# Patient Record
Sex: FEMALE | Race: WHITE | NOT HISPANIC OR LATINO | URBAN - METROPOLITAN AREA
[De-identification: names, ages, dates, MRNs, and addresses within clinical notes are randomized per-mention and may not be internally consistent; named-entity substitution may affect disease eponyms.]

---

## 2017-04-13 ENCOUNTER — OUTPATIENT (OUTPATIENT)
Dept: OUTPATIENT SERVICES | Facility: HOSPITAL | Age: 72
LOS: 1 days | Discharge: HOME | End: 2017-04-13

## 2017-04-26 ENCOUNTER — TRANSCRIPTION ENCOUNTER (OUTPATIENT)
Age: 72
End: 2017-04-26

## 2017-05-02 ENCOUNTER — APPOINTMENT (OUTPATIENT)
Dept: SURGERY | Facility: CLINIC | Age: 72
End: 2017-05-02

## 2017-06-27 DIAGNOSIS — R22.42 LOCALIZED SWELLING, MASS AND LUMP, LEFT LOWER LIMB: ICD-10-CM

## 2017-08-03 ENCOUNTER — OUTPATIENT (OUTPATIENT)
Dept: OUTPATIENT SERVICES | Facility: HOSPITAL | Age: 72
LOS: 1 days | Discharge: HOME | End: 2017-08-03

## 2017-08-03 DIAGNOSIS — J11.1 INFLUENZA DUE TO UNIDENTIFIED INFLUENZA VIRUS WITH OTHER RESPIRATORY MANIFESTATIONS: ICD-10-CM

## 2017-08-03 DIAGNOSIS — I25.10 ATHEROSCLEROTIC HEART DISEASE OF NATIVE CORONARY ARTERY WITHOUT ANGINA PECTORIS: ICD-10-CM

## 2017-08-03 DIAGNOSIS — N20.0 CALCULUS OF KIDNEY: ICD-10-CM

## 2017-08-03 DIAGNOSIS — I67.4 HYPERTENSIVE ENCEPHALOPATHY: ICD-10-CM

## 2017-08-03 DIAGNOSIS — R07.89 OTHER CHEST PAIN: ICD-10-CM

## 2017-08-03 DIAGNOSIS — N18.4 CHRONIC KIDNEY DISEASE, STAGE 4 (SEVERE): ICD-10-CM

## 2017-08-07 DIAGNOSIS — F41.9 ANXIETY DISORDER, UNSPECIFIED: ICD-10-CM

## 2017-08-07 DIAGNOSIS — I10 ESSENTIAL (PRIMARY) HYPERTENSION: ICD-10-CM

## 2017-08-07 DIAGNOSIS — H25.89 OTHER AGE-RELATED CATARACT: ICD-10-CM

## 2017-08-07 DIAGNOSIS — E78.00 PURE HYPERCHOLESTEROLEMIA, UNSPECIFIED: ICD-10-CM

## 2017-08-07 DIAGNOSIS — I48.91 UNSPECIFIED ATRIAL FIBRILLATION: ICD-10-CM

## 2017-08-10 ENCOUNTER — OUTPATIENT (OUTPATIENT)
Dept: OUTPATIENT SERVICES | Facility: HOSPITAL | Age: 72
LOS: 1 days | Discharge: HOME | End: 2017-08-10

## 2017-08-10 DIAGNOSIS — J11.1 INFLUENZA DUE TO UNIDENTIFIED INFLUENZA VIRUS WITH OTHER RESPIRATORY MANIFESTATIONS: ICD-10-CM

## 2017-08-10 DIAGNOSIS — N18.4 CHRONIC KIDNEY DISEASE, STAGE 4 (SEVERE): ICD-10-CM

## 2017-08-10 DIAGNOSIS — I25.10 ATHEROSCLEROTIC HEART DISEASE OF NATIVE CORONARY ARTERY WITHOUT ANGINA PECTORIS: ICD-10-CM

## 2017-08-10 DIAGNOSIS — N20.0 CALCULUS OF KIDNEY: ICD-10-CM

## 2017-08-10 DIAGNOSIS — R07.89 OTHER CHEST PAIN: ICD-10-CM

## 2017-08-10 DIAGNOSIS — I67.4 HYPERTENSIVE ENCEPHALOPATHY: ICD-10-CM

## 2017-08-15 DIAGNOSIS — E66.9 OBESITY, UNSPECIFIED: ICD-10-CM

## 2017-08-15 DIAGNOSIS — I10 ESSENTIAL (PRIMARY) HYPERTENSION: ICD-10-CM

## 2017-08-15 DIAGNOSIS — E78.00 PURE HYPERCHOLESTEROLEMIA, UNSPECIFIED: ICD-10-CM

## 2017-08-15 DIAGNOSIS — H25.89 OTHER AGE-RELATED CATARACT: ICD-10-CM

## 2018-03-24 ENCOUNTER — OUTPATIENT (OUTPATIENT)
Dept: ADMINISTRATIVE | Facility: HOSPITAL | Age: 73
LOS: 1 days | Discharge: HOME | End: 2018-03-24

## 2018-03-26 DIAGNOSIS — Z12.31 ENCOUNTER FOR SCREENING MAMMOGRAM FOR MALIGNANT NEOPLASM OF BREAST: ICD-10-CM

## 2018-05-07 ENCOUNTER — INPATIENT (INPATIENT)
Facility: HOSPITAL | Age: 73
LOS: 3 days | Discharge: HOME | End: 2018-05-11
Attending: INTERNAL MEDICINE | Admitting: INTERNAL MEDICINE

## 2018-05-07 VITALS
TEMPERATURE: 98 F | HEART RATE: 134 BPM | OXYGEN SATURATION: 98 % | DIASTOLIC BLOOD PRESSURE: 78 MMHG | RESPIRATION RATE: 18 BRPM | SYSTOLIC BLOOD PRESSURE: 167 MMHG | WEIGHT: 176.37 LBS

## 2018-05-07 DIAGNOSIS — Z90.710 ACQUIRED ABSENCE OF BOTH CERVIX AND UTERUS: Chronic | ICD-10-CM

## 2018-05-07 DIAGNOSIS — Z98.890 OTHER SPECIFIED POSTPROCEDURAL STATES: Chronic | ICD-10-CM

## 2018-05-07 LAB
ALBUMIN SERPL ELPH-MCNC: 4.2 G/DL — SIGNIFICANT CHANGE UP (ref 3.5–5.2)
ALP SERPL-CCNC: 58 U/L — SIGNIFICANT CHANGE UP (ref 30–115)
ALT FLD-CCNC: 18 U/L — SIGNIFICANT CHANGE UP (ref 0–41)
ANION GAP SERPL CALC-SCNC: 20 MMOL/L — HIGH (ref 7–14)
APPEARANCE UR: (no result)
AST SERPL-CCNC: 19 U/L — SIGNIFICANT CHANGE UP (ref 0–41)
BILIRUB SERPL-MCNC: <0.2 MG/DL — SIGNIFICANT CHANGE UP (ref 0.2–1.2)
BILIRUB UR-MCNC: NEGATIVE — SIGNIFICANT CHANGE UP
BUN SERPL-MCNC: 66 MG/DL — CRITICAL HIGH (ref 10–20)
CALCIUM SERPL-MCNC: 10.2 MG/DL — HIGH (ref 8.5–10.1)
CHLORIDE SERPL-SCNC: 105 MMOL/L — SIGNIFICANT CHANGE UP (ref 98–110)
CK MB CFR SERPL CALC: 2.1 NG/ML — SIGNIFICANT CHANGE UP (ref 0.6–6.3)
CK MB CFR SERPL CALC: 2.2 NG/ML — SIGNIFICANT CHANGE UP (ref 0.6–6.3)
CK SERPL-CCNC: 40 U/L — SIGNIFICANT CHANGE UP (ref 0–225)
CK SERPL-CCNC: 44 U/L — SIGNIFICANT CHANGE UP (ref 0–225)
CO2 SERPL-SCNC: 20 MMOL/L — SIGNIFICANT CHANGE UP (ref 17–32)
COLOR SPEC: YELLOW — SIGNIFICANT CHANGE UP
CREAT SERPL-MCNC: 3.5 MG/DL — HIGH (ref 0.7–1.5)
DIFF PNL FLD: (no result)
GAS PNL BLDV: SIGNIFICANT CHANGE UP
GLUCOSE SERPL-MCNC: 112 MG/DL — HIGH (ref 70–99)
GLUCOSE UR QL: NEGATIVE MG/DL — SIGNIFICANT CHANGE UP
HCT VFR BLD CALC: 36.7 % — LOW (ref 37–47)
HGB BLD-MCNC: 11.7 G/DL — LOW (ref 12–16)
INR BLD: 1.1 RATIO — SIGNIFICANT CHANGE UP (ref 0.65–1.3)
KETONES UR-MCNC: NEGATIVE — SIGNIFICANT CHANGE UP
LACTATE SERPL-SCNC: <0.2 MMOL/L — LOW (ref 0.5–2.2)
LEUKOCYTE ESTERASE UR-ACNC: (no result)
LIDOCAIN IGE QN: 71 U/L — HIGH (ref 7–60)
MCHC RBC-ENTMCNC: 27.7 PG — SIGNIFICANT CHANGE UP (ref 27–31)
MCHC RBC-ENTMCNC: 31.9 G/DL — LOW (ref 32–37)
MCV RBC AUTO: 87 FL — SIGNIFICANT CHANGE UP (ref 81–99)
NITRITE UR-MCNC: POSITIVE
NRBC # BLD: 0 /100 WBCS — SIGNIFICANT CHANGE UP (ref 0–0)
NT-PROBNP SERPL-SCNC: 6032 PG/ML — HIGH (ref 0–300)
PH UR: 6 — SIGNIFICANT CHANGE UP (ref 5–8)
PLATELET # BLD AUTO: 225 K/UL — SIGNIFICANT CHANGE UP (ref 130–400)
POTASSIUM SERPL-MCNC: 4.9 MMOL/L — SIGNIFICANT CHANGE UP (ref 3.5–5)
POTASSIUM SERPL-SCNC: 4.9 MMOL/L — SIGNIFICANT CHANGE UP (ref 3.5–5)
PROT SERPL-MCNC: 7.6 G/DL — SIGNIFICANT CHANGE UP (ref 6–8)
PROT UR-MCNC: >=300 MG/DL
PROTHROM AB SERPL-ACNC: 11.9 SEC — SIGNIFICANT CHANGE UP (ref 9.95–12.87)
RBC # BLD: 4.22 M/UL — SIGNIFICANT CHANGE UP (ref 4.2–5.4)
RBC # FLD: 14.3 % — SIGNIFICANT CHANGE UP (ref 11.5–14.5)
SODIUM SERPL-SCNC: 145 MMOL/L — SIGNIFICANT CHANGE UP (ref 135–146)
SP GR SPEC: 1.02 — SIGNIFICANT CHANGE UP (ref 1.01–1.03)
TROPONIN T SERPL-MCNC: <0.01 NG/ML — SIGNIFICANT CHANGE UP
TROPONIN T SERPL-MCNC: <0.01 NG/ML — SIGNIFICANT CHANGE UP
UROBILINOGEN FLD QL: 0.2 MG/DL — SIGNIFICANT CHANGE UP (ref 0.2–0.2)
WBC # BLD: 7.11 K/UL — SIGNIFICANT CHANGE UP (ref 4.8–10.8)
WBC # FLD AUTO: 7.11 K/UL — SIGNIFICANT CHANGE UP (ref 4.8–10.8)

## 2018-05-07 RX ORDER — ALLOPURINOL 300 MG
0 TABLET ORAL
Qty: 0 | Refills: 0 | COMMUNITY

## 2018-05-07 RX ORDER — CEFTRIAXONE 500 MG/1
1 INJECTION, POWDER, FOR SOLUTION INTRAMUSCULAR; INTRAVENOUS EVERY 24 HOURS
Qty: 0 | Refills: 0 | Status: DISCONTINUED | OUTPATIENT
Start: 2018-05-08 | End: 2018-05-11

## 2018-05-07 RX ORDER — CLOPIDOGREL BISULFATE 75 MG/1
75 TABLET, FILM COATED ORAL DAILY
Qty: 0 | Refills: 0 | Status: DISCONTINUED | OUTPATIENT
Start: 2018-05-07 | End: 2018-05-08

## 2018-05-07 RX ORDER — ASPIRIN/CALCIUM CARB/MAGNESIUM 324 MG
2 TABLET ORAL
Qty: 0 | Refills: 0 | COMMUNITY

## 2018-05-07 RX ORDER — HEPARIN SODIUM 5000 [USP'U]/ML
900 INJECTION INTRAVENOUS; SUBCUTANEOUS
Qty: 25000 | Refills: 0 | Status: DISCONTINUED | OUTPATIENT
Start: 2018-05-07 | End: 2018-05-08

## 2018-05-07 RX ORDER — ALPRAZOLAM 0.25 MG
0.25 TABLET ORAL THREE TIMES A DAY
Qty: 0 | Refills: 0 | Status: DISCONTINUED | OUTPATIENT
Start: 2018-05-07 | End: 2018-05-11

## 2018-05-07 RX ORDER — ALLOPURINOL 300 MG
100 TABLET ORAL DAILY
Qty: 0 | Refills: 0 | Status: DISCONTINUED | OUTPATIENT
Start: 2018-05-07 | End: 2018-05-11

## 2018-05-07 RX ORDER — CEFTRIAXONE 500 MG/1
1 INJECTION, POWDER, FOR SOLUTION INTRAMUSCULAR; INTRAVENOUS ONCE
Qty: 0 | Refills: 0 | Status: COMPLETED | OUTPATIENT
Start: 2018-05-07 | End: 2018-05-07

## 2018-05-07 RX ORDER — METOPROLOL TARTRATE 50 MG
50 TABLET ORAL THREE TIMES A DAY
Qty: 0 | Refills: 0 | Status: DISCONTINUED | OUTPATIENT
Start: 2018-05-07 | End: 2018-05-11

## 2018-05-07 RX ORDER — METOPROLOL TARTRATE 50 MG
25 TABLET ORAL ONCE
Qty: 0 | Refills: 0 | Status: COMPLETED | OUTPATIENT
Start: 2018-05-07 | End: 2018-05-07

## 2018-05-07 RX ORDER — NIFEDIPINE 30 MG
0 TABLET, EXTENDED RELEASE 24 HR ORAL
Qty: 0 | Refills: 0 | COMMUNITY

## 2018-05-07 RX ORDER — ISOSORBIDE MONONITRATE 60 MG/1
30 TABLET, EXTENDED RELEASE ORAL DAILY
Qty: 0 | Refills: 0 | Status: DISCONTINUED | OUTPATIENT
Start: 2018-05-07 | End: 2018-05-11

## 2018-05-07 RX ORDER — METOPROLOL TARTRATE 50 MG
5 TABLET ORAL ONCE
Qty: 0 | Refills: 0 | Status: COMPLETED | OUTPATIENT
Start: 2018-05-07 | End: 2018-05-07

## 2018-05-07 RX ORDER — SODIUM CHLORIDE 9 MG/ML
1000 INJECTION, SOLUTION INTRAVENOUS ONCE
Qty: 0 | Refills: 0 | Status: COMPLETED | OUTPATIENT
Start: 2018-05-07 | End: 2018-05-07

## 2018-05-07 RX ORDER — ASPIRIN/CALCIUM CARB/MAGNESIUM 324 MG
162 TABLET ORAL DAILY
Qty: 0 | Refills: 0 | Status: DISCONTINUED | OUTPATIENT
Start: 2018-05-07 | End: 2018-05-09

## 2018-05-07 RX ORDER — CLOPIDOGREL BISULFATE 75 MG/1
1 TABLET, FILM COATED ORAL
Qty: 0 | Refills: 0 | COMMUNITY

## 2018-05-07 RX ORDER — AMIODARONE HYDROCHLORIDE 400 MG/1
200 TABLET ORAL DAILY
Qty: 0 | Refills: 0 | Status: DISCONTINUED | OUTPATIENT
Start: 2018-05-07 | End: 2018-05-08

## 2018-05-07 RX ADMIN — CEFTRIAXONE 100 GRAM(S): 500 INJECTION, POWDER, FOR SOLUTION INTRAMUSCULAR; INTRAVENOUS at 17:40

## 2018-05-07 RX ADMIN — Medication 162 MILLIGRAM(S): at 22:17

## 2018-05-07 RX ADMIN — Medication 50 MILLIGRAM(S): at 22:18

## 2018-05-07 RX ADMIN — SODIUM CHLORIDE 2000 MILLILITER(S): 9 INJECTION, SOLUTION INTRAVENOUS at 17:48

## 2018-05-07 RX ADMIN — HEPARIN SODIUM 9 UNIT(S)/HR: 5000 INJECTION INTRAVENOUS; SUBCUTANEOUS at 22:06

## 2018-05-07 RX ADMIN — Medication 0.25 MILLIGRAM(S): at 22:45

## 2018-05-07 RX ADMIN — Medication 5 MILLIGRAM(S): at 17:00

## 2018-05-07 RX ADMIN — Medication 25 MILLIGRAM(S): at 17:32

## 2018-05-07 NOTE — PATIENT PROFILE ADULT. - PMH
Atrial fibrillation    CAD (coronary artery disease)    CKD (chronic kidney disease)    HTN (hypertension)    Kidney stone    MI (myocardial infarction)

## 2018-05-07 NOTE — ED PROVIDER NOTE - NS ED ROS FT
Constitutional: See HPI.  Eyes: No visual changes, eye pain or discharge.  ENMT: No hearing changes, pain, discharge or infections. No neck pain or stiffness.  Cardiac: +palpitations. No chest pain, SOB or edema. No chest pain with exertion.  Respiratory: No cough or respiratory distress.   GI: No nausea, vomiting, diarrhea or abdominal pain.  : No dysuria, frequency or burning.  MS: No myalgia, muscle weakness, joint pain or back pain.  Neuro: No headache or weakness. No LOC.  Skin: No skin rash.

## 2018-05-07 NOTE — H&P ADULT - HISTORY OF PRESENT ILLNESS
73 y/o F pmh htn, CKD, CAD s/p CABG x4, afib s/p failed abaltion p.w 3 days of intermittent palpitations. Denies CP, SOB, n/v, diaphoresis, calf pain/ swelling 72 year old Female with PMH of CAD, MI s/p CABG, h/o Afib not on anticoagulation, h/o ablation, HTN, CKD (Baseline Cr 2.4), CAD s/p CABG, Anxiety presented with intermittent palpitations for past 4 days which are getting worse. Pt denies recent sick contact, fever, chills, SOB, chest pain, nausea, vomiting, abdominal pain, dysuria, diarrhea, rash, muscle or joint pain. Patient had history of Afib many years ago and underwent ablation with EP (Dr. Rosas) admits to intermittent palpitations over the course of many years but it was not severe. Patient is compliant with following with her cardiologist every 4 months and underwent holter monitor     Cardio: Dr. Child  Nephro: Dr. Phelps 72 year old Female with PMH of CAD, MI s/p CABG, h/o Afib not on anticoagulation, h/o ablation, HTN, CKD (Baseline Cr 2.4), CAD s/p CABG, Anxiety presented with intermittent palpitations for past 4 days which are getting worse. Pt denies recent sick contact, fever, chills, SOB, chest pain, nausea, vomiting, abdominal pain, dysuria, diarrhea, rash, muscle or joint pain. Patient had history of Afib many years ago and underwent ablation with EP (Dr. Rosas) admits to intermittent palpitations over the course of many years but it was not severe to this point. Patient is compliant with following with her cardiologist every 4 months and underwent holter monitoring outpatient. It is unclear why patient is not on anticoagulation.    Cardio: Dr. Child  Nephro: Dr. Phelps

## 2018-05-07 NOTE — ED PROVIDER NOTE - CARE PLAN
Principal Discharge DX:	Atrial fibrillation, unspecified type  Secondary Diagnosis:	UTI (urinary tract infection) Principal Discharge DX:	Atrial fibrillation, unspecified type  Secondary Diagnosis:	UTI (urinary tract infection)  Secondary Diagnosis:	Acute kidney injury

## 2018-05-07 NOTE — H&P ADULT - NSHPLABSRESULTS_GEN_ALL_CORE
______________________________________________________________________________________________________  LABS:                        11.7   7.11  )-----------( 225      ( 07 May 2018 16:51 )             36.7         145  |  105  |  66<HH>  ----------------------------<  112<H>  4.9   |  20  |  3.5<H>    Ca    10.2<H>      07 May 2018 16:51    TPro  7.6  /  Alb  4.2  /  TBili  <0.2  /  DBili  x   /  AST  19  /  ALT  18  /  AlkPhos  58      PT/INR - ( 07 May 2018 19:10 )   PT: 11.90 sec;   INR: 1.10 ratio           CARDIAC MARKERS ( 07 May 2018 16:51 )  x     / <0.01 ng/mL / 44 U/L / x     / 2.2 ng/mL        Urinalysis Basic - ( 07 May 2018 16:51 )    Color: Yellow / Appearance: Cloudy / S.020 / pH: x  Gluc: x / Ketone: Negative  / Bili: Negative / Urobili: 0.2 mg/dL   Blood: x / Protein: >=300 mg/dL / Nitrite: Positive   Leuk Esterase: Moderate / RBC: x / WBC x   Sq Epi: x / Non Sq Epi: x / Bacteria: x  Blood Gas Profile - Venous (18 @ 17:25)    pH, Venous: 7.32    pCO2, Venous: 41 mmHg    pO2, Venous: 36 mmHg    HCO3, Venous: 21 mmoL/L    Base Excess, Venous: -4.7 mmoL/L    Oxygen Saturation, Venous: 67 %    Blood Gas Venous - Lactate: 0.9 mmoL/L (18 @ 17:25)  Serum Pro-Brain Natriuretic Peptide: 6032 pg/mL (18 @ 16:51)

## 2018-05-07 NOTE — ED PROVIDER NOTE - PROGRESS NOTE DETAILS
called Dr Naylor x 2, [phone] no answer spoke w/ Dr Naylor, can admit, Dr Chris covers him at [facility]. signed out to MAR, inpatient team will f/u coags and order heparin. Pt rate controlled currently 95.

## 2018-05-07 NOTE — ED PROVIDER NOTE - PHYSICAL EXAMINATION
AOx4, Non toxic appearing, NAD, speaking in full sentences. Skin  warm and dry, no acute rash. Head normocephalic, atraumatic. Conjunctiva and sclera clear. MM moist, no nasal discharge.  Pharynx and TM's unremarkable.  No mastoid or temporal ttp. Neck supple nt, no meningeal signs. Heart irregular RRR s1s2 nl, no rub/murmur. Lungs- No retractions, BS equal, CTAB. Abdomen soft ntnd no r/g. Extremities- normal ROM. No LE edema, calves nttp b/l.

## 2018-05-07 NOTE — H&P ADULT - NSHPPHYSICALEXAM_GEN_ALL_CORE
T(C): 36.1 (07 May 2018 19:11), Max: 36.7 (07 May 2018 15:58)  T(F): 97 (07 May 2018 19:11), Max: 98 (07 May 2018 15:58)  HR: 90 (07 May 2018 19:11) (90 - 134)  BP: 172/73 (07 May 2018 19:11) (152/84 - 172/74)    RR: 18 (07 May 2018 19:11) (18 - 18)  SpO2: 98% (07 May 2018 19:11) (98% - 99%)    PHYSICAL EXAM:  GENERAL: NAD, well-groomed, well-developed  HEAD:  Atraumatic, Normocephalic  EYES: EOMI, PERRLA, conjunctiva and sclera clear  NECK: Supple, No JVD, Normal thyroid  NERVOUS SYSTEM:  Alert & Oriented X3, Good concentration; Move all extremities.  CHEST/LUNG: Decrease bilateral breath sounds.  HEART: Irregular. (+) Murmur  ABDOMEN: Soft, Nontender, Nondistended; Bowel sounds present  EXTREMITIES:  2+ Peripheral Pulses, No clubbing, cyanosis, or edema  LYMPH: No lymphadenopathy noted  SKIN: No rashes or lesions

## 2018-05-07 NOTE — H&P ADULT - ASSESSMENT
72 year old Female with PMH of CAD, MI s/p CABG, h/o Afib not on anticoagulation, h/o ablation, HTN, CKD (Baseline Cr 2.4), CAD s/p CABG, Anxiety presented with intermittent palpitations for past 4 days which are getting worse    # Afib with RVR  - c/w rate control  - start on heparin given no other contraindication  - unclear why patient is not previously on anticoagulation for paroxysmal afib [id number] score of 3  - patient become very anxious when speaking about option of cardioversion or ablation due to previous bad experience with it as per patient  - f/u Cardiology for further management  - increase metoprolol 50 mg to TID, responded to metoprolol in ED    # UTI  - c/w rocephin  - f/u cultures    # FRANC on CKD  - US retroperitoneal  - urine studies  - mild IV hydration    # HTN  - on nifedepine xl, will hold ordering for now, reorder tomorrow based on blood pressure  - consider switching to cardizem if rate is not controlled    # h/o Kidney Stones  - c/w allopurinol for now    # Anxiety  - takes xanax prn at home    # h/o CAD, MI  - c/w aspirin and plavix    # DVT Prophylaxis  - on heparin drip 72 year old Female with PMH of CAD, MI s/p CABG, h/o Afib not on anticoagulation, h/o ablation, HTN, CKD (Baseline Cr 2.4), CAD s/p CABG, Anxiety presented with intermittent palpitations for past 4 days which are getting worse    # Afib with RVR  - c/w rate control  - start on heparin given no other contraindication  - unclear why patient is not previously on anticoagulation for paroxysmal afib [id number] score of 3  - patient become very anxious when speaking about option of cardioversion or ablation due to previous bad experience with it as per patient  - f/u Cardiology for further management  - increase metoprolol 50 mg to TID, responded to metoprolol in ED    # UTI  - c/w rocephin  - f/u cultures    # FRANC on CKD s/p IV bolus in ED  - US retroperitoneal  - urine studies  - hold further IVF for now    # HTN  - on nifedepine xl, will hold ordering for now, reorder tomorrow based on blood pressure  - consider switching to cardizem if rate is not controlled    # h/o Kidney Stones  - c/w allopurinol for now    # Anxiety  - takes xanax prn at home    # h/o CAD, MI  - c/w aspirin and plavix    # DVT Prophylaxis  - on heparin drip

## 2018-05-07 NOTE — H&P ADULT - PMH
<<-----Click on this checkbox to enter Past Medical History Atrial fibrillation    CAD (coronary artery disease)    CKD (chronic kidney disease)    HTN (hypertension)    Kidney stone    MI (myocardial infarction)

## 2018-05-07 NOTE — ED ADULT NURSE NOTE - OBJECTIVE STATEMENT
Pt complaining of palpations starting 3 days ago intermittently. Pt a&OX4, speaking coherently, denies any shorntess of breath, complains of chest discomfort midsternal

## 2018-05-07 NOTE — ED PROVIDER NOTE - OBJECTIVE STATEMENT
73 y/o F pmh htn, CAD s/p CABG x4, afib s/p failed abaltion p.w 3 days of intermittent palpitations. Denies CP, SOB, n/v, diaphoresis, calf pain/ swelling. 73 y/o F pmh htn, CKD, CAD s/p CABG x4, afib s/p failed abaltion p.w 3 days of intermittent palpitations. Denies CP, SOB, n/v, diaphoresis, calf pain/ swelling.

## 2018-05-07 NOTE — ED PROVIDER NOTE - ATTENDING CONTRIBUTION TO CARE
73 y/o F PMH HTN, CAD s/p CABG x4, A Fib s/p failed ablation presents with 3 days of intermittent palpitations. Denies CP, SOB, n/v, diaphoresis, calf pain/ swelling. Patient found to be in symptomatic A Fib.  Patient  is a CHADS2-Vasc 4.  Will rate control and send appropriate labs, appropriate work up.  Patient will require admission and anticoagulation as she is not a good candidate for outpatient management (Her doctor is away on vacation).  Will anticoagulate here in the ED after blood work results.

## 2018-05-08 DIAGNOSIS — I25.10 ATHEROSCLEROTIC HEART DISEASE OF NATIVE CORONARY ARTERY WITHOUT ANGINA PECTORIS: ICD-10-CM

## 2018-05-08 DIAGNOSIS — N17.9 ACUTE KIDNEY FAILURE, UNSPECIFIED: ICD-10-CM

## 2018-05-08 DIAGNOSIS — I48.91 UNSPECIFIED ATRIAL FIBRILLATION: ICD-10-CM

## 2018-05-08 DIAGNOSIS — I10 ESSENTIAL (PRIMARY) HYPERTENSION: ICD-10-CM

## 2018-05-08 LAB
ANION GAP SERPL CALC-SCNC: 18 MMOL/L — HIGH (ref 7–14)
APTT BLD: 47 SEC — HIGH (ref 27–39.2)
APTT BLD: 51.5 SEC — HIGH (ref 27–39.2)
APTT BLD: 55 SEC — HIGH (ref 27–39.2)
BUN SERPL-MCNC: 56 MG/DL — HIGH (ref 10–20)
CALCIUM SERPL-MCNC: 9.7 MG/DL — SIGNIFICANT CHANGE UP (ref 8.5–10.1)
CALCIUM UR-MCNC: 5 MG/DL — SIGNIFICANT CHANGE UP
CHLORIDE SERPL-SCNC: 108 MMOL/L — SIGNIFICANT CHANGE UP (ref 98–110)
CHLORIDE UR-SCNC: 67 — SIGNIFICANT CHANGE UP
CK MB CFR SERPL CALC: 1.5 NG/ML — SIGNIFICANT CHANGE UP (ref 0.6–6.3)
CK MB CFR SERPL CALC: 1.8 NG/ML — SIGNIFICANT CHANGE UP (ref 0.6–6.3)
CK SERPL-CCNC: 27 U/L — SIGNIFICANT CHANGE UP (ref 0–225)
CK SERPL-CCNC: 29 U/L — SIGNIFICANT CHANGE UP (ref 0–225)
CO2 SERPL-SCNC: 21 MMOL/L — SIGNIFICANT CHANGE UP (ref 17–32)
CREAT ?TM UR-MCNC: 40 MG/DL — SIGNIFICANT CHANGE UP
CREAT SERPL-MCNC: 3.2 MG/DL — HIGH (ref 0.7–1.5)
GLUCOSE SERPL-MCNC: 131 MG/DL — HIGH (ref 70–99)
HCT VFR BLD CALC: 36.5 % — LOW (ref 37–47)
HGB BLD-MCNC: 11.4 G/DL — LOW (ref 12–16)
INR BLD: 1.14 RATIO — SIGNIFICANT CHANGE UP (ref 0.65–1.3)
MAGNESIUM SERPL-MCNC: 1.9 MG/DL — SIGNIFICANT CHANGE UP (ref 1.8–2.4)
MCHC RBC-ENTMCNC: 27.1 PG — SIGNIFICANT CHANGE UP (ref 27–31)
MCHC RBC-ENTMCNC: 31.2 G/DL — LOW (ref 32–37)
MCV RBC AUTO: 86.7 FL — SIGNIFICANT CHANGE UP (ref 81–99)
NRBC # BLD: 0 /100 WBCS — SIGNIFICANT CHANGE UP (ref 0–0)
PHOSPHATE SERPL-MCNC: 3.8 MG/DL — SIGNIFICANT CHANGE UP (ref 2.1–4.9)
PLATELET # BLD AUTO: 240 K/UL — SIGNIFICANT CHANGE UP (ref 130–400)
POTASSIUM SERPL-MCNC: 3.8 MMOL/L — SIGNIFICANT CHANGE UP (ref 3.5–5)
POTASSIUM SERPL-SCNC: 3.8 MMOL/L — SIGNIFICANT CHANGE UP (ref 3.5–5)
POTASSIUM UR-SCNC: 12 MMOL/L — SIGNIFICANT CHANGE UP
PROT ?TM UR-MCNC: 159 MG/DLG/24H — SIGNIFICANT CHANGE UP
PROTHROM AB SERPL-ACNC: 12.4 SEC — SIGNIFICANT CHANGE UP (ref 9.95–12.87)
RBC # BLD: 4.21 M/UL — SIGNIFICANT CHANGE UP (ref 4.2–5.4)
RBC # FLD: 14.2 % — SIGNIFICANT CHANGE UP (ref 11.5–14.5)
SODIUM SERPL-SCNC: 147 MMOL/L — HIGH (ref 135–146)
SODIUM UR-SCNC: 92 MMOL/L — SIGNIFICANT CHANGE UP
TROPONIN T SERPL-MCNC: <0.01 NG/ML — SIGNIFICANT CHANGE UP
TROPONIN T SERPL-MCNC: <0.01 NG/ML — SIGNIFICANT CHANGE UP
UUN UR-MCNC: 405 MG/DL — SIGNIFICANT CHANGE UP
WBC # BLD: 6.55 K/UL — SIGNIFICANT CHANGE UP (ref 4.8–10.8)
WBC # FLD AUTO: 6.55 K/UL — SIGNIFICANT CHANGE UP (ref 4.8–10.8)

## 2018-05-08 RX ORDER — METOPROLOL TARTRATE 50 MG
5 TABLET ORAL ONCE
Qty: 0 | Refills: 0 | Status: COMPLETED | OUTPATIENT
Start: 2018-05-08 | End: 2018-05-08

## 2018-05-08 RX ORDER — AMLODIPINE BESYLATE 2.5 MG/1
5 TABLET ORAL DAILY
Qty: 0 | Refills: 0 | Status: DISCONTINUED | OUTPATIENT
Start: 2018-05-08 | End: 2018-05-09

## 2018-05-08 RX ORDER — SODIUM CHLORIDE 9 MG/ML
1000 INJECTION INTRAMUSCULAR; INTRAVENOUS; SUBCUTANEOUS
Qty: 0 | Refills: 0 | Status: DISCONTINUED | OUTPATIENT
Start: 2018-05-08 | End: 2018-05-10

## 2018-05-08 RX ORDER — ACETAMINOPHEN 500 MG
650 TABLET ORAL ONCE
Qty: 0 | Refills: 0 | Status: COMPLETED | OUTPATIENT
Start: 2018-05-08 | End: 2018-05-08

## 2018-05-08 RX ORDER — HEPARIN SODIUM 5000 [USP'U]/ML
1100 INJECTION INTRAVENOUS; SUBCUTANEOUS
Qty: 25000 | Refills: 0 | Status: DISCONTINUED | OUTPATIENT
Start: 2018-05-08 | End: 2018-05-08

## 2018-05-08 RX ORDER — HEPARIN SODIUM 5000 [USP'U]/ML
1300 INJECTION INTRAVENOUS; SUBCUTANEOUS
Qty: 25000 | Refills: 0 | Status: DISCONTINUED | OUTPATIENT
Start: 2018-05-08 | End: 2018-05-09

## 2018-05-08 RX ORDER — HEPARIN SODIUM 5000 [USP'U]/ML
1200 INJECTION INTRAVENOUS; SUBCUTANEOUS
Qty: 25000 | Refills: 0 | Status: DISCONTINUED | OUTPATIENT
Start: 2018-05-08 | End: 2018-05-08

## 2018-05-08 RX ORDER — WARFARIN SODIUM 2.5 MG/1
5 TABLET ORAL ONCE
Qty: 0 | Refills: 0 | Status: COMPLETED | OUTPATIENT
Start: 2018-05-08 | End: 2018-05-08

## 2018-05-08 RX ORDER — AMIODARONE HYDROCHLORIDE 400 MG/1
1 TABLET ORAL
Qty: 900 | Refills: 0 | Status: DISCONTINUED | OUTPATIENT
Start: 2018-05-08 | End: 2018-05-10

## 2018-05-08 RX ORDER — AMIODARONE HYDROCHLORIDE 400 MG/1
150 TABLET ORAL ONCE
Qty: 0 | Refills: 0 | Status: COMPLETED | OUTPATIENT
Start: 2018-05-08 | End: 2018-05-08

## 2018-05-08 RX ORDER — AMIODARONE HYDROCHLORIDE 400 MG/1
200 TABLET ORAL
Qty: 0 | Refills: 0 | Status: DISCONTINUED | OUTPATIENT
Start: 2018-05-08 | End: 2018-05-08

## 2018-05-08 RX ORDER — AMIODARONE HYDROCHLORIDE 400 MG/1
0.5 TABLET ORAL
Qty: 900 | Refills: 0 | Status: DISCONTINUED | OUTPATIENT
Start: 2018-05-08 | End: 2018-05-10

## 2018-05-08 RX ADMIN — ISOSORBIDE MONONITRATE 30 MILLIGRAM(S): 60 TABLET, EXTENDED RELEASE ORAL at 11:17

## 2018-05-08 RX ADMIN — Medication 50 MILLIGRAM(S): at 14:23

## 2018-05-08 RX ADMIN — SODIUM CHLORIDE 60 MILLILITER(S): 9 INJECTION INTRAMUSCULAR; INTRAVENOUS; SUBCUTANEOUS at 21:59

## 2018-05-08 RX ADMIN — Medication 50 MILLIGRAM(S): at 21:58

## 2018-05-08 RX ADMIN — Medication 650 MILLIGRAM(S): at 21:58

## 2018-05-08 RX ADMIN — Medication 0.25 MILLIGRAM(S): at 21:58

## 2018-05-08 RX ADMIN — Medication 50 MILLIGRAM(S): at 06:12

## 2018-05-08 RX ADMIN — WARFARIN SODIUM 5 MILLIGRAM(S): 2.5 TABLET ORAL at 21:58

## 2018-05-08 RX ADMIN — CLOPIDOGREL BISULFATE 75 MILLIGRAM(S): 75 TABLET, FILM COATED ORAL at 11:24

## 2018-05-08 RX ADMIN — HEPARIN SODIUM 11 UNIT(S)/HR: 5000 INJECTION INTRAVENOUS; SUBCUTANEOUS at 11:13

## 2018-05-08 RX ADMIN — CEFTRIAXONE 100 GRAM(S): 500 INJECTION, POWDER, FOR SOLUTION INTRAMUSCULAR; INTRAVENOUS at 06:14

## 2018-05-08 RX ADMIN — Medication 162 MILLIGRAM(S): at 11:24

## 2018-05-08 RX ADMIN — Medication 5 MILLIGRAM(S): at 14:23

## 2018-05-08 RX ADMIN — AMIODARONE HYDROCHLORIDE 618 MILLIGRAM(S): 400 TABLET ORAL at 15:31

## 2018-05-08 RX ADMIN — AMIODARONE HYDROCHLORIDE 200 MILLIGRAM(S): 400 TABLET ORAL at 06:12

## 2018-05-08 RX ADMIN — AMLODIPINE BESYLATE 5 MILLIGRAM(S): 2.5 TABLET ORAL at 21:58

## 2018-05-08 RX ADMIN — Medication 100 MILLIGRAM(S): at 11:17

## 2018-05-08 RX ADMIN — AMIODARONE HYDROCHLORIDE 33.33 MG/MIN: 400 TABLET ORAL at 15:35

## 2018-05-08 RX ADMIN — AMIODARONE HYDROCHLORIDE 16.67 MG/MIN: 400 TABLET ORAL at 21:53

## 2018-05-08 RX ADMIN — HEPARIN SODIUM 12 UNIT(S)/HR: 5000 INJECTION INTRAVENOUS; SUBCUTANEOUS at 17:41

## 2018-05-08 RX ADMIN — Medication 650 MILLIGRAM(S): at 22:16

## 2018-05-08 NOTE — CONSULT NOTE ADULT - ASSESSMENT
72 year old Female with PMH of CAD, MI s/p CABG, h/o Afib/A flutter not on anticoagulation, h/o ablation, HTN, CKD (Baseline Cr 2.4), Anxiety presented with intermittent palpitations for past 4 days which are getting worse. Found to have SCr of 3.2. 72 year old Female with PMH of CAD, MI s/p CABG, h/o Afib/A flutter not on anticoagulation s/p, ablation, HTN, CKD4 (Baseline Cr 2.4), Anxiety presented with intermittent palpitations for past 4 days which are getting worse. Found to have SCr of 3.5 and positive UA.     # FRANC on CKD4 vs. progression of CKD    Urine creatinine too low for the sample to be reliable to calculate FeNa  patient looks on the dry side (No OLIVIA, no crackles) even though has cardiomegaly and bibasilar opacities  recommend gentle hydration  found to have positive UA started on Rocephin IV  check renal sono for hydronephrosis given hx of nephrolithiasis  repeat Urine electrolytes for FeNa  trend BMP  needs rate control  will follow 72 year old Female with PMH of CAD, MI s/p CABG, h/o Afib/A flutter not on anticoagulation s/p, ablation, HTN, CKD4 (Baseline Cr 2.4), Anxiety presented with intermittent palpitations for past 4 days which are getting worse. Found to have SCr of 3.5 and positive UA.     ·	FRANC on CKD4 vs. progression of CKD    ·	Urine creatinine too low for the sample to be reliable to calculate FeNa  ·	patient looks on the dry side (No OLIVIA, no crackles) even though has cardiomegaly and bibasilar opacities  ·	recommend gentle hydration  ·	found to have positive UA started on Rocephin IV  ·	check renal sono for hydronephrosis given hx of nephrolithiasis  ·	repeat Urine electrolytes for FeNa  ·	trend BMP  ·	needs rate control    will follow

## 2018-05-08 NOTE — PROGRESS NOTE ADULT - ASSESSMENT
Palpitations due to cardiac arrhythmia afib c RVR  UTI  Hx of HTN, ASHD, CAD, old MI, sp CABG, afib/flutter, sp previous ablation  Hx of Hyperlipidemia  Hx of CKD IV, nephrolithiasis, renal colic  Hx of OA, DDD, DJD  Hx of anxiety    CE, ECHO, telemetry  started IV heparin and will transition to coumadin  cardio consult appreciated, inc amiodarone to 200mg  q12hrs, BP meds adjusted  IV rocephin for UTI  appreciate renal consult  cont all meds  emotional support rendered

## 2018-05-08 NOTE — CONSULT NOTE ADULT - SUBJECTIVE AND OBJECTIVE BOX
Patient is a 72y old  Female who presents with a chief complaint of palpitations (07 May 2018 19:20)      HPI:  72 year old Female with PMH of CAD, MI s/p 4v CABG , small area of ischemia on nuclear medical mgt,  h/o Aflutter s/p ablation on amio 200,  not on anticoagulation, h/o ablation,, CKD (Baseline Cr 2.4), , Anxiety presented with intermittent palpitations for past 4 days which are getting worse. Pt denies recent sick contact, fever, chills, SOB, chest pain, nausea, vomiting, abdominal pain, dysuria, diarrhea, rash, muscle or joint pain. Patient had history of Aflutter many years ago and underwent ablation with EP (Dr. Rosas) admits to intermittent palpitations over the course of many years but it was not severe to this point.     Cardio: Dr. Child  Nephro: Dr. Phelps (07 May 2018 19:20)  EP Dr. Rosas    PAST MEDICAL & SURGICAL HISTORY:  Atrial flutter  CKD (chronic kidney disease)  MI (myocardial infarction)  CAD (coronary artery disease)  Kidney stone  HTN (hypertension)  H/O abdominal hysterectomy  H/O cardiac radiofrequency ablation  History of open heart surgery      PREVIOUS DIAGNOSTIC TESTING:      ECHO pending  FINDINGS:    STRESS TEST small lateal wall ischemia  FINDINGS:        MEDICATIONS  (STANDING):  allopurinol 100 milliGRAM(s) Oral daily  amiodarone    Tablet 200 milliGRAM(s) Oral daily  aspirin  chewable 162 milliGRAM(s) Oral daily  cefTRIAXone   IVPB 1 Gram(s) IV Intermittent every 24 hours  clopidogrel Tablet 75 milliGRAM(s) Oral daily  heparin  Infusion 1100 Unit(s)/Hr (11 mL/Hr) IV Continuous <Continuous>  isosorbide   mononitrate ER Tablet (IMDUR) 30 milliGRAM(s) Oral daily  metoprolol tartrate 50 milliGRAM(s) Oral three times a day    MEDICATIONS  (PRN):  ALPRAZolam 0.25 milliGRAM(s) Oral three times a day PRN anxiety      FAMILY HISTORY: DM      SOCIAL HISTORY:  CIGARETTES: none  ALCOHOL: none  DRUGS: none                      REVIEW OF SYSTEMS:  CONSTITUTIONAL: No distress, Looks stable  NECK: No pain   RESPIRATORY: No cough, wheezing, shortness of breath  CARDIOVASCULAR: No chest pain, SOB, (+)palpitations,  no leg swelling  GASTROINTESTINAL: No abdominal or epigastric pain. No nausea, vomiting, or hematemesis;  No melena.  NEUROLOGICAL: No dizziness, headaches, memory loss, loss of strength          Vital Signs Last 24 Hrs  T(C): 36.6 (08 May 2018 07:55), Max: 36.7 (07 May 2018 15:58)  T(F): 97.8 (08 May 2018 07:55), Max: 98 (07 May 2018 15:58)  HR: 72 (08 May 2018 07:55) (67 - 134)  BP: 161/76 (08 May 2018 07:55) (152/84 - 177/94)  BP(mean): --  RR: 16 (08 May 2018 07:55) (16 - 18)  SpO2: 97% (08 May 2018 07:55) (96% - 99%)                      PHYSICAL EXAM:  GENERAL: No distress, well developed  HEAD:  Atraumatic, Normocephalic  NECK: Supple, No JVD, No Bruit of either carotid arteries  NERVOUS SYSTEM:  Alert, Awake, Oriented to time, place, person; Normal memory and speech; Normal motor Strength 5/5 B/L upper and lower extremities  CHEST/LUNG: Normal air entry to lung base bilaterally; No wheeze, crackle, rales, rhonchi  HEART: Regular heart beat, S1, A2, P2, No S3, No S4, No gallop, No murmur  ABDOMEN: Soft, Non tender, Non distended; Bowel sounds present  EXTREMITIES:  2+ Peripheral Pulses, No clubbing, No edema  SKIN: No rashes or lesions    TELEMETRY:    ECG:< from: 12 Lead ECG (18 @ 22:13) >    Diagnosis Line Atrial fibrillation with rapid ventricular response  Poor R wave progression  Nonspecific ST and T wave abnormality  Voltage criteria for left ventricular hypertrophy  Abnormal ECG          I&O's Detail      LABS:                        11.4   6.55  )-----------( 240      ( 08 May 2018 04:49 )             36.5     05-08    147<H>  |  108  |  56<H>  ----------------------------<  131<H>  3.8   |  21  |  3.2<H>    Ca    9.7      08 May 2018 04:49  Phos  3.8     05-08  Mg     1.9     05-08    TPro  7.6  /  Alb  4.2  /  TBili  <0.2  /  DBili  x   /  AST  19  /  ALT  18  /  AlkPhos  58  05-07    CARDIAC MARKERS ( 07 May 2018 20:41 )  x     / <0.01 ng/mL / 40 U/L / x     / 2.1 ng/mL  CARDIAC MARKERS ( 07 May 2018 16:51 )  x     / <0.01 ng/mL / 44 U/L / x     / 2.2 ng/mL      PT/INR - ( 08 May 2018 04:49 )   PT: 12.40 sec;   INR: 1.14 ratio         PTT - ( 08 May 2018 04:49 )  PTT:55.0 sec  Urinalysis Basic - ( 07 May 2018 16:51 )    Color: Yellow / Appearance: Cloudy / S.020 / pH: x  Gluc: x / Ketone: Negative  / Bili: Negative / Urobili: 0.2 mg/dL   Blood: x / Protein: >=300 mg/dL / Nitrite: Positive   Leuk Esterase: Moderate / RBC: 1-2 /HPF / WBC 26-50 /HPF   Sq Epi: x / Non Sq Epi: Few /HPF / Bacteria: Many /HPF      I&O's Summary      RADIOLOGY & ADDITIONAL STUDIES:

## 2018-05-08 NOTE — PROGRESS NOTE ADULT - ASSESSMENT
72 y.o female patient with PMH of CAD, MI s/p CABG, h/o Afib/A.flutter not on anticoagulation, h/o ablation, HTN, CKD (Baseline Cr 2.4), CAD s/p CABG, Anxiety presented with intermittent palpitations for past 4 days which are getting worse    # Afib with RVR  - c/w rate control  - start on heparin given no other contraindication  - unclear why patient is not previously on anticoagulation for paroxysmal afib [id number] score of 3  - patient become very anxious when speaking about option of cardioversion or ablation due to previous bad experience with it as per patient  - f/u Cardiology for further management  - increase metoprolol 50 mg to TID, responded to metoprolol in ED    # UTI  - c/w rocephin  - f/u cultures    # FRANC on CKD s/p IV bolus in ED  - US retroperitoneal  - urine studies  - hold further IVF for now    # HTN  - on nifedepine xl, will hold ordering for now, reorder tomorrow based on blood pressure  - consider switching to cardizem if rate is not controlled    # h/o Kidney Stones  - c/w allopurinol for now    # Anxiety  - takes xanax prn at home    # h/o CAD, MI  - c/w aspirin and plavix    # DVT Prophylaxis  - on heparin drip 72 y.o female patient with PMH of CAD, MI s/p CABG, h/o Afib/A.flutter not on anticoagulation, h/o ablation, HTN, CKD (Baseline Cr 2.4), CAD s/p CABG, Anxiety presented with intermittent palpitations for past 4 days which are getting worse 72 y.o female patient with PMH of CAD, MI s/p CABG, h/o Afib/A.flutter s/p ablation about 10 years ago, not on anticoagulation,, HTN, CKD (Baseline Cr 2.4), anxiety presented with intermittent palpitations for past 4 days which are getting worse, found to be in a.fib RVR on admission    # Afib with RVR  - Rate control upon admission; in the afternoon, patient developed a.fib RVR again with chest discomfort  - Chest discomfort resolves when patient's heart rate is controlled  - Patient become very anxious when speaking about option of cardioversion or ablation due to previous bad experience with it as per patient  - CE during episode of chest pain negative  - Discussed with EP (Dr. Rosas): will start amiodarone loading dose; continue metoprolol 50mg q8h  - Continue heparin for anticoagulation and start bridge with coumadin tonight: monitor PTT and adjust heparin dose accordingly; also check INR daily until it becomes therapeutic. D/C Plavix as per cardio  - F/U CE  - Check 2D echo  - Treat UTI  - F/U with cardiology and EP    # UTI  - Continue Rocephin  - F/U urine cultures    # FRANC on CKD  - s/p IV bolus in ED  - Check renal US  - Repeat urine electrolytes as per nephrology  - Will start gentle hydration with NS at 60cc/hr as per nephro. Patient currently not in fluid overload clinically; watch for signs and symptoms of fluid overload  - Monitor BMP    # HTN  - Start Norvasc 5mg as per cardio  - Monitor BP    # History of kidney Stones/Anxiety/CAD/MI/  - Continue current medications  - D/C Plavix as per cardio    # DVT prophylaxis  - Patient on heparin for anticoagulation

## 2018-05-08 NOTE — PROGRESS NOTE ADULT - SUBJECTIVE AND OBJECTIVE BOX
NOAH QUIROS, female, 72y (49),   MRN-[id number]  Admit Date: 18 (1d)    Chief Complaint  Patient is a 72y old  Female who presents with a chief complaint of palpitations (07 May 2018 19:20)    Past Medical and Surgical History  PAST MEDICAL & SURGICAL HISTORY:  Atrial fibrillation  CKD (chronic kidney disease)  MI (myocardial infarction)  CAD (coronary artery disease)  Kidney stone  HTN (hypertension)  H/O abdominal hysterectomy  H/O cardiac radiofrequency ablation  History of open heart surgery      Current Medications:  MEDICATIONS  (STANDING):  allopurinol 100 milliGRAM(s) Oral daily  amiodarone Infusion 1 mG/Min (33.333 mL/Hr) IV Continuous <Continuous>  amiodarone Infusion 0.5 mG/Min (16.667 mL/Hr) IV Continuous <Continuous>  amLODIPine   Tablet 5 milliGRAM(s) Oral daily  aspirin  chewable 162 milliGRAM(s) Oral daily  cefTRIAXone   IVPB 1 Gram(s) IV Intermittent every 24 hours  heparin  Infusion 1100 Unit(s)/Hr (11 mL/Hr) IV Continuous <Continuous>  isosorbide   mononitrate ER Tablet (IMDUR) 30 milliGRAM(s) Oral daily  metoprolol tartrate 50 milliGRAM(s) Oral three times a day    MEDICATIONS  (PRN):  ALPRAZolam 0.25 milliGRAM(s) Oral three times a day PRN anxiety      Interval History:  This afternoon, patient developed a.fib with RVR again    Vital Signs:  T(F): 97.8 (18 @ 15:14), Max: 98.7 (18 @ 14:00)  HR: 98 (18 @ 15:14) (67 - 134)  BP: 172/119 (18 @ 15:14) (151/110 - 177/94)  RR: 17 (18 @ 15:14) (16 - 18)  SpO2: 96% (18 @ 15:14) (96% - 99%)  CAPILLARY BLOOD GLUCOSE    Physical Exam:  General: Not in distress.   HEENT: Moist mucus membranes. PERRLA.  Cardio: Irregular  Pulm: Clear to auscultation bilaterally. No wheezing, rales, or rhonchi  Abdomen: Soft, non-tender, non-distended, no guarding or rebound tenderness  Extremities: No cyanosis or edema bilaterally. No calf tenderness to palpation  Neuro: A&O x3. No focal deficits    Labs and Imaging:  CBC Full  -  ( 08 May 2018 04:49 )  WBC Count : 6.55 K/uL  Hemoglobin : 11.4 g/dL  Hematocrit : 36.5 %  Platelet Count - Automated : 240 K/uL  Mean Cell Volume : 86.7 fL  Mean Cell Hemoglobin : 27.1 pg  Mean Cell Hemoglobin Concentration : 31.2 g/dL  Auto Neutrophil # : x  Auto Lymphocyte # : x  Auto Monocyte # : x  Auto Eosinophil # : x  Auto Basophil # : x  Auto Neutrophil % : x  Auto Lymphocyte % : x  Auto Monocyte % : x  Auto Eosinophil % : x  Auto Basophil % : x    RDW: 14.2  14.3    PT/INR/PTT: 18 @ 15:29  -- | 47.0        ^      --  PT/INR/PTT: 18 @ 04:49  12.40 | 55.0        ^      1.14  PT/INR/PTT: 18 @ 19:10  11.90 | --        ^      1.10    BMP: 18 @ 04:49  147 | 108 | 56   -----------------< 131  3.8  | 21 | 3.2  eGFR(AA): 16, eGFR (non-AA): 14  Ca 9.7, Mg 1.9, P 3.8  BMP: 18 @ 16:51  145 | 105 | 66   -----------------< 112  4.9  | 20 | 3.5  eGFR(AA): 15, eGFR (non-AA): 13  Ca 10.2, Mg --, P --    LFTs: 18 @ 16:51  TP  7.6  | 4.2 Alb   ---------------  TB  <0.2  | --  DB   ---------------  ALT 18  | 19  AST            ^          58  ALK      LFTs: 18 @ 04:49  Ca  9.7  | -- AST   -----------------  TP  --  | -- ALT  -----------------  Alb --  | -- ALK          ^        --         TB  LFTs: 18 @ 16:51  Ca  10.2  | 19 AST   -----------------  TP  7.6  | 18 ALT  -----------------  Alb 4.2  | 58 ALK          ^        <0.2         TB      Cardiac Enzymes:  Creatine Kinase, Serum: 40 U/L (18 @ 20:41)  Creatine Kinase, Serum: 44 U/L (18 @ 16:51)    Urinalysis:  Urinalysis Basic - ( 07 May 2018 16:51 )    Color: Yellow / Appearance: Cloudy / S.020 / pH: x  Gluc: x / Ketone: Negative  / Bili: Negative / Urobili: 0.2 mg/dL   Blood: x / Protein: >=300 mg/dL / Nitrite: Positive   Leuk Esterase: Moderate / RBC: 1-2 /HPF / WBC 26-50 /HPF   Sq Epi: x / Non Sq Epi: Few /HPF / Bacteria: Many /HPF      Cultures:      Home Medications:  Home Medications:  allopurinol 100 mg oral tablet: 1 tab(s) orally once a day (07 May 2018 20:19)  amiodarone 200 mg oral tablet: 1 tab(s) orally once a day (07 May 2018 20:19)  aspirin 81 mg oral tablet: 2 tab(s) orally once a day (07 May 2018 20:22)  Imdur 30 mg oral tablet, extended release: 1 tab(s) orally once a day (in the morning) (07 May 2018 20:20)  Kayexalate: 1 dose(s) orally 3 times a week (07 May 2018 20:20)  metoprolol tartrate 50 mg oral tablet: 1 tab(s) orally 2 times a day (07 May 2018 20:21)  NIFEdipine 90 mg oral tablet, extended release: 1 tab(s) orally once a day (07 May 2018 20:19)  Plavix 75 mg oral tablet: 1 tab(s) orally once a day (07 May 2018 20:19)

## 2018-05-08 NOTE — CONSULT NOTE ADULT - ATTENDING COMMENTS
I was Physically Present for the key portions of the evaluation   I agree with the above History  , Physical examination Assessment and plan   I have Reviewed , Modified or appended where appropriate.  Please check A and P as above

## 2018-05-08 NOTE — PROGRESS NOTE ADULT - SUBJECTIVE AND OBJECTIVE BOX
NOAH QUIROS 73yo W Female came to the ER c/o palpitations and noted to be in afib with RVR.  Pt denies CP, SOB, N/V or diaphoresis.  The pt was also noted to have E coli UTI.  Of note is the fact that the pt has a Hx of afib with previous ablation and is on amiodarone 200mg daily but on no AC.  The PMHx includes:  HTN, ASHD, CAD, sp CABG, afib/flutter, sp ablation in the remote past, old MI, sp CABG, Hyperlipidemia, CKD IV (baseline creat 2.4), nephrolithiases, OA, DDD, DJD. past surgical Hx includes C section, hysterectomy, hernia repair and CABG.  The pt was evaluated by cardio and the amiodarone was inc to 200mg q 12hrspt, started on IV heparin and to be transitioned to coumadin,  The pt was also evaluated by renal.  She was placed on Rocephin for UTI.    INTERVAL HPI/OVERNIGHT EVENTS:  pt v anxious but states she is feeling better    MEDICATIONS  (STANDING):  allopurinol 100 milliGRAM(s) Oral daily  amiodarone Infusion 1 mG/Min (33.333 mL/Hr) IV Continuous <Continuous>  amiodarone Infusion 0.5 mG/Min (16.667 mL/Hr) IV Continuous <Continuous>  amLODIPine   Tablet 5 milliGRAM(s) Oral daily  aspirin  chewable 162 milliGRAM(s) Oral daily  cefTRIAXone   IVPB 1 Gram(s) IV Intermittent every 24 hours  heparin  Infusion 1200 Unit(s)/Hr (12 mL/Hr) IV Continuous <Continuous>  isosorbide   mononitrate ER Tablet (IMDUR) 30 milliGRAM(s) Oral daily  metoprolol tartrate 50 milliGRAM(s) Oral three times a day  sodium chloride 0.9%. 1000 milliLiter(s) (60 mL/Hr) IV Continuous <Continuous>    MEDICATIONS  (PRN):  ALPRAZolam 0.25 milliGRAM(s) Oral three times a day PRN anxiety      Allergies    No Known Allergies  	    Vital Signs Last 24 Hrs  T(C): 36.6 (08 May 2018 15:14), Max: 37.1 (08 May 2018 14:00)  T(F): 97.8 (08 May 2018 15:14), Max: 98.7 (08 May 2018 14:00)  HR: 96 (08 May 2018 15:30) (67 - 112)  BP: 173/74 (08 May 2018 15:30) (151/110 - 177/94)  BP(mean): --  RR: 17 (08 May 2018 15:14) (16 - 18)  SpO2: 96% (08 May 2018 15:14) (96% - 97%)    PHYSICAL EXAM:      Constitutional:  pt is alert, oriented, v anxious but in NAD    Eyes:  normal    ENMT:  dry oral mucosa    Neck:  supple no JVD, no bruits    Cardiovascular:  S1S2 irreg-[id number]    Gastrointestinal:  globose, soft and benign    Extremities:  moves all ext, + arthritic changes    LABS:                        11.4   6.55  )-----------( 240      ( 08 May 2018 04:49 )             36.5     05-08    147<H>  |  108  |  56<H>  ----------------------------<  131<H>  3.8   |  21  |  3.2<H>    GFR 14  Ca    9.7      08 May 2018 04:49  Phos  3.8     05-08  Mg     1.9     05-08    TPro  7.6  /  Alb  4.2  /  TBili  <0.2  /  DBili  x   /  AST  19  /  ALT  18  /  AlkPhos  58  05-07    PT/INR - ( 08 May 2018 04:49 )   PT: 12.40 sec;   INR: 1.14 ratio    CK  40, 29:  CKMB  2.1, 1.8;  trop <0.01      PTT - ( 08 May 2018 15:29 )  PTT:47.0 sec  Urinalysis Basic - ( 07 May 2018 16:51 )    Color: Yellow / Appearance: Cloudy / S.020 / pH: x  Gluc: x / Ketone: Negative  / Bili: Negative / Urobili: 0.2 mg/dL   Blood: x / Protein: >=300 mg/dL / Nitrite: Positive   Leuk Esterase: Moderate / RBC: 1-2 /HPF / WBC 26-50 /HPF   Sq Epi: x / Non Sq Epi: Few /HPF / Bacteria: Many /HPF    urine C&S:  E coli >100,000 CFU    RADIOLOGY & ADDITIONAL TESTS:    EKG:  afib /min, PRWP, PVCs    CXR:  no infiltrate, + cardiomegaly, + sternotomy

## 2018-05-08 NOTE — CONSULT NOTE ADULT - SUBJECTIVE AND OBJECTIVE BOX
NEPHROLOGY CONSULTATION NOTE    Patient is a 72y Female whom presented to the hospital with palpitations.    PAST MEDICAL & SURGICAL HISTORY:  Atrial flutter s/p ablation  CKD (chronic kidney disease)  MI (myocardial infarction)  CAD (coronary artery disease)  Kidney stone  HTN (hypertension)  H/O abdominal hysterectomy  H/O cardiac radiofrequency ablation  CABG    Allergies:  No Known Allergies    Home Medications Reviewed  Hospital Medications:   MEDICATIONS  (STANDING):  allopurinol 100 milliGRAM(s) Oral daily  amiodarone    Tablet 200 milliGRAM(s) Oral daily  aspirin  chewable 162 milliGRAM(s) Oral daily  cefTRIAXone   IVPB 1 Gram(s) IV Intermittent every 24 hours  clopidogrel Tablet 75 milliGRAM(s) Oral daily  heparin  Infusion 1100 Unit(s)/Hr (11 mL/Hr) IV Continuous <Continuous>  isosorbide   mononitrate ER Tablet (IMDUR) 30 milliGRAM(s) Oral daily  metoprolol tartrate 50 milliGRAM(s) Oral three times a day      SOCIAL HISTORY:  Denies ETOH,Smoking,   FAMILY HISTORY:        REVIEW OF SYSTEMS:  CONSTITUTIONAL: No weakness, fevers or chills  EYES/ENT: No visual changes;  No vertigo or throat pain   NECK: No pain or stiffness  RESPIRATORY: No cough, wheezing, hemoptysis; No shortness of breath  CARDIOVASCULAR: No chest pain. Presence of  palpitations.  GASTROINTESTINAL: No abdominal or epigastric pain. No nausea, vomiting, or hematemesis; No diarrhea or constipation. No melena or hematochezia.  GENITOURINARY: No dysuria, frequency, foamy urine, urinary urgency, incontinence or hematuria  NEUROLOGICAL: No numbness or weakness  SKIN: No itching, burning, rashes, or lesions   VASCULAR: No bilateral lower extremity edema.   All other review of systems is negative unless indicated above.    VITALS:  T(F): 97.8 (18 @ 07:55), Max: 98 (18 @ 15:58)  HR: 72 (18 @ 07:55)  BP: 161/76 (18 @ 07:55)  RR: 16 (18 @ 07:55)  SpO2: 97% (18 @ 07:55)      Weight (kg): 80 ( @ 19:11)    I&O's Detail    Creatine Kinase, Serum: 40 U/L (18 @ 20:41)  Creatine Kinase, Serum: 44 U/L (18 @ 16:51)      PHYSICAL EXAM:  Constitutional: NAD  HEENT: anicteric sclera, oropharynx clear, MMM  Neck: No JVD  Respiratory: CTAB, no wheezes, rales or rhonchi  Cardiovascular: S1, S2, RRR  Gastrointestinal: BS+, soft, NT/ND  Extremities: No cyanosis or clubbing. No peripheral edema  Neurological: A/O x 3, no focal deficits  Psychiatric: Normal mood, normal affect  : No CVA tenderness. No simeon.   Skin: No rashes  Vascular Access:    LABS:      147<H>  |  108  |  56<H>  ----------------------------<  131<H>  3.8   |  21  |  3.2<H>    Ca    9.7      08 May 2018 04:49  Phos  3.8       Mg     1.9         TPro  7.6  /  Alb  4.2  /  TBili  <0.2  /  DBili      /  AST  19  /  ALT  18  /  AlkPhos  58      Creatinine Trend: 3.2 <--, 3.5 <--    < end of copied text >    baseline SCr 2.4                        11.4   6.55  )-----------( 240      ( 08 May 2018 04:49 )             36.5     Urine Studies:  Urinalysis Basic - ( 07 May 2018 16:51 )    Color: Yellow / Appearance: Cloudy / S.020 / pH:   Gluc:  / Ketone: Negative  / Bili: Negative / Urobili: 0.2 mg/dL   Blood:  / Protein: >=300 mg/dL / Nitrite: Positive   Leuk Esterase: Moderate / RBC: 1-2 /HPF / WBC 26-50 /HPF   Sq Epi:  / Non Sq Epi: Few /HPF / Bacteria: Many /HPF      Potassium, Random Urine: 12 mmol/L ( @ 04:50)  Creatinine, Random Urine: 40 mg/dL ( @ 04:50)  Chloride, Random Urine: 67 ( @ 04:50)  Sodium, Random Urine: 92.0 mmoL/L ( @ 04:50)  Calcium, Random Urine: 5 mg/dL ( @ 04:49)                RADIOLOGY & ADDITIONAL STUDIES:      < from: 12 Lead ECG (18 @ 22:13) >  Diagnosis Line Atrial fibrillation with rapid ventricular response  Poor R wave progression  Nonspecific ST and T wave abnormality  Voltage criteria for left ventricular hypertrophy  Abnormal ECG    < from: Xray Chest 2 Views PA/Lat (18 @ 17:03) >    Stable cardiomegaly. Stable Bilateral opacities.    < end of copied text > NEPHROLOGY CONSULTATION NOTE    Patient is a 72y Female whom presented to the hospital with palpitations.    PAST MEDICAL & SURGICAL HISTORY:  Atrial flutter s/p ablation  CKD (chronic kidney disease)  MI (myocardial infarction)  CAD (coronary artery disease)  Kidney stone  HTN (hypertension)  H/O abdominal hysterectomy  H/O cardiac radiofrequency ablation  CABG    Allergies:  No Known Allergies    Home Medications Reviewed  Home Medications:  allopurinol 100 mg oral tablet: 1 tab(s) orally once a day (07 May 2018 20:19)  amiodarone 200 mg oral tablet: 1 tab(s) orally once a day (07 May 2018 20:19)  aspirin 81 mg oral tablet: 2 tab(s) orally once a day (07 May 2018 20:22)  Imdur 30 mg oral tablet, extended release: 1 tab(s) orally once a day (in the morning) (07 May 2018 20:20)  Kayexalate: 1 dose(s) orally 3 times a week (07 May 2018 20:20)  metoprolol tartrate 50 mg oral tablet: 1 tab(s) orally 2 times a day (07 May 2018 20:21)  NIFEdipine 90 mg oral tablet, extended release: 1 tab(s) orally once a day (07 May 2018 20:19)  Plavix 75 mg oral tablet: 1 tab(s) orally once a day (07 May 2018 20:19)    Hospital Medications:   MEDICATIONS  (STANDING):  allopurinol 100 milliGRAM(s) Oral daily  amiodarone    Tablet 200 milliGRAM(s) Oral daily  aspirin  chewable 162 milliGRAM(s) Oral daily  cefTRIAXone   IVPB 1 Gram(s) IV Intermittent every 24 hours  clopidogrel Tablet 75 milliGRAM(s) Oral daily  heparin  Infusion 1100 Unit(s)/Hr (11 mL/Hr) IV Continuous <Continuous>  isosorbide   mononitrate ER Tablet (IMDUR) 30 milliGRAM(s) Oral daily  metoprolol tartrate 50 milliGRAM(s) Oral three times a day      SOCIAL HISTORY:  Denies ETOH,Smoking,   FAMILY HISTORY:        REVIEW OF SYSTEMS:  CONSTITUTIONAL: No weakness, fevers or chills  EYES/ENT: No visual changes;  No vertigo or throat pain   NECK: No pain or stiffness  RESPIRATORY: No cough, wheezing, hemoptysis; No shortness of breath  CARDIOVASCULAR: No chest pain. Presence of  palpitations.  GASTROINTESTINAL: No abdominal or epigastric pain. No nausea, vomiting, or hematemesis; No diarrhea or constipation. No melena or hematochezia.  GENITOURINARY: No dysuria, frequency, foamy urine, urinary urgency, incontinence or hematuria  NEUROLOGICAL: No numbness or weakness  SKIN: No itching, burning, rashes, or lesions   VASCULAR: No bilateral lower extremity edema.   All other review of systems is negative unless indicated above.    VITALS:  T(F): 97.8 (18 @ 07:55), Max: 98 (18 @ 15:58)  HR: 72 (18 @ 07:55)  BP: 161/76 (18 @ 07:55)  RR: 16 (18 @ 07:55)  SpO2: 97% (18 @ 07:55)      Weight (kg): 80 ( @ 19:11)    I&O's Detail    Creatine Kinase, Serum: 40 U/L (18 @ 20:41)  Creatine Kinase, Serum: 44 U/L (18 @ 16:51)      PHYSICAL EXAM:  Constitutional: NAD  HEENT: anicteric sclera, oropharynx clear, MMM  Neck: No JVD  Respiratory: CTAB, no wheezes, rales or rhonchi  Cardiovascular: S1, S2, RRR  Gastrointestinal: BS+, soft, NT/ND  Extremities: No cyanosis or clubbing. No peripheral edema  Neurological: A/O x 3, no focal deficits  Psychiatric: Normal mood, normal affect  : No CVA tenderness. No simeon.   Skin: No rashes  Vascular Access:    LABS:      147<H>  |  108  |  56<H>  ----------------------------<  131<H>  3.8   |  21  |  3.2<H>    Ca    9.7      08 May 2018 04:49  Phos  3.8       Mg     1.9         TPro  7.6  /  Alb  4.2  /  TBili  <0.2  /  DBili      /  AST  19  /  ALT  18  /  AlkPhos  58      Creatinine Trend: 3.2 <--, 3.5 <--    < end of copied text >    baseline SCr 2.4                        11.4   6.55  )-----------( 240      ( 08 May 2018 04:49 )             36.5     Urine Studies:  Urinalysis Basic - ( 07 May 2018 16:51 )    Color: Yellow / Appearance: Cloudy / S.020 / pH:   Gluc:  / Ketone: Negative  / Bili: Negative / Urobili: 0.2 mg/dL   Blood:  / Protein: >=300 mg/dL / Nitrite: Positive   Leuk Esterase: Moderate / RBC: 1-2 /HPF / WBC 26-50 /HPF   Sq Epi:  / Non Sq Epi: Few /HPF / Bacteria: Many /HPF      Potassium, Random Urine: 12 mmol/L ( @ 04:50)  Creatinine, Random Urine: 40 mg/dL ( @ 04:50)  Chloride, Random Urine: 67 ( @ 04:50)  Sodium, Random Urine: 92.0 mmoL/L ( @ 04:50)  Calcium, Random Urine: 5 mg/dL ( @ 04:49)                RADIOLOGY & ADDITIONAL STUDIES:      < from: 12 Lead ECG (18 @ 22:13) >  Diagnosis Line Atrial fibrillation with rapid ventricular response  Poor R wave progression  Nonspecific ST and T wave abnormality  Voltage criteria for left ventricular hypertrophy  Abnormal ECG    < from: Xray Chest 2 Views PA/Lat (18 @ 17:03) >    Stable cardiomegaly. Stable Bilateral opacities.    < end of copied text > NEPHROLOGY CONSULTATION NOTE    Patient is a 72y Female PMHx of HTN, CAD s/p MI s/p CABG, A flutter s/p ablation, CKD4 on kayexalate and allopurinol at home, hx of nephrolithiasis whom presented to the hospital with palpitations. Found to be in A fib with RVR and fount to have a SCr of 3.5 (up from 2.4 in 2017).    PAST MEDICAL & SURGICAL HISTORY:  Atrial flutter s/p ablation  CKD (chronic kidney disease)  MI (myocardial infarction)  CAD (coronary artery disease)  Kidney stone  HTN (hypertension)  H/O abdominal hysterectomy  H/O cardiac radiofrequency ablation  CABG    Allergies:  No Known Allergies    Home Medications Reviewed  Home Medications:  allopurinol 100 mg oral tablet: 1 tab(s) orally once a day (07 May 2018 20:19)  amiodarone 200 mg oral tablet: 1 tab(s) orally once a day (07 May 2018 20:19)  aspirin 81 mg oral tablet: 2 tab(s) orally once a day (07 May 2018 20:22)  Imdur 30 mg oral tablet, extended release: 1 tab(s) orally once a day (in the morning) (07 May 2018 20:20)  Kayexalate: 1 dose(s) orally 3 times a week (07 May 2018 20:20)  metoprolol tartrate 50 mg oral tablet: 1 tab(s) orally 2 times a day (07 May 2018 20:21)  NIFEdipine 90 mg oral tablet, extended release: 1 tab(s) orally once a day (07 May 2018 20:19)  Plavix 75 mg oral tablet: 1 tab(s) orally once a day (07 May 2018 20:19)    Hospital Medications:   MEDICATIONS  (STANDING):  allopurinol 100 milliGRAM(s) Oral daily  amiodarone    Tablet 200 milliGRAM(s) Oral daily  aspirin  chewable 162 milliGRAM(s) Oral daily  cefTRIAXone   IVPB 1 Gram(s) IV Intermittent every 24 hours  clopidogrel Tablet 75 milliGRAM(s) Oral daily  heparin  Infusion 1100 Unit(s)/Hr (11 mL/Hr) IV Continuous <Continuous>  isosorbide   mononitrate ER Tablet (IMDUR) 30 milliGRAM(s) Oral daily  metoprolol tartrate 50 milliGRAM(s) Oral three times a day      SOCIAL HISTORY:  Denies ETOH,Smoking,   FAMILY HISTORY:        REVIEW OF SYSTEMS:  CONSTITUTIONAL: No weakness, fevers or chills  EYES/ENT: No visual changes;  No vertigo or throat pain   NECK: No pain or stiffness  RESPIRATORY: No cough, wheezing, hemoptysis; No shortness of breath  CARDIOVASCULAR: No chest pain. Presence of  palpitations.  GASTROINTESTINAL: No abdominal or epigastric pain. No nausea, vomiting, or hematemesis; No diarrhea or constipation. No melena or hematochezia.  GENITOURINARY: No dysuria, frequency, foamy urine, urinary urgency, incontinence or hematuria  NEUROLOGICAL: No numbness or weakness  SKIN: No itching, burning, rashes, or lesions   VASCULAR: No bilateral lower extremity edema.   All other review of systems is negative unless indicated above.    VITALS:  T(F): 97.8 (18 @ 07:55), Max: 98 (18 @ 15:58)  HR: 72 (18 @ 07:55)  BP: 161/76 (18 @ 07:55)  RR: 16 (18 @ 07:55)  SpO2: 97% (18 @ 07:55)      Weight (kg): 80 ( @ 19:11)    I&O's Detail    Creatine Kinase, Serum: 40 U/L (18 @ 20:41)  Creatine Kinase, Serum: 44 U/L (18 @ 16:51)      PHYSICAL EXAM:  Constitutional: NAD  HEENT: anicteric sclera, oropharynx clear, MMM  Neck: No JVD  Respiratory: CTAB, no wheezes, rales or rhonchi  Cardiovascular: S1, S2, RRR  Gastrointestinal: BS+, soft, NT/ND  Extremities: No cyanosis or clubbing. No peripheral edema  Neurological: A/O x 3, no focal deficits  Psychiatric: Normal mood, normal affect  : No CVA tenderness. No simeon.   Skin: No rashes  Vascular Access:    LABS:      147<H>  |  108  |  56<H>  ----------------------------<  131<H>  3.8   |  21  |  3.2<H>    Ca    9.7      08 May 2018 04:49  Phos  3.8       Mg     1.9         TPro  7.6  /  Alb  4.2  /  TBili  <0.2  /  DBili      /  AST  19  /  ALT  18  /  AlkPhos  58      Creatinine Trend: 3.2 <--, 3.5 <--    < end of copied text >    baseline SCr 2.4                        11.4   6.55  )-----------( 240      ( 08 May 2018 04:49 )             36.5     Urine Studies:  Urinalysis Basic - ( 07 May 2018 16:51 )    Color: Yellow / Appearance: Cloudy / S.020 / pH:   Gluc:  / Ketone: Negative  / Bili: Negative / Urobili: 0.2 mg/dL   Blood:  / Protein: >=300 mg/dL / Nitrite: Positive   Leuk Esterase: Moderate / RBC: 1-2 /HPF / WBC 26-50 /HPF   Sq Epi:  / Non Sq Epi: Few /HPF / Bacteria: Many /HPF      Potassium, Random Urine: 12 mmol/L ( @ 04:50)  Creatinine, Random Urine: 40 mg/dL ( @ 04:50)  Chloride, Random Urine: 67 ( @ 04:50)  Sodium, Random Urine: 92.0 mmoL/L ( @ 04:50)  Calcium, Random Urine: 5 mg/dL ( @ 04:49)                RADIOLOGY & ADDITIONAL STUDIES:      < from: 12 Lead ECG (18 @ 22:13) >  Diagnosis Line Atrial fibrillation with rapid ventricular response  Poor R wave progression  Nonspecific ST and T wave abnormality  Voltage criteria for left ventricular hypertrophy  Abnormal ECG    < from: Xray Chest 2 Views PA/Lat (18 @ 17:03) >    Stable cardiomegaly. Stable Bilateral opacities.    < end of copied text > NEPHROLOGY CONSULTATION NOTE    Patient is a 72y Female PMHx of HTN, CAD s/p MI s/p CABG, A flutter s/p ablation, CKD4 on kayexalate and allopurinol at home, hx of nephrolithiasis whom presented to the hospital with palpitations. Found to be in A fib with RVR and fount to have a SCr of 3.5 (up from 2.4 in 2017).    PAST MEDICAL & SURGICAL HISTORY:  Atrial flutter s/p ablation  CKD (chronic kidney disease)  MI (myocardial infarction)  CAD (coronary artery disease)  Kidney stone  HTN (hypertension)  H/O abdominal hysterectomy  H/O cardiac radiofrequency ablation  CABG    Allergies:  No Known Allergies    Home Medications Reviewed  Home Medications:  allopurinol 100 mg oral tablet: 1 tab(s) orally once a day (07 May 2018 20:19)  amiodarone 200 mg oral tablet: 1 tab(s) orally once a day (07 May 2018 20:19)  aspirin 81 mg oral tablet: 2 tab(s) orally once a day (07 May 2018 20:22)  Imdur 30 mg oral tablet, extended release: 1 tab(s) orally once a day (in the morning) (07 May 2018 20:20)  Kayexalate: 1 dose(s) orally 3 times a week (07 May 2018 20:20)  metoprolol tartrate 50 mg oral tablet: 1 tab(s) orally 2 times a day (07 May 2018 20:21)  NIFEdipine 90 mg oral tablet, extended release: 1 tab(s) orally once a day (07 May 2018 20:19)  Plavix 75 mg oral tablet: 1 tab(s) orally once a day (07 May 2018 20:19)    Hospital Medications:   MEDICATIONS  (STANDING):  allopurinol 100 milliGRAM(s) Oral daily  amiodarone    Tablet 200 milliGRAM(s) Oral daily  aspirin  chewable 162 milliGRAM(s) Oral daily  cefTRIAXone   IVPB 1 Gram(s) IV Intermittent every 24 hours  clopidogrel Tablet 75 milliGRAM(s) Oral daily  heparin  Infusion 1100 Unit(s)/Hr (11 mL/Hr) IV Continuous <Continuous>  isosorbide   mononitrate ER Tablet (IMDUR) 30 milliGRAM(s) Oral daily  metoprolol tartrate 50 milliGRAM(s) Oral three times a day      SOCIAL HISTORY:  Denies ETOH,Smoking,   FAMILY HISTORY:        REVIEW OF SYSTEMS:  CONSTITUTIONAL: No weakness, fevers or chills  EYES/ENT: No visual changes;  No vertigo or throat pain   NECK: No pain or stiffness  RESPIRATORY: No cough, wheezing, hemoptysis; No shortness of breath  CARDIOVASCULAR: No chest pain. Presence of  palpitations.  GASTROINTESTINAL: No abdominal or epigastric pain. No nausea, vomiting, or hematemesis; No diarrhea or constipation. No melena or hematochezia.  GENITOURINARY: No dysuria, frequency, foamy urine, urinary urgency, incontinence or hematuria  NEUROLOGICAL: No numbness or weakness  SKIN: No itching, burning, rashes, or lesions   VASCULAR: No bilateral lower extremity edema.   All other review of systems is negative unless indicated above.    VITALS:  T(F): 97.8 (18 @ 07:55), Max: 98 (18 @ 15:58)  HR: 72 (18 @ 07:55)  BP: 161/76 (18 @ 07:55)  RR: 16 (18 @ 07:55)  SpO2: 97% (18 @ 07:55)      Weight (kg): 80 ( @ 19:11)    I&O's Detail    Creatine Kinase, Serum: 40 U/L (18 @ 20:41)  Creatine Kinase, Serum: 44 U/L (18 @ 16:51)      PHYSICAL EXAM:  Constitutional: NAD  HEENT: anicteric sclera, oropharynx clear, MMM  Neck: No JVD  Respiratory: CTAB, no wheezes, rales or rhonchi  Cardiovascular: irregular S1S2  Gastrointestinal: BS+, soft, NT/ND  Extremities: No cyanosis or clubbing. No peripheral edema  Neurological: A/O x 3, no focal deficits  Psychiatric: Normal mood, normal affect  : No CVA tenderness. No simeon.   Skin: No rashes  Vascular Access:    LABS:      147<H>  |  108  |  56<H>  ----------------------------<  131<H>  3.8   |  21  |  3.2<H>    Ca    9.7      08 May 2018 04:49  Phos  3.8       Mg     1.9         TPro  7.6  /  Alb  4.2  /  TBili  <0.2  /  DBili      /  AST  19  /  ALT  18  /  AlkPhos  58      Creatinine Trend: 3.2 <--, 3.5 <--    < end of copied text >    baseline SCr 2.4                        11.4   6.55  )-----------( 240      ( 08 May 2018 04:49 )             36.5     Urine Studies:  Urinalysis Basic - ( 07 May 2018 16:51 )    Color: Yellow / Appearance: Cloudy / S.020 / pH:   Gluc:  / Ketone: Negative  / Bili: Negative / Urobili: 0.2 mg/dL   Blood:  / Protein: >=300 mg/dL / Nitrite: Positive   Leuk Esterase: Moderate / RBC: 1-2 /HPF / WBC 26-50 /HPF   Sq Epi:  / Non Sq Epi: Few /HPF / Bacteria: Many /HPF      Potassium, Random Urine: 12 mmol/L ( @ 04:50)  Creatinine, Random Urine: 40 mg/dL ( @ 04:50)  Chloride, Random Urine: 67 ( @ 04:50)  Sodium, Random Urine: 92.0 mmoL/L ( @ 04:50)  Calcium, Random Urine: 5 mg/dL ( @ 04:49)                RADIOLOGY & ADDITIONAL STUDIES:      < from: 12 Lead ECG (18 @ 22:13) >  Diagnosis Line Atrial fibrillation with rapid ventricular response  Poor R wave progression  Nonspecific ST and T wave abnormality  Voltage criteria for left ventricular hypertrophy  Abnormal ECG    < from: Xray Chest 2 Views PA/Lat (18 @ 17:03) >    Stable cardiomegaly. Stable Bilateral opacities.    < end of copied text >

## 2018-05-09 DIAGNOSIS — N39.0 URINARY TRACT INFECTION, SITE NOT SPECIFIED: ICD-10-CM

## 2018-05-09 LAB
-  AMIKACIN: SIGNIFICANT CHANGE UP
-  AMOXICILLIN/CLAVULANIC ACID: SIGNIFICANT CHANGE UP
-  AMPICILLIN/SULBACTAM: SIGNIFICANT CHANGE UP
-  AMPICILLIN: SIGNIFICANT CHANGE UP
-  AZTREONAM: SIGNIFICANT CHANGE UP
-  CEFAZOLIN: SIGNIFICANT CHANGE UP
-  CEFEPIME: SIGNIFICANT CHANGE UP
-  CEFOXITIN: SIGNIFICANT CHANGE UP
-  CEFTRIAXONE: SIGNIFICANT CHANGE UP
-  CIPROFLOXACIN: SIGNIFICANT CHANGE UP
-  ERTAPENEM: SIGNIFICANT CHANGE UP
-  GENTAMICIN: SIGNIFICANT CHANGE UP
-  IMIPENEM: SIGNIFICANT CHANGE UP
-  LEVOFLOXACIN: SIGNIFICANT CHANGE UP
-  MEROPENEM: SIGNIFICANT CHANGE UP
-  NITROFURANTOIN: SIGNIFICANT CHANGE UP
-  PIPERACILLIN/TAZOBACTAM: SIGNIFICANT CHANGE UP
-  TIGECYCLINE: SIGNIFICANT CHANGE UP
-  TOBRAMYCIN: SIGNIFICANT CHANGE UP
-  TRIMETHOPRIM/SULFAMETHOXAZOLE: SIGNIFICANT CHANGE UP
ALBUMIN SERPL ELPH-MCNC: 3.9 G/DL — SIGNIFICANT CHANGE UP (ref 3.5–5.2)
ALP SERPL-CCNC: 49 U/L — SIGNIFICANT CHANGE UP (ref 30–115)
ALT FLD-CCNC: 14 U/L — SIGNIFICANT CHANGE UP (ref 0–41)
ANION GAP SERPL CALC-SCNC: 18 MMOL/L — HIGH (ref 7–14)
APTT BLD: 67.7 SEC — HIGH (ref 27–39.2)
APTT BLD: 75.7 SEC — CRITICAL HIGH (ref 27–39.2)
AST SERPL-CCNC: 14 U/L — SIGNIFICANT CHANGE UP (ref 0–41)
BASOPHILS # BLD AUTO: 0.05 K/UL — SIGNIFICANT CHANGE UP (ref 0–0.2)
BASOPHILS NFR BLD AUTO: 0.7 % — SIGNIFICANT CHANGE UP (ref 0–1)
BILIRUB SERPL-MCNC: <0.2 MG/DL — SIGNIFICANT CHANGE UP (ref 0.2–1.2)
BUN SERPL-MCNC: 46 MG/DL — HIGH (ref 10–20)
CALCIUM SERPL-MCNC: 9.7 MG/DL — SIGNIFICANT CHANGE UP (ref 8.5–10.1)
CHLORIDE SERPL-SCNC: 102 MMOL/L — SIGNIFICANT CHANGE UP (ref 98–110)
CHLORIDE UR-SCNC: 55 — SIGNIFICANT CHANGE UP
CK MB CFR SERPL CALC: 2 NG/ML — SIGNIFICANT CHANGE UP (ref 0.6–6.3)
CK SERPL-CCNC: 26 U/L — SIGNIFICANT CHANGE UP (ref 0–225)
CO2 SERPL-SCNC: 23 MMOL/L — SIGNIFICANT CHANGE UP (ref 17–32)
CREAT ?TM UR-MCNC: 52 MG/DL — SIGNIFICANT CHANGE UP
CREAT SERPL-MCNC: 2.6 MG/DL — HIGH (ref 0.7–1.5)
CULTURE RESULTS: SIGNIFICANT CHANGE UP
EOSINOPHIL # BLD AUTO: 0.25 K/UL — SIGNIFICANT CHANGE UP (ref 0–0.7)
EOSINOPHIL NFR BLD AUTO: 3.6 % — SIGNIFICANT CHANGE UP (ref 0–8)
GLUCOSE SERPL-MCNC: 184 MG/DL — HIGH (ref 70–99)
HCT VFR BLD CALC: 37.3 % — SIGNIFICANT CHANGE UP (ref 37–47)
HGB BLD-MCNC: 11.6 G/DL — LOW (ref 12–16)
IMM GRANULOCYTES NFR BLD AUTO: 0.6 % — HIGH (ref 0.1–0.3)
INR BLD: 1.16 RATIO — SIGNIFICANT CHANGE UP (ref 0.65–1.3)
LYMPHOCYTES # BLD AUTO: 0.93 K/UL — LOW (ref 1.2–3.4)
LYMPHOCYTES # BLD AUTO: 13.4 % — LOW (ref 20.5–51.1)
MAGNESIUM SERPL-MCNC: 1.8 MG/DL — SIGNIFICANT CHANGE UP (ref 1.8–2.4)
MCHC RBC-ENTMCNC: 27.3 PG — SIGNIFICANT CHANGE UP (ref 27–31)
MCHC RBC-ENTMCNC: 31.1 G/DL — LOW (ref 32–37)
MCV RBC AUTO: 87.8 FL — SIGNIFICANT CHANGE UP (ref 81–99)
METHOD TYPE: SIGNIFICANT CHANGE UP
MONOCYTES # BLD AUTO: 0.68 K/UL — HIGH (ref 0.1–0.6)
MONOCYTES NFR BLD AUTO: 9.8 % — HIGH (ref 1.7–9.3)
NEUTROPHILS # BLD AUTO: 5 K/UL — SIGNIFICANT CHANGE UP (ref 1.4–6.5)
NEUTROPHILS NFR BLD AUTO: 71.9 % — SIGNIFICANT CHANGE UP (ref 42.2–75.2)
ORGANISM # SPEC MICROSCOPIC CNT: SIGNIFICANT CHANGE UP
ORGANISM # SPEC MICROSCOPIC CNT: SIGNIFICANT CHANGE UP
PLATELET # BLD AUTO: 237 K/UL — SIGNIFICANT CHANGE UP (ref 130–400)
POTASSIUM SERPL-MCNC: 4.2 MMOL/L — SIGNIFICANT CHANGE UP (ref 3.5–5)
POTASSIUM SERPL-SCNC: 4.2 MMOL/L — SIGNIFICANT CHANGE UP (ref 3.5–5)
POTASSIUM UR-SCNC: 15 MMOL/L — SIGNIFICANT CHANGE UP
PROT SERPL-MCNC: 7.3 G/DL — SIGNIFICANT CHANGE UP (ref 6–8)
PROTHROM AB SERPL-ACNC: 12.6 SEC — SIGNIFICANT CHANGE UP (ref 9.95–12.87)
RBC # BLD: 4.25 M/UL — SIGNIFICANT CHANGE UP (ref 4.2–5.4)
RBC # FLD: 14.3 % — SIGNIFICANT CHANGE UP (ref 11.5–14.5)
SODIUM SERPL-SCNC: 143 MMOL/L — SIGNIFICANT CHANGE UP (ref 135–146)
SODIUM UR-SCNC: 76 MMOL/L — SIGNIFICANT CHANGE UP
SPECIMEN SOURCE: SIGNIFICANT CHANGE UP
TROPONIN T SERPL-MCNC: <0.01 NG/ML — SIGNIFICANT CHANGE UP
WBC # BLD: 6.95 K/UL — SIGNIFICANT CHANGE UP (ref 4.8–10.8)
WBC # FLD AUTO: 6.95 K/UL — SIGNIFICANT CHANGE UP (ref 4.8–10.8)

## 2018-05-09 RX ORDER — AMLODIPINE BESYLATE 2.5 MG/1
10 TABLET ORAL DAILY
Qty: 0 | Refills: 0 | Status: DISCONTINUED | OUTPATIENT
Start: 2018-05-09 | End: 2018-05-11

## 2018-05-09 RX ORDER — AMIODARONE HYDROCHLORIDE 400 MG/1
200 TABLET ORAL
Qty: 0 | Refills: 0 | Status: DISCONTINUED | OUTPATIENT
Start: 2018-05-09 | End: 2018-05-11

## 2018-05-09 RX ORDER — ASPIRIN/CALCIUM CARB/MAGNESIUM 324 MG
81 TABLET ORAL DAILY
Qty: 0 | Refills: 0 | Status: DISCONTINUED | OUTPATIENT
Start: 2018-05-09 | End: 2018-05-11

## 2018-05-09 RX ORDER — AMIODARONE HYDROCHLORIDE 400 MG/1
200 TABLET ORAL
Qty: 0 | Refills: 0 | Status: DISCONTINUED | OUTPATIENT
Start: 2018-05-09 | End: 2018-05-09

## 2018-05-09 RX ORDER — AMLODIPINE BESYLATE 2.5 MG/1
5 TABLET ORAL ONCE
Qty: 0 | Refills: 0 | Status: COMPLETED | OUTPATIENT
Start: 2018-05-09 | End: 2018-05-09

## 2018-05-09 RX ORDER — WARFARIN SODIUM 2.5 MG/1
5 TABLET ORAL ONCE
Qty: 0 | Refills: 0 | Status: COMPLETED | OUTPATIENT
Start: 2018-05-09 | End: 2018-05-09

## 2018-05-09 RX ORDER — HEPARIN SODIUM 5000 [USP'U]/ML
1250 INJECTION INTRAVENOUS; SUBCUTANEOUS
Qty: 25000 | Refills: 0 | Status: DISCONTINUED | OUTPATIENT
Start: 2018-05-09 | End: 2018-05-10

## 2018-05-09 RX ADMIN — AMIODARONE HYDROCHLORIDE 200 MILLIGRAM(S): 400 TABLET ORAL at 17:31

## 2018-05-09 RX ADMIN — ISOSORBIDE MONONITRATE 30 MILLIGRAM(S): 60 TABLET, EXTENDED RELEASE ORAL at 12:32

## 2018-05-09 RX ADMIN — AMLODIPINE BESYLATE 5 MILLIGRAM(S): 2.5 TABLET ORAL at 10:22

## 2018-05-09 RX ADMIN — HEPARIN SODIUM 13 UNIT(S)/HR: 5000 INJECTION INTRAVENOUS; SUBCUTANEOUS at 06:38

## 2018-05-09 RX ADMIN — SODIUM CHLORIDE 60 MILLILITER(S): 9 INJECTION INTRAMUSCULAR; INTRAVENOUS; SUBCUTANEOUS at 19:57

## 2018-05-09 RX ADMIN — AMLODIPINE BESYLATE 5 MILLIGRAM(S): 2.5 TABLET ORAL at 06:13

## 2018-05-09 RX ADMIN — HEPARIN SODIUM 12.5 UNIT(S)/HR: 5000 INJECTION INTRAVENOUS; SUBCUTANEOUS at 16:43

## 2018-05-09 RX ADMIN — Medication 50 MILLIGRAM(S): at 14:03

## 2018-05-09 RX ADMIN — WARFARIN SODIUM 5 MILLIGRAM(S): 2.5 TABLET ORAL at 21:09

## 2018-05-09 RX ADMIN — Medication 50 MILLIGRAM(S): at 06:13

## 2018-05-09 RX ADMIN — Medication 100 MILLIGRAM(S): at 12:32

## 2018-05-09 RX ADMIN — AMIODARONE HYDROCHLORIDE 200 MILLIGRAM(S): 400 TABLET ORAL at 12:32

## 2018-05-09 RX ADMIN — Medication 81 MILLIGRAM(S): at 12:32

## 2018-05-09 RX ADMIN — Medication 50 MILLIGRAM(S): at 21:09

## 2018-05-09 RX ADMIN — Medication 0.25 MILLIGRAM(S): at 21:13

## 2018-05-09 RX ADMIN — CEFTRIAXONE 100 GRAM(S): 500 INJECTION, POWDER, FOR SOLUTION INTRAMUSCULAR; INTRAVENOUS at 06:14

## 2018-05-09 RX ADMIN — HEPARIN SODIUM 12.5 UNIT(S)/HR: 5000 INJECTION INTRAVENOUS; SUBCUTANEOUS at 19:57

## 2018-05-09 RX ADMIN — SODIUM CHLORIDE 60 MILLILITER(S): 9 INJECTION INTRAMUSCULAR; INTRAVENOUS; SUBCUTANEOUS at 14:02

## 2018-05-09 NOTE — PROGRESS NOTE ADULT - PROBLEM SELECTOR PLAN 1
switch iv amio to po  start coumadin and when inr > 2 then d/c heparin switch iv amio to po  start coumadin and when inr > 2 then d/c heparin  echo eval ef and aortic stenosis.

## 2018-05-09 NOTE — PROGRESS NOTE ADULT - SUBJECTIVE AND OBJECTIVE BOX
NOAH QUIROS 71yo W Female came to the ER c/o palpitations and noted to be in afib with RVR.  Pt denies CP, SOB, N/V or diaphoresis.  The pt was also noted to have E coli UTI.  Of note is the fact that the pt has a Hx of afib with previous ablation and is on amiodarone 200mg daily but on no AC.  The PMHx includes:  HTN, ASHD, CAD, sp CABG, afib/flutter, sp ablation in the remote past, old MI, sp CABG, Hyperlipidemia, CKD IV (baseline creat 2.4), nephrolithiases, OA, DDD, DJD. past surgical Hx includes C section, hysterectomy, hernia repair and CABG.  The pt was evaluated by cardio and tx with amiodarone IV 200mg q 12hr now to transition to po amidarone.  The pt, was  started on IV heparin and to be transitioned to coumadin,  The pt was also evaluated by renal.  She was placed on Rocephin for UTI.    INTERVAL HPI/OVERNIGHT EVENTS:  pt v anxious but states she is feeling better, started po amiodarone and coumadin, renal parameters improving    MEDICATIONS  (STANDING):  allopurinol 100 milliGRAM(s) Oral daily  amiodarone 200 mg q 12hrs  amLODIPine   Tablet 5 milliGRAM(s) Oral daily  aspirin  chewable 162 milliGRAM(s) Oral daily  cefTRIAXone   IVPB 1 Gram(s) IV Intermittent every 24 hours  heparin  Infusion 1200 Unit(s)/Hr (12 mL/Hr) IV Continuous <Continuous>  coumadin 5mg q 24hrs  isosorbide   mononitrate ER Tablet (IMDUR) 30 milliGRAM(s) Oral daily  metoprolol tartrate 50 milliGRAM(s) Oral three times a day  sodium chloride 0.9%. 1000 milliLiter(s) (60 mL/Hr) IV Continuous <Continuous>    MEDICATIONS  (PRN):  ALPRAZolam 0.25 milliGRAM(s) Oral three times a day PRN anxiety      Allergies    No Known Allergies  	    Vital Signs Last 24 Hrs    T(F): 96.6  HR: 60s-80s  BP: 136/77    RR: 18  SpO2: 96%     PHYSICAL EXAM:      Constitutional:  pt is alert, oriented,  anxious but in NAD    Eyes:  normal    ENMT:  dry oral mucosa    Neck:  supple no JVD, no bruits    Cardiovascular:  S1S2 [id number]    Gastrointestinal:  globose, soft and benign    Extremities:  moves all ext, + arthritic changes    LABS:                        11.6   6.95  )-----------( 237                   36.5         143  |  102  |  46  ----------------------------<  184  4.2   |  23    |  2.6    GFR 14, 18  Ca    9.7      08 May 2018 04:49  Phos  3.8     05-08  Mg     1.9     05-08    TPro  7.6  /  Alb  4.2  /  TBili  <0.2  /  DBili  x   /  AST  19  /  ALT  18  /  AlkPhos  58  05-07    PT/INR - ( 08 May 2018 04:49 )   PT: 12.40 sec;   INR: 1.14 ratio    CK  40, 29, 26:  CKMB  2.1, 1.8 1.5;   trop <0.01 x 3     PTT - ( 08 May 2018 15:29 )  PTT:47.0 sec  Urinalysis Basic - ( 07 May 2018 16:51 )    Color: Yellow / Appearance: Cloudy / S.020 / pH: x  Gluc: x / Ketone: Negative  / Bili: Negative / Urobili: 0.2 mg/dL   Blood: x / Protein: >=300 mg/dL / Nitrite: Positive   Leuk Esterase: Moderate / RBC: 1-2 /HPF / WBC 26-50 /HPF   Sq Epi: x / Non Sq Epi: Few /HPF / Bacteria: Many /HPF    urine C&S:  E coli >100,000 CFU    RADIOLOGY & ADDITIONAL TESTS:    EKG:  afib /min, PRWP, PVCs    CXR:  no infiltrate, + cardiomegaly, + sternotomy

## 2018-05-09 NOTE — PROGRESS NOTE ADULT - SUBJECTIVE AND OBJECTIVE BOX
Patient is a 72y Female PMHx of HTN, CAD s/p MI s/p CABG, A flutter s/p ablation, CKD4 on kayexalate and allopurinol at home, hx of nephrolithiasis whom presented to the hospital with palpitations. Found to be in A fib with RVR and fount to have a SCr of 3.5 (up from 2.4 in 2017).    PAST MEDICAL & SURGICAL HISTORY:  Atrial fibrillation  CKD (chronic kidney disease)  MI (myocardial infarction)  CAD (coronary artery disease)  Kidney stone  HTN (hypertension)  H/O abdominal hysterectomy  H/O cardiac radiofrequency ablation  History of open heart surgery    Allergies:  No Known Allergies    Home Medications Reviewed  Hospital Medications:   MEDICATIONS  (STANDING):  allopurinol 100 milliGRAM(s) Oral daily  amiodarone    Tablet 200 milliGRAM(s) Oral two times a day  amiodarone Infusion 1 mG/Min (33.333 mL/Hr) IV Continuous <Continuous>  amiodarone Infusion 0.5 mG/Min (16.667 mL/Hr) IV Continuous <Continuous>  amLODIPine   Tablet 10 milliGRAM(s) Oral daily  aspirin  chewable 81 milliGRAM(s) Oral daily  cefTRIAXone   IVPB 1 Gram(s) IV Intermittent every 24 hours  heparin  Infusion 1300 Unit(s)/Hr (13 mL/Hr) IV Continuous <Continuous>  isosorbide   mononitrate ER Tablet (IMDUR) 30 milliGRAM(s) Oral daily  metoprolol tartrate 50 milliGRAM(s) Oral three times a day  sodium chloride 0.9%. 1000 milliLiter(s) (60 mL/Hr) IV Continuous <Continuous>      SOCIAL HISTORY:  Denies ETOH,Smoking,   FAMILY HISTORY:   noncontributary      REVIEW OF SYSTEMS:  CONSTITUTIONAL: No weakness, fevers or chills  EYES/ENT: No visual changes;  No vertigo or throat pain   NECK: No pain or stiffness  RESPIRATORY: No cough, wheezing, hemoptysis; No shortness of breath  CARDIOVASCULAR: No chest pain or palpitations.  GASTROINTESTINAL: No abdominal or epigastric pain. No nausea, vomiting, or hematemesis; No diarrhea or constipation. No melena or hematochezia.  GENITOURINARY: No dysuria, frequency, foamy urine, urinary urgency, incontinence or hematuria  NEUROLOGICAL: No numbness or weakness  SKIN: No itching, burning, rashes, or lesions   VASCULAR: No bilateral lower extremity edema.   All other review of systems is negative unless indicated above.    VITALS:  T(F): 96.4 (18 @ 10:41), Max: 98.7 (18 @ 14:00)  HR: 73 (18 @ 10:41)  BP: 146/68 (18 @ 10:41)  RR: 18 (18 @ 10:41)  SpO2: 97% (18 @ 02:40)     07:01  -   07:00  --------------------------------------------------------  IN: 269 mL / OUT: 0 mL / NET: 269 mL     07:  -   @ 11:11  --------------------------------------------------------  IN: 330 mL / OUT: 0 mL / NET: 330 mL      Height (cm): 167.64 ( 04:45)  Weight (kg): 81.7 ( 04:45)  BMI (kg/m2): 29.1 ( 04:45)  BSA (m2): 1.91 ( 04:45)      I&O's Detail    08 May 2018 07:  -  09 May 2018 07:00  --------------------------------------------------------  IN:    amiodarone Infusion: 50 mL    heparin Infusion: 39 mL    sodium chloride 0.9%.: 180 mL  Total IN: 269 mL    OUT:  Total OUT: 0 mL    Total NET: 269 mL      09 May 2018 07:  -  09 May 2018 11:11  --------------------------------------------------------  IN:    Oral Fluid: 330 mL  Total IN: 330 mL    OUT:  Total OUT: 0 mL    Total NET: 330 mL        Creatine Kinase, Serum: 27 U/L (18 @ 21:37)  Creatine Kinase, Serum: 29 U/L (18 @ 17:45)      PHYSICAL EXAM:  Constitutional: NAD  HEENT: anicteric sclera, oropharynx clear, MMM  Neck: No JVD  Respiratory: CTAB, no wheezes, rales or rhonchi  Cardiovascular: S1, S2, RRR  Gastrointestinal: BS+, soft, NT/ND  Extremities: No cyanosis or clubbing. No peripheral edema  Neurological: A/O x 3, no focal deficits  Psychiatric: Normal mood, normal affect  : No CVA tenderness. No simeon.   Skin: No rashes  Vascular Access:    LABS:      147<H>  |  108  |  56<H>  ----------------------------<  131<H>  3.8   |  21  |  3.2<H>    Ca    9.7      08 May 2018 04:49  Phos  3.8       Mg     1.9         TPro  7.6  /  Alb  4.2  /  TBili  <0.2  /  DBili      /  AST  19  /  ALT  18  /  AlkPhos  58      Creatinine Trend: 3.2 <--, 3.5 <--                        11.6   6.95  )-----------( 237      ( 09 May 2018 09:39 )             37.3     Urine Studies:  Urinalysis Basic - ( 07 May 2018 16:51 )    Color: Yellow / Appearance: Cloudy / S.020 / pH:   Gluc:  / Ketone: Negative  / Bili: Negative / Urobili: 0.2 mg/dL   Blood:  / Protein: >=300 mg/dL / Nitrite: Positive   Leuk Esterase: Moderate / RBC: 1-2 /HPF / WBC 26-50 /HPF   Sq Epi:  / Non Sq Epi: Few /HPF / Bacteria: Many /HPF      Potassium, Random Urine: 12 mmol/L ( @ 04:50)  Creatinine, Random Urine: 40 mg/dL ( @ 04:50)  Chloride, Random Urine: 67 ( @ 04:50)  Sodium, Random Urine: 92.0 mmoL/L ( @ 04:50)  Calcium, Random Urine: 5 mg/dL ( @ 04:49)            RADIOLOGY & ADDITIONAL STUDIES:    stable cardiomegaly with bilateral opacities on cxr

## 2018-05-09 NOTE — PROGRESS NOTE ADULT - ASSESSMENT
72 year old Female with PMH of CAD, MI s/p CABG, h/o Afib/A flutter not on anticoagulation s/p, ablation, HTN, CKD4 (Baseline Cr 2.4), Anxiety presented with intermittent palpitations for past 4 days which are getting worse. Found to have SCr of 3.5 (5/7) --> 3.2 (5/8) and positive UA.     ·	FRANC on CKD4 vs. progression of CKD    ·	Cr trending  upward 2.4 to 3.5-->3.2  ·	Urine creatinine too low for the sample to be reliable to calculate FeNa  ·	patient looks on the dry side (No OLIVIA, no crackles) even though has cardiomegaly and bibasilar opacities  ·	recommend PO hydration since the patient is alert and oriented with the capacity to swallow.  ·	found to have positive UA started on Rocephin IV  ·	check for follow up renal sono for hydronephrosis given hx of nephrolithiasis  ·	repeat Urine electrolytes for FeNa  ·	trend BMP since the patient did not have any studies done today

## 2018-05-09 NOTE — PROGRESS NOTE ADULT - SUBJECTIVE AND OBJECTIVE BOX
NEPHROLOGY CONSULTATION NOTE    Patient is a 72y Female PMHx of HTN, CAD s/p MI s/p CABG, A flutter s/p ablation, CKD4 on kayexalate and allopurinol at home, hx of nephrolithiasis whom presented to the hospital with palpitations. Found to be in A fib with RVR and fount to have a SCr of 3.5 (up from 2.4 in 2017).    PAST MEDICAL & SURGICAL HISTORY:  Atrial fibrillation  CKD (chronic kidney disease)  MI (myocardial infarction)  CAD (coronary artery disease)  Kidney stone  HTN (hypertension)  H/O abdominal hysterectomy  H/O cardiac radiofrequency ablation  History of open heart surgery    Allergies:  No Known Allergies    Home Medications Reviewed  Hospital Medications:   MEDICATIONS  (STANDING):  allopurinol 100 milliGRAM(s) Oral daily  amiodarone    Tablet 200 milliGRAM(s) Oral two times a day  amiodarone Infusion 1 mG/Min (33.333 mL/Hr) IV Continuous <Continuous>  amiodarone Infusion 0.5 mG/Min (16.667 mL/Hr) IV Continuous <Continuous>  amLODIPine   Tablet 10 milliGRAM(s) Oral daily  aspirin  chewable 81 milliGRAM(s) Oral daily  cefTRIAXone   IVPB 1 Gram(s) IV Intermittent every 24 hours  heparin  Infusion 1300 Unit(s)/Hr (13 mL/Hr) IV Continuous <Continuous>  isosorbide   mononitrate ER Tablet (IMDUR) 30 milliGRAM(s) Oral daily  metoprolol tartrate 50 milliGRAM(s) Oral three times a day  sodium chloride 0.9%. 1000 milliLiter(s) (60 mL/Hr) IV Continuous <Continuous>      SOCIAL HISTORY:  Denies ETOH,Smoking,   FAMILY HISTORY:   noncontributary      REVIEW OF SYSTEMS:  CONSTITUTIONAL: No weakness, fevers or chills  EYES/ENT: No visual changes;  No vertigo or throat pain   NECK: No pain or stiffness  RESPIRATORY: No cough, wheezing, hemoptysis; No shortness of breath  CARDIOVASCULAR: No chest pain or palpitations.  GASTROINTESTINAL: No abdominal or epigastric pain. No nausea, vomiting, or hematemesis; No diarrhea or constipation. No melena or hematochezia.  GENITOURINARY: No dysuria, frequency, foamy urine, urinary urgency, incontinence or hematuria  NEUROLOGICAL: No numbness or weakness  SKIN: No itching, burning, rashes, or lesions   VASCULAR: No bilateral lower extremity edema.   All other review of systems is negative unless indicated above.    VITALS:  T(F): 96.4 (18 @ 10:41), Max: 98.7 (18 @ 14:00)  HR: 73 (18 @ 10:41)  BP: 146/68 (18 @ 10:41)  RR: 18 (18 @ 10:41)  SpO2: 97% (18 @ 02:40)    :  -   07:00  --------------------------------------------------------  IN: 269 mL / OUT: 0 mL / NET: 269 mL     07:  -   @ 11:11  --------------------------------------------------------  IN: 330 mL / OUT: 0 mL / NET: 330 mL      Height (cm): 167.64 ( 04:45)  Weight (kg): 81.7 ( @ 04:45)  BMI (kg/m2): 29.1 ( 04:45)  BSA (m2): 1.91 ( 04:45)      I&O's Detail    08 May 2018 07:  -  09 May 2018 07:00  --------------------------------------------------------  IN:    amiodarone Infusion: 50 mL    heparin Infusion: 39 mL    sodium chloride 0.9%.: 180 mL  Total IN: 269 mL    OUT:  Total OUT: 0 mL    Total NET: 269 mL      09 May 2018 07:  -  09 May 2018 11:11  --------------------------------------------------------  IN:    Oral Fluid: 330 mL  Total IN: 330 mL    OUT:  Total OUT: 0 mL    Total NET: 330 mL        Creatine Kinase, Serum: 27 U/L (18 @ 21:37)  Creatine Kinase, Serum: 29 U/L (18 @ 17:45)      PHYSICAL EXAM:  Constitutional: NAD  HEENT: anicteric sclera, oropharynx clear, MMM  Neck: No JVD  Respiratory: CTAB, no wheezes, rales or rhonchi  Cardiovascular: S1, S2, RRR  Gastrointestinal: BS+, soft, NT/ND  Extremities: No cyanosis or clubbing. No peripheral edema  Neurological: A/O x 3, no focal deficits  Psychiatric: Normal mood, normal affect  : No CVA tenderness. No simeon.   Skin: No rashes  Vascular Access:    LABS:      147<H>  |  108  |  56<H>  ----------------------------<  131<H>  3.8   |  21  |  3.2<H>    Ca    9.7      08 May 2018 04:49  Phos  3.8       Mg     1.9         TPro  7.6  /  Alb  4.2  /  TBili  <0.2  /  DBili      /  AST  19  /  ALT  18  /  AlkPhos  58      Creatinine Trend: 3.2 <--, 3.5 <--                        11.6   6.95  )-----------( 237      ( 09 May 2018 09:39 )             37.3     Urine Studies:  Urinalysis Basic - ( 07 May 2018 16:51 )    Color: Yellow / Appearance: Cloudy / S.020 / pH:   Gluc:  / Ketone: Negative  / Bili: Negative / Urobili: 0.2 mg/dL   Blood:  / Protein: >=300 mg/dL / Nitrite: Positive   Leuk Esterase: Moderate / RBC: 1-2 /HPF / WBC 26-50 /HPF   Sq Epi:  / Non Sq Epi: Few /HPF / Bacteria: Many /HPF      Potassium, Random Urine: 12 mmol/L ( @ 04:50)  Creatinine, Random Urine: 40 mg/dL ( @ 04:50)  Chloride, Random Urine: 67 ( @ 04:50)  Sodium, Random Urine: 92.0 mmoL/L ( @ 04:50)  Calcium, Random Urine: 5 mg/dL ( @ 04:49)            RADIOLOGY & ADDITIONAL STUDIES:    stable cardiomegaly with bilateral opacities on cxr

## 2018-05-09 NOTE — PROGRESS NOTE ADULT - SUBJECTIVE AND OBJECTIVE BOX
Subjective: pt feels well with no cp nor sob      MEDICATIONS  (STANDING):  allopurinol 100 milliGRAM(s) Oral daily  amiodarone    Tablet 200 milliGRAM(s) Oral two times a day  amiodarone Infusion 1 mG/Min (33.333 mL/Hr) IV Continuous <Continuous>  amiodarone Infusion 0.5 mG/Min (16.667 mL/Hr) IV Continuous <Continuous>  amLODIPine   Tablet 5 milliGRAM(s) Oral daily  aspirin  chewable 162 milliGRAM(s) Oral daily  cefTRIAXone   IVPB 1 Gram(s) IV Intermittent every 24 hours  heparin  Infusion 1300 Unit(s)/Hr (13 mL/Hr) IV Continuous <Continuous>  isosorbide   mononitrate ER Tablet (IMDUR) 30 milliGRAM(s) Oral daily  metoprolol tartrate 50 milliGRAM(s) Oral three times a day  sodium chloride 0.9%. 1000 milliLiter(s) (60 mL/Hr) IV Continuous <Continuous>    MEDICATIONS  (PRN):  ALPRAZolam 0.25 milliGRAM(s) Oral three times a day PRN anxiety            Vital Signs Last 24 Hrs  T(C): 35.9 (09 May 2018 08:45), Max: 37.1 (08 May 2018 14:00)  T(F): 96.6 (09 May 2018 08:45), Max: 98.7 (08 May 2018 14:00)  HR: 66 (09 May 2018 08:45) (57 - 112)  BP: 182/75 (09 May 2018 08:45) (151/110 - 182/75)  BP(mean): --  RR: 18 (09 May 2018 08:45) (17 - 18)  SpO2: 97% (09 May 2018 02:40) (96% - 97%)             REVIEW OF SYSTEMS:  CONSTITUTIONAL: no fever, no chills, no diaphoresis  CARDIOLOGY: no chest pain, no SOB, (+) palpitation, no diaphoresis, no faint   RESPIRATORY: no dyspnea, no wheeze, no orthopnea, no PND   NEUROLOGICAL: no dizziness, headache, focal deficits to report.  GI: no abdominal pain, no dyspepsia, no nausea, no vomiting, no diarrhea.    HEENT: no congestion, no nasal bleeding  SKIN: no ecchymosis             PHYSICAL EXAM:  · CONSTITUTIONAL: Looks stable  . NECK: Supple, no JVD, no bruit on either carotid side   · RESPIRATORY: Normal air entry to lung base, no wheeze, no crackle, no wet rales  · CARDIOVASCULAR: irregular irregular normal S1, A2, P2, 2/6 eden rsb, no click, regular rate,  no rub,  · EXTREMITIES: No cyanosis, no clubbing, no edema  · VASCULAR: Pulses are regular, equal, bilateral in upper and lower extremities  	  TELEMETRY: afib mod v response      TTE: pending    LABS:                        11.4   6.55  )-----------( 240      ( 08 May 2018 04:49 )             36.5     05-08    147<H>  |  108  |  56<H>  ----------------------------<  131<H>  3.8   |  21  |  3.2<H>    Ca    9.7      08 May 2018 04:49  Phos  3.8     05-08  Mg     1.9     05-08    TPro  7.6  /  Alb  4.2  /  TBili  <0.2  /  DBili  x   /  AST  19  /  ALT  18  /  AlkPhos  58  05-07    CARDIAC MARKERS ( 08 May 2018 21:37 )  x     / <0.01 ng/mL / 27 U/L / x     / 1.5 ng/mL  CARDIAC MARKERS ( 08 May 2018 17:45 )  x     / x     / 29 U/L / x     / x      CARDIAC MARKERS ( 08 May 2018 15:29 )  x     / <0.01 ng/mL / x     / x     / 1.8 ng/mL  CARDIAC MARKERS ( 07 May 2018 20:41 )  x     / <0.01 ng/mL / 40 U/L / x     / 2.1 ng/mL  CARDIAC MARKERS ( 07 May 2018 16:51 )  x     / <0.01 ng/mL / 44 U/L / x     / 2.2 ng/mL      PT/INR - ( 08 May 2018 04:49 )   PT: 12.40 sec;   INR: 1.14 ratio         PTT - ( 08 May 2018 21:37 )  PTT:51.5 sec    I&O's Summary    08 May 2018 07:01  -  09 May 2018 07:00  --------------------------------------------------------  IN: 269 mL / OUT: 0 mL / NET: 269 mL      BNP  RADIOLOGY & ADDITIONAL STUDIES:    IMPRESSION AND PLAN:

## 2018-05-09 NOTE — PROGRESS NOTE ADULT - SUBJECTIVE AND OBJECTIVE BOX
Patient is a 72y old  Female who presents with a chief complaint of palpitations (07 May 2018 19:20)      Overnight events:   No acute event overnight.   Patient is feeling well today. No complaints.       PAST MEDICAL & SURGICAL HISTORY:  Atrial fibrillation  CKD (chronic kidney disease)  MI (myocardial infarction)  CAD (coronary artery disease)  Kidney stone  HTN (hypertension)  H/O abdominal hysterectomy  H/O cardiac radiofrequency ablation  History of open heart surgery      Allergies  No Known Allergies          MEDICATIONS  (STANDING):  allopurinol 100 milliGRAM(s) Oral daily  amiodarone    Tablet 200 milliGRAM(s) Oral two times a day  amiodarone Infusion 1 mG/Min (33.333 mL/Hr) IV Continuous <Continuous>  amiodarone Infusion 0.5 mG/Min (16.667 mL/Hr) IV Continuous <Continuous>  amLODIPine   Tablet 10 milliGRAM(s) Oral daily  aspirin  chewable 81 milliGRAM(s) Oral daily  cefTRIAXone   IVPB 1 Gram(s) IV Intermittent every 24 hours  heparin  Infusion 1250 Unit(s)/Hr (12.5 mL/Hr) IV Continuous <Continuous>  isosorbide   mononitrate ER Tablet (IMDUR) 30 milliGRAM(s) Oral daily  metoprolol tartrate 50 milliGRAM(s) Oral three times a day  sodium chloride 0.9%. 1000 milliLiter(s) (60 mL/Hr) IV Continuous <Continuous>  warfarin 5 milliGRAM(s) Oral once    MEDICATIONS  (PRN):  ALPRAZolam 0.25 milliGRAM(s) Oral three times a day PRN anxiety        Vital Signs Last 24 Hrs  T(C): 35.9 (09 May 2018 14:43), Max: 36.5 (09 May 2018 00:04)  T(F): 96.6 (09 May 2018 14:43), Max: 97.7 (09 May 2018 00:04)  HR: 121 (09 May 2018 14:43) (57 - 121)  BP: 136/77 (09 May 2018 14:43) (136/77 - 182/75)  BP(mean): --  RR: 18 (09 May 2018 14:43) (17 - 18)  SpO2: 97% (09 May 2018 02:40) (96% - 97%)  CAPILLARY BLOOD GLUCOSE      I&O's Summary    08 May 2018 07:01  -  09 May 2018 07:00  --------------------------------------------------------  IN: 269 mL / OUT: 0 mL / NET: 269 mL    09 May 2018 07:01  -  09 May 2018 15:55  --------------------------------------------------------  IN: 1228 mL / OUT: 200 mL / NET: 1028 mL        Physical Exam:    -     General : Not in acute distress.    -      HEENT: PEERLA    -      Cardiac: S1 & S2. No murmur/gallop/rubs.     -      Pulm: clear to auscultate b/l lung field.     -      GI: positive BS. No tenderness/rigidity/rebound.     -      Musculoskeletal: no pitting edema.     -      Neuro: A & O4.         Labs:                        11.6   6.95  )-----------( 237      ( 09 May 2018 09:39 )             37.3                           05-09    143  |  102  |  46<H>  ----------------------------<  184<H>  4.2   |  23  |  2.6<H>    Ca    9.7      09 May 2018 09:39  Phos  3.8     05-08  Mg     1.8         TPro  7.3  /  Alb  3.9  /  TBili  <0.2  /  DBili  x   /  AST  14  /  ALT  14  /  AlkPhos  49  05-09    LIVER FUNCTIONS - ( 09 May 2018 09:39 )  Alb: 3.9 g/dL / Pro: 7.3 g/dL / ALK PHOS: 49 U/L / ALT: 14 U/L / AST: 14 U/L / GGT: x                              PT/INR - ( 09 May 2018 09:39 )   PT: 12.60 sec;   INR: 1.16 ratio         PTT - ( 09 May 2018 09:39 )  PTT:75.7 sec  CARDIAC MARKERS ( 09 May 2018 09:39 )  x     / <0.01 ng/mL / 26 U/L / x     / 2.0 ng/mL  CARDIAC MARKERS ( 08 May 2018 21:37 )  x     / <0.01 ng/mL / 27 U/L / x     / 1.5 ng/mL  CARDIAC MARKERS ( 08 May 2018 17:45 )  x     / x     / 29 U/L / x     / x      CARDIAC MARKERS ( 08 May 2018 15:29 )  x     / <0.01 ng/mL / x     / x     / 1.8 ng/mL  CARDIAC MARKERS ( 07 May 2018 20:41 )  x     / <0.01 ng/mL / 40 U/L / x     / 2.1 ng/mL  CARDIAC MARKERS ( 07 May 2018 16:51 )  x     / <0.01 ng/mL / 44 U/L / x     / 2.2 ng/mL                           Urinalysis Basic - ( 07 May 2018 16:51 )    Color: Yellow / Appearance: Cloudy / S.020 / pH: x  Gluc: x / Ketone: Negative  / Bili: Negative / Urobili: 0.2 mg/dL   Blood: x / Protein: >=300 mg/dL / Nitrite: Positive   Leuk Esterase: Moderate / RBC: 1-2 /HPF / WBC 26-50 /HPF   Sq Epi: x / Non Sq Epi: Few /HPF / Bacteria: Many /HPF        Culture - Urine (collected 07 May 2018 16:51)  Source: .Urine Clean Catch (Midstream)  Preliminary Report (08 May 2018 18:47):    >100,000 CFU/ml Escherichia coli            Imaging:    ECG:

## 2018-05-09 NOTE — PROGRESS NOTE ADULT - ASSESSMENT
71 yo female patient with PMH of CAD, MI s/p CABG, h/o Afib/A.flutter s/p ablation about 10 years ago, not on anticoagulation,, HTN, CKD (Baseline Cr 2.4), anxiety presented with intermittent palpitations for past 4 days which are getting worse, found to be in a.fib RVR on admission      Atrial fibrillation with RVR  - Rate control upon admission; in the afternoon, patient developed a.fib RVR again with chest discomfort  - Chest discomfort resolves when patient's heart rate is controlled  - CE neg x 5  - s/p amio drip   - started on PO amiodarone 200mg q12h (started on 5/9)  - started coumadin 5mg   - c/w heparin until INR > 2 (PTT 55-65)  - Cardiology: switched IV amio to PO, start Coumadin and dc heparin when INR >2.  - f/u EP    UTI  - c/w Rocephin  - UA positive  - UCx: pansensitive E.coli  - f/u renal US    FRANC on CKD (baseline 2.4) - improving  - s/p IV bolus in ED  - Gentle hydration with NS at 60cc/hr as per nephro. Patient currently not in fluid overload clinically; watch for signs and symptoms of fluid overload  - As per renal: urine creatinine too low for the sample to be reliable to calculate FeNa  - f/u repeat urine electrolytes, BMP  - f/u renal US    HTN  - /75 this morning  - Increased norvasc to 10 mg as per Cardio  - Monitor BP    History of kidney Stones/Anxiety/CAD/MI/  - C/w current medications  - discontinued Plavix as per cardio    DVT prophylaxis  - On heparin for anticoagulation, bridge to Coumadin.

## 2018-05-09 NOTE — PROGRESS NOTE ADULT - ASSESSMENT
72 year old Female with PMH of CAD, MI s/p CABG, h/o Afib/A flutter not on anticoagulation s/p, ablation, HTN, CKD4 (Baseline Cr 2.4), Anxiety presented with intermittent palpitations for past 4 days which are getting worse. Found to have SCr of 3.5 (5/7) --> 3.2 (5/8) and positive UA.     ·	FRANC on CKD4 vs. progression of CKD    continue gentle hydration  SCr improved. mostl likely prerenal  obtain Retroperitoneal US

## 2018-05-09 NOTE — PROGRESS NOTE ADULT - ASSESSMENT
Palpitations due to cardiac arrhythmia afib c RVR  UTI  Hx of HTN, ASHD, CAD, old MI, sp CABG, afib/flutter, sp previous ablation  Hx of Hyperlipidemia  Hx of CKD IV, nephrolithiasis, renal colic  Hx of OA, DDD, DJD  Hx of anxiety    CE x 3 negative, ECHO, telemetry  amiodarone IV transitioned to po   started IV heparin,  started coumadin today  cardio consult appreciated, inc amiodarone to 200mg  q12hrs, BP meds adjusted  IV rocephin for UTI  appreciate renal consult, renal parameters improving, probably prerenal  cont all meds  emotional support rendered

## 2018-05-10 DIAGNOSIS — I38 ENDOCARDITIS, VALVE UNSPECIFIED: ICD-10-CM

## 2018-05-10 DIAGNOSIS — I48.0 PAROXYSMAL ATRIAL FIBRILLATION: ICD-10-CM

## 2018-05-10 LAB
ALBUMIN SERPL ELPH-MCNC: 3.6 G/DL — SIGNIFICANT CHANGE UP (ref 3.5–5.2)
ALP SERPL-CCNC: 46 U/L — SIGNIFICANT CHANGE UP (ref 30–115)
ALT FLD-CCNC: 14 U/L — SIGNIFICANT CHANGE UP (ref 0–41)
ANION GAP SERPL CALC-SCNC: 19 MMOL/L — HIGH (ref 7–14)
APTT BLD: 55.8 SEC — HIGH (ref 27–39.2)
APTT BLD: 83.4 SEC — CRITICAL HIGH (ref 27–39.2)
AST SERPL-CCNC: 13 U/L — SIGNIFICANT CHANGE UP (ref 0–41)
BASOPHILS # BLD AUTO: 0.04 K/UL — SIGNIFICANT CHANGE UP (ref 0–0.2)
BASOPHILS NFR BLD AUTO: 0.6 % — SIGNIFICANT CHANGE UP (ref 0–1)
BILIRUB SERPL-MCNC: <0.2 MG/DL — SIGNIFICANT CHANGE UP (ref 0.2–1.2)
BUN SERPL-MCNC: 50 MG/DL — HIGH (ref 10–20)
CALCIUM SERPL-MCNC: 9.4 MG/DL — SIGNIFICANT CHANGE UP (ref 8.5–10.1)
CALCIUM UR-MCNC: 3 MG/DL — SIGNIFICANT CHANGE UP
CHLORIDE SERPL-SCNC: 106 MMOL/L — SIGNIFICANT CHANGE UP (ref 98–110)
CO2 SERPL-SCNC: 20 MMOL/L — SIGNIFICANT CHANGE UP (ref 17–32)
CREAT SERPL-MCNC: 3 MG/DL — HIGH (ref 0.7–1.5)
EOSINOPHIL # BLD AUTO: 0.24 K/UL — SIGNIFICANT CHANGE UP (ref 0–0.7)
EOSINOPHIL NFR BLD AUTO: 3.7 % — SIGNIFICANT CHANGE UP (ref 0–8)
GLUCOSE SERPL-MCNC: 122 MG/DL — HIGH (ref 70–99)
HCT VFR BLD CALC: 32.9 % — LOW (ref 37–47)
HGB BLD-MCNC: 10.1 G/DL — LOW (ref 12–16)
IMM GRANULOCYTES NFR BLD AUTO: 0.6 % — HIGH (ref 0.1–0.3)
INR BLD: 1.22 RATIO — SIGNIFICANT CHANGE UP (ref 0.65–1.3)
LYMPHOCYTES # BLD AUTO: 1.08 K/UL — LOW (ref 1.2–3.4)
LYMPHOCYTES # BLD AUTO: 16.7 % — LOW (ref 20.5–51.1)
MCHC RBC-ENTMCNC: 27.3 PG — SIGNIFICANT CHANGE UP (ref 27–31)
MCHC RBC-ENTMCNC: 30.7 G/DL — LOW (ref 32–37)
MCV RBC AUTO: 88.9 FL — SIGNIFICANT CHANGE UP (ref 81–99)
MONOCYTES # BLD AUTO: 0.66 K/UL — HIGH (ref 0.1–0.6)
MONOCYTES NFR BLD AUTO: 10.2 % — HIGH (ref 1.7–9.3)
NEUTROPHILS # BLD AUTO: 4.42 K/UL — SIGNIFICANT CHANGE UP (ref 1.4–6.5)
NEUTROPHILS NFR BLD AUTO: 68.2 % — SIGNIFICANT CHANGE UP (ref 42.2–75.2)
NRBC # BLD: 0 /100 WBCS — SIGNIFICANT CHANGE UP (ref 0–0)
PLATELET # BLD AUTO: 198 K/UL — SIGNIFICANT CHANGE UP (ref 130–400)
POTASSIUM SERPL-MCNC: 4.5 MMOL/L — SIGNIFICANT CHANGE UP (ref 3.5–5)
POTASSIUM SERPL-SCNC: 4.5 MMOL/L — SIGNIFICANT CHANGE UP (ref 3.5–5)
PROT ?TM UR-MCNC: >188 MG/DLG/24H — SIGNIFICANT CHANGE UP
PROT SERPL-MCNC: 6.6 G/DL — SIGNIFICANT CHANGE UP (ref 6–8)
PROTHROM AB SERPL-ACNC: 13.2 SEC — HIGH (ref 9.95–12.87)
RBC # BLD: 3.7 M/UL — LOW (ref 4.2–5.4)
RBC # FLD: 14.6 % — HIGH (ref 11.5–14.5)
SODIUM SERPL-SCNC: 145 MMOL/L — SIGNIFICANT CHANGE UP (ref 135–146)
UUN UR-MCNC: 427 MG/DL — SIGNIFICANT CHANGE UP
WBC # BLD: 6.48 K/UL — SIGNIFICANT CHANGE UP (ref 4.8–10.8)
WBC # FLD AUTO: 6.48 K/UL — SIGNIFICANT CHANGE UP (ref 4.8–10.8)

## 2018-05-10 RX ORDER — HYDRALAZINE HCL 50 MG
25 TABLET ORAL ONCE
Qty: 0 | Refills: 0 | Status: COMPLETED | OUTPATIENT
Start: 2018-05-10 | End: 2018-05-10

## 2018-05-10 RX ORDER — ALPRAZOLAM 0.25 MG
0.25 TABLET ORAL EVERY 12 HOURS
Qty: 0 | Refills: 0 | Status: DISCONTINUED | OUTPATIENT
Start: 2018-05-10 | End: 2018-05-11

## 2018-05-10 RX ORDER — SODIUM CHLORIDE 9 MG/ML
1000 INJECTION INTRAMUSCULAR; INTRAVENOUS; SUBCUTANEOUS
Qty: 0 | Refills: 0 | Status: DISCONTINUED | OUTPATIENT
Start: 2018-05-10 | End: 2018-05-11

## 2018-05-10 RX ORDER — WARFARIN SODIUM 2.5 MG/1
5 TABLET ORAL ONCE
Qty: 0 | Refills: 0 | Status: COMPLETED | OUTPATIENT
Start: 2018-05-10 | End: 2018-05-10

## 2018-05-10 RX ORDER — HEPARIN SODIUM 5000 [USP'U]/ML
1200 INJECTION INTRAVENOUS; SUBCUTANEOUS
Qty: 25000 | Refills: 0 | Status: DISCONTINUED | OUTPATIENT
Start: 2018-05-10 | End: 2018-05-11

## 2018-05-10 RX ORDER — WARFARIN SODIUM 2.5 MG/1
5 TABLET ORAL ONCE
Qty: 0 | Refills: 0 | Status: DISCONTINUED | OUTPATIENT
Start: 2018-05-10 | End: 2018-05-10

## 2018-05-10 RX ADMIN — Medication 0.25 MILLIGRAM(S): at 21:44

## 2018-05-10 RX ADMIN — Medication 50 MILLIGRAM(S): at 13:26

## 2018-05-10 RX ADMIN — SODIUM CHLORIDE 60 MILLILITER(S): 9 INJECTION INTRAMUSCULAR; INTRAVENOUS; SUBCUTANEOUS at 21:58

## 2018-05-10 RX ADMIN — AMIODARONE HYDROCHLORIDE 200 MILLIGRAM(S): 400 TABLET ORAL at 17:57

## 2018-05-10 RX ADMIN — Medication 100 MILLIGRAM(S): at 12:52

## 2018-05-10 RX ADMIN — SODIUM CHLORIDE 60 MILLILITER(S): 9 INJECTION INTRAMUSCULAR; INTRAVENOUS; SUBCUTANEOUS at 13:26

## 2018-05-10 RX ADMIN — SODIUM CHLORIDE 60 MILLILITER(S): 9 INJECTION INTRAMUSCULAR; INTRAVENOUS; SUBCUTANEOUS at 06:08

## 2018-05-10 RX ADMIN — Medication 25 MILLIGRAM(S): at 21:56

## 2018-05-10 RX ADMIN — WARFARIN SODIUM 5 MILLIGRAM(S): 2.5 TABLET ORAL at 21:38

## 2018-05-10 RX ADMIN — AMLODIPINE BESYLATE 10 MILLIGRAM(S): 2.5 TABLET ORAL at 06:09

## 2018-05-10 RX ADMIN — AMIODARONE HYDROCHLORIDE 200 MILLIGRAM(S): 400 TABLET ORAL at 06:08

## 2018-05-10 RX ADMIN — HEPARIN SODIUM 12 UNIT(S)/HR: 5000 INJECTION INTRAVENOUS; SUBCUTANEOUS at 09:04

## 2018-05-10 RX ADMIN — ISOSORBIDE MONONITRATE 30 MILLIGRAM(S): 60 TABLET, EXTENDED RELEASE ORAL at 12:52

## 2018-05-10 RX ADMIN — CEFTRIAXONE 100 GRAM(S): 500 INJECTION, POWDER, FOR SOLUTION INTRAMUSCULAR; INTRAVENOUS at 06:08

## 2018-05-10 RX ADMIN — Medication 50 MILLIGRAM(S): at 06:08

## 2018-05-10 RX ADMIN — Medication 81 MILLIGRAM(S): at 12:52

## 2018-05-10 NOTE — CONSULT NOTE ADULT - SUBJECTIVE AND OBJECTIVE BOX
HPI:  72 year old Female with PMH of CAD, MI s/p CABG, h/o Afib not on anticoagulation, h/o ablation, HTN, CKD (Baseline Cr 2.4), CAD s/p CABG, Anxiety presented with intermittent palpitations for past 4 days which are getting worse. Pt denies recent sick contact, fever, chills, SOB, chest pain, nausea, vomiting, abdominal pain, dysuria, diarrhea, rash, muscle or joint pain. Patient had history of Afib many years ago and underwent ablation with EP (Dr. Rosas) admits to intermittent palpitations over the course of many years but it was not severe to this point. Patient is compliant with following with her cardiologist every 4 months and underwent holter monitoring outpatient. It is unclear why patient is not on anticoagulation.    Cardio: Dr. Child  Nephro: Dr. Phelps (07 May 2018 19:20)    Patient is a 72y old  Female who presents with a chief complaint of palpitations (07 May 2018 19:20)        PAST MEDICAL & SURGICAL HISTORY:  Atrial fibrillation  CKD (chronic kidney disease)  MI (myocardial infarction)  CAD (coronary artery disease)  Kidney stone  HTN (hypertension)  H/O abdominal hysterectomy  H/O cardiac radiofrequency ablation  History of open heart surgery    PREVIOUS DIAGNOSTIC TESTING:      ECHO   FINDINGS: < from: Transthoracic Echocardiogram (05.10.18 @ 16:51) >   EXAM:  2-D ECHO (TTE) COMPLETE    PROCEDURE DATE:  05/10/2018    Summary:   1. Left ventricular ejection fraction, by visual estimation, is 45 to   50%.   2. Mildly decreased global left ventricular systolic function.   3. Mild to moderate mitral valve regurgitation.   4. Mild-moderate tricuspid regurgitation.   5. Mild aortic regurgitation.   6. Estimated pulmonary artery systolic pressure is 50.1 mmHg assuming a   right atrial pressure of 10 mmHg, which is consistent with moderate   pulmonary hypertension.   7. Peak transaortic gradient equals 27.4 mmHg, mean transaortic gradient   equals 15.3 mmHg, the calculated aortic valve area equals 1.04 cm² by the   continuity equation consistent with moderate aortic stenosis.  < end of copied text >    MEDICATIONS  (STANDING):  allopurinol 100 milliGRAM(s) Oral daily  amiodarone    Tablet 200 milliGRAM(s) Oral two times a day  amLODIPine   Tablet 10 milliGRAM(s) Oral daily  aspirin  chewable 81 milliGRAM(s) Oral daily  cefTRIAXone   IVPB 1 Gram(s) IV Intermittent every 24 hours  heparin  Infusion 1200 Unit(s)/Hr (12 mL/Hr) IV Continuous <Continuous>  isosorbide   mononitrate ER Tablet (IMDUR) 30 milliGRAM(s) Oral daily  metoprolol tartrate 50 milliGRAM(s) Oral three times a day  sodium chloride 0.9%. 1000 milliLiter(s) (60 mL/Hr) IV Continuous <Continuous>  warfarin 5 milliGRAM(s) Oral once    MEDICATIONS  (PRN):  ALPRAZolam 0.25 milliGRAM(s) Oral three times a day PRN anxiety  ALPRAZolam 0.25 milliGRAM(s) Oral every 12 hours PRN anxiety   Home Medications:  allopurinol 100 mg oral tablet: 1 tab(s) orally once a day (07 May 2018 20:19)  amiodarone 200 mg oral tablet: 1 tab(s) orally once a day (07 May 2018 20:19)  aspirin 81 mg oral tablet: 2 tab(s) orally once a day (07 May 2018 20:22)  Imdur 30 mg oral tablet, extended release: 1 tab(s) orally once a day (in the morning) (07 May 2018 20:20)  Kayexalate: 1 dose(s) orally 3 times a week (07 May 2018 20:20)  metoprolol tartrate 50 mg oral tablet: 1 tab(s) orally 2 times a day (07 May 2018 20:21)  NIFEdipine 90 mg oral tablet, extended release: 1 tab(s) orally once a day (07 May 2018 20:19)  Plavix 75 mg oral tablet: 1 tab(s) orally once a day (07 May 2018 20:19)    FAMILY HISTORY:    SOCIAL HISTORY:  CIGARETTES:  ALCOHOL:    Vital Signs Last 24 Hrs  T(C): 36 (10 May 2018 13:12), Max: 36.1 (10 May 2018 05:32)  T(F): 96.8 (10 May 2018 13:12), Max: 97 (10 May 2018 05:32)  HR: 59 (10 May 2018 13:12) (56 - 59)  BP: 179/72 (10 May 2018 13:12) (160/64 - 179/72)  BP(mean): --  RR: 18 (10 May 2018 13:12) (16 - 18)  SpO2: --    INTERPRETATION OF TELEMETRY:    ECG: < from: 12 Lead ECG (05.09.18 @ 07:35) >  Ventricular Rate 62 BPM  Atrial Rate 61 BPM  QRS Duration 94 ms  Q-T Interval 470 ms  QTC Calculation(Bezet) 477 ms  R Axis 13 degrees  T Axis 39 degrees  Diagnosis Line Atrial fibrillation  Abnormal ECG  < end of copied text >    LABS:                      10.1   6.48  )-----------( 198      ( 10 May 2018 06:30 )             32.9     05-10    145  |  106  |  50<H>  ----------------------------<  122<H>  4.5   |  20  |  3.0<H>    Ca    9.4      10 May 2018 06:30  Mg     1.8     05-09    TPro  6.6  /  Alb  3.6  /  TBili  <0.2  /  DBili  x   /  AST  13  /  ALT  14  /  AlkPhos  46  05-10    CARDIAC MARKERS ( 09 May 2018 09:39 )  x     / <0.01 ng/mL / 26 U/L / x     / 2.0 ng/mL  CARDIAC MARKERS ( 08 May 2018 21:37 )  x     / <0.01 ng/mL / 27 U/L / x     / 1.5 ng/mL    PT/INR - ( 10 May 2018 06:30 )   PT: 13.20 sec;   INR: 1.22 ratio       PTT - ( 10 May 2018 06:30 )  PTT:83.4 sec  LIVER FUNCTIONS - ( 10 May 2018 06:30 )  Alb: 3.6 g/dL / Pro: 6.6 g/dL / ALK PHOS: 46 U/L / ALT: 14 U/L / AST: 13 U/L / GGT: x           I&O's Detail  09 May 2018 07:01  -  10 May 2018 07:00  --------------------------------------------------------  IN:    amiodarone Infusion: 116.7 mL    heparin Infusion: 91 mL    heparin Infusion: 212.5 mL    Oral Fluid: 690 mL    sodium chloride 0.9%: 720 mL  Total IN: 1830.2 mL  OUT:    Voided: 200 mL  Total OUT: 200 mL  Total NET: 1630.2 mL  10 May 2018 07:01  -  10 May 2018 18:35  --------------------------------------------------------  IN:    heparin Infusion: 25 mL    Oral Fluid: 750 mL    sodium chloride 0.9%: 180 mL  Total IN: 955 mL  OUT:  Total OUT: 0 mL  Total NET: 955 mL

## 2018-05-10 NOTE — CONSULT NOTE ADULT - PROBLEM SELECTOR RECOMMENDATION 2
pt is intolerant of statins  cont asa, metoprolol, add norvasc, hold procardia  no ace/arb with decreased renal fxn
aortic stenosis and mitral regurgitation recommend assessing the degreek of  severity for possible corrective surgery

## 2018-05-10 NOTE — PROGRESS NOTE ADULT - SUBJECTIVE AND OBJECTIVE BOX
seen and examined  no distress  lying comfortable       Standing Inpatient Medications  allopurinol 100 milliGRAM(s) Oral daily  amiodarone    Tablet 200 milliGRAM(s) Oral two times a day  amiodarone Infusion 1 mG/Min IV Continuous <Continuous>  amiodarone Infusion 0.5 mG/Min IV Continuous <Continuous>  amLODIPine   Tablet 10 milliGRAM(s) Oral daily  aspirin  chewable 81 milliGRAM(s) Oral daily  cefTRIAXone   IVPB 1 Gram(s) IV Intermittent every 24 hours  heparin  Infusion 1200 Unit(s)/Hr IV Continuous <Continuous>  isosorbide   mononitrate ER Tablet (IMDUR) 30 milliGRAM(s) Oral daily  metoprolol tartrate 50 milliGRAM(s) Oral three times a day  sodium chloride 0.9%. 1000 milliLiter(s) IV Continuous <Continuous>  warfarin 5 milliGRAM(s) Oral once          VITALS/PHYSICAL EXAM  --------------------------------------------------------------------------------  T(C): 36.1 (05-10-18 @ 05:32), Max: 36.4 (05-09-18 @ 12:49)  HR: 56 (05-10-18 @ 05:32) (56 - 121)  BP: 160/64 (05-10-18 @ 05:32) (136/77 - 175/72)  RR: 18 (05-10-18 @ 05:32) (16 - 18)  SpO2: --  Wt(kg): --  Height (cm): 167.64 (05-09-18 @ 04:45)  Weight (kg): 81.7 (05-09-18 @ 04:45)  BMI (kg/m2): 29.1 (05-09-18 @ 04:45)  BSA (m2): 1.91 (05-09-18 @ 04:45)      05-09-18 @ 07:01  -  05-10-18 @ 07:00  --------------------------------------------------------  IN: 1830.2 mL / OUT: 200 mL / NET: 1630.2 mL    05-10-18 @ 07:01  -  05-10-18 @ 10:19  --------------------------------------------------------  IN: 25 mL / OUT: 0 mL / NET: 25 mL      Physical Exam:  	Gen: NAD  	Pulm: CTA B/L  	CV: S1S2; no rub  	Abd:nontender/nondistended  	LE: no edema  	  LABS/STUDIES  --------------------------------------------------------------------------------              10.1   6.48  >-----------<  198      [05-10-18 @ 06:30]              32.9     145  |  106  |  50  ----------------------------<  122      [05-10-18 @ 06:30]  4.5   |  20  |  3.0        Ca     9.4     [05-10-18 @ 06:30]      Mg     1.8     [05-09-18 @ 09:39]    TPro  6.6  /  Alb  3.6  /  TBili  <0.2  /  DBili  x   /  AST  13  /  ALT  14  /  AlkPhos  46  [05-10-18 @ 06:30]    PT/INR: PT 13.20, INR 1.22       [05-10-18 @ 06:30]  PTT: 83.4       [05-10-18 @ 06:30]    Troponin <0.01      [05-09-18 @ 09:39]  CK 26      [05-09-18 @ 09:39]    Creatinine Trend:  SCr 3.0 [05-10 @ 06:30]  SCr 2.6 [05-09 @ 09:39]  SCr 3.2 [05-08 @ 04:49]  SCr 3.5 [05-07 @ 16:51]    Urinalysis - [05-07-18 @ 16:51]      Color Yellow / Appearance Cloudy / SG 1.020 / pH 6.0      Gluc Negative / Ketone Negative  / Bili Negative / Urobili 0.2       Blood Trace / Protein >=300 / Leuk Est Moderate / Nitrite Positive      RBC 1-2 / WBC 26-50 / Hyaline  / Gran  / Sq Epi  / Non Sq Epi Few / Bacteria Many    Urine Creatinine 52      [05-09-18 @ 16:15]  Urine Protein >188      [05-09-18 @ 16:15]  Urine Sodium 76.0      [05-09-18 @ 16:15]  Urine Urea Nitrogen 427      [05-09-18 @ 16:15]  Urine Potassium 15      [05-09-18 @ 16:15]  Urine Chloride 55      [05-09-18 @ 16:15] seen and examined  no distress  lying comfortable       Standing Inpatient Medications  allopurinol 100 milliGRAM(s) Oral daily  amiodarone    Tablet 200 milliGRAM(s) Oral two times a day  amiodarone Infusion 1 mG/Min IV Continuous <Continuous>  amiodarone Infusion 0.5 mG/Min IV Continuous <Continuous>  amLODIPine   Tablet 10 milliGRAM(s) Oral daily  aspirin  chewable 81 milliGRAM(s) Oral daily  cefTRIAXone   IVPB 1 Gram(s) IV Intermittent every 24 hours  heparin  Infusion 1200 Unit(s)/Hr IV Continuous <Continuous>  isosorbide   mononitrate ER Tablet (IMDUR) 30 milliGRAM(s) Oral daily  metoprolol tartrate 50 milliGRAM(s) Oral three times a day  sodium chloride 0.9%. 1000 milliLiter(s) IV Continuous <Continuous>  warfarin 5 milliGRAM(s) Oral once          VITALS/PHYSICAL EXAM  --------------------------------------------------------------------------------  T(C): 36.1 (05-10-18 @ 05:32), Max: 36.4 (05-09-18 @ 12:49)  HR: 56 (05-10-18 @ 05:32) (56 - 121)  BP: 160/64 (05-10-18 @ 05:32) (136/77 - 175/72)  RR: 18 (05-10-18 @ 05:32) (16 - 18)  SpO2: --  Wt(kg): --  Height (cm): 167.64 (05-09-18 @ 04:45)  Weight (kg): 81.7 (05-09-18 @ 04:45)  BMI (kg/m2): 29.1 (05-09-18 @ 04:45)  BSA (m2): 1.91 (05-09-18 @ 04:45)      05-09-18 @ 07:01  -  05-10-18 @ 07:00  --------------------------------------------------------  IN: 1830.2 mL / OUT: 200 mL / NET: 1630.2 mL    05-10-18 @ 07:01  -  05-10-18 @ 10:19  --------------------------------------------------------  IN: 25 mL / OUT: 0 mL / NET: 25 mL      Physical Exam:  	Gen: NAD  	Pulm: CTA B/L  	CV: S1S2; no rub  	Abd:nontender/nondistended  	LE: no edema  	  LABS/STUDIES  --------------------------------------------------------------------------------              10.1   6.48  >-----------<  198      [05-10-18 @ 06:30]              32.9     145  |  106  |  50  ----------------------------<  122      [05-10-18 @ 06:30]  4.5   |  20  |  3.0        Ca     9.4     [05-10-18 @ 06:30]      Mg     1.8     [05-09-18 @ 09:39]    TPro  6.6  /  Alb  3.6  /  TBili  <0.2  /  DBili  x   /  AST  13  /  ALT  14  /  AlkPhos  46  [05-10-18 @ 06:30]    PT/INR: PT 13.20, INR 1.22       [05-10-18 @ 06:30]  PTT: 83.4       [05-10-18 @ 06:30]    Troponin <0.01      [05-09-18 @ 09:39]  CK 26      [05-09-18 @ 09:39]    Creatinine Trend:  SCr 3.0 [05-10 @ 06:30]  SCr 2.6 [05-09 @ 09:39]  SCr 3.2 [05-08 @ 04:49]  SCr 3.5 [05-07 @ 16:51]    Urinalysis - [05-07-18 @ 16:51]      Color Yellow / Appearance Cloudy / SG 1.020 / pH 6.0      Gluc Negative / Ketone Negative  / Bili Negative / Urobili 0.2       Blood Trace / Protein >=300 / Leuk Est Moderate / Nitrite Positive      RBC 1-2 / WBC 26-50 / Hyaline  / Gran  / Sq Epi  / Non Sq Epi Few / Bacteria Many    Urine Creatinine 52      [05-09-18 @ 16:15]  Urine Protein >188      [05-09-18 @ 16:15]  Urine Sodium 76.0      [05-09-18 @ 16:15]  Urine Urea Nitrogen 427      [05-09-18 @ 16:15]  Urine Potassium 15      [05-09-18 @ 16:15]  Urine Chloride 55      [05-09-18 @ 16:15]    Culture - Urine (05.07.18 @ 16:51)    -  Amikacin: S <=8    -  Amoxicillin/Clavulanic Acid: R >16/8    -  Ampicillin: R >16    -  Ampicillin/Sulbactam: R >16/8    -  Aztreonam: S <=4    -  Cefazolin: S <=2    -  Cefepime: S <=2    -  Cefoxitin: S <=4    -  Ceftriaxone: S <=1    -  Ciprofloxacin: R >2    -  Ertapenem: S <=0.5    -  Gentamicin: S <=1    -  Imipenem: S <=1    -  Levofloxacin: I 4    -  Meropenem: S <=1    -  Nitrofurantoin: S <=32    -  Piperacillin/Tazobactam: S <=8    -  Tigecycline: S <=1    -  Tobramycin: S <=2    -  Trimethoprim/Sulfamethoxazole: R >2/38    Specimen Source: .Urine Clean Catch (Midstream)    Culture Results:   >100,000 CFU/ml Escherichia coli    Organism Identification: Escherichia coli    Organism: Escherichia coli    Method Type: ILDA

## 2018-05-10 NOTE — PROGRESS NOTE ADULT - SUBJECTIVE AND OBJECTIVE BOX
Patient is a 72y old  Female who presents with a chief complaint of palpitations (07 May 2018 19:20)      Overnight events:   No acute event overnight.   Patient is feeling well today. No complaints.   Reports that she would like to go home as soon as possible.       PAST MEDICAL & SURGICAL HISTORY:  Atrial fibrillation  CKD (chronic kidney disease)  MI (myocardial infarction)  CAD (coronary artery disease)  Kidney stone  HTN (hypertension)  H/O abdominal hysterectomy  H/O cardiac radiofrequency ablation  History of open heart surgery      Allergies  No Known Allergies          MEDICATIONS  (STANDING):  allopurinol 100 milliGRAM(s) Oral daily  amiodarone    Tablet 200 milliGRAM(s) Oral two times a day  amiodarone Infusion 1 mG/Min (33.333 mL/Hr) IV Continuous <Continuous>  amiodarone Infusion 0.5 mG/Min (16.667 mL/Hr) IV Continuous <Continuous>  amLODIPine   Tablet 10 milliGRAM(s) Oral daily  aspirin  chewable 81 milliGRAM(s) Oral daily  cefTRIAXone   IVPB 1 Gram(s) IV Intermittent every 24 hours  heparin  Infusion 1200 Unit(s)/Hr (12 mL/Hr) IV Continuous <Continuous>  isosorbide   mononitrate ER Tablet (IMDUR) 30 milliGRAM(s) Oral daily  metoprolol tartrate 50 milliGRAM(s) Oral three times a day  sodium chloride 0.9%. 1000 milliLiter(s) (60 mL/Hr) IV Continuous <Continuous>  warfarin 5 milliGRAM(s) Oral once    MEDICATIONS  (PRN):  ALPRAZolam 0.25 milliGRAM(s) Oral three times a day PRN anxiety      Vital Signs Last 24 Hrs  T(C): 36.1 (10 May 2018 05:32), Max: 36.4 (09 May 2018 12:49)  T(F): 97 (10 May 2018 05:32), Max: 97.5 (09 May 2018 12:49)  HR: 56 (10 May 2018 05:32) (56 - 121)  BP: 160/64 (10 May 2018 05:32) (136/77 - 175/72)  BP(mean): --  RR: 18 (10 May 2018 05:32) (16 - 18)  SpO2: --    I&O's Summary    09 May 2018 07:01  -  10 May 2018 07:00  --------------------------------------------------------  IN: 1830.2 mL / OUT: 200 mL / NET: 1630.2 mL    10 May 2018 07:01  -  10 May 2018 10:37  --------------------------------------------------------  IN: 355 mL / OUT: 0 mL / NET: 355 mL          Physical Exam:    -     General : Not in acute distress.    -      HEENT: PEERLA    -      Cardiac: S1 & S2. No murmur/gallop/rubs.     -      Pulm: clear to auscultate b/l lung field.     -      GI: positive BS. No tenderness/rigidity/rebound.     -      Musculoskeletal: no pitting edema.     -      Neuro: A & O4.           Labs:                          10.1   6.48  )-----------( 198      ( 10 May 2018 06:30 )             32.9             05-10    145  |  106  |  50<H>  ----------------------------<  122<H>  4.5   |  20  |  3.0<H>    Ca    9.4      10 May 2018 06:30  Mg     1.8     05-09    TPro  6.6  /  Alb  3.6  /  TBili  <0.2  /  DBili  x   /  AST  13  /  ALT  14  /  AlkPhos  46  05-10    LIVER FUNCTIONS - ( 10 May 2018 06:30 )  Alb: 3.6 g/dL / Pro: 6.6 g/dL / ALK PHOS: 46 U/L / ALT: 14 U/L / AST: 13 U/L / GGT: x                 PT/INR - ( 10 May 2018 06:30 )   PT: 13.20 sec;   INR: 1.22 ratio         PTT - ( 10 May 2018 06:30 )  PTT:83.4 sec  CARDIAC MARKERS ( 09 May 2018 09:39 )  x     / <0.01 ng/mL / 26 U/L / x     / 2.0 ng/mL  CARDIAC MARKERS ( 08 May 2018 21:37 )  x     / <0.01 ng/mL / 27 U/L / x     / 1.5 ng/mL  CARDIAC MARKERS ( 08 May 2018 17:45 )  x     / x     / 29 U/L / x     / x      CARDIAC MARKERS ( 08 May 2018 15:29 )  x     / <0.01 ng/mL / x     / x     / 1.8 ng/mL      Culture - Urine (collected 07 May 2018 16:51)  Source: .Urine Clean Catch (Midstream)  Final Report (09 May 2018 18:33):    >100,000 CFU/ml Escherichia coli  Organism: Escherichia coli (09 May 2018 18:33)  Organism: Escherichia coli (09 May 2018 18:33)              Imaging:    ECG:

## 2018-05-10 NOTE — PROGRESS NOTE ADULT - ASSESSMENT
paroxismal afib: converted to NSR  CKD: advanced, no change in severity  Pulmonary HTN, moderate MR, TR, mild AS: all stable and at the base    Eliquis 2.5 mg bid (advanced CKD)  by starting Eliquis then heparin and coumadin can be stopped  continue with Amiodarone daily and Metoprolol  if stable anticipate d/c in 24 hours (in cardiac point)

## 2018-05-10 NOTE — PROGRESS NOTE ADULT - SUBJECTIVE AND OBJECTIVE BOX
NOAH QUIROS 73yo W Female came to the ER c/o palpitations and noted to be in afib with RVR.  Pt denies CP, SOB, N/V or diaphoresis.  The pt was also noted to have E coli UTI.  Of note is the fact that the pt has a Hx of afib with previous ablation and is on amiodarone 200mg daily but on no AC.  The PMHx includes:  HTN, ASHD, CAD, sp CABG, afib/flutter, sp ablation in the remote past, old MI, sp CABG, Hyperlipidemia, CKD IV (baseline creat 2.4), nephrolithiases, OA, DDD, DJD. past surgical Hx includes C section, hysterectomy, hernia repair and CABG.  The pt was evaluated by cardio and tx with amiodarone IV 200mg q 12hr now to transition to po amidarone.  The pt, was  started on IV heparin and to be transitioned to coumadin,  The pt was also evaluated by renal.  She was placed on Rocephin for UTI.    INTERVAL HPI/OVERNIGHT EVENTS:  pt on amiodarone and coumadin for cardiac arrhythmia, renal parameters trending upward in the last 24 hrs, ff by renal,  encourage po fluids, on reocphin for UTI    MEDICATIONS  (STANDING):  allopurinol 100 milliGRAM(s) Oral daily  amiodarone 200 mg q 12hrs  amLODIPine   Tablet 5 milliGRAM(s) Oral daily  aspirin  chewable 162 milliGRAM(s) Oral daily  cefTRIAXone   IVPB 1 Gram(s) IV Intermittent every 24 hours  heparin  Infusion 1200 Unit(s)/Hr (12 mL/Hr) IV Continuous <Continuous>  coumadin 5mg q 24hrs  isosorbide   mononitrate ER Tablet (IMDUR) 30 milliGRAM(s) Oral daily  metoprolol tartrate 50 milliGRAM(s) Oral three times a day  sodium chloride 0.9%. 1000 milliLiter(s) (60 mL/Hr) IV Continuous <Continuous>    MEDICATIONS  (PRN):  ALPRAZolam 0.25 milliGRAM(s) Oral three times a day PRN anxiety      Allergies    No Known Allergies  	    Vital Signs Last 24 Hrs    T(F): 97  HR: 56  BP: 160/64    RR: 18  SpO2: 96%     PHYSICAL EXAM:      Constitutional:  pt is alert, oriented, anxious but in NAD    Eyes:  normal    ENMT:  dry oral mucosa    Neck:  supple no JVD, no bruits    Cardiovascular:  S1S2 irreg-[id number]    Gastrointestinal:  globose, soft and benign    Extremities:  moves all ext, + arthritic changes    LABS:                        10  6.4  )-----------( 198                   32      145  |  106  |  50  (46)  ----------------------------<  122  4.5   |  20    |  3.0  (2.9)    GFR 14, 18, 15  Ca    9.7      08 May 2018 04:49  Phos  3.8     05-08  Mg     1.9     05-08    TPro  7.6  /  Alb  4.2  /  TBili  <0.2  /  DBili  x   /  AST  19  /  ALT  18  /  AlkPhos  58  05-07    PT/INR - ( 08 May 2018 04:49 )   PT: 12.40 sec;   INR: 1.14 ratio    CK  40, 29, 26:  CKMB  2.1, 1.8 1.5;   trop <0.01 x 3     PTT - ( 08 May 2018 15:29 )  PTT:47.0 sec  Urinalysis Basic - ( 07 May 2018 16:51 )    Color: Yellow / Appearance: Cloudy / S.020 / pH: x  Gluc: x / Ketone: Negative  / Bili: Negative / Urobili: 0.2 mg/dL   Blood: x / Protein: >=300 mg/dL / Nitrite: Positive   Leuk Esterase: Moderate / RBC: 1-2 /HPF / WBC 26-50 /HPF   Sq Epi: x / Non Sq Epi: Few /HPF / Bacteria: Many /HPF    urine C&S:  E coli >100,000 CFU    RADIOLOGY & ADDITIONAL TESTS:    EKG:  afib /min, PRWP, PVCs    CXR:  no infiltrate, + cardiomegaly, + sternotomy

## 2018-05-10 NOTE — CONSULT NOTE ADULT - PROBLEM SELECTOR RECOMMENDATION 9
pt with pafib  increase amio to 200 po q12 for 1 week  cont metoprolol  change procardia to norvasc5 and titrate up as needed  ep  cont iv heparin and start coumadin  d/c plavix and cont [id number]  echo  repaet ekg
most likely due to mitral valve disease and HTN, recommend continue amiodarone and continue anticoagulation

## 2018-05-10 NOTE — PROGRESS NOTE ADULT - ASSESSMENT
72 year old Female with PMH of CAD, MI s/p CABG, h/o Afib/A flutter not on anticoagulation s/p, ablation, HTN, CKD4 (Baseline Cr 2.4), Anxiety presented with intermittent palpitations for past 4 days which are getting worse     ·	FRANC on CKD4 vs. progression of CKD    ·	Cr trending  trending up   ·	check pr/cr ratio  ·	recommend PO hydration since the patient is alert and oriented with the capacity to swallow.  ·	urine culture noted , on Rocephin IV  ·	sono noted : no hydro , non obstructing stones   ·	continue gentle iv hydration  ·	check ph level  ·	no acute indication for RRT  ·	will follow

## 2018-05-10 NOTE — PROGRESS NOTE ADULT - ASSESSMENT
Palpitations due to cardiac arrhythmia afib c RVR  UTI  FRANC/CKD  Hx of HTN, ASHD, CAD, old MI, sp CABG, afib/flutter, sp previous ablation  Hx of Hyperlipidemia  Hx of CKD IV, nephrolithiasis, renal colic  Hx of OA, DDD, DJD  Hx of anxiety    CE x 3 negative, ECHO, telemetry  amiodarone IV, transitioned to po   started IV heparin, bridging to coumadin today  cardio consult appreciated, inc amiodarone to 200mg  q12hrs, BP meds adjusted  IV rocephin for UTI  appreciate renal consult, renal parameters improving, probably prerenal, encourage po fluids  cont all meds  emotional support rendered

## 2018-05-10 NOTE — PROGRESS NOTE ADULT - ASSESSMENT
73 yo female patient with PMH of CAD, MI s/p CABG, h/o Afib/A.flutter s/p ablation about 10 years ago, not on anticoagulation,, HTN, CKD (Baseline Cr 2.4), anxiety presented with intermittent palpitations for past 4 days which are getting worse, found to be in a.fib RVR on admission      Atrial fibrillation with RVR  - Rate control upon admission; in the afternoon, patient developed a.fib RVR again with chest discomfort  - Chest discomfort resolves when patient's heart rate is controlled  - CE neg x 5  - s/p amio drip   - c/w PO amiodarone 200mg q12h (started on 5/9)  - started coumadin 2mg   - c/w heparin until INR > 2 (PTT 55-65)  - f/u Cardiology  - f/u EP    UTI  - c/w Rocephin  - UA positive  - UCx: pansensitive E.coli  - Renal US: no stone, no hydronephrosis.     FRANC on CKD   - s/p IV bolus in ED  - will continue gentle hydration with NS at 60cc/hr for now as patient's Cr is still high.   - Patient has no clinical signs of fluid overload.   - f/u repeat urine electrolytes, BMP  - Renal US: no stone, no hydronephrosis.     HTN (uncontrolled)  - Increased norvasc to 10 mg as per Cardio  - Monitor BP    History of kidney Stones/Anxiety/CAD/MI/  - C/w current medications  - discontinued Plavix as per cardio    DVT prophylaxis  - On heparin for anticoagulation, bridge to Coumadin. 71 yo female patient with PMH of CAD, MI s/p CABG, h/o Afib/A.flutter s/p ablation about 10 years ago, not on anticoagulation,, HTN, CKD (Baseline Cr 2.4), anxiety presented with intermittent palpitations for past 4 days which are getting worse, found to be in a.fib RVR on admission      Atrial fibrillation with RVR  - Rate control upon admission; in the afternoon, patient developed a.fib RVR again with chest discomfort  - Chest discomfort resolves when patient's heart rate is controlled  - CE neg x 5  - s/p amio drip   - c/w PO amiodarone 200mg q12h (started on 5/9)  - started coumadin 2mg   - c/w heparin until INR > 2 (PTT 55-65)  - f/u Cardiology  - f/u EP    UTI  - c/w Rocephin  - UA positive  - UCx: pansensitive E.coli  - Renal US: no stone, no hydronephrosis.     FRANC on CKD   Renal US 5/9: Increased bilateral renal echogenicity consistent with medical renal disease.Nonobstructing left renal mid to lower pole calculus. No hydronephrosis. Multiple bilateral renal cysts.  - s/p IV bolus in ED  - will continue gentle hydration with NS at 60cc/hr for now as patient's Cr is still high.   - Patient has no clinical signs of fluid overload.   - f/u repeat urine electrolytes, BMP    HTN (uncontrolled)  - /64 this morning.   - Increased norvasc to 10 mg as per Cardio  - Monitor BP    History of kidney Stones/Anxiety/CAD/MI/  - C/w current medications  - discontinued Plavix as per cardio    DVT prophylaxis  - On heparin for anticoagulation, bridge to Coumadin.

## 2018-05-10 NOTE — PROGRESS NOTE ADULT - SUBJECTIVE AND OBJECTIVE BOX
Subjective:  symptoms improved, no more cough, much less sob, converted to NSR spontanously  and after receiving antibiotic, O2, and amiodarone.    MEDICATIONS  (STANDING):  allopurinol 100 milliGRAM(s) Oral daily  amiodarone    Tablet 200 milliGRAM(s) Oral two times a day  amLODIPine   Tablet 10 milliGRAM(s) Oral daily  aspirin  chewable 81 milliGRAM(s) Oral daily  cefTRIAXone   IVPB 1 Gram(s) IV Intermittent every 24 hours  heparin  Infusion 1200 Unit(s)/Hr (12 mL/Hr) IV Continuous <Continuous>  isosorbide   mononitrate ER Tablet (IMDUR) 30 milliGRAM(s) Oral daily  metoprolol tartrate 50 milliGRAM(s) Oral three times a day  sodium chloride 0.9%. 1000 milliLiter(s) (60 mL/Hr) IV Continuous <Continuous>  warfarin 5 milliGRAM(s) Oral once    MEDICATIONS  (PRN):  ALPRAZolam 0.25 milliGRAM(s) Oral three times a day PRN anxiety  ALPRAZolam 0.25 milliGRAM(s) Oral every 12 hours PRN anxiety            Vital Signs Last 24 Hrs  T(C): 36 (10 May 2018 13:12), Max: 36.1 (10 May 2018 05:32)  T(F): 96.8 (10 May 2018 13:12), Max: 97 (10 May 2018 05:32)  HR: 59 (10 May 2018 13:12) (56 - 59)  BP: 179/72 (10 May 2018 13:12) (160/64 - 179/72)  BP(mean): --  RR: 18 (10 May 2018 13:12) (16 - 18)  SpO2: --             REVIEW OF SYSTEMS:  CONSTITUTIONAL: no fever, no chills, no diaphoresis  CARDIOLOGY: no chest pain, some SOB, no palpitation, no diaphoresis, no faint   RESPIRATORY: mild dyspnea, no wheeze, no orthopnea, no PND   NEUROLOGICAL: no dizziness, headache, focal deficits to report.  GI: no abdominal pain, no dyspepsia, no nausea, no vomiting, no diarrhea.    HEENT: no congestion, no nasal bleeding  SKIN: no ecchymosis, no ptechia             PHYSICAL EXAM:  · CONSTITUTIONAL: Looks stable, in no diress  . NECK: Supple, no JVD, no bruit on either carotid side   · RESPIRATORY: Normal air entry to lung base, no wheeze, no crackle, no wet rales  · CARDIOVASCULAR: Normal S1, A2, P2, no murmur, no click, regular rate,  no rub,  · EXTREMITIES: No cyanosis, no clubbing, no edema  · VASCULAR: Pulses are regular, equal, bilateral in upper and lower extremities  	  TELEMETRY: initially afib, now NSR    ECG: < from: 12 Lead ECG (05.09.18 @ 07:35) >  Diagnosis Line Atrial fibrillation    < end of copied text >  no ECG for today, is in nsr    TTE: < from: Transthoracic Echocardiogram (05.10.18 @ 16:51) >   1. Left ventricular ejection fraction, by visual estimation, is 45 to   50%.   2. Mildly decreased global left ventricular systolic function.   3. Mild to moderate mitral valve regurgitation.   4. Mild-moderate tricuspid regurgitation.   5. Mild aortic regurgitation.   6. Estimated pulmonary artery systolic pressure is 50.1 mmHg assuming a   right atrial pressure of 10 mmHg, which is consistent with moderate   pulmonary hypertension.   7. Peak transaortic gradient equals 27.4 mmHg, mean transaortic gradient   equals 15.3 mmHg, the calculated aortic valve area equals 1.04 cm² by the   continuity equation consistent with moderate aortic stenosis.    < end of copied text >      LABS:                        10.1   6.48  )-----------( 198      ( 10 May 2018 06:30 )             32.9     05-10    145  |  106  |  50<H>  ----------------------------<  122<H>  4.5   |  20  |  3.0<H>    Ca    9.4      10 May 2018 06:30  Mg     1.8     05-09    TPro  6.6  /  Alb  3.6  /  TBili  <0.2  /  DBili  x   /  AST  13  /  ALT  14  /  AlkPhos  46  05-10    CARDIAC MARKERS ( 09 May 2018 09:39 )  x     / <0.01 ng/mL / 26 U/L / x     / 2.0 ng/mL  CARDIAC MARKERS ( 08 May 2018 21:37 )  x     / <0.01 ng/mL / 27 U/L / x     / 1.5 ng/mL      PT/INR - ( 10 May 2018 06:30 )   PT: 13.20 sec;   INR: 1.22 ratio         PTT - ( 10 May 2018 06:30 )  PTT:83.4 sec    I&O's Summary    09 May 2018 07:01  -  10 May 2018 07:00  --------------------------------------------------------  IN: 1830.2 mL / OUT: 200 mL / NET: 1630.2 mL    10 May 2018 07:01  -  10 May 2018 19:39  --------------------------------------------------------  IN: 955 mL / OUT: 0 mL / NET: 955 mL      BNP  RADIOLOGY & ADDITIONAL STUDIES: < from: Xray Chest 2 Views PA/Lat (05.07.18 @ 17:03) >  Stable cardiomegaly. Stable Bilateral opacities.    < end of copied text >      IMPRESSION AND PLAN:

## 2018-05-11 ENCOUNTER — TRANSCRIPTION ENCOUNTER (OUTPATIENT)
Age: 73
End: 2018-05-11

## 2018-05-11 VITALS — RESPIRATION RATE: 18 BRPM | SYSTOLIC BLOOD PRESSURE: 173 MMHG | DIASTOLIC BLOOD PRESSURE: 73 MMHG | HEART RATE: 63 BPM

## 2018-05-11 LAB
ANION GAP SERPL CALC-SCNC: 13 MMOL/L — SIGNIFICANT CHANGE UP (ref 7–14)
APTT BLD: 40.6 SEC — HIGH (ref 27–39.2)
BASOPHILS # BLD AUTO: 0.04 K/UL — SIGNIFICANT CHANGE UP (ref 0–0.2)
BASOPHILS NFR BLD AUTO: 0.6 % — SIGNIFICANT CHANGE UP (ref 0–1)
BUN SERPL-MCNC: 45 MG/DL — HIGH (ref 10–20)
CALCIUM SERPL-MCNC: 9.3 MG/DL — SIGNIFICANT CHANGE UP (ref 8.5–10.1)
CHLORIDE SERPL-SCNC: 104 MMOL/L — SIGNIFICANT CHANGE UP (ref 98–110)
CO2 SERPL-SCNC: 24 MMOL/L — SIGNIFICANT CHANGE UP (ref 17–32)
CREAT ?TM UR-MCNC: 47 MG/DL — SIGNIFICANT CHANGE UP
CREAT SERPL-MCNC: 2.7 MG/DL — HIGH (ref 0.7–1.5)
EOSINOPHIL # BLD AUTO: 0.22 K/UL — SIGNIFICANT CHANGE UP (ref 0–0.7)
EOSINOPHIL NFR BLD AUTO: 3.1 % — SIGNIFICANT CHANGE UP (ref 0–8)
GLUCOSE SERPL-MCNC: 150 MG/DL — HIGH (ref 70–99)
HCT VFR BLD CALC: 32.4 % — LOW (ref 37–47)
HGB BLD-MCNC: 10.1 G/DL — LOW (ref 12–16)
IMM GRANULOCYTES NFR BLD AUTO: 0.7 % — HIGH (ref 0.1–0.3)
INR BLD: 1.37 RATIO — HIGH (ref 0.65–1.3)
LYMPHOCYTES # BLD AUTO: 0.81 K/UL — LOW (ref 1.2–3.4)
LYMPHOCYTES # BLD AUTO: 11.2 % — LOW (ref 20.5–51.1)
MCHC RBC-ENTMCNC: 27.6 PG — SIGNIFICANT CHANGE UP (ref 27–31)
MCHC RBC-ENTMCNC: 31.2 G/DL — LOW (ref 32–37)
MCV RBC AUTO: 88.5 FL — SIGNIFICANT CHANGE UP (ref 81–99)
MONOCYTES # BLD AUTO: 0.67 K/UL — HIGH (ref 0.1–0.6)
MONOCYTES NFR BLD AUTO: 9.3 % — SIGNIFICANT CHANGE UP (ref 1.7–9.3)
NEUTROPHILS # BLD AUTO: 5.42 K/UL — SIGNIFICANT CHANGE UP (ref 1.4–6.5)
NEUTROPHILS NFR BLD AUTO: 75.1 % — SIGNIFICANT CHANGE UP (ref 42.2–75.2)
NRBC # BLD: 0 /100 WBCS — SIGNIFICANT CHANGE UP (ref 0–0)
PHOSPHATE SERPL-MCNC: 3.4 MG/DL — SIGNIFICANT CHANGE UP (ref 2.1–4.9)
PLATELET # BLD AUTO: 204 K/UL — SIGNIFICANT CHANGE UP (ref 130–400)
POTASSIUM SERPL-MCNC: 4.4 MMOL/L — SIGNIFICANT CHANGE UP (ref 3.5–5)
POTASSIUM SERPL-SCNC: 4.4 MMOL/L — SIGNIFICANT CHANGE UP (ref 3.5–5)
PROT ?TM UR-MCNC: 144 MG/DLG/24H — SIGNIFICANT CHANGE UP
PROT/CREAT UR-RTO: 3.1 RATIO — HIGH (ref 0–0.2)
PROTHROM AB SERPL-ACNC: 14.9 SEC — HIGH (ref 9.95–12.87)
RBC # BLD: 3.66 M/UL — LOW (ref 4.2–5.4)
RBC # FLD: 14.6 % — HIGH (ref 11.5–14.5)
SODIUM SERPL-SCNC: 141 MMOL/L — SIGNIFICANT CHANGE UP (ref 135–146)
WBC # BLD: 7.21 K/UL — SIGNIFICANT CHANGE UP (ref 4.8–10.8)
WBC # FLD AUTO: 7.21 K/UL — SIGNIFICANT CHANGE UP (ref 4.8–10.8)

## 2018-05-11 RX ORDER — ALPRAZOLAM 0.25 MG
1 TABLET ORAL
Qty: 60 | Refills: 0 | OUTPATIENT
Start: 2018-05-11 | End: 2018-06-09

## 2018-05-11 RX ORDER — AMIODARONE HYDROCHLORIDE 400 MG/1
1 TABLET ORAL
Qty: 0 | Refills: 0 | COMMUNITY

## 2018-05-11 RX ORDER — METOPROLOL TARTRATE 50 MG
1 TABLET ORAL
Qty: 0 | Refills: 0 | COMMUNITY

## 2018-05-11 RX ORDER — METOPROLOL TARTRATE 50 MG
1 TABLET ORAL
Qty: 0 | Refills: 0 | COMMUNITY
Start: 2018-05-11 | End: 2018-06-09

## 2018-05-11 RX ORDER — ASPIRIN/CALCIUM CARB/MAGNESIUM 324 MG
2 TABLET ORAL
Qty: 0 | Refills: 0 | COMMUNITY

## 2018-05-11 RX ORDER — METOPROLOL TARTRATE 50 MG
1 TABLET ORAL
Qty: 90 | Refills: 0 | OUTPATIENT
Start: 2018-05-11 | End: 2018-06-09

## 2018-05-11 RX ORDER — NIFEDIPINE 30 MG
1 TABLET, EXTENDED RELEASE 24 HR ORAL
Qty: 0 | Refills: 0 | COMMUNITY

## 2018-05-11 RX ORDER — AMIODARONE HYDROCHLORIDE 400 MG/1
1 TABLET ORAL
Qty: 60 | Refills: 0 | OUTPATIENT
Start: 2018-05-11 | End: 2018-06-09

## 2018-05-11 RX ORDER — CLOPIDOGREL BISULFATE 75 MG/1
1 TABLET, FILM COATED ORAL
Qty: 0 | Refills: 0 | COMMUNITY

## 2018-05-11 RX ORDER — AMLODIPINE BESYLATE 2.5 MG/1
1 TABLET ORAL
Qty: 30 | Refills: 0 | OUTPATIENT
Start: 2018-05-11 | End: 2018-06-09

## 2018-05-11 RX ORDER — APIXABAN 2.5 MG/1
2.5 TABLET, FILM COATED ORAL EVERY 12 HOURS
Qty: 0 | Refills: 0 | Status: DISCONTINUED | OUTPATIENT
Start: 2018-05-11 | End: 2018-05-11

## 2018-05-11 RX ORDER — AMIODARONE HYDROCHLORIDE 400 MG/1
1 TABLET ORAL
Qty: 0 | Refills: 0 | COMMUNITY
Start: 2018-05-11 | End: 2018-06-09

## 2018-05-11 RX ORDER — APIXABAN 2.5 MG/1
1 TABLET, FILM COATED ORAL
Qty: 60 | Refills: 0 | OUTPATIENT
Start: 2018-05-11 | End: 2018-06-09

## 2018-05-11 RX ORDER — ALPRAZOLAM 0.25 MG
1 TABLET ORAL
Qty: 90 | Refills: 0 | OUTPATIENT
Start: 2018-05-11 | End: 2018-06-09

## 2018-05-11 RX ADMIN — APIXABAN 2.5 MILLIGRAM(S): 2.5 TABLET, FILM COATED ORAL at 10:22

## 2018-05-11 RX ADMIN — AMLODIPINE BESYLATE 10 MILLIGRAM(S): 2.5 TABLET ORAL at 06:07

## 2018-05-11 RX ADMIN — Medication 50 MILLIGRAM(S): at 06:07

## 2018-05-11 RX ADMIN — ISOSORBIDE MONONITRATE 30 MILLIGRAM(S): 60 TABLET, EXTENDED RELEASE ORAL at 13:21

## 2018-05-11 RX ADMIN — Medication 0.25 MILLIGRAM(S): at 03:17

## 2018-05-11 RX ADMIN — Medication 100 MILLIGRAM(S): at 13:21

## 2018-05-11 RX ADMIN — CEFTRIAXONE 100 GRAM(S): 500 INJECTION, POWDER, FOR SOLUTION INTRAMUSCULAR; INTRAVENOUS at 06:08

## 2018-05-11 RX ADMIN — AMIODARONE HYDROCHLORIDE 200 MILLIGRAM(S): 400 TABLET ORAL at 06:08

## 2018-05-11 RX ADMIN — Medication 50 MILLIGRAM(S): at 13:21

## 2018-05-11 RX ADMIN — Medication 81 MILLIGRAM(S): at 13:21

## 2018-05-11 NOTE — PROGRESS NOTE ADULT - ASSESSMENT
infection: improved, stable  Afib, converted to NSR 2 days ago and remained in NSR  anxiety: stable  CKD: at base even slightly improved    agree with Eliquis 2.5 bid, i spoke to her in detail about the risk, bleeding, renal function and necessity of taking anti coagulation, as long as there is no bleeding  will keep on 400 mg Amiodarone for 5 days then 200 mg daily and will f/u in 3 weeks  in few months will try to stop Amiodarone  she is stable to be d/c home  d/w her resident and medical team doing round

## 2018-05-11 NOTE — PROGRESS NOTE ADULT - ASSESSMENT
72 year old Female with PMH of CAD, MI s/p CABG, h/o Afib/A flutter not on anticoagulation s/p, ablation, HTN, CKD4 (Baseline Cr 2.5), Anxiety presented with intermittent palpitations for past 4 days which are getting worse     ·	FRANC on CKD4 vs. progression of CKD    ·	Cr trending down with IVF  ·	proteinuria 3.1 g/g - pt probably already had w/u (follows with Dr Cano) or is going to do as outpt  ·	Kidney sono - echogenic kidneys, no hydro  ·	no need for IVF, d/c- oral hydration, close to baseline  ·	urine culture noted E coli, on Rocephin IV  ·	HTN - repeat BP was usually better controlled until this am, cont current meds  ·	will follow

## 2018-05-11 NOTE — DISCHARGE NOTE ADULT - PLAN OF CARE
rate and rhythm controlled Echo reveals LVEF of 45-50%, with moderate Aortic stenosis.  Please continue taking medications and follow up with cardiology and Electrophysiologist.  Discontinue plavix as per cardiology. Please follow up with cardiologist outpatient. improving Cr baseline ~ 2.3. Cr today is 2.7.  Please keep hydrated. Avoid nephrotoxic drug.   Renal ultrasound reveals increased b/l renal echogenicity consistent with medical renal disease, no hydronephrosis, no obstructive stones. However, patient has multiple b/l renal cysts.   Please follow up with primary medical doctor regarding ultrasound result and to repeat BMP in 1 week. resolved Patient is asymptomatic currently.  Completed 5 days course of Rocephin. optimal BP control Please continue taking medications as prescribed and follow up with primary medical doctor. Patient was started on Xanax with improvement in symptoms.   Patient was advised on relaxation techniques such as meditation, yoga. likely systolic Echo reveals LVEF of 45-50%, with moderate Aortic stenosis.  Please follow up with Cardiology for further management. Cr baseline ~ 2.3. Cr today is 2.7. Urine Protein/Cr ratio is 3.1.   Please keep hydrated. Avoid nephrotoxic drug.   Renal ultrasound reveals increased b/l renal echogenicity consistent with medical renal disease, no hydronephrosis, no obstructive stones. However, patient has multiple b/l renal cysts.   Please follow up with Nephrologist regarding ultrasound result and to repeat BMP in 1 week. Patient is asymptomatic currently.  Completed 5 days course of Rocephin.  Please follow up with Primary medical doctor in 2 weeks. Please continue taking medications as prescribed and follow up with primary medical doctor in 2 weeks.

## 2018-05-11 NOTE — DISCHARGE NOTE ADULT - HOSPITAL COURSE
71 yo female patient with PMH of CAD, MI s/p CABG, h/o Afib/A.flutter s/p ablation about 10 years ago, not on anticoagulation HTN, CKD (Baseline Cr 2.4), anxiety presented with intermittent palpitations for past 4 days which are getting worse, found to be in Atrial fibrillation RVR on admission. Patient was started on amiodarone and started on anticoagulation. Acute MI was ruled out by EKG and cardiac enzymes. Echocardiogram on this admission reveals LVEF 45-50% with moderate aortic stenosis. Patient's HR become stabilized and in sinus rhythm.   Patient was also found to have FRANC on CKD with UTI, started on antibiotics and IVF with improvement in kidney function. Renal ultrasound reveals no obstruction or hydronephrosis. Patient's Cr improving on IVF.   Patient will be discharge today with outpatient follow up. 73 yo female patient with PMH of CAD, MI s/p CABG, h/o Afib/A.flutter s/p ablation about 10 years ago, not on anticoagulation HTN, CKD (Baseline Cr 2.4), anxiety presented with intermittent palpitations for past 4 days which are getting worse, found to be in Atrial fibrillation RVR on admission. Patient was started on amiodarone and started on anticoagulation. Acute MI was ruled out by EKG and cardiac enzymes. Echocardiogram on this admission reveals LVEF 45-50% with moderate aortic stenosis. Patient's HR become stabilized and in sinus rhythm.   Patient was also found to have RFANC on CKD with UTI, started on antibiotics and IVF with improvement in kidney function. Renal ultrasound reveals no obstruction or hydronephrosis. Urine protein/Cr ratio is 3.1.   Patient will be discharge today with outpatient follow up.

## 2018-05-11 NOTE — PROGRESS NOTE ADULT - SUBJECTIVE AND OBJECTIVE BOX
Subjective:  i saw the patient at 2.15 pm and spoke in detail with the medical team, but note is written at 5.20 pm  she feels well, no more SOB, no distress, remained in NSR, UTI resolved. renal function remained steady    MEDICATIONS  (STANDING):  allopurinol 100 milliGRAM(s) Oral daily  amiodarone    Tablet 200 milliGRAM(s) Oral two times a day  amLODIPine   Tablet 10 milliGRAM(s) Oral daily  apixaban 2.5 milliGRAM(s) Oral every 12 hours  aspirin  chewable 81 milliGRAM(s) Oral daily  cefTRIAXone   IVPB 1 Gram(s) IV Intermittent every 24 hours  isosorbide   mononitrate ER Tablet (IMDUR) 30 milliGRAM(s) Oral daily  metoprolol tartrate 50 milliGRAM(s) Oral three times a day  sodium chloride 0.9%. 1000 milliLiter(s) (60 mL/Hr) IV Continuous <Continuous>    MEDICATIONS  (PRN):  ALPRAZolam 0.25 milliGRAM(s) Oral three times a day PRN anxiety  ALPRAZolam 0.25 milliGRAM(s) Oral every 12 hours PRN anxiety            Vital Signs Last 24 Hrs  T(C): 36.1 (11 May 2018 13:35), Max: 36.4 (10 May 2018 20:10)  T(F): 96.9 (11 May 2018 13:35), Max: 97.6 (10 May 2018 20:10)  HR: 63 (11 May 2018 15:33) (58 - 70)  BP: 173/73 (11 May 2018 15:33) (149/67 - 191/75)  BP(mean): --  RR: 18 (11 May 2018 15:33) (16 - 18)  SpO2: 93% (11 May 2018 08:25) (93% - 93%)             REVIEW OF SYSTEMS:  CONSTITUTIONAL: no fever, no chills, no diaphoresis, but fatigue  CARDIOLOGY: no chest pain, no SOB, no palpitation, no diaphoresis, no faint   RESPIRATORY: no dyspnea, no wheeze, no orthopnea, no PND   NEUROLOGICAL: no dizziness, headache, focal deficits to report.  GI: no abdominal pain, no dyspepsia, no nausea, no vomiting, no diarrhea.    HEENT: no congestion, no nasal bleeding  SKIN: no ecchymosis  severe anxiety             PHYSICAL EXAM:  · CONSTITUTIONAL: Looks stable, in no diress  . NECK: Supple, no JVD, no bruit on either carotid side   · RESPIRATORY: Normal air entry to lung base, no wheeze, no crackle, no wet rales  · CARDIOVASCULAR: Normal S1, A2, P2, 2/6 systolic aortic murmur, and 2/6 apical mitral murmur, regular rate,  no rub,  · EXTREMITIES: No cyanosis, no clubbing, no edema  · VASCULAR: Pulses are regular, equal, bilateral in upper and lower extremities  	  TELEMETRY: NSR    ECG: no new ECG since 9th    TTE: < from: Transthoracic Echocardiogram (05.10.18 @ 16:51) >  1. Left ventricular ejection fraction, by visual estimation, is 45 to   50%.   2. Mildly decreased global left ventricular systolic function.   3. Mild to moderate mitral valve regurgitation.   4. Mild-moderate tricuspid regurgitation.   5. Mild aortic regurgitation.   6. Estimated pulmonary artery systolic pressure is 50.1 mmHg assuming a   right atrial pressure of 10 mmHg, which is consistent with moderate   pulmonary hypertension.   7. Peak transaortic gradient equals 27.4 mmHg, mean transaortic gradient   equals 15.3 mmHg, the calculated aortic valve area equals 1.04 cm²     < end of copied text >      LABS:                        10.1   7.21  )-----------( 204      ( 11 May 2018 09:41 )             32.4     05-11    141  |  104  |  45<H>  ----------------------------<  150<H>  4.4   |  24  |  2.7<H>    Ca    9.3      11 May 2018 09:41  Phos  3.4     05-11    TPro  6.6  /  Alb  3.6  /  TBili  <0.2  /  DBili  x   /  AST  13  /  ALT  14  /  AlkPhos  46  05-10        PT/INR - ( 11 May 2018 09:41 )   PT: 14.90 sec;   INR: 1.37 ratio         PTT - ( 11 May 2018 09:41 )  PTT:40.6 sec    I&O's Summary    10 May 2018 07:01  -  11 May 2018 07:00  --------------------------------------------------------  IN: 1819 mL / OUT: 0 mL / NET: 1819 mL      BNP  RADIOLOGY & ADDITIONAL STUDIES:  no new xray    IMPRESSION AND PLAN:

## 2018-05-11 NOTE — PROGRESS NOTE ADULT - PROVIDER SPECIALTY LIST ADULT
Cardiology
Internal Medicine
Nephrology
Internal Medicine

## 2018-05-11 NOTE — DISCHARGE NOTE ADULT - MEDICATION SUMMARY - MEDICATIONS TO STOP TAKING
I will STOP taking the medications listed below when I get home from the hospital:    Plavix 75 mg oral tablet  -- 1 tab(s) by mouth once a day    NIFEdipine 90 mg oral tablet, extended release  -- 1 tab(s) by mouth once a day

## 2018-05-11 NOTE — PROGRESS NOTE ADULT - ASSESSMENT
71 yo female patient with PMH of CAD, MI s/p CABG, h/o Afib/A.flutter s/p ablation about 10 years ago, not on anticoagulation,, HTN, CKD (Baseline Cr 2.4), anxiety presented with intermittent palpitations for past 4 days which are getting worse, found to be in a.fib RVR on admission.     Atrial fibrillation with RVR  - Rate control upon admission; in the afternoon, patient developed a.fib RVR again with chest discomfort  - CE neg x 5  - C/w PO amiodarone 200mg q12h  - TTE: EF 45-50%. mild/mod MR. mild/mod TR. mild AR. pulm HTN. moderate AS.  - As per cardiology, Eliquis 2.5 mg bid (advanced CKD), heparin and coumadin stopped.  - F/u EP o/p    UTI  - Day 5 of Rocephin  - UA positive  - UCx: pansensitive E.coli  - Renal US: no stone, no hydronephrosis.     FRANC on CKD  - Renal US 5/9: Increased bilateral renal echogenicity consistent with medical renal disease.Nonobstructing left renal mid to lower pole calculus. No hydronephrosis. Multiple bilateral renal cysts.  - Cr 2.7  - Patient has no clinical signs of fluid overload.   - F/u Pr/Cr ratio    HTN (uncontrolled)  - /75 this morning  - Norvasc 10 mg  - One time dose of 25 mg hydralazine  - Monitor BP    Anxiety  - C/w Xanax  - Medication and relaxation techniques advised    History of kidney Stones/CAD/MI/  - C/w current medications  - D/C Plavix as per cardio    DVT prophylaxis  - Stopped heparin, warfarin  - Starting elliquis

## 2018-05-11 NOTE — DISCHARGE NOTE ADULT - PATIENT PORTAL LINK FT
You can access the [facility] Portal, offered by [facility], by registering with the following website: [email or web address]

## 2018-05-11 NOTE — PROGRESS NOTE ADULT - SUBJECTIVE AND OBJECTIVE BOX
Nephrology progress note    Patient is seen and examined, events over the last 24 h noted .    Allergies:  No Known Allergies    Hospital Medications:   MEDICATIONS  (STANDING):  allopurinol 100 milliGRAM(s) Oral daily  amiodarone    Tablet 200 milliGRAM(s) Oral two times a day  amLODIPine   Tablet 10 milliGRAM(s) Oral daily  apixaban 2.5 milliGRAM(s) Oral every 12 hours  aspirin  chewable 81 milliGRAM(s) Oral daily  cefTRIAXone   IVPB 1 Gram(s) IV Intermittent every 24 hours  isosorbide   mononitrate ER Tablet (IMDUR) 30 milliGRAM(s) Oral daily  metoprolol tartrate 50 milliGRAM(s) Oral three times a day  sodium chloride 0.9%. 1000 milliLiter(s) (60 mL/Hr) IV Continuous <Continuous>        VITALS:  T(F): 96.9 (18 @ 13:35), Max: 97.6 (05-10-18 @ 20:10)  HR: 70 (18 @ 13:35)  BP: 191/75 (18 @ 13:35)  RR: 18 (18 @ 13:35)  SpO2: 93% (18 @ 08:25)  Wt(kg): --     @ 07:  -  05-10 @ 07:00  --------------------------------------------------------  IN: 1830.2 mL / OUT: 200 mL / NET: 1630.2 mL    05-10 @ 07:01  -   @ 07:00  --------------------------------------------------------  IN: 1819 mL / OUT: 0 mL / NET: 1819 mL          PHYSICAL EXAM:  Constitutional: NAD  HEENT: anicteric sclera, oropharynx clear, MMM  Neck: No JVD  Respiratory: CTAB, no wheezes, rales or rhonchi  Cardiovascular: S1, S2, RRR  Gastrointestinal: BS+, soft, NT/ND  Extremities: trace peripheral edema  Neurological: A/O x 3,  : No CVA tenderness. No simeon.   Skin: No rashes  Vascular Access:    LABS:      141  |  104  |  45<H>  ----------------------------<  150<H>  4.4   |  24  |  2.7<H>  Creatinine Trend: 2.7<--, 3.0<--, 2.6<--, 3.2<--, 3.5<--    Ca    9.3      11 May 2018 09:41  Phos  3.4         TPro  6.6  /  Alb  3.6  /  TBili  <0.2  /  DBili      /  AST  13  /  ALT  14  /  AlkPhos  46  05-10                          10.1   7.21  )-----------( 204      ( 11 May 2018 09:41 )             32.4       Urine Studies:  Urinalysis Basic - ( 07 May 2018 16:51 )    Color: Yellow / Appearance: Cloudy / S.020 / pH:   Gluc:  / Ketone: Negative  / Bili: Negative / Urobili: 0.2 mg/dL   Blood:  / Protein: >=300 mg/dL / Nitrite: Positive   Leuk Esterase: Moderate / RBC: 1-2 /HPF / WBC 26-50 /HPF   Sq Epi:  / Non Sq Epi: Few /HPF / Bacteria: Many /HPF      Protein/Creatinine Ratio Calculation: 3.1 Ratio (05-10 @ 18:21)  Creatinine, Random Urine: 47 mg/dL (05-10 @ 18:21)  Creatinine, Random Urine: 52 mg/dL ( @ 16:15)  Chloride, Random Urine: 55 ( @ 16:15)  Calcium, Random Urine: 3 mg/dL ( @ 16:15)  Sodium, Random Urine: 76.0 mmoL/L ( @ 16:15)  Potassium, Random Urine: 15 mmol/L ( @ 16:15)  Potassium, Random Urine: 12 mmol/L ( @ 04:50)  Creatinine, Random Urine: 40 mg/dL ( @ 04:50)  Chloride, Random Urine: 67 ( @ 04:50)  Sodium, Random Urine: 92.0 mmoL/L ( @ 04:50)  Calcium, Random Urine: 5 mg/dL ( @ 04:49)    RADIOLOGY & ADDITIONAL STUDIES:  < from: US Renal (18 @ 17:10) >  ncreased bilateral renal echogenicity consistent with medical renal   disease.    Nonobstructing left renal mid to lower pole calculus. No hydronephrosis.    Multiple bilateral renal cysts.    < end of copied text >

## 2018-05-11 NOTE — DISCHARGE NOTE ADULT - CARE PLAN
Principal Discharge DX:	Atrial fibrillation  Goal:	rate and rhythm controlled  Assessment and plan of treatment:	Echo reveals LVEF of 45-50%, with moderate Aortic stenosis.  Please continue taking medications and follow up with cardiology and Electrophysiologist.  Discontinue plavix as per cardiology.  Secondary Diagnosis:	Aortic stenosis, moderate  Assessment and plan of treatment:	Please follow up with cardiologist outpatient.  Secondary Diagnosis:	Acute kidney injury superimposed on CKD  Goal:	improving  Assessment and plan of treatment:	Cr baseline ~ 2.3. Cr today is 2.7.  Please keep hydrated. Avoid nephrotoxic drug.   Renal ultrasound reveals increased b/l renal echogenicity consistent with medical renal disease, no hydronephrosis, no obstructive stones. However, patient has multiple b/l renal cysts.   Please follow up with primary medical doctor regarding ultrasound result and to repeat BMP in 1 week.  Secondary Diagnosis:	UTI (urinary tract infection)  Goal:	resolved  Assessment and plan of treatment:	Patient is asymptomatic currently.  Completed 5 days course of Rocephin.  Secondary Diagnosis:	HTN (hypertension)  Goal:	optimal BP control  Assessment and plan of treatment:	Please continue taking medications as prescribed and follow up with primary medical doctor.  Secondary Diagnosis:	Anxiety  Assessment and plan of treatment:	Patient was started on Xanax with improvement in symptoms.   Patient was advised on relaxation techniques such as meditation, yoga.  Secondary Diagnosis:	CHF (congestive heart failure)  Goal:	likely systolic  Assessment and plan of treatment:	Echo reveals LVEF of 45-50%, with moderate Aortic stenosis.  Please follow up with Cardiology for further management. Principal Discharge DX:	Atrial fibrillation  Goal:	rate and rhythm controlled  Assessment and plan of treatment:	Echo reveals LVEF of 45-50%, with moderate Aortic stenosis.  Please continue taking medications and follow up with cardiology and Electrophysiologist.  Discontinue plavix as per cardiology.  Secondary Diagnosis:	Aortic stenosis, moderate  Assessment and plan of treatment:	Please follow up with cardiologist outpatient.  Secondary Diagnosis:	Acute kidney injury superimposed on CKD  Goal:	improving  Assessment and plan of treatment:	Cr baseline ~ 2.3. Cr today is 2.7. Urine Protein/Cr ratio is 3.1.   Please keep hydrated. Avoid nephrotoxic drug.   Renal ultrasound reveals increased b/l renal echogenicity consistent with medical renal disease, no hydronephrosis, no obstructive stones. However, patient has multiple b/l renal cysts.   Please follow up with Nephrologist regarding ultrasound result and to repeat BMP in 1 week.  Secondary Diagnosis:	UTI (urinary tract infection)  Goal:	resolved  Assessment and plan of treatment:	Patient is asymptomatic currently.  Completed 5 days course of Rocephin.  Secondary Diagnosis:	HTN (hypertension)  Goal:	optimal BP control  Assessment and plan of treatment:	Please continue taking medications as prescribed and follow up with primary medical doctor.  Secondary Diagnosis:	Anxiety  Assessment and plan of treatment:	Patient was started on Xanax with improvement in symptoms.   Patient was advised on relaxation techniques such as meditation, yoga.  Secondary Diagnosis:	CHF (congestive heart failure)  Goal:	likely systolic  Assessment and plan of treatment:	Echo reveals LVEF of 45-50%, with moderate Aortic stenosis.  Please follow up with Cardiology for further management. Principal Discharge DX:	Atrial fibrillation  Goal:	rate and rhythm controlled  Assessment and plan of treatment:	Echo reveals LVEF of 45-50%, with moderate Aortic stenosis.  Please continue taking medications and follow up with cardiology and Electrophysiologist.  Discontinue plavix as per cardiology.  Secondary Diagnosis:	Aortic stenosis, moderate  Assessment and plan of treatment:	Please follow up with cardiologist outpatient.  Secondary Diagnosis:	Acute kidney injury superimposed on CKD  Goal:	improving  Assessment and plan of treatment:	Cr baseline ~ 2.3. Cr today is 2.7. Urine Protein/Cr ratio is 3.1.   Please keep hydrated. Avoid nephrotoxic drug.   Renal ultrasound reveals increased b/l renal echogenicity consistent with medical renal disease, no hydronephrosis, no obstructive stones. However, patient has multiple b/l renal cysts.   Please follow up with Nephrologist regarding ultrasound result and to repeat BMP in 1 week.  Secondary Diagnosis:	UTI (urinary tract infection)  Goal:	resolved  Assessment and plan of treatment:	Patient is asymptomatic currently.  Completed 5 days course of Rocephin.  Please follow up with Primary medical doctor in 2 weeks.  Secondary Diagnosis:	HTN (hypertension)  Goal:	optimal BP control  Assessment and plan of treatment:	Please continue taking medications as prescribed and follow up with primary medical doctor in 2 weeks.  Secondary Diagnosis:	Anxiety  Assessment and plan of treatment:	Patient was started on Xanax with improvement in symptoms.   Patient was advised on relaxation techniques such as meditation, yoga.  Secondary Diagnosis:	CHF (congestive heart failure)  Goal:	likely systolic  Assessment and plan of treatment:	Echo reveals LVEF of 45-50%, with moderate Aortic stenosis.  Please follow up with Cardiology for further management.

## 2018-05-11 NOTE — DISCHARGE NOTE ADULT - SECONDARY DIAGNOSIS.
Aortic stenosis, moderate Acute kidney injury superimposed on CKD UTI (urinary tract infection) HTN (hypertension) Anxiety CHF (congestive heart failure)

## 2018-05-11 NOTE — DISCHARGE NOTE ADULT - MEDICATION SUMMARY - MEDICATIONS TO CHANGE
I will SWITCH the dose or number of times a day I take the medications listed below when I get home from the hospital:    amiodarone 200 mg oral tablet  -- 1 tab(s) by mouth once a day    Aspir-Low  -- 2 tab(s) by mouth once a day    metoprolol tartrate 50 mg oral tablet  -- 1 tab(s) by mouth 2 times a day    aspirin 81 mg oral tablet  -- 2 tab(s) by mouth once a day

## 2018-05-11 NOTE — PROGRESS NOTE ADULT - SUBJECTIVE AND OBJECTIVE BOX
Patient is a 72y old  Female who presents with a chief complaint of palpitations (11 May 2018 11:08)      Overnight events:  Overnight, she woke up from a deep sleep feeling "pressure" and feeling like she can't take a deep breath. She denies any chest pain. EKG was unremarkable. Pt reports she has had similar experiences in the past due to her anxiety. Her blood pressure also elevated to 180/74, for which she was given 25 mg hydralazine. She is currently in normal sinus rhythm.      PAST MEDICAL & SURGICAL HISTORY:  Atrial fibrillation  CKD (chronic kidney disease)  MI (myocardial infarction)  CAD (coronary artery disease)  Kidney stone  HTN (hypertension)  H/O abdominal hysterectomy  H/O cardiac radiofrequency ablation  History of open heart surgery      Allergies    No Known Allergies        MEDICATIONS  (STANDING):  allopurinol 100 milliGRAM(s) Oral daily  amiodarone    Tablet 200 milliGRAM(s) Oral two times a day  amLODIPine   Tablet 10 milliGRAM(s) Oral daily  apixaban 2.5 milliGRAM(s) Oral every 12 hours  aspirin  chewable 81 milliGRAM(s) Oral daily  cefTRIAXone   IVPB 1 Gram(s) IV Intermittent every 24 hours  isosorbide   mononitrate ER Tablet (IMDUR) 30 milliGRAM(s) Oral daily  metoprolol tartrate 50 milliGRAM(s) Oral three times a day  sodium chloride 0.9%. 1000 milliLiter(s) (60 mL/Hr) IV Continuous <Continuous>    MEDICATIONS  (PRN):  ALPRAZolam 0.25 milliGRAM(s) Oral three times a day PRN anxiety  ALPRAZolam 0.25 milliGRAM(s) Oral every 12 hours PRN anxiety        Vital Signs Last 24 Hrs  T(C): 35.4 (11 May 2018 05:58), Max: 36.4 (10 May 2018 20:10)  T(F): 95.7 (11 May 2018 05:58), Max: 97.6 (10 May 2018 20:10)  HR: 62 (11 May 2018 05:58) (58 - 62)  BP: 150/66 (11 May 2018 05:58) (149/67 - 180/74)  BP(mean): --  RR: 18 (11 May 2018 05:58) (16 - 18)  SpO2: 93% (11 May 2018 08:25) (93% - 93%)  CAPILLARY BLOOD GLUCOSE        I&O's Summary    10 May 2018 07:01  -  11 May 2018 07:00  --------------------------------------------------------  IN: 1819 mL / OUT: 0 mL / NET: 1819 mL        Physical Exam:    -     General : Not in acute distress.    -      HEENT: PEERLA    -      Cardiac: S1 & S2. No murmur/gallop/rubs.     -      Pulm: clear to auscultate b/l lung field.     -      GI: positive BS. No tenderness/rigidity/rebound.     -      Musculoskeletal: no pitting edema.     -      Neuro: A & O3.     Labs:                        10.1   7.21  )-----------( 204      ( 11 May 2018 09:41 )             32.4                           05-11    141  |  104  |  45<H>  ----------------------------<  150<H>  4.4   |  24  |  2.7<H>    Ca    9.3      11 May 2018 09:41  Phos  3.4     05-11    TPro  6.6  /  Alb  3.6  /  TBili  <0.2  /  DBili  x   /  AST  13  /  ALT  14  /  AlkPhos  46  05-10    LIVER FUNCTIONS - ( 10 May 2018 06:30 )  Alb: 3.6 g/dL / Pro: 6.6 g/dL / ALK PHOS: 46 U/L / ALT: 14 U/L / AST: 13 U/L / GGT: x                              PT/INR - ( 11 May 2018 09:41 )   PT: 14.90 sec;   INR: 1.37 ratio         PTT - ( 11 May 2018 09:41 )  PTT:40.6 sec                                   Imaging:    Echo:   1. Left ventricular ejection fraction, by visual estimation, is 45 to 50%.   2. Mildly decreased global left ventricular systolic function.   3. Mild to moderate mitral valve regurgitation.   4. Mild-moderate tricuspid regurgitation.   5. Mild aortic regurgitation.   6. Estimated pulmonary artery systolic pressure is 50.1 mmHg assuming a right atrial pressure of 10 mmHg, which is consistent with moderate pulmonary hypertension.   7. Peak transaortic gradient equals 27.4 mmHg, mean transaortic gradient equals 15.3 mmHg, the calculated aortic valve area equals 1.04 cm² by the continuity equation consistent with moderate aortic stenosis.    Renal US:  Increased bilateral renal echogenicity consistent with medical renal   disease.  Nonobstructing left renal mid to lower pole calculus. No hydronephrosis.  Multiple bilateral renal cysts.    CXR: Stable cardiomegaly. Stable Bilateral opacities.

## 2018-05-11 NOTE — DISCHARGE NOTE ADULT - NS AS DC STROKE ED MATERIALS
Call 911 for Stroke/Prescribed Medications/Need for Followup After Discharge/Stroke Warning Signs and Symptoms/Risk Factors for Stroke/Stroke Education Booklet

## 2018-05-11 NOTE — DISCHARGE NOTE ADULT - MEDICATION SUMMARY - MEDICATIONS TO TAKE
I will START or STAY ON the medications listed below when I get home from the hospital:    aspirin 81 mg oral tablet  -- 1 tab(s) by mouth once a day  -- Indication: For CAD (coronary artery disease)    Imdur 30 mg oral tablet, extended release  -- 1 tab(s) by mouth once a day (in the morning)  -- Indication: For CHF (congestive heart failure)    amiodarone 200 mg oral tablet  -- 1 tab(s) by mouth 2 times a day  -- Indication: For Atrial fibrillation    apixaban 2.5 mg oral tablet  -- 1 tab(s) by mouth every 12 hours  -- Indication: For Atrial fibrillation    allopurinol 100 mg oral tablet  -- 1 tab(s) by mouth once a day  -- Indication: For Kidney stone/ Gout    ALPRAZolam 0.25 mg oral tablet  -- 1 tab(s) by mouth 2 times a day, As Needed -anxiety MDD:0.50 mg  -- Indication: For Anxiety    metoprolol tartrate 50 mg oral tablet  -- 1 tab(s) by mouth 3 times a day  -- Indication: For Atrial fibrillation    amLODIPine 10 mg oral tablet  -- 1 tab(s) by mouth once a day  -- Indication: For HTN (hypertension)    Kayexalate  -- 1 dose(s) by mouth 3 times a week  -- Indication: For Hyperkalemia

## 2018-05-11 NOTE — DISCHARGE NOTE ADULT - CARE PROVIDER_API CALL
Sherif Phelps), Nephrology  [address] [place] [address]  Phone: [phone]  Fax: [phone]    Bipin Child), Cardiology; Internal Medicine; Nuclear Cardiology  [address] [place] [address]  Phone: [phone]  Fax: [phone]    Amanda Archer), Cardiology; Internal Medicine  [address] [place] [address]  Phone: [phone]  Fax: [phone]

## 2018-05-15 DIAGNOSIS — I13.0 HYPERTENSIVE HEART AND CHRONIC KIDNEY DISEASE WITH HEART FAILURE AND STAGE 1 THROUGH STAGE 4 CHRONIC KIDNEY DISEASE, OR UNSPECIFIED CHRONIC KIDNEY DISEASE: ICD-10-CM

## 2018-05-15 DIAGNOSIS — I25.2 OLD MYOCARDIAL INFARCTION: ICD-10-CM

## 2018-05-15 DIAGNOSIS — I25.10 ATHEROSCLEROTIC HEART DISEASE OF NATIVE CORONARY ARTERY WITHOUT ANGINA PECTORIS: ICD-10-CM

## 2018-05-15 DIAGNOSIS — I48.91 UNSPECIFIED ATRIAL FIBRILLATION: ICD-10-CM

## 2018-05-15 DIAGNOSIS — I35.0 NONRHEUMATIC AORTIC (VALVE) STENOSIS: ICD-10-CM

## 2018-05-15 DIAGNOSIS — Z79.02 LONG TERM (CURRENT) USE OF ANTITHROMBOTICS/ANTIPLATELETS: ICD-10-CM

## 2018-05-15 DIAGNOSIS — I50.20 UNSPECIFIED SYSTOLIC (CONGESTIVE) HEART FAILURE: ICD-10-CM

## 2018-05-15 DIAGNOSIS — N17.9 ACUTE KIDNEY FAILURE, UNSPECIFIED: ICD-10-CM

## 2018-05-15 DIAGNOSIS — E78.5 HYPERLIPIDEMIA, UNSPECIFIED: ICD-10-CM

## 2018-05-15 DIAGNOSIS — Z95.1 PRESENCE OF AORTOCORONARY BYPASS GRAFT: ICD-10-CM

## 2018-05-15 DIAGNOSIS — F41.9 ANXIETY DISORDER, UNSPECIFIED: ICD-10-CM

## 2018-05-15 DIAGNOSIS — M19.90 UNSPECIFIED OSTEOARTHRITIS, UNSPECIFIED SITE: ICD-10-CM

## 2018-05-15 DIAGNOSIS — N39.0 URINARY TRACT INFECTION, SITE NOT SPECIFIED: ICD-10-CM

## 2018-05-15 DIAGNOSIS — N18.4 CHRONIC KIDNEY DISEASE, STAGE 4 (SEVERE): ICD-10-CM

## 2018-05-15 DIAGNOSIS — I48.0 PAROXYSMAL ATRIAL FIBRILLATION: ICD-10-CM

## 2018-05-17 PROBLEM — N20.0 CALCULUS OF KIDNEY: Chronic | Status: ACTIVE | Noted: 2018-05-07

## 2018-05-17 PROBLEM — I10 ESSENTIAL (PRIMARY) HYPERTENSION: Chronic | Status: ACTIVE | Noted: 2018-05-07

## 2018-05-21 ENCOUNTER — OUTPATIENT (OUTPATIENT)
Dept: OUTPATIENT SERVICES | Facility: HOSPITAL | Age: 73
LOS: 1 days | Discharge: HOME | End: 2018-05-21

## 2018-05-21 VITALS
HEART RATE: 109 BPM | RESPIRATION RATE: 19 BRPM | OXYGEN SATURATION: 96 % | WEIGHT: 182.1 LBS | DIASTOLIC BLOOD PRESSURE: 86 MMHG | SYSTOLIC BLOOD PRESSURE: 139 MMHG

## 2018-05-21 VITALS — SYSTOLIC BLOOD PRESSURE: 139 MMHG | OXYGEN SATURATION: 98 % | DIASTOLIC BLOOD PRESSURE: 86 MMHG | HEART RATE: 86 BPM

## 2018-05-21 DIAGNOSIS — I48.91 UNSPECIFIED ATRIAL FIBRILLATION: ICD-10-CM

## 2018-05-21 DIAGNOSIS — Z90.710 ACQUIRED ABSENCE OF BOTH CERVIX AND UTERUS: Chronic | ICD-10-CM

## 2018-05-21 DIAGNOSIS — Z98.890 OTHER SPECIFIED POSTPROCEDURAL STATES: Chronic | ICD-10-CM

## 2018-05-21 RX ORDER — APIXABAN 2.5 MG/1
2.5 TABLET, FILM COATED ORAL ONCE
Qty: 0 | Refills: 0 | Status: DISCONTINUED | OUTPATIENT
Start: 2018-05-21 | End: 2018-06-05

## 2018-05-21 NOTE — PROGRESS NOTE ADULT - SUBJECTIVE AND OBJECTIVE BOX
PREOPERATIVE DAY OR PROCEDURE EVALUATION    I have personally seen and examined the patient.  I agree with the history and physical which I have reviewed and noted any changes below.  (ELECTRONICALLY SIGNED: BIPIN ZIEGLER MD05-21-18 @ 09:49)                                                              POST OPERATIVE PROCEDURAL DOCUMENTATION  PRE-OP DIAGNOSIS: afib, AS, AI    POST-OP DIAGNOSIS: AS, AI, MR, dilated LA and RA, no thrombus    PROCEDURE: Transesophageal echocardiogram    Primary Physician: Bipin Ziegler MD  Fellow:    ANESTHESIA TYPE  [  ] General Anesthesia  [ x ] Conscious Sedation  [  ] Local/Regional    CONDITION  [  ] Critical  [  ] Serious  [x ] Fair  [  ] Good    SPECIMENS REMOVED (IF APPLICABLE): NA    IMPLANTS (IF APPLICABLE): None    ESTIMATED BLOOD LOSS: None    COMPLICATIONS: None    FINDINGS  DIAGNOSIS/IMPRESSION: dilated atria, intact IAS, clear ANDREINA, no thrombus, sclerotic mitral valve and aortic valve, moderate AS with 1-1.2cm2 MAURICIO, mild AI, mild-moderate MR, Moderate TR, systolic RV pressure 46-51 mmHg, atheroma of the ascending aorta    PLAN OF CARE:  we did cardioversion, 200 Jules, via AP pads, 1 attempt, successfully converted, initially to junctional rhythm then to NSR.

## 2018-05-21 NOTE — PRE-ANESTHESIA EVALUATION ADULT - NSANTHTOTALSCORECAL_ENT_A_CORE
Complains of blood in urine since yesterday.  Denies any pain or discomfort.  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   2

## 2018-05-21 NOTE — PROGRESS NOTE ADULT - ASSESSMENT
A.Fib  moderate AS, mild AI  Mild-moderate MR, moderate TR  Mild pulmonary HTN at 46-51 mmHg    successful electric cardioversion.

## 2018-05-21 NOTE — PACU DISCHARGE NOTE - COMMENTS
Patient tolerated the procedure well.  May d/c patient home once all discharge criteria have been met.  Please refer to anesthesia record for post-procedural vitals.

## 2018-05-21 NOTE — PRE-ANESTHESIA EVALUATION ADULT - NSANTHOSAYNRD_GEN_A_CORE
No. ROSCOE screening performed.  STOP BANG Legend: 0-2 = LOW Risk; 3-4 = INTERMEDIATE Risk; 5-8 = HIGH Risk

## 2018-05-21 NOTE — H&P CARDIOLOGY - HISTORY OF PRESENT ILLNESS
Patient is a 72y Female PMH: CAD, anemia, CKD, HLD, pre-DM, anxiety,  GOUT, GERD, hysterectomy, bypass x 4 2005. pt was recently d/c from hospital, admitted with UTI, AFIB converted to NSR without cardioversion but day after d/c developed SOB and leg edema and AFIB. pt here for JOÃO/CV    REVIEW OF SYSTEMS:  CONSTITUTIONAL: No fever, weight loss, or fatigue  CARDIOLOGY: PAtient denies chest pain, shortness of breath or syncopal episodes.   RESPIRATORY: denies shortness of breath, wheezing  NEUROLOGICAL: NO weakness, no focal deficits to report.  GI: no BRBPR, no N,V,diarrhea.     PHYSICAL EXAM:  · CONSTITUTIONAL:	Well-developed, well nourished    BMI-  ·RESPIRATORY:   airway patent; breath sounds decreased R side; respirations non-labored; ; no chest wall tenderness; no intercostal retractions; no rales, rhonchi or wheeze  · CARDIOVASCULAR	regular rate and rhythm  no rub  no murmur  normal PMI  · EXTREMITIES: No cyanosis, clubbing or edema  · VASCULAR: 	Equal and normal pulses (carotid, femoral, dorsalis pedis) Patient is a 72y Female PMH: CAD, anemia, CKD, HLD, pre-DM, anxiety,  GOUT, GERD, hysterectomy, bypass x 4 2005. pt was recently d/c from hospital, admitted with UTI, AFIB converted to NSR without cardioversion but day after d/c developed SOB and leg edema and AFIB. pt here for JOÃO/CV    REVIEW OF SYSTEMS:  CONSTITUTIONAL: No fever, weight loss, or fatigue  CARDIOLOGY: PAtient denies chest pain, but has shortness of breath, no syncopal episodes.   RESPIRATORY: shortness of breath, fatigue, no wheezing  NEUROLOGICAL: NO weakness, no focal deficits to report, severe anxiety.  GI: no BRBPR, no N,V,diarrhea.     PHYSICAL EXAM:  · CONSTITUTIONAL:	Well-developed, well nourished    BMI-  ·RESPIRATORY:   airway patent; breath sounds decreased R side; respirations non-labored; ; no chest wall tenderness; no intercostal retractions; no rales, rhonchi or wheeze  · CARDIOVASCULAR	irregular rate and rhythm , no rub, 2/6 systolic aortic murmur,  normal PMI  · EXTREMITIES: No cyanosis, clubbing or edema  · VASCULAR: 	Equal and normal pulses (carotid, femoral, dorsalis pedis)

## 2018-05-21 NOTE — H&P PST ADULT - ATTENDING COMMENTS
there were 2 H & P this is the second one, I just will sign it, but the other H & P is my note and examination

## 2018-05-21 NOTE — H&P CARDIOLOGY - ATTENDING COMMENTS
d/w her and her daughter, still in A.Fib, rate controlled, will do JOÃO and if result is acceptable will do cardioversion.

## 2018-05-21 NOTE — H&P PST ADULT - HISTORY OF PRESENT ILLNESS
72y Female here for elective JOÃO/DCCV for recent onset atrial fibrillation. Patient denies any chest pain or SOB. Complains of SALDANA. Has had no previous DCCV attempts. Patient is compliant with anticoagulant therapy. Will give her the morning dose of eliquis.     Hx of CAD s/p CABG    Social history:  Former Smoker     EKG: Atrial Fibrillation at the rate of 87 bpm.     Physical Exam:    General: WN/WD NAD  Neurology: A&Ox3, nonfocal  Eyes: PERRLA/ EOMI.  ENT/Neck: No JVD.  Airway: Dentition good. Mallampati score 3   Respiratory: CTA B/L, No wheezing, rales, rhonchi  CV:  S1S2, no murmurs, rubs or gallops.  Abdominal: Soft, NT, ND +BS.  Extremities: No edema, + peripheral pulses B/L. Irregularly irregular pulse.   Skin: No Rashes, Hematoma, Ecchymosis    Labs:  Reviewed    JOÃO performed with assistance of anaesthesiologist and cardiology fellow.

## 2018-05-21 NOTE — PRE-ANESTHESIA EVALUATION ADULT - NSANTHPMHFT_GEN_ALL_CORE
73 yo F with above noted PMHx with onset of SOB and palpitations 1 week ago and noted recurrence of atrial fibrillation.  Patient w/ h/o of MI s/p CABG x4 ~15 years ago and post-op afib s/p ablation shortly after.  Patient denies any angina/MI since then.  Denies CVA/seizure/asthma/recent URI.      PSHx:  includes TAB & BSO, b/l cataract sx,  x2, Colonoscopy/EGD,

## 2018-07-06 ENCOUNTER — OUTPATIENT (OUTPATIENT)
Dept: OUTPATIENT SERVICES | Facility: HOSPITAL | Age: 73
LOS: 1 days | Discharge: HOME | End: 2018-07-06

## 2018-07-06 DIAGNOSIS — Z98.890 OTHER SPECIFIED POSTPROCEDURAL STATES: Chronic | ICD-10-CM

## 2018-07-06 DIAGNOSIS — I48.91 UNSPECIFIED ATRIAL FIBRILLATION: ICD-10-CM

## 2018-07-06 DIAGNOSIS — Z01.810 ENCOUNTER FOR PREPROCEDURAL CARDIOVASCULAR EXAMINATION: ICD-10-CM

## 2018-07-06 DIAGNOSIS — Z90.710 ACQUIRED ABSENCE OF BOTH CERVIX AND UTERUS: Chronic | ICD-10-CM

## 2018-07-06 DIAGNOSIS — D68.9 COAGULATION DEFECT, UNSPECIFIED: ICD-10-CM

## 2018-07-06 DIAGNOSIS — N18.9 CHRONIC KIDNEY DISEASE, UNSPECIFIED: ICD-10-CM

## 2018-07-12 ENCOUNTER — OUTPATIENT (OUTPATIENT)
Dept: OUTPATIENT SERVICES | Facility: HOSPITAL | Age: 73
LOS: 1 days | Discharge: HOME | End: 2018-07-12

## 2018-07-12 VITALS — HEIGHT: 66 IN | WEIGHT: 176.37 LBS

## 2018-07-12 DIAGNOSIS — I48.91 UNSPECIFIED ATRIAL FIBRILLATION: ICD-10-CM

## 2018-07-12 DIAGNOSIS — Z90.710 ACQUIRED ABSENCE OF BOTH CERVIX AND UTERUS: Chronic | ICD-10-CM

## 2018-07-12 DIAGNOSIS — Z98.890 OTHER SPECIFIED POSTPROCEDURAL STATES: Chronic | ICD-10-CM

## 2018-07-12 NOTE — PROGRESS NOTE ADULT - SUBJECTIVE AND OBJECTIVE BOX
PREOPERATIVE DAY OR PROCEDURE EVALUATION    I have personally seen and examined the patient.  I agree with the history and physical which I have reviewed and noted any changes below.  (ELECTRONICALLY SIGNED: BIPIN ZIEGLER MD07-12-18 @ 10:40)                                                              POST OPERATIVE PROCEDURAL DOCUMENTATION  PRE-OP DIAGNOSIS: Afib, tachycardia, MR    POST-OP DIAGNOSIS: no thrombus, Moderate MR, then successful cardioversion    PROCEDURE: Transesophageal echocardiogram    Primary Physician: Bipin Ziegler MD  Fellow:    ANESTHESIA TYPE  [  ] General Anesthesia  [ x ] Conscious Sedation  [  ] Local/Regional    CONDITION  [  ] Critical  [  ] Serious  [x  ] Fair  [  ] Good    SPECIMENS REMOVED (IF APPLICABLE): NA    IMPLANTS (IF APPLICABLE): None    ESTIMATED BLOOD LOSS: None    COMPLICATIONS: None    FINDINGS  DIAGNOSIS/IMPRESSION: Dilated LA, RA, intact IAS, sclerotic mitral and aortic valve, MAC, moderate MR, mild TR, adequate LV systolic function about 50%  extensive atheroma of the ascending and descending aorta    PLAN OF CARE: cardioversion

## 2018-07-12 NOTE — PROGRESS NOTE ADULT - ASSESSMENT
150 Jules bidirectional, synchronized, via AP pads, 1 attempt, successful cardioversion to NSR  continue with current Rx  when is stable to d/c the patient  already has an appointment with me in the office for follow up

## 2018-07-12 NOTE — H&P CARDIOLOGY - HISTORY OF PRESENT ILLNESS
72 yF PMHx od CAD with CABG in 2005, HTN, DLD. had an atrial flutter ablation now with atrial fibrillation presents for elective JOÃO/ CV.

## 2018-08-21 ENCOUNTER — TRANSCRIPTION ENCOUNTER (OUTPATIENT)
Age: 73
End: 2018-08-21

## 2018-09-21 ENCOUNTER — INPATIENT (INPATIENT)
Facility: HOSPITAL | Age: 73
LOS: 1 days | Discharge: HOME | End: 2018-09-23
Attending: INTERNAL MEDICINE | Admitting: INTERNAL MEDICINE

## 2018-09-21 VITALS
SYSTOLIC BLOOD PRESSURE: 151 MMHG | HEART RATE: 72 BPM | OXYGEN SATURATION: 97 % | RESPIRATION RATE: 18 BRPM | DIASTOLIC BLOOD PRESSURE: 82 MMHG | TEMPERATURE: 97 F

## 2018-09-21 DIAGNOSIS — Z90.710 ACQUIRED ABSENCE OF BOTH CERVIX AND UTERUS: Chronic | ICD-10-CM

## 2018-09-21 DIAGNOSIS — Z98.890 OTHER SPECIFIED POSTPROCEDURAL STATES: Chronic | ICD-10-CM

## 2018-09-21 LAB
ALBUMIN SERPL ELPH-MCNC: 4.5 G/DL — SIGNIFICANT CHANGE UP (ref 3.5–5.2)
ALP SERPL-CCNC: 71 U/L — SIGNIFICANT CHANGE UP (ref 30–115)
ALT FLD-CCNC: 24 U/L — SIGNIFICANT CHANGE UP (ref 0–41)
ANION GAP SERPL CALC-SCNC: 17 MMOL/L — HIGH (ref 7–14)
ANION GAP SERPL CALC-SCNC: 19 MMOL/L — HIGH (ref 7–14)
APTT BLD: 41.3 SEC — HIGH (ref 27–39.2)
AST SERPL-CCNC: 33 U/L — SIGNIFICANT CHANGE UP (ref 0–41)
BASE EXCESS BLDV CALC-SCNC: -2 MMOL/L — SIGNIFICANT CHANGE UP (ref -2–2)
BASE EXCESS BLDV CALC-SCNC: -3.1 MMOL/L — LOW (ref -2–2)
BILIRUB SERPL-MCNC: 0.4 MG/DL — SIGNIFICANT CHANGE UP (ref 0.2–1.2)
BUN SERPL-MCNC: 70 MG/DL — CRITICAL HIGH (ref 10–20)
BUN SERPL-MCNC: 71 MG/DL — CRITICAL HIGH (ref 10–20)
CA-I SERPL-SCNC: 1.14 MMOL/L — SIGNIFICANT CHANGE UP (ref 1.12–1.3)
CA-I SERPL-SCNC: 1.28 MMOL/L — SIGNIFICANT CHANGE UP (ref 1.12–1.3)
CALCIUM SERPL-MCNC: 9.8 MG/DL — SIGNIFICANT CHANGE UP (ref 8.5–10.1)
CALCIUM SERPL-MCNC: 9.9 MG/DL — SIGNIFICANT CHANGE UP (ref 8.5–10.1)
CHLORIDE SERPL-SCNC: 101 MMOL/L — SIGNIFICANT CHANGE UP (ref 98–110)
CHLORIDE SERPL-SCNC: 103 MMOL/L — SIGNIFICANT CHANGE UP (ref 98–110)
CO2 SERPL-SCNC: 20 MMOL/L — SIGNIFICANT CHANGE UP (ref 17–32)
CO2 SERPL-SCNC: 21 MMOL/L — SIGNIFICANT CHANGE UP (ref 17–32)
CREAT SERPL-MCNC: 3.7 MG/DL — HIGH (ref 0.7–1.5)
CREAT SERPL-MCNC: 3.7 MG/DL — HIGH (ref 0.7–1.5)
GAS PNL BLDV: 135 MMOL/L — LOW (ref 136–145)
GAS PNL BLDV: 143 MMOL/L — SIGNIFICANT CHANGE UP (ref 136–145)
GAS PNL BLDV: SIGNIFICANT CHANGE UP
GAS PNL BLDV: SIGNIFICANT CHANGE UP
GLUCOSE SERPL-MCNC: 110 MG/DL — HIGH (ref 70–99)
GLUCOSE SERPL-MCNC: 110 MG/DL — HIGH (ref 70–99)
HCO3 BLDV-SCNC: 23 MMOL/L — SIGNIFICANT CHANGE UP (ref 22–29)
HCO3 BLDV-SCNC: 23 MMOL/L — SIGNIFICANT CHANGE UP (ref 22–29)
HCT VFR BLD CALC: 34.3 % — LOW (ref 37–47)
HCT VFR BLDA CALC: 29.5 % — LOW (ref 34–44)
HCT VFR BLDA CALC: 34.7 % — SIGNIFICANT CHANGE UP (ref 34–44)
HGB BLD CALC-MCNC: 11.3 G/DL — LOW (ref 14–18)
HGB BLD CALC-MCNC: 9.6 G/DL — LOW (ref 14–18)
HGB BLD-MCNC: 10.5 G/DL — LOW (ref 12–16)
INR BLD: 1.27 RATIO — SIGNIFICANT CHANGE UP (ref 0.65–1.3)
LACTATE BLDV-MCNC: 1 MMOL/L — SIGNIFICANT CHANGE UP (ref 0.5–1.6)
LACTATE BLDV-MCNC: 1.2 MMOL/L — SIGNIFICANT CHANGE UP (ref 0.5–1.6)
MCHC RBC-ENTMCNC: 26.9 PG — LOW (ref 27–31)
MCHC RBC-ENTMCNC: 30.6 G/DL — LOW (ref 32–37)
MCV RBC AUTO: 87.7 FL — SIGNIFICANT CHANGE UP (ref 81–99)
NRBC # BLD: 0 /100 WBCS — SIGNIFICANT CHANGE UP (ref 0–0)
NT-PROBNP SERPL-SCNC: 9779 PG/ML — HIGH (ref 0–300)
PCO2 BLDV: 42 MMHG — SIGNIFICANT CHANGE UP (ref 41–51)
PCO2 BLDV: 47 MMHG — SIGNIFICANT CHANGE UP (ref 41–51)
PH BLDV: 7.3 — SIGNIFICANT CHANGE UP (ref 7.26–7.43)
PH BLDV: 7.36 — SIGNIFICANT CHANGE UP (ref 7.26–7.43)
PLATELET # BLD AUTO: 293 K/UL — SIGNIFICANT CHANGE UP (ref 130–400)
PO2 BLDV: 28 MMHG — SIGNIFICANT CHANGE UP (ref 20–40)
PO2 BLDV: 39 MMHG — SIGNIFICANT CHANGE UP (ref 20–40)
POTASSIUM BLDV-SCNC: 11.9 MMOL/L — HIGH (ref 3.3–5.6)
POTASSIUM BLDV-SCNC: 4.2 MMOL/L — SIGNIFICANT CHANGE UP (ref 3.3–5.6)
POTASSIUM SERPL-MCNC: 4.5 MMOL/L — SIGNIFICANT CHANGE UP (ref 3.5–5)
POTASSIUM SERPL-MCNC: 6.4 MMOL/L — CRITICAL HIGH (ref 3.5–5)
POTASSIUM SERPL-SCNC: 4.5 MMOL/L — SIGNIFICANT CHANGE UP (ref 3.5–5)
POTASSIUM SERPL-SCNC: 6.4 MMOL/L — CRITICAL HIGH (ref 3.5–5)
PROT SERPL-MCNC: 8.6 G/DL — HIGH (ref 6–8)
PROTHROM AB SERPL-ACNC: 13.7 SEC — HIGH (ref 9.95–12.87)
RBC # BLD: 3.91 M/UL — LOW (ref 4.2–5.4)
RBC # FLD: 17.4 % — HIGH (ref 11.5–14.5)
SAO2 % BLDV: 53 % — SIGNIFICANT CHANGE UP
SAO2 % BLDV: 71 % — SIGNIFICANT CHANGE UP
SODIUM SERPL-SCNC: 140 MMOL/L — SIGNIFICANT CHANGE UP (ref 135–146)
SODIUM SERPL-SCNC: 141 MMOL/L — SIGNIFICANT CHANGE UP (ref 135–146)
TROPONIN T SERPL-MCNC: <0.01 NG/ML — SIGNIFICANT CHANGE UP
WBC # BLD: 8.05 K/UL — SIGNIFICANT CHANGE UP (ref 4.8–10.8)
WBC # FLD AUTO: 8.05 K/UL — SIGNIFICANT CHANGE UP (ref 4.8–10.8)

## 2018-09-21 RX ORDER — AMIODARONE HYDROCHLORIDE 400 MG/1
200 TABLET ORAL DAILY
Qty: 0 | Refills: 0 | Status: DISCONTINUED | OUTPATIENT
Start: 2018-09-21 | End: 2018-09-23

## 2018-09-21 RX ORDER — APIXABAN 2.5 MG/1
2.5 TABLET, FILM COATED ORAL EVERY 12 HOURS
Qty: 0 | Refills: 0 | Status: DISCONTINUED | OUTPATIENT
Start: 2018-09-21 | End: 2018-09-23

## 2018-09-21 RX ORDER — ALPRAZOLAM 0.25 MG
0.25 TABLET ORAL
Qty: 0 | Refills: 0 | Status: DISCONTINUED | OUTPATIENT
Start: 2018-09-21 | End: 2018-09-23

## 2018-09-21 RX ORDER — FUROSEMIDE 40 MG
40 TABLET ORAL DAILY
Qty: 0 | Refills: 0 | Status: DISCONTINUED | OUTPATIENT
Start: 2018-09-21 | End: 2018-09-23

## 2018-09-21 RX ORDER — ASPIRIN/CALCIUM CARB/MAGNESIUM 324 MG
1 TABLET ORAL
Qty: 0 | Refills: 0 | COMMUNITY

## 2018-09-21 RX ORDER — ALLOPURINOL 300 MG
100 TABLET ORAL DAILY
Qty: 0 | Refills: 0 | Status: DISCONTINUED | OUTPATIENT
Start: 2018-09-21 | End: 2018-09-23

## 2018-09-21 RX ORDER — NIFEDIPINE 30 MG
90 TABLET, EXTENDED RELEASE 24 HR ORAL DAILY
Qty: 0 | Refills: 0 | Status: DISCONTINUED | OUTPATIENT
Start: 2018-09-21 | End: 2018-09-22

## 2018-09-21 RX ORDER — ISOSORBIDE MONONITRATE 60 MG/1
30 TABLET, EXTENDED RELEASE ORAL DAILY
Qty: 0 | Refills: 0 | Status: DISCONTINUED | OUTPATIENT
Start: 2018-09-21 | End: 2018-09-23

## 2018-09-21 RX ORDER — METOPROLOL TARTRATE 50 MG
50 TABLET ORAL ONCE
Qty: 0 | Refills: 0 | Status: COMPLETED | OUTPATIENT
Start: 2018-09-21 | End: 2018-09-21

## 2018-09-21 RX ORDER — ISOSORBIDE DINITRATE 5 MG/1
0 TABLET ORAL
Qty: 0 | Refills: 0 | COMMUNITY

## 2018-09-21 RX ORDER — PREGABALIN 225 MG/1
1000 CAPSULE ORAL DAILY
Qty: 0 | Refills: 0 | Status: DISCONTINUED | OUTPATIENT
Start: 2018-09-21 | End: 2018-09-23

## 2018-09-21 RX ORDER — FAMOTIDINE 10 MG/ML
40 INJECTION INTRAVENOUS DAILY
Qty: 0 | Refills: 0 | Status: DISCONTINUED | OUTPATIENT
Start: 2018-09-21 | End: 2018-09-23

## 2018-09-21 RX ORDER — CALCITRIOL 0.5 UG/1
0.5 CAPSULE ORAL DAILY
Qty: 0 | Refills: 0 | Status: DISCONTINUED | OUTPATIENT
Start: 2018-09-21 | End: 2018-09-23

## 2018-09-21 RX ADMIN — Medication 50 MILLIGRAM(S): at 19:33

## 2018-09-21 NOTE — ED ADULT NURSE NOTE - NSIMPLEMENTINTERV_GEN_ALL_ED
Implemented All Fall with Harm Risk Interventions:  [place] to call system. Call bell, personal items and telephone within reach. Instruct patient to call for assistance. Room bathroom lighting operational. Non-slip footwear when patient is off stretcher. Physically safe environment: no spills, clutter or unnecessary equipment. Stretcher in lowest position, wheels locked, appropriate side rails in place. Provide visual cue, wrist band, yellow gown, etc. Monitor gait and stability. Monitor for mental status changes and reorient to person, place, and time. Review medications for side effects contributing to fall risk. Reinforce activity limits and safety measures with patient and family. Provide visual clues: red socks.

## 2018-09-21 NOTE — ED PROVIDER NOTE - OBJECTIVE STATEMENT
73yF with PMH HTN, afib on toprol, amio and eliquis, CKD, MI s/p CABG, PSH hysterectomy and recent L arm fistula placement, no allergies, p/w SOB X 1 week, worse with exertion. No fever/chills, lightheadedness, ha, n/v/d abd pain, urinary sx, CP diaphoresis. Cardiologist Payal, last seen 1.5 mo ago. Nephro Lenin last seen 2 weeks ago.

## 2018-09-21 NOTE — H&P ADULT - NSHPLABSRESULTS_GEN_ALL_CORE
10.5   8.05  )-----------( 293      ( 21 Sep 2018 10:09 )             34.3       09-21    141  |  101  |  71<HH>  ----------------------------<  110<H>  6.4<HH>   |  21  |  3.7<H>    Ca    9.9      21 Sep 2018 10:09    TPro  8.6<H>  /  Alb  4.5  /  TBili  0.4  /  DBili  x   /  AST  33  /  ALT  24  /  AlkPhos  71  09-21                  PT/INR - ( 21 Sep 2018 10:09 )   PT: 13.70 sec;   INR: 1.27 ratio         PTT - ( 21 Sep 2018 10:09 )  PTT:41.3 sec    Lactate Trend      CARDIAC MARKERS ( 21 Sep 2018 10:09 )  x     / <0.01 ng/mL / x     / x     / x            CAPILLARY BLOOD GLUCOSE        < from: 12 Lead ECG (09.21.18 @ 18:00) >     Atrial fibrillation with rapid ventricular response  Cannot rule out Anterior infarct , age undetermined  ST & T wave abnormality, consider inferolateral ischemia    < end of copied text >  < from: JOÃO w/Probe Placement (07.12.18 @ 09:18) >    . Left ventricular ejection fraction, by visual estimation, is 50 to   55%.   2. Thickening of the anterior and posterior mitral valve leaflets.   3. Mild tricuspid regurgitation.   4. Extensive ascending and descending aortic atheroma.  5. Atrial fibrilation, no thrombus,.   6. Recommend proceed with cardioversion.   7. There is mild aortic root calcification.    < end of copied text >    < from: Transthoracic Echocardiogram (05.10.18 @ 16:51) >     1. Left ventricular ejection fraction, by visual estimation, is 45 to   50%.   2. Mildly decreased global left ventricular systolic function.   3. Mild to moderate mitral valve regurgitation.   4. Mild-moderate tricuspid regurgitation.   5. Mild aortic regurgitation.   6. Estimated pulmonary artery systolic pressure is 50.1 mmHg assuming a   right atrial pressure of 10 mmHg, which is consistent with moderate   pulmonary hypertension.   7. Peak transaortic gradient equals 27.4 mmHg, mean transaortic gradient   equals 15.3 mmHg, the calculated aortic valve area equals 1.04 cm² by the   continuity equation consistent with moderate aortic stenosis.    < end of copied text >    cxr- b/l pleural effusion

## 2018-09-21 NOTE — H&P ADULT - HISTORY OF PRESENT ILLNESS
73 y f with pmh of afib on eliquis s/p cardioversion, cad s/p cabg, ckd, anxiety, gerd, gout p/w sob for the past 1 week. As per patient, she has been c/o sob which is worse with exertion and orthopnea along with occasional palpitations. she denies any fever, n/v, cp, leg swelling, 73 y f with pmh of afib on eliquis s/p cardioversion, cad s/p cabg, ckd, anxiety, gerd, gout p/w sob for the past 1 week. As per patient, she has been c/o sob which is worse with exertion and orthopnea along with occasional palpitations. she denies any fever, n/v, cp, leg swelling, weight gain or loss. she was also anxious during examination. she saw cardiologist and pmd in 1 week, where her examination was normal. Today she was found to be sob and called her daughter , who suggested her to go to ed. In ed, she was found to be in afib with rvr which was controlled after cardizem push and oral metoprolol.

## 2018-09-21 NOTE — ED PROVIDER NOTE - ATTENDING CONTRIBUTION TO CARE
72 y/o F pmh HTN, CAD s/p MI, CABG, afib on bblocker, amio, eliquis, CKD s/p recent LUE fistula, p/w intermittent SOB and palpitations x 1wk.  Worse w/ exertion.  No CP, SOB, fever, new cough, v/d or abnl bleeding.    Pt was in afib RVR, o/w comfortable, no complaint.  lungs clear, no pedal edema.  EKG afib, non ischemic. Pt given IV CCB x2 then PO BBlocker w/ improvement of HR.  Pt later hypoxic to 89% on RA, had NL mental status, no resp distress, comfortable. Labs show BNP > 9000, neg trop, CKD, and elevated (but hemolyzed) K.  CXR + small b/l pleural effusions.    Clinical picture suggests fluid overload.  considered PE, but pt is already anticoagulated, and CKD precludes CTA.  Pt remains hemodynamically stable. Plan is to repeat K and admit for VQ, scan, medical optimization, afib management, specialist consult.  Plan discussed w/ pt who agrees.    Pt endorsed to Dr. Brooks for dispo.

## 2018-09-21 NOTE — H&P ADULT - NSHPPHYSICALEXAM_GEN_ALL_CORE
T(C): 36.2 (09-21-18 @ 18:05), Max: 36.2 (09-21-18 @ 18:05)  HR: 90 (09-21-18 @ 19:33) (72 - 158)  BP: 146/64 (09-21-18 @ 19:33) (136/102 - 183/81)  RR: 18 (09-21-18 @ 19:33) (18 - 19)  SpO2: 94% (09-21-18 @ 19:33) (94% - 97%)    PHYSICAL EXAM:  GENERAL: NAD, speaks in full sentences, no signs of respiratory distress  HEAD:  Atraumatic, Normocephalic  EYES: EOMI, PERRLA, conjunctiva and sclera clear  NECK: Supple, No JVD  CHEST/LUNG: Clear to auscultation bilaterally; No wheeze; No crackles; No accessory muscles used  HEART: Regular rate and rhythm; No murmurs;   ABDOMEN: Soft, Nontender, Nondistended; Bowel sounds present; No guarding  EXTREMITIES:  2+ Peripheral Pulses, No cyanosis or edema  PSYCH: AAOx3  NEUROLOGY: non-focal  SKIN: No rashes or lesions T(C): 36.2 (09-21-18 @ 18:05), Max: 36.2 (09-21-18 @ 18:05)  HR: 90 (09-21-18 @ 19:33) (72 - 158)  BP: 146/64 (09-21-18 @ 19:33) (136/102 - 183/81)  RR: 18 (09-21-18 @ 19:33) (18 - 19)  SpO2: 94% (09-21-18 @ 19:33) (94% - 97%)    PHYSICAL EXAM:  GENERAL: NAD, speaks in full sentences, anxious  HEAD:  Atraumatic, Normocephalic  EYES: EOMI, PERRLA, conjunctiva and sclera clear  NECK: Supple,   CHEST/LUNG: Clear to auscultation bilaterally;  HEART: Regular rate and rhythm; No murmurs;   ABDOMEN: Soft, Nontender, Nondistended; Bowel sounds present; obesity  EXTREMITIES:  2+ Peripheral Pulses, no edema  PSYCH: AAOx3  NEUROLOGY: non-focal  SKIN: No rashes or lesions

## 2018-09-21 NOTE — H&P ADULT - ASSESSMENT
73 y f with pmh of afib on eliquis s/p cardioversion, cad s/p cabg, ckd, anxiety, gerd, gout p/w sob was found to be in afib with rvr 73 y f with pmh of afib on eliquis s/p cardioversion, cad s/p cabg, ckd, anxiety, gerd, gout p/w sob was found to be in afib with rvr    1) afib with rvr s/p cardizem improved  chadsvasc- 3  c/w metoprolol and amiodarone  c/w eliquis adjusted renally  consider cardizem if hr is not controlled  cardiology eval    2) b/l pleural effusion and sob with high bnp, clear lungs on examination could be 2/2 afib with rvr, less likely chf vs anxiety  repeat cxr in am  Give lasix iv and monitor i&o  repeat echo  c/w ativan    3) gout- c/w allopurinol    4) gerd- c/w pepcid    5) ckd -stable   monitor bmp    6)dvt ppx  diet- dash  dispo- home 73 y f with pmh of afib on eliquis s/p cardioversion, cad s/p cabg, ckd, anxiety, gerd, gout p/w sob was found to be in afib with rvr    1) afib with rvr s/p cardizem improved  chadsvasc- 3  c/w metoprolol and amiodarone  c/w eliquis adjusted renally  consider cardizem if hr is not controlled  cardiology eval    2) b/l pleural effusion and sob with high bnp, clear lungs on examination could be 2/2 afib with rvr, less likely chf vs anxiety  repeat cxr in am  Give lasix iv and monitor i&o and consider changing it to oral in morning  repeat echo  c/w ativan    3) gout- c/w allopurinol    4) gerd- c/w pepcid    5) ckd -stable   monitor bmp    6)dvt ppx  diet- dash  dispo- home

## 2018-09-21 NOTE — ED PROVIDER NOTE - PHYSICAL EXAMINATION
Vital Signs: I have reviewed the initial vital signs.  Constitutional: anxious appearing, well-nourished, appears stated age, no acute distress.  HEENT: Airway patent, moist MM, no erythema/swelling/deformity of oral structures. EOMI, PERRLA.  CV: tachycardic rate, irregular rhythm, well-perfused extremities, 2+ b/l DP and radial pulses equal.  Lungs: BCTA, no increased WOB.  ABD: NTND, no guarding or rebound, no pulsatile mass, no hernias.   MSK: Neck supple, nontender, nl ROM, no stepoff. Chest nontender. Back nontender in TLS spine or to b/l bony structures or flanks. Ext nontender, nl rom, no deformity.   INTEG: Skin warm, dry, no rash.  NEURO: A&Ox3, moving all extremities, normal speech  PSYCH: anxious but cooperative, normal affect and interaction.

## 2018-09-22 PROBLEM — I25.10 ATHEROSCLEROTIC HEART DISEASE OF NATIVE CORONARY ARTERY WITHOUT ANGINA PECTORIS: Chronic | Status: ACTIVE | Noted: 2018-05-07

## 2018-09-22 PROBLEM — I21.9 ACUTE MYOCARDIAL INFARCTION, UNSPECIFIED: Chronic | Status: ACTIVE | Noted: 2018-05-07

## 2018-09-22 LAB
ANION GAP SERPL CALC-SCNC: 18 MMOL/L — HIGH (ref 7–14)
BASOPHILS # BLD AUTO: 0.07 K/UL — SIGNIFICANT CHANGE UP (ref 0–0.2)
BASOPHILS NFR BLD AUTO: 0.9 % — SIGNIFICANT CHANGE UP (ref 0–1)
BUN SERPL-MCNC: 67 MG/DL — CRITICAL HIGH (ref 10–20)
CALCIUM SERPL-MCNC: 10.3 MG/DL — HIGH (ref 8.5–10.1)
CHLORIDE SERPL-SCNC: 102 MMOL/L — SIGNIFICANT CHANGE UP (ref 98–110)
CK MB CFR SERPL CALC: 1.9 NG/ML — SIGNIFICANT CHANGE UP (ref 0.6–6.3)
CK SERPL-CCNC: 29 U/L — SIGNIFICANT CHANGE UP (ref 0–225)
CO2 SERPL-SCNC: 21 MMOL/L — SIGNIFICANT CHANGE UP (ref 17–32)
CREAT SERPL-MCNC: 3.3 MG/DL — HIGH (ref 0.7–1.5)
EOSINOPHIL # BLD AUTO: 0.16 K/UL — SIGNIFICANT CHANGE UP (ref 0–0.7)
EOSINOPHIL NFR BLD AUTO: 2.1 % — SIGNIFICANT CHANGE UP (ref 0–8)
GLUCOSE SERPL-MCNC: 154 MG/DL — HIGH (ref 70–99)
HCT VFR BLD CALC: 32.8 % — LOW (ref 37–47)
HGB BLD-MCNC: 10 G/DL — LOW (ref 12–16)
IMM GRANULOCYTES NFR BLD AUTO: 0.4 % — HIGH (ref 0.1–0.3)
LYMPHOCYTES # BLD AUTO: 0.56 K/UL — LOW (ref 1.2–3.4)
LYMPHOCYTES # BLD AUTO: 7.3 % — LOW (ref 20.5–51.1)
MAGNESIUM SERPL-MCNC: 2 MG/DL — SIGNIFICANT CHANGE UP (ref 1.8–2.4)
MCHC RBC-ENTMCNC: 27 PG — SIGNIFICANT CHANGE UP (ref 27–31)
MCHC RBC-ENTMCNC: 30.5 G/DL — LOW (ref 32–37)
MCV RBC AUTO: 88.6 FL — SIGNIFICANT CHANGE UP (ref 81–99)
MONOCYTES # BLD AUTO: 0.68 K/UL — HIGH (ref 0.1–0.6)
MONOCYTES NFR BLD AUTO: 8.8 % — SIGNIFICANT CHANGE UP (ref 1.7–9.3)
NEUTROPHILS # BLD AUTO: 6.21 K/UL — SIGNIFICANT CHANGE UP (ref 1.4–6.5)
NEUTROPHILS NFR BLD AUTO: 80.5 % — HIGH (ref 42.2–75.2)
PLATELET # BLD AUTO: 267 K/UL — SIGNIFICANT CHANGE UP (ref 130–400)
POTASSIUM SERPL-MCNC: 4.5 MMOL/L — SIGNIFICANT CHANGE UP (ref 3.5–5)
POTASSIUM SERPL-SCNC: 4.5 MMOL/L — SIGNIFICANT CHANGE UP (ref 3.5–5)
RBC # BLD: 3.7 M/UL — LOW (ref 4.2–5.4)
RBC # FLD: 16.9 % — HIGH (ref 11.5–14.5)
SODIUM SERPL-SCNC: 141 MMOL/L — SIGNIFICANT CHANGE UP (ref 135–146)
TROPONIN T SERPL-MCNC: 0.01 NG/ML — SIGNIFICANT CHANGE UP
WBC # BLD: 7.71 K/UL — SIGNIFICANT CHANGE UP (ref 4.8–10.8)
WBC # FLD AUTO: 7.71 K/UL — SIGNIFICANT CHANGE UP (ref 4.8–10.8)

## 2018-09-22 RX ADMIN — Medication 100 MILLIGRAM(S): at 11:19

## 2018-09-22 RX ADMIN — Medication 90 MILLIGRAM(S): at 05:27

## 2018-09-22 RX ADMIN — APIXABAN 2.5 MILLIGRAM(S): 2.5 TABLET, FILM COATED ORAL at 05:27

## 2018-09-22 RX ADMIN — AMIODARONE HYDROCHLORIDE 200 MILLIGRAM(S): 400 TABLET ORAL at 05:27

## 2018-09-22 RX ADMIN — Medication 40 MILLIGRAM(S): at 05:27

## 2018-09-22 RX ADMIN — CALCITRIOL 0.5 MICROGRAM(S): 0.5 CAPSULE ORAL at 11:19

## 2018-09-22 RX ADMIN — FAMOTIDINE 40 MILLIGRAM(S): 10 INJECTION INTRAVENOUS at 11:19

## 2018-09-22 RX ADMIN — APIXABAN 2.5 MILLIGRAM(S): 2.5 TABLET, FILM COATED ORAL at 18:19

## 2018-09-22 RX ADMIN — PREGABALIN 1000 MICROGRAM(S): 225 CAPSULE ORAL at 11:20

## 2018-09-22 RX ADMIN — ISOSORBIDE MONONITRATE 30 MILLIGRAM(S): 60 TABLET, EXTENDED RELEASE ORAL at 11:19

## 2018-09-22 NOTE — CONSULT NOTE ADULT - ASSESSMENT
AFIB, tachycardia, failed cardioversion after 2 months  mild pulmonary congestion due to tachy arrhythmia, underlying EF is about 50-55%  still HR is not well controlled  HTN: stable      d/c Nifedipine, start Diltiazem 30 q 6h, continue with Lasix iv 40 mg daily,   later on will stop Amiodarone and will switch to safer medicine  I spoke to her again about rate control therapy vs rhythm control by doing EPS and ablation, absolutely refused ablation

## 2018-09-22 NOTE — CONSULT NOTE ADULT - SUBJECTIVE AND OBJECTIVE BOX
Patient is a 73y old  Female who presents with a chief complaint of sob (21 Sep 2018 22:13)  patient admitted in afib, tachycardia, sob, significant panic attack (has a known hx of panic attack), xray revealed a mild congestion and small pleural effusion, she has been generally stable since admission but HR still is not well controlled  hx of few JOÃO cardioversion, she already was aware of limited  chance of success, but absolutely refused ablation    HPI:  73 y f with pmh of afib on eliquis s/p cardioversion, cad s/p cabg, ckd, anxiety, gerd, gout p/w sob for the past 1 week. As per patient, she has been c/o sob which is worse with exertion and orthopnea along with occasional palpitations. she denies any fever, n/v, cp, leg swelling, weight gain or loss. she was also anxious during examination. she saw cardiologist and pmd in 1 week, where her examination was normal. Today she was found to be sob and called her daughter , who suggested her to go to ed. In ed, she was found to be in afib with rvr which was controlled after cardizem push and oral metoprolol. (21 Sep 2018 22:13)      PAST MEDICAL & SURGICAL HISTORY:  Atrial fibrillation  CKD (chronic kidney disease)  MI (myocardial infarction)  CAD (coronary artery disease)  Kidney stone  HTN (hypertension)  H/O abdominal hysterectomy  H/O cardiac radiofrequency ablation  History of open heart surgery      PREVIOUS DIAGNOSTIC TESTING:      ECHO  FINDINGS: < from: JOÃO w/Probe Placement (07.12.18 @ 09:18) >  1. Left ventricular ejection fraction, by visual estimation, is 50 to   55%.   2. Thickening of the anterior and posterior mitral valve leaflets.   3. Mild tricuspid regurgitation.   4. Extensive ascending and descending aortic atheroma.  5. Atrial fibrilation, no thrombus,.   6. Recommend proceed with cardioversion.   7. There is mild aortic root calcification.    < end of copied text >      STRESS TEST  FINDINGS: normal out patient nuclear stress test      MEDICATIONS  (STANDING):  allopurinol 100 milliGRAM(s) Oral daily  amiodarone    Tablet 200 milliGRAM(s) Oral daily  apixaban 2.5 milliGRAM(s) Oral every 12 hours  calcitriol   Capsule 0.5 MICROGram(s) Oral daily  cyanocobalamin 1000 MICROGram(s) Oral daily  diltiazem    Tablet 30 milliGRAM(s) Oral four times a day  famotidine    Tablet 40 milliGRAM(s) Oral daily  furosemide   Injectable 40 milliGRAM(s) IV Push daily  isosorbide   mononitrate ER Tablet (IMDUR) 30 milliGRAM(s) Oral daily    MEDICATIONS  (PRN):  ALPRAZolam 0.25 milliGRAM(s) Oral two times a day PRN anxiety      FAMILY HISTORY:  No pertinent family history in first degree relatives      SOCIAL HISTORY:  CIGARETTES: ex  ALCOHOL: No  DRUGS: No                      REVIEW OF SYSTEMS:  CONSTITUTIONAL: No distress, generally stable  NECK: No pain  RESPIRATORY: No cough, No wheezing, but initially shortness of breath  CARDIOVASCULAR: No chest pain, but palpitations, no leg swelling  GASTROINTESTINAL: No abdominal or epigastric pain. No nausea, vomiting, or hematemesis;  No melena.  NEUROLOGICAL: No dizziness, headaches, memory loss, loss of strength  SKIN: No itching, burning, rashes, or lesions   ENDOCRINE: No heat or cold intolerance  MUSCULOSKELETAL: No joint pain, No  swelling; No muscle pain  PSYCHIATRIC: depression & anxiety,   ALLERGY: No hives, itching, rash          Vital Signs Last 24 Hrs  T(C): 35.8 (22 Sep 2018 06:38), Max: 36.4 (21 Sep 2018 22:35)  T(F): 96.4 (22 Sep 2018 06:38), Max: 97.5 (21 Sep 2018 22:35)  HR: 145 (22 Sep 2018 06:38) (72 - 158)  BP: 116/67 (22 Sep 2018 06:38) (116/67 - 207/81)  BP(mean): --  RR: 18 (22 Sep 2018 06:38) (18 - 19)  SpO2: 93% (22 Sep 2018 08:13) (93% - 97%)                      PHYSICAL EXAM:  GENERAL: No distress, well developed  HEAD:  Atraumatic, Normocephalic  NECK: Supple, No JVD, No Bruit  NERVOUS SYSTEM:  Alert, Awake, Oriented to time, place, person; Normal memory and speech; Normal motor Strength 5/5 B/L upper and lower extremities  CHEST/LUNG: Normal air entry to lung base bilaterally; No wheeze, no crackle  HEART: Irregular heart beat, S1, A2, P2, No S3  ABDOMEN: Soft, Non tender, Non distended; Bowel sounds present  EXTREMITIES:  2+ Peripheral Pulses, No clubbing, No edema  SKIN: No rashes or lesions    TELEMETRY: afib, HR atb     ECG: < from: 12 Lead ECG (09.21.18 @ 18:00) >  Atrial fibrillation with rapid ventricular response  Cannot rule out Anterior infarct , age undetermined  ST & T wave abnormality, consider inferolateral ischemia    < end of copied text >      I&O's Detail      LABS:                        10.0   7.71  )-----------( 267      ( 22 Sep 2018 09:16 )             32.8     09-22    141  |  102  |  67<HH>  ----------------------------<  154<H>  4.5   |  21  |  3.3<H>    Ca    10.3<H>      22 Sep 2018 09:16  Mg     2.0     09-22    TPro  8.6<H>  /  Alb  4.5  /  TBili  0.4  /  DBili  x   /  AST  33  /  ALT  24  /  AlkPhos  71  09-21    CARDIAC MARKERS ( 22 Sep 2018 02:20 )  x     / 0.01 ng/mL / 29 U/L / x     / 1.9 ng/mL  CARDIAC MARKERS ( 21 Sep 2018 10:09 )  x     / <0.01 ng/mL / x     / x     / x          PT/INR - ( 21 Sep 2018 10:09 )   PT: 13.70 sec;   INR: 1.27 ratio         PTT - ( 21 Sep 2018 10:09 )  PTT:41.3 sec    I&O's Summary      RADIOLOGY & ADDITIONAL STUDIES: < from: Xray Chest 1 View AP/PA (09.21.18 @ 18:46) >  Small effusions with adjacent opacities    < end of copied text >

## 2018-09-22 NOTE — PROGRESS NOTE ADULT - SUBJECTIVE AND OBJECTIVE BOX
NOAH QUIROS 72yo W Female with know n cardiac Hx brought to ER by daughter for c/o increasing SOB and palpitations over the past week, worsening in intesity with exertion x 24 hrs PTA.  Of note is the fact that the pt had a visit with her cardiologist about a week ago.  The pt was ad with SOB due to afib c RVR of 153/min and exacerbation of CHF with elevated BNP of 9779.  The pt is admitted to telemetry for further tx and cardiac medication adjustment. The PMHx includes:  HTN, ASHD, CAD, old MI, afib, CHF ( EF 45-50% on ECHO 7/18), sp cardioversion, Pul HTN, CKD, renal stone, anxiety, GERD, diverticulosis, OA, DDD, DJD, gout, Psurgical Hx includes hysterectomy, CABG.      INTERVAL HPI/OVERNIGHT EVENTS:  pt on tele, was evaluated by cardio, rate is still not controlled but better and pt feels better    MEDICATIONS  (STANDING):  allopurinol 100 milliGRAM(s) Oral daily  amiodarone    Tablet 200 milliGRAM(s) Oral daily  apixaban 2.5 milliGRAM(s) Oral every 12 hours  calcitriol   Capsule 0.5 MICROGram(s) Oral daily  cyanocobalamin 1000 MICROGram(s) Oral daily  diltiazem    Tablet 30 milliGRAM(s) Oral four times a day  famotidine    Tablet 40 milliGRAM(s) Oral daily  furosemide   Injectable 40 milliGRAM(s) IV Push daily  isosorbide   mononitrate ER Tablet (IMDUR) 30 milliGRAM(s) Oral daily    MEDICATIONS  (PRN):  ALPRAZolam 0.25 milliGRAM(s) Oral two times a day PRN anxiety      Allergies    No Known Allergies    Vital Signs Last 24 Hrs  T(C): 36.2 (22 Sep 2018 12:58), Max: 36.4 (21 Sep 2018 22:35)  T(F): 97.1 (22 Sep 2018 12:58), Max: 97.5 (21 Sep 2018 22:35)  HR: 132 (22 Sep 2018 12:58) (72 - 158)  BP: 134/77 (22 Sep 2018 12:58) (116/67 - 207/81)  BP(mean): --  RR: 18 (22 Sep 2018 12:58) (18 - 19)  SpO2: 93% (22 Sep 2018 08:13) (93% - 97%)    PHYSICAL EXAM:      Constitutional:  pt is alert and oriented X 3, anxious but in nAD    Eyes:  normal    ENMT:  normal    Neck:  supple, no bruits, + JVD    Respiratory:  BL resp excursions are equal, dec bibasilar BS    Cardiovascular:  S1S2 irreg, tachy, old sternal scar    Gastrointestinal:  globular soft and benign    Extremities:  moves all ext, + arthritic changes    Skin:  no rash    Lymph Nodes:  not enlarged          LABS:                        10.0   7.71  )-----------( 267      ( 22 Sep 2018 09:16 )             32.8     09-22    141  |  102  |  67<HH>  ----------------------------<  154<H>  4.5   |  21  |  3.3<H>  GFR 12, 13  Ca    10.3<H>      22 Sep 2018 09:16  Mg     2.0     09-22  CK:  29  CKMB:  1.9  trop:  0.01  BNP:  9779  TPro  8.6<H>  /  Alb  4.5  /  TBili  0.4  /  DBili  x   /  AST  33  /  ALT  24  /  AlkPhos  71  09-21    PT/INR - ( 21 Sep 2018 10:09 )   PT: 13.70 sec;   INR: 1.27 ratio         PTT - ( 21 Sep 2018 10:09 )  PTT:41.3 sec      RADIOLOGY & ADDITIONAL TESTS:    CXR:  + post CABG changes, Bibasilar small pleural effussions    EKG:  afib /min, PRWP, ST-T changes    ECHO 7/18:  mildly dec global ejection fraction of 45-50%,

## 2018-09-22 NOTE — PROGRESS NOTE ADULT - ASSESSMENT
SOB, palpitations and orthopnea due to afib with rapid ventricular response and acute on chronic CHF  Hx of HTN, ASHD, CAD, old MI, sp CABG, afib, sp cardioversion, CHF with EF mildly decreased 45-50%, Pulmonary HTN  Hxf severe anxiety  Hx of CKD, stage III  Hx of anemia of chronic renal disease  Hx of OA, DDD, DJD, gout  Hx of GERD, diverticulosis    pt admitted to tele for tx and observation  appreciate cardio consult  pt 's cardiac meds being adjusted:  nifedipine being changed to diltiazem, Lasix IV  monitor CE, renal parameters, electrolytes and BNP, check TSH  monitor intake and output  emotional support rendered

## 2018-09-23 ENCOUNTER — TRANSCRIPTION ENCOUNTER (OUTPATIENT)
Age: 73
End: 2018-09-23

## 2018-09-23 VITALS — WEIGHT: 171.96 LBS

## 2018-09-23 RX ORDER — METOPROLOL TARTRATE 50 MG
1 TABLET ORAL
Qty: 0 | Refills: 0 | COMMUNITY
Start: 2018-09-23

## 2018-09-23 RX ORDER — DILTIAZEM HCL 120 MG
1 CAPSULE, EXT RELEASE 24 HR ORAL
Qty: 14 | Refills: 0 | OUTPATIENT
Start: 2018-09-23 | End: 2018-10-06

## 2018-09-23 RX ORDER — DILTIAZEM HCL 120 MG
180 CAPSULE, EXT RELEASE 24 HR ORAL DAILY
Qty: 0 | Refills: 0 | Status: DISCONTINUED | OUTPATIENT
Start: 2018-09-23 | End: 2018-09-23

## 2018-09-23 RX ORDER — FUROSEMIDE 40 MG
1 TABLET ORAL
Qty: 14 | Refills: 0 | OUTPATIENT
Start: 2018-09-23 | End: 2018-10-06

## 2018-09-23 RX ORDER — NIFEDIPINE 30 MG
1 TABLET, EXTENDED RELEASE 24 HR ORAL
Qty: 0 | Refills: 0 | COMMUNITY

## 2018-09-23 RX ORDER — FUROSEMIDE 40 MG
20 TABLET ORAL DAILY
Qty: 0 | Refills: 0 | Status: DISCONTINUED | OUTPATIENT
Start: 2018-09-23 | End: 2018-09-23

## 2018-09-23 RX ADMIN — FAMOTIDINE 40 MILLIGRAM(S): 10 INJECTION INTRAVENOUS at 12:05

## 2018-09-23 RX ADMIN — Medication 180 MILLIGRAM(S): at 12:02

## 2018-09-23 RX ADMIN — CALCITRIOL 0.5 MICROGRAM(S): 0.5 CAPSULE ORAL at 12:02

## 2018-09-23 RX ADMIN — Medication 40 MILLIGRAM(S): at 06:15

## 2018-09-23 RX ADMIN — ISOSORBIDE MONONITRATE 30 MILLIGRAM(S): 60 TABLET, EXTENDED RELEASE ORAL at 12:03

## 2018-09-23 RX ADMIN — PREGABALIN 1000 MICROGRAM(S): 225 CAPSULE ORAL at 12:04

## 2018-09-23 RX ADMIN — Medication 100 MILLIGRAM(S): at 12:03

## 2018-09-23 RX ADMIN — AMIODARONE HYDROCHLORIDE 200 MILLIGRAM(S): 400 TABLET ORAL at 06:14

## 2018-09-23 RX ADMIN — APIXABAN 2.5 MILLIGRAM(S): 2.5 TABLET, FILM COATED ORAL at 06:14

## 2018-09-23 RX ADMIN — Medication 20 MILLIGRAM(S): at 12:05

## 2018-09-23 NOTE — PROGRESS NOTE ADULT - SUBJECTIVE AND OBJECTIVE BOX
Subjective:  feels well, no more sob, ambulatin well, during the walk HR is 80-90 and at rest is 70-75/min, no orthopnea    MEDICATIONS  (STANDING):  allopurinol 100 milliGRAM(s) Oral daily  amiodarone    Tablet 200 milliGRAM(s) Oral daily  apixaban 2.5 milliGRAM(s) Oral every 12 hours  calcitriol   Capsule 0.5 MICROGram(s) Oral daily  cyanocobalamin 1000 MICROGram(s) Oral daily  diltiazem    milliGRAM(s) Oral daily  famotidine    Tablet 40 milliGRAM(s) Oral daily  furosemide    Tablet 20 milliGRAM(s) Oral daily  isosorbide   mononitrate ER Tablet (IMDUR) 30 milliGRAM(s) Oral daily    MEDICATIONS  (PRN):  ALPRAZolam 0.25 milliGRAM(s) Oral two times a day PRN anxiety            Vital Signs Last 24 Hrs  T(C): 36.1 (23 Sep 2018 06:25), Max: 36.7 (22 Sep 2018 20:58)  T(F): 97 (23 Sep 2018 06:25), Max: 98 (22 Sep 2018 20:58)  HR: 104 (23 Sep 2018 06:25) (86 - 132)  BP: 123/68 (23 Sep 2018 06:25) (122/75 - 159/74)  BP(mean): --  RR: 18 (23 Sep 2018 06:25) (18 - 18)  SpO2: --             REVIEW OF SYSTEMS:  CONSTITUTIONAL: no fever, no chills, no diaphoresis  CARDIOLOGY: no chest pain, no SOB, no palpitation, no diaphoresis, no faint   RESPIRATORY: no dyspnea, no wheeze, no orthopnea, no PND   NEUROLOGICAL: no dizziness, headache, focal deficits to report.  GI: no abdominal pain, no dyspepsia, no nausea, no vomiting, no diarrhea.    HEENT: no congestion, no nasal bleeding  SKIN: no ecchymosis, no petechia              PHYSICAL EXAM:  · CONSTITUTIONAL: Looks stable, in no respiratory distress  . NECK: Supple, no JVD  · RESPIRATORY: Normal air entry to lung base, no wheeze, no crackle, no wet rales  · CARDIOVASCULAR: Normal S1, A2, P2, no murmur  . EXTREMITIES: no edema  · VASCULAR: Pulses are regular, equal, bilateral in upper and lower extremities  	  TELEMETRY: afib, rate controlled    ECG: Afib. no new ECG, initially tachycardia    LABS:                        10.0   7.71  )-----------( 267      ( 22 Sep 2018 09:16 )             32.8     09-22    141  |  102  |  67<HH>  ----------------------------<  154<H>  4.5   |  21  |  3.3<H>    Ca    10.3<H>      22 Sep 2018 09:16  Mg     2.0     09-22      CARDIAC MARKERS ( 22 Sep 2018 02:20 )  x     / 0.01 ng/mL / 29 U/L / x     / 1.9 ng/mL          I&O's Summary    22 Sep 2018 07:01  -  23 Sep 2018 07:00  --------------------------------------------------------  IN: 0 mL / OUT: 100 mL / NET: -100 mL      BNP  RADIOLOGY & ADDITIONAL STUDIES:    IMPRESSION AND PLAN:

## 2018-09-23 NOTE — DISCHARGE NOTE ADULT - FUNCTIONAL SCREEN CURRENT LEVEL: DRESSING, MLM
[facility]  Pulmonology  Progress Note    Patient Name: Jossie Crowley  MRN: [id number]  Admission Date: 12/1/2017  Hospital Length of Stay: 7 days  Code Status: Full Code  Attending Provider: Andra Cifuentes MD  Primary Care Provider: Jordyn Owen MD   Principal Problem: ARDS (adult respiratory distress syndrome)    Subjective:     Interval History: no acute issue.  Switched back to bipap after 30 minutes of HFNC.      Objective:     Vital Signs (Most Recent):  Temp: 96.4 °F (35.8 °C) (12/08/17 0300)  Pulse: 84 (12/08/17 0809)  Resp: (!) 49 (12/08/17 0809)  BP: (!) 163/78 (12/08/17 0805)  SpO2: 97 % (12/08/17 0809) Vital Signs (24h Range):  Temp:  [96.4 °F (35.8 °C)-97.5 °F (36.4 °C)] 96.4 °F (35.8 °C)  Pulse:  [] 84  Resp:  [17-49] 49  SpO2:  [93 %-100 %] 97 %  BP: (128-180)/(63-86) 163/78     Weight: 63.7 kg (140 lb 6.9 oz)  Body mass index is 21.99 kg/m².      Intake/Output Summary (Last 24 hours) at 12/08/17 0905  Last data filed at 12/08/17 0700   Gross per 24 hour   Intake          1431.25 ml   Output             1120 ml   Net           311.25 ml       Physical Exam   Constitutional: She is oriented to person, place, and time. She appears well-developed. No distress.   HENT:   Head: Normocephalic.   Nose: Nose normal.   Eyes: Conjunctivae and EOM are normal. Pupils are equal, round, and reactive to light.   Neck: Normal range of motion. Neck supple.   Cardiovascular: Normal rate, regular rhythm and normal heart sounds.    Pulmonary/Chest: Effort normal and breath sounds normal. No respiratory distress. She has no wheezes.   Abdominal: Soft. Bowel sounds are normal. She exhibits no distension.   Musculoskeletal: Normal range of motion. She exhibits no edema.   Neurological: She is alert and oriented to person, place, and time.   Skin: Skin is warm and dry.   Psychiatric: She has a normal mood and affect.       Vents:  Oxygen Concentration (%): 60 (12/08/17  0808)    Lines/Drains/Airways     Drain                 Urethral Catheter 12/02/17 1545 Straight-tip 16 Fr. 5 days          Peripheral Intravenous Line                 Peripheral IV - Single Lumen 12/07/17 0446 Left Forearm 1 day         Peripheral IV - Single Lumen 12/07/17 0447 Left Upper Arm 1 day                Significant Labs:    CBC/Anemia Profile:    Recent Labs  Lab 12/07/17  0200 12/08/17  0241   WBC 2.32* 3.85*   HGB 11.6* 10.6*   HCT 33.8* 31.2*    186   MCV 84 83   RDW 16.0* 15.9*        Chemistries:    Recent Labs  Lab 12/07/17  0200 12/08/17  0241   *  149* 149*   K 3.7  3.7 3.6     108 109   CO2 32*  32* 30*   BUN 27*  27* 25*   CREATININE 1.0  1.0 0.9   CALCIUM 9.0  9.0 8.5*   ALBUMIN 2.3*  --    PROT 6.3  --    BILITOT 0.3  --    ALKPHOS 97  --    ALT 75*  --    AST 80*  --    MG 2.4 2.3       ABGs:   Recent Labs  Lab 12/07/17  0900   PH 7.422   PCO2 54.7*   HCO3 35.7*   POCSATURATED 97   BE 10       Significant Imaging:  none    Assessment/Plan:     * ARDS (adult respiratory distress syndrome)    Per chart reviewed, patient with recurring hypoxic respiratory failure with bilateral infiltrate in 2015 and 2013.  Etiology could be infectious (+influenza) vs non-infectious (i.e.  or hp) vs malignancy (h/o breast cancer).  Currently on broad spectrum antibiotic + antivirals.  Continue with steroid + antibiotic + antiviral.  Off depakote without issue.  Will switch to high flow.  Wean fio2 if sat is above 90%.  Await swallow evaluation.                 Selwyn Springer MD  Pulmonology  [facility]     (0) independent

## 2018-09-23 NOTE — DISCHARGE NOTE ADULT - MEDICATION SUMMARY - MEDICATIONS TO TAKE
I will START or STAY ON the medications listed below when I get home from the hospital:    Imdur 30 mg oral tablet, extended release  -- 1 tab(s) by mouth once a day (in the morning)  -- Indication: For heart failure    amiodarone 200 mg oral tablet  -- 1 tab(s) by mouth once a day  -- Indication: For Antiarrythmia    Cardizem  mg/24 hours oral capsule, extended release  -- 1 cap(s) by mouth once a day  -- Indication: For tachycardia    apixaban 2.5 mg oral tablet  -- 1 tab(s) by mouth every 12 hours  -- Indication: For Anticoagulation    allopurinol 100 mg oral tablet  -- 1 tab(s) by mouth once a day  -- Indication: For gout    ALPRAZolam 0.25 mg oral tablet  -- 1 tab(s) by mouth 2 times a day, As Needed -anxiety MDD:0.50 mg  -- Indication: For Anxiolytic    metoprolol tartrate 50 mg oral tablet  -- 1 tab(s) by mouth 2 times a day  -- Indication: For tachycardi    Kayexalate  -- 1 dose(s) by mouth 3 times a week  -- Indication: For hyperkalemia    furosemide 20 mg oral tablet  -- 1 tab(s) by mouth once a day  -- Indication: For volume overload    famotidine 40 mg oral tablet  -- 1 tab(s) by mouth once a day (at bedtime)  -- Indication: For gi treatment    calcitriol 0.5 mcg oral capsule  -- 1 cap(s) by mouth once a day  -- Indication: For vitamin deficiency    Vitamin B-12 1000 mcg oral tablet  -- 1 tab(s) by mouth once a day  -- Indication: For vitamin deficiency

## 2018-09-23 NOTE — DISCHARGE NOTE ADULT - CARE PLAN
Principal Discharge DX:	Atrial fibrillation  Goal:	rate control  Assessment and plan of treatment:	switched nifedipine to dialtazem, continue metoprolol , heart rate controlled  Secondary Diagnosis:	CHF exacerbation  Assessment and plan of treatment:	lasix 20 mg , low salt diet  Secondary Diagnosis:	HTN (hypertension)  Assessment and plan of treatment:	medical treatment

## 2018-09-23 NOTE — DISCHARGE NOTE ADULT - PATIENT PORTAL LINK FT
You can access the [facility], offered by [facility], by registering with the following website: [email or web address]

## 2018-09-23 NOTE — DISCHARGE NOTE ADULT - HOSPITAL COURSE
73 y f with pmh of afib on eliquis s/p cardioversion, cad s/p cabg, ckd, anxiety, gerd, gout p/w sob was found to be in afib with rvr she had SOB, palpitations and orthopnea due to afib with rapid ventricular response and acute on chronic CHF and Pulmonary Edema    Hx of HTN, ASHD, CAD, old MI, sp CABG, afib, sp cardioversion, CHF with EF mildly decreased 45-50%, Pulmonary HTN  Hxf severe anxiety  Hx of CKD, stage III  Hx of anemia of chronic renal disease  Hx of OA, DDD, DJD, gout  Hx of GERD, diverticulosis    pt admitted to [facility] for tx and observation  appreciate cardio consult and cleared for discharge   pt 's cardiac meds being adjusted:  nifedipine being changed to diltiazem, Lasix IV to po   patient is clinically stable being discharged with cardio follow up in 2 weeks

## 2018-09-23 NOTE — PROGRESS NOTE ADULT - ASSESSMENT
SOB, palpitations and orthopnea due to afib with rapid ventricular response and acute on chronic CHF and Pulmonary Edema  Hx of HTN, ASHD, CAD, old MI, sp CABG, afib, sp cardioversion, CHF with EF mildly decreased 45-50%, Pulmonary HTN  Hxf severe anxiety  Hx of CKD, stage III  Hx of anemia of chronic renal disease  Hx of OA, DDD, DJD, gout  Hx of GERD, diverticulosis    pt admitted to tele for tx and observation  appreciate cardio consult  pt 's cardiac meds being adjusted:  nifedipine being changed to diltiazem, Lasix IV to po  monitor CE, renal parameters, electrolytes and BNP, check TS  emotional support rendered  pt medically stable  pt is cleared by Cardio for D/C today

## 2018-09-23 NOTE — PROGRESS NOTE ADULT - SUBJECTIVE AND OBJECTIVE BOX
NOAH QUIROS 74yo W Female with know n cardiac Hx brought to ER by daughter for c/o increasing SOB and palpitations over the past week, worsening in intesity with exertion x 24 hrs PTA.  Of note is the fact that the pt had a visit with her cardiologist about a week ago.  The pt was ad with SOB due to afib c RVR of 153/min and exacerbation of CHF with elevated BNP of 9779.  The pt is admitted to telemetry for further tx and cardiac medication adjustment. The PMHx includes:  HTN, ASHD, CAD, old MI, afib, CHF ( EF 45-50% on ECHO 7/18), sp cardioversion, Pul HTN, CKD, renal stone, anxiety, GERD, diverticulosis, OA, DDD, DJD, gout, Psurgical Hx includes hysterectomy, CABG.      INTERVAL HPI/OVERNIGHT EVENTS:  pt evaluated and cleared the pt for D/C on Cardizem  mg and to D/C nifedipien, asper Cardio Dr Subramanian he is aware of the interaction with Amiodarone, Lasix to 20 mg, pt medically stable for D/C today    MEDICATIONS  (STANDING):  allopurinol 100 milliGRAM(s) Oral daily  amiodarone    Tablet 200 milliGRAM(s) Oral daily  apixaban 2.5 milliGRAM(s) Oral every 12 hours  calcitriol   Capsule 0.5 MICROGram(s) Oral daily  cyanocobalamin 1000 MICROGram(s) Oral daily  diltiazem    Tablet 30 milliGRAM(s) Oral four times a day  famotidine    Tablet 40 milliGRAM(s) Oral daily  furosemide   Injectable 40 milliGRAM(s) po q 24  isosorbide   mononitrate ER Tablet (IMDUR) 30 milliGRAM(s) Oral daily  Cardizem  mg po q 24  MEDICATIONS  (PRN):  ALPRAZolam 0.25 milliGRAM(s) Oral two times a day PRN anxiety      Allergies    No Known Allergies    Vital Signs Last 24 Hrs    T(F): 96  HR:   BP: 107/54  RR: 18      PHYSICAL EXAM:      Constitutional:  pt is alert and oriented X 3, anxious but in NAD    Eyes:  normal    ENMT:  normal    Neck:  supple, no bruits, + JVD    Respiratory:  BL resp excursions are equal, dec bibasilar BS    Cardiovascular:  S1S2 irreg, tachy, old sternal scar    Gastrointestinal:  globular soft and benign    Extremities:  moves all ext, + arthritic changes    Skin:  no rash    Lymph Nodes:  not enlarged          LABS:                        10.0   7.71  )-----------( 267      ( 22 Sep 2018 09:16 )             32.8     09-22    141  |  102  |  67<HH>  ----------------------------<  154<H>  4.5   |  21  |  3.3<H>  GFR 12, 13  Ca    10.3<H>      22 Sep 2018 09:16  Mg     2.0     09-22  CK:  29  CKMB:  1.9  trop:  0.01  BNP:  9779  TPro  8.6<H>  /  Alb  4.5  /  TBili  0.4  /  DBili  x   /  AST  33  /  ALT  24  /  AlkPhos  71  09-21    PT/INR - ( 21 Sep 2018 10:09 )   PT: 13.70 sec;   INR: 1.27 ratio         PTT - ( 21 Sep 2018 10:09 )  PTT:41.3 sec      RADIOLOGY & ADDITIONAL TESTS:    CXR:  + post CABG changes, Bibasilar small pleural effussions    EKG:  afib /min, PRWP, ST-T changes    ECHO 7/18:  mildly dec global ejection fraction of 45-50%,

## 2018-09-23 NOTE — PROGRESS NOTE ADULT - SUBJECTIVE AND OBJECTIVE BOX
Patient reports feeling denies chest pain or SOB, No leg swelling or dyspnea on lying supine. Denies any other complaints.      PHYSICAL EXAM:T(C): 36.7 (09-22-18 @ 20:58), Max: 36.7 (09-22-18 @ 20:58)  HR: 86 (09-23-18 @ 00:05) (86 - 145)  BP: 122/75 (09-23-18 @ 00:05) (116/67 - 159/74)  RR: 18 (09-22-18 @ 20:58) (18 - 18)  SpO2: 93% (09-22-18 @ 08:13) (93% - 93%)  GENERAL: NAD, well-developed  HEAD:  Atraumatic, Normocephalic  EYES: EOMI, PERRLA, conjunctiva and sclera clear  NECK: Supple, No JVD  CHEST/LUNG: Clear to auscultation bilaterally; No wheeze  HEART: Regular rate and rhythm; No murmurs, rubs, or gallops  ABDOMEN: Soft, Nontender, Nondistended; Bowel sounds present  EXTREMITIES:  2+ Peripheral Pulses, No clubbing, cyanosis, or edema  PSYCH: AAOx3  NEUROLOGY: non-focal  SKIN: No rashes or lesions                            10.0   7.71  )-----------( 267      ( 22 Sep 2018 09:16 )             32.8       09-22    141  |  102  |  67<HH>  ----------------------------<  154<H>  4.5   |  21  |  3.3<H>    Ca    10.3<H>      22 Sep 2018 09:16  Mg     2.0     09-22    TPro  8.6<H>  /  Alb  4.5  /  TBili  0.4  /  DBili  x   /  AST  33  /  ALT  24  /  AlkPhos  71  09-21

## 2018-09-23 NOTE — PROGRESS NOTE ADULT - ASSESSMENT
acute pulmonary edema due to afib tachycardia, resolved  A.Fib, rate well controlled  HTN: stable  Advanced renal failure    patient is stable to be d/c home today  d/c iv lasix, Nifedipine is already d/c ed  switch cardizem to cardizem cd 180 mg daily, i am aware of interaction with Amiodarone  keep on just 20 mg daily lasix po  continue with the rest of the current medicine  f/u with me at  in 2 weeks

## 2018-09-23 NOTE — PROGRESS NOTE ADULT - ASSESSMENT
73 y f with pmh of afib on eliquis s/p cardioversion, cad s/p cabg, ckd, anxiety, gerd, gout p/w sob was found to be in afib with rvr    1) afib with rvr s/p cardizem improved  chadsvasc- 3  c/w metoprolol and amiodarone  c/w eliquis adjusted renally  As per Cardio, Nifedipine d/c, started on Cardizem 30mg q6      2) b/l pleural effusion and sob with high bnp, clear lungs on examination could be 2/2 afib with rvr, less likely chf vs anxiety  repeat cxr in am  Lasix 40mg IV daily  monitor i&o and consider changing it to oral   f/u JOÃO results  c/w ativan    3) gout- c/w allopurinol    4) gerd- c/w pepcid    5) ckd -stable   monitor bmp    6)dvt ppx  diet- dash  dispo- home

## 2018-09-23 NOTE — DISCHARGE NOTE ADULT - PLAN OF CARE
rate control switched nifedipine to dialtazem, continue metoprolol , heart rate controlled lasix 20 mg , low salt diet medical treatment

## 2018-09-23 NOTE — DISCHARGE NOTE ADULT - CARE PROVIDER_API CALL
Bipin Child), Cardiology; Internal Medicine; Nuclear Cardiology  [address] [place] [address]  Phone: [phone]  Fax: [phone]    Garett Naylor), Internal Medicine  [address] [place] [address]  Phone: [phone]  Fax: [phone]

## 2018-09-26 DIAGNOSIS — Z95.1 PRESENCE OF AORTOCORONARY BYPASS GRAFT: ICD-10-CM

## 2018-09-26 DIAGNOSIS — Z79.82 LONG TERM (CURRENT) USE OF ASPIRIN: ICD-10-CM

## 2018-09-26 DIAGNOSIS — R09.02 HYPOXEMIA: ICD-10-CM

## 2018-09-26 DIAGNOSIS — Z53.29 PROCEDURE AND TREATMENT NOT CARRIED OUT BECAUSE OF PATIENT'S DECISION FOR OTHER REASONS: ICD-10-CM

## 2018-09-26 DIAGNOSIS — Z98.890 OTHER SPECIFIED POSTPROCEDURAL STATES: ICD-10-CM

## 2018-09-26 DIAGNOSIS — I25.2 OLD MYOCARDIAL INFARCTION: ICD-10-CM

## 2018-09-26 DIAGNOSIS — K21.9 GASTRO-ESOPHAGEAL REFLUX DISEASE WITHOUT ESOPHAGITIS: ICD-10-CM

## 2018-09-26 DIAGNOSIS — Z79.01 LONG TERM (CURRENT) USE OF ANTICOAGULANTS: ICD-10-CM

## 2018-09-26 DIAGNOSIS — M19.90 UNSPECIFIED OSTEOARTHRITIS, UNSPECIFIED SITE: ICD-10-CM

## 2018-09-26 DIAGNOSIS — I27.20 PULMONARY HYPERTENSION, UNSPECIFIED: ICD-10-CM

## 2018-09-26 DIAGNOSIS — N18.3 CHRONIC KIDNEY DISEASE, STAGE 3 (MODERATE): ICD-10-CM

## 2018-09-26 DIAGNOSIS — I13.0 HYPERTENSIVE HEART AND CHRONIC KIDNEY DISEASE WITH HEART FAILURE AND STAGE 1 THROUGH STAGE 4 CHRONIC KIDNEY DISEASE, OR UNSPECIFIED CHRONIC KIDNEY DISEASE: ICD-10-CM

## 2018-09-26 DIAGNOSIS — F41.0 PANIC DISORDER [EPISODIC PAROXYSMAL ANXIETY]: ICD-10-CM

## 2018-09-26 DIAGNOSIS — D63.1 ANEMIA IN CHRONIC KIDNEY DISEASE: ICD-10-CM

## 2018-09-26 DIAGNOSIS — I25.10 ATHEROSCLEROTIC HEART DISEASE OF NATIVE CORONARY ARTERY WITHOUT ANGINA PECTORIS: ICD-10-CM

## 2018-09-26 DIAGNOSIS — I48.91 UNSPECIFIED ATRIAL FIBRILLATION: ICD-10-CM

## 2018-09-26 DIAGNOSIS — M10.9 GOUT, UNSPECIFIED: ICD-10-CM

## 2018-09-26 DIAGNOSIS — I50.23 ACUTE ON CHRONIC SYSTOLIC (CONGESTIVE) HEART FAILURE: ICD-10-CM

## 2018-09-26 DIAGNOSIS — R06.02 SHORTNESS OF BREATH: ICD-10-CM

## 2018-10-22 ENCOUNTER — INPATIENT (INPATIENT)
Facility: HOSPITAL | Age: 73
LOS: 1 days | Discharge: HOME | End: 2018-10-24
Attending: INTERNAL MEDICINE | Admitting: INTERNAL MEDICINE

## 2018-10-22 VITALS
HEART RATE: 82 BPM | SYSTOLIC BLOOD PRESSURE: 151 MMHG | HEIGHT: 66 IN | DIASTOLIC BLOOD PRESSURE: 82 MMHG | TEMPERATURE: 98 F | OXYGEN SATURATION: 98 % | WEIGHT: 171.96 LBS | RESPIRATION RATE: 18 BRPM

## 2018-10-22 DIAGNOSIS — Z98.890 OTHER SPECIFIED POSTPROCEDURAL STATES: Chronic | ICD-10-CM

## 2018-10-22 DIAGNOSIS — I50.9 HEART FAILURE, UNSPECIFIED: ICD-10-CM

## 2018-10-22 DIAGNOSIS — R07.9 CHEST PAIN, UNSPECIFIED: ICD-10-CM

## 2018-10-22 DIAGNOSIS — Z90.710 ACQUIRED ABSENCE OF BOTH CERVIX AND UTERUS: Chronic | ICD-10-CM

## 2018-10-22 DIAGNOSIS — I10 ESSENTIAL (PRIMARY) HYPERTENSION: ICD-10-CM

## 2018-10-22 DIAGNOSIS — I48.91 UNSPECIFIED ATRIAL FIBRILLATION: ICD-10-CM

## 2018-10-22 LAB
ALBUMIN SERPL ELPH-MCNC: 3.8 G/DL — SIGNIFICANT CHANGE UP (ref 3.5–5.2)
ALP SERPL-CCNC: 54 U/L — SIGNIFICANT CHANGE UP (ref 30–115)
ALT FLD-CCNC: 12 U/L — SIGNIFICANT CHANGE UP (ref 0–41)
ANION GAP SERPL CALC-SCNC: 13 MMOL/L — SIGNIFICANT CHANGE UP (ref 7–14)
APTT BLD: 39 SEC — SIGNIFICANT CHANGE UP (ref 27–39.2)
AST SERPL-CCNC: 18 U/L — SIGNIFICANT CHANGE UP (ref 0–41)
BASOPHILS # BLD AUTO: 0.06 K/UL — SIGNIFICANT CHANGE UP (ref 0–0.2)
BASOPHILS NFR BLD AUTO: 0.8 % — SIGNIFICANT CHANGE UP (ref 0–1)
BILIRUB SERPL-MCNC: 0.5 MG/DL — SIGNIFICANT CHANGE UP (ref 0.2–1.2)
BUN SERPL-MCNC: 60 MG/DL — HIGH (ref 10–20)
CALCIUM SERPL-MCNC: 10.2 MG/DL — HIGH (ref 8.5–10.1)
CHLORIDE SERPL-SCNC: 95 MMOL/L — LOW (ref 98–110)
CK MB CFR SERPL CALC: 1.6 NG/ML — SIGNIFICANT CHANGE UP (ref 0.6–6.3)
CK MB CFR SERPL CALC: 1.8 NG/ML — SIGNIFICANT CHANGE UP (ref 0.6–6.3)
CK SERPL-CCNC: 25 U/L — SIGNIFICANT CHANGE UP (ref 0–225)
CK SERPL-CCNC: 33 U/L — SIGNIFICANT CHANGE UP (ref 0–225)
CO2 SERPL-SCNC: 27 MMOL/L — SIGNIFICANT CHANGE UP (ref 17–32)
CREAT SERPL-MCNC: 3.2 MG/DL — HIGH (ref 0.7–1.5)
EOSINOPHIL # BLD AUTO: 0.15 K/UL — SIGNIFICANT CHANGE UP (ref 0–0.7)
EOSINOPHIL NFR BLD AUTO: 2 % — SIGNIFICANT CHANGE UP (ref 0–8)
FLU A RESULT: NEGATIVE — SIGNIFICANT CHANGE UP
FLU A RESULT: NEGATIVE — SIGNIFICANT CHANGE UP
FLUAV AG NPH QL: NEGATIVE — SIGNIFICANT CHANGE UP
FLUBV AG NPH QL: NEGATIVE — SIGNIFICANT CHANGE UP
GLUCOSE SERPL-MCNC: 136 MG/DL — HIGH (ref 70–99)
HCT VFR BLD CALC: 32.3 % — LOW (ref 37–47)
HGB BLD-MCNC: 9.5 G/DL — LOW (ref 12–16)
IMM GRANULOCYTES NFR BLD AUTO: 0.4 % — HIGH (ref 0.1–0.3)
INR BLD: 1.59 RATIO — HIGH (ref 0.65–1.3)
LYMPHOCYTES # BLD AUTO: 0.6 K/UL — LOW (ref 1.2–3.4)
LYMPHOCYTES # BLD AUTO: 8 % — LOW (ref 20.5–51.1)
MCHC RBC-ENTMCNC: 26 PG — LOW (ref 27–31)
MCHC RBC-ENTMCNC: 29.4 G/DL — LOW (ref 32–37)
MCV RBC AUTO: 88.3 FL — SIGNIFICANT CHANGE UP (ref 81–99)
MONOCYTES # BLD AUTO: 0.84 K/UL — HIGH (ref 0.1–0.6)
MONOCYTES NFR BLD AUTO: 11.2 % — HIGH (ref 1.7–9.3)
NEUTROPHILS # BLD AUTO: 5.79 K/UL — SIGNIFICANT CHANGE UP (ref 1.4–6.5)
NEUTROPHILS NFR BLD AUTO: 77.6 % — HIGH (ref 42.2–75.2)
NRBC # BLD: 0 /100 WBCS — SIGNIFICANT CHANGE UP (ref 0–0)
NT-PROBNP SERPL-SCNC: HIGH PG/ML (ref 0–300)
PLATELET # BLD AUTO: 314 K/UL — SIGNIFICANT CHANGE UP (ref 130–400)
POTASSIUM SERPL-MCNC: 5.1 MMOL/L — HIGH (ref 3.5–5)
POTASSIUM SERPL-SCNC: 5.1 MMOL/L — HIGH (ref 3.5–5)
PROT SERPL-MCNC: 7.6 G/DL — SIGNIFICANT CHANGE UP (ref 6–8)
PROTHROM AB SERPL-ACNC: 18.2 SEC — HIGH (ref 9.95–12.87)
RBC # BLD: 3.66 M/UL — LOW (ref 4.2–5.4)
RBC # FLD: 17.1 % — HIGH (ref 11.5–14.5)
RSV RESULT: NEGATIVE — SIGNIFICANT CHANGE UP
RSV RNA RESP QL NAA+PROBE: NEGATIVE — SIGNIFICANT CHANGE UP
SODIUM SERPL-SCNC: 135 MMOL/L — SIGNIFICANT CHANGE UP (ref 135–146)
TROPONIN T SERPL-MCNC: <0.01 NG/ML — SIGNIFICANT CHANGE UP
TROPONIN T SERPL-MCNC: <0.01 NG/ML — SIGNIFICANT CHANGE UP
WBC # BLD: 7.47 K/UL — SIGNIFICANT CHANGE UP (ref 4.8–10.8)
WBC # FLD AUTO: 7.47 K/UL — SIGNIFICANT CHANGE UP (ref 4.8–10.8)

## 2018-10-22 RX ORDER — ISOSORBIDE MONONITRATE 60 MG/1
30 TABLET, EXTENDED RELEASE ORAL AT BEDTIME
Qty: 0 | Refills: 0 | Status: DISCONTINUED | OUTPATIENT
Start: 2018-10-22 | End: 2018-10-24

## 2018-10-22 RX ORDER — ALLOPURINOL 300 MG
100 TABLET ORAL DAILY
Qty: 0 | Refills: 0 | Status: DISCONTINUED | OUTPATIENT
Start: 2018-10-22 | End: 2018-10-24

## 2018-10-22 RX ORDER — DILTIAZEM HCL 120 MG
180 CAPSULE, EXT RELEASE 24 HR ORAL DAILY
Qty: 0 | Refills: 0 | Status: DISCONTINUED | OUTPATIENT
Start: 2018-10-22 | End: 2018-10-24

## 2018-10-22 RX ORDER — CALCITRIOL 0.5 UG/1
0.5 CAPSULE ORAL DAILY
Qty: 0 | Refills: 0 | Status: DISCONTINUED | OUTPATIENT
Start: 2018-10-22 | End: 2018-10-24

## 2018-10-22 RX ORDER — SODIUM POLYSTYRENE SULFONATE 4.1 MEQ/G
1 POWDER, FOR SUSPENSION ORAL
Qty: 0 | Refills: 0 | COMMUNITY

## 2018-10-22 RX ORDER — ISOSORBIDE MONONITRATE 60 MG/1
1 TABLET, EXTENDED RELEASE ORAL
Qty: 0 | Refills: 0 | COMMUNITY

## 2018-10-22 RX ORDER — APIXABAN 2.5 MG/1
2.5 TABLET, FILM COATED ORAL EVERY 12 HOURS
Qty: 0 | Refills: 0 | Status: DISCONTINUED | OUTPATIENT
Start: 2018-10-22 | End: 2018-10-24

## 2018-10-22 RX ORDER — FAMOTIDINE 10 MG/ML
40 INJECTION INTRAVENOUS
Qty: 0 | Refills: 0 | Status: DISCONTINUED | OUTPATIENT
Start: 2018-10-22 | End: 2018-10-24

## 2018-10-22 RX ORDER — METOPROLOL TARTRATE 50 MG
50 TABLET ORAL
Qty: 0 | Refills: 0 | Status: DISCONTINUED | OUTPATIENT
Start: 2018-10-22 | End: 2018-10-24

## 2018-10-22 RX ORDER — PREGABALIN 225 MG/1
1 CAPSULE ORAL
Qty: 0 | Refills: 0 | COMMUNITY

## 2018-10-22 RX ORDER — FAMOTIDINE 10 MG/ML
1 INJECTION INTRAVENOUS
Qty: 0 | Refills: 0 | COMMUNITY

## 2018-10-22 RX ORDER — AMIODARONE HYDROCHLORIDE 400 MG/1
200 TABLET ORAL DAILY
Qty: 0 | Refills: 0 | Status: DISCONTINUED | OUTPATIENT
Start: 2018-10-22 | End: 2018-10-24

## 2018-10-22 RX ORDER — ALLOPURINOL 300 MG
1 TABLET ORAL
Qty: 0 | Refills: 0 | COMMUNITY

## 2018-10-22 RX ORDER — CHLORHEXIDINE GLUCONATE 213 G/1000ML
1 SOLUTION TOPICAL
Qty: 0 | Refills: 0 | Status: DISCONTINUED | OUTPATIENT
Start: 2018-10-22 | End: 2018-10-24

## 2018-10-22 RX ORDER — FUROSEMIDE 40 MG
40 TABLET ORAL DAILY
Qty: 0 | Refills: 0 | Status: DISCONTINUED | OUTPATIENT
Start: 2018-10-22 | End: 2018-10-24

## 2018-10-22 RX ORDER — CALCITRIOL 0.5 UG/1
1 CAPSULE ORAL
Qty: 0 | Refills: 0 | COMMUNITY

## 2018-10-22 RX ORDER — PREGABALIN 225 MG/1
1000 CAPSULE ORAL DAILY
Qty: 0 | Refills: 0 | Status: DISCONTINUED | OUTPATIENT
Start: 2018-10-22 | End: 2018-10-24

## 2018-10-22 RX ORDER — FUROSEMIDE 40 MG
60 TABLET ORAL ONCE
Qty: 0 | Refills: 0 | Status: COMPLETED | OUTPATIENT
Start: 2018-10-22 | End: 2018-10-22

## 2018-10-22 RX ADMIN — ISOSORBIDE MONONITRATE 30 MILLIGRAM(S): 60 TABLET, EXTENDED RELEASE ORAL at 22:44

## 2018-10-22 RX ADMIN — PREGABALIN 1000 MICROGRAM(S): 225 CAPSULE ORAL at 11:55

## 2018-10-22 RX ADMIN — FAMOTIDINE 40 MILLIGRAM(S): 10 INJECTION INTRAVENOUS at 16:33

## 2018-10-22 RX ADMIN — Medication 50 MILLIGRAM(S): at 16:33

## 2018-10-22 RX ADMIN — Medication 100 MILLIGRAM(S): at 11:55

## 2018-10-22 RX ADMIN — APIXABAN 2.5 MILLIGRAM(S): 2.5 TABLET, FILM COATED ORAL at 16:33

## 2018-10-22 RX ADMIN — AMIODARONE HYDROCHLORIDE 200 MILLIGRAM(S): 400 TABLET ORAL at 11:55

## 2018-10-22 RX ADMIN — Medication 180 MILLIGRAM(S): at 12:06

## 2018-10-22 RX ADMIN — CALCITRIOL 0.5 MICROGRAM(S): 0.5 CAPSULE ORAL at 11:55

## 2018-10-22 RX ADMIN — Medication 60 MILLIGRAM(S): at 07:18

## 2018-10-22 NOTE — ED PROVIDER NOTE - ATTENDING CONTRIBUTION TO CARE
72 y/o female with h/o afib on eliquis, cad s/p MI and CABG, CKD with recent LUE avf placement, in ER with c/o SOB.  Pt states has been feeling more sob for 1 day, also some mild chest burning.  no f/c. + mild cough/nasal congestion/sneezing.  no abd pain.  no n/v/d.  no le pain/swelling.  follows with palombini for renal, ghavami for card.  pe - nad, nc/at, eomi, perrl, op - clear, no resp distress, speaking full sentences, + decreased bs b/l with some crackles, irreg rhythm, abd - soft, nt/nd, nabs, from x 4, no le edema, A&O x 3, no focal neuro deficits.  -cardiac w/u.

## 2018-10-22 NOTE — CONSULT NOTE ADULT - PROBLEM SELECTOR RECOMMENDATION 9
afib is currently rate control but has been an issue  ep eval for possible ablation  cont amio, metoprolol and cardiazem  cont eliquis for now

## 2018-10-22 NOTE — ED ADULT NURSE NOTE - NSIMPLEMENTINTERV_GEN_ALL_ED
Implemented All Universal Safety Interventions:  [place] to call system. Call bell, personal items and telephone within reach. Instruct patient to call for assistance. Room bathroom lighting operational. Non-slip footwear when patient is off stretcher. Physically safe environment: no spills, clutter or unnecessary equipment. Stretcher in lowest position, wheels locked, appropriate side rails in place.

## 2018-10-22 NOTE — H&P ADULT - NSHPLABSRESULTS_GEN_ALL_CORE
CBC Full  -  ( 22 Oct 2018 05:33 )  WBC Count : 7.47 K/uL  Hemoglobin : 9.5 g/dL  Hematocrit : 32.3 %  Platelet Count - Automated : 314 K/uL  Mean Cell Volume : 88.3 fL  Mean Cell Hemoglobin : 26.0 pg  Mean Cell Hemoglobin Concentration : 29.4 g/dL  Auto Neutrophil # : 5.79 K/uL  Auto Lymphocyte # : 0.60 K/uL  Auto Monocyte # : 0.84 K/uL  Auto Eosinophil # : 0.15 K/uL  Auto Basophil # : 0.06 K/uL  Auto Neutrophil % : 77.6 %  Auto Lymphocyte % : 8.0 %  Auto Monocyte % : 11.2 %  Auto Eosinophil % : 2.0 %  Auto Basophil % : 0.8 %  PT/INR - ( 22 Oct 2018 05:33 )   PT: 18.20 sec;   INR: 1.59 ratio         PTT - ( 22 Oct 2018 05:33 )  PTT:39.0 sec  10-22    135  |  95<L>  |  60<H>  ----------------------------<  136<H>  5.1<H>   |  27  |  3.2<H>    Ca    10.2<H>      22 Oct 2018 05:33    TPro  7.6  /  Alb  3.8  /  TBili  0.5  /  DBili  x   /  AST  18  /  ALT  12  /  AlkPhos  54  10-22    Serum Pro-Brain Natriuretic Peptide: 64010 pg/mL (10.22.18 @ 05:33)    < from: Xray Chest 1 View-PORTABLE IMMEDIATE (10.22.18 @ 06:28) >  Impression:    Bibasilar opacities.  Cardiomegaly.  < end of copied text >

## 2018-10-22 NOTE — PROGRESS NOTE ADULT - SUBJECTIVE AND OBJECTIVE BOX
NOAH QUIROS 72yo W Female brought from home for c/o increasing SOB, orthopnea, palpitations and mild cough,  Pt was noted to be in afib with /min and in CHF wit BNP of 22, 144.  pt just completed a course of augmentin for cough.  Pt is known to have afib and had prior cardioversion.  The pt is on Eliquis, amiodarone and diltiazem.  The PMHx includes:  HTN, ASHD, CAD, old MI, CHF, afib, sp CABG, sp cardioversion, Hyperlipidemia, CKD, nephrolithiasis, renal colic, OA, DDD, DJD, anemia, hysterectomy, C section, hernia repair.    INTERVAL HPI/OVERNIGHT EVENTS:  pt feels better, less SOB    MEDICATIONS  (STANDING):  allopurinol 100 milliGRAM(s) Oral daily  amiodarone    Tablet 200 milliGRAM(s) Oral daily  apixaban 2.5 milliGRAM(s) Oral every 12 hours  calcitriol   Capsule 0.5 MICROGram(s) Oral daily  chlorhexidine 4% Liquid 1 Application(s) Topical <User Schedule>  cyanocobalamin 1000 MICROGram(s) Oral daily  diltiazem    milliGRAM(s) Oral daily  famotidine    Tablet 40 milliGRAM(s) Oral two times a day  furosemide    Tablet 40 milliGRAM(s) Oral daily  isosorbide   mononitrate ER Tablet (IMDUR) 30 milliGRAM(s) Oral at bedtime  metoprolol tartrate 50 milliGRAM(s) Oral two times a day    MEDICATIONS  (PRN):      Allergies    No Known Allergies    Vital Signs Last 24 Hrs  T(C): 35.7 (22 Oct 2018 16:29), Max: 36.7 (22 Oct 2018 04:48)  T(F): 96.3 (22 Oct 2018 16:29), Max: 98 (22 Oct 2018 04:48)  HR: 87 (22 Oct 2018 20:52) (70 - 118)  BP: 134/55 (22 Oct 2018 20:52) (125/67 - 174/81)  BP(mean): --  RR: 20 (22 Oct 2018 20:52) (18 - 20)  SpO2: 99% (22 Oct 2018 20:52) (94% - 99%)    PHYSICAL EXAM:      Constitutional: alert, oriened, uncomfortable and anxious but in NAD    Eyes:  normal    ENMT:  normal    Neck:  supple, + JVD, no bruits    Respiratory:  dec BS at bases, scattered rhonchi    Cardiovascular:  S1S2 irreg, tachy    Gastrointestinal:  globose, soft and benign    Extremities:  moves all ext    Neurological:  nonfocal    Skin:  no rash    Lymph Nodes:  not enlarged    Musculoskeletal:  good mm mass and tone    Psychiatric:  alert and oriented x 3        LABS:                        9.5    7.47  )-----------( 314      ( 22 Oct 2018 05:33 )             32.3     10-22    135  |  95<L>  |  60<H>  ----------------------------<  136<H>  5.1<H>   |  27  |  3.2<H>  GFR 14  Mg  2.0  CK 25, MB  1.6,  trop  <0.01  BNP 22,144  RSV neg, Influenza A/B neg      Ca    10.2<H>      22 Oct 2018 05:33    TPro  7.6  /  Alb  3.8  /  TBili  0.5  /  DBili  x   /  AST  18  /  ALT  12  /  AlkPhos  54  10-22    PT/INR - ( 22 Oct 2018 05:33 )   PT: 18.20 sec;   INR: 1.59 ratio         PTT - ( 22 Oct 2018 05:33 )  PTT:39.0 sec      RADIOLOGY & ADDITIONAL TESTS:    CXR:  cardiomegaly, inc vasc markings  EKG  afiv /min, nonspecific ST-T changes

## 2018-10-22 NOTE — ED PROVIDER NOTE - MEDICAL DECISION MAKING DETAILS
pt with h/o ckd, afib on eliquis, cad, mi/cabg, in ER with 1 day h/o sob/mild chest burning.  + congestion on cxr, bnp 22K, trop neg.  pt admitted for chf exacerbation.

## 2018-10-22 NOTE — PROGRESS NOTE ADULT - ASSESSMENT
SOB adue to afib and RVR and exacerbation of acute on chronic CHF  Hx of HTN, ASHD, CAD, old MI, Afib ( AC with Eliquis), CABG  Hx of CKD  Hx of Hyperlipidemia  Hx of nephrolithiases, renal colic  Hx of anemia    diuretics and assess antiarrhythmics  Cardio consult  CE negative  ECHO  monitor electrolytes and renal parameters

## 2018-10-22 NOTE — ED PROVIDER NOTE - OBJECTIVE STATEMENT
72 yo female hx of HTN/stage 4 CKD/CAD/MI/A. fib on Eliquis present c/o palpitations and SOB this evening. Reported similar sxs over the past 2 days and she thought it was her post nasal drip. [place] her heart racing so she came to ED for evaluation. also reported heart burn sensation to her chest. denies chest pain.   denies fever/chill/HA/dizziness/abd pain/n/v/d/urinary sxs.

## 2018-10-22 NOTE — H&P ADULT - HISTORY OF PRESENT ILLNESS
74 yo F with a PMH of Atrial Fibrillation (AC - Eliquis, s/p cardioversion), CAD s/p CABG, CHF, CKD, Anxiety, GERD, and Gout presented to the ED for the evaluation of sob associated with palpitations. Patient states that in the evening she was overcome with gradual sob. Patient states that the shortness of breath began in the evening. It is positional in nature. Worse when she is lying down flat and improves when she is sitting up. Patient notes she has no LE edema. She admits to cough over the past few weeks that is productive of white sputum. She denies any hemoptysis. Patient states that since arrival symptoms have improved greatly. She is inquiring about a possible ablation currently. Patient states she has limitation with ambulation and at baseline can walk up 12 stairs. Patient admits to cough, runny nose, sob, orthopnea and mild chest pain that was burning like in sensation near xyphoid process and occurred after eating a large meal. Patient denies light headedness, dizziness, n/v/d/c, cp, abdominal pain, neck pain, dysuria.

## 2018-10-22 NOTE — H&P ADULT - ASSESSMENT
ASSESSMENT / PLAN:    74 yo F with a PMH of Atrial Fibrillation (AC - Eliquis, s/p cardioversion), CAD s/p CABG, CHF, CKD, Anxiety, GERD, and Gout presented to the ED for the evaluation of sob associated with palpitations.     # SOB: likely 2/2 A.Fib RVR less likely CHF exacerbation associated with chest pain  - ProBNP: 01906  - Cardiology consulted  - Will continue home dose of lasix - no signs of fluid overload  - Continue rate control  - Consider EP  evaluation for ablation  - Daily weight  - Strict I/O's  - ECHO 45-50%  - CE's x 1 negative, f/u second set   - Monitor on telemetry    # Atrial Fibrillation (AC - Eliquis, s/p cardioversion)  - Continue Metoprolol, Cardizem, and Amiodarone  - Anticoagulation: Eliquis  - Agreeable for possible Ablation   - Cardiology consulted, f/u recommendations    # CAD s/p CABG  - Contiune Metoprolol, Imdur, Eliquis    # CHF: Continue Furosemide    # GERD: Continue Famotidine    # Gout: Continue Allopurinol    dvt ppx  gi ppx  chg bath daily and prn  Full Code  Disposition: From Home ASSESSMENT / PLAN:    72 yo F with a PMH of Atrial Fibrillation (AC - Eliquis, s/p cardioversion), CAD s/p CABG, CHF, CKD, Anxiety, GERD, and Gout presented to the ED for the evaluation of sob associated with palpitations.     # SOB: likely 2/2 A.Fib RVR less likely CHF exacerbation associated with chest pain  - ProBNP: 48974  - Cardiology consulted  - Will continue home dose of lasix - no signs of fluid overload  - Continue rate control  - Consider EP  evaluation for ablation  - Daily weight  - Strict I/O's  - ECHO 45-50%  - CE's x 1 negative, f/u second set   - Monitor on telemetry    # Bi-basilar opacities on CXR - Patient with recent URI symptoms, Post nasal drip, and GERD  - Completed outpatient abx: Amox-Clav   - No active signs of infection, will continue to monitor    # Atrial Fibrillation (AC - Eliquis, s/p cardioversion)  - Continue Metoprolol, Cardizem, and Amiodarone  - Anticoagulation: Eliquis  - Agreeable for possible Ablation   - Cardiology consulted, f/u recommendations    # CAD s/p CABG  - Contiune Metoprolol, Imdur, Eliquis    # CHF: Continue Furosemide    # GERD: Continue Famotidine    # Gout: Continue Allopurinol    dvt ppx  gi ppx  chg bath daily and prn  Full Code  Disposition: From Home

## 2018-10-22 NOTE — ED ADULT TRIAGE NOTE - SPO2 (%)
patient c/o of abdominal pain and coffee ground vomits started 2 H ago , patient diaphoretic denied chest pain no difficulty breathing 98

## 2018-10-22 NOTE — ED PROVIDER NOTE - PHYSICAL EXAMINATION
CONSTITUTIONAL: Well-appearing; well-nourished; in no apparent distress.   EYES: PERRL; EOM intact.   ENT: normal nose; no rhinorrhea; normal pharynx with no tonsillar hypertrophy.   CARDIOVASCULAR: Normal S1, S2; no murmurs, rubs, or gallops.   RESPIRATORY: + decreased BS to RLL with rales. + rales to LLL. RR - 18. speaking full sentence. no resp distress.   GI/: Normal bowel sounds; non-distended; non-tender; no palpable organomegaly.   MS: no calf tenderness and pitting edema. No evidence of trauma or deformity. Non-tender to palpation. Normal ROM in all four extremities; non-tender to palpation; distal pulses are normal.   SKIN: Normal for age and race; warm; dry; good turgor; no apparent lesions or exudate.   NEURO/PSYCH: A & O x 4; grossly unremarkable. mood and manner are appropriate

## 2018-10-22 NOTE — CONSULT NOTE ADULT - PROBLEM SELECTOR RECOMMENDATION 3
secondary to tachycardia and does not appear significantly fluid overloaded  hr control and avoid over diuresis

## 2018-10-23 ENCOUNTER — TRANSCRIPTION ENCOUNTER (OUTPATIENT)
Age: 73
End: 2018-10-23

## 2018-10-23 LAB
ANION GAP SERPL CALC-SCNC: 13 MMOL/L — SIGNIFICANT CHANGE UP (ref 7–14)
APTT BLD: 38.9 SEC — SIGNIFICANT CHANGE UP (ref 27–39.2)
BUN SERPL-MCNC: 58 MG/DL — HIGH (ref 10–20)
CALCIUM SERPL-MCNC: 10.2 MG/DL — HIGH (ref 8.5–10.1)
CHLORIDE SERPL-SCNC: 99 MMOL/L — SIGNIFICANT CHANGE UP (ref 98–110)
CO2 SERPL-SCNC: 27 MMOL/L — SIGNIFICANT CHANGE UP (ref 17–32)
CREAT SERPL-MCNC: 3.3 MG/DL — HIGH (ref 0.7–1.5)
GLUCOSE SERPL-MCNC: 117 MG/DL — HIGH (ref 70–99)
HCT VFR BLD CALC: 30.9 % — LOW (ref 37–47)
HGB BLD-MCNC: 9.1 G/DL — LOW (ref 12–16)
INR BLD: 1.79 RATIO — HIGH (ref 0.65–1.3)
MAGNESIUM SERPL-MCNC: 2.1 MG/DL — SIGNIFICANT CHANGE UP (ref 1.8–2.4)
MCHC RBC-ENTMCNC: 26 PG — LOW (ref 27–31)
MCHC RBC-ENTMCNC: 29.4 G/DL — LOW (ref 32–37)
MCV RBC AUTO: 88.3 FL — SIGNIFICANT CHANGE UP (ref 81–99)
NRBC # BLD: 0 /100 WBCS — SIGNIFICANT CHANGE UP (ref 0–0)
PLATELET # BLD AUTO: 324 K/UL — SIGNIFICANT CHANGE UP (ref 130–400)
POTASSIUM SERPL-MCNC: 4.7 MMOL/L — SIGNIFICANT CHANGE UP (ref 3.5–5)
POTASSIUM SERPL-SCNC: 4.7 MMOL/L — SIGNIFICANT CHANGE UP (ref 3.5–5)
PROTHROM AB SERPL-ACNC: 20.5 SEC — HIGH (ref 9.95–12.87)
RBC # BLD: 3.5 M/UL — LOW (ref 4.2–5.4)
RBC # FLD: 17.5 % — HIGH (ref 11.5–14.5)
SODIUM SERPL-SCNC: 139 MMOL/L — SIGNIFICANT CHANGE UP (ref 135–146)
WBC # BLD: 6.16 K/UL — SIGNIFICANT CHANGE UP (ref 4.8–10.8)
WBC # FLD AUTO: 6.16 K/UL — SIGNIFICANT CHANGE UP (ref 4.8–10.8)

## 2018-10-23 RX ADMIN — Medication 50 MILLIGRAM(S): at 05:33

## 2018-10-23 RX ADMIN — APIXABAN 2.5 MILLIGRAM(S): 2.5 TABLET, FILM COATED ORAL at 18:17

## 2018-10-23 RX ADMIN — AMIODARONE HYDROCHLORIDE 200 MILLIGRAM(S): 400 TABLET ORAL at 05:33

## 2018-10-23 RX ADMIN — CALCITRIOL 0.5 MICROGRAM(S): 0.5 CAPSULE ORAL at 12:16

## 2018-10-23 RX ADMIN — ISOSORBIDE MONONITRATE 30 MILLIGRAM(S): 60 TABLET, EXTENDED RELEASE ORAL at 21:43

## 2018-10-23 RX ADMIN — Medication 40 MILLIGRAM(S): at 05:33

## 2018-10-23 RX ADMIN — CHLORHEXIDINE GLUCONATE 1 APPLICATION(S): 213 SOLUTION TOPICAL at 05:34

## 2018-10-23 RX ADMIN — FAMOTIDINE 40 MILLIGRAM(S): 10 INJECTION INTRAVENOUS at 05:33

## 2018-10-23 RX ADMIN — APIXABAN 2.5 MILLIGRAM(S): 2.5 TABLET, FILM COATED ORAL at 05:33

## 2018-10-23 RX ADMIN — PREGABALIN 1000 MICROGRAM(S): 225 CAPSULE ORAL at 12:17

## 2018-10-23 RX ADMIN — Medication 50 MILLIGRAM(S): at 18:17

## 2018-10-23 RX ADMIN — Medication 100 MILLIGRAM(S): at 12:16

## 2018-10-23 RX ADMIN — Medication 180 MILLIGRAM(S): at 05:33

## 2018-10-23 NOTE — PROGRESS NOTE ADULT - ASSESSMENT
74 yo F with a PMH of Atrial Fibrillation (AC - Eliquis, s/p cardioversion), CAD s/p CABG, CHF, CKD, Anxiety, GERD, and Gout presented to the ED for the evaluation of sob associated with palpitations.     # SOB: likely 2/2 A.Fib RVR less likely CHF exacerbation associated with chest pain less likely cardiac in anture  - Cardiac enzymes negative   - ProBNP: 49196  - Cardiology recommendations appreciated  - Will continue home dose of lasix - no signs of fluid overload  - Continue rate control  - Daily weight  - Strict I/O's  - ECHO 45-50%    # Bi-basilar opacities on CXR - Patient with recent URI symptoms, Post nasal drip, and GERD  - Completed outpatient abx: Amox-Clav   - No active signs of infection, will continue to monitor    # Atrial Fibrillation (AC - Eliquis, s/p cardioversion)  - Continue Metoprolol, Cardizem, and Amiodarone  - Anticoagulation: Eliquis  - Cardiology recommendations appreciated: EP eval for ablation.     # CAD s/p CABG  - Contiune Metoprolol, Imdur, Eliquis    # CHF: Continue Furosemide    # GERD: Continue Famotidine    # Gout: Continue Allopurinol    dvt ppx  gi ppx  chg bath daily and prn  Full Code  Disposition: From Home 72 yo F with a PMH of Atrial Fibrillation (AC - Eliquis, s/p cardioversion), CAD s/p CABG, CHF, CKD, Anxiety, GERD, and Gout presented to the ED for the evaluation of sob associated with palpitations.     # SOB: likely 2/2 A.Fib RVR less likely CHF exacerbation associated with chest pain less likely cardiac in anture  - Cardiac enzymes negative   - ProBNP: 37430  - Cardiology recommendations appreciated  - Will continue home dose of lasix - no signs of fluid overload  - Continue rate control  - Daily weight  - Strict I/O's  - ECHO 45-50% as per previous admission     # Bi-basilar opacities on CXR - Patient with recent URI symptoms, Post nasal drip, and GERD  - Completed outpatient abx: Amox-Clav   - No active signs of infection, will continue to monitor    # Atrial Fibrillation (AC - Eliquis, s/p cardioversion)  - Continue Metoprolol, Cardizem, and Amiodarone  - Anticoagulation: Eliquis  - Cardiology recommendations appreciated: EP eval for ablation pending    # CAD s/p CABG  - Continue Metoprolol, Imdur, Eliquis    # CHF: Continue Furosemide    # GERD: Continue Famotidine    # Gout: Continue Allopurinol    dvt ppx  gi ppx  chg bath daily and prn  Full Code  Disposition: From Home

## 2018-10-23 NOTE — DISCHARGE NOTE ADULT - PROVIDER TOKENS
TOKJAN:'[id number]' TOKEN:'[id number]',FREE:[LAST:[shanelle],FIRST:[santiago],PHONE:[(   )    -],FAX:[(   )    -]],TOKEN:'[id number]'

## 2018-10-23 NOTE — PROGRESS NOTE ADULT - SUBJECTIVE AND OBJECTIVE BOX
SUBJECTIVE:Patient is a 73y old  Female who presents with a chief complaint of SOB, palpitations, afib with RVR (22 Oct 2018 22:26)  . Today is hospital day 1d     PAST MEDICAL & SURGICAL HISTORY  PAST MEDICAL & SURGICAL HISTORY:  Atrial fibrillation  CKD (chronic kidney disease)  MI (myocardial infarction)  CAD (coronary artery disease)  Kidney stone  HTN (hypertension)  H/O abdominal hysterectomy  H/O cardiac radiofrequency ablation  History of open heart surgery    SOCIAL HISTORY:    ALLERGIES:  No Known Allergies    MEDICATIONS:  STANDING MEDICATIONS  allopurinol 100 milliGRAM(s) Oral daily  amiodarone    Tablet 200 milliGRAM(s) Oral daily  apixaban 2.5 milliGRAM(s) Oral every 12 hours  calcitriol   Capsule 0.5 MICROGram(s) Oral daily  chlorhexidine 4% Liquid 1 Application(s) Topical <User Schedule>  cyanocobalamin 1000 MICROGram(s) Oral daily  diltiazem    milliGRAM(s) Oral daily  famotidine    Tablet 40 milliGRAM(s) Oral two times a day  furosemide    Tablet 40 milliGRAM(s) Oral daily  isosorbide   mononitrate ER Tablet (IMDUR) 30 milliGRAM(s) Oral at bedtime  metoprolol tartrate 50 milliGRAM(s) Oral two times a day    PRN MEDICATIONS    VITALS:   T(F): 97.5  HR: 67  BP: 143/63  RR: 20  SpO2: 96%    LABS:                        9.1    6.16  )-----------( 324      ( 23 Oct 2018 06:43 )             30.9     10-23    139  |  99  |  58<H>  ----------------------------<  117<H>  4.7   |  27  |  3.3<H>    Ca    10.2<H>      23 Oct 2018 06:43  Mg     2.1     10-23    TPro  7.6  /  Alb  3.8  /  TBili  0.5  /  DBili  x   /  AST  18  /  ALT  12  /  AlkPhos  54  10-22    PT/INR - ( 23 Oct 2018 06:43 )   PT: 20.50 sec;   INR: 1.79 ratio         PTT - ( 23 Oct 2018 06:43 )  PTT:38.9 sec      Creatine Kinase, Serum: 25 U/L (10-22-18 @ 16:17)  Troponin T, Serum: <0.01 ng/mL (10-22-18 @ 16:17)      CARDIAC MARKERS ( 22 Oct 2018 16:17 )  x     / <0.01 ng/mL / 25 U/L / x     / 1.6 ng/mL  CARDIAC MARKERS ( 22 Oct 2018 05:33 )  x     / <0.01 ng/mL / 33 U/L / x     / 1.8 ng/mL      RADIOLOGY:    PHYSICAL EXAM:  GEN:   LUNGS: Clear to auscultation bilaterally   HEART: S1/S2 present. RRR.   ABD: Soft, non-tender, non-distended. Bowel sounds present  EXT: SUBJECTIVE:Patient is a 73y old  Female who presents with a chief complaint of SOB, palpitations, afib with RVR (22 Oct 2018 22:26)  . Today is hospital day 1d. Patient seen and examined. NAD. Patients states she feels well. Cardiology follow up yesterday appreciated. EP evaluation is pending today.     PAST MEDICAL & SURGICAL HISTORY  PAST MEDICAL & SURGICAL HISTORY:  Atrial fibrillation  CKD (chronic kidney disease)  MI (myocardial infarction)  CAD (coronary artery disease)  Kidney stone  HTN (hypertension)  H/O abdominal hysterectomy  H/O cardiac radiofrequency ablation  History of open heart surgery    SOCIAL HISTORY:    ALLERGIES:  No Known Allergies    MEDICATIONS:  STANDING MEDICATIONS  allopurinol 100 milliGRAM(s) Oral daily  amiodarone    Tablet 200 milliGRAM(s) Oral daily  apixaban 2.5 milliGRAM(s) Oral every 12 hours  calcitriol   Capsule 0.5 MICROGram(s) Oral daily  chlorhexidine 4% Liquid 1 Application(s) Topical <User Schedule>  cyanocobalamin 1000 MICROGram(s) Oral daily  diltiazem    milliGRAM(s) Oral daily  famotidine    Tablet 40 milliGRAM(s) Oral two times a day  furosemide    Tablet 40 milliGRAM(s) Oral daily  isosorbide   mononitrate ER Tablet (IMDUR) 30 milliGRAM(s) Oral at bedtime  metoprolol tartrate 50 milliGRAM(s) Oral two times a day    PRN MEDICATIONS    VITALS:   T(F): 97.5  HR: 67  BP: 143/63  RR: 20  SpO2: 96%    LABS:                        9.1    6.16  )-----------( 324      ( 23 Oct 2018 06:43 )             30.9     10-23    139  |  99  |  58<H>  ----------------------------<  117<H>  4.7   |  27  |  3.3<H>    Ca    10.2<H>      23 Oct 2018 06:43  Mg     2.1     10-23    TPro  7.6  /  Alb  3.8  /  TBili  0.5  /  DBili  x   /  AST  18  /  ALT  12  /  AlkPhos  54  10-22    PT/INR - ( 23 Oct 2018 06:43 )   PT: 20.50 sec;   INR: 1.79 ratio         PTT - ( 23 Oct 2018 06:43 )  PTT:38.9 sec      Creatine Kinase, Serum: 25 U/L (10-22-18 @ 16:17)  Troponin T, Serum: <0.01 ng/mL (10-22-18 @ 16:17)      CARDIAC MARKERS ( 22 Oct 2018 16:17 )  x     / <0.01 ng/mL / 25 U/L / x     / 1.6 ng/mL  CARDIAC MARKERS ( 22 Oct 2018 05:33 )  x     / <0.01 ng/mL / 33 U/L / x     / 1.8 ng/mL      RADIOLOGY:    PHYSICAL EXAM:  GEN: NAD  LUNGS: Clear to auscultation bilaterally   HEART: S1/S2 present. RRR.   ABD: Soft, non-tender, non-distended.   EXT: No b/l le edema

## 2018-10-23 NOTE — DISCHARGE NOTE ADULT - HOSPITAL COURSE
74 yo F with a PMH of Atrial Fibrillation (AC - Eliquis, s/p cardioversion), CAD s/p CABG, CHF, CKD, Anxiety, GERD, and Gout presented to the ED for the evaluation of sob associated with palpitations.     # SOB: likely 2/2 A.Fib RVR less likely CHF exacerbation associated with chest pain less likely cardiac in anture  - Cardiac enzymes negative   - ProBNP: 67393  - Cardiology recommendations appreciated  - Will continue home dose of lasix - no signs of fluid overload  - Continue rate control  - Daily weight  - Strict I/O's  - ECHO 45-50%    # Bi-basilar opacities on CXR - Patient with recent URI symptoms, Post nasal drip, and GERD  - Completed outpatient abx: Amox-Clav   - No active signs of infection, will continue to monitor    # Atrial Fibrillation (AC - Eliquis, s/p cardioversion)  - Continue Metoprolol, Cardizem, and Amiodarone  - Anticoagulation: Eliquis  - Cardiology recommendations appreciated: EP eval for ablation.     # CAD s/p CABG  - Contiune Metoprolol, Imdur, Eliquis    # CHF: Continue Furosemide    # GERD: Continue Famotidine    # Gout: Continue Allopurinol    dvt ppx  gi ppx  chg bath daily and prn  Full Code  Disposition: From Home 74 yo F with a PMH of Atrial Fibrillation (AC - Eliquis, s/p cardioversion), CAD s/p CABG, CHF, CKD, Anxiety, GERD, and Gout presented to the ED for the evaluation of sob associated with palpitations.     # SOB: likely 2/2 A.Fib RVR less likely CHF exacerbation associated with chest pain less likely cardiac in anture  - Cardiac enzymes negative   - ProBNP: 86445  - Cardiology recommendations appreciated  - Will continue home dose of lasix - no signs of fluid overload  - Continue rate control  - ECHO 45-50% on 7/21/2018    # Bi-basilar opacities on CXR - Patient with recent URI symptoms, Post nasal drip, and GERD  - Completed outpatient abx: Amox-Clav   - No active signs of infection  - less likely CHF exacerbation    # Atrial Fibrillation (AC - Eliquis, s/p cardioversion)  - Continue Metoprolol, Cardizem, and Amiodarone  - Anticoagulation: Eliquis  - Ep to do ablation    # CAD s/p CABG  - Contiune Metoprolol, Imdur, Eliquis    # CHF: Continue Furosemide, less likely exacerbation    # GERD: Continue Famotidine    # Gout: Continue Allopurinol    case discussed with the attending and is discharged to home. 72 yo F with a PMH of Atrial Fibrillation (AC - Eliquis, s/p cardioversion), CAD s/p CABG, CHF, CKD, Anxiety, GERD, and Gout presented to the ED for the evaluation of sob associated with palpitations.     # SOB: likely 2/2 A.Fib RVR less likely CHF exacerbation associated with chest pain less likely cardiac in anture  - Cardiac enzymes negative   - ProBNP: 11408  - Cardiology recommendations appreciated  - Will continue home dose of lasix - no signs of fluid overload  - Continue rate control  - ECHO 45-50% on 7/21/2018    # Bi-basilar opacities on CXR - Patient with recent URI symptoms, Post nasal drip, and GERD  - Completed outpatient abx: Amox-Clav   - No active signs of infection  - less likely CHF exacerbation    # Atrial Fibrillation (AC - Eliquis, s/p cardioversion)  - Continue Metoprolol, Cardizem, and Amiodarone  - Anticoagulation: Eliquis  - Ep to do ablation    # CAD s/p CABG  - Contiune Metoprolol, Imdur, Eliquis    # chronic systolic CHF: Continue Furosemide, less likely exacerbation    # GERD: Continue Famotidine    # Gout: Continue Allopurinol    case discussed with the attending and is discharged to home.

## 2018-10-23 NOTE — PROGRESS NOTE ADULT - SUBJECTIVE AND OBJECTIVE BOX
NOAH QUIROS 74yo W Female brought from home for c/o increasing SOB, orthopnea, palpitations and mild cough,  Pt was noted to be in afib with /min and in CHF wit BNP of 22,144.  pt just completed a course of augmentin for cough.  Pt is known to have afib and had prior cardioversion.  The pt is on Eliquis, amiodarone and diltiazem.  The PMHx includes:  HTN, ASHD, CAD, old MI, CHF, afib, sp CABG, sp cardioversion, Hyperlipidemia, CKD, nephrolithiasis, renal colic, OA, DDD, DJD, anemia, hysterectomy, C section, hernia repair.    INTERVAL HPI/OVERNIGHT EVENTS:  pt feels better, less SOB, was evaluated by cardio, pending EPS to see whether a candidate for ablation    MEDICATIONS  (STANDING):  allopurinol 100 milliGRAM(s) Oral daily  amiodarone    Tablet 200 milliGRAM(s) Oral daily  apixaban 2.5 milliGRAM(s) Oral every 12 hours  calcitriol   Capsule 0.5 MICROGram(s) Oral daily  chlorhexidine 4% Liquid 1 Application(s) Topical <User Schedule>  cyanocobalamin 1000 MICROGram(s) Oral daily  diltiazem    milliGRAM(s) Oral daily  famotidine    Tablet 40 milliGRAM(s) Oral two times a day  furosemide    Tablet 40 milliGRAM(s) Oral daily  isosorbide   mononitrate ER Tablet (IMDUR) 30 milliGRAM(s) Oral at bedtime  metoprolol tartrate 50 milliGRAM(s) Oral two times a day    MEDICATIONS  (PRN):      Allergies    No Known Allergies    Vital Signs Last 24 Hrs    T(F): 97.5  HR: 67 irreg  BP: 143/63  RR: 20   SpO2: 99%-96%    PHYSICAL EXAM:      Constitutional: alert, oriented, much more comfortable, NAD    Eyes:  normal    ENMT:  normal    Neck:  supple, + JVD, no bruits    Respiratory:  dec BS at bases, scattered rhonchi    Cardiovascular:  S1S2 irreg, tachy    Gastrointestinal:  globose, soft and benign    Extremities:  moves all ext    Neurological:  nonfocal    Skin:  no rash    Lymph Nodes:  not enlarged    Musculoskeletal:  good mm mass and tone    Psychiatric:  alert and oriented x 3        LABS:                        9.1   6  )-----------( 324                   30     10-22    139  |  99  |  58  ----------------------------<  117  4.7   |  27  |  3.3  GFR 14  Mg  2.0  CK 25, MB  1.6,  trop  <0.01  BNP 22,144  RSV neg, Influenza A/B neg      Ca    10.2<H>      22 Oct 2018 05:33    TPro  7.6  /  Alb  3.8  /  TBili  0.5  /  DBili  x   /  AST  18  /  ALT  12  /  AlkPhos  54  10-22    PT/INR - ( 22 Oct 2018 05:33 )   PT: 18.20 sec;   INR: 1.59 ratio         PTT - ( 22 Oct 2018 05:33 )  PTT:39.0 sec      RADIOLOGY & ADDITIONAL TESTS:    CXR:  cardiomegaly, inc vasc markings  EKG  afiv /min, nonspecific ST-T changes

## 2018-10-23 NOTE — PROGRESS NOTE ADULT - ASSESSMENT
SOB adue to afib and RVR and exacerbation of acute on chronic CHF  Hx of HTN, ASHD, CAD, old MI, Afib ( AC with Eliquis), CABG  Hx of CKD  Hx of Hyperlipidemia  Hx of nephrolithiases, renal colic  Hx of anemia    diuretics and cont amiodarone, diltiazem, metoprolol and apixiban  p rate controlled   cont diuretic  Cardio consult appreciated  EPS to determine whether pt is a candidate for ablation  CE negative  ECHO  monitor electrolytes and renal parameters

## 2018-10-23 NOTE — DISCHARGE NOTE ADULT - PLAN OF CARE
rate control Afib with RVR under medication.  rate controlled now.  follow up with Dr. Stephanie Rehman (EP) as outpatient next week for ablation. less likely CHF exacerbation.  likely due to prolonged tachycardia.  follow up with cardiology and PMD please follow up with PMD and Nephrology

## 2018-10-23 NOTE — DISCHARGE NOTE ADULT - MEDICATION SUMMARY - MEDICATIONS TO TAKE
I will START or STAY ON the medications listed below when I get home from the hospital:    isosorbide mononitrate 30 mg oral tablet, extended release  -- 1 tab(s) by mouth once a day (at bedtime)  -- Indication: For Cad    Cardizem  mg/24 hours oral capsule, extended release  -- 1 cap(s) by mouth once a day  -- Indication: For Afib    amiodarone 200 mg oral tablet  -- 1 tab(s) by mouth once a day  -- Indication: For Atrial fibrillation    Eliquis 2.5 mg oral tablet  -- 1 tab(s) by mouth 2 times a day  -- Indication: For Atrial fibrillation    allopurinol 100 mg oral tablet  -- 1 tab(s) by mouth once a day  -- Indication: For gout    metoprolol tartrate 50 mg oral tablet  -- 1 tab(s) by mouth 2 times a day  -- Indication: For Cad    Kayexalate oral and rectal powder  -- 1 application by mouth and rectally 3 times a day  -- Indication: For Hyperkalemia    furosemide 40 mg oral tablet  -- 1 tab(s) by mouth once a day  -- Indication: For HTN (hypertension)    famotidine 40 mg oral tablet  -- 1 tab(s) by mouth 2 times a day  -- Indication: For gerd    calcitriol 0.5 mcg oral capsule  -- 1 cap(s) by mouth once a day  -- Indication: For supplement    Vitamin B-12 100 mcg oral tablet  -- 1 tab(s) by mouth once a day  -- Indication: For supplement I will START or STAY ON the medications listed below when I get home from the hospital:    isosorbide mononitrate 30 mg oral tablet, extended release  -- 1 tab(s) by mouth once a day (at bedtime)  -- Indication: For Cad    Cardizem  mg/24 hours oral capsule, extended release  -- 1 cap(s) by mouth once a day  -- Indication: For Afib    amiodarone 200 mg oral tablet  -- 1 tab(s) by mouth once a day  -- Indication: For Atrial fibrillation    Eliquis 2.5 mg oral tablet  -- 1 tab(s) by mouth 2 times a day  -- Indication: For Atrial fibrillation    allopurinol 100 mg oral tablet  -- 1 tab(s) by mouth once a day  -- Indication: For gout    metoprolol tartrate 50 mg oral tablet  -- 1 tab(s) by mouth 2 times a day  -- Indication: For Cad    Kayexalate oral and rectal powder  -- 1 application by mouth and rectally 3 times a day  -- Indication: For CKD (chronic kidney disease)    furosemide 40 mg oral tablet  -- 1 tab(s) by mouth once a day  -- Indication: For HTN (hypertension)    famotidine 40 mg oral tablet  -- 1 tab(s) by mouth 2 times a day  -- Indication: For gerd    calcitriol 0.5 mcg oral capsule  -- 1 cap(s) by mouth once a day  -- Indication: For CKD (chronic kidney disease)    Vitamin B-12 100 mcg oral tablet  -- 1 tab(s) by mouth once a day  -- Indication: For Supplement

## 2018-10-23 NOTE — DISCHARGE NOTE ADULT - CARE PROVIDERS DIRECT ADDRESSES
,DirectAddress_Unknown ,DirectAddress_Unknown,DirectAddress_Unknown,[email or web address]

## 2018-10-23 NOTE — PROVIDER CONTACT NOTE (MEDICATION) - SITUATION
Patient ordered famotidine 40 mg po q 12h. Pharmacy states dosage is to high for patient with altered GFR and cannot confirm order.

## 2018-10-23 NOTE — DISCHARGE NOTE ADULT - PATIENT PORTAL LINK FT
You can access the [facility] Patient Portal, offered by [facility], by registering with the following website: [email or web address]

## 2018-10-23 NOTE — DISCHARGE NOTE ADULT - CARE PLAN
Principal Discharge DX:	Atrial fibrillation  Goal:	rate control  Assessment and plan of treatment:	Afib with RVR under medication.  rate controlled now.  follow up with Dr. Stephanie Rehman (EP) as outpatient next week for ablation.  Secondary Diagnosis:	Shortness of breath  Assessment and plan of treatment:	less likely CHF exacerbation.  likely due to prolonged tachycardia.  follow up with cardiology and PMD  Secondary Diagnosis:	CKD (chronic kidney disease)  Assessment and plan of treatment:	please follow up with PMD and Nephrology

## 2018-10-23 NOTE — DISCHARGE NOTE ADULT - CARE PROVIDER_API CALL
Bipin Child), Cardiology; Internal Medicine; Nuclear Cardiology  [address] [place] [address]  Phone: [phone]  Fax: [phone] Bipin Child), Cardiology; Internal Medicine; Nuclear Cardiology  [address] [place] [address]  [address]  [place] [address]  Phone: [phone]  Fax: [phone]    pamela timmons  Phone: (   )    -  Fax: (   )    -    Garett Naylor), Internal Medicine  [address] [facility]  [address]  [place] [address]  Phone: [phone]  Fax: [phone]

## 2018-10-24 VITALS
DIASTOLIC BLOOD PRESSURE: 67 MMHG | RESPIRATION RATE: 20 BRPM | HEART RATE: 74 BPM | TEMPERATURE: 97 F | OXYGEN SATURATION: 93 % | SYSTOLIC BLOOD PRESSURE: 152 MMHG

## 2018-10-24 RX ADMIN — CALCITRIOL 0.5 MICROGRAM(S): 0.5 CAPSULE ORAL at 11:00

## 2018-10-24 RX ADMIN — PREGABALIN 1000 MICROGRAM(S): 225 CAPSULE ORAL at 11:00

## 2018-10-24 RX ADMIN — APIXABAN 2.5 MILLIGRAM(S): 2.5 TABLET, FILM COATED ORAL at 05:33

## 2018-10-24 RX ADMIN — FAMOTIDINE 40 MILLIGRAM(S): 10 INJECTION INTRAVENOUS at 05:33

## 2018-10-24 RX ADMIN — Medication 50 MILLIGRAM(S): at 05:33

## 2018-10-24 RX ADMIN — Medication 100 MILLIGRAM(S): at 11:00

## 2018-10-24 RX ADMIN — Medication 180 MILLIGRAM(S): at 05:33

## 2018-10-24 RX ADMIN — AMIODARONE HYDROCHLORIDE 200 MILLIGRAM(S): 400 TABLET ORAL at 05:33

## 2018-10-24 RX ADMIN — Medication 40 MILLIGRAM(S): at 05:33

## 2018-10-24 NOTE — PROGRESS NOTE ADULT - SUBJECTIVE AND OBJECTIVE BOX
<<<RESIDENT DISCHARGE NOTE>>>     NOAH QUIROS  MRN-[id number]    VITAL SIGNS:  T(F): 97.4 (10-24-18 @ 05:56), Max: 97.7 (10-23-18 @ 21:05)  HR: 74 (10-24-18 @ 05:56)  BP: 152/67 (10-24-18 @ 05:56)  SpO2: 93% (10-24-18 @ 05:56)      PHYSICAL EXAMINATION:  General:   Head & Neck:wnl  Pulmonary:clear b./l  Cardiovascular:s1s2  Gastrointestinal/Abdomen & Pelvis:BS+  Neurologic/Motor:grossly intact    TEST RESULTS:                        9.1    6.16  )-----------( 324      ( 23 Oct 2018 06:43 )             30.9       10-23    139  |  99  |  58<H>  ----------------------------<  117<H>  4.7   |  27  |  3.3<H>    Ca    10.2<H>      23 Oct 2018 06:43  Mg     2.1     10-23        FINAL DISCHARGE INTERVIEW:  Resident(s) Present: (Name:________Nanda_____), RN Present: (Name:  ________Lou___)    DISCHARGE MEDICATION RECONCILIATION  reviewed with Attending (Name:Aries___________)    DISPOSITION:   [ * ] Home,    [  ] Home with Visiting Nursing Services,   [    ]  SNF/ NH,    [   ] Acute Rehab (4A),   [   ] Other (Specify:_________)

## 2018-10-24 NOTE — CONSULT NOTE ADULT - SUBJECTIVE AND OBJECTIVE BOX
Patient is a 73y old  Female who presents with a chief complaint of SOB (22 Oct 2018 11:34)      HPI:  72 yo F with a PMH of Atrial Fibrillation (AC - Eliquis, s/p cardioversion), CAD s/p 4vCABG, ef 40 to 45%, mild/mod as, mod tr, pulm htn, CHF, CKD, Anxiety, GERD, and Gout presented to the ED for the evaluation of sob associated with palpitations. Patient states that in the evening she was overcome with gradual sob. Patient states that the shortness of breath began in the evening. It is positional in nature. Worse when she is lying down flat and improves when she is sitting up. Patient notes she has no LE edema. She admits to cough over the past few weeks that is productive of white sputum. She denies any hemoptysis. Patient states that since arrival symptoms have improved greatly. She is illing to go for an afib  ablation which ws discussed wit her in the past and pt had declined.  y. Patient states she has limitation with ambulation and at baseline can walk up 12 stairs. Patient admits to cough, runny nose, sob, orthopnea and mild chest pain that was burning like in sensation near xyphoid process and occurred after eating a large meal. Patient denies light handedness, dizziness, n/v/d/c, cp, abdominal pain, neck pain, dysuria. (22 Oct 2018 11:34)      PAST MEDICAL & SURGICAL HISTORY:  Atrial fibrillation  CKD (chronic kidney disease)  MI (myocardial infarction)  CAD (coronary artery disease)  Kidney stone  HTN (hypertension)  H/O abdominal hysterectomy  H/O cardiac radiofrequency ablation  History of open heart surgery      PREVIOUS DIAGNOSTIC TESTING:      ECHO  FINDINGS:< from: JOÃO w/Probe Placement (07.12.18 @ 09:18) >  Summary:   1. Left ventricular ejection fraction, by visual estimation, is 50 to   55%.   2. Thickening of the anterior and posterior mitral valve leaflets.   3. Mild tricuspid regurgitation.   4. Extensive ascending and descending aortic atheroma.  5. Atrial fibrilation, no thrombus,.   6. Recommend proceed with cardioversion.   7. There is mild aortic root calcification.        MEDICATIONS  (STANDING):  allopurinol 100 milliGRAM(s) Oral daily  amiodarone    Tablet 200 milliGRAM(s) Oral daily  apixaban 2.5 milliGRAM(s) Oral every 12 hours  calcitriol   Capsule 0.5 MICROGram(s) Oral daily  chlorhexidine 4% Liquid 1 Application(s) Topical <User Schedule>  cyanocobalamin 1000 MICROGram(s) Oral daily  diltiazem    milliGRAM(s) Oral daily  famotidine    Tablet 40 milliGRAM(s) Oral two times a day  furosemide    Tablet 40 milliGRAM(s) Oral daily  isosorbide   mononitrate ER Tablet (IMDUR) 30 milliGRAM(s) Oral at bedtime  metoprolol tartrate 50 milliGRAM(s) Oral two times a day    MEDICATIONS  (PRN):      FAMILY HISTORY:  DM and CAD FHX      SOCIAL HISTORY:  CIGARETTES: none  ALCOHOL:none  DRUGS:none                      REVIEW OF SYSTEMS:  CONSTITUTIONAL: No distress, Looks stable  NECK: No pain   RESPIRATORY: No cough, wheezing, (+)shortness of breath  CARDIOVASCULAR: No chest pain,(+) SOB,(-) palpitations, (+)leg swelling  GASTROINTESTINAL: No abdominal or epigastric pain. No nausea, vomiting, or hematemesis;  No melena.  NEUROLOGICAL: No dizziness, headaches, memory loss, loss of strength  SKIN: No itching, burning, rashes, or lesions   ENDOCRINE: No heat or cold intolerance  MUSCULOSKELETAL: No joint pain, No  swelling; No muscle pain          Vital Signs Last 24 Hrs  T(C): 35.7 (22 Oct 2018 16:29), Max: 36.7 (22 Oct 2018 04:48)  T(F): 96.3 (22 Oct 2018 16:29), Max: 98 (22 Oct 2018 04:48)  HR: 87 (22 Oct 2018 20:52) (70 - 118)  BP: 134/55 (22 Oct 2018 20:52) (125/67 - 174/81)  BP(mean): --  RR: 20 (22 Oct 2018 20:52) (18 - 20)  SpO2: 99% (22 Oct 2018 20:52) (94% - 99%)                      PHYSICAL EXAM:  GENERAL: No distress, well developed  HEAD:  Atraumatic, Normocephalic  NECK: Supple, No JVD, No Bruit of either carotid arteries  NERVOUS SYSTEM:  Alert, Awake, Oriented to time, place, person; Normal memory and speech; Normal motor Strength 5/5 B/L upper and lower extremities  CHEST/LUNG: Normal air entry to lung base bilaterally; No wheeze, crackle, rales, rhonchi  HEART: Regular heart beat, S1, A2, P2, No S3, No S4, No gallop, (+) eden rusb mid peaking murmur  ABDOMEN: Soft, Non tender, Non distended; Bowel sounds present  EXTREMITIES:  2+ Peripheral Pulses, No clubbing, (+) 1 lower ext edema  SKIN: No rashes or lesions    TELEMETRY: afib with mod v response    ECG:< from: 12 Lead ECG (10.22.18 @ 06:18) >    Diagnosis Line Atrial fibrillation with premature ventricular or aberrantly conducted  complexes  Cannot rule out Anterior infarct , age undetermined  Abnormal ECG    CXRAY < from: Xray Chest 1 View-PORTABLE IMMEDIATE (10.22.18 @ 06:28) >  Impression:      Bibasilar opacities.    Cardiomegaly.    < end of copied text >      I&O's Detail      LABS:                        9.5    7.47  )-----------( 314      ( 22 Oct 2018 05:33 )             32.3     10-22    135  |  95<L>  |  60<H>  ----------------------------<  136<H>  5.1<H>   |  27  |  3.2<H>    Ca    10.2<H>      22 Oct 2018 05:33    TPro  7.6  /  Alb  3.8  /  TBili  0.5  /  DBili  x   /  AST  18  /  ALT  12  /  AlkPhos  54  10-22    CARDIAC MARKERS ( 22 Oct 2018 16:17 )  x     / <0.01 ng/mL / 25 U/L / x     / 1.6 ng/mL  CARDIAC MARKERS ( 22 Oct 2018 05:33 )  x     / <0.01 ng/mL / 33 U/L / x     / 1.8 ng/mL      PT/INR - ( 22 Oct 2018 05:33 )   PT: 18.20 sec;   INR: 1.59 ratio         PTT - ( 22 Oct 2018 05:33 )  PTT:39.0 sec    I&O's Summary      RADIOLOGY & ADDITIONAL STUDIES:
Patient is a 73y old  Female who presents with a chief complaint of SOB, afib with RVR, CHF exacerbation (23 Oct 2018 19:48)      HPI:  72 yo F with a history of AFib on Eliquis, CAD s/p 4v CABG, CHF (EF 40-45%), CKD, anxiety admitted for palpitations and dyspnea, noted to have AFib with RVR () as well as heart failure (BNP > 22,000). Patient states she has a long standing history of atrial fibrillation. Of note, she just completed a course of Augmentin for a cough. She denies any chest pain, N/V/D, dizziness/lightheadedness or other cardiovascular complaints.       PAST MEDICAL & SURGICAL HISTORY:  Atrial fibrillation  CKD (chronic kidney disease)  MI (myocardial infarction)  CAD (coronary artery disease)  Kidney stone  HTN (hypertension)  H/O abdominal hysterectomy  H/O cardiac radiofrequency ablation  History of open heart surgery    PREVIOUS DIAGNOSTIC TESTING:      ECHO 5/10/18  1. Left ventricular ejection fraction, by visual estimation, is 45 to 50%.   2. Mildly decreased global left ventricular systolic function.   3. Mild to moderate mitral valve regurgitation.   4. Mild-moderate tricuspid regurgitation.   5. Mild aortic regurgitation.   6. Estimated pulmonary artery systolic pressure is 50.1 mmHg assuming a right atrial pressure of 10 mmHg, which is consistent with moderate   pulmonary hypertension.   7. Peak transaortic gradient equals 27.4 mmHg, mean transaortic gradient equals 15.3 mmHg, the calculated aortic valve area equals 1.04 cm² by the continuity equation consistent with moderate aortic stenosis.    JOÃO with cardioversion 7/12/18   1. Left ventricular ejection fraction, by visual estimation, is 50 to 55%.   2. Thickening of the anterior and posterior mitral valve leaflets.   3. Mild tricuspid regurgitation.   4. Extensive ascending and descending aortic atheroma.   5. Atrial fibrillation, no thrombus,.   6. Recommend proceed with cardioversion.   7. There is mild aortic root calcification.    MEDICATIONS  (STANDING):  allopurinol 100 milliGRAM(s) Oral daily  amiodarone    Tablet 200 milliGRAM(s) Oral daily  apixaban 2.5 milliGRAM(s) Oral every 12 hours  calcitriol   Capsule 0.5 MICROGram(s) Oral daily  chlorhexidine 4% Liquid 1 Application(s) Topical <User Schedule>  cyanocobalamin 1000 MICROGram(s) Oral daily  diltiazem    milliGRAM(s) Oral daily  famotidine    Tablet 40 milliGRAM(s) Oral two times a day  furosemide    Tablet 40 milliGRAM(s) Oral daily  isosorbide   mononitrate ER Tablet (IMDUR) 30 milliGRAM(s) Oral at bedtime  metoprolol tartrate 50 milliGRAM(s) Oral two times a day    MEDICATIONS  (PRN):    FAMILY HISTORY:  No pertinent family history in first degree relatives      SOCIAL HISTORY  CIGARETTES:  ALCOHOL:    Past Surgical History:   4v CABG    Allergies:  No Known Allergies    REVIEW OF SYSTEMS:  CONSTITUTIONAL: No fever, weight loss, chills  EYES: No visual problems  ENMT:  No difficulty hearing, tinnitus, vertigo  NECK: No neck pain   RESPIRATORY: Recent cough, + dyspnea   CARDIOVASCULAR: No chest pain, dizziness, syncope, paroxysmal nocturnal dyspnea, orthopnea, leg swelling  GASTROINTESTINAL: No abdominal pain, nausea, vomiting, hematemesis, diarrhea, constipation, melena or bright red blood  GENITOURINARY: No dysuria, nocturia, hematuria, or urinary incontinence  NEUROLOGICAL: No headaches, memory loss, slurred speech, limb weakness, loss of strength, numbness, or tremors  SKIN: No rashes   ENDOCRINE: No heat or cold intolerance, or hair loss  MUSCULOSKELETAL: No joint pain or swelling, muscle, back, or extremity pain  PSYCHIATRIC: No depression or anxiety  HEME/LYMPH: No easy bruising or bleeding gums      Vital Signs Last 24 Hrs  T(C): 36.3 (24 Oct 2018 05:56), Max: 36.5 (23 Oct 2018 21:05)  T(F): 97.4 (24 Oct 2018 05:56), Max: 97.7 (23 Oct 2018 21:05)  HR: 74 (24 Oct 2018 05:56) (74 - 91)  BP: 152/67 (24 Oct 2018 05:56) (148/63 - 152/67)  BP(mean): --  RR: 20 (24 Oct 2018 05:56) (20 - 20)  SpO2: 93% (24 Oct 2018 05:56) (93% - 96%)    TELEMETRY: AFib, rate controlled    PHYSICAL EXAM  GENERAL: No apparent distress, well developed, well nourished  HEAD:  Normocephalic, atraumatic  EYES: EOMI, PERRLA, conjunctiva and sclera clear  ENMT: Moist mucous membranes   NECK: Supple.  No JVD or carotid bruit.    HEART: irregularly irregular, 3/6 mid peaking systolic murmur, No rubs or gallops.  PULMONARY: Clear to auscultation bilaterally.  No rales, wheezing, or rhonchi bilaterally.  ABDOMEN: Normoactive bowel sounds, soft, nondistended, nontender   EXTREMITIES:  2+ Peripheral Pulses, No clubbing, cyanosis. 1+ pitting edema in b/l LE  NEUROLOGICAL: Grossly nonfocal      ECG 10/23: AFib with rates controlled, poor R wave progression     I&O's Detail    23 Oct 2018 07:01  -  24 Oct 2018 07:00  --------------------------------------------------------  IN:    Oral Fluid: 400 mL  Total IN: 400 mL    OUT:    Voided: 1 mL  Total OUT: 1 mL    Total NET: 399 mL      LABS:                        9.1    6.16  )-----------( 324      ( 23 Oct 2018 06:43 )             30.9     10-23    139  |  99  |  58<H>  ----------------------------<  117<H>  4.7   |  27  |  3.3<H>    Ca    10.2<H>      23 Oct 2018 06:43  Mg     2.1     10-23      CARDIAC MARKERS ( 22 Oct 2018 16:17 )  x     / <0.01 ng/mL / 25 U/L / x     / 1.6 ng/mL      PT/INR - ( 23 Oct 2018 06:43 )   PT: 20.50 sec;   INR: 1.79 ratio         PTT - ( 23 Oct 2018 06:43 )  PTT:38.9 sec    BNP  I&O's Detail    23 Oct 2018 07:01  -  24 Oct 2018 07:00  --------------------------------------------------------  IN:    Oral Fluid: 400 mL  Total IN: 400 mL    OUT:    Voided: 1 mL  Total OUT: 1 mL    Total NET: 399 mL    Daily     Daily     RADIOLOGY & ADDITIONAL STUDIES:  CXR 10/22/18  Bibasilar opacities.  Cardiomegaly.

## 2018-10-24 NOTE — CONSULT NOTE ADULT - ATTENDING COMMENTS
Discussed in detail with the patient the process of an ablation as well as the risks and benefits of the procedure. She has failed Amiodarone therapy and is a good candidate for atrial fibrillation ablation. She would like to discuss this further with her daughter before proceeding.     In the meantime, please continue Eliquis and Amiodarone.   Check TSH.   LFT's normal.   Follow up with me in the office in 2 weeks (I provided her with my card).     Stephanie Rehman MD  Electrophysiology

## 2018-10-30 DIAGNOSIS — K21.9 GASTRO-ESOPHAGEAL REFLUX DISEASE WITHOUT ESOPHAGITIS: ICD-10-CM

## 2018-10-30 DIAGNOSIS — R06.02 SHORTNESS OF BREATH: ICD-10-CM

## 2018-10-30 DIAGNOSIS — N18.4 CHRONIC KIDNEY DISEASE, STAGE 4 (SEVERE): ICD-10-CM

## 2018-10-30 DIAGNOSIS — R09.82 POSTNASAL DRIP: ICD-10-CM

## 2018-10-30 DIAGNOSIS — R07.9 CHEST PAIN, UNSPECIFIED: ICD-10-CM

## 2018-10-30 DIAGNOSIS — I50.22 CHRONIC SYSTOLIC (CONGESTIVE) HEART FAILURE: ICD-10-CM

## 2018-10-30 DIAGNOSIS — I13.0 HYPERTENSIVE HEART AND CHRONIC KIDNEY DISEASE WITH HEART FAILURE AND STAGE 1 THROUGH STAGE 4 CHRONIC KIDNEY DISEASE, OR UNSPECIFIED CHRONIC KIDNEY DISEASE: ICD-10-CM

## 2018-10-30 DIAGNOSIS — M10.9 GOUT, UNSPECIFIED: ICD-10-CM

## 2018-10-30 DIAGNOSIS — I48.91 UNSPECIFIED ATRIAL FIBRILLATION: ICD-10-CM

## 2018-10-30 DIAGNOSIS — Z95.1 PRESENCE OF AORTOCORONARY BYPASS GRAFT: ICD-10-CM

## 2018-10-30 DIAGNOSIS — E78.5 HYPERLIPIDEMIA, UNSPECIFIED: ICD-10-CM

## 2018-10-30 DIAGNOSIS — I25.10 ATHEROSCLEROTIC HEART DISEASE OF NATIVE CORONARY ARTERY WITHOUT ANGINA PECTORIS: ICD-10-CM

## 2018-10-31 ENCOUNTER — APPOINTMENT (OUTPATIENT)
Dept: CARDIOLOGY | Facility: CLINIC | Age: 73
End: 2018-10-31

## 2018-10-31 VITALS
OXYGEN SATURATION: 95 % | WEIGHT: 165 LBS | DIASTOLIC BLOOD PRESSURE: 68 MMHG | SYSTOLIC BLOOD PRESSURE: 163 MMHG | BODY MASS INDEX: 26.52 KG/M2 | HEART RATE: 85 BPM | HEIGHT: 66 IN

## 2018-10-31 DIAGNOSIS — L85.9 EPIDERMAL THICKENING, UNSPECIFIED: ICD-10-CM

## 2018-10-31 DIAGNOSIS — Z82.49 FAMILY HISTORY OF ISCHEMIC HEART DISEASE AND OTHER DISEASES OF THE CIRCULATORY SYSTEM: ICD-10-CM

## 2018-10-31 DIAGNOSIS — Z87.891 PERSONAL HISTORY OF NICOTINE DEPENDENCE: ICD-10-CM

## 2018-10-31 DIAGNOSIS — Z83.3 FAMILY HISTORY OF DIABETES MELLITUS: ICD-10-CM

## 2018-10-31 DIAGNOSIS — Z86.2 PERSONAL HISTORY OF DISEASES OF THE BLOOD AND BLOOD-FORMING ORGANS AND CERTAIN DISORDERS INVOLVING THE IMMUNE MECHANISM: ICD-10-CM

## 2018-10-31 DIAGNOSIS — Z80.1 FAMILY HISTORY OF MALIGNANT NEOPLASM OF TRACHEA, BRONCHUS AND LUNG: ICD-10-CM

## 2018-11-11 ENCOUNTER — OUTPATIENT (OUTPATIENT)
Dept: OUTPATIENT SERVICES | Facility: HOSPITAL | Age: 73
LOS: 1 days | Discharge: HOME | End: 2018-11-11

## 2018-11-11 DIAGNOSIS — I48.0 PAROXYSMAL ATRIAL FIBRILLATION: ICD-10-CM

## 2018-11-11 DIAGNOSIS — Z98.890 OTHER SPECIFIED POSTPROCEDURAL STATES: Chronic | ICD-10-CM

## 2018-11-11 DIAGNOSIS — Z90.710 ACQUIRED ABSENCE OF BOTH CERVIX AND UTERUS: Chronic | ICD-10-CM

## 2018-11-24 ENCOUNTER — INPATIENT (INPATIENT)
Facility: HOSPITAL | Age: 73
LOS: 4 days | Discharge: HOME HEALTH | End: 2018-11-29
Attending: INTERNAL MEDICINE | Admitting: INTERNAL MEDICINE

## 2018-11-24 VITALS
HEART RATE: 68 BPM | RESPIRATION RATE: 20 BRPM | TEMPERATURE: 97 F | SYSTOLIC BLOOD PRESSURE: 163 MMHG | OXYGEN SATURATION: 94 % | DIASTOLIC BLOOD PRESSURE: 102 MMHG

## 2018-11-24 DIAGNOSIS — Z98.890 OTHER SPECIFIED POSTPROCEDURAL STATES: Chronic | ICD-10-CM

## 2018-11-24 DIAGNOSIS — Z90.710 ACQUIRED ABSENCE OF BOTH CERVIX AND UTERUS: Chronic | ICD-10-CM

## 2018-11-24 LAB
ALBUMIN SERPL ELPH-MCNC: 4.1 G/DL — SIGNIFICANT CHANGE UP (ref 3.5–5.2)
ALP SERPL-CCNC: 62 U/L — SIGNIFICANT CHANGE UP (ref 30–115)
ALT FLD-CCNC: 13 U/L — SIGNIFICANT CHANGE UP (ref 0–41)
ANION GAP SERPL CALC-SCNC: 19 MMOL/L — HIGH (ref 7–14)
APTT BLD: 40.2 SEC — HIGH (ref 27–39.2)
AST SERPL-CCNC: 13 U/L — SIGNIFICANT CHANGE UP (ref 0–41)
BASOPHILS # BLD AUTO: 0.03 K/UL — SIGNIFICANT CHANGE UP (ref 0–0.2)
BASOPHILS NFR BLD AUTO: 0.3 % — SIGNIFICANT CHANGE UP (ref 0–1)
BILIRUB SERPL-MCNC: 0.5 MG/DL — SIGNIFICANT CHANGE UP (ref 0.2–1.2)
BUN SERPL-MCNC: 52 MG/DL — HIGH (ref 10–20)
CALCIUM SERPL-MCNC: 9.6 MG/DL — SIGNIFICANT CHANGE UP (ref 8.5–10.1)
CHLORIDE SERPL-SCNC: 97 MMOL/L — LOW (ref 98–110)
CK MB CFR SERPL CALC: 1.8 NG/ML — SIGNIFICANT CHANGE UP (ref 0.6–6.3)
CK SERPL-CCNC: 30 U/L — SIGNIFICANT CHANGE UP (ref 0–225)
CO2 SERPL-SCNC: 22 MMOL/L — SIGNIFICANT CHANGE UP (ref 17–32)
CREAT SERPL-MCNC: 3.3 MG/DL — HIGH (ref 0.7–1.5)
EOSINOPHIL # BLD AUTO: 0.13 K/UL — SIGNIFICANT CHANGE UP (ref 0–0.7)
EOSINOPHIL NFR BLD AUTO: 1.2 % — SIGNIFICANT CHANGE UP (ref 0–8)
GLUCOSE SERPL-MCNC: 141 MG/DL — HIGH (ref 70–99)
HCT VFR BLD CALC: 32.6 % — LOW (ref 37–47)
HGB BLD-MCNC: 9.6 G/DL — LOW (ref 12–16)
IMM GRANULOCYTES NFR BLD AUTO: 0.4 % — HIGH (ref 0.1–0.3)
INR BLD: 2.03 RATIO — HIGH (ref 0.65–1.3)
LYMPHOCYTES # BLD AUTO: 0.52 K/UL — LOW (ref 1.2–3.4)
LYMPHOCYTES # BLD AUTO: 4.9 % — LOW (ref 20.5–51.1)
MAGNESIUM SERPL-MCNC: 1.9 MG/DL — SIGNIFICANT CHANGE UP (ref 1.8–2.4)
MCHC RBC-ENTMCNC: 26.3 PG — LOW (ref 27–31)
MCHC RBC-ENTMCNC: 29.4 G/DL — LOW (ref 32–37)
MCV RBC AUTO: 89.3 FL — SIGNIFICANT CHANGE UP (ref 81–99)
MONOCYTES # BLD AUTO: 0.79 K/UL — HIGH (ref 0.1–0.6)
MONOCYTES NFR BLD AUTO: 7.5 % — SIGNIFICANT CHANGE UP (ref 1.7–9.3)
NEUTROPHILS # BLD AUTO: 9.06 K/UL — HIGH (ref 1.4–6.5)
NEUTROPHILS NFR BLD AUTO: 85.7 % — HIGH (ref 42.2–75.2)
NRBC # BLD: 0 /100 WBCS — SIGNIFICANT CHANGE UP (ref 0–0)
NT-PROBNP SERPL-SCNC: HIGH PG/ML (ref 0–300)
PLATELET # BLD AUTO: 286 K/UL — SIGNIFICANT CHANGE UP (ref 130–400)
POTASSIUM SERPL-MCNC: 4.8 MMOL/L — SIGNIFICANT CHANGE UP (ref 3.5–5)
POTASSIUM SERPL-SCNC: 4.8 MMOL/L — SIGNIFICANT CHANGE UP (ref 3.5–5)
PROT SERPL-MCNC: 7.3 G/DL — SIGNIFICANT CHANGE UP (ref 6–8)
PROTHROM AB SERPL-ACNC: 23.2 SEC — HIGH (ref 9.95–12.87)
RBC # BLD: 3.65 M/UL — LOW (ref 4.2–5.4)
RBC # FLD: 16.7 % — HIGH (ref 11.5–14.5)
SODIUM SERPL-SCNC: 138 MMOL/L — SIGNIFICANT CHANGE UP (ref 135–146)
TROPONIN T SERPL-MCNC: <0.01 NG/ML — SIGNIFICANT CHANGE UP
WBC # BLD: 10.57 K/UL — SIGNIFICANT CHANGE UP (ref 4.8–10.8)
WBC # FLD AUTO: 10.57 K/UL — SIGNIFICANT CHANGE UP (ref 4.8–10.8)

## 2018-11-24 RX ORDER — FUROSEMIDE 40 MG
60 TABLET ORAL ONCE
Qty: 0 | Refills: 0 | Status: COMPLETED | OUTPATIENT
Start: 2018-11-24 | End: 2018-11-24

## 2018-11-24 NOTE — ED PROVIDER NOTE - NS ED ROS FT
GEN: (-) fever, (-) chills (-) malaise  HEENT: (-) vision changes, (-) HA  CV: (+) chest pressure, (+) palpitations, (-) edema, (+) shortness of breath  PULM: (-) cough, (-) wheezing, (+) dyspnea, (+) orthopnea, (-) hemoptysis   GI: (-) abdominal pain,(-) Nausea, (-) Vomiting, (-) Diarrhea, (-) Melena  NEURO: (-) weakness, (-) paresthesias, (-) syncope  : (-) dysuria, (-) frequency, (-) urgency, (-) incontinence   MS: (-) back pain, (-) joint pain, (-)myalgias, (-) swelling  SKIN: (-) rashes, (-) new lesions, (-) pruritus, (-) jaundice  HEME: (-) bleeding, (-) ecchymosis

## 2018-11-24 NOTE — ED PROVIDER NOTE - ATTENDING CONTRIBUTION TO CARE
74 yo f with pmh of afib on eliquis, scheduled for ablation on dec 14 with dr. santiago, EP, cad, cabg, chf, ckd, presents with c/o sob x 3 days.  +mild increase in leg swelling.  +chest pressure.  no abd pain, n/v/d, back pain, leg pain.  exam: nad, ncat, perrl, eomi, mmm, rrr, dec bs at bases, right sided rales, abd soft, nt,nd aox3, +pitting edema imp: pt with chf exacerbation, cxr, labs, lasix admission

## 2018-11-24 NOTE — ED PROVIDER NOTE - PROGRESS NOTE DETAILS
Spoke with MAR, admitting patient under Dr. Chris. I personally evaluated the patient. I reviewed the Physician Assistant Fellow's note and agree with the findings and plan.

## 2018-11-24 NOTE — ED PROVIDER NOTE - OBJECTIVE STATEMENT
The patient is a 73y Female with PMH CKD, CAD, HTN, and A-fib is presenting with shortness of breath x 3 days. She notes some constant chest pressure, non-radiating that started 3 days ago. She endorses sob worse with exertion, and orthopnea. Patient states she is rate controlled on diltiazem and is on Eliquis. She states she has noticed her heart rate getting up into the 120s. She is scheduled for an ablation with Dr. Castillo on Dec. 14th. She denies cough, abdominal pain, f/c/n/v/d, increased leg swelling, and urinary changes.

## 2018-11-24 NOTE — ED PROVIDER NOTE - CARE PLAN
Principal Discharge DX:	CHF exacerbation  Secondary Diagnosis:	Atrial fibrillation  Secondary Diagnosis:	Dyspnea

## 2018-11-24 NOTE — ED ADULT NURSE NOTE - NSIMPLEMENTINTERV_GEN_ALL_ED
Implemented All Universal Safety Interventions:  [place] to call system. Call bell, personal items and telephone within reach. Instruct patient to call for assistance. Room bathroom lighting operational. Non-slip footwear when patient is off stretcher. Physically safe environment: no spills, clutter or unnecessary equipment. Stretcher in lowest position, wheels locked, appropriate side rails in place.

## 2018-11-24 NOTE — ED PROVIDER NOTE - PHYSICAL EXAMINATION
GEN: Alert & Oriented x 3, No acute distress. Calm, appropriate.  Eyes: PERRL. No conjunctival injection. No scleral icterus.   RESP: Decreased breath sounds and rales noted to base of bilateral lung fields. No wheezes or rhonchi. No retractions. Equal air entry. No tachypnea.   CARDIO: regular rate and irregular rhythm, systolic murmur heard over aortic valve. Radial pulses 2+ bilaterally. Slight pitting edema.   ABD: Soft, Nondistended. No rebound tenderness/guarding. No pulsatile mass. No tenderness with light and deep palpation.  MS: Full ROM of extremities.  SKIN: no rashes/lesions, no petechiae, no ecchymosis. Skin warm and dry.   NEURO: CN II-XII grossly intact. Speech and cognition normal.

## 2018-11-25 DIAGNOSIS — N18.9 CHRONIC KIDNEY DISEASE, UNSPECIFIED: ICD-10-CM

## 2018-11-25 DIAGNOSIS — I48.91 UNSPECIFIED ATRIAL FIBRILLATION: ICD-10-CM

## 2018-11-25 DIAGNOSIS — I10 ESSENTIAL (PRIMARY) HYPERTENSION: ICD-10-CM

## 2018-11-25 DIAGNOSIS — I25.10 ATHEROSCLEROTIC HEART DISEASE OF NATIVE CORONARY ARTERY WITHOUT ANGINA PECTORIS: ICD-10-CM

## 2018-11-25 LAB
ALBUMIN SERPL ELPH-MCNC: 3.9 G/DL — SIGNIFICANT CHANGE UP (ref 3.5–5.2)
ALP SERPL-CCNC: 57 U/L — SIGNIFICANT CHANGE UP (ref 30–115)
ALT FLD-CCNC: 11 U/L — SIGNIFICANT CHANGE UP (ref 0–41)
ANION GAP SERPL CALC-SCNC: 18 MMOL/L — HIGH (ref 7–14)
AST SERPL-CCNC: 10 U/L — SIGNIFICANT CHANGE UP (ref 0–41)
BASOPHILS # BLD AUTO: 0.03 K/UL — SIGNIFICANT CHANGE UP (ref 0–0.2)
BASOPHILS NFR BLD AUTO: 0.4 % — SIGNIFICANT CHANGE UP (ref 0–1)
BILIRUB SERPL-MCNC: 0.5 MG/DL — SIGNIFICANT CHANGE UP (ref 0.2–1.2)
BUN SERPL-MCNC: 52 MG/DL — HIGH (ref 10–20)
CALCIUM SERPL-MCNC: 9.9 MG/DL — SIGNIFICANT CHANGE UP (ref 8.5–10.1)
CHLORIDE SERPL-SCNC: 99 MMOL/L — SIGNIFICANT CHANGE UP (ref 98–110)
CK MB CFR SERPL CALC: 1.6 NG/ML — SIGNIFICANT CHANGE UP (ref 0.6–6.3)
CK SERPL-CCNC: 25 U/L — SIGNIFICANT CHANGE UP (ref 0–225)
CO2 SERPL-SCNC: 24 MMOL/L — SIGNIFICANT CHANGE UP (ref 17–32)
CREAT SERPL-MCNC: 3.3 MG/DL — HIGH (ref 0.7–1.5)
EOSINOPHIL # BLD AUTO: 0.13 K/UL — SIGNIFICANT CHANGE UP (ref 0–0.7)
EOSINOPHIL NFR BLD AUTO: 1.8 % — SIGNIFICANT CHANGE UP (ref 0–8)
GLUCOSE SERPL-MCNC: 116 MG/DL — HIGH (ref 70–99)
HCT VFR BLD CALC: 31.5 % — LOW (ref 37–47)
HGB BLD-MCNC: 9.4 G/DL — LOW (ref 12–16)
IMM GRANULOCYTES NFR BLD AUTO: 0.3 % — SIGNIFICANT CHANGE UP (ref 0.1–0.3)
LYMPHOCYTES # BLD AUTO: 0.73 K/UL — LOW (ref 1.2–3.4)
LYMPHOCYTES # BLD AUTO: 10.1 % — LOW (ref 20.5–51.1)
MAGNESIUM SERPL-MCNC: 1.9 MG/DL — SIGNIFICANT CHANGE UP (ref 1.8–2.4)
MCHC RBC-ENTMCNC: 26.6 PG — LOW (ref 27–31)
MCHC RBC-ENTMCNC: 29.8 G/DL — LOW (ref 32–37)
MCV RBC AUTO: 89.2 FL — SIGNIFICANT CHANGE UP (ref 81–99)
MONOCYTES # BLD AUTO: 0.73 K/UL — HIGH (ref 0.1–0.6)
MONOCYTES NFR BLD AUTO: 10.1 % — HIGH (ref 1.7–9.3)
NEUTROPHILS # BLD AUTO: 5.61 K/UL — SIGNIFICANT CHANGE UP (ref 1.4–6.5)
NEUTROPHILS NFR BLD AUTO: 77.3 % — HIGH (ref 42.2–75.2)
PLATELET # BLD AUTO: 251 K/UL — SIGNIFICANT CHANGE UP (ref 130–400)
POTASSIUM SERPL-MCNC: 4.1 MMOL/L — SIGNIFICANT CHANGE UP (ref 3.5–5)
POTASSIUM SERPL-SCNC: 4.1 MMOL/L — SIGNIFICANT CHANGE UP (ref 3.5–5)
PROT SERPL-MCNC: 7 G/DL — SIGNIFICANT CHANGE UP (ref 6–8)
RBC # BLD: 3.53 M/UL — LOW (ref 4.2–5.4)
RBC # FLD: 17 % — HIGH (ref 11.5–14.5)
SODIUM SERPL-SCNC: 141 MMOL/L — SIGNIFICANT CHANGE UP (ref 135–146)
TROPONIN T SERPL-MCNC: <0.01 NG/ML — SIGNIFICANT CHANGE UP
TYPE + AB SCN PNL BLD: SIGNIFICANT CHANGE UP
WBC # BLD: 7.25 K/UL — SIGNIFICANT CHANGE UP (ref 4.8–10.8)
WBC # FLD AUTO: 7.25 K/UL — SIGNIFICANT CHANGE UP (ref 4.8–10.8)

## 2018-11-25 RX ORDER — PREGABALIN 225 MG/1
1000 CAPSULE ORAL DAILY
Qty: 0 | Refills: 0 | Status: DISCONTINUED | OUTPATIENT
Start: 2018-11-25 | End: 2018-11-29

## 2018-11-25 RX ORDER — FUROSEMIDE 40 MG
40 TABLET ORAL DAILY
Qty: 0 | Refills: 0 | Status: DISCONTINUED | OUTPATIENT
Start: 2018-11-25 | End: 2018-11-28

## 2018-11-25 RX ORDER — AMIODARONE HYDROCHLORIDE 400 MG/1
200 TABLET ORAL DAILY
Qty: 0 | Refills: 0 | Status: DISCONTINUED | OUTPATIENT
Start: 2018-11-25 | End: 2018-11-29

## 2018-11-25 RX ORDER — ACETAMINOPHEN 500 MG
650 TABLET ORAL EVERY 6 HOURS
Qty: 0 | Refills: 0 | Status: DISCONTINUED | OUTPATIENT
Start: 2018-11-25 | End: 2018-11-29

## 2018-11-25 RX ORDER — FAMOTIDINE 10 MG/ML
20 INJECTION INTRAVENOUS DAILY
Qty: 0 | Refills: 0 | Status: DISCONTINUED | OUTPATIENT
Start: 2018-11-25 | End: 2018-11-29

## 2018-11-25 RX ORDER — ALLOPURINOL 300 MG
100 TABLET ORAL DAILY
Qty: 0 | Refills: 0 | Status: DISCONTINUED | OUTPATIENT
Start: 2018-11-25 | End: 2018-11-29

## 2018-11-25 RX ORDER — ISOSORBIDE MONONITRATE 60 MG/1
30 TABLET, EXTENDED RELEASE ORAL AT BEDTIME
Qty: 0 | Refills: 0 | Status: DISCONTINUED | OUTPATIENT
Start: 2018-11-25 | End: 2018-11-29

## 2018-11-25 RX ORDER — METOPROLOL TARTRATE 50 MG
50 TABLET ORAL
Qty: 0 | Refills: 0 | Status: DISCONTINUED | OUTPATIENT
Start: 2018-11-25 | End: 2018-11-29

## 2018-11-25 RX ORDER — APIXABAN 2.5 MG/1
2.5 TABLET, FILM COATED ORAL EVERY 12 HOURS
Qty: 0 | Refills: 0 | Status: DISCONTINUED | OUTPATIENT
Start: 2018-11-25 | End: 2018-11-26

## 2018-11-25 RX ORDER — FAMOTIDINE 10 MG/ML
40 INJECTION INTRAVENOUS
Qty: 0 | Refills: 0 | Status: DISCONTINUED | OUTPATIENT
Start: 2018-11-25 | End: 2018-11-25

## 2018-11-25 RX ORDER — DILTIAZEM HCL 120 MG
180 CAPSULE, EXT RELEASE 24 HR ORAL DAILY
Qty: 0 | Refills: 0 | Status: DISCONTINUED | OUTPATIENT
Start: 2018-11-25 | End: 2018-11-26

## 2018-11-25 RX ORDER — CALCITRIOL 0.5 UG/1
0.5 CAPSULE ORAL DAILY
Qty: 0 | Refills: 0 | Status: DISCONTINUED | OUTPATIENT
Start: 2018-11-25 | End: 2018-11-29

## 2018-11-25 RX ADMIN — Medication 60 MILLIGRAM(S): at 00:16

## 2018-11-25 RX ADMIN — ISOSORBIDE MONONITRATE 30 MILLIGRAM(S): 60 TABLET, EXTENDED RELEASE ORAL at 21:30

## 2018-11-25 RX ADMIN — APIXABAN 2.5 MILLIGRAM(S): 2.5 TABLET, FILM COATED ORAL at 17:19

## 2018-11-25 RX ADMIN — Medication 180 MILLIGRAM(S): at 06:40

## 2018-11-25 RX ADMIN — AMIODARONE HYDROCHLORIDE 200 MILLIGRAM(S): 400 TABLET ORAL at 06:40

## 2018-11-25 RX ADMIN — Medication 40 MILLIGRAM(S): at 06:41

## 2018-11-25 RX ADMIN — Medication 650 MILLIGRAM(S): at 21:33

## 2018-11-25 RX ADMIN — Medication 650 MILLIGRAM(S): at 12:09

## 2018-11-25 RX ADMIN — Medication 50 MILLIGRAM(S): at 06:51

## 2018-11-25 RX ADMIN — Medication 650 MILLIGRAM(S): at 11:18

## 2018-11-25 RX ADMIN — CALCITRIOL 0.5 MICROGRAM(S): 0.5 CAPSULE ORAL at 11:19

## 2018-11-25 RX ADMIN — PREGABALIN 1000 MICROGRAM(S): 225 CAPSULE ORAL at 11:19

## 2018-11-25 RX ADMIN — Medication 100 MILLIGRAM(S): at 11:19

## 2018-11-25 RX ADMIN — FAMOTIDINE 20 MILLIGRAM(S): 10 INJECTION INTRAVENOUS at 11:19

## 2018-11-25 RX ADMIN — Medication 50 MILLIGRAM(S): at 17:19

## 2018-11-25 RX ADMIN — APIXABAN 2.5 MILLIGRAM(S): 2.5 TABLET, FILM COATED ORAL at 06:40

## 2018-11-25 NOTE — CONSULT NOTE ADULT - PROBLEM SELECTOR RECOMMENDATION 9
cont amio and metoprolol   change cardizem to 60 po q6 x 24 hours and if hr controlled then change to cardizem cd 240 po q24  cont anticoagulation  ekg today

## 2018-11-25 NOTE — H&P ADULT - NSHPLABSRESULTS_GEN_ALL_CORE
9.6    10.57 )-----------( 286      ( 24 Nov 2018 20:02 )             32.6       11-24    138  |  97<L>  |  52<H>  ----------------------------<  141<H>  4.8   |  22  |  3.3<H>    Ca    9.6      24 Nov 2018 20:02  Mg     1.9     11-24    TPro  7.3  /  Alb  4.1  /  TBili  0.5  /  DBili  x   /  AST  13  /  ALT  13  /  AlkPhos  62  11-24           PT/INR - ( 24 Nov 2018 20:02 )   PT: 23.20 sec;   INR: 2.03 ratio    PTT - ( 24 Nov 2018 20:02 )  PTT:40.2 sec    CARDIAC MARKERS ( 24 Nov 2018 20:02 )  x     / <0.01 ng/mL / 30 U/L / x     / 1.8 ng/mL

## 2018-11-25 NOTE — CONSULT NOTE ADULT - SUBJECTIVE AND OBJECTIVE BOX
Patient is a 73y old  Female who presents with a chief complaint of SOB and palpitations (25 Nov 2018 00:55)      HPI:  73F with pmhx of A-fib on eliquis (s/p failed ablation and hx of difficulty controlling rate), CAD s/p 4V CABG(2005), CKD IV, Valvular Heart Disease (MV Regurg), Anxiety, and GERD presents from home for shortness of breath x 3 days. Patient states her shortness of breath is associated with palpitations and minimal exertion and occasionally at rest. Patient noticed her heart rate was uncontrolled yesterday at 129. Currently feels significant improved.. Patient is compliant with all her medications,   patient was scheduled to have an ablation in Dec. With EPS Dr. Castillo. Received a CT scan of the chest as out patient which showed a 1.4cm thyroid nodule, and pulm nodules with the largest was 4mm. There was also trace L pulm effusion and non-specific pulmonary parenchymal changes noted.   Last admission in Oct was for similar symptoms, discharged with increased dose of cardizem.     Patient denied chest pain, dizziness, increased leg swelling, recent illness, fever, chills, cough, sick contacts, or LE pain. Patient has been compliant with all medications since last admission. Orthopnea has been at baseline with no significant changes     In ED: HR: 68      BP: 168/102      RR: 20, 94%on RA   Given lasix 60mg IV (25 Nov 2018 00:55)      PAST MEDICAL & SURGICAL HISTORY:  Atrial fibrillation  CKD (chronic kidney disease)  MI (myocardial infarction)  CAD (coronary artery disease)  Kidney stone  HTN (hypertension)  H/O abdominal hysterectomy  H/O cardiac radiofrequency ablation  History of open heart surgery      PREVIOUS DIAGNOSTIC TESTING:      ECHO< from: JOÃO w/Probe Placement (07.12.18 @ 09:18) >  ummary:   1. Left ventricular ejection fraction, by visual estimation, is 50 to   55%.   2. Thickening of the anterior and posterior mitral valve leaflets.   3. Mild tricuspid regurgitation.   4. Extensive ascending and descending aortic atheroma.  5. Atrial fibrilation, no thrombus,.   6. Recommend proceed with cardioversion.   7. There is mild aortic root calcification.      < end of copied text >    FINDINGS:    STRESS TEST  FINDINGS: small lateral wall ischemia    MEDICATIONS  (STANDING):  allopurinol 100 milliGRAM(s) Oral daily  amiodarone    Tablet 200 milliGRAM(s) Oral daily  apixaban 2.5 milliGRAM(s) Oral every 12 hours  calcitriol   Capsule 0.5 MICROGram(s) Oral daily  cyanocobalamin 1000 MICROGram(s) Oral daily  diltiazem    milliGRAM(s) Oral daily  famotidine    Tablet 20 milliGRAM(s) Oral daily  furosemide    Tablet 40 milliGRAM(s) Oral daily  isosorbide   mononitrate ER Tablet (IMDUR) 30 milliGRAM(s) Oral at bedtime  metoprolol tartrate 50 milliGRAM(s) Oral two times a day    MEDICATIONS  (PRN):  acetaminophen   Tablet .. 650 milliGRAM(s) Oral every 6 hours PRN Mild Pain (1 - 3)      FAMILY HISTORY:  Family history of lung cancer (Father)  Family history of CHF (congestive heart failure) (Mother)      SOCIAL HISTORY:  CIGARETTES: none  ALCOHOL: none  DRUGS: none                      REVIEW OF SYSTEMS:  CONSTITUTIONAL: No distress, Looks stable  NECK: No pain   RESPIRATORY: No cough, wheezing, (+)shortness of breath  CARDIOVASCULAR: No chest pain, (+)SOB,(+) palpitations, leg swelling  GASTROINTESTINAL: No abdominal or epigastric pain. No nausea, vomiting, or hematemesis;  No melena.  NEUROLOGICAL: No dizziness, headaches, memory loss, loss of strength  SKIN: No itching, burning, rashes, or lesions   ENDOCRINE: No heat or cold intolerance  MUSCULOSKELETAL: No joint pain, No  swelling; No muscle pain  PSYCHIATRIC: No depression, anxiety, mood swings, or difficulty sleeping  ALLERGY: No hives, itching, rash          Vital Signs Last 24 Hrs  T(C): 36.4 (25 Nov 2018 06:36), Max: 36.4 (25 Nov 2018 06:36)  T(F): 97.5 (25 Nov 2018 06:36), Max: 97.5 (25 Nov 2018 06:36)  HR: 114 (25 Nov 2018 06:36) (68 - 114)  BP: 146/97 (25 Nov 2018 06:36) (128/60 - 163/102)  BP(mean): --  RR: 18 (25 Nov 2018 06:36) (18 - 20)  SpO2: 92% (25 Nov 2018 06:42) (92% - 98%)                      PHYSICAL EXAM:  GENERAL: No distress, well developed  HEAD:  Atraumatic, Normocephalic  NECK: Supple, No JVD, No Bruit of either carotid arteries  NERVOUS SYSTEM:  Alert, Awake, Oriented to time, place, person; Normal memory and speech; Normal motor Strength 5/5 B/L upper and lower extremities  CHEST/LUNG: Normal air entry to lung base bilaterally; No wheeze, crackle, rales, rhonchi  HEART: irregular irregular  heart beat, S1, A2, P2, No S3, No S4, No gallop, 2/6 eden lsb  ABDOMEN: Soft, Non tender, Non distended; Bowel sounds present  EXTREMITIES:  2+ Peripheral Pulses, No clubbing, No edema  SKIN: No rashes or lesions      ECG: pending  CXRAY < from: Xray Chest 1 View AP/PA (11.24.18 @ 21:12) >  Right costophrenic angle small pleural effusion versus pleural thickening.      < end of copied text >      I&O's Detail      LABS:                        9.4    7.25  )-----------( 251      ( 25 Nov 2018 07:31 )             31.5     11-25    141  |  99  |  52<H>  ----------------------------<  116<H>  4.1   |  24  |  3.3<H>    Ca    9.9      25 Nov 2018 07:31  Mg     1.9     11-25    TPro  7.0  /  Alb  3.9  /  TBili  0.5  /  DBili  x   /  AST  10  /  ALT  11  /  AlkPhos  57  11-25    CARDIAC MARKERS ( 25 Nov 2018 07:31 )  x     / <0.01 ng/mL / 25 U/L / x     / 1.6 ng/mL  CARDIAC MARKERS ( 24 Nov 2018 20:02 )  x     / <0.01 ng/mL / 30 U/L / x     / 1.8 ng/mL      PT/INR - ( 24 Nov 2018 20:02 )   PT: 23.20 sec;   INR: 2.03 ratio         PTT - ( 24 Nov 2018 20:02 )  PTT:40.2 sec    I&O's Summary      RADIOLOGY & ADDITIONAL STUDIES:

## 2018-11-25 NOTE — H&P ADULT - NSHPSOCIALHISTORY_GEN_ALL_CORE
Substance Use (street drugs): ( x ) never used  (  ) other:  Tobacco Usage:  (   ) never smoked   (  x ) former smoker   (   ) current smoker  : 10 pack years, quit >20 years ago   Alcohol Usage: Denied

## 2018-11-25 NOTE — PROGRESS NOTE ADULT - ASSESSMENT
Shortness of breath and palpitations due to afib with rapid ventricular response, PVCs and diastolic CHF exacerbation    Hx of Afib, AC with Eliquis, failed cardiac cblation  Hx of HTN, ASHD, MI, sp CABG, afib, little CHF  Hx of Hyperlipidemia  Hx of CKD IV  Hx of thyroid nodule (1.4cm)  Hx of GERD, diverticulosis  Hx of OA, DDD, DJD  Hx of hysterectomy      pt was evaluated by cardiology and EPS  pt's HR better controlled  medications adjusted  monitor electrolytes and renal parameters

## 2018-11-25 NOTE — CONSULT NOTE ADULT - ASSESSMENT
# sob 2/2 a fib with RVR  -last EKG in electronic chart is from oct 2018, no physical ekg in chart  -get repeat EKG now  check tsh  -c/w amio and metoprolol  cardizem 60 q6h # sob 2/2 a fib with RVR  -last EKG in electronic chart is from oct 2018, no physical ekg in chart  -get repeat EKG now  check tsh and free t4  -c/w amio and metoprolol  cardizem 60 q6h transition to long acting cardizem cd tomorrow # sob 2/2 a fib with RVR now better controlled (chadsvasc 4)  check tsh and free t4  -c/w amio and metoprolol  cardizem 60 q6h transition to long acting cardizem cd tomorrow

## 2018-11-25 NOTE — H&P ADULT - ASSESSMENT
73F with pmhx of A-fib on eliquis (s/p failed ablation and hx of difficulty controlling rate), CAD s/p CABG, CKD IV, Valvular Heart Disease (MV Regurg), Anxiety, and GERD presents from home for shortness of breath x 3 days.    Shortness of Breath: Likely 2/2 Uncontrolled A-Fib in the setting of Valvular heart disease   Currently rate controlled and symtpoms significantly improved   EPS consult, possible in house ablation   Cardiology Consult   s/p Lasix 60mg IV, clinically euvolemic, will resume home dose of lasix   CXR appears unchanged from previous, awaiting official reading   Continue with Amiodarone, Cardizem, and metoprolol  Check TSH, no results in our system and multiple thyroid nodules noted on CT scan     CHF/Valvular Heart Disease:   continue with current home medical management   Patient is not on an ACE-I / ARBgilda secondary to kidney function     CKD IV: at baseline     Gout: Cont with allopurinol     GERD: cont famotidine   DVT ppx: On eliquis   Full Code   Diet: DASH, low fat   Activity: Ambulate as tolerated 73F with pmhx of A-fib on eliquis (s/p failed ablation and hx of difficulty controlling rate), CAD s/p CABG, CKD IV, Valvular Heart Disease (MV Regurg), Anxiety, and GERD presents from home for shortness of breath x 3 days.    Shortness of Breath: Likely 2/2 Uncontrolled A-Fib in the setting of Valvular heart disease   Currently rate controlled and symtpoms significantly improved   EPS consult, possible in house ablation   Cardiology Consult   s/p Lasix 60mg IV, clinically euvolemic, will resume home dose of lasix   CXR appears unchanged from previous, awaiting official reading   Continue with Amiodarone, Cardizem, and metoprolol  Check TSH, no results in our system and multiple thyroid nodules noted on CT scan       Lung and Thyroid nodules on CT Chest:  Patient will need out patient follow up for further evaluations.   Check TSH for thyroid nodule     CHF/Valvular Heart Disease:   continue with current home medical management   Patient is not on an ACE-I / ARB, gilda secondary to kidney function     CKD IV: at baseline     Gout: Cont with allopurinol     GERD: cont famotidine   DVT ppx: On eliquis   Full Code   Diet: DASH, low fat   Activity: Ambulate as tolerated

## 2018-11-25 NOTE — PROGRESS NOTE ADULT - SUBJECTIVE AND OBJECTIVE BOX
NOAH QUIROS 74yo W Female from home came to the ER for c/o SOB worsening over 3 days with palpitations.  The pt was noted to be in a fib with RVR and a proBNP of 17,707 and CE are normal.  The pt was evaluate d by carido and EPS.  The rate is better controlled and pt overall feels better.  The PMHx includes:  HTN, ASHD, CAD, sp MI, sp CABG, afib, AC with Eliquis, sp failed ablation, Hyperlipidemia, CKD IV, GERD, diverticulosis, anxiety, thyroid nodule (1.4cm),  anxiety, sp hysterectomy.     INTERVAL HPI/OVERNIGHT EVENTS: pt's rate is better controlled pt overall feels better, was evaluated by cardio and EPS    MEDICATIONS  (STANDING):  allopurinol 100 milliGRAM(s) Oral daily  amiodarone    Tablet 200 milliGRAM(s) Oral daily  apixaban 2.5 milliGRAM(s) Oral every 12 hours  calcitriol   Capsule 0.5 MICROGram(s) Oral daily  cyanocobalamin 1000 MICROGram(s) Oral daily  diltiazem    milliGRAM(s) Oral daily  famotidine    Tablet 20 milliGRAM(s) Oral daily  furosemide    Tablet 40 milliGRAM(s) Oral daily  isosorbide   mononitrate ER Tablet (IMDUR) 30 milliGRAM(s) Oral at bedtime  metoprolol tartrate 50 milliGRAM(s) Oral two times a day    MEDICATIONS  (PRN):  acetaminophen   Tablet .. 650 milliGRAM(s) Oral every 6 hours PRN Mild Pain (1 - 3)      Allergies    No Known Allergies      Vital Signs Last 24 Hrs  T(C): 35.8 (25 Nov 2018 12:18), Max: 36.4 (25 Nov 2018 06:36)  T(F): 96.4 (25 Nov 2018 12:18), Max: 97.5 (25 Nov 2018 06:36)  HR: 72 (25 Nov 2018 12:18) (68 - 114)  BP: 152/65 (25 Nov 2018 12:18) (128/60 - 163/102)  BP(mean): --  RR: 17 (25 Nov 2018 12:18) (17 - 20)  SpO2: 92% (25 Nov 2018 06:42) (92% - 98%)    PHYSICAL EXAM:  pt is alert and oriented x 3 and feels better    Eyes:  normal    ENMT:  normal    Neck:  supple, mild JVD, no bruits    Respiratory:  shallow respirations    Cardiovascular:  S1S2 irreg II/Vi OUSMANE    Gastrointestinal:  globose, soft and benign    Extremities:  moves all ext, + arthritic changes    Neurological:  nor focal deficits    Skin:  nor rash    Lymph Nodes:  not enlarged    LABS:                        9.4    7.25  )-----------( 251      ( 25 Nov 2018 07:31 )             31.5     11-25    141  |  99  |  52<H>  ----------------------------<  116<H>  4.1   |  24  |  3.3<H>    Ca    9.9        Mg     1.9      BNP  17, 707  CK  30, 25  MB  1.8, 1.6  trop < 0.01 x 2  TPro  7.0  /  Alb  3.9  /  TBili  0.5  /  DBili  x   /  AST  10  /  ALT  11  /  AlkPhos  57  11-25    PT/INR - ( 24 Nov 2018 20:02 )   PT: 23.20 sec;   INR: 2.03 ratio         PTT - ( 24 Nov 2018 20:02 )  PTT:40.2 sec      RADIOLOGY & ADDITIONAL TESTS:    CXR:  R costophrenic angle blunting, sm pl effusion    CT of the Chest:  trace L pl efusion,  R horizontal fisure fluid, no endobronchial obs,  RLL densities and tenting,  4 and 3mm pul nodules, BL multiple granulomas    EKG #1:  abib 89/min PVCs,  nonspecific ST-T changes  EKG #2:  84/min prolonged QTc 482  EKG #3:  77/min QTc 443 NOAH QUIROS 74yo W Female from home came to the ER for c/o SOB worsening over 3 days with palpitations.  The pt was noted to be in a fib with RVR and a proBNP of 17,707 and CE are normal.  The pt was evaluate d by carido and EPS.  The rate is better controlled and pt overall feels better.  The PMHx includes:  HTN, ASHD, CAD, sp MI, sp CABG, afib, AC with Eliquis, sp failed ablation, Hyperlipidemia, CKD IV, GERD, diverticulosis, anxiety, thyroid nodule (1.4cm),  anxiety, sp hysterectomy.     INTERVAL HPI/OVERNIGHT EVENTS: pt's rate is better controlled pt overall feels better, was evaluated by cardio and EPS    MEDICATIONS  (STANDING):  allopurinol 100 milliGRAM(s) Oral daily  amiodarone    Tablet 200 milliGRAM(s) Oral daily  apixaban 2.5 milliGRAM(s) Oral every 12 hours  calcitriol   Capsule 0.5 MICROGram(s) Oral daily  cyanocobalamin 1000 MICROGram(s) Oral daily  diltiazem    milliGRAM(s) Oral daily  famotidine    Tablet 20 milliGRAM(s) Oral daily  furosemide    Tablet 40 milliGRAM(s) Oral daily  isosorbide   mononitrate ER Tablet (IMDUR) 30 milliGRAM(s) Oral at bedtime  metoprolol tartrate 50 milliGRAM(s) Oral two times a day    MEDICATIONS  (PRN):  acetaminophen   Tablet .. 650 milliGRAM(s) Oral every 6 hours PRN Mild Pain (1 - 3)      Allergies    No Known Allergies      Vital Signs Last 24 Hrs  T(C): 35.8 (25 Nov 2018 12:18), Max: 36.4 (25 Nov 2018 06:36)  T(F): 96.4 (25 Nov 2018 12:18), Max: 97.5 (25 Nov 2018 06:36)  HR: 72 (25 Nov 2018 12:18) (68 - 114)  BP: 152/65 (25 Nov 2018 12:18) (128/60 - 163/102)  BP(mean): --  RR: 17 (25 Nov 2018 12:18) (17 - 20)  SpO2: 92% (25 Nov 2018 06:42) (92% - 98%)    PHYSICAL EXAM:  pt is alert and oriented x 3 and feels better    Eyes:  normal    ENMT:  normal    Neck:  supple, mild JVD, no bruits    Respiratory:  shallow respirations    Cardiovascular:  S1S2 irreg II/Vi OUSMANE    Gastrointestinal:  globose, soft and benign    Extremities:  moves all ext, + arthritic changes    Neurological:  nor focal deficits    Skin:  nor rash    Lymph Nodes:  not enlarged    LABS:                        9.4    7.25  )-----------( 251      ( 25 Nov 2018 07:31 )             31.5     11-25    141  |  99  |  52<H>  ----------------------------<  116<H>  4.1   |  24  |  3.3<H>    GFR 13    Ca    9.9        Mg     1.9      BNP  17, 707  CK  30, 25  MB  1.8, 1.6  trop < 0.01 x 2  TPro  7.0  /  Alb  3.9  /  TBili  0.5  /  DBili  x   /  AST  10  /  ALT  11  /  AlkPhos  57  11-25    PT/INR - ( 24 Nov 2018 20:02 )   PT: 23.20 sec;   INR: 2.03 ratio         PTT - ( 24 Nov 2018 20:02 )  PTT:40.2 sec      RADIOLOGY & ADDITIONAL TESTS:    CXR:  R costophrenic angle blunting, sm pl effusion    CT of the Chest:  trace L pl efusion,  R horizontal fisure fluid, no endobronchial obs,  RLL densities and tenting,  4 and 3mm pul nodules, BL multiple granulomas    EKG #1:  abib 89/min PVCs,  nonspecific ST-T changes  EKG #2:  84/min prolonged QTc 482  EKG #3:  77/min QTc 443

## 2018-11-25 NOTE — H&P ADULT - FAMILY HISTORY
Father  Still living? No  Family history of lung cancer, Age at diagnosis: Age Unknown     Mother  Still living? Unknown  Family history of CHF (congestive heart failure), Age at diagnosis: Age Unknown

## 2018-11-25 NOTE — H&P ADULT - NSHPPHYSICALEXAM_GEN_ALL_CORE
GENERAL: NAD, well-developed, Non-toxic, stated age   HEAD:  Atraumatic, Normocephalic  EYES: EOMI, PERRLA, conjunctiva and sclera clear  NECK: Mild JVD  CHEST/LUNG: bibasilar crackles, breathing comfortably on nasal canula   HEART: irregular rhythm and regular rate; s1, s2, 2/6 systolic heard in the Left upper sternal boarded, no rubs, or gallops  ABDOMEN: Soft, Nontender, Nondistended; Bowel sounds present, No rebound or guarding noted   EXTREMITIES:  trace b/l pitting edema, no calf tenderness. b/l venous stasis changes   PSYCH: AAOx3  NEUROLOGY: non-focal  SKIN: No rashes or lesions

## 2018-11-25 NOTE — H&P ADULT - HISTORY OF PRESENT ILLNESS
73F with pmhx of A-fib on eliquis (s/p failed ablation and hx of difficulty controlling rate), CAD s/p CABG, CKD IV, Valvular Heart Disease (MV Regurg), Anxiety, and GERD presents from home for shortness of breath x 3 days. Patient states her shortness of breath is associated with palpitations and minimal exertion and occasionally at rest. Patient noticed her heart rate was uncontrolled today at 129. Currently feels significant improved during interview. Patient is compliant with all her medications,   patient was scheduled to have an ablation in Dec. With EPS Dr. Castillo. Received a CT scan of the chest as out patient which showed a 1.4cm thyroid nodule, and pulm nodules with the largest was 4mm. There was also trace L pulm effusion and non-specific pulmonary parenchymal changes noted.   Last admission in Oct was for similar symptoms, discharged with increased dose of cardizem.     Patient denied chest pain, dizziness, increased leg swelling, recent illness, fever, chills, cough, sick contacts, or LE pain. Patient has been compliant with all medications since last admission. Orthopnea has been at baseline with no significant changes     In ED: HR: 68      BP: 168/102      RR: 20, 94%on RA   Given lasix 60mg IV

## 2018-11-25 NOTE — CONSULT NOTE ADULT - SUBJECTIVE AND OBJECTIVE BOX
HPI:  73F with pmhx of A-fib on eliquis (s/p failed ablation and hx of difficulty controlling rate), CAD s/p CABG, CKD IV, Valvular Heart Disease (MV Regurg), Anxiety, and GERD presents from home for shortness of breath x 3 days. Patient states her shortness of breath is associated with palpitations and minimal exertion and occasionally at rest. Patient noticed her heart rate was uncontrolled today(11/24) at 129. Currently feels significant improved during interview. Patient is compliant with all her medications,   patient was scheduled to have an ablation in Dec. With EPS Dr. Castillo. Received a CT scan of the chest as out patient which showed a 1.4cm thyroid nodule, and pulm nodules with the largest was 4mm. There was also trace L pulm effusion and non-specific pulmonary parenchymal changes noted.   Last admission in Oct was for similar symptoms, discharged with increased dose of cardizem.     Patient denied chest pain, dizziness, increased leg swelling, recent illness, fever, chills, cough, sick contacts, or LE pain. Patient has been compliant with all medications since last admission. Orthopnea has been at baseline with no significant changes     In ED: HR: 68      BP: 168/102      RR: 20, 94%on RA   Given lasix 60mg IV (25 Nov 2018 00:55)      Patient is a 73y old  Female who presents with a chief complaint of SOB and palpitations (25 Nov 2018 10:45)        PAST MEDICAL & SURGICAL HISTORY:  Atrial fibrillation  CKD (chronic kidney disease)  MI (myocardial infarction)  CAD (coronary artery disease)  Kidney stone  HTN (hypertension)  H/O abdominal hysterectomy  H/O cardiac radiofrequency ablation  History of open heart surgery                  PREVIOUS DIAGNOSTIC TESTING:      ECHO   FINDINGS:     STRESS  FINDINGS:    CATHETERIZATION  FINDINGS:    ELECTROPHYSIOLOGY STUDY  FINDINGS:    CAROTID ULTRASOUND:  FINDINGS    VENOUS DUPLEX SCAN:  FINDINGS:    CHEST CT PULMONARY ANGIO with IV Contrast:  FINDINGS:  MEDICATIONS  (STANDING):  allopurinol 100 milliGRAM(s) Oral daily  amiodarone    Tablet 200 milliGRAM(s) Oral daily  apixaban 2.5 milliGRAM(s) Oral every 12 hours  calcitriol   Capsule 0.5 MICROGram(s) Oral daily  cyanocobalamin 1000 MICROGram(s) Oral daily  diltiazem    milliGRAM(s) Oral daily  famotidine    Tablet 20 milliGRAM(s) Oral daily  furosemide    Tablet 40 milliGRAM(s) Oral daily  isosorbide   mononitrate ER Tablet (IMDUR) 30 milliGRAM(s) Oral at bedtime  metoprolol tartrate 50 milliGRAM(s) Oral two times a day    MEDICATIONS  (PRN):  acetaminophen   Tablet .. 650 milliGRAM(s) Oral every 6 hours PRN Mild Pain (1 - 3)   Home Medications:  allopurinol 100 mg oral tablet: 1 tab(s) orally once a day (22 Oct 2018 11:47)  amiodarone 200 mg oral tablet: 1 tab(s) orally once a day (22 Oct 2018 11:47)  calcitriol 0.5 mcg oral capsule: 1 cap(s) orally once a day (22 Oct 2018 11:47)  Cardizem  mg/24 hours oral capsule, extended release: 1 cap(s) orally once a day (22 Oct 2018 11:47)  Eliquis 2.5 mg oral tablet: 1 tab(s) orally 2 times a day (22 Oct 2018 11:47)  famotidine 40 mg oral tablet: 1 tab(s) orally 2 times a day (22 Oct 2018 11:47)  furosemide 40 mg oral tablet: 1 tab(s) orally once a day (22 Oct 2018 11:47)  isosorbide mononitrate 30 mg oral tablet, extended release: 1 tab(s) orally once a day (at bedtime) (22 Oct 2018 11:47)  Kayexalate oral and rectal powder: 1 application oral and rectal 3 times a day (22 Oct 2018 11:47)  metoprolol tartrate 50 mg oral tablet: 1 tab(s) orally 2 times a day (22 Oct 2018 11:47)  Vitamin B-12 100 mcg oral tablet: 1 tab(s) orally once a day (22 Oct 2018 11:47)      FAMILY HISTORY:  Family history of lung cancer (Father)  Family history of CHF (congestive heart failure) (Mother)      SOCIAL HISTORY:    CIGARETTES:    ALCOHOL:          Vital Signs Last 24 Hrs  T(C): 36.4 (25 Nov 2018 06:36), Max: 36.4 (25 Nov 2018 06:36)  T(F): 97.5 (25 Nov 2018 06:36), Max: 97.5 (25 Nov 2018 06:36)  HR: 114 (25 Nov 2018 06:36) (68 - 114)  BP: 146/97 (25 Nov 2018 06:36) (128/60 - 163/102)  BP(mean): --  RR: 18 (25 Nov 2018 06:36) (18 - 20)  SpO2: 92% (25 Nov 2018 06:42) (92% - 98%)          INTERPRETATION OF TELEMETRY:    ECG:  last ekg is from oct 22      LABS:                        9.4    7.25  )-----------( 251      ( 25 Nov 2018 07:31 )             31.5     11-25    141  |  99  |  52<H>  ----------------------------<  116<H>  4.1   |  24  |  3.3<H>    Ca    9.9      25 Nov 2018 07:31  Mg     1.9     11-25    TPro  7.0  /  Alb  3.9  /  TBili  0.5  /  DBili  x   /  AST  10  /  ALT  11  /  AlkPhos  57  11-25    CARDIAC MARKERS ( 25 Nov 2018 07:31 )  x     / <0.01 ng/mL / 25 U/L / x     / 1.6 ng/mL  CARDIAC MARKERS ( 24 Nov 2018 20:02 )  x     / <0.01 ng/mL / 30 U/L / x     / 1.8 ng/mL      PT/INR - ( 24 Nov 2018 20:02 )   PT: 23.20 sec;   INR: 2.03 ratio         PTT - ( 24 Nov 2018 20:02 )  PTT:40.2 sec  LIVER FUNCTIONS - ( 25 Nov 2018 07:31 )  Alb: 3.9 g/dL / Pro: 7.0 g/dL / ALK PHOS: 57 U/L / ALT: 11 U/L / AST: 10 U/L / GGT: x               BNPSerum Pro-Brain Natriuretic Peptide: 51926 pg/mL <H> (11-24 @ 20:02)    I&O's Detail    Daily Height in cm: 167.64 (25 Nov 2018 06:42)    Daily     RADIOLOGY & ADDITIONAL STUDIES: HPI:  73F with pmhx of A-fib on eliquis (s/p failed ablation and hx of difficulty controlling rate), CAD s/p CABG, CKD IV, Valvular Heart Disease (MV Regurg), Anxiety, and GERD presents from home for shortness of breath x 3 days. Patient states her shortness of breath is associated with palpitations and minimal exertion and occasionally at rest. Patient noticed her heart rate was uncontrolled today(11/24) at 129. Currently feels significant improved during interview. Patient is compliant with all her medications,   patient was scheduled to have an ablation in Dec. With EPS Dr. Castillo. Received a CT scan of the chest as out patient which showed a 1.4cm thyroid nodule, and pulm nodules with the largest was 4mm. There was also trace L pulm effusion and non-specific pulmonary parenchymal changes noted.   Last admission in Oct was for similar symptoms, discharged with increased dose of cardizem.     Patient denied chest pain, dizziness, increased leg swelling, recent illness, fever, chills, cough, sick contacts, or LE pain. Patient has been compliant with all medications since last admission. Orthopnea has been at baseline with no significant changes     In ED: HR: 68      BP: 168/102      RR: 20, 94%on RA   Given lasix 60mg IV (25 Nov 2018 00:55)      Patient is a 73y old  Female who presents with a chief complaint of SOB and palpitations (25 Nov 2018 10:45)        PAST MEDICAL & SURGICAL HISTORY:  Atrial fibrillation  CKD (chronic kidney disease)  MI (myocardial infarction)  CAD (coronary artery disease)  Kidney stone  HTN (hypertension)  H/O abdominal hysterectomy  H/O cardiac radiofrequency ablation  History of open heart surgery        CHEST CT PULMONARY ANGIO with IV Contrast:  FINDINGS:  MEDICATIONS  (STANDING):  allopurinol 100 milliGRAM(s) Oral daily  amiodarone    Tablet 200 milliGRAM(s) Oral daily  apixaban 2.5 milliGRAM(s) Oral every 12 hours  calcitriol   Capsule 0.5 MICROGram(s) Oral daily  cyanocobalamin 1000 MICROGram(s) Oral daily  diltiazem    milliGRAM(s) Oral daily  famotidine    Tablet 20 milliGRAM(s) Oral daily  furosemide    Tablet 40 milliGRAM(s) Oral daily  isosorbide   mononitrate ER Tablet (IMDUR) 30 milliGRAM(s) Oral at bedtime  metoprolol tartrate 50 milliGRAM(s) Oral two times a day    MEDICATIONS  (PRN):  acetaminophen   Tablet .. 650 milliGRAM(s) Oral every 6 hours PRN Mild Pain (1 - 3)   Home Medications:  allopurinol 100 mg oral tablet: 1 tab(s) orally once a day (22 Oct 2018 11:47)  amiodarone 200 mg oral tablet: 1 tab(s) orally once a day (22 Oct 2018 11:47)  calcitriol 0.5 mcg oral capsule: 1 cap(s) orally once a day (22 Oct 2018 11:47)  Cardizem  mg/24 hours oral capsule, extended release: 1 cap(s) orally once a day (22 Oct 2018 11:47)  Eliquis 2.5 mg oral tablet: 1 tab(s) orally 2 times a day (22 Oct 2018 11:47)  famotidine 40 mg oral tablet: 1 tab(s) orally 2 times a day (22 Oct 2018 11:47)  furosemide 40 mg oral tablet: 1 tab(s) orally once a day (22 Oct 2018 11:47)  isosorbide mononitrate 30 mg oral tablet, extended release: 1 tab(s) orally once a day (at bedtime) (22 Oct 2018 11:47)  Kayexalate oral and rectal powder: 1 application oral and rectal 3 times a day (22 Oct 2018 11:47)  metoprolol tartrate 50 mg oral tablet: 1 tab(s) orally 2 times a day (22 Oct 2018 11:47)  Vitamin B-12 100 mcg oral tablet: 1 tab(s) orally once a day (22 Oct 2018 11:47)    FAMILY HISTORY:  Family history of lung cancer (Father)  Family history of CHF (congestive heart failure) (Mother)      SOCIAL HISTORY:  CIGARETTES: none  ALCOHOL: none  DRUGS: none                      REVIEW OF SYSTEMS:  CONSTITUTIONAL: No distress, Looks stable  NECK: No pain   RESPIRATORY: No cough, wheezing, (+)shortness of breath  CARDIOVASCULAR: No chest pain, (+)SOB,(+) palpitations, leg swelling  GASTROINTESTINAL: No abdominal or epigastric pain. No nausea, vomiting, or hematemesis;  No melena.  NEUROLOGICAL: No dizziness, headaches, memory loss, loss of strength  SKIN: No itching, burning, rashes, or lesions   ENDOCRINE: No heat or cold intolerance  MUSCULOSKELETAL: No joint pain, No  swelling; No muscle pain  PSYCHIATRIC: No depression, anxiety, mood swings, or difficulty sleeping  ALLERGY: No hives, itching, rash          Vital Signs Last 24 Hrs  T(C): 36.4 (25 Nov 2018 06:36), Max: 36.4 (25 Nov 2018 06:36)  T(F): 97.5 (25 Nov 2018 06:36), Max: 97.5 (25 Nov 2018 06:36)  HR: 114 (25 Nov 2018 06:36) (68 - 114)  BP: 146/97 (25 Nov 2018 06:36) (128/60 - 163/102)  BP(mean): --  RR: 18 (25 Nov 2018 06:36) (18 - 20)  SpO2: 92% (25 Nov 2018 06:42) (92% - 98%)          INTERPRETATION OF TELEMETRY:    ECG:  last ekg is from oct 22      LABS:                        9.4    7.25  )-----------( 251      ( 25 Nov 2018 07:31 )             31.5     11-25    141  |  99  |  52<H>  ----------------------------<  116<H>  4.1   |  24  |  3.3<H>    Ca    9.9      25 Nov 2018 07:31  Mg     1.9     11-25    TPro  7.0  /  Alb  3.9  /  TBili  0.5  /  DBili  x   /  AST  10  /  ALT  11  /  AlkPhos  57  11-25    CARDIAC MARKERS ( 25 Nov 2018 07:31 )  x     / <0.01 ng/mL / 25 U/L / x     / 1.6 ng/mL  CARDIAC MARKERS ( 24 Nov 2018 20:02 )  x     / <0.01 ng/mL / 30 U/L / x     / 1.8 ng/mL      PT/INR - ( 24 Nov 2018 20:02 )   PT: 23.20 sec;   INR: 2.03 ratio         PTT - ( 24 Nov 2018 20:02 )  PTT:40.2 sec  LIVER FUNCTIONS - ( 25 Nov 2018 07:31 )  Alb: 3.9 g/dL / Pro: 7.0 g/dL / ALK PHOS: 57 U/L / ALT: 11 U/L / AST: 10 U/L / GGT: x               BNPSerum Pro-Brain Natriuretic Peptide: 57543 pg/mL <H> (11-24 @ 20:02)    I&O's Detail    Daily Height in cm: 167.64 (25 Nov 2018 06:42)    Daily     RADIOLOGY & ADDITIONAL STUDIES:

## 2018-11-26 LAB
T4 FREE+ TSH PNL SERPL: 2.94 UIU/ML — SIGNIFICANT CHANGE UP (ref 0.27–4.2)
TSH SERPL-MCNC: 2.98 UIU/ML — SIGNIFICANT CHANGE UP (ref 0.27–4.2)

## 2018-11-26 RX ORDER — DILTIAZEM HCL 120 MG
240 CAPSULE, EXT RELEASE 24 HR ORAL DAILY
Qty: 0 | Refills: 0 | Status: DISCONTINUED | OUTPATIENT
Start: 2018-11-26 | End: 2018-11-29

## 2018-11-26 RX ORDER — APIXABAN 2.5 MG/1
5 TABLET, FILM COATED ORAL EVERY 12 HOURS
Qty: 0 | Refills: 0 | Status: DISCONTINUED | OUTPATIENT
Start: 2018-11-26 | End: 2018-11-29

## 2018-11-26 RX ORDER — CHLORHEXIDINE GLUCONATE 213 G/1000ML
1 SOLUTION TOPICAL
Qty: 0 | Refills: 0 | Status: DISCONTINUED | OUTPATIENT
Start: 2018-11-26 | End: 2018-11-29

## 2018-11-26 RX ADMIN — Medication 50 MILLIGRAM(S): at 17:02

## 2018-11-26 RX ADMIN — Medication 100 MILLIGRAM(S): at 12:20

## 2018-11-26 RX ADMIN — PREGABALIN 1000 MICROGRAM(S): 225 CAPSULE ORAL at 12:20

## 2018-11-26 RX ADMIN — APIXABAN 2.5 MILLIGRAM(S): 2.5 TABLET, FILM COATED ORAL at 05:10

## 2018-11-26 RX ADMIN — ISOSORBIDE MONONITRATE 30 MILLIGRAM(S): 60 TABLET, EXTENDED RELEASE ORAL at 21:07

## 2018-11-26 RX ADMIN — Medication 180 MILLIGRAM(S): at 05:10

## 2018-11-26 RX ADMIN — AMIODARONE HYDROCHLORIDE 200 MILLIGRAM(S): 400 TABLET ORAL at 05:10

## 2018-11-26 RX ADMIN — Medication 40 MILLIGRAM(S): at 05:10

## 2018-11-26 RX ADMIN — CALCITRIOL 0.5 MICROGRAM(S): 0.5 CAPSULE ORAL at 12:20

## 2018-11-26 RX ADMIN — Medication 240 MILLIGRAM(S): at 16:44

## 2018-11-26 RX ADMIN — APIXABAN 5 MILLIGRAM(S): 2.5 TABLET, FILM COATED ORAL at 17:02

## 2018-11-26 RX ADMIN — FAMOTIDINE 20 MILLIGRAM(S): 10 INJECTION INTRAVENOUS at 12:20

## 2018-11-26 NOTE — PROGRESS NOTE ADULT - SUBJECTIVE AND OBJECTIVE BOX
Subjective:  in afib, rate controlled now, initially presented with SOB, palpitation, afib, tachycardia, chest xray showed prominent pulmonary arteries  she finally decided to have ablation, has an appointment later on in this month    MEDICATIONS  (STANDING):  allopurinol 100 milliGRAM(s) Oral daily  amiodarone    Tablet 200 milliGRAM(s) Oral daily  apixaban 2.5 milliGRAM(s) Oral every 12 hours  calcitriol   Capsule 0.5 MICROGram(s) Oral daily  cyanocobalamin 1000 MICROGram(s) Oral daily  diltiazem    milliGRAM(s) Oral daily  famotidine    Tablet 20 milliGRAM(s) Oral daily  furosemide    Tablet 40 milliGRAM(s) Oral daily  isosorbide   mononitrate ER Tablet (IMDUR) 30 milliGRAM(s) Oral at bedtime  metoprolol tartrate 50 milliGRAM(s) Oral two times a day    MEDICATIONS  (PRN):  acetaminophen   Tablet .. 650 milliGRAM(s) Oral every 6 hours PRN Mild Pain (1 - 3)            Vital Signs Last 24 Hrs  T(C): 35.6 (26 Nov 2018 13:00), Max: 35.8 (25 Nov 2018 20:12)  T(F): 96.1 (26 Nov 2018 13:00), Max: 96.5 (25 Nov 2018 20:12)  HR: 122 (26 Nov 2018 13:00) (52 - 122)  BP: 154/77 (26 Nov 2018 13:00) (127/60 - 164/79)  BP(mean): --  RR: 19 (26 Nov 2018 13:00) (16 - 19)  SpO2: 95% (26 Nov 2018 09:02) (95% - 99%)             REVIEW OF SYSTEMS:  CONSTITUTIONAL: no fever, no chills, no diaphoresis  CARDIOLOGY: no chest pain, but SOB, palpitation, diaphoresis, no faint   RESPIRATORY: improved dyspnea, no wheeze, no orthopnea, no PND   NEUROLOGICAL: no dizziness, headache  GI: no abdominal pain, no dyspepsia, no nausea, no vomiting, no diarrhea.    HEENT: no congestion, no nasal bleeding  SKIN: no ecchymosis             PHYSICAL EXAM:  · CONSTITUTIONAL: Looks stable, sitting at he bed, no respiratory distress  . NECK: Supple, no JVD, no bruit  · RESPIRATORY: Normal air entry to lung base, no wheeze, no crackle, no wet rales  · CARDIOVASCULAR: S1, A2, P2, no murmur, irregular rate,  no rub,  · EXTREMITIES: No cyanosis, no clubbing, no edema  · VASCULAR: Pulses are irregular but equal, bilateral in upper and lower extremities  	  ECG: < from: 12 Lead ECG (11.25.18 @ 12:53) >  Atrial fibrillation  Nonspecific T wave abnormality    < end of copied text >  < from: 12 Lead ECG (11.25.18 @ 12:53) >  Ventricular Rate 77 BPM    < end of copied text >      TTE: < from: JOÃO w/Probe Placement (07.12.18 @ 09:18) >  Summary:   1. Left ventricular ejection fraction, by visual estimation, is 50 to   55%.   2. Thickening of the anterior and posterior mitral valve leaflets.   3. Mild tricuspid regurgitation.   4. Extensive ascending and descending aortic atheroma.  5. Atrial fibrilation, no thrombus,.   6. Recommend proceed with cardioversion.   7. There is mild aortic root calcification.    < end of copied text >      LABS:                        9.4    7.25  )-----------( 251      ( 25 Nov 2018 07:31 )             31.5     11-25    141  |  99  |  52<H>  ----------------------------<  116<H>  4.1   |  24  |  3.3<H>    Ca    9.9      25 Nov 2018 07:31  Mg     1.9     11-25    TPro  7.0  /  Alb  3.9  /  TBili  0.5  /  DBili  x   /  AST  10  /  ALT  11  /  AlkPhos  57  11-25    CARDIAC MARKERS ( 25 Nov 2018 07:31 )  x     / <0.01 ng/mL / 25 U/L / x     / 1.6 ng/mL  CARDIAC MARKERS ( 24 Nov 2018 20:02 )  x     / <0.01 ng/mL / 30 U/L / x     / 1.8 ng/mL      PT/INR - ( 24 Nov 2018 20:02 )   PT: 23.20 sec;   INR: 2.03 ratio         PTT - ( 24 Nov 2018 20:02 )  PTT:40.2 sec    I&O's Summary    BNP  RADIOLOGY & ADDITIONAL STUDIES: < from: Xray Chest 1 View AP/PA (11.24.18 @ 21:12) >    Right costophrenic angle small pleural effusion versus pleural thickening.    < end of copied text >      IMPRESSION AND PLAN:

## 2018-11-26 NOTE — PROGRESS NOTE ADULT - ASSESSMENT
74 yo F w/ PMH of A-fib on eliquis (s/p failed ablation and hx of difficulty controlling rate), CAD s/p 4V CABG (2005), CKD IV, MV Regurgitation presented w/ SOB worsening over 3 days with palpitations.  Pt was admitted for symptomatic Afib with RVR.    #Symptomatic Afib with RVR (SOB and palpation on admission) - rate controlled  -c/w amio and metoprolol as per cardiology  -change to cardizem CD 240mg PO q24  -c/w AC  -f/u TSH and free T4  -CHADSVASC of 4  -scheduled for ablation in December with EPS Dr. Castillo    #Thyroid nodules (largest 1.4 cm)  -outpatient f/u  -TSH and free T4 f/u    #Pulmonary nodules (largest 4mm)  -previous smoker, quitted 10yr ago   -outpatient f/u    #DVT: Eliquis   #GI ppx: not indicated   #Activity: ambulate as tolerated  #Disposition: home, anticipate for afternoon if HR controlled 74 yo F w/ PMH of A-fib on eliquis (s/p failed ablation and hx of difficulty controlling rate), CAD s/p 4V CABG (2005), CKD IV, MV Regurgitation presented w/ SOB worsening over 3 days with palpitations.  Pt was admitted for symptomatic Afib with RVR.    #Symptomatic Afib with RVR (SOB and palpation on admission) - rate controlled  -c/w amio and metoprolol as per cardiology  -heart rate is controlled with cardizem 180mg PO q24h now  -c/w AC  -f/u TSH and free T4  -CHADSVASC of 4  -scheduled for ablation in December with EPS Dr. Castillo    #Thyroid nodules (largest 1.4 cm)  -outpatient f/u  -TSH and free T4 f/u    #Pulmonary nodules (largest 4mm)  -previous smoker, quitted 10yr ago   -outpatient f/u    #DVT: Eliquis   #GI ppx: not indicated   #Activity: ambulate as tolerated  #Disposition: home, anticipate for afternoon if HR controlled 74 yo F w/ PMH of A-fib on eliquis (s/p failed ablation and hx of difficulty controlling rate), CAD s/p 4V CABG (2005), CKD IV, MV Regurgitation presented w/ SOB worsening over 3 days with palpitations.  Pt was admitted for symptomatic Afib with RVR.    #Symptomatic Afib with RVR (SOB and palpation on admission) - rate controlled  -c/w amio and metoprolol as per cardiology  -heart rate is controlled with cardizem 180mg PO q24h now  -c/w AC  -f/u TSH and free T4  -CHADSVASC of 4  -scheduled for ablation in December with EPS Dr. Castillo    #Thyroid nodules (largest 1.4 cm)  -outpatient f/u  -TSH and free T4 f/u    #Pulmonary nodules (largest 4mm)  -previous smoker, quitted 10yr ago   -outpatient f/u    #B12 deficiency - stable   - resume b12 home vitamin    #DVT: Eliquis   #GI ppx: not indicated   #Activity: ambulate as tolerated  #Disposition: home, anticipate for afternoon if HR controlled

## 2018-11-26 NOTE — PROGRESS NOTE ADULT - SUBJECTIVE AND OBJECTIVE BOX
NOAH QUIROS 73y Female  MRN#: [id number]       SUBJECTIVE  Patient is a 73y old Female who presents with a chief complaint of SOB and palpitations (25 Nov 2018 19:24)    she is currently admitted to medicine with the primary diagnosis of CHF exacerbation    Today is hospital day 2d, and this morning she is lying in bed without concern. Pt states she wishes to go home and she no longer experiences the SOB and palpitation.    No acute overnight events.     OBJECTIVE  PAST MEDICAL & SURGICAL HISTORY  Atrial fibrillation  CKD (chronic kidney disease)  MI (myocardial infarction)  CAD (coronary artery disease)  Kidney stone  HTN (hypertension)  H/O abdominal hysterectomy  H/O cardiac radiofrequency ablation  History of open heart surgery    ALLERGIES:  No Known Allergies    MEDICATIONS:  STANDING MEDICATIONS  allopurinol 100 milliGRAM(s) Oral daily  amiodarone    Tablet 200 milliGRAM(s) Oral daily  apixaban 2.5 milliGRAM(s) Oral every 12 hours  calcitriol   Capsule 0.5 MICROGram(s) Oral daily  cyanocobalamin 1000 MICROGram(s) Oral daily  diltiazem    milliGRAM(s) Oral daily  famotidine    Tablet 20 milliGRAM(s) Oral daily  furosemide    Tablet 40 milliGRAM(s) Oral daily  isosorbide   mononitrate ER Tablet (IMDUR) 30 milliGRAM(s) Oral at bedtime  metoprolol tartrate 50 milliGRAM(s) Oral two times a day    PRN MEDICATIONS  acetaminophen   Tablet .. 650 milliGRAM(s) Oral every 6 hours PRN    HOME MEDICATIONS  Home Medications:  allopurinol 100 mg oral tablet: 1 tab(s) orally once a day (22 Oct 2018 11:47)  amiodarone 200 mg oral tablet: 1 tab(s) orally once a day (22 Oct 2018 11:47)  calcitriol 0.5 mcg oral capsule: 1 cap(s) orally once a day (22 Oct 2018 11:47)  Cardizem  mg/24 hours oral capsule, extended release: 1 cap(s) orally once a day (22 Oct 2018 11:47)  Eliquis 2.5 mg oral tablet: 1 tab(s) orally 2 times a day (22 Oct 2018 11:47)  famotidine 40 mg oral tablet: 1 tab(s) orally 2 times a day (22 Oct 2018 11:47)  furosemide 40 mg oral tablet: 1 tab(s) orally once a day (22 Oct 2018 11:47)  isosorbide mononitrate 30 mg oral tablet, extended release: 1 tab(s) orally once a day (at bedtime) (22 Oct 2018 11:47)  Kayexalate oral and rectal powder: 1 application oral and rectal 3 times a day (22 Oct 2018 11:47)  metoprolol tartrate 50 mg oral tablet: 1 tab(s) orally 2 times a day (22 Oct 2018 11:47)  Vitamin B-12 100 mcg oral tablet: 1 tab(s) orally once a day (22 Oct 2018 11:47)      VITAL SIGNS: Last 24 Hours  T(C): 35.6 (26 Nov 2018 05:07), Max: 35.8 (25 Nov 2018 12:18)  T(F): 96 (26 Nov 2018 05:07), Max: 96.5 (25 Nov 2018 20:12)  HR: 52 (26 Nov 2018 05:07) (52 - 106)  BP: 137/79 (26 Nov 2018 05:07) (127/60 - 164/79)  BP(mean): --  RR: 18 (26 Nov 2018 05:07) (16 - 18)  SpO2: 95% (26 Nov 2018 09:02) (95% - 99%)    LABS:                        9.4    7.25  )-----------( 251      ( 25 Nov 2018 07:31 )             31.5     11-25    141  |  99  |  52<H>  ----------------------------<  116<H>  4.1   |  24  |  3.3<H>    Ca    9.9      25 Nov 2018 07:31  Mg     1.9     11-25    TPro  7.0  /  Alb  3.9  /  TBili  0.5  /  DBili  x   /  AST  10  /  ALT  11  /  AlkPhos  57  11-25    LIVER FUNCTIONS - ( 25 Nov 2018 07:31 )  Alb: 3.9 g/dL / Pro: 7.0 g/dL / ALK PHOS: 57 U/L / ALT: 11 U/L / AST: 10 U/L / GGT: x           PT/INR - ( 24 Nov 2018 20:02 )   PT: 23.20 sec;   INR: 2.03 ratio         PTT - ( 24 Nov 2018 20:02 )  PTT:40.2 sec      CARDIAC MARKERS ( 25 Nov 2018 07:31 )  x     / <0.01 ng/mL / 25 U/L / x     / 1.6 ng/mL  CARDIAC MARKERS ( 24 Nov 2018 20:02 )  x     / <0.01 ng/mL / 30 U/L / x     / 1.8 ng/mL      CAPILLARY BLOOD GLUCOSE          RADIOLOGY:    Serum Pro-Brain Natriuretic Peptide: 38855 pg/mL (11.24.18 @ 20:02)    < from: Xray Chest 1 View AP/PA (11.24.18 @ 21:12) >  Impression:    Right costophrenic angle small pleural effusion versus pleural thickening.  < end of copied text >    < from: CT Chest No Cont (11.11.18 @ 08:44) >  IMPRESSION:   Fluid within the right horizontal fissure (series 4, image 91) a trace   left pleural effusion is noted.   Bilateral nonspecific mosaic attenuation most notable within the lower   lobes are seen (most commonly associated with small airway disease.  Multiple right lower lobe pulmonary nodules largest 4 mm (series 4/154)  Multiple thyroid nodules largest 1.4 cm, right lobe (series 4/11).  < end of copied text >        PHYSICAL EXAM:    GENERAL: NAD, AAOx3, 74 yo F, scar noted in sternum   HEENT:  Atraumatic, Normocephalic. EOMI, conjunctiva and sclera clear, No JVD  PULMONARY: Clear to auscultation bilaterally; No wheeze  CARDIOVASCULAR: Regular rate and irregular rhythm; No murmurs.  GASTROINTESTINAL: Soft, Nontender, Nondistended; Bowel sounds present  EXTREMITIES:  2+ Peripheral Pulses, No clubbing, cyanosis, or edema  NEUROLOGY: non-focal  SKIN: No rashes or lesions      ADMISSION SUMMARY  Patient is a 73y old Female who presents with a chief complaint of SOB and palpitations (25 Nov 2018 19:24)     she currently admitted to medicine with the primary diagnosis of CHF exacerbation

## 2018-11-26 NOTE — PROGRESS NOTE ADULT - ASSESSMENT
sob, mild initial pulmonary congestion, due to afib, tachycardia, responded to therapy, much more stable  A.Fib, tachycardia, HR just became stable and controlled,   anxiety disorder    switch and increase the dose of cardizem to cardizem cd 240 mg daily, one dose today  continue with the rest of th meds  keep on lasix 40 mg daily   if HR is stable she can be d/c home tomorrow on current therapy  she will f/u with EP for EPS and ablation of the A.Fib, already saw Dr. Rehman

## 2018-11-26 NOTE — PROGRESS NOTE ADULT - ATTENDING COMMENTS
AFib with RVR  - Move patient to floor with tele as she needs cardiac monitoring  - Switch Cardizem to short acting and give Cardizem 30 q 6 hours.   - Continue amiodarone and Metoprolol   - HR increases up to 130s with ambulation  - Cardiac CT for pulmonary vein assessment prior to ablation  - Ablation is scheduled in the near future  - Continue Eliquis    Pleural Effusion  - pulm consult AFib with RVR  - Move patient to floor with tele as she needs cardiac monitoring  - Switch Cardizem to short acting and give Cardizem 30 q 6 hours.   - Continue amiodarone and Metoprolol (please use hold parameters)  - HR increases up to 130s with ambulation  - Cardiac CT for pulmonary vein assessment prior to ablation  - Ablation is scheduled in the near future  - Continue Eliquis    Pleural Effusion  - pulm consult AFib with RVR  - Move patient to floor with tele as she needs cardiac monitoring  - Switch Cardizem to short acting and give Cardizem 30 q 6 hours.   - Continue amiodarone and Metoprolol (please use hold parameters)  - HR increases up to 130s with ambulation  - Cardiac CT for pulmonary vein assessment prior to ablation  - Ablation is scheduled in the near future  - Continue Eliquis    Pleural Effusion & pulm nodules  - pulm consult    Thyroid Nodule  - Endocrine consult. Check TSH and free T4 AFib with RVR  - Move patient to floor with tele as she needs cardiac monitoring  - Switch Cardizem to short acting and give Cardizem 30 q 6 hours  - Continue amiodarone and Metoprolol (please use hold parameters)  - HR increases up to 130s with ambulation  - Cardiac CT for pulmonary vein assessment prior to ablation  - Ablation is scheduled in the near future  - Continue Eliquis but increase dose to 5mg q12    Pleural Effusion & pulm nodules  - pulm consult    Thyroid Nodule  - Endocrine consult. Check TSH and free T4

## 2018-11-26 NOTE — PROGRESS NOTE ADULT - SUBJECTIVE AND OBJECTIVE BOX
INTERVAL HPI/OVERNIGHT EVENTS:  No tele monitoring.    Pt complains of dyspnea with exertion and irregular heart rate worse x 3 days     MEDICATIONS  (STANDING):  allopurinol 100 milliGRAM(s) Oral daily  amiodarone    Tablet 200 milliGRAM(s) Oral daily  apixaban 2.5 milliGRAM(s) Oral every 12 hours  calcitriol   Capsule 0.5 MICROGram(s) Oral daily  cyanocobalamin 1000 MICROGram(s) Oral daily  diltiazem    milliGRAM(s) Oral daily  famotidine    Tablet 20 milliGRAM(s) Oral daily  furosemide    Tablet 40 milliGRAM(s) Oral daily  isosorbide   mononitrate ER Tablet (IMDUR) 30 milliGRAM(s) Oral at bedtime  metoprolol tartrate 50 milliGRAM(s) Oral two times a day    MEDICATIONS  (PRN):  acetaminophen   Tablet .. 650 milliGRAM(s) Oral every 6 hours PRN Mild Pain (1 - 3)      Allergies    No Known Allergies    Intolerances        REVIEW OF SYSTEMS  All systems negative except as listed below     Vital Signs Last 24 Hrs  T(C): 35.6 (26 Nov 2018 05:07), Max: 35.8 (25 Nov 2018 12:18)  T(F): 96 (26 Nov 2018 05:07), Max: 96.5 (25 Nov 2018 20:12)  HR: 52 (26 Nov 2018 05:07) (52 - 106)  BP: 137/79 (26 Nov 2018 05:07) (127/60 - 164/79)  BP(mean): --  RR: 18 (26 Nov 2018 05:07) (16 - 18)  SpO2: 95% (26 Nov 2018 09:02) (95% - 99%)    Physical Exam    GENERAL: In no apparent distress, well nourished, and hydrated.  HEAD:  Atraumatic, Normocephalic  EYES: EOMI, PERRLA, conjunctiva and sclera clear  ENMT: No tonsillar erythema, exudates, or enlargements; ist mucous membranes, Good dentition, No lesions  NECK: Supple and normal thyroid.  No JVD or carotid bruit.  Carotid pulse is 2+ bilaterally.  HEART: IRRegular rate and rhythm; No murmurs, rubs, or gallops.  PULMONARY: Clear to auscultation and perfusion.  No rales, wheezing, or rhonchi bilaterally.  ABDOMEN: Soft, Nontender, Nondistended; Bowel sounds present  EXTREMITIES:  2+ Peripheral Pulses, No clubbing, cyanosis, or edema  LYMPH: No lymphadenopathy noted  NEUROLOGICAL: Grossly nonfocal    LABS:                        9.4    7.25  )-----------( 251      ( 25 Nov 2018 07:31 )             31.5     11-25    141  |  99  |  52<H>  ----------------------------<  116<H>  4.1   |  24  |  3.3<H>    Ca    9.9      25 Nov 2018 07:31  Mg     1.9     11-25    TPro  7.0  /  Alb  3.9  /  TBili  0.5  /  DBili  x   /  AST  10  /  ALT  11  /  AlkPhos  57  11-25    PT/INR - ( 24 Nov 2018 20:02 )   PT: 23.20 sec;   INR: 2.03 ratio         PTT - ( 24 Nov 2018 20:02 )  PTT:40.2 sec     12 Lead ECG (11.25.18 @ 12:53) >    Ventricular Rate 77 BPM    Atrial Rate 78 BPM    QRS Duration 104 ms    Q-T Interval 392 ms    QTC Calculation(Bezet) 443 ms    R Axis 4 degrees    T Axis 133 degrees    Diagnosis Line Atrial fibrillation  Nonspecific T wave abnormality  Abnormal ECG    Confirmed by REEMA MCDONOUGH, [facility] ([id number]) on 11/25/2018 2:33:13 PM    < end of copied text >    RADIOLOGY & ADDITIONAL TESTS:   Xray Chest 1 View AP/PA (11.24.18 @ 21:12) >  EXAM:  XR CHEST FRONTAL 1V            PROCEDURE DATE:  11/24/2018            INTERPRETATION:  Clinical History / Reason for exam: CHF.    Comparison : Chest radiograph October 22, 2018.    Technique/Positioning: XR CHEST FRONTAL 1V.    Findings:    Support devices: None.    Cardiac/mediastinum/hilum: Stable cardiomegaly poststernotomy.    Lung parenchyma/Pleura: There is small right pleural effusion versus   pleural thickening at the costophrenic angle. Lungs are otherwise clear.   No pneumothorax.    Skeleton/soft tissues: Unremarkable.    Impression:      Right costophrenic angle small pleural effusion versus pleural thickening.        BALBIR ESPITIA M.D., ATTENDING RADIOLOGIST  This document has been electronically signed. Nov 25 2018  9:39AM    < end of copied text > INTERVAL HPI/OVERNIGHT EVENTS:  No tele monitoring.  Pt complains of dyspnea with exertion and irregular heart rate worse x 3 days     MEDICATIONS  (STANDING):  allopurinol 100 milliGRAM(s) Oral daily  amiodarone    Tablet 200 milliGRAM(s) Oral daily  apixaban 2.5 milliGRAM(s) Oral every 12 hours  calcitriol   Capsule 0.5 MICROGram(s) Oral daily  cyanocobalamin 1000 MICROGram(s) Oral daily  diltiazem    milliGRAM(s) Oral daily  famotidine    Tablet 20 milliGRAM(s) Oral daily  furosemide    Tablet 40 milliGRAM(s) Oral daily  isosorbide   mononitrate ER Tablet (IMDUR) 30 milliGRAM(s) Oral at bedtime  metoprolol tartrate 50 milliGRAM(s) Oral two times a day    MEDICATIONS  (PRN):  acetaminophen   Tablet .. 650 milliGRAM(s) Oral every 6 hours PRN Mild Pain (1 - 3)      Allergies    No Known Allergies    Intolerances        REVIEW OF SYSTEMS  All systems negative except as listed below     Vital Signs Last 24 Hrs  T(C): 35.6 (26 Nov 2018 05:07), Max: 35.8 (25 Nov 2018 12:18)  T(F): 96 (26 Nov 2018 05:07), Max: 96.5 (25 Nov 2018 20:12)  HR: 52 (26 Nov 2018 05:07) (52 - 106)  BP: 137/79 (26 Nov 2018 05:07) (127/60 - 164/79)  BP(mean): --  RR: 18 (26 Nov 2018 05:07) (16 - 18)  SpO2: 95% (26 Nov 2018 09:02) (95% - 99%)    Physical Exam    GENERAL: In no apparent distress, well nourished, and hydrated.  HEAD:  Atraumatic, Normocephalic  EYES: EOMI, PERRLA, conjunctiva and sclera clear  ENMT: No tonsillar erythema, exudates, or enlargements; ist mucous membranes, Good dentition, No lesions  NECK: Supple and normal thyroid.  No JVD or carotid bruit.  Carotid pulse is 2+ bilaterally.  HEART: IRRegular rate and rhythm; No murmurs, rubs, or gallops.  PULMONARY: Clear to auscultation and perfusion.  No rales, wheezing, or rhonchi bilaterally.  ABDOMEN: Soft, Nontender, Nondistended; Bowel sounds present  EXTREMITIES:  2+ Peripheral Pulses, No clubbing, cyanosis, or edema  LYMPH: No lymphadenopathy noted  NEUROLOGICAL: Grossly nonfocal    LABS:                        9.4    7.25  )-----------( 251      ( 25 Nov 2018 07:31 )             31.5     11-25    141  |  99  |  52<H>  ----------------------------<  116<H>  4.1   |  24  |  3.3<H>    Ca    9.9      25 Nov 2018 07:31  Mg     1.9     11-25    TPro  7.0  /  Alb  3.9  /  TBili  0.5  /  DBili  x   /  AST  10  /  ALT  11  /  AlkPhos  57  11-25    PT/INR - ( 24 Nov 2018 20:02 )   PT: 23.20 sec;   INR: 2.03 ratio         PTT - ( 24 Nov 2018 20:02 )  PTT:40.2 sec     12 Lead ECG (11.25.18 @ 12:53) >    Ventricular Rate 77 BPM    Atrial Rate 78 BPM    QRS Duration 104 ms    Q-T Interval 392 ms    QTC Calculation(Bezet) 443 ms    R Axis 4 degrees    T Axis 133 degrees    Diagnosis Line Atrial fibrillation  Nonspecific T wave abnormality  Abnormal ECG    Confirmed by REEMA MCDONOUGH, [facility] ([id number]) on 11/25/2018 2:33:13 PM    < end of copied text >    RADIOLOGY & ADDITIONAL TESTS:   Xray Chest 1 View AP/PA (11.24.18 @ 21:12) >  EXAM:  XR CHEST FRONTAL 1V            PROCEDURE DATE:  11/24/2018            INTERPRETATION:  Clinical History / Reason for exam: CHF.    Comparison : Chest radiograph October 22, 2018.    Technique/Positioning: XR CHEST FRONTAL 1V.    Findings:    Support devices: None.    Cardiac/mediastinum/hilum: Stable cardiomegaly poststernotomy.    Lung parenchyma/Pleura: There is small right pleural effusion versus   pleural thickening at the costophrenic angle. Lungs are otherwise clear.   No pneumothorax.    Skeleton/soft tissues: Unremarkable.    Impression:      Right costophrenic angle small pleural effusion versus pleural thickening.        BALBIR ESPITIA M.D., ATTENDING RADIOLOGIST  This document has been electronically signed. Nov 25 2018  9:39AM    < end of copied text >      ` INTERVAL HPI/OVERNIGHT EVENTS:  No tele monitoring.  Pt complains of dyspnea with exertion and irregular heart rate worse x 3 days     MEDICATIONS  (STANDING):  allopurinol 100 milliGRAM(s) Oral daily  amiodarone    Tablet 200 milliGRAM(s) Oral daily  apixaban 2.5 milliGRAM(s) Oral every 12 hours  calcitriol   Capsule 0.5 MICROGram(s) Oral daily  cyanocobalamin 1000 MICROGram(s) Oral daily  diltiazem    milliGRAM(s) Oral daily  famotidine    Tablet 20 milliGRAM(s) Oral daily  furosemide    Tablet 40 milliGRAM(s) Oral daily  isosorbide   mononitrate ER Tablet (IMDUR) 30 milliGRAM(s) Oral at bedtime  metoprolol tartrate 50 milliGRAM(s) Oral two times a day    MEDICATIONS  (PRN):  acetaminophen   Tablet .. 650 milliGRAM(s) Oral every 6 hours PRN Mild Pain (1 - 3)      Allergies    No Known Allergies    Intolerances        REVIEW OF SYSTEMS  All systems negative except as listed below     Vital Signs Last 24 Hrs  T(C): 35.6 (26 Nov 2018 05:07), Max: 35.8 (25 Nov 2018 12:18)  T(F): 96 (26 Nov 2018 05:07), Max: 96.5 (25 Nov 2018 20:12)  HR: 52 (26 Nov 2018 05:07) (52 - 106)  BP: 137/79 (26 Nov 2018 05:07) (127/60 - 164/79)  BP(mean): --  RR: 18 (26 Nov 2018 05:07) (16 - 18)  SpO2: 95% (26 Nov 2018 09:02) (95% - 99%)    Physical Exam    GENERAL: In no apparent distress, well nourished, and hydrated.  HEAD:  Atraumatic, Normocephalic  EYES: EOMI, PERRLA, conjunctiva and sclera clear  ENMT: Moist mucous membranes   NECK: Supple and normal thyroid.  No JVD or carotid bruit.  Carotid pulse is 2+ bilaterally.  HEART: IRRegular rate and rhythm; No murmurs, rubs, or gallops.  PULMONARY: Clear to auscultation and perfusion.  No rales, wheezing, or rhonchi bilaterally.  ABDOMEN: Soft, Nontender, Nondistended; Bowel sounds present  EXTREMITIES:  2+ Peripheral Pulses, No clubbing, cyanosis, or edema  LYMPH: No lymphadenopathy noted  NEUROLOGICAL: Grossly nonfocal    LABS:                        9.4    7.25  )-----------( 251      ( 25 Nov 2018 07:31 )             31.5     11-25    141  |  99  |  52<H>  ----------------------------<  116<H>  4.1   |  24  |  3.3<H>    Ca    9.9      25 Nov 2018 07:31  Mg     1.9     11-25    TPro  7.0  /  Alb  3.9  /  TBili  0.5  /  DBili  x   /  AST  10  /  ALT  11  /  AlkPhos  57  11-25    PT/INR - ( 24 Nov 2018 20:02 )   PT: 23.20 sec;   INR: 2.03 ratio         PTT - ( 24 Nov 2018 20:02 )  PTT:40.2 sec     12 Lead ECG (11.25.18 @ 12:53) >    Ventricular Rate 77 BPM    Atrial Rate 78 BPM    QRS Duration 104 ms    Q-T Interval 392 ms    QTC Calculation(Bezet) 443 ms    R Axis 4 degrees    T Axis 133 degrees    Diagnosis Line Atrial fibrillation  Nonspecific T wave abnormality  Abnormal ECG    Confirmed by REEMA MCDONOUGH, [facility] ([id number]) on 11/25/2018 2:33:13 PM    < end of copied text >    RADIOLOGY & ADDITIONAL TESTS:   Xray Chest 1 View AP/PA (11.24.18 @ 21:12) >  EXAM:  XR CHEST FRONTAL 1V            PROCEDURE DATE:  11/24/2018            INTERPRETATION:  Clinical History / Reason for exam: CHF.    Comparison : Chest radiograph October 22, 2018.    Technique/Positioning: XR CHEST FRONTAL 1V.    Findings:    Support devices: None.    Cardiac/mediastinum/hilum: Stable cardiomegaly poststernotomy.    Lung parenchyma/Pleura: There is small right pleural effusion versus   pleural thickening at the costophrenic angle. Lungs are otherwise clear.   No pneumothorax.    Skeleton/soft tissues: Unremarkable.    Impression:      Right costophrenic angle small pleural effusion versus pleural thickening.        BALBIR ESPITIA M.D., ATTENDING RADIOLOGIST  This document has been electronically signed. Nov 25 2018  9:39AM    < end of copied text >      < from: Transthoracic Echocardiogram (05.10.18 @ 16:51) >   1. Left ventricular ejection fraction, by visual estimation, is 45 to   50%.   2. Mildly decreased global left ventricular systolic function.   3. Mild to moderate mitral valve regurgitation.   4. Mild-moderate tricuspid regurgitation.   5. Mild aortic regurgitation.   6. Estimated pulmonary artery systolic pressure is 50.1 mmHg assuming a   right atrial pressure of 10 mmHg, which is consistent with moderate   pulmonary hypertension.   7. Peak transaortic gradient equals 27.4 mmHg, mean transaortic gradient   equals 15.3 mmHg, the calculated aortic valve area equals 1.04 cm² by the   continuity equation consistent with moderate aortic stenosis.    < end of copied text > INTERVAL HPI/OVERNIGHT EVENTS:  No tele monitoring.  Pt complains of dyspnea with exertion and irregular heart rate worse x 3 days     MEDICATIONS  (STANDING):  allopurinol 100 milliGRAM(s) Oral daily  amiodarone    Tablet 200 milliGRAM(s) Oral daily  apixaban 2.5 milliGRAM(s) Oral every 12 hours  calcitriol   Capsule 0.5 MICROGram(s) Oral daily  cyanocobalamin 1000 MICROGram(s) Oral daily  diltiazem    milliGRAM(s) Oral daily  famotidine    Tablet 20 milliGRAM(s) Oral daily  furosemide    Tablet 40 milliGRAM(s) Oral daily  isosorbide   mononitrate ER Tablet (IMDUR) 30 milliGRAM(s) Oral at bedtime  metoprolol tartrate 50 milliGRAM(s) Oral two times a day    MEDICATIONS  (PRN):  acetaminophen   Tablet .. 650 milliGRAM(s) Oral every 6 hours PRN Mild Pain (1 - 3)      Allergies  No Known Allergies  Intolerances    REVIEW OF SYSTEMS  All systems negative except as listed below     Vital Signs Last 24 Hrs  T(C): 35.6 (26 Nov 2018 05:07), Max: 35.8 (25 Nov 2018 12:18)  T(F): 96 (26 Nov 2018 05:07), Max: 96.5 (25 Nov 2018 20:12)  HR: 52 (26 Nov 2018 05:07) (52 - 106)  BP: 137/79 (26 Nov 2018 05:07) (127/60 - 164/79)  BP(mean): --  RR: 18 (26 Nov 2018 05:07) (16 - 18)  SpO2: 95% (26 Nov 2018 09:02) (95% - 99%)    Physical Exam  GENERAL: In no apparent distress, well nourished, and hydrated.  HEAD:  Atraumatic, Normocephalic  EYES: EOMI, PERRLA, conjunctiva and sclera clear  ENMT: Moist mucous membranes   NECK: Supple and normal thyroid.  No JVD or carotid bruit.  Carotid pulse is 2+ bilaterally.  HEART: IRRegular rate and rhythm; No murmurs, rubs, or gallops.  PULMONARY: Clear to auscultation and perfusion.  No rales, wheezing, or rhonchi bilaterally.  ABDOMEN: Soft, Nontender, Nondistended; Bowel sounds present  EXTREMITIES:  2+ Peripheral Pulses, No clubbing, cyanosis, or edema  LYMPH: No lymphadenopathy noted  NEUROLOGICAL: Grossly nonfocal    LABS:                        9.4    7.25  )-----------( 251      ( 25 Nov 2018 07:31 )             31.5     11-25    141  |  99  |  52<H>  ----------------------------<  116<H>  4.1   |  24  |  3.3<H>    Ca    9.9      25 Nov 2018 07:31  Mg     1.9     11-25    TPro  7.0  /  Alb  3.9  /  TBili  0.5  /  DBili  x   /  AST  10  /  ALT  11  /  AlkPhos  57  11-25    PT/INR - ( 24 Nov 2018 20:02 )   PT: 23.20 sec;   INR: 2.03 ratio      PTT - ( 24 Nov 2018 20:02 )  PTT:40.2 sec      12 Lead ECG (11.25.18 @ 12:53)  Ventricular Rate 77 BPM    Atrial Rate 78 BPM    QRS Duration 104 ms    Q-T Interval 392 ms    QTC Calculation(Bezet) 443 ms    R Axis 4 degrees    T Axis 133 degrees    Diagnosis Line Atrial fibrillation  Nonspecific T wave abnormality  Abnormal ECG    Confirmed by REEMA MCDONOUGH, [facility] ([id number]) on 11/25/2018 2:33:13 PM    < end of copied text >    RADIOLOGY & ADDITIONAL TESTS:  Xray Chest 1 View AP/PA (11.24.18 @ 21:12) >  EXAM:  XR CHEST FRONTAL 1V          PROCEDURE DATE:  11/24/2018      INTERPRETATION:  Clinical History / Reason for exam: CHF.    Comparison : Chest radiograph October 22, 2018.    Technique/Positioning: XR CHEST FRONTAL 1V.    Findings:    Support devices: None.    Cardiac/mediastinum/hilum: Stable cardiomegaly post sternotomy.    Lung parenchyma/Pleura: There is small right pleural effusion versus   pleural thickening at the costophrenic angle. Lungs are otherwise clear.   No pneumothorax.    Skeleton/soft tissues: Unremarkable.    Impression:      Right costophrenic angle small pleural effusion versus pleural thickening.        BALBIR ESPITIA M.D., ATTENDING RADIOLOGIST  This document has been electronically signed. Nov 25 2018  9:39AM    < end of copied text >      < from: Transthoracic Echocardiogram (05.10.18 @ 16:51) >   1. Left ventricular ejection fraction, by visual estimation, is 45 to   50%.   2. Mildly decreased global left ventricular systolic function.   3. Mild to moderate mitral valve regurgitation.   4. Mild-moderate tricuspid regurgitation.   5. Mild aortic regurgitation.   6. Estimated pulmonary artery systolic pressure is 50.1 mmHg assuming a   right atrial pressure of 10 mmHg, which is consistent with moderate   pulmonary hypertension.   7. Peak transaortic gradient equals 27.4 mmHg, mean transaortic gradient   equals 15.3 mmHg, the calculated aortic valve area equals 1.04 cm² by the   continuity equation consistent with moderate aortic stenosis.    < end of copied text >

## 2018-11-26 NOTE — PROGRESS NOTE ADULT - SUBJECTIVE AND OBJECTIVE BOX
NOAH QUIROS 74yo W Female from home came to the ER for c/o SOB worsening over 3 days with palpitations.  The pt was noted to be in a fib with RVR and a proBNP of 17,707 and CE are normal.  The pt was evaluate d by carido and EPS.  The rate is better controlled and pt overall feels better.  The PMHx includes:  HTN, ASHD, CAD, sp MI, sp CABG, afib, AC with Eliquis, sp failed ablation, Hyperlipidemia, CKD IV, GERD, diverticulosis, anxiety, thyroid nodule (1.4cm),  anxiety, sp hysterectomy.     INTERVAL HPI/OVERNIGHT EVENTS: pt overall feels better, HR inc with ambulation, was evaluated by cardio and EPS, meds being adjusted, pt more amenable to repeat ablation which will be scheduled as out pt    MEDICATIONS  (STANDING):  allopurinol 100 milliGRAM(s) Oral daily  amiodarone    Tablet 200 milliGRAM(s) Oral daily  apixaban 2.5 milliGRAM(s) Oral every 12 hours  calcitriol   Capsule 0.5 MICROGram(s) Oral daily  cyanocobalamin 1000 MICROGram(s) Oral daily  diltiazem    milliGRAM(s) Oral daily  famotidine    Tablet 20 milliGRAM(s) Oral daily  furosemide    Tablet 40 milliGRAM(s) Oral daily  isosorbide   mononitrate ER Tablet (IMDUR) 30 milliGRAM(s) Oral at bedtime  metoprolol tartrate 50 milliGRAM(s) Oral two times a day    MEDICATIONS  (PRN):  acetaminophen   Tablet .. 650 milliGRAM(s) Oral every 6 hours PRN Mild Pain (1 - 3)      Allergies    No Known Allergies      Vital Signs Last 24 Hrs    T(F): 96.1  HR: 52 to 122  BP: 154/74  RR: 19  SpO2: 95%    PHYSICAL EXAM:  pt is alert and oriented x 3, NAD    Eyes:  normal    ENMT:  normal    Neck:  supple, mild JVD, no bruits    Respiratory:  shallow respirations    Cardiovascular:  S1S2 irreg II/Vi OUSMANE    Gastrointestinal:  globose, soft and benign    Extremities:  moves all ext, + arthritic changes    Neurological:  nor focal deficits    Skin:  nor rash    Lymph Nodes:  not enlarged    LABS:                        9.4    7.2  )-----------( 251                  31    11-25    141  |  99  |  52  ----------------------------<  116  4.1   |  24  |  3.3    GFR 13  TSH 2.5  Ca    9.9        Mg     1.9      BNP  17, 707  CK  30, 25  MB  1.8, 1.6  trop < 0.01 x 2  TPro  7.0  /  Alb  3.9  /  TBili  0.5  /  DBili  x   /  AST  10  /  ALT  11  /  AlkPhos  57  11-25    PT/INR - ( 24 Nov 2018 20:02 )   PT: 23.20 sec;   INR: 2.03 ratio         PTT - ( 24 Nov 2018 20:02 )  PTT:40.2 sec      RADIOLOGY & ADDITIONAL TESTS:    CXR:  R costophrenic angle blunting, sm pl effusion    CT of the Chest:  trace L pl efusion,  R horizontal fisure fluid, no endobronchial obs,  RLL densities and tenting,  4 and 3mm pul nodules, BL multiple granulomas    EKG #1:  abib 89/min PVCs,  nonspecific ST-T changes  EKG #2:  84/min prolonged QTc 482  EKG #3:  77/min QTc 443

## 2018-11-26 NOTE — PROGRESS NOTE ADULT - ASSESSMENT
SOB:  - continue current medical therapies  - change Cardizem to Cardizem 30 mg q 6 hours. SOB:  - continue current medical therapies  - change Cardizem to Cardizem 30 mg q 6 hours.   - Pulmonary consult for nodules  - endocrine consult for nodules

## 2018-11-27 ENCOUNTER — TRANSCRIPTION ENCOUNTER (OUTPATIENT)
Age: 73
End: 2018-11-27

## 2018-11-27 LAB
ALBUMIN SERPL ELPH-MCNC: 3.8 G/DL — SIGNIFICANT CHANGE UP (ref 3.5–5.2)
ALP SERPL-CCNC: 72 U/L — SIGNIFICANT CHANGE UP (ref 30–115)
ALT FLD-CCNC: 15 U/L — SIGNIFICANT CHANGE UP (ref 0–41)
ANION GAP SERPL CALC-SCNC: 20 MMOL/L — HIGH (ref 7–14)
AST SERPL-CCNC: 20 U/L — SIGNIFICANT CHANGE UP (ref 0–41)
BASOPHILS # BLD AUTO: 0.04 K/UL — SIGNIFICANT CHANGE UP (ref 0–0.2)
BASOPHILS NFR BLD AUTO: 0.5 % — SIGNIFICANT CHANGE UP (ref 0–1)
BILIRUB SERPL-MCNC: 0.5 MG/DL — SIGNIFICANT CHANGE UP (ref 0.2–1.2)
BUN SERPL-MCNC: 75 MG/DL — CRITICAL HIGH (ref 10–20)
CALCIUM SERPL-MCNC: 10.2 MG/DL — HIGH (ref 8.5–10.1)
CHLORIDE SERPL-SCNC: 98 MMOL/L — SIGNIFICANT CHANGE UP (ref 98–110)
CO2 SERPL-SCNC: 21 MMOL/L — SIGNIFICANT CHANGE UP (ref 17–32)
CREAT SERPL-MCNC: 3.3 MG/DL — HIGH (ref 0.7–1.5)
EOSINOPHIL # BLD AUTO: 0.16 K/UL — SIGNIFICANT CHANGE UP (ref 0–0.7)
EOSINOPHIL NFR BLD AUTO: 1.8 % — SIGNIFICANT CHANGE UP (ref 0–8)
GLUCOSE SERPL-MCNC: 138 MG/DL — HIGH (ref 70–99)
HCT VFR BLD CALC: 30.6 % — LOW (ref 37–47)
HGB BLD-MCNC: 9.2 G/DL — LOW (ref 12–16)
IMM GRANULOCYTES NFR BLD AUTO: 0.5 % — HIGH (ref 0.1–0.3)
LYMPHOCYTES # BLD AUTO: 0.54 K/UL — LOW (ref 1.2–3.4)
LYMPHOCYTES # BLD AUTO: 6.2 % — LOW (ref 20.5–51.1)
MAGNESIUM SERPL-MCNC: 1.9 MG/DL — SIGNIFICANT CHANGE UP (ref 1.8–2.4)
MCHC RBC-ENTMCNC: 27 PG — SIGNIFICANT CHANGE UP (ref 27–31)
MCHC RBC-ENTMCNC: 30.1 G/DL — LOW (ref 32–37)
MCV RBC AUTO: 89.7 FL — SIGNIFICANT CHANGE UP (ref 81–99)
MONOCYTES # BLD AUTO: 0.95 K/UL — HIGH (ref 0.1–0.6)
MONOCYTES NFR BLD AUTO: 10.9 % — HIGH (ref 1.7–9.3)
NEUTROPHILS # BLD AUTO: 6.95 K/UL — HIGH (ref 1.4–6.5)
NEUTROPHILS NFR BLD AUTO: 80.1 % — HIGH (ref 42.2–75.2)
NRBC # BLD: 0 /100 WBCS — SIGNIFICANT CHANGE UP (ref 0–0)
PLATELET # BLD AUTO: 248 K/UL — SIGNIFICANT CHANGE UP (ref 130–400)
POTASSIUM SERPL-MCNC: 4.3 MMOL/L — SIGNIFICANT CHANGE UP (ref 3.5–5)
POTASSIUM SERPL-SCNC: 4.3 MMOL/L — SIGNIFICANT CHANGE UP (ref 3.5–5)
PROT SERPL-MCNC: 7.3 G/DL — SIGNIFICANT CHANGE UP (ref 6–8)
RBC # BLD: 3.41 M/UL — LOW (ref 4.2–5.4)
RBC # FLD: 16.3 % — HIGH (ref 11.5–14.5)
SODIUM SERPL-SCNC: 139 MMOL/L — SIGNIFICANT CHANGE UP (ref 135–146)
T4 FREE SERPL-MCNC: 1.2 NG/DL — SIGNIFICANT CHANGE UP (ref 0.9–1.8)
TSH SERPL-MCNC: 4.02 UIU/ML — SIGNIFICANT CHANGE UP (ref 0.27–4.2)
WBC # BLD: 8.68 K/UL — SIGNIFICANT CHANGE UP (ref 4.8–10.8)
WBC # FLD AUTO: 8.68 K/UL — SIGNIFICANT CHANGE UP (ref 4.8–10.8)

## 2018-11-27 RX ORDER — IPRATROPIUM BROMIDE 0.2 MG/ML
500 SOLUTION, NON-ORAL INHALATION ONCE
Qty: 0 | Refills: 0 | Status: COMPLETED | OUTPATIENT
Start: 2018-11-27 | End: 2018-11-27

## 2018-11-27 RX ORDER — FUROSEMIDE 40 MG
20 TABLET ORAL ONCE
Qty: 0 | Refills: 0 | Status: COMPLETED | OUTPATIENT
Start: 2018-11-27 | End: 2018-11-27

## 2018-11-27 RX ADMIN — ISOSORBIDE MONONITRATE 30 MILLIGRAM(S): 60 TABLET, EXTENDED RELEASE ORAL at 21:18

## 2018-11-27 RX ADMIN — Medication 500 MICROGRAM(S): at 02:33

## 2018-11-27 RX ADMIN — Medication 240 MILLIGRAM(S): at 06:03

## 2018-11-27 RX ADMIN — Medication 50 MILLIGRAM(S): at 17:19

## 2018-11-27 RX ADMIN — APIXABAN 5 MILLIGRAM(S): 2.5 TABLET, FILM COATED ORAL at 17:19

## 2018-11-27 RX ADMIN — PREGABALIN 1000 MICROGRAM(S): 225 CAPSULE ORAL at 13:45

## 2018-11-27 RX ADMIN — Medication 100 MILLIGRAM(S): at 13:45

## 2018-11-27 RX ADMIN — Medication 40 MILLIGRAM(S): at 06:02

## 2018-11-27 RX ADMIN — Medication 20 MILLIGRAM(S): at 02:10

## 2018-11-27 RX ADMIN — CHLORHEXIDINE GLUCONATE 1 APPLICATION(S): 213 SOLUTION TOPICAL at 06:02

## 2018-11-27 RX ADMIN — Medication 50 MILLIGRAM(S): at 06:02

## 2018-11-27 RX ADMIN — FAMOTIDINE 20 MILLIGRAM(S): 10 INJECTION INTRAVENOUS at 13:44

## 2018-11-27 RX ADMIN — AMIODARONE HYDROCHLORIDE 200 MILLIGRAM(S): 400 TABLET ORAL at 06:02

## 2018-11-27 RX ADMIN — APIXABAN 5 MILLIGRAM(S): 2.5 TABLET, FILM COATED ORAL at 06:02

## 2018-11-27 RX ADMIN — CALCITRIOL 0.5 MICROGRAM(S): 0.5 CAPSULE ORAL at 13:45

## 2018-11-27 NOTE — PROGRESS NOTE ADULT - SUBJECTIVE AND OBJECTIVE BOX
NOAH QUIROS 74yo W Female from home came to the ER for c/o SOB worsening over 3 days with palpitations.  The pt was noted to be in a fib with RVR and a proBNP of 17,707 and CE are normal.  The pt was evaluate d by carido and EPS.  The rate is better controlled and pt overall feels better.  The PMHx includes:  HTN, ASHD, CAD, sp MI, sp CABG, afib, AC with Eliquis, sp failed ablation, Hyperlipidemia, CKD IV, GERD, diverticulosis, anxiety, thyroid nodule (1.4cm),  anxiety, sp hysterectomy.     INTERVAL HPI/OVERNIGHT EVENTS: pt overall feels better, HR inc with ambulation, was evaluated by cardio and EPS, meds being adjusted, pt more amenable to repeat ablation which will be scheduled as out pt in January    MEDICATIONS  (STANDING):  allopurinol 100 milliGRAM(s) Oral daily  amiodarone    Tablet 200 milliGRAM(s) Oral daily  apixaban 2.5 milliGRAM(s) Oral every 12 hours  calcitriol   Capsule 0.5 MICROGram(s) Oral daily  cyanocobalamin 1000 MICROGram(s) Oral daily  diltiazem    milliGRAM(s) Oral daily  famotidine    Tablet 20 milliGRAM(s) Oral daily  furosemide    Tablet 40 milliGRAM(s) Oral daily  isosorbide   mononitrate ER Tablet (IMDUR) 30 milliGRAM(s) Oral at bedtime  metoprolol tartrate 50 milliGRAM(s) Oral two times a day    MEDICATIONS  (PRN):  acetaminophen   Tablet .. 650 milliGRAM(s) Oral every 6 hours PRN Mild Pain (1 - 3)      Allergies    No Known Allergies      Vital Signs Last 24 Hrs    T(F): 96.1  HR: 64  BP: 146/67  RR: 18  SpO2: 91 to 95%    PHYSICAL EXAM:  pt is alert and oriented x 3, NAD    Eyes:  normal    ENMT:  normal    Neck:  supple, mild JVD, no bruits    Respiratory:  shallow respirations    Cardiovascular:  S1S2 irreg II/Vi OUSMANE    Gastrointestinal:  globose, soft and benign    Extremities:  moves all ext, + arthritic changes    Neurological:  nor focal deficits    Skin:  nor rash    Lymph Nodes:  not enlarged    LABS:                        9.2    8.6  )-----------( 248                  30    11-25    139  |  98  |  75  ----------------------------<  138  4.3   |  21  |  3.3    GFR 13  TSH 2.5  Ca    9.9        Mg     1.9      BNP  17, 707  CK  30, 25  MB  1.8, 1.6  trop < 0.01 x 2  TPro  7.0  /  Alb  3.9  /  TBili  0.5  /  DBili  x   /  AST  10  /  ALT  11  /  AlkPhos  57  11-25    PT/INR - ( 24 Nov 2018 20:02 )   PT: 23.20 sec;   INR: 2.03 ratio         PTT - ( 24 Nov 2018 20:02 )  PTT:40.2 sec      RADIOLOGY & ADDITIONAL TESTS:    CXR:  R costophrenic angle blunting, sm pl effusion    CT of the Chest:  trace L pl efusion,  R horizontal fisure fluid, no endobronchial obs,  RLL densities and tenting,  4 and 3mm pul nodules, BL multiple granulomas    EKG #1:  abib 89/min PVCs,  nonspecific ST-T changes  EKG #2:  84/min prolonged QTc 482  EKG #3:  77/min QTc 443

## 2018-11-27 NOTE — DISCHARGE NOTE ADULT - MEDICATION SUMMARY - MEDICATIONS TO CHANGE
I will SWITCH the dose or number of times a day I take the medications listed below when I get home from the hospital:    Cardizem  mg/24 hours oral capsule, extended release  -- 1 cap(s) by mouth once a day    furosemide 40 mg oral tablet  -- 1 tab(s) by mouth once a day

## 2018-11-27 NOTE — PROGRESS NOTE ADULT - ASSESSMENT
Afib with RVR - rate controlled   - continue amiodarone, metoprolol and Cardizem   - continue Eliquis 5 mg q12h  - normal TSH, follow up free T4  - scheduled for ablation in January with Dr. Stephanie Rehman  - cardiac CT pending to assess pulmonary vein prior to ablation     CKD stage IV  - ollow with Dr Grullon (nephrologist) as outpatient, inpatient consult ending   - will need renal approval before proceeding with the imaging    Pleural Effusion & pulm nodules  - out patient pulmonary follow up    Thyroid Nodule  - our patient endocrine follow up

## 2018-11-27 NOTE — PROGRESS NOTE ADULT - SUBJECTIVE AND OBJECTIVE BOX
INTERVAL HPI/OVERNIGHT EVENTS:    Patient seen and examined oob to chair, no complaints  Tele: A-Fib rate controlled    MEDICATIONS  (STANDING):  allopurinol 100 milliGRAM(s) Oral daily  amiodarone    Tablet 200 milliGRAM(s) Oral daily  apixaban 5 milliGRAM(s) Oral every 12 hours  calcitriol   Capsule 0.5 MICROGram(s) Oral daily  chlorhexidine 4% Liquid 1 Application(s) Topical <User Schedule>  cyanocobalamin 1000 MICROGram(s) Oral daily  diltiazem    milliGRAM(s) Oral daily  famotidine    Tablet 20 milliGRAM(s) Oral daily  furosemide    Tablet 40 milliGRAM(s) Oral daily  isosorbide   mononitrate ER Tablet (IMDUR) 30 milliGRAM(s) Oral at bedtime  metoprolol tartrate 50 milliGRAM(s) Oral two times a day    MEDICATIONS  (PRN):  acetaminophen   Tablet .. 650 milliGRAM(s) Oral every 6 hours PRN Mild Pain (1 - 3)    Allergies  No Known Allergies    Vital Signs Last 24 Hrs  T(C): 35.6 (27 Nov 2018 13:52), Max: 37 (26 Nov 2018 21:45)  T(F): 96.1 (27 Nov 2018 13:52), Max: 98.6 (26 Nov 2018 21:45)  HR: 64 (27 Nov 2018 13:52) (61 - 66)  BP: 137/61 (27 Nov 2018 13:52) (137/61 - 163/65)  RR: 18 (27 Nov 2018 13:52) (18 - 20)  SpO2: 91% (27 Nov 2018 09:00) (86% - 96%)    11-26-18 @ 07:01  -  11-27-18 @ 07:00  --------------------------------------------------------  IN: 240 mL / OUT: 400 mL / NET: -160 mL    11-27-18 @ 07:01  -  11-27-18 @ 14:16  --------------------------------------------------------  IN: 0 mL / OUT: 200 mL / NET: -200 mL    Physical Exam    GENERAL: In no apparent distress, well nourished, and hydrated.  HEART: Regular rate and rhythm; No murmurs, rubs, or gallops.  PULMONARY: Clear to auscultation and perfusion.  No rales, wheezing, or rhonchi bilaterally.  ABDOMEN: Soft, Nontender, Nondistended; Bowel sounds present  EXTREMITIES:  2+ Peripheral Pulses, No clubbing, cyanosis, or edema  NEUROLOGICAL: Grossly nonfocal    LABS:                      9.2    8.68  )-----------( 248      ( 27 Nov 2018 08:24 )             30.6     11-27    139  |  98  |  75<HH>  ----------------------------<  138<H>  4.3   |  21  |  3.3<H>    Ca    10.2<H>      27 Nov 2018 08:24  Mg     1.9     11-27    TPro  7.3  /  Alb  3.8  /  TBili  0.5  /  DBili  x   /  AST  20  /  ALT  15  /  AlkPhos  72  11-27 INTERVAL HPI/OVERNIGHT EVENTS:  Patient seen and examined oob to chair, no complaints     Tele: A-Fib rate controlled    MEDICATIONS  (STANDING):  allopurinol 100 milliGRAM(s) Oral daily  amiodarone    Tablet 200 milliGRAM(s) Oral daily  apixaban 5 milliGRAM(s) Oral every 12 hours  calcitriol   Capsule 0.5 MICROGram(s) Oral daily  chlorhexidine 4% Liquid 1 Application(s) Topical <User Schedule>  cyanocobalamin 1000 MICROGram(s) Oral daily  diltiazem    milliGRAM(s) Oral daily  famotidine    Tablet 20 milliGRAM(s) Oral daily  furosemide    Tablet 40 milliGRAM(s) Oral daily  isosorbide   mononitrate ER Tablet (IMDUR) 30 milliGRAM(s) Oral at bedtime  metoprolol tartrate 50 milliGRAM(s) Oral two times a day    MEDICATIONS  (PRN):  acetaminophen   Tablet .. 650 milliGRAM(s) Oral every 6 hours PRN Mild Pain (1 - 3)    Allergies  No Known Allergies    Vital Signs Last 24 Hrs  T(C): 35.6 (27 Nov 2018 13:52), Max: 37 (26 Nov 2018 21:45)  T(F): 96.1 (27 Nov 2018 13:52), Max: 98.6 (26 Nov 2018 21:45)  HR: 64 (27 Nov 2018 13:52) (61 - 66)  BP: 137/61 (27 Nov 2018 13:52) (137/61 - 163/65)  RR: 18 (27 Nov 2018 13:52) (18 - 20)  SpO2: 91% (27 Nov 2018 09:00) (86% - 96%)    11-26-18 @ 07:01  -  11-27-18 @ 07:00  --------------------------------------------------------  IN: 240 mL / OUT: 400 mL / NET: -160 mL    11-27-18 @ 07:01  -  11-27-18 @ 14:16  --------------------------------------------------------  IN: 0 mL / OUT: 200 mL / NET: -200 mL    Physical Exam    GENERAL: In no apparent distress, well nourished, and hydrated.  HEART: irregular rate and rhythm; No murmurs, rubs, or gallops.  PULMONARY: Clear to auscultation and perfusion.  No rales, wheezing, or rhonchi bilaterally.  ABDOMEN: Soft, Nontender, Nondistended; Bowel sounds present  EXTREMITIES:  2+ Peripheral Pulses, No clubbing, cyanosis, or edema  NEUROLOGICAL: Grossly nonfocal    LABS:                      9.2    8.68  )-----------( 248      ( 27 Nov 2018 08:24 )             30.6     11-27    139  |  98  |  75<HH>  ----------------------------<  138<H>  4.3   |  21  |  3.3<H>    Ca    10.2<H>      27 Nov 2018 08:24  Mg     1.9     11-27    TPro  7.3  /  Alb  3.8  /  TBili  0.5  /  DBili  x   /  AST  20  /  ALT  15  /  AlkPhos  72  11-27

## 2018-11-27 NOTE — PROGRESS NOTE ADULT - ATTENDING COMMENTS
Pt seen and examined.    Afib with RVR - rate controlled   - continue amiodarone, metoprolol and Cardizem   - continue Eliquis 5 mg q12h  - scheduled for ablation with me in 2 weeks    CKD stage IV  - Follow with Dr Grullon (nephrologist) as outpatient     Pleural Effusion & pulm nodules  - Pulm follow up    Thyroid Nodule  - Endocrine follow up

## 2018-11-27 NOTE — CONSULT NOTE ADULT - ASSESSMENT
Patient is a 73y F pmhx of Afib (on eliquis s/p failed ablation and hx of difficulty controlling rate, CAD s/p CABG, CKD4, valvular heart disease (mitral regurg) GERD presents from home for SOB (11/25). Patient is currently scheduled for ablation here with EPS (Dr. Castillo) in December. Nephrology was consulted for clearance for cardiac CT w/ contrast given patient's history of CKD4.     #CKD stage IV  - Patient has been following up with outpatient nephrologist Dr. Grullon  - Cr currently 3.3 (stable, baseline ~3), GFR currently 12 (at baseline), has AV fistula left upper arm but does not get dialyzed   - monitor BP, currently 155/66. C/w Lasix 40mg PO daily, metoprolol tartrate 50mg BID, fluid and salt restrictions   - Avoid hypotension, NSAIDs, and nephrotoxins   - Scheduled for ablation in December, pending Cardiac CT for assess pulmonary vein. Patient does not currently want cardiac CT even though it was explained that it is necessary for her ablation. Will discuss with attending. Patient is a 73y F pmhx of Afib (on eliquis s/p failed ablation and hx of difficulty controlling rate, CAD s/p CABG, CKD4, valvular heart disease (mitral regurg) GERD presents from home for SOB (11/25). Patient is currently scheduled for ablation here with EPS (Dr. Castillo) in December. Nephrology was consulted for clearance for cardiac CT w/ contrast given patient's history of CKD4.     #CKD stage IV  - Patient has been following up with outpatient nephrologist Dr. Phelps  - Cr currently 3.3 (stable, baseline ~3), GFR currently 12 (at baseline), has AV fistula left upper arm but does not get dialyzed   - monitor BP, currently 155/66. C/w Lasix 40mg PO daily, metoprolol tartrate 50mg BID, fluid and salt restrictions   - Avoid hypotension, NSAIDs, and nephrotoxins   - Scheduled for ablation in December, pending Cardiac CT for assess pulmonary vein. Patient does not currently want cardiac CT even though it was explained that it is necessary for her ablation. Will discuss with attending.

## 2018-11-27 NOTE — DISCHARGE NOTE ADULT - PRINCIPAL DIAGNOSIS
Acute on chronic congestive heart failure, unspecified heart failure type Chronic atrial fibrillation

## 2018-11-27 NOTE — DISCHARGE NOTE ADULT - PATIENT PORTAL LINK FT
You can access the [facility] Patient Portal, offered by [facility], by registering with the following website: [email or web address]

## 2018-11-27 NOTE — PROGRESS NOTE ADULT - ASSESSMENT
Shortness of breath and palpitations due to afib with rapid ventricular response, PVCs and diastolic CHF exacerbation    Hx of Afib, AC with Eliquis, failed cardiac cblation  Hx of HTN, ASHD, MI, sp CABG, afib, little CHF  Hx of Hyperlipidemia  Hx of CKD IV  Hx of thyroid nodule (1.4cm)  Hx of GERD, diverticulosis  Hx of OA, DDD, DJD  Hx of hysterectomy      pt was evaluated by cardiology and EPS  pt's HR better controlled but still inc when pt ambulates  medications adjusted:  amiodarone 200mg, cardizem CD 240mg, metoprolol tartrate 50 mg q12, furosemide 40mg q24  monitor electrolytes and renal parameters  TSH is normal  ablation is scheduled as out pt sometime in Jan, 2019    pt medicallys stable for D/C within 24 hrs

## 2018-11-27 NOTE — CONSULT NOTE ADULT - SUBJECTIVE AND OBJECTIVE BOX
NEPHROLOGY CONSULTATION NOTE    Patient is a 73y F pmhx of Afib (on eliquis s/p failed ablation and hx of difficulty controlling rate, CAD s/p CABG, CKD4, valvular heart disease (mitral regurg) GERD presents from home for SOB (11/25). Patient currently denies chest pain, dizziness, increased leg swelling, recent illness, fever, chills. Patient has been compliant with all of her medications since last admission in October (similar symptoms and discharged with increased dose of cardizem). Patient is currently scheduled for ablation here with EPS (Dr. Castillo) in December. Nephrology was consulted for clearance for cardiac CT w/ contrast given patient's history of CKD4.     PAST MEDICAL & SURGICAL HISTORY:  Atrial fibrillation  CKD (chronic kidney disease)  MI (myocardial infarction)  CAD (coronary artery disease)  Kidney stone  HTN (hypertension)  H/O abdominal hysterectomy  H/O cardiac radiofrequency ablation  History of open heart surgery    Allergies:  No Known Allergies    Home Medications Reviewed  Hospital Medications:   MEDICATIONS  (STANDING):  allopurinol 100 milliGRAM(s) Oral daily  amiodarone    Tablet 200 milliGRAM(s) Oral daily  apixaban 5 milliGRAM(s) Oral every 12 hours  calcitriol   Capsule 0.5 MICROGram(s) Oral daily  chlorhexidine 4% Liquid 1 Application(s) Topical <User Schedule>  cyanocobalamin 1000 MICROGram(s) Oral daily  diltiazem    milliGRAM(s) Oral daily  famotidine    Tablet 20 milliGRAM(s) Oral daily  furosemide    Tablet 40 milliGRAM(s) Oral daily  isosorbide   mononitrate ER Tablet (IMDUR) 30 milliGRAM(s) Oral at bedtime  metoprolol tartrate 50 milliGRAM(s) Oral two times a day      SOCIAL HISTORY:  Denies ETOH,Smoking,   FAMILY HISTORY:  Family history of lung cancer (Father)  Family history of CHF (congestive heart failure) (Mother)        REVIEW OF SYSTEMS:  CONSTITUTIONAL: No weakness, fevers or chills  EYES/ENT: No visual changes;  No vertigo or throat pain   NECK: No pain or stiffness  RESPIRATORY: No cough, wheezing, hemoptysis; No shortness of breath  CARDIOVASCULAR: No chest pain or palpitations.  GASTROINTESTINAL: No abdominal or epigastric pain. No nausea, vomiting, or hematemesis; No diarrhea or constipation. No melena or hematochezia.  GENITOURINARY: No dysuria, frequency, foamy urine, urinary urgency, incontinence or hematuria  NEUROLOGICAL: No numbness or weakness  SKIN: No itching, burning, rashes, or lesions. AV fistula left upper arm.    VASCULAR: No bilateral lower extremity edema.   All other review of systems is negative unless indicated above.    VITALS:  T(F): 96.2 (11-27-18 @ 06:05), Max: 98.6 (11-26-18 @ 21:45)  HR: 64 (11-27-18 @ 06:05)  BP: 155/66 (11-27-18 @ 06:05)  RR: 18 (11-27-18 @ 06:05)  SpO2: 91% (11-27-18 @ 09:00)    11-26 @ 07:01  -  11-27 @ 07:00  --------------------------------------------------------  IN: 240 mL / OUT: 400 mL / NET: -160 mL    11-27 @ 07:01  -  11-27 @ 10:59  --------------------------------------------------------  IN: 0 mL / OUT: 200 mL / NET: -200 mL            I&O's Detail    26 Nov 2018 07:01 - 27 Nov 2018 07:00  --------------------------------------------------------  IN:    Oral Fluid: 240 mL  Total IN: 240 mL    OUT:    Voided: 400 mL  Total OUT: 400 mL    Total NET: -160 mL      27 Nov 2018 07:01 - 27 Nov 2018 10:59  --------------------------------------------------------  IN:  Total IN: 0 mL    OUT:    Voided: 200 mL  Total OUT: 200 mL    Total NET: -200 mL            PHYSICAL EXAM:  Constitutional: NAD  HEENT: anicteric sclera, oropharynx clear, MMM  Neck: No JVD  Respiratory: CTAB, no wheezes, rales or rhonchi  Cardiovascular: irregular rhythm  Gastrointestinal: BS+, soft, NT/ND  Extremities: No cyanosis or clubbing. No peripheral edema, euvolemic   Neurological: A/O x 3, no focal deficits  Psychiatric: Normal mood, normal affect  : No CVA tenderness. No simeon.   Skin: No rashes  Vascular Access: AV fistula left upper arm.     LABS:  11-27    139  |  98  |  75<HH>  ----------------------------<  138<H>  4.3   |  21  |  3.3<H>    Ca    10.2<H>      27 Nov 2018 08:24  Mg     1.9     11-27    TPro  7.3  /  Alb  3.8  /  TBili  0.5  /  DBili      /  AST  20  /  ALT  15  /  AlkPhos  72  11-27    Creatinine Trend: 3.3 <--, 3.3 <--, 3.3 <--                        9.2    8.68  )-----------( 248      ( 27 Nov 2018 08:24 )             30.6     Urine Studies:              RADIOLOGY & ADDITIONAL STUDIES:  < from: US Renal (05.09.18 @ 17:10) >  EXAM:  US KIDNEY(S)            PROCEDURE DATE:  05/09/2018            INTERPRETATION:  Clinical History / Reason for exam: Acute kidney injury    Technique: Ultrasound of the kidneys    Comparison: CT abdomen and pelvis dated 4/30/2016.    Findings:    Right kidney: Increased in echogenicity measuring 11.4 cm in length. No   hydronephrosis or calculus. Multiple cysts measuring up to 2 cm within   the upper pole.    Left kidney. Increased in echogenicity measuring up to 11.6 cm. No   hydronephrosis. 0.4 x 0.5 cm mid to lower pole calculus. Multiple cysts   measuring up to 1.2 cm. Trace perinephric fluid.    Urinary bladder: Underdistended therefore not evaluated    Impression:  Increased bilateral renal echogenicity consistent with medical renal   disease.    Nonobstructing left renal mid to lower pole calculus. No hydronephrosis.    Multiple bilateral renal cysts.    < end of copied text >

## 2018-11-27 NOTE — DISCHARGE NOTE ADULT - CARE PROVIDER_API CALL
Garett Naylor), Internal Medicine  [address] [facility] [address]  [place] [address]  Phone: [phone]  Fax: [phone]    Bipin Child), Cardiology; Internal Medicine; Nuclear Cardiology  [address] [address]  Phone: [phone]  Fax: [phone]    Sherif Phelps), Nephrology  [address] [place] [address]  Phone: [phone]  Fax: [phone] Garett Naylor), Internal Medicine  [address] [address]  Phone: [phone]  Fax: [phone]    Bipin Child), Cardiology; Internal Medicine; Nuclear Cardiology  [address] [place] [address]  Phone: [phone]  Fax: [phone]    Sherif Phelps), Nephrology  [address] [place] [address]  Phone: [phone]  Fax: [phone]    Yoshi Minaya), Critical Care Medicine; Internal Medicine; Pulmonary Disease; Sleep Medicine  [address] [place] [address]  Phone: [phone]  Fax: [phone]

## 2018-11-27 NOTE — DISCHARGE NOTE ADULT - CARE PROVIDERS DIRECT ADDRESSES
[email or web address],DirectAddress_Unknown,DirectAddress_Unknown [email or web address],DirectAddress_Unknown,DirectAddress_Unknown,DirectAddress_Unknown

## 2018-11-27 NOTE — DISCHARGE NOTE ADULT - SECONDARY DIAGNOSIS.
Stage 5 chronic kidney disease not on chronic dialysis Essential hypertension Thyroid nodule Pulmonary nodule

## 2018-11-27 NOTE — DISCHARGE NOTE ADULT - MEDICATION SUMMARY - MEDICATIONS TO TAKE
I will START or STAY ON the medications listed below when I get home from the hospital:    isosorbide mononitrate 30 mg oral tablet, extended release  -- 1 tab(s) by mouth once a day (at bedtime)  -- Indication: For CAD (coronary artery disease)    amiodarone 200 mg oral tablet  -- 1 tab(s) by mouth once a day  -- Indication: For Atrial fibrillation    dilTIAZem 240 mg/24 hours oral capsule, extended release  -- 1 cap(s) by mouth once a day  -- Indication: For Atrial fibrillation    Eliquis 2.5 mg oral tablet  -- 1 tab(s) by mouth 2 times a day  -- Indication: For Atrial fibrillation    allopurinol 100 mg oral tablet  -- 1 tab(s) by mouth once a day  -- Indication: For Gout    metoprolol tartrate 50 mg oral tablet  -- 1 tab(s) by mouth 2 times a day  -- Indication: For Atrial fibrillation    Kayexalate oral and rectal powder  -- 1 application by mouth and rectally 3 times a day  -- Indication: For Hyperkalemia    furosemide 40 mg oral tablet  -- 1 tab(s) by mouth 2 times a day on Monday, Wednesday and Friday, 1 tab(s) by mouth 1 time a day on Tuesday, Thursday, Saturday and Sunday.  -- Indication: For Fluid overload    famotidine 40 mg oral tablet  -- 1 tab(s) by mouth 2 times a day  -- Indication: For GERD    Vitamin B-12 100 mcg oral tablet  -- 1 tab(s) by mouth once a day  -- Indication: For Supplement    calcitriol 0.5 mcg oral capsule  -- 1 cap(s) by mouth once a day  -- Indication: For Supplement

## 2018-11-27 NOTE — PROGRESS NOTE ADULT - ASSESSMENT
NOAH QUIROS 73y Female  MRN#: [id number]   CODE STATUS: Full code      SUBJECTIVE  Patient is a 73y old Female who presented with a chief complaint of worsening shortness of breath (26 Nov 2018 21:12)  Currently admitted to medicine with the primary diagnosis of symptomatic afib with RVR  Today is hospital day 3d, and this morning she is resting comfortably in bed and reports no overnight events. Tele has no cardiac events. Patient states that she is breathing much better today compared to the past few days. Patient denies any dizziness, chest pain, shortness of breath, abdominal pain or any urinary symptoms.       OBJECTIVE  PAST MEDICAL & SURGICAL HISTORY  Atrial fibrillation  CKD (chronic kidney disease)  MI (myocardial infarction)  CAD (coronary artery disease)  Kidney stone  HTN (hypertension)  H/O abdominal hysterectomy  H/O cardiac radiofrequency ablation  History of open heart surgery    ALLERGIES:  No Known Allergies    MEDICATIONS:  STANDING MEDICATIONS  allopurinol 100 milliGRAM(s) Oral daily  amiodarone    Tablet 200 milliGRAM(s) Oral daily  apixaban 5 milliGRAM(s) Oral every 12 hours  calcitriol   Capsule 0.5 MICROGram(s) Oral daily  chlorhexidine 4% Liquid 1 Application(s) Topical <User Schedule>  cyanocobalamin 1000 MICROGram(s) Oral daily  diltiazem    milliGRAM(s) Oral daily  famotidine    Tablet 20 milliGRAM(s) Oral daily  furosemide    Tablet 40 milliGRAM(s) Oral daily  isosorbide   mononitrate ER Tablet (IMDUR) 30 milliGRAM(s) Oral at bedtime  metoprolol tartrate 50 milliGRAM(s) Oral two times a day    PRN MEDICATIONS  acetaminophen   Tablet .. 650 milliGRAM(s) Oral every 6 hours PRN      VITAL SIGNS: Last 24 Hours  T(C): 35.7 (27 Nov 2018 06:05), Max: 37 (26 Nov 2018 21:45)  T(F): 96.2 (27 Nov 2018 06:05), Max: 98.6 (26 Nov 2018 21:45)  HR: 64 (27 Nov 2018 06:05) (61 - 122)  BP: 155/66 (27 Nov 2018 06:05) (148/65 - 163/65)  BP(mean): --  RR: 18 (27 Nov 2018 06:05) (18 - 20)  SpO2: 96% (27 Nov 2018 01:18) (86% - 96%)    LABS:    RADIOLOGY:      PHYSICAL EXAM:  GENERAL: NAD, well-developed, AAOx3  HEENT:  Atraumatic, Normocephalic, conjunctiva and sclera clear, No JVD  PULMONARY: Clear to auscultation bilaterally  CARDIOVASCULAR: Irregularly irregular rhythm; No murmurs; post CABG scar in the chest  GASTROINTESTINAL: Soft, Nontender, Nondistended; Bowel sounds present  MUSCULOSKELETAL: No edema, no joint pain  NEUROLOGY: non-focal  SKIN: No rashes or lesions      ADMISSION SUMMARY  Patient is a 73y old Female who presented with a chief complaint of worsening shortness of breath (26 Nov 2018 21:12)  Currently admitted to medicine with the primary diagnosis of symptomatic afib with RVR    ASSESSMENT & PLAN  74 yo F w/ PMH of A-fib on eliquis (s/p failed ablation and hx of difficulty controlling rate), CAD s/p 4V CABG (2005), CKD IV, MV Regurgitation presented w/ SOB worsening over 3 days with palpitations.  Pt was admitted for symptomatic Afib with RVR.    #Symptomatic Afib with RVR: improved  - CHADSVASC of 4  - Currently rate controlled with amiodarone, metoprolol and Cardizem  - Cardizem increased from 180mg to 240mg daily  - Continue with Eliquis AC  - Normal TSH, follow up free T4  - Scheduled for ablation in December with EPS Dr. Castillo, pending Cardiac CT for assess pulmonary vein, however due to CKD stage IV, will need renal approval before proceeding with the imaging  - Cardiology recs appreciated (Dr. Child): clear to be d/c if HR well controlled    #) CAD s/p CABG in 2005  - JOÃO (7/12/18): EF 55-60%, thickening of ant/post mitral valve leaflets  - Continue medical management for now with imdur and metoprolol    #) CKD stage IV  - Patient has been following up with outpatient Nephrologist Dr. Grullon  - Baseline Cr ~3, now Cr 3.3  - Avoid any nephrotoxic drugs    #Thyroid nodules (largest 1.4 cm)  - Outpatient f/u  - Normal TSH, pending free T4    #Pulmonary nodules (largest 4mm)  - Previous smoker, quitted 10yr ago   - Outpatient f/u with pulm    #B12 deficiency - stable   - Resume b12 and home vitamin      DVT ppx: Eliquis   GI ppx: not indicated   Activity: ambulate as tolerated  Diet: DASH  Code status: full code  Disposition: pending d/c after CT cardiac, cleared as per Cardiology

## 2018-11-27 NOTE — CONSULT NOTE ADULT - ATTENDING COMMENTS
I was Physically Present for the key portions of the evaluation   I agree with the above History  , Physical examination Assessment and plan   I have Reviewed , Modified or appended where appropriate.  Please check A and P as above   1- CKD 4/ AVF in place at high risk for CHELSIE   performing the test depends on need and urgency   patient to be discharged today   OP renal follow up Dr Phelps
Persistent AF with RVR  PAlpitaitons  SOB  Cardiomyopathy EF 45%    Recommend continue NOAC  Rate control with AV jossie agents  Check TFTs  Planned ablation Dr. Rehman  Follow-up with Dr. Rehman regarding CT pulmoanry veins

## 2018-11-27 NOTE — DISCHARGE NOTE ADULT - HOSPITAL COURSE
73F with pmhx of A-fib on eliquis (s/p failed ablation and hx of difficulty controlling rate), CAD s/p CABG, CKD IV, Valvular Heart Disease (MV Regurg), Anxiety, and GERD presents from home for shortness of breath x 3 days. Patient states her shortness of breath is associated with palpitations and minimal exertion and occasionally at rest. Patient noticed her heart rate was uncontrolled today at 129. Currently feels significant improved during interview. Patient is compliant with all her medications,   patient was scheduled to have an ablation in Dec. With EPS Dr. Castillo. Received a CT scan of the chest as out patient which showed a 1.4cm thyroid nodule, and pulm nodules with the largest was 4mm. There was also trace L pulm effusion and non-specific pulmonary parenchymal changes noted.   Last admission in Oct was for similar symptoms, discharged with increased dose of cardizem.     Patient denied chest pain, dizziness, increased leg swelling, recent illness, fever, chills, cough, sick contacts, or LE pain. Patient has been compliant with all medications since last admission. Orthopnea has been at baseline with no significant changes 73F with pmhx of A-fib on eliquis (s/p failed ablation and hx of difficulty controlling rate), CAD s/p CABG, CKD IV, Valvular Heart Disease (MV Regurg), Anxiety, and GERD presents from home for shortness of breath x 3 days. Patient states her shortness of breath is associated with palpitations and minimal exertion and occasionally at rest. Patient noticed her heart rate was uncontrolled today at 129. Currently feels significant improved during interview. Patient is compliant with all her medications,   patient was scheduled to have an ablation in Dec. With EPS Dr. Castillo. Received a CT scan of the chest as out patient which showed a 1.4cm thyroid nodule, and pulm nodules with the largest was 4mm. There was also trace L pulm effusion and non-specific pulmonary parenchymal changes noted.   Last admission in Oct was for similar symptoms, discharged with increased dose of cardizem.     Patient denied chest pain, dizziness, increased leg swelling, recent illness, fever, chills, cough, sick contacts, or LE pain. Patient has been compliant with all medications since last admission. Orthopnea has been at baseline with no significant changes     Patient's rapid heart rate was better controlled with an increased dose of cardizem. However patient's shortness of breath fail to improved initially even after her HR was better controlled. Patient continued to desaturate on ambulation and her cxr shows pulmonary congestion. Patient was given an increased dose of IV lasix and she responded well. Patient's SOB finally improved and her ambulatory O2 sat held up to 91% and patient felt much better. Patient is cleared to be discharged home with a close follow up with her Cardiologist Dr. Child, Nephrologist Dr. Phelps and PCP Dr. Naylor. 73F with pmhx of A-fib on eliquis (s/p failed ablation and hx of difficulty controlling rate), CAD s/p CABG, CKD IV, Valvular Heart Disease (MV Regurg), Anxiety, and GERD presents from home for shortness of breath x 3 days. Patient states her shortness of breath is associated with palpitations and minimal exertion and occasionally at rest. Patient noticed her heart rate was uncontrolled today at 129. Currently feels significant improved during interview. Patient is compliant with all her medications,   patient was scheduled to have an ablation in Dec. With EPS Dr. Castillo. Received a CT scan of the chest as out patient which showed a 1.4cm thyroid nodule, and pulm nodules with the largest was 4mm. There was also trace L pulm effusion and non-specific pulmonary parenchymal changes noted.   Last admission in Oct was for similar symptoms, discharged with increased dose of cardizem.     Patient denied chest pain, dizziness, increased leg swelling, recent illness, fever, chills, cough, sick contacts, or LE pain. Patient has been compliant with all medications since last admission. Orthopnea has been at baseline with no significant changes 73F with pmhx of A-fib on eliquis (s/p failed ablation and hx of difficulty controlling rate), CAD s/p CABG, CKD IV, Valvular Heart Disease (MV Regurg), Anxiety, and GERD presents from home for shortness of breath x 3 days. Patient states her shortness of breath is associated with palpitations and minimal exertion and occasionally at rest. Patient noticed her heart rate was uncontrolled today at 129. Currently feels significant improved during interview. Patient is compliant with all her medications,   patient was scheduled to have an ablation in Dec. With EPS Dr. Castillo. Received a CT scan of the chest as out patient which showed a 1.4cm thyroid nodule, and pulm nodules with the largest was 4mm. There was also trace L pulm effusion and non-specific pulmonary parenchymal changes noted.   Last admission in Oct was for similar symptoms, discharged with increased dose of cardizem.     Patient denied chest pain, dizziness, increased leg swelling, recent illness, fever, chills, cough, sick contacts, or LE pain. Patient has been compliant with all medications since last admission. Orthopnea has been at baseline with no significant changes     Patient's rapid heart rate was better controlled with an increased dose of cardizem. However patient's shortness of breath fail to improved initially even after her HR was better controlled. Patient continued to desaturate on ambulation and her cxr shows pulmonary congestion. Patient was given an increased dose of IV lasix and she responded well. Patient's SOB finally improved and her ambulatory O2 sat held up to 91% and patient felt much better. Patient is cleared to be discharged home with a close follow up with her Cardiologist Dr. Child, Nephrologist Dr. Phelps and PCP Dr. Naylor. Patient will also need to follow up Endocrinology and Pulmonology for her thyroid and pulmonary nodules.

## 2018-11-27 NOTE — DISCHARGE NOTE ADULT - PROVIDER TOKENS
TOKEN:'[id number]:'[id number]:'[id number] TOKEN:'[id number]:'[id number]'

## 2018-11-27 NOTE — DISCHARGE NOTE ADULT - PLAN OF CARE
Resolution of symptoms, continue medical therapy Your shortness of breath is likely caused by a combination of atrial fibrillation with rapid heart rate and ongoing chronic kidney disease. Your body will have a difficult time secreting fluid, so you will need to reduce your daily fluid intake to less than 1.5L, low salt diet, and continue taking lasix. You will need to follow up with your Cardiologist Dr. Child and your Nephrologist Dr. Phelps within 3-5 days to follow up your volume status. Please continue to take the prescribed medication and if your shortness of breath worsens, please return to the ED for further evaluation. Continue medical therapy and routine follow up You have a history of chronic kidney disease. It will cause you to retain water due to reduce fluid secretion. Please continue fluid restriction and taking your medication as directed. Please follow up with your Nephrologist Dr. Phelps within 3-5 days after you are discharged. Continue medical management You have a history of hypertension. Please maintain a low salt diet and reduce your daily fluid intake. Follow up with your PCP Dr. Naylor within 1-2 weeks Continue observation You have two thyroid nodules found incidentally in the CT chest. Please follow up outpatient with an Endocrinologist for further evaluation. You have two pulmonary nodules found incidentally in the CT chest. Please follow up outpatient with Dr. Yoshi Mckeon outpatient within 1-2 weeks for further evaluation

## 2018-11-27 NOTE — DISCHARGE NOTE ADULT - CARE PLAN
Principal Discharge DX:	Acute on chronic congestive heart failure, unspecified heart failure type  Secondary Diagnosis:	Stage 5 chronic kidney disease not on chronic dialysis  Secondary Diagnosis:	Essential hypertension Principal Discharge DX:	Chronic atrial fibrillation  Goal:	Resolution of symptoms, continue medical therapy  Assessment and plan of treatment:	Your shortness of breath is likely caused by a combination of atrial fibrillation with rapid heart rate and ongoing chronic kidney disease. Your body will have a difficult time secreting fluid, so you will need to reduce your daily fluid intake to less than 1.5L, low salt diet, and continue taking lasix. You will need to follow up with your Cardiologist Dr. Child and your Nephrologist Dr. Phelps within 3-5 days to follow up your volume status. Please continue to take the prescribed medication and if your shortness of breath worsens, please return to the ED for further evaluation.  Secondary Diagnosis:	Stage 5 chronic kidney disease not on chronic dialysis  Goal:	Continue medical therapy and routine follow up  Assessment and plan of treatment:	You have a history of chronic kidney disease. It will cause you to retain water due to reduce fluid secretion. Please continue fluid restriction and taking your medication as directed. Please follow up with your Nephrologist Dr. Phelps within 3-5 days after you are discharged.  Secondary Diagnosis:	Essential hypertension  Goal:	Continue medical management  Assessment and plan of treatment:	You have a history of hypertension. Please maintain a low salt diet and reduce your daily fluid intake. Follow up with your PCP Dr. Naylor within 1-2 weeks Principal Discharge DX:	Chronic atrial fibrillation  Goal:	Resolution of symptoms, continue medical therapy  Assessment and plan of treatment:	Your shortness of breath is likely caused by a combination of atrial fibrillation with rapid heart rate and ongoing chronic kidney disease. Your body will have a difficult time secreting fluid, so you will need to reduce your daily fluid intake to less than 1.5L, low salt diet, and continue taking lasix. You will need to follow up with your Cardiologist Dr. Child and your Nephrologist Dr. Phelps within 3-5 days to follow up your volume status. Please continue to take the prescribed medication and if your shortness of breath worsens, please return to the ED for further evaluation.  Secondary Diagnosis:	Stage 5 chronic kidney disease not on chronic dialysis  Goal:	Continue medical therapy and routine follow up  Assessment and plan of treatment:	You have a history of chronic kidney disease. It will cause you to retain water due to reduce fluid secretion. Please continue fluid restriction and taking your medication as directed. Please follow up with your Nephrologist Dr. Phelps within 3-5 days after you are discharged.  Secondary Diagnosis:	Essential hypertension  Goal:	Continue medical management  Assessment and plan of treatment:	You have a history of hypertension. Please maintain a low salt diet and reduce your daily fluid intake. Follow up with your PCP Dr. Naylor within 1-2 weeks  Secondary Diagnosis:	Thyroid nodule  Goal:	Continue observation  Assessment and plan of treatment:	You have two thyroid nodules found incidentally in the CT chest. Please follow up outpatient with an Endocrinologist for further evaluation.  Secondary Diagnosis:	Pulmonary nodule  Goal:	Continue observation  Assessment and plan of treatment:	You have two pulmonary nodules found incidentally in the CT chest. Please follow up outpatient with Dr. Yoshi Mckeon outpatient within 1-2 weeks for further evaluation

## 2018-11-28 LAB
ANION GAP SERPL CALC-SCNC: 19 MMOL/L — HIGH (ref 7–14)
BUN SERPL-MCNC: 89 MG/DL — CRITICAL HIGH (ref 10–20)
CALCIUM SERPL-MCNC: 10.5 MG/DL — HIGH (ref 8.5–10.1)
CHLORIDE SERPL-SCNC: 98 MMOL/L — SIGNIFICANT CHANGE UP (ref 98–110)
CO2 SERPL-SCNC: 22 MMOL/L — SIGNIFICANT CHANGE UP (ref 17–32)
CREAT SERPL-MCNC: 3.8 MG/DL — HIGH (ref 0.7–1.5)
GLUCOSE SERPL-MCNC: 190 MG/DL — HIGH (ref 70–99)
HCT VFR BLD CALC: 33.8 % — LOW (ref 37–47)
HGB BLD-MCNC: 9.9 G/DL — LOW (ref 12–16)
MAGNESIUM SERPL-MCNC: 2 MG/DL — SIGNIFICANT CHANGE UP (ref 1.8–2.4)
MCHC RBC-ENTMCNC: 26.2 PG — LOW (ref 27–31)
MCHC RBC-ENTMCNC: 29.3 G/DL — LOW (ref 32–37)
MCV RBC AUTO: 89.4 FL — SIGNIFICANT CHANGE UP (ref 81–99)
NRBC # BLD: 0 /100 WBCS — SIGNIFICANT CHANGE UP (ref 0–0)
PLATELET # BLD AUTO: 269 K/UL — SIGNIFICANT CHANGE UP (ref 130–400)
POTASSIUM SERPL-MCNC: 4.7 MMOL/L — SIGNIFICANT CHANGE UP (ref 3.5–5)
POTASSIUM SERPL-SCNC: 4.7 MMOL/L — SIGNIFICANT CHANGE UP (ref 3.5–5)
RBC # BLD: 3.78 M/UL — LOW (ref 4.2–5.4)
RBC # FLD: 16.6 % — HIGH (ref 11.5–14.5)
SODIUM SERPL-SCNC: 139 MMOL/L — SIGNIFICANT CHANGE UP (ref 135–146)
WBC # BLD: 8.04 K/UL — SIGNIFICANT CHANGE UP (ref 4.8–10.8)
WBC # FLD AUTO: 8.04 K/UL — SIGNIFICANT CHANGE UP (ref 4.8–10.8)

## 2018-11-28 RX ORDER — FUROSEMIDE 40 MG
60 TABLET ORAL DAILY
Qty: 0 | Refills: 0 | Status: DISCONTINUED | OUTPATIENT
Start: 2018-11-28 | End: 2018-11-28

## 2018-11-28 RX ORDER — FUROSEMIDE 40 MG
80 TABLET ORAL
Qty: 0 | Refills: 0 | Status: DISCONTINUED | OUTPATIENT
Start: 2018-11-28 | End: 2018-11-29

## 2018-11-28 RX ADMIN — CHLORHEXIDINE GLUCONATE 1 APPLICATION(S): 213 SOLUTION TOPICAL at 05:53

## 2018-11-28 RX ADMIN — Medication 80 MILLIGRAM(S): at 12:16

## 2018-11-28 RX ADMIN — ISOSORBIDE MONONITRATE 30 MILLIGRAM(S): 60 TABLET, EXTENDED RELEASE ORAL at 22:07

## 2018-11-28 RX ADMIN — Medication 40 MILLIGRAM(S): at 05:53

## 2018-11-28 RX ADMIN — FAMOTIDINE 20 MILLIGRAM(S): 10 INJECTION INTRAVENOUS at 12:16

## 2018-11-28 RX ADMIN — CALCITRIOL 0.5 MICROGRAM(S): 0.5 CAPSULE ORAL at 12:16

## 2018-11-28 RX ADMIN — Medication 240 MILLIGRAM(S): at 05:53

## 2018-11-28 RX ADMIN — PREGABALIN 1000 MICROGRAM(S): 225 CAPSULE ORAL at 12:16

## 2018-11-28 RX ADMIN — AMIODARONE HYDROCHLORIDE 200 MILLIGRAM(S): 400 TABLET ORAL at 05:53

## 2018-11-28 RX ADMIN — APIXABAN 5 MILLIGRAM(S): 2.5 TABLET, FILM COATED ORAL at 05:53

## 2018-11-28 RX ADMIN — Medication 50 MILLIGRAM(S): at 05:53

## 2018-11-28 RX ADMIN — Medication 50 MILLIGRAM(S): at 18:00

## 2018-11-28 RX ADMIN — Medication 100 MILLIGRAM(S): at 12:16

## 2018-11-28 RX ADMIN — Medication 80 MILLIGRAM(S): at 17:59

## 2018-11-28 RX ADMIN — APIXABAN 5 MILLIGRAM(S): 2.5 TABLET, FILM COATED ORAL at 18:00

## 2018-11-28 NOTE — PROGRESS NOTE ADULT - ASSESSMENT
NOAH QUIROS 73y Female  MRN#: [id number]   CODE STATUS: Full code      SUBJECTIVE  Patient is a 73y old Female who presented with a chief complaint of worsening shortness of breath (26 Nov 2018 21:12)  Currently admitted to medicine with the primary diagnosis of symptomatic afib with RVR  Today is hospital day 4d, and this morning she is resting comfortably in bed and reports no overnight events. Tele has no cardiac events. Patient states that she is breathing much better today compared to the past few days. Patient denies any dizziness, chest pain, shortness of breath, abdominal pain or any urinary symptoms.         OBJECTIVE  PAST MEDICAL & SURGICAL HISTORY  Atrial fibrillation  CKD (chronic kidney disease)  MI (myocardial infarction)  CAD (coronary artery disease)  Kidney stone  HTN (hypertension)  H/O abdominal hysterectomy  H/O cardiac radiofrequency ablation  History of open heart surgery    ALLERGIES:  No Known Allergies    MEDICATIONS:  STANDING MEDICATIONS  allopurinol 100 milliGRAM(s) Oral daily  amiodarone    Tablet 200 milliGRAM(s) Oral daily  apixaban 5 milliGRAM(s) Oral every 12 hours  calcitriol   Capsule 0.5 MICROGram(s) Oral daily  chlorhexidine 4% Liquid 1 Application(s) Topical <User Schedule>  cyanocobalamin 1000 MICROGram(s) Oral daily  diltiazem    milliGRAM(s) Oral daily  famotidine    Tablet 20 milliGRAM(s) Oral daily  furosemide   Injectable 80 milliGRAM(s) IV Push two times a day  isosorbide   mononitrate ER Tablet (IMDUR) 30 milliGRAM(s) Oral at bedtime  metoprolol tartrate 50 milliGRAM(s) Oral two times a day    PRN MEDICATIONS  acetaminophen   Tablet .. 650 milliGRAM(s) Oral every 6 hours PRN      VITAL SIGNS: Last 24 Hours  T(C): 36.4 (28 Nov 2018 13:41), Max: 36.7 (27 Nov 2018 20:59)  T(F): 97.5 (28 Nov 2018 13:41), Max: 98 (27 Nov 2018 20:59)  HR: 63 (28 Nov 2018 13:41) (62 - 63)  BP: 121/60 (28 Nov 2018 13:41) (121/60 - 159/80)  BP(mean): --  RR: 18 (28 Nov 2018 13:41) (18 - 18)  SpO2: 87% (28 Nov 2018 10:23) (87% - 98%)    LABS:                        9.9    8.04  )-----------( 269      ( 28 Nov 2018 14:18 )             33.8     11-28    139  |  98  |  89<HH>  ----------------------------<  190<H>  4.7   |  22  |  3.8<H>    Ca    10.5<H>      28 Nov 2018 14:18  Mg     2.0     11-28    TPro  7.3  /  Alb  3.8  /  TBili  0.5  /  DBili  x   /  AST  20  /  ALT  15  /  AlkPhos  72  11-27    RADIOLOGY:      PHYSICAL EXAM:  GENERAL: NAD, well-developed, AAOx3  HEENT:  Atraumatic, Normocephalic, conjunctiva and sclera clear, No JVD  PULMONARY: bibasilar crackles, poor respiratory efforts  CARDIOVASCULAR: Irregularly irregular rhythm; No murmurs; post CABG scar in the chest  GASTROINTESTINAL: Soft, Nontender, Nondistended; Bowel sounds present  MUSCULOSKELETAL: 1+ petal edema, no joint pain  NEUROLOGY: non-focal  SKIN: No rashes or lesions      ADMISSION SUMMARY  Patient is a 73y old Female who presented with a chief complaint of worsening shortness of breath (26 Nov 2018 21:12)  Currently admitted to medicine with the primary diagnosis of symptomatic afib with RVR causing pulmonary edema    ASSESSMENT & PLAN  72 yo F w/ PMH of A-fib on eliquis (s/p failed ablation and hx of difficulty controlling rate), CAD s/p 4V CABG (2005), CKD IV, MV Regurgitation presented w/ SOB worsening over 3 days with palpitations.  Pt was admitted for symptomatic Afib with RVR.    #) Pulmonary edema 2/2 CKD V vs afib with RVR  - Persistent pulmonary edema, patient is symptomatic  - Ambulatory sat dropped to 87%  - Repeated CXR shows increased pulmonary vascular congestion and bilateral pleural effusions  -     #Symptomatic Afib with RVR: improved  - CHADSVASC of 4  - Currently rate controlled with amiodarone, metoprolol and Cardizem  - Cardizem increased from 180mg to 240mg daily  - Continue with Eliquis for AC  - Normal TSH and free T4  - Scheduled for ablation in December with EPS Dr. Castillo, No need to CT cardiac due to CKD stage V  - Cardiology recs appreciated (Dr. Child): clear to be d/c if HR well controlled    #) CAD s/p CABG in 2005  - JOÃO (7/12/18): EF 55-60%, thickening of ant/post mitral valve leaflets  - Continue medical management for now with imdur and metoprolol    #) CKD stage IV  - Patient has been following up with outpatient Nephrologist Dr. Grullon  - Baseline Cr ~3, now Cr 3.3  - Avoid any nephrotoxic drugs    #Thyroid nodules (largest 1.4 cm)  - Outpatient f/u  - Normal TSH, pending free T4    #Pulmonary nodules (largest 4mm)  - Previous smoker, quitted 10yr ago   - Outpatient f/u with pulm    #B12 deficiency - stable   - Resume b12 and home vitamin      DVT ppx: Eliquis   GI ppx: not indicated   Activity: ambulate as tolerated  Diet: DASH  Code status: full code  Disposition: pending d/c after CT cardiac, cleared as per Cardiology NOAH QUIROS 73y Female  MRN#: [id number]   CODE STATUS: Full code      SUBJECTIVE  Patient is a 73y old Female who presented with a chief complaint of worsening shortness of breath (26 Nov 2018 21:12)  Currently admitted to medicine with the primary diagnosis of symptomatic afib with RVR  Today is hospital day 4d, and this morning she is resting comfortably in bed and reports no overnight events. Tele has no cardiac events. Patient states that she is having intermittent shortness of breath that requires NC, especially after she ambulates. Patient denies any dizziness, chest pain, abdominal pain or any urinary symptoms.       OBJECTIVE  PAST MEDICAL & SURGICAL HISTORY  Atrial fibrillation  CKD (chronic kidney disease)  MI (myocardial infarction)  CAD (coronary artery disease)  Kidney stone  HTN (hypertension)  H/O abdominal hysterectomy  H/O cardiac radiofrequency ablation  History of open heart surgery    ALLERGIES:  No Known Allergies    MEDICATIONS:  STANDING MEDICATIONS  allopurinol 100 milliGRAM(s) Oral daily  amiodarone    Tablet 200 milliGRAM(s) Oral daily  apixaban 5 milliGRAM(s) Oral every 12 hours  calcitriol   Capsule 0.5 MICROGram(s) Oral daily  chlorhexidine 4% Liquid 1 Application(s) Topical <User Schedule>  cyanocobalamin 1000 MICROGram(s) Oral daily  diltiazem    milliGRAM(s) Oral daily  famotidine    Tablet 20 milliGRAM(s) Oral daily  furosemide   Injectable 80 milliGRAM(s) IV Push two times a day  isosorbide   mononitrate ER Tablet (IMDUR) 30 milliGRAM(s) Oral at bedtime  metoprolol tartrate 50 milliGRAM(s) Oral two times a day    PRN MEDICATIONS  acetaminophen   Tablet .. 650 milliGRAM(s) Oral every 6 hours PRN      VITAL SIGNS: Last 24 Hours  T(C): 36.4 (28 Nov 2018 13:41), Max: 36.7 (27 Nov 2018 20:59)  T(F): 97.5 (28 Nov 2018 13:41), Max: 98 (27 Nov 2018 20:59)  HR: 63 (28 Nov 2018 13:41) (62 - 63)  BP: 121/60 (28 Nov 2018 13:41) (121/60 - 159/80)  BP(mean): --  RR: 18 (28 Nov 2018 13:41) (18 - 18)  SpO2: 87% (28 Nov 2018 10:23) (87% - 98%)    LABS:                        9.9    8.04  )-----------( 269      ( 28 Nov 2018 14:18 )             33.8     11-28    139  |  98  |  89<HH>  ----------------------------<  190<H>  4.7   |  22  |  3.8<H>    Ca    10.5<H>      28 Nov 2018 14:18  Mg     2.0     11-28    TPro  7.3  /  Alb  3.8  /  TBili  0.5  /  DBili  x   /  AST  20  /  ALT  15  /  AlkPhos  72  11-27    RADIOLOGY:  < from: Xray Chest 1 View- PORTABLE-Routine (11.28.18 @ 12:04) >  Increased pulmonary vascular congestion and bilateral pleural effusions.    < end of copied text >      PHYSICAL EXAM:  GENERAL: NAD, well-developed, AAOx3  HEENT:  Atraumatic, Normocephalic, conjunctiva and sclera clear, No JVD  PULMONARY: bibasilar crackles, poor respiratory efforts  CARDIOVASCULAR: Irregularly irregular rhythm; No murmurs; post CABG scar in the chest  GASTROINTESTINAL: Soft, Nontender, Nondistended; Bowel sounds present  MUSCULOSKELETAL: 1+ petal edema, no joint pain  NEUROLOGY: non-focal  SKIN: No rashes or lesions      ADMISSION SUMMARY  Patient is a 73y old Female who presented with a chief complaint of worsening shortness of breath (26 Nov 2018 21:12)  Currently admitted to medicine with the primary diagnosis of symptomatic afib with RVR causing pulmonary edema    ASSESSMENT & PLAN  72 yo F w/ PMH of A-fib on eliquis (s/p failed ablation and hx of difficulty controlling rate), CAD s/p 4V CABG (2005), CKD IV, MV Regurgitation presented w/ SOB worsening over 3 days with palpitations.  Pt was admitted for symptomatic Afib with RVR.    #) Pulmonary edema 2/2 CKD V vs afib with RVR  - BNP [id number]  - Persistent pulmonary edema, patient is symptomatic  - Ambulatory sat dropped to 87%  - Repeated CXR shows increased pulmonary vascular congestion and bilateral pleural effusions  - Start IV lasix 80mg BID  - Nephro recs recalled for re-evaluation: ok to use IV lasix for 24-48 hours, monitor BP, check iron study  - Will repeat CXR in AM    #Symptomatic Afib with RVR: improved  - CHADSVASC of 4  - Currently rate controlled with amiodarone, metoprolol and Cardizem  - Cardizem increased from 180mg to 240mg daily  - Continue with Eliquis for AC  - Normal TSH and free T4  - Scheduled for ablation in December with EPS Dr. Castillo, No need to CT cardiac due to CKD stage V  - Cardiology recs appreciated (Dr. Child): clear to be d/c if HR well controlled    #) CAD s/p CABG in 2005  - JOÃO (7/12/18): EF 55-60%, thickening of ant/post mitral valve leaflets  - Continue medical management for now with imdur and metoprolol    #) CKD stage IV  - Patient has been following up with outpatient Nephrologist Dr. Medrano  - Baseline Cr ~3, now BUN/Cr 89/3.8  - Avoid any nephrotoxic drugs    #Thyroid nodules (largest 1.4 cm)  - Outpatient f/u  - Normal TSH and free T4    #Pulmonary nodules (largest 4mm)  - Previous smoker, quitted 10yr ago   - Outpatient f/u with Dr. Mckeon for repeat imaging    #B12 deficiency - stable   - Resume b12 and home vitamin      DVT ppx: Eliquis   GI ppx: not indicated   Activity: ambulate as tolerated  Diet: DASH  Code status: full code  Disposition: d/c if SOB resolves and stable

## 2018-11-28 NOTE — CONSULT NOTE ADULT - SUBJECTIVE AND OBJECTIVE BOX
Patient is a 73y old  Female who presents with a chief complaint of SOB and palpitations (28 Nov 2018 11:50)      HPI:  73F with pmhx of A-fib on eliquis (s/p failed ablation and hx of difficulty controlling rate), CAD s/p CABG, CKD IV, Valvular Heart Disease (MV Regurg), Anxiety, and GERD presents from home for shortness of breath x 3 days. Patient states her shortness of breath is associated with palpitations and minimal exertion and occasionally at rest. Patient noticed her heart rate was uncontrolled today at 129. Currently feels significant improved during interview. Patient is compliant with all her medications,   patient was scheduled to have an ablation in Dec. With EPS Dr. Castillo. Received a CT scan of the chest as out patient which showed a 1.4cm thyroid nodule, and pulm nodules with the largest was 4mm. There was also trace L pulm effusion and non-specific pulmonary parenchymal changes noted.   Last admission in Oct was for similar symptoms, discharged with increased dose of cardizem.     Patient denied chest pain, dizziness, increased leg swelling, recent illness, fever, chills, cough, sick contacts, or LE pain. Patient has been compliant with all medications since last admission. Orthopnea has been at baseline with no significant changes STILL C/P SOB ON EXERTION, REMOTE HX OF SMOKING, SAW DR QUINTEROS BEFORE ? ASTHMA, NO OLD CHEST CT, 2 YEARS AGO CT A/P    In ED: HR: 68      BP: 168/102      RR: 20, 94%on RA   Given lasix 60mg IV (25 Nov 2018 00:55)      PAST MEDICAL & SURGICAL HISTORY:  Atrial fibrillation  CKD (chronic kidney disease)  MI (myocardial infarction)  CAD (coronary artery disease)  Kidney stone  HTN (hypertension)  H/O abdominal hysterectomy  H/O cardiac radiofrequency ablation  History of open heart surgery      SOCIAL HX:   Smoking NEG    FAMILY HISTORY:  Family history of lung cancer (Father)  Family history of CHF (congestive heart failure) (Mother)      REVIEW OF SYSTEMS SEE HPI  	    Allergies    No Known Allergies    Intolerances        acetaminophen   Tablet .. 650 milliGRAM(s) Oral every 6 hours PRN  allopurinol 100 milliGRAM(s) Oral daily  amiodarone    Tablet 200 milliGRAM(s) Oral daily  apixaban 5 milliGRAM(s) Oral every 12 hours  calcitriol   Capsule 0.5 MICROGram(s) Oral daily  chlorhexidine 4% Liquid 1 Application(s) Topical <User Schedule>  cyanocobalamin 1000 MICROGram(s) Oral daily  diltiazem    milliGRAM(s) Oral daily  famotidine    Tablet 20 milliGRAM(s) Oral daily  furosemide   Injectable 80 milliGRAM(s) IV Push two times a day  isosorbide   mononitrate ER Tablet (IMDUR) 30 milliGRAM(s) Oral at bedtime  metoprolol tartrate 50 milliGRAM(s) Oral two times a day  : Home Meds:      PHYSICAL EXAM    ICU Vital Signs Last 24 Hrs  T(C): 36.4 (28 Nov 2018 13:41), Max: 36.7 (27 Nov 2018 20:59)  T(F): 97.5 (28 Nov 2018 13:41), Max: 98 (27 Nov 2018 20:59)  HR: 63 (28 Nov 2018 13:41) (62 - 63)  BP: 121/60 (28 Nov 2018 13:41) (121/60 - 159/80)  RR: 18 (28 Nov 2018 13:41) (18 - 18)  SpO2: 87% (28 Nov 2018 10:23) (87% - 98%)      General:  HEENT:  NABEEL              Lymph Nodes: No cervical LN   Lungs: Bilateral BS, BIBASILAR CRACKLES  Cardiovascular: Regular  Abdomen: Soft, Positive BS  Extremities: No clubbing  Skin: Warm, SWELLING +  Neurological: Non focal       11-27-18 @ 07:01  -  11-28-18 @ 07:00  --------------------------------------------------------  IN:    Oral Fluid: 590 mL  Total IN: 590 mL    OUT:    Voided: 900 mL  Total OUT: 900 mL    Total NET: -310 mL      11-28-18 @ 07:01  -  11-28-18 @ 16:07  --------------------------------------------------------  IN:    Oral Fluid: 750 mL  Total IN: 750 mL    OUT:    Voided: 600 mL  Total OUT: 600 mL    Total NET: 150 mL          LABS:                          9.9    8.04  )-----------( 269      ( 28 Nov 2018 14:18 )             33.8                                               11-28    139  |  98  |  89<HH>  ----------------------------<  190<H>  4.7   |  22  |  3.8<H>    Ca    10.5<H>      28 Nov 2018 14:18  Mg     2.0     11-28    TPro  7.3  /  Alb  3.8  /  TBili  0.5  /  DBili  x   /  AST  20  /  ALT  15  /  AlkPhos  72  11-27                                                                                           LIVER FUNCTIONS - ( 27 Nov 2018 08:24 )  Alb: 3.8 g/dL / Pro: 7.3 g/dL / ALK PHOS: 72 U/L / ALT: 15 U/L / AST: 20 U/L / GGT: x                                                                                                                                       X-Rays REVIEWED    MEDICATIONS  (STANDING):  allopurinol 100 milliGRAM(s) Oral daily  amiodarone    Tablet 200 milliGRAM(s) Oral daily  apixaban 5 milliGRAM(s) Oral every 12 hours  calcitriol   Capsule 0.5 MICROGram(s) Oral daily  chlorhexidine 4% Liquid 1 Application(s) Topical <User Schedule>  cyanocobalamin 1000 MICROGram(s) Oral daily  diltiazem    milliGRAM(s) Oral daily  famotidine    Tablet 20 milliGRAM(s) Oral daily  furosemide   Injectable 80 milliGRAM(s) IV Push two times a day  isosorbide   mononitrate ER Tablet (IMDUR) 30 milliGRAM(s) Oral at bedtime  metoprolol tartrate 50 milliGRAM(s) Oral two times a day    MEDICATIONS  (PRN):  acetaminophen   Tablet .. 650 milliGRAM(s) Oral every 6 hours PRN Mild Pain (1 - 3)

## 2018-11-28 NOTE — PROGRESS NOTE ADULT - ASSESSMENT
Patient is a 73y F pmhx of Afib (on eliquis s/p failed ablation and hx of difficulty controlling rate, CAD s/p CABG, CKD4, valvular heart disease (mitral regurg) GERD presents from home for SOB (11/25). Patient is currently scheduled for ablation here with EPS (Dr. Castillo) in December. Nephrology was consulted for clearance for cardiac CT w/ contrast given patient's history of CKD4.     #CKD stage IV  - serum creat at baseline / ok to use IV lasix  for 24- 48 h   - Cr currently 3.3 (stable, baseline ~3), GFR currently 12 (at baseline), has AV fistula left upper arm but does not get dialyzed   - monitor BP, currently 155/66. C/w Lasix IV , metoprolol tartrate 50mg BID,   - Avoid hypotension, NSAIDs, and nephrotoxins   - Scheduled for ablation in December, pending Cardiac CT for assess pulmonary vein. Patient does not currently want cardiac CT even though it was explained that it is necessary for her ablation. CT scan on hold for now   # anemia chronic check Fe studies may need JOSE

## 2018-11-28 NOTE — PROGRESS NOTE ADULT - SUBJECTIVE AND OBJECTIVE BOX
Nephrology progress note    Patient is seen and examined, events over the last 24 h noted .  Had SOB  this morning while walking   No other complaints     Allergies:  No Known Allergies    Hospital Medications:   MEDICATIONS  (STANDING):  allopurinol 100 milliGRAM(s) Oral daily  amiodarone    Tablet 200 milliGRAM(s) Oral daily  apixaban 5 milliGRAM(s) Oral every 12 hours  calcitriol   Capsule 0.5 MICROGram(s) Oral daily  chlorhexidine 4% Liquid 1 Application(s) Topical <User Schedule>  cyanocobalamin 1000 MICROGram(s) Oral daily  diltiazem    milliGRAM(s) Oral daily  famotidine    Tablet 20 milliGRAM(s) Oral daily  furosemide   Injectable 80 milliGRAM(s) IV Push two times a day  isosorbide   mononitrate ER Tablet (IMDUR) 30 milliGRAM(s) Oral at bedtime  metoprolol tartrate 50 milliGRAM(s) Oral two times a day        VITALS:  T(F): 96.8 (11-28-18 @ 06:03), Max: 98 (11-27-18 @ 20:59)  HR: 62 (11-28-18 @ 06:03)  BP: 159/80 (11-28-18 @ 06:03)  RR: 18 (11-28-18 @ 06:03)  SpO2: 87% (11-28-18 @ 10:23)      11-26 @ 07:01  -  11-27 @ 07:00  --------------------------------------------------------  IN: 240 mL / OUT: 400 mL / NET: -160 mL    11-27 @ 07:01  -  11-28 @ 07:00  --------------------------------------------------------  IN: 590 mL / OUT: 900 mL / NET: -310 mL    11-28 @ 07:01  -  11-28 @ 11:50  --------------------------------------------------------  IN: 350 mL / OUT: 250 mL / NET: 100 mL          PHYSICAL EXAM:  Constitutional: NAD o2 nasal canula   HEENT: anicteric sclera, oropharynx clear, MMM  Neck: No JVD  Respiratory: CTAB, no wheezes, rales or rhonchi  Cardiovascular: S1, S2, RRR  Gastrointestinal: BS+, soft, NT/ND  Extremities: No cyanosis or clubbing. No peripheral edema  :  No simeon.   Skin: No rashes    LABS:  11-27    139  |  98  |  75<HH>  ----------------------------<  138<H>  4.3   |  21  |  3.3<H>    Ca    10.2<H>      27 Nov 2018 08:24  Mg     1.9     11-27    TPro  7.3  /  Alb  3.8  /  TBili  0.5  /  DBili      /  AST  20  /  ALT  15  /  AlkPhos  72  11-27                          9.2    8.68  )-----------( 248      ( 27 Nov 2018 08:24 )             30.6       Urine Studies:      RADIOLOGY & ADDITIONAL STUDIES:

## 2018-11-28 NOTE — CONSULT NOTE ADULT - ASSESSMENT
SOB ON EXERTION/ A FIB/ FLUID OVERLAOD/ 2 SMALL LUNG NODULES REMOTE HX OF SMOKING NO OLD CT/ CXR WORSENING    - LASIX KEEP NEG BALANCE  - REPEAT CXR  - CHEST CT IN 6 MONTH  - LONG DISCUSSION WITH PATIIENT  SPOKE WITH TEAM

## 2018-11-29 VITALS — SYSTOLIC BLOOD PRESSURE: 142 MMHG | HEART RATE: 71 BPM | DIASTOLIC BLOOD PRESSURE: 83 MMHG

## 2018-11-29 LAB
ALBUMIN SERPL ELPH-MCNC: 3.8 G/DL — SIGNIFICANT CHANGE UP (ref 3.5–5.2)
ALP SERPL-CCNC: 81 U/L — SIGNIFICANT CHANGE UP (ref 30–115)
ALT FLD-CCNC: 17 U/L — SIGNIFICANT CHANGE UP (ref 0–41)
ANION GAP SERPL CALC-SCNC: 18 MMOL/L — HIGH (ref 7–14)
AST SERPL-CCNC: 19 U/L — SIGNIFICANT CHANGE UP (ref 0–41)
BILIRUB SERPL-MCNC: 0.5 MG/DL — SIGNIFICANT CHANGE UP (ref 0.2–1.2)
BUN SERPL-MCNC: 96 MG/DL — CRITICAL HIGH (ref 10–20)
CALCIUM SERPL-MCNC: 10.7 MG/DL — HIGH (ref 8.5–10.1)
CHLORIDE SERPL-SCNC: 97 MMOL/L — LOW (ref 98–110)
CO2 SERPL-SCNC: 26 MMOL/L — SIGNIFICANT CHANGE UP (ref 17–32)
CREAT SERPL-MCNC: 3.8 MG/DL — HIGH (ref 0.7–1.5)
GLUCOSE SERPL-MCNC: 141 MG/DL — HIGH (ref 70–99)
HCT VFR BLD CALC: 33 % — LOW (ref 37–47)
HGB BLD-MCNC: 9.8 G/DL — LOW (ref 12–16)
IRON SATN MFR SERPL: 13 % — LOW (ref 15–50)
IRON SATN MFR SERPL: 38 UG/DL — SIGNIFICANT CHANGE UP (ref 35–150)
MAGNESIUM SERPL-MCNC: 2 MG/DL — SIGNIFICANT CHANGE UP (ref 1.8–2.4)
MCHC RBC-ENTMCNC: 26.4 PG — LOW (ref 27–31)
MCHC RBC-ENTMCNC: 29.7 G/DL — LOW (ref 32–37)
MCV RBC AUTO: 88.9 FL — SIGNIFICANT CHANGE UP (ref 81–99)
NRBC # BLD: 0 /100 WBCS — SIGNIFICANT CHANGE UP (ref 0–0)
PLATELET # BLD AUTO: 258 K/UL — SIGNIFICANT CHANGE UP (ref 130–400)
POTASSIUM SERPL-MCNC: 4.1 MMOL/L — SIGNIFICANT CHANGE UP (ref 3.5–5)
POTASSIUM SERPL-SCNC: 4.1 MMOL/L — SIGNIFICANT CHANGE UP (ref 3.5–5)
PROT SERPL-MCNC: 7.3 G/DL — SIGNIFICANT CHANGE UP (ref 6–8)
RBC # BLD: 3.71 M/UL — LOW (ref 4.2–5.4)
RBC # FLD: 16.7 % — HIGH (ref 11.5–14.5)
SODIUM SERPL-SCNC: 141 MMOL/L — SIGNIFICANT CHANGE UP (ref 135–146)
TIBC SERPL-MCNC: 300 UG/DL — SIGNIFICANT CHANGE UP (ref 220–430)
UIBC SERPL-MCNC: 262 UG/DL — SIGNIFICANT CHANGE UP (ref 110–370)
WBC # BLD: 7.54 K/UL — SIGNIFICANT CHANGE UP (ref 4.8–10.8)
WBC # FLD AUTO: 7.54 K/UL — SIGNIFICANT CHANGE UP (ref 4.8–10.8)

## 2018-11-29 RX ORDER — DILTIAZEM HCL 120 MG
1 CAPSULE, EXT RELEASE 24 HR ORAL
Qty: 30 | Refills: 0 | OUTPATIENT
Start: 2018-11-29 | End: 2018-12-28

## 2018-11-29 RX ORDER — FUROSEMIDE 40 MG
1 TABLET ORAL
Qty: 60 | Refills: 0 | OUTPATIENT
Start: 2018-11-29 | End: 2018-12-28

## 2018-11-29 RX ORDER — DILTIAZEM HCL 120 MG
1 CAPSULE, EXT RELEASE 24 HR ORAL
Qty: 0 | Refills: 0 | COMMUNITY

## 2018-11-29 RX ORDER — FUROSEMIDE 40 MG
40 TABLET ORAL
Qty: 0 | Refills: 0 | Status: DISCONTINUED | OUTPATIENT
Start: 2018-11-29 | End: 2018-11-29

## 2018-11-29 RX ORDER — FUROSEMIDE 40 MG
1 TABLET ORAL
Qty: 0 | Refills: 0 | COMMUNITY

## 2018-11-29 RX ADMIN — PREGABALIN 1000 MICROGRAM(S): 225 CAPSULE ORAL at 11:57

## 2018-11-29 RX ADMIN — Medication 50 MILLIGRAM(S): at 17:20

## 2018-11-29 RX ADMIN — APIXABAN 5 MILLIGRAM(S): 2.5 TABLET, FILM COATED ORAL at 17:21

## 2018-11-29 RX ADMIN — Medication 40 MILLIGRAM(S): at 17:20

## 2018-11-29 RX ADMIN — AMIODARONE HYDROCHLORIDE 200 MILLIGRAM(S): 400 TABLET ORAL at 06:37

## 2018-11-29 RX ADMIN — Medication 240 MILLIGRAM(S): at 06:37

## 2018-11-29 RX ADMIN — CALCITRIOL 0.5 MICROGRAM(S): 0.5 CAPSULE ORAL at 11:58

## 2018-11-29 RX ADMIN — Medication 50 MILLIGRAM(S): at 06:36

## 2018-11-29 RX ADMIN — Medication 100 MILLIGRAM(S): at 11:57

## 2018-11-29 RX ADMIN — FAMOTIDINE 20 MILLIGRAM(S): 10 INJECTION INTRAVENOUS at 11:57

## 2018-11-29 RX ADMIN — Medication 80 MILLIGRAM(S): at 06:41

## 2018-11-29 RX ADMIN — APIXABAN 5 MILLIGRAM(S): 2.5 TABLET, FILM COATED ORAL at 06:37

## 2018-11-29 NOTE — PROGRESS NOTE ADULT - REASON FOR ADMISSION
SOB and palpitations
SOB and palpitations, afib with rapid ventricular response
SOB and palpitations
SOB and palpitations due to afib with rapid ventricular response
SOB and palpitations
SOB and palpitations

## 2018-11-29 NOTE — PROGRESS NOTE ADULT - PROVIDER SPECIALTY LIST ADULT
Cardiology
Electrophysiology
Electrophysiology
Internal Medicine
Nephrology
Pulmonology
Internal Medicine
Internal Medicine

## 2018-11-29 NOTE — PROGRESS NOTE ADULT - ASSESSMENT
SOB ON EXERTION/ A FIB/ FLUID OVERLAOD/ 2 SMALL LUNG NODULES REMOTE HX OF SMOKING NO OLD CT/ CXR better/ worsening renal function    - LASIX po   - RENAL EVAL  - CHEST CT IN 6 MONTH  - LONG DISCUSSION WITH PATIIENT  SPOKE WITH TEAM

## 2018-11-29 NOTE — PROGRESS NOTE ADULT - ASSESSMENT
Shortness of breath and palpitations due to afib with rapid ventricular response, PVCs and exacerbation of chronic diastolic CHF exacerbation  worsening renal failure  Hx of Afib, AC with Eliquis, failed cardiac ablation  Hx of HTN, ASHD, MI, sp CABG, afib, little CHF  Hx of Hyperlipidemia  Hx of CKD IV  Hx of thyroid nodule (1.4cm)  Hx of GERD, diverticulosis  Hx of OA, DDD, DJD  Hx of hysterectomy      pt was evaluated by cardiology and EPS  pt's HR better controlled but still inc when pt ambulates  medications adjusted:  amiodarone 200mg, cardizem CD 240mg, metoprolol tartrate 50 mg q12, furosemide 40mg q24  monitor electrolytes and renal parameters  TSH is normal  ablation is scheduled as out pt sometime in Jan, 2019    pt's discharge delayed due to cont issues with SOB on exertion, today feels better, social svc/ D/C planning arranging safe dischareg, transportation home etc

## 2018-11-29 NOTE — PROGRESS NOTE ADULT - SUBJECTIVE AND OBJECTIVE BOX
NOAH QUIROS 74yo W Female from home came to the ER for c/o SOB worsening over 3 days with palpitations.  The pt was noted to be in a fib with RVR and a proBNP of 17,707 and CE are normal.  The pt was evaluate d by carido and EPS.  The rate is better controlled and pt overall feels better.  The PMHx includes:  HTN, ASHD, CAD, sp MI, sp CABG, afib, AC with Eliquis, sp failed ablation, Hyperlipidemia, CKD IV, GERD, diverticulosis, anxiety, thyroid nodule (1.4cm),  anxiety, sp hysterectomy.     INTERVAL HPI/OVERNIGHT EVENTS: pt overall feels better, HR inc with ambulation, was evaluated by cardio and EPS, meds being adjusted, pt more amenable to repeat ablation which will be scheduled as out pt in January, D/C delayed due to cont SALDANA, pt feels better today for D/C    MEDICATIONS  (STANDING):  allopurinol 100 milliGRAM(s) Oral daily  amiodarone    Tablet 200 milliGRAM(s) Oral daily  apixaban 2.5 milliGRAM(s) Oral every 12 hours  calcitriol   Capsule 0.5 MICROGram(s) Oral daily  cyanocobalamin 1000 MICROGram(s) Oral daily  diltiazem    milliGRAM(s) Oral daily  famotidine    Tablet 20 milliGRAM(s) Oral daily  furosemide    Tablet 40 milliGRAM(s) Oral daily  isosorbide   mononitrate ER Tablet (IMDUR) 30 milliGRAM(s) Oral at bedtime  metoprolol tartrate 50 milliGRAM(s) Oral two times a day    MEDICATIONS  (PRN):  acetaminophen   Tablet .. 650 milliGRAM(s) Oral every 6 hours PRN Mild Pain (1 - 3)      Allergies    No Known Allergies      Vital Signs Last 24 Hrs    T(F): 96.2  HR: 70  BP: 136/673  RR: 18  SpO2: 91 to 95%    PHYSICAL EXAM:  pt is alert and oriented x 3, NAD    Eyes:  normal    ENMT:  normal    Neck:  supple, mild JVD, no bruits    Respiratory:  shallow respirations    Cardiovascular:  S1S2 irreg II/Vi OUSMANE    Gastrointestinal:  globose, soft and benign    Extremities:  moves all ext, + arthritic changes    Neurological:  nor focal deficits    Skin:  nor rash    Lymph Nodes:  not enlarged    LABS:                        9.6    7.5  )-----------( 258                  303    11-25    141 |  97  |  96  ----------------------------<  141  4.1   |  26  |  3.8    GFR 13,  11  TSH 2.5  Ca    9.9        Mg     1.9      BNP  17, 707  CK  30, 25  MB  1.8, 1.6  trop < 0.01 x 2  TPro  7.0  /  Alb  3.9  /  TBili  0.5  /  DBili  x   /  AST  10  /  ALT  11  /  AlkPhos  57  11-25    PT/INR - ( 24 Nov 2018 20:02 )   PT: 23.20 sec;   INR: 2.03 ratio         PTT - ( 24 Nov 2018 20:02 )  PTT:40.2 sec      RADIOLOGY & ADDITIONAL TESTS:    CXR:  R costophrenic angle blunting, sm pl effusion    CT of the Chest:  trace L pl efusion,  R horizontal fisure fluid, no endobronchial obs,  RLL densities and tenting,  4 and 3mm pul nodules, BL multiple granulomas    EKG #1:  abib 89/min PVCs,  nonspecific ST-T changes  EKG #2:  84/min prolonged QTc 482  EKG #3:  77/min QTc 443

## 2018-11-29 NOTE — PROGRESS NOTE ADULT - SUBJECTIVE AND OBJECTIVE BOX
OVERNIGHT EVENTS: feels better, On IV lasix    Vital Signs Last 24 Hrs  T(C): 36.1 (29 Nov 2018 13:27), Max: 36.1 (28 Nov 2018 20:13)  T(F): 96.9 (29 Nov 2018 13:27), Max: 96.9 (28 Nov 2018 20:13)  HR: 71 (29 Nov 2018 17:22) (64 - 71)  BP: 142/83 (29 Nov 2018 17:22) (121/58 - 142/83  RR: 18 (29 Nov 2018 13:27) (17 - 18)  SpO2: 91% (29 Nov 2018 10:37) (91% - 91%)    PHYSICAL EXAMINATION:    GENERAL: The patient is awake and alert in no apparent distress.     HEENT: Head is normocephalic and atraumatic. Extraocular muscles are intact. Mucous membranes are moist.    NECK: Supple.    LUNGS: bibasilar crackles     HEART: S1S2 irregular    ABDOMEN: Soft, nontender, and nondistended.      EXTREMITIES: swelling +  NEUROLOGIC: Grossly intact.    SKIN: No ulceration or induration present.      LABS:                        9.8    7.54  )-----------( 258      ( 29 Nov 2018 07:49 )             33.0     11-29    141  |  97<L>  |  96<HH>  ----------------------------<  141<H>  4.1   |  26  |  3.8<H>    Ca    10.7<H>      29 Nov 2018 07:49  Mg     2.0     11-29    TPro  7.3  /  Alb  3.8  /  TBili  0.5  /  DBili  x   /  AST  19  /  ALT  17  /  AlkPhos  81  11-29 11-28-18 @ 07:01  -  11-29-18 @ 07:00  --------------------------------------------------------  IN: 750 mL / OUT: 800 mL / NET: -50 mL    11-29-18 @ 07:01  -  11-29-18 @ 18:34  --------------------------------------------------------  IN: 600 mL / OUT: 600 mL / NET: 0 mL        MICROBIOLOGY:      MEDICATIONS  (STANDING):  allopurinol 100 milliGRAM(s) Oral daily  amiodarone    Tablet 200 milliGRAM(s) Oral daily  apixaban 5 milliGRAM(s) Oral every 12 hours  calcitriol   Capsule 0.5 MICROGram(s) Oral daily  chlorhexidine 4% Liquid 1 Application(s) Topical <User Schedule>  cyanocobalamin 1000 MICROGram(s) Oral daily  diltiazem    milliGRAM(s) Oral daily  famotidine    Tablet 20 milliGRAM(s) Oral daily  furosemide    Tablet 40 milliGRAM(s) Oral two times a day  isosorbide   mononitrate ER Tablet (IMDUR) 30 milliGRAM(s) Oral at bedtime  metoprolol tartrate 50 milliGRAM(s) Oral two times a day    MEDICATIONS  (PRN):  acetaminophen   Tablet .. 650 milliGRAM(s) Oral every 6 hours PRN Mild Pain (1 - 3)      RADIOLOGY & ADDITIONAL STUDIES:

## 2018-11-30 LAB — FERRITIN SERPL-MCNC: 444 NG/ML — HIGH (ref 15–150)

## 2018-12-04 DIAGNOSIS — R06.02 SHORTNESS OF BREATH: ICD-10-CM

## 2018-12-04 DIAGNOSIS — I48.91 UNSPECIFIED ATRIAL FIBRILLATION: ICD-10-CM

## 2018-12-04 DIAGNOSIS — I48.2 CHRONIC ATRIAL FIBRILLATION: ICD-10-CM

## 2018-12-04 DIAGNOSIS — Z95.1 PRESENCE OF AORTOCORONARY BYPASS GRAFT: ICD-10-CM

## 2018-12-04 DIAGNOSIS — E53.8 DEFICIENCY OF OTHER SPECIFIED B GROUP VITAMINS: ICD-10-CM

## 2018-12-04 DIAGNOSIS — I50.33 ACUTE ON CHRONIC DIASTOLIC (CONGESTIVE) HEART FAILURE: ICD-10-CM

## 2018-12-04 DIAGNOSIS — I13.2 HYPERTENSIVE HEART AND CHRONIC KIDNEY DISEASE WITH HEART FAILURE AND WITH STAGE 5 CHRONIC KIDNEY DISEASE, OR END STAGE RENAL DISEASE: ICD-10-CM

## 2018-12-04 DIAGNOSIS — I42.0 DILATED CARDIOMYOPATHY: ICD-10-CM

## 2018-12-04 DIAGNOSIS — N18.5 CHRONIC KIDNEY DISEASE, STAGE 5: ICD-10-CM

## 2018-12-10 ENCOUNTER — OUTPATIENT (OUTPATIENT)
Dept: OUTPATIENT SERVICES | Facility: HOSPITAL | Age: 73
LOS: 1 days | Discharge: HOME | End: 2018-12-10
Payer: MEDICARE

## 2018-12-10 VITALS
RESPIRATION RATE: 16 BRPM | HEIGHT: 66 IN | TEMPERATURE: 96 F | SYSTOLIC BLOOD PRESSURE: 127 MMHG | DIASTOLIC BLOOD PRESSURE: 59 MMHG | OXYGEN SATURATION: 98 % | HEART RATE: 62 BPM | WEIGHT: 166.89 LBS

## 2018-12-10 DIAGNOSIS — I48.0 PAROXYSMAL ATRIAL FIBRILLATION: ICD-10-CM

## 2018-12-10 DIAGNOSIS — Z98.890 OTHER SPECIFIED POSTPROCEDURAL STATES: Chronic | ICD-10-CM

## 2018-12-10 DIAGNOSIS — Z01.818 ENCOUNTER FOR OTHER PREPROCEDURAL EXAMINATION: ICD-10-CM

## 2018-12-10 DIAGNOSIS — Z90.710 ACQUIRED ABSENCE OF BOTH CERVIX AND UTERUS: Chronic | ICD-10-CM

## 2018-12-10 DIAGNOSIS — I77.0 ARTERIOVENOUS FISTULA, ACQUIRED: Chronic | ICD-10-CM

## 2018-12-10 LAB
ALBUMIN SERPL ELPH-MCNC: 4 G/DL — SIGNIFICANT CHANGE UP (ref 3.5–5.2)
ALP SERPL-CCNC: 64 U/L — SIGNIFICANT CHANGE UP (ref 30–115)
ALT FLD-CCNC: 12 U/L — SIGNIFICANT CHANGE UP (ref 0–41)
ANION GAP SERPL CALC-SCNC: 21 MMOL/L — HIGH (ref 7–14)
APTT BLD: 35.2 SEC — SIGNIFICANT CHANGE UP (ref 27–39.2)
AST SERPL-CCNC: 13 U/L — SIGNIFICANT CHANGE UP (ref 0–41)
BASOPHILS # BLD AUTO: 0.04 K/UL — SIGNIFICANT CHANGE UP (ref 0–0.2)
BASOPHILS NFR BLD AUTO: 0.5 % — SIGNIFICANT CHANGE UP (ref 0–1)
BILIRUB SERPL-MCNC: 0.5 MG/DL — SIGNIFICANT CHANGE UP (ref 0.2–1.2)
BUN SERPL-MCNC: 85 MG/DL — CRITICAL HIGH (ref 10–20)
CALCIUM SERPL-MCNC: 10.2 MG/DL — HIGH (ref 8.5–10.1)
CHLORIDE SERPL-SCNC: 102 MMOL/L — SIGNIFICANT CHANGE UP (ref 98–110)
CO2 SERPL-SCNC: 20 MMOL/L — SIGNIFICANT CHANGE UP (ref 17–32)
CREAT SERPL-MCNC: 3.4 MG/DL — HIGH (ref 0.7–1.5)
EOSINOPHIL # BLD AUTO: 0.2 K/UL — SIGNIFICANT CHANGE UP (ref 0–0.7)
EOSINOPHIL NFR BLD AUTO: 2.5 % — SIGNIFICANT CHANGE UP (ref 0–8)
GLUCOSE SERPL-MCNC: 147 MG/DL — HIGH (ref 70–99)
HCT VFR BLD CALC: 32.1 % — LOW (ref 37–47)
HGB BLD-MCNC: 9.9 G/DL — LOW (ref 12–16)
IMM GRANULOCYTES NFR BLD AUTO: 0.5 % — HIGH (ref 0.1–0.3)
INR BLD: 1.35 RATIO — HIGH (ref 0.65–1.3)
LYMPHOCYTES # BLD AUTO: 0.44 K/UL — LOW (ref 1.2–3.4)
LYMPHOCYTES # BLD AUTO: 5.5 % — LOW (ref 20.5–51.1)
MCHC RBC-ENTMCNC: 27.3 PG — SIGNIFICANT CHANGE UP (ref 27–31)
MCHC RBC-ENTMCNC: 30.8 G/DL — LOW (ref 32–37)
MCV RBC AUTO: 88.4 FL — SIGNIFICANT CHANGE UP (ref 81–99)
MONOCYTES # BLD AUTO: 0.68 K/UL — HIGH (ref 0.1–0.6)
MONOCYTES NFR BLD AUTO: 8.5 % — SIGNIFICANT CHANGE UP (ref 1.7–9.3)
NEUTROPHILS # BLD AUTO: 6.56 K/UL — HIGH (ref 1.4–6.5)
NEUTROPHILS NFR BLD AUTO: 82.5 % — HIGH (ref 42.2–75.2)
NRBC # BLD: 0 /100 WBCS — SIGNIFICANT CHANGE UP (ref 0–0)
PLATELET # BLD AUTO: 252 K/UL — SIGNIFICANT CHANGE UP (ref 130–400)
POTASSIUM SERPL-MCNC: 4.5 MMOL/L — SIGNIFICANT CHANGE UP (ref 3.5–5)
POTASSIUM SERPL-SCNC: 4.5 MMOL/L — SIGNIFICANT CHANGE UP (ref 3.5–5)
PROT SERPL-MCNC: 7.3 G/DL — SIGNIFICANT CHANGE UP (ref 6–8)
PROTHROM AB SERPL-ACNC: 15.5 SEC — HIGH (ref 9.95–12.87)
RBC # BLD: 3.63 M/UL — LOW (ref 4.2–5.4)
RBC # FLD: 17 % — HIGH (ref 11.5–14.5)
SODIUM SERPL-SCNC: 143 MMOL/L — SIGNIFICANT CHANGE UP (ref 135–146)
WBC # BLD: 7.96 K/UL — SIGNIFICANT CHANGE UP (ref 4.8–10.8)
WBC # FLD AUTO: 7.96 K/UL — SIGNIFICANT CHANGE UP (ref 4.8–10.8)

## 2018-12-10 PROCEDURE — 93010 ELECTROCARDIOGRAM REPORT: CPT

## 2018-12-10 NOTE — H&P PST ADULT - PSH
AV fistula    H/O abdominal hysterectomy    H/O cardiac radiofrequency ablation    History of open heart surgery

## 2018-12-10 NOTE — H&P PST ADULT - PMH
Atrial fibrillation    CAD (coronary artery disease)    CKD (chronic kidney disease)  stage 5, no dialysis  HTN (hypertension)    Kidney stone    MI (myocardial infarction)

## 2018-12-10 NOTE — H&P PST ADULT - REASON FOR ADMISSION
74 yo female presents s/p hospitalization from afib "I have had it but I wasn't feeling well, I am having an ablation"  denies chest pain, palpitations, shortness of breath, dyspnea, or dysuria. exercise tolerance: 2 blocks/ 1 flights of stairs w/o sob

## 2018-12-14 ENCOUNTER — INPATIENT (INPATIENT)
Facility: HOSPITAL | Age: 73
LOS: 1 days | Discharge: HOME | End: 2018-12-16
Attending: STUDENT IN AN ORGANIZED HEALTH CARE EDUCATION/TRAINING PROGRAM | Admitting: STUDENT IN AN ORGANIZED HEALTH CARE EDUCATION/TRAINING PROGRAM
Payer: MEDICARE

## 2018-12-14 VITALS
SYSTOLIC BLOOD PRESSURE: 149 MMHG | HEIGHT: 66 IN | WEIGHT: 156.97 LBS | DIASTOLIC BLOOD PRESSURE: 70 MMHG | OXYGEN SATURATION: 96 % | RESPIRATION RATE: 16 BRPM | HEART RATE: 79 BPM | TEMPERATURE: 98 F

## 2018-12-14 DIAGNOSIS — Z98.890 OTHER SPECIFIED POSTPROCEDURAL STATES: Chronic | ICD-10-CM

## 2018-12-14 DIAGNOSIS — I48.0 PAROXYSMAL ATRIAL FIBRILLATION: ICD-10-CM

## 2018-12-14 DIAGNOSIS — I77.0 ARTERIOVENOUS FISTULA, ACQUIRED: Chronic | ICD-10-CM

## 2018-12-14 DIAGNOSIS — Z90.710 ACQUIRED ABSENCE OF BOTH CERVIX AND UTERUS: Chronic | ICD-10-CM

## 2018-12-14 RX ORDER — INFLUENZA VIRUS VACCINE 15; 15; 15; 15 UG/.5ML; UG/.5ML; UG/.5ML; UG/.5ML
0.5 SUSPENSION INTRAMUSCULAR ONCE
Qty: 0 | Refills: 0 | Status: DISCONTINUED | OUTPATIENT
Start: 2018-12-14 | End: 2018-12-16

## 2018-12-14 RX ORDER — FUROSEMIDE 40 MG
40 TABLET ORAL ONCE
Qty: 0 | Refills: 0 | Status: COMPLETED | OUTPATIENT
Start: 2018-12-14 | End: 2018-12-14

## 2018-12-14 RX ORDER — DILTIAZEM HCL 120 MG
240 CAPSULE, EXT RELEASE 24 HR ORAL DAILY
Qty: 0 | Refills: 0 | Status: DISCONTINUED | OUTPATIENT
Start: 2018-12-14 | End: 2018-12-16

## 2018-12-14 RX ORDER — APIXABAN 2.5 MG/1
5 TABLET, FILM COATED ORAL EVERY 12 HOURS
Qty: 0 | Refills: 0 | Status: DISCONTINUED | OUTPATIENT
Start: 2018-12-14 | End: 2018-12-16

## 2018-12-14 RX ORDER — FUROSEMIDE 40 MG
20 TABLET ORAL ONCE
Qty: 0 | Refills: 0 | Status: COMPLETED | OUTPATIENT
Start: 2018-12-14 | End: 2018-12-14

## 2018-12-14 RX ORDER — PREGABALIN 225 MG/1
1000 CAPSULE ORAL DAILY
Qty: 0 | Refills: 0 | Status: DISCONTINUED | OUTPATIENT
Start: 2018-12-14 | End: 2018-12-16

## 2018-12-14 RX ORDER — ISOSORBIDE MONONITRATE 60 MG/1
30 TABLET, EXTENDED RELEASE ORAL DAILY
Qty: 0 | Refills: 0 | Status: DISCONTINUED | OUTPATIENT
Start: 2018-12-14 | End: 2018-12-16

## 2018-12-14 RX ORDER — FUROSEMIDE 40 MG
40 TABLET ORAL
Qty: 0 | Refills: 0 | Status: COMPLETED | OUTPATIENT
Start: 2018-12-14 | End: 2018-12-16

## 2018-12-14 RX ORDER — AMIODARONE HYDROCHLORIDE 400 MG/1
200 TABLET ORAL DAILY
Qty: 0 | Refills: 0 | Status: DISCONTINUED | OUTPATIENT
Start: 2018-12-14 | End: 2018-12-16

## 2018-12-14 RX ORDER — PANTOPRAZOLE SODIUM 20 MG/1
40 TABLET, DELAYED RELEASE ORAL
Qty: 0 | Refills: 0 | Status: DISCONTINUED | OUTPATIENT
Start: 2018-12-14 | End: 2018-12-16

## 2018-12-14 RX ORDER — ALLOPURINOL 300 MG
100 TABLET ORAL DAILY
Qty: 0 | Refills: 0 | Status: DISCONTINUED | OUTPATIENT
Start: 2018-12-14 | End: 2018-12-16

## 2018-12-14 RX ORDER — CALCITRIOL 0.5 UG/1
0.5 CAPSULE ORAL DAILY
Qty: 0 | Refills: 0 | Status: DISCONTINUED | OUTPATIENT
Start: 2018-12-14 | End: 2018-12-16

## 2018-12-14 RX ORDER — METOPROLOL TARTRATE 50 MG
50 TABLET ORAL
Qty: 0 | Refills: 0 | Status: DISCONTINUED | OUTPATIENT
Start: 2018-12-14 | End: 2018-12-16

## 2018-12-14 RX ADMIN — Medication 40 MILLIGRAM(S): at 18:50

## 2018-12-14 RX ADMIN — APIXABAN 5 MILLIGRAM(S): 2.5 TABLET, FILM COATED ORAL at 21:22

## 2018-12-14 RX ADMIN — Medication 20 MILLIGRAM(S): at 15:35

## 2018-12-14 RX ADMIN — Medication 50 MILLIGRAM(S): at 21:22

## 2018-12-14 NOTE — CHART NOTE - NSCHARTNOTEFT_GEN_A_CORE
Cardiac Electrophysiology Procedure Report  Date of procedure	12/14/18  EP Attending	Stephanie Rehman MD  Anesthesiologist	Elmer Kidd MD  Referring Physician	Bipin Child MD  Procedure	JOÃO, Electrophysiology Study, Computerized 3D mapping, Intracardiac Ultrasound, Right and left atrial catheterization with dual trans-septal approach, Radiofrequency ablation of pulmonary veins to treat atrial fibrillation, CS cannulation, Fluoroscopy, Medication administration    INDICATION: 74 yo F with history of Aflutter s/p ablation (2008), CKD, paroxysmal AFib on Eliquis here for AFib ablation.    MATERIALS  Insertion Site	Sheath                      	Catheter	Location  RFV	Agilis	Thermocool STSF D/F curve	      LA  RFV	Daig Decapolar 	Pentaray    	      LA  LFV	11 Fr 	ICE	                                  RA  LFV	7 Fr 	Aly EZ steer bidirectional    CS       DESCRIPTION OF PROCEDURE  The patient was brought to the Procedure Room in a nonsedated and fasting state and was in atrial fibrillation with controlled ventricular rates at baseline. Informed, written consent was obtained prior to the procedure. Anesthesia maintain comfort and analgesia throughout the procedure. Blood pressure, oxygenation and level of comfort were stable throughout. JOÃO was performed and showed no evidence of LA or ANDREINA thrombus. Esophageal temperature was monitored using esophageal temperature probe.     The right and left groin were cleaned and prepped with the applications of Chloraprep. Patient was then covered from head to toe with sterile drapes in the usual manner. The right and left inguinal areas and arterial pulse were defined; 10 cc of lidocaine solution were infiltrated into the skin overlying the area.     Next, under ultrasound guidance, the right femoral vein was accessed twice using needle and guidewire with the modified Seldinger technique. The guidewires were followed up the IVC, right of the spine, to confirm venous access. Two 8 Fr introducer sheaths were placed into the femoral vein. The dilators and guidewires were removed. Then, under ultrasound guidance, the left femoral vein was accessed twice using needle and guidewire with the modified Seldinger technique. The guidewires were followed up the IVC, right of the spine, to confirm venous access. Two introducer sheaths were placed into the femoral vein. The dilators and guidewires were removed. The ICE catheter and CS catheter were advanced into the right atrium. The ICE catheter was used to visual the intra-atrial septum. Intravenous heparin was given prior to performing trans-septal puncture and ACTs followed during the rest of the procedure to ensure an ACT greater than 300 sec.    One SL-1 sheath was exchanged for the 8Fr introducer sheath previously inserted in the right femoral vein. Single trans-septal puncture was performed with BRK1 needle and one SL-1 sheath via the right femoral vein under direct visualization with ICE and fluoroscopy guidance. A Pentaray was advanced through the SL1 into the left superior pulmonary vein.      A second trans-septal was performed with a long BRK needle and an Agilis via the right femoral vein using ICE and fluoroscopy for guidance. An ablation catheter was inserted via the Agilis sheath.  Left atrial location was confirmed for each trans-septal puncture with micro-bubble confirmation on ICE, after withdrawal of bright red blood from the catheter and with advancement of a guide wire into the left pulmonary vein.    The patient was cardioverted to sinus rhythm with 200 J DCCV. 3D contact electroanatomical map was created in sinus rhythm using CARTO 3D mapping. The left atrium was noted to be scarred and had low voltage throughout the atrium.. The esophagus was noted to be deviated leftward at baseline.     Wide area circumferential ablation was performed around the left pulmonary veins. RFA energy was limited to 25 W on the posterior wall and was 31 W on the anterior wall. During left pulmonary vein isolation, the patient went back into atrial fibrillation. Max esophageal temperature was 36.3 C from a baseline temp of 36 C.  Wide area circumferential ablation was then performed around the right pulmonary veins, but ablation within the will was required to achieve isolation. After isolation of the pulmonary veins, exit block was assessed. The left pulmonary veins were isolated, but the right pulmonary veins did not have exit block. Further ablation along the will was required. The patient was cardioverted to sinus rhythm with 200 J DCCV. Exit block was confirmed from all four pulmonary veins. A total of 105 ablations over 45:44 min was performed.    Adenosine 18 mg was administered while recording potentials in each PV. With adenosine administration there was no evidence of PV reconnection. ICE showed no evidence of pericardial effusion.     The prior CTI line was assessed with a use of a HALO catheter. Bidirectional block was noted with > 200 ms. HV was recorded and noted to be 48 ms.    Patient was extubated. The catheters and sheaths were removed. Hemostasis was achieved with a Figure of Eight stitch bilaterally. A dry, sterile dressing was placed over this. Gauze and needle count were performed and found to be consistent at the end of the procedure.    Patient was sent to recovery area with no immediate complications.     COMPLICATIONS:  The patient tolerated the procedure well. There were no immediate complications.     CONCLUSIONS:  Successful radiofrequency ablation of the left and right pulmonary veins with ablation of PV potentials and entrance/exit block as endpoints. No evidence of induced atrial fibrillation with adenosine.     EBL: 20 cc    FLUORO: 23:57 min; 464 mGy      _______________________________  Stephanie Rehman MD  Cardiac Electrophysiology

## 2018-12-14 NOTE — CHART NOTE - NSCHARTNOTEFT_GEN_A_CORE
PACU ANESTHESIA ADMISSION NOTE      Procedure: JOÃO, Afib ablation, cardioversion  Post op diagnosis:      ____  Intubated  TV:______       Rate: ______      FiO2: ______    _x___  Patent Airway    _x___  Full return of protective reflexes    _x___  Full recovery from anesthesia / back to baseline status    Vitals:    see anesthesia record and pacu flow sheet    Mental Status:  _x___ Awake   _____ Alert   _____ Drowsy   _____ Sedated    Nausea/Vomiting:  _x___  NO       ______Yes,   See Post - Op Orders         Pain Scale (0-10):  _____    Treatment: ____ None    __x__ See Post - Op/PCA Orders    Post - Operative Fluids:   ____ Oral   ___x_ See Post - Op Orders    Plan: Discharge:   ____Home       _____Floor     _____Critical Care    _____  Other:_________________    Comments:    decreased saturation in OR and RR, lasix 20 mg given.  reported to Cardiology attending  No anesthesia issues or complications noted.  Discharge when criteria met.

## 2018-12-14 NOTE — PROGRESS NOTE ADULT - SUBJECTIVE AND OBJECTIVE BOX
Electrophysiology Brief Post-Op Note    I have personally seen and examined the patient.  I agree with the history and physical which I have reviewed and noted any changes below.  12-14-18 @ 8:10 AM    PRE-OP DIAGNOSIS: Paroxysmal AFib    POST-OP DIAGNOSIS: Paroxysmal AFib    PROCEDURE:   - JOÃO   - AFib Ablation     VASCULAR ACCESS (with ultrasound guidance)   - Right femoral vein: 8.5 Fr, 8.5 Fr   - Left femoral vein: 8 Fr, 11 Fr   - Right femoral artery: none    Physician: Stephanie Rehman MD  Assistant: None    ANESTHESIA TYPE:  [ x ]General Anesthesia  [  ] Sedation  [ x ] Local/Regional    ESTIMATED BLOOD LOSS:      20 mL    CONDITION  [  ] Critical  [  ] Serious  [  ]Fair  [ x ]Good      SPECIMENS REMOVED (IF APPLICABLE): N/A    IMPLANTS (IF APPLICABLE): N/A    FINDINGS  PLAN OF CARE  -	Start Eliquis 5 mg q12 tonight at 8 pm  -	Start protonix 40 mg daily tonight  -	Bed rest till am  -           Discontinue Lubin in the AM  -           Sutures to be removed from both groins tomorrow morning  -	Admit to telemetry

## 2018-12-14 NOTE — PRE-ANESTHESIA EVALUATION ADULT - NSANTHOSAYNRD_GEN_A_CORE
No. ROSCOE screening performed.  STOP BANG Legend: 0-2 = LOW Risk; 3-4 = INTERMEDIATE Risk; 5-8 = HIGH Risk/tested& negative per dtr

## 2018-12-15 ENCOUNTER — TRANSCRIPTION ENCOUNTER (OUTPATIENT)
Age: 73
End: 2018-12-15

## 2018-12-15 LAB
ALBUMIN SERPL ELPH-MCNC: 3.6 G/DL — SIGNIFICANT CHANGE UP (ref 3.5–5.2)
ALP SERPL-CCNC: 49 U/L — SIGNIFICANT CHANGE UP (ref 30–115)
ALT FLD-CCNC: 12 U/L — SIGNIFICANT CHANGE UP (ref 0–41)
ANION GAP SERPL CALC-SCNC: 18 MMOL/L — HIGH (ref 7–14)
AST SERPL-CCNC: 22 U/L — SIGNIFICANT CHANGE UP (ref 0–41)
BILIRUB SERPL-MCNC: 0.4 MG/DL — SIGNIFICANT CHANGE UP (ref 0.2–1.2)
BUN SERPL-MCNC: 90 MG/DL — CRITICAL HIGH (ref 10–20)
CALCIUM SERPL-MCNC: 9.5 MG/DL — SIGNIFICANT CHANGE UP (ref 8.5–10.1)
CHLORIDE SERPL-SCNC: 100 MMOL/L — SIGNIFICANT CHANGE UP (ref 98–110)
CO2 SERPL-SCNC: 20 MMOL/L — SIGNIFICANT CHANGE UP (ref 17–32)
CREAT SERPL-MCNC: 3.7 MG/DL — HIGH (ref 0.7–1.5)
GLUCOSE SERPL-MCNC: 135 MG/DL — HIGH (ref 70–99)
HCT VFR BLD CALC: 31.6 % — LOW (ref 37–47)
HGB BLD-MCNC: 9.2 G/DL — LOW (ref 12–16)
MCHC RBC-ENTMCNC: 26.4 PG — LOW (ref 27–31)
MCHC RBC-ENTMCNC: 29.1 G/DL — LOW (ref 32–37)
MCV RBC AUTO: 90.8 FL — SIGNIFICANT CHANGE UP (ref 81–99)
NRBC # BLD: 0 /100 WBCS — SIGNIFICANT CHANGE UP (ref 0–0)
PLATELET # BLD AUTO: 232 K/UL — SIGNIFICANT CHANGE UP (ref 130–400)
POTASSIUM SERPL-MCNC: 5.4 MMOL/L — HIGH (ref 3.5–5)
POTASSIUM SERPL-SCNC: 5.4 MMOL/L — HIGH (ref 3.5–5)
PROT SERPL-MCNC: 6.8 G/DL — SIGNIFICANT CHANGE UP (ref 6–8)
RBC # BLD: 3.48 M/UL — LOW (ref 4.2–5.4)
RBC # FLD: 16.9 % — HIGH (ref 11.5–14.5)
SODIUM SERPL-SCNC: 138 MMOL/L — SIGNIFICANT CHANGE UP (ref 135–146)
WBC # BLD: 9.21 K/UL — SIGNIFICANT CHANGE UP (ref 4.8–10.8)
WBC # FLD AUTO: 9.21 K/UL — SIGNIFICANT CHANGE UP (ref 4.8–10.8)

## 2018-12-15 PROCEDURE — 99232 SBSQ HOSP IP/OBS MODERATE 35: CPT

## 2018-12-15 RX ORDER — ACETAMINOPHEN 500 MG
650 TABLET ORAL EVERY 6 HOURS
Qty: 0 | Refills: 0 | Status: DISCONTINUED | OUTPATIENT
Start: 2018-12-15 | End: 2018-12-16

## 2018-12-15 RX ORDER — AMIODARONE HYDROCHLORIDE 400 MG/1
1 TABLET ORAL
Qty: 0 | Refills: 0 | COMMUNITY

## 2018-12-15 RX ORDER — DILTIAZEM HCL 120 MG
1 CAPSULE, EXT RELEASE 24 HR ORAL
Qty: 30 | Refills: 0 | OUTPATIENT
Start: 2018-12-15

## 2018-12-15 RX ORDER — AMIODARONE HYDROCHLORIDE 400 MG/1
100 TABLET ORAL
Qty: 30 | Refills: 0 | OUTPATIENT
Start: 2018-12-15

## 2018-12-15 RX ADMIN — Medication 650 MILLIGRAM(S): at 04:37

## 2018-12-15 RX ADMIN — ISOSORBIDE MONONITRATE 30 MILLIGRAM(S): 60 TABLET, EXTENDED RELEASE ORAL at 11:49

## 2018-12-15 RX ADMIN — PREGABALIN 1000 MICROGRAM(S): 225 CAPSULE ORAL at 11:48

## 2018-12-15 RX ADMIN — Medication 240 MILLIGRAM(S): at 08:07

## 2018-12-15 RX ADMIN — Medication 50 MILLIGRAM(S): at 18:10

## 2018-12-15 RX ADMIN — Medication 40 MILLIGRAM(S): at 18:10

## 2018-12-15 RX ADMIN — APIXABAN 5 MILLIGRAM(S): 2.5 TABLET, FILM COATED ORAL at 18:10

## 2018-12-15 RX ADMIN — Medication 50 MILLIGRAM(S): at 08:07

## 2018-12-15 RX ADMIN — PANTOPRAZOLE SODIUM 40 MILLIGRAM(S): 20 TABLET, DELAYED RELEASE ORAL at 08:08

## 2018-12-15 RX ADMIN — CALCITRIOL 0.5 MICROGRAM(S): 0.5 CAPSULE ORAL at 11:49

## 2018-12-15 RX ADMIN — AMIODARONE HYDROCHLORIDE 200 MILLIGRAM(S): 400 TABLET ORAL at 05:34

## 2018-12-15 RX ADMIN — APIXABAN 5 MILLIGRAM(S): 2.5 TABLET, FILM COATED ORAL at 05:34

## 2018-12-15 RX ADMIN — Medication 100 MILLIGRAM(S): at 11:49

## 2018-12-15 RX ADMIN — Medication 40 MILLIGRAM(S): at 05:34

## 2018-12-15 NOTE — DISCHARGE NOTE ADULT - PATIENT PORTAL LINK FT
You can access the [facility] Patient Portal, offered by [facility], by registering with the following website: [email or web address]

## 2018-12-15 NOTE — DISCHARGE NOTE ADULT - CARE PROVIDER_API CALL
Stephanie Rehman), Medicine  Physicians  [address] [place] [address]  Phone: [phone]  Fax: [phone]

## 2018-12-15 NOTE — PROGRESS NOTE ADULT - SUBJECTIVE AND OBJECTIVE BOX
Post A.F  ablation on 12/14/18  no c/o   R& l groin no hematomas or swelling  sutures removed  pt can go home on same meds

## 2018-12-15 NOTE — DISCHARGE NOTE ADULT - PLAN OF CARE
s/p ablation please follow up with Dr. Rehman and PMD. Please monitor for any bleeding, swelling, pain around site of incision, chest pain, palpitations, shortness of breath and report to emergency services.

## 2018-12-15 NOTE — DISCHARGE NOTE ADULT - CARE PLAN
Principal Discharge DX:	Paroxysmal atrial fibrillation  Goal:	s/p ablation  Assessment and plan of treatment:	please follow up with Dr. Rehman and PMD. Please monitor for any bleeding, swelling, pain around site of incision, chest pain, palpitations, shortness of breath and report to emergency services.

## 2018-12-15 NOTE — CHART NOTE - NSCHARTNOTEFT_GEN_A_CORE
Notified by nurse of patient's bradycardia and hypotension, patient received Amiodarone 200mg and Cardizem 240mg this morning. Patient seen and examined, remains asymptomatic. Patient expresses nervousness over not having voided as of yet, simeon catheter discontinued at 11AM, I reassured her it may take a few more hours before she voids. Discussed with Dr. Rehman patient's bradycardia, advised to change her dosages to Amiodarone 100mg and Cardizem 180mg, scripts sent to pharmacy. Daughter and patient made aware of changes.

## 2018-12-15 NOTE — DISCHARGE NOTE ADULT - MEDICATION SUMMARY - MEDICATIONS TO TAKE
I will START or STAY ON the medications listed below when I get home from the hospital:    isosorbide mononitrate 30 mg oral tablet, extended release  -- 1 tab(s) by mouth once a day (at bedtime)  -- Indication: For Paroxysmal atrial fibrillation    amiodarone 200 mg oral tablet  -- 1 tab(s) by mouth once a day  -- Indication: For Paroxysmal atrial fibrillation    dilTIAZem 240 mg/24 hours oral capsule, extended release  -- 1 cap(s) by mouth once a day  -- Indication: For Paroxysmal atrial fibrillation    Eliquis 2.5 mg oral tablet  -- 1 tab(s) by mouth 2 times a day  -- Indication: For Paroxysmal atrial fibrillation    allopurinol 100 mg oral tablet  -- 1 tab(s) by mouth once a day  -- Indication: For gout    metoprolol tartrate 50 mg oral tablet  -- 1 tab(s) by mouth 2 times a day  -- Indication: For Paroxysmal atrial fibrillation    Kayexalate oral and rectal powder  -- 1 application by mouth and rectally 3 times a day  -- Indication: For health    furosemide 40 mg oral tablet  -- 1 tab(s) by mouth 2 times a day on Monday, Wednesday and Friday, 1 tab(s) by mouth 1 time a day on Tuesday, Thursday, Saturday and Sunday.  -- Indication: For diuretic     famotidine 40 mg oral tablet  -- 1 tab(s) by mouth 2 times a day  -- Indication: For gerd    Vitamin B-12 100 mcg oral tablet  -- 1 tab(s) by mouth once a day  -- Indication: For health     calcitriol 0.5 mcg oral capsule  -- 1 cap(s) by mouth once a day  -- Indication: For health I will START or STAY ON the medications listed below when I get home from the hospital:    isosorbide mononitrate 30 mg oral tablet, extended release  -- 1 tab(s) by mouth once a day (at bedtime)  -- Indication: For Paroxysmal atrial fibrillation    amiodarone 100 mg oral tablet  -- 100 milligram(s) by mouth once a day   -- Avoid grapefruit and grapefruit juice while taking this medication.  Avoid prolonged or excessive exposure to direct and/or artificial sunlight while taking this medication.  Do not take this drug if you are pregnant.  It is very important that you take or use this exactly as directed.  Do not skip doses or discontinue unless directed by your doctor.  May cause drowsiness or dizziness.    -- Indication: For Paroxysmal atrial fibrillation    Cardizem  mg/24 hours oral capsule, extended release  -- 1 cap(s) by mouth once a day   -- It is very important that you take or use this exactly as directed.  Do not skip doses or discontinue unless directed by your doctor.  Some non-prescription drugs may aggravate your condition.  Read all labels carefully.  If a warning appears, check with your doctor before taking.  Swallow whole.  Do not crush.    -- Indication: For Paroxysmal atrial fibrillation    Eliquis 2.5 mg oral tablet  -- 1 tab(s) by mouth 2 times a day  -- Indication: For Paroxysmal atrial fibrillation    allopurinol 100 mg oral tablet  -- 1 tab(s) by mouth once a day  -- Indication: For gout    metoprolol tartrate 50 mg oral tablet  -- 1 tab(s) by mouth 2 times a day  -- Indication: For Paroxysmal atrial fibrillation    Kayexalate oral and rectal powder  -- 1 application by mouth and rectally 3 times a day  -- Indication: For health    furosemide 40 mg oral tablet  -- 1 tab(s) by mouth 2 times a day on Monday, Wednesday and Friday, 1 tab(s) by mouth 1 time a day on Tuesday, Thursday, Saturday and Sunday.  -- Indication: For diuretic     famotidine 40 mg oral tablet  -- 1 tab(s) by mouth 2 times a day  -- Indication: For gerd    calcitriol 0.5 mcg oral capsule  -- 1 cap(s) by mouth once a day  -- Indication: For health    Vitamin B-12 100 mcg oral tablet  -- 1 tab(s) by mouth once a day  -- Indication: For health

## 2018-12-15 NOTE — DISCHARGE NOTE ADULT - HOSPITAL COURSE
72 yo F presents with paroxysmal AFIB s/p ablation. Sutures removed on morning of discharge by Dr. Rosas. Stable for discharge, remaining hospital course uncomplicated.

## 2018-12-15 NOTE — DISCHARGE NOTE ADULT - MEDICATION SUMMARY - MEDICATIONS TO CHANGE
I will SWITCH the dose or number of times a day I take the medications listed below when I get home from the hospital:  None I will SWITCH the dose or number of times a day I take the medications listed below when I get home from the hospital:    amiodarone 200 mg oral tablet  -- 1 tab(s) by mouth once a day    dilTIAZem 240 mg/24 hours oral capsule, extended release  -- 1 cap(s) by mouth once a day

## 2018-12-16 VITALS
HEART RATE: 60 BPM | WEIGHT: 171.96 LBS | DIASTOLIC BLOOD PRESSURE: 58 MMHG | TEMPERATURE: 97 F | SYSTOLIC BLOOD PRESSURE: 119 MMHG | RESPIRATION RATE: 18 BRPM

## 2018-12-16 RX ADMIN — Medication 240 MILLIGRAM(S): at 06:19

## 2018-12-16 RX ADMIN — Medication 50 MILLIGRAM(S): at 06:19

## 2018-12-16 RX ADMIN — AMIODARONE HYDROCHLORIDE 200 MILLIGRAM(S): 400 TABLET ORAL at 06:19

## 2018-12-16 RX ADMIN — APIXABAN 5 MILLIGRAM(S): 2.5 TABLET, FILM COATED ORAL at 06:19

## 2018-12-16 RX ADMIN — PANTOPRAZOLE SODIUM 40 MILLIGRAM(S): 20 TABLET, DELAYED RELEASE ORAL at 06:20

## 2018-12-16 RX ADMIN — Medication 40 MILLIGRAM(S): at 06:19

## 2018-12-19 DIAGNOSIS — R00.1 BRADYCARDIA, UNSPECIFIED: ICD-10-CM

## 2018-12-19 DIAGNOSIS — I12.9 HYPERTENSIVE CHRONIC KIDNEY DISEASE WITH STAGE 1 THROUGH STAGE 4 CHRONIC KIDNEY DISEASE, OR UNSPECIFIED CHRONIC KIDNEY DISEASE: ICD-10-CM

## 2018-12-19 DIAGNOSIS — I95.9 HYPOTENSION, UNSPECIFIED: ICD-10-CM

## 2018-12-19 DIAGNOSIS — Z87.891 PERSONAL HISTORY OF NICOTINE DEPENDENCE: ICD-10-CM

## 2018-12-19 DIAGNOSIS — N18.9 CHRONIC KIDNEY DISEASE, UNSPECIFIED: ICD-10-CM

## 2018-12-19 DIAGNOSIS — Z79.01 LONG TERM (CURRENT) USE OF ANTICOAGULANTS: ICD-10-CM

## 2018-12-19 DIAGNOSIS — I25.2 OLD MYOCARDIAL INFARCTION: ICD-10-CM

## 2018-12-19 DIAGNOSIS — I25.10 ATHEROSCLEROTIC HEART DISEASE OF NATIVE CORONARY ARTERY WITHOUT ANGINA PECTORIS: ICD-10-CM

## 2019-01-09 ENCOUNTER — APPOINTMENT (OUTPATIENT)
Dept: CARDIOLOGY | Facility: CLINIC | Age: 74
End: 2019-01-09

## 2019-01-09 VITALS
DIASTOLIC BLOOD PRESSURE: 70 MMHG | OXYGEN SATURATION: 95 % | SYSTOLIC BLOOD PRESSURE: 137 MMHG | WEIGHT: 170 LBS | HEART RATE: 107 BPM | HEIGHT: 66 IN | BODY MASS INDEX: 27.32 KG/M2

## 2019-01-09 DIAGNOSIS — Z00.00 ENCOUNTER FOR GENERAL ADULT MEDICAL EXAMINATION W/OUT ABNORMAL FINDINGS: ICD-10-CM

## 2019-01-09 RX ORDER — FAMOTIDINE 40 MG/1
40 TABLET, FILM COATED ORAL
Qty: 60 | Refills: 0 | Status: DISCONTINUED | COMMUNITY
End: 2019-01-09

## 2019-01-09 NOTE — HISTORY OF PRESENT ILLNESS
[Persistent Atrial Fibrillation] : persistent atrial fibrillation [FreeTextEntry1] : Cardiologist: Dr. 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 Patient states she was feeling well after her ablation for a few days and then went back into AFib. She has dyspnea and palpitations when she is in AFib. She has not been feeling as well as she did when she was in sinus rhythm. She also complains of heartburn since her ablation with a burning sensation. It has been getting better with time, but is still occurring. She denies any pain or tenderness in her groins. She denies any other cardiovascular complaints. No bleeding issues with Eliquis.

## 2019-01-09 NOTE — PHYSICAL EXAM
[General Appearance - Well Developed] : well developed [Normal Appearance] : normal appearance [Well Groomed] : well groomed [General Appearance - Well Nourished] : well nourished [No Deformities] : no deformities [General Appearance - In No Acute Distress] : no acute distress [Normal Conjunctiva] : the conjunctiva exhibited no abnormalities [Eyelids - No Xanthelasma] : the eyelids demonstrated no xanthelasmas [Normal Oral Mucosa] : normal oral mucosa [No Oral Pallor] : no oral pallor [No Oral Cyanosis] : no oral cyanosis [Respiration, Rhythm And Depth] : normal respiratory rhythm and effort [Exaggerated Use Of Accessory Muscles For Inspiration] : no accessory muscle use [Auscultation Breath Sounds / Voice Sounds] : lungs were clear to auscultation bilaterally [Heart Sounds] : normal S1 and S2 [Bowel Sounds] : normal bowel sounds [Abnormal Walk] : normal gait [Skin Color & Pigmentation] : normal skin color and pigmentation [Oriented To Time, Place, And Person] : oriented to person, place, and time [Nail Clubbing] : no clubbing of the fingernails [Cyanosis, Localized] : no localized cyanosis [] : no rash [FreeTextEntry1] : anxious

## 2019-01-09 NOTE — REVIEW OF SYSTEMS
[Dyspnea on exertion] : dyspnea during exertion [Palpitations] : palpitations [Heartburn] : heartburn [Fever] : no fever [Headache] : no headache [Blurry Vision] : no blurred vision [Earache] : no earache [Lower Ext Edema] : no extremity edema [see HPI] : see HPI [Cough] : no cough [Incontinence] : no incontinence [Joint Pain] : no joint pain [Skin: A Rash] : no rash: [Anxiety] : anxiety [Negative] : Heme/Lymph

## 2019-01-09 NOTE — ASSESSMENT
[FreeTextEntry1] : Ms. Shaw presents today with her family for follow up after recent RF for paroxysmal AFib. She is back in AFib, so we will schedule a JOÃO/DCCV for tomorrow as she is symptomatic. I have discussed the risks/benefits of a JOÃO/DCCV. I have asked her to continue her present medications including anticoagulation with Eliquis for her CHADS VASc score of 3 (HTN, Age, Female). I have also ordered Protonix 40mg daily for 1 month given her heartburn which is likely from extensive RF ablation. I have reinforced that this does not mean her ablation was unsuccessful but at the same time, she may require repeat ablation in the future. \par \par I have also advised the patient to go to the nearest emergency room if she experiences any chest pain, dyspnea, syncope, or has any other compelling symptoms.\par \par Follow up in 1 month.

## 2019-01-09 NOTE — HISTORY OF PRESENT ILLNESS
[Persistent Atrial Fibrillation] : persistent atrial fibrillation [FreeTextEntry1] : Cardiologist: Dr. 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 Patient states she was feeling well after her ablation for a few days and then went back into AFib. She has dyspnea and palpitations when she is in AFib. She has not been feeling as well as she did when she was in sinus rhythm. She also complains of heartburn since her ablation with a burning sensation. It has been getting better with time, but is still occurring. She denies any pain or tenderness in her groins. She denies any other cardiovascular complaints. No bleeding issues with Eliquis.

## 2019-01-10 ENCOUNTER — OUTPATIENT (OUTPATIENT)
Dept: OUTPATIENT SERVICES | Facility: HOSPITAL | Age: 74
LOS: 1 days | Discharge: HOME | End: 2019-01-10

## 2019-01-10 DIAGNOSIS — Z90.710 ACQUIRED ABSENCE OF BOTH CERVIX AND UTERUS: Chronic | ICD-10-CM

## 2019-01-10 DIAGNOSIS — Z98.890 OTHER SPECIFIED POSTPROCEDURAL STATES: Chronic | ICD-10-CM

## 2019-01-10 DIAGNOSIS — I77.0 ARTERIOVENOUS FISTULA, ACQUIRED: Chronic | ICD-10-CM

## 2019-01-10 RX ORDER — APIXABAN 2.5 MG/1
1 TABLET, FILM COATED ORAL
Qty: 0 | Refills: 0 | COMMUNITY

## 2019-01-10 RX ORDER — APIXABAN 2.5 MG/1
1 TABLET, FILM COATED ORAL
Qty: 0 | Refills: 0 | DISCHARGE
Start: 2019-01-10

## 2019-01-10 NOTE — PROCEDURE NOTE - SUPERVISORY STATEMENT
Patient presented in AFib and had successful cardioversion to NSR with one 200 J shock  Check 12 lead ECG  Continue Eliquis at 5mg PO BID  Follow up in office with me in 4 weeks

## 2019-01-10 NOTE — CHART NOTE - NSCHARTNOTEFT_GEN_A_CORE
PREOPERATIVE DAY OF PROCEDURE EVALUATION:  I have personally seen and examined the patient.  I agree with the history and physical which I have reviewed and noted any changes below.  (Signed electronically by Stephanie Rehman MD_)  01-10-19 @ 11:58

## 2019-01-10 NOTE — CHART NOTE - NSCHARTNOTEFT_GEN_A_CORE
RN TRANSFER / ANESTHESIA ADMISSION NOTE    __x__  Patent Airway    __x__  Full return of protective reflexes    __x__  Full recovery from anesthesia / back to baseline status  -    Mental Status:  __x__ Awake   ___x__ Alert   _____ Drowsy   _____ Sedated    Nausea/Vomiting:  __x__ NO  ______Yes,   See Post - Op Orders          Pain Scale (0-10):  _0____    Treatment: ____ None    ____ See Post - Op/PCA Orders    Post - Operative Fluids:   __x__ Oral   ____ See Post - Op Orders    Plan: Discharge:   _x___Home       _____Floor     _____Critical Care    _____  Other:_________________    Comments: Patient had smooth intraoperative event, no anesthesia complication.  PACU Vital signs: HR:   62         BP:  103      /    45      RR:    16         O2 Sat: 97      %     Temp 36

## 2019-01-10 NOTE — H&P CARDIOLOGY - HISTORY OF PRESENT ILLNESS
73y Female here for elective JOÃO/ DCCV for atrial fibrillation. Patient denies any chest pain or SOB. Patient is compliant with anticoagulant therapy.     EKG: Atrial Fibrillation at the rate of 91 bpm.     Physical Exam:    General: NAD  Neurology: A&Ox3, nonfocal  Eyes: PERRLA/ EOMI.  ENT/Neck: No JVD.   Respiratory: CTA B/L, No wheezing, rales, rhonchi  CV:  S1S2, irregular  Abdominal: Soft, NT, ND +BS.  Extremities: No edema, + peripheral pulses B/L. Irregularly irregular pulse.   Skin: No Rashes, Hematoma, Ecchymosis    Labs: reviewed 73y Female here for elective JOÃO/ DCCV for atrial fibrillation. Patient denies any chest pain or SOB. Patient is compliant with anticoagulant therapy.     EKG: Atrial Fibrillation at the rate of 91 bpm.     Physical Exam:    General: NAD  Neurology: A&Ox3, nonfocal  Eyes: PERRLA/ EOMI.  ENT/Neck: No JVD.   Respiratory: CTA B/L, No wheezing, rales, rhonchi  CV:  S1S2, irregularly irregular  Abdominal: Soft, NT, ND +BS.  Extremities: No edema, + peripheral pulses B/L. Irregularly irregular pulse.   Skin: No Rashes, Hematoma, Ecchymosis    Labs: reviewed

## 2019-01-10 NOTE — H&P CARDIOLOGY - ATTENDING COMMENTS
Patient with paroxysmal AFib on Eliquis s/p RFA in Dec and now recurrent AFib here for JOÃO/DCCV.    Continue Amio  Increase Eliquis to 5mg PO BID  Follow up with me in the office in 1 month

## 2019-01-10 NOTE — CHART NOTE - NSCHARTNOTEFT_GEN_A_CORE
TRANSESOPHAGEAL ECHOCARDIOGRAM     After risks and benefits of procedures were explained, informed consent was obtained and placed in chart.   The patient received topical anesthestic to the oropharynx with viscous lidocaine and benzocaine spray.  Refer to Anesthesia note for sedation details.  The JOÃO probe was passed into the esophagus without difficulty.  Transesophageal and transgastric images were obtained.  The JOÃO probe was removed without difficulty and examined.  There was no evidence for bleeding.  The patient tolerated the procedure well without any immediate JOÃO-related complications.      Preliminary Findings:  ANDREINA: Left atrial appendage was clear of clot and smoke. low ANDREINA velocities.   LV: LVEF was estimated at 50-55%  MV: mild-moderate MR, No evidence for MS.   AV: No evidence for AI, calcified and sclerosed aortic valve leaflets.    TV: trivial   There was mild, non-mobile class I atheroma seen in the thoracic aorta.     Patient successfully converted to sinus rhythm with synchronized 200 J of direct current cardioversion.    Final report to follow. TRANSESOPHAGEAL ECHOCARDIOGRAM     After risks and benefits of procedures were explained, informed consent was obtained and placed in chart.   The patient received topical anesthestic to the oropharynx with viscous lidocaine and benzocaine spray.  Refer to Anesthesia note for sedation details.  The JOÃO probe was passed into the esophagus without difficulty.  Transesophageal and transgastric images were obtained.  The JOÃO probe was removed without difficulty and examined.  There was no evidence for bleeding.  The patient tolerated the procedure well without any immediate JOÃO-related complications.      Preliminary Findings:  ANDREINA: Left atrial appendage was clear of clot and smoke. low ANDREINA velocities.   LV: LVEF was estimated at 50%  MV: mild-moderate MR, No evidence for MS.   AV: No evidence for AI, calcified and sclerosed aortic valve leaflets.    TV: trivial TR  There was mild, non-mobile class I atheroma seen in the thoracic aorta.     Patient successfully converted to sinus rhythm with synchronized 200 J of direct current cardioversion.    Final report to follow.

## 2019-01-12 ENCOUNTER — INPATIENT (INPATIENT)
Facility: HOSPITAL | Age: 74
LOS: 1 days | Discharge: HOME | End: 2019-01-14
Attending: INTERNAL MEDICINE | Admitting: INTERNAL MEDICINE

## 2019-01-12 VITALS
SYSTOLIC BLOOD PRESSURE: 164 MMHG | TEMPERATURE: 98 F | DIASTOLIC BLOOD PRESSURE: 102 MMHG | OXYGEN SATURATION: 91 % | HEART RATE: 86 BPM | RESPIRATION RATE: 18 BRPM

## 2019-01-12 DIAGNOSIS — Z98.890 OTHER SPECIFIED POSTPROCEDURAL STATES: Chronic | ICD-10-CM

## 2019-01-12 DIAGNOSIS — Z95.1 PRESENCE OF AORTOCORONARY BYPASS GRAFT: Chronic | ICD-10-CM

## 2019-01-12 DIAGNOSIS — I77.0 ARTERIOVENOUS FISTULA, ACQUIRED: Chronic | ICD-10-CM

## 2019-01-12 DIAGNOSIS — Z90.710 ACQUIRED ABSENCE OF BOTH CERVIX AND UTERUS: Chronic | ICD-10-CM

## 2019-01-12 LAB
ALBUMIN SERPL ELPH-MCNC: 3.8 G/DL — SIGNIFICANT CHANGE UP (ref 3.5–5.2)
ALP SERPL-CCNC: 55 U/L — SIGNIFICANT CHANGE UP (ref 30–115)
ALT FLD-CCNC: 10 U/L — SIGNIFICANT CHANGE UP (ref 0–41)
ANION GAP SERPL CALC-SCNC: 21 MMOL/L — HIGH (ref 7–14)
APTT BLD: 35.6 SEC — SIGNIFICANT CHANGE UP (ref 27–39.2)
AST SERPL-CCNC: 20 U/L — SIGNIFICANT CHANGE UP (ref 0–41)
BASOPHILS # BLD AUTO: 0.05 K/UL — SIGNIFICANT CHANGE UP (ref 0–0.2)
BASOPHILS NFR BLD AUTO: 0.6 % — SIGNIFICANT CHANGE UP (ref 0–1)
BILIRUB SERPL-MCNC: 0.5 MG/DL — SIGNIFICANT CHANGE UP (ref 0.2–1.2)
BUN SERPL-MCNC: 71 MG/DL — CRITICAL HIGH (ref 10–20)
CALCIUM SERPL-MCNC: 10.2 MG/DL — HIGH (ref 8.5–10.1)
CHLORIDE SERPL-SCNC: 92 MMOL/L — LOW (ref 98–110)
CO2 SERPL-SCNC: 24 MMOL/L — SIGNIFICANT CHANGE UP (ref 17–32)
CREAT SERPL-MCNC: 3.6 MG/DL — HIGH (ref 0.7–1.5)
EOSINOPHIL # BLD AUTO: 0.17 K/UL — SIGNIFICANT CHANGE UP (ref 0–0.7)
EOSINOPHIL NFR BLD AUTO: 2 % — SIGNIFICANT CHANGE UP (ref 0–8)
GLUCOSE SERPL-MCNC: 118 MG/DL — HIGH (ref 70–99)
HCT VFR BLD CALC: 31.6 % — LOW (ref 37–47)
HGB BLD-MCNC: 9.5 G/DL — LOW (ref 12–16)
IMM GRANULOCYTES NFR BLD AUTO: 0.3 % — SIGNIFICANT CHANGE UP (ref 0.1–0.3)
INR BLD: 2.03 RATIO — HIGH (ref 0.65–1.3)
LYMPHOCYTES # BLD AUTO: 0.51 K/UL — LOW (ref 1.2–3.4)
LYMPHOCYTES # BLD AUTO: 5.9 % — LOW (ref 20.5–51.1)
MAGNESIUM SERPL-MCNC: 2 MG/DL — SIGNIFICANT CHANGE UP (ref 1.8–2.4)
MCHC RBC-ENTMCNC: 26.2 PG — LOW (ref 27–31)
MCHC RBC-ENTMCNC: 30.1 G/DL — LOW (ref 32–37)
MCV RBC AUTO: 87.3 FL — SIGNIFICANT CHANGE UP (ref 81–99)
MONOCYTES # BLD AUTO: 1.01 K/UL — HIGH (ref 0.1–0.6)
MONOCYTES NFR BLD AUTO: 11.7 % — HIGH (ref 1.7–9.3)
NEUTROPHILS # BLD AUTO: 6.86 K/UL — HIGH (ref 1.4–6.5)
NEUTROPHILS NFR BLD AUTO: 79.5 % — HIGH (ref 42.2–75.2)
NRBC # BLD: 0 /100 WBCS — SIGNIFICANT CHANGE UP (ref 0–0)
NT-PROBNP SERPL-SCNC: HIGH PG/ML (ref 0–300)
PHOSPHATE SERPL-MCNC: 4 MG/DL — SIGNIFICANT CHANGE UP (ref 2.1–4.9)
PLATELET # BLD AUTO: 250 K/UL — SIGNIFICANT CHANGE UP (ref 130–400)
POTASSIUM SERPL-MCNC: 4.7 MMOL/L — SIGNIFICANT CHANGE UP (ref 3.5–5)
POTASSIUM SERPL-SCNC: 4.7 MMOL/L — SIGNIFICANT CHANGE UP (ref 3.5–5)
PROT SERPL-MCNC: 7.3 G/DL — SIGNIFICANT CHANGE UP (ref 6–8)
PROTHROM AB SERPL-ACNC: 23.2 SEC — HIGH (ref 9.95–12.87)
RBC # BLD: 3.62 M/UL — LOW (ref 4.2–5.4)
RBC # FLD: 16.7 % — HIGH (ref 11.5–14.5)
SODIUM SERPL-SCNC: 137 MMOL/L — SIGNIFICANT CHANGE UP (ref 135–146)
TROPONIN T SERPL-MCNC: 0.03 NG/ML — CRITICAL HIGH
WBC # BLD: 8.63 K/UL — SIGNIFICANT CHANGE UP (ref 4.8–10.8)
WBC # FLD AUTO: 8.63 K/UL — SIGNIFICANT CHANGE UP (ref 4.8–10.8)

## 2019-01-12 RX ORDER — FUROSEMIDE 40 MG
40 TABLET ORAL ONCE
Qty: 0 | Refills: 0 | Status: COMPLETED | OUTPATIENT
Start: 2019-01-12 | End: 2019-01-12

## 2019-01-12 RX ORDER — SODIUM POLYSTYRENE SULFONATE 4.1 MEQ/G
1 POWDER, FOR SUSPENSION ORAL
Qty: 0 | Refills: 0 | COMMUNITY

## 2019-01-12 NOTE — H&P ADULT - ASSESSMENT
Acute exacerbation of Heart Failure with reduced Ejection Fraction in the setting of Moderate Aortic Stenosis and Moderate Pulmonary Hypertension    Atrial Fibrillation status post radiofrequency ablation and recent cardioversion yesterday; currently NSR with PVCs, on Eliquis    Coronary Artery Disease, history of Myocardial Infarction, status post Coronary Artery Bypass Graft    Hypertension    Chronic Kidney Disease stage V not on hemodialysis; urine output    DVT prophylaxis; on Eliquis 73 year old female with a past medical history of Chronic Kidney Disease V, Hypertension, Coronary Artery Disease and history of Myocardial Infarction status post Coronary Artery Bypass Graft, Heart Failure with reduced Ejection Fraction, moderate Pulmonary Hypertension, moderate Aortic Valve stenosis, Atrial Fibrillation status post radiofrequency ablation and recent cardioversion yesterday presenting for shortness of breath of 1 month intermittent duration.    Acute exacerbation of Heart Failure with reduced Ejection Fraction in the setting of Moderate Aortic Stenosis and Moderate Pulmonary Hypertension  - Status post Lasix 40 IV once pending  - Will resume Lasix 40 IV bid  - Repeat TTE  - HOB 30-45, daily weight, fluid < 1.5L/day, aspiration precautions  - Cardiology consult  - Cannot add ACEI due to CKD IV  - Re-educate patient on discharge    Atrial Fibrillation status post radiofrequency ablation and recent cardioversion yesterday; currently NSR with PVCs, on Eliquis 5 bid, Amiodarone 100, Cardizem 180    Coronary Artery Disease, history of Myocardial Infarction, status post Coronary Artery Bypass Graft; Isosorbide mono, Metoprolol    Hypertension; resume home meds    Chronic Kidney Disease stage IV-V not on hemodialysis; urine output 4-5x/day    DVT prophylaxis; on Eliquis  Ambulate as tolerated  Diet; low sodium heart healthy  Full code

## 2019-01-12 NOTE — H&P ADULT - NSHPLABSRESULTS_GEN_ALL_CORE
Labs                        9.5    8.63  )-----------( 250      ( 12 Jan 2019 20:36 )             31.6     01-12  137  |  92<L>  |  71<HH>  ----------------------------<  118<H>  4.7   |  24  |  3.6<H>  Ca    10.2<H>      12 Jan 2019 20:36  Phos  4.0     01-12  Mg     2.0     01-12  TPro  7.3  /  Alb  3.8  /  TBili  0.5  /  DBili  x   /  AST  20  /  ALT  10  /  AlkPhos  55  01-12  PT/INR - ( 12 Jan 2019 20:36 )   PT: 23.20 sec;   INR: 2.03 ratio    PTT - ( 12 Jan 2019 20:36 )  PTT:35.6 sec    CARDIAC MARKERS ( 12 Jan 2019 20:36 )  Trop 0.03 ng/mL<HH> / CK x     / CKMB x       Pending repeat    Serum Pro-Brain Natriuretic Peptide (01.12.19 @ 20:36)    Serum Pro-Brain Natriuretic Peptide: 22067 pg/mL  Serum Pro-Brain Natriuretic Peptide (11.24.18 @ 20:02)    Serum Pro-Brain Natriuretic Peptide: 19986 pg/mL    EKG NSR with PVC    Radiology  CXR: Cardiomegaly, prior CABG, bilateral pulmonary vascular congestion. Pending official read    JOÃO 7/2018  Summary:   1. Left ventricular ejection fraction, by visual estimation, is 50 to   55%.   2. Thickening of the anterior and posterior mitral valve leaflets.   3. Mild tricuspid regurgitation.   4. Extensive ascending and descending aortic atheroma.   5. Atrial fibrillation, no thrombus,.   6. Recommend proceed with cardioversion.   7. There is mild aortic root calcification.    JOÃO 5/2018  Summary:   1. Left ventricular ejection fraction, by visual estimation, is 45 to   50%.   2. Mildly decreased global left ventricular systolic function.   3. Mild to moderate mitral valve regurgitation.   4. Mild-moderate tricuspid regurgitation.   5. Mild aortic regurgitation.   6. Estimated pulmonary artery systolic pressure is 50.1 mmHg assuming a   right atrial pressure of 10 mmHg, which is consistent with moderate   pulmonary hypertension.   7. Peak transaortic gradient equals 27.4 mmHg, mean transaortic gradient   equals 15.3 mmHg, the calculated aortic valve area equals 1.04 cm² by the   continuity equation consistent with moderate aortic stenosis.

## 2019-01-12 NOTE — ED PROVIDER NOTE - PROGRESS NOTE DETAILS
Patient to be admitted to an inpatient floor. Case discussed with and care endorsed to medical admitting resident. Case d/w Dr. Naylor

## 2019-01-12 NOTE — H&P ADULT - NSHPPHYSICALEXAM_GEN_ALL_CORE
Vital Signs Last 24 Hrs  T(C): 36.7 (12 Jan 2019 19:59), Max: 36.7 (12 Jan 2019 19:59)  T(F): 98 (12 Jan 2019 19:59), Max: 98 (12 Jan 2019 19:59)  HR: 86 (12 Jan 2019 19:59) (86 - 86)  BP: 164/102 (12 Jan 2019 19:59) (164/102 - 164/102)  BP(mean): --  RR: 18 (12 Jan 2019 19:59) (18 - 18)  SpO2: 91% (12 Jan 2019 19:59) (91% - 91%)    General:  HEENT:  Cardiac:  Pulm:  Abd:  Ext/MSK:  Skin:  Psych:  Neuro: Vital Signs Last 24 Hrs  T(C): 36.7 (12 Jan 2019 19:59), Max: 36.7 (12 Jan 2019 19:59)  T(F): 98 (12 Jan 2019 19:59), Max: 98 (12 Jan 2019 19:59)  HR: 86 (12 Jan 2019 19:59) (86 - 86)  BP: 164/102 (12 Jan 2019 19:59) (164/102 - 164/102)  BP(mean): --  RR: 18 (12 Jan 2019 19:59) (18 - 18)  SpO2: 91% (12 Jan 2019 19:59) (91% - 91%)    General: NAD, sitting up comfortably in bed  HEENT: NCAT, EOMI, strabismus right eye, PERRLA, non erythematous oropharynx, non exudative tonsils, uvula midline, neck supple, no JVD  Cardiac: S1 S2, RRR, systolic 3/6 murmur LUSB  Pulm: DAEB, audible bibasilar crackles R > L  Abd: +BS, soft, NTND  Ext/MSK: +2 PP b/l, +1 LLE b/l  Skin: warm  Psych: normal affect  Neuro: AAOx3, no focal deficits

## 2019-01-12 NOTE — ED PROVIDER NOTE - PLAN OF CARE
pt here for sob, no infectious complaints, on eliquis for a fib, s/p recent cardioversion. exam c/f chf- crackles and LE edema. will get labs, bnp, cxr

## 2019-01-12 NOTE — H&P ADULT - PSH
AV fistula    H/O abdominal hysterectomy    H/O cardiac radiofrequency ablation    S/P CABG (coronary artery bypass graft)

## 2019-01-12 NOTE — ED PROVIDER NOTE - MEDICAL DECISION MAKING DETAILS
pt here for dyspnea at rest, orthopnea, elevated bnp, cxr w/ congestion, trop 0.03. s/p lasix IV. will admit for chf

## 2019-01-12 NOTE — ED PROVIDER NOTE - CARE PLAN
Principal Discharge DX:	Congestive heart failure, unspecified HF chronicity, unspecified heart failure type  Assessment and plan of treatment:	pt here for sob, no infectious complaints, on eliquis for a fib, s/p recent cardioversion. exam c/f chf- crackles and LE edema. will get labs, bnp, cxr

## 2019-01-12 NOTE — ED PROVIDER NOTE - NS ED ROS FT
Constitutional: no fever or rigors  Eyes: no eye redness, acute visual change  ENMT: no ear pain, no throat pain  Card: no chest pain, no syncope  Pulm: no cough, pos shortness of breath  GI: no abdominal pain, nausea or vomiting  : no dysuria or hematuria  MSK: no limitation in range of motion, no neck pain  Skin: no rash, no abrasion  Neuro: no numbness, no weakness  Heme/Onc: no easy bruising, no bleeding tendency   Allergic: no hives, no throat swelling

## 2019-01-12 NOTE — H&P ADULT - PMH
Aortic stenosis, moderate    Atrial fibrillation  status post cardioversion  CAD (coronary artery disease)    CKD (chronic kidney disease)  stage 5, no dialysis  Heart failure with reduced ejection fraction    HTN (hypertension)    Kidney stone    MI (myocardial infarction)    Pulmonary hypertension, moderate to severe Aortic stenosis, moderate    Atrial fibrillation  status post cardioversion  CAD (coronary artery disease)    CKD (chronic kidney disease)  stage 5, no dialysis  Degenerative joint disease    Diverticulosis    GERD (gastroesophageal reflux disease)    Heart failure with reduced ejection fraction    HTN (hypertension)    Kidney stone    MI (myocardial infarction)    Osteoarthritis    Pulmonary hypertension, moderate to severe    Thyroid nodule

## 2019-01-12 NOTE — ED ADULT NURSE NOTE - OBJECTIVE STATEMENT
Patient reports she has had intermittent dyspnia on excursion and sob for the last few months with no fevers.

## 2019-01-12 NOTE — H&P ADULT - HISTORY OF PRESENT ILLNESS
73 year old female with a past medical history of Chronic Kidney Disease V, Hypertension, Coronary Artery Disease and history of Myocardial Infarction status post Coronary Artery Bypass Graft, Heart Failure with reduced Ejection Fraction, moderate Pulmonary Hypertension, moderate Aortic Valve stenosis, Atrial Fibrillation status post radiofrequency ablation and recent cardioversion yesterday presenting for shortness of breath.  Denies fevers, chills, chest pain, cough, diaphoresis, lower extremity pain, recent travels, prolonged immobilization. 73 year old female with a past medical history of Chronic Kidney Disease V, Hypertension, Coronary Artery Disease and history of Myocardial Infarction status post Coronary Artery Bypass Graft, Heart Failure with reduced Ejection Fraction, moderate Pulmonary Hypertension, moderate Aortic Valve stenosis, Atrial Fibrillation status post radiofrequency ablation and recent cardioversion yesterday presenting for shortness of breath of 1 month intermittent duration.  Patient reports that her Nephrologist prescribed Lasix 40 mg twice a day around two weeks which improved her shortness of breath symptoms however she started having a cough for the past week and tonight her symptoms got worse with difficulty breathing, unable to lie on her usual 2 pillows and increased lower extremity swelling. Denies fevers, chills, chest pain, diaphoresis, lower extremity pain, recent travels, prolonged immobilization, gaining weight.

## 2019-01-12 NOTE — ED PROVIDER NOTE - OBJECTIVE STATEMENT
73y Female with PMH CKD, CAD, HTN, and A-fib on eliquis here for SOB. pt states she has had increasing SOB today. pt states she cannot lay flat. pt endorsing SOB at rest. pt endorsing increased LE edema. no chest pain. no fever/chills/cough. pt s/p cardioversion for parox afib yesterday.

## 2019-01-12 NOTE — ED ADULT NURSE NOTE - NSIMPLEMENTINTERV_GEN_ALL_ED
Implemented All Universal Safety Interventions:  [place] to call system. Call bell, personal items and telephone within reach. Instruct patient to call for assistance. Room bathroom lighting operational. Non-slip footwear when patient is off stretcher. Physically safe environment: no spills, clutter or unnecessary equipment. Stretcher in lowest position, wheels locked, appropriate side rails in place.

## 2019-01-12 NOTE — ED PROVIDER NOTE - PHYSICAL EXAMINATION
PHYSICAL EXAM:    Constitutional: awake, alert, NAD  Eyes: EOMI, no conj injection  Respiratory: no respiratory distress, crackles to b/l mid lungs, satting 91% on 2L  Cardiovascular: RRR nml S1S2  Gastrointestinal: soft, no masses, nontender, nondistended. No guarding or rebound.   Extremities: 1+ edema to mid calves b/l  Neurological: AAOx3, CN II-XII grossly intact, no focal numbness or weakness  Skin: no rash  Musculoskeletal: no gross deformity

## 2019-01-13 PROBLEM — I48.91 UNSPECIFIED ATRIAL FIBRILLATION: Chronic | Status: ACTIVE | Noted: 2018-05-07

## 2019-01-13 LAB
ALBUMIN SERPL ELPH-MCNC: 4 G/DL — SIGNIFICANT CHANGE UP (ref 3.5–5.2)
ALP SERPL-CCNC: 52 U/L — SIGNIFICANT CHANGE UP (ref 30–115)
ALT FLD-CCNC: 8 U/L — SIGNIFICANT CHANGE UP (ref 0–41)
ANION GAP SERPL CALC-SCNC: 17 MMOL/L — HIGH (ref 7–14)
AST SERPL-CCNC: 10 U/L — SIGNIFICANT CHANGE UP (ref 0–41)
BASOPHILS # BLD AUTO: 0.04 K/UL — SIGNIFICANT CHANGE UP (ref 0–0.2)
BASOPHILS NFR BLD AUTO: 0.5 % — SIGNIFICANT CHANGE UP (ref 0–1)
BILIRUB SERPL-MCNC: 0.6 MG/DL — SIGNIFICANT CHANGE UP (ref 0.2–1.2)
BUN SERPL-MCNC: 69 MG/DL — CRITICAL HIGH (ref 10–20)
CALCIUM SERPL-MCNC: 10.1 MG/DL — SIGNIFICANT CHANGE UP (ref 8.5–10.1)
CHLORIDE SERPL-SCNC: 98 MMOL/L — SIGNIFICANT CHANGE UP (ref 98–110)
CK MB CFR SERPL CALC: 1.4 NG/ML — SIGNIFICANT CHANGE UP (ref 0.6–6.3)
CK SERPL-CCNC: 19 U/L — SIGNIFICANT CHANGE UP (ref 0–225)
CO2 SERPL-SCNC: 26 MMOL/L — SIGNIFICANT CHANGE UP (ref 17–32)
CREAT SERPL-MCNC: 3.6 MG/DL — HIGH (ref 0.7–1.5)
EOSINOPHIL # BLD AUTO: 0.18 K/UL — SIGNIFICANT CHANGE UP (ref 0–0.7)
EOSINOPHIL NFR BLD AUTO: 2.4 % — SIGNIFICANT CHANGE UP (ref 0–8)
GLUCOSE SERPL-MCNC: 117 MG/DL — HIGH (ref 70–99)
HCT VFR BLD CALC: 31.5 % — LOW (ref 37–47)
HGB BLD-MCNC: 9.2 G/DL — LOW (ref 12–16)
IMM GRANULOCYTES NFR BLD AUTO: 0.4 % — HIGH (ref 0.1–0.3)
LYMPHOCYTES # BLD AUTO: 0.43 K/UL — LOW (ref 1.2–3.4)
LYMPHOCYTES # BLD AUTO: 5.9 % — LOW (ref 20.5–51.1)
MAGNESIUM SERPL-MCNC: 2 MG/DL — SIGNIFICANT CHANGE UP (ref 1.8–2.4)
MCHC RBC-ENTMCNC: 25.8 PG — LOW (ref 27–31)
MCHC RBC-ENTMCNC: 29.2 G/DL — LOW (ref 32–37)
MCV RBC AUTO: 88.5 FL — SIGNIFICANT CHANGE UP (ref 81–99)
MONOCYTES # BLD AUTO: 1 K/UL — HIGH (ref 0.1–0.6)
MONOCYTES NFR BLD AUTO: 13.6 % — HIGH (ref 1.7–9.3)
NEUTROPHILS # BLD AUTO: 5.67 K/UL — SIGNIFICANT CHANGE UP (ref 1.4–6.5)
NEUTROPHILS NFR BLD AUTO: 77.2 % — HIGH (ref 42.2–75.2)
NRBC # BLD: 0 /100 WBCS — SIGNIFICANT CHANGE UP (ref 0–0)
PLATELET # BLD AUTO: 256 K/UL — SIGNIFICANT CHANGE UP (ref 130–400)
POTASSIUM SERPL-MCNC: 3.8 MMOL/L — SIGNIFICANT CHANGE UP (ref 3.5–5)
POTASSIUM SERPL-SCNC: 3.8 MMOL/L — SIGNIFICANT CHANGE UP (ref 3.5–5)
PROT SERPL-MCNC: 7 G/DL — SIGNIFICANT CHANGE UP (ref 6–8)
RBC # BLD: 3.56 M/UL — LOW (ref 4.2–5.4)
RBC # FLD: 16.6 % — HIGH (ref 11.5–14.5)
SODIUM SERPL-SCNC: 141 MMOL/L — SIGNIFICANT CHANGE UP (ref 135–146)
TROPONIN T SERPL-MCNC: 0.03 NG/ML — CRITICAL HIGH
WBC # BLD: 7.35 K/UL — SIGNIFICANT CHANGE UP (ref 4.8–10.8)
WBC # FLD AUTO: 7.35 K/UL — SIGNIFICANT CHANGE UP (ref 4.8–10.8)

## 2019-01-13 RX ORDER — FUROSEMIDE 40 MG
40 TABLET ORAL EVERY 12 HOURS
Qty: 0 | Refills: 0 | Status: DISCONTINUED | OUTPATIENT
Start: 2019-01-13 | End: 2019-01-14

## 2019-01-13 RX ORDER — METOPROLOL TARTRATE 50 MG
50 TABLET ORAL
Qty: 0 | Refills: 0 | Status: DISCONTINUED | OUTPATIENT
Start: 2019-01-13 | End: 2019-01-14

## 2019-01-13 RX ORDER — CALCITRIOL 0.5 UG/1
0.5 CAPSULE ORAL DAILY
Qty: 0 | Refills: 0 | Status: DISCONTINUED | OUTPATIENT
Start: 2019-01-13 | End: 2019-01-14

## 2019-01-13 RX ORDER — APIXABAN 2.5 MG/1
5 TABLET, FILM COATED ORAL EVERY 12 HOURS
Qty: 0 | Refills: 0 | Status: DISCONTINUED | OUTPATIENT
Start: 2019-01-13 | End: 2019-01-14

## 2019-01-13 RX ORDER — ALLOPURINOL 300 MG
100 TABLET ORAL DAILY
Qty: 0 | Refills: 0 | Status: DISCONTINUED | OUTPATIENT
Start: 2019-01-13 | End: 2019-01-14

## 2019-01-13 RX ORDER — AMIODARONE HYDROCHLORIDE 400 MG/1
200 TABLET ORAL DAILY
Qty: 0 | Refills: 0 | Status: DISCONTINUED | OUTPATIENT
Start: 2019-01-13 | End: 2019-01-14

## 2019-01-13 RX ORDER — ISOSORBIDE MONONITRATE 60 MG/1
30 TABLET, EXTENDED RELEASE ORAL DAILY
Qty: 0 | Refills: 0 | Status: DISCONTINUED | OUTPATIENT
Start: 2019-01-13 | End: 2019-01-14

## 2019-01-13 RX ORDER — PREGABALIN 225 MG/1
1000 CAPSULE ORAL DAILY
Qty: 0 | Refills: 0 | Status: DISCONTINUED | OUTPATIENT
Start: 2019-01-13 | End: 2019-01-14

## 2019-01-13 RX ORDER — ISOSORBIDE MONONITRATE 60 MG/1
30 TABLET, EXTENDED RELEASE ORAL AT BEDTIME
Qty: 0 | Refills: 0 | Status: DISCONTINUED | OUTPATIENT
Start: 2019-01-13 | End: 2019-01-14

## 2019-01-13 RX ORDER — DILTIAZEM HCL 120 MG
180 CAPSULE, EXT RELEASE 24 HR ORAL DAILY
Qty: 0 | Refills: 0 | Status: DISCONTINUED | OUTPATIENT
Start: 2019-01-13 | End: 2019-01-14

## 2019-01-13 RX ORDER — FAMOTIDINE 10 MG/ML
40 INJECTION INTRAVENOUS
Qty: 0 | Refills: 0 | Status: DISCONTINUED | OUTPATIENT
Start: 2019-01-13 | End: 2019-01-14

## 2019-01-13 RX ADMIN — ISOSORBIDE MONONITRATE 30 MILLIGRAM(S): 60 TABLET, EXTENDED RELEASE ORAL at 12:17

## 2019-01-13 RX ADMIN — Medication 50 MILLIGRAM(S): at 17:56

## 2019-01-13 RX ADMIN — Medication 180 MILLIGRAM(S): at 12:19

## 2019-01-13 RX ADMIN — PREGABALIN 1000 MICROGRAM(S): 225 CAPSULE ORAL at 12:17

## 2019-01-13 RX ADMIN — Medication 100 MILLIGRAM(S): at 12:16

## 2019-01-13 RX ADMIN — CALCITRIOL 0.5 MICROGRAM(S): 0.5 CAPSULE ORAL at 12:17

## 2019-01-13 RX ADMIN — Medication 40 MILLIGRAM(S): at 00:07

## 2019-01-13 RX ADMIN — Medication 40 MILLIGRAM(S): at 17:56

## 2019-01-13 RX ADMIN — ISOSORBIDE MONONITRATE 30 MILLIGRAM(S): 60 TABLET, EXTENDED RELEASE ORAL at 21:48

## 2019-01-13 RX ADMIN — AMIODARONE HYDROCHLORIDE 200 MILLIGRAM(S): 400 TABLET ORAL at 12:17

## 2019-01-13 RX ADMIN — FAMOTIDINE 40 MILLIGRAM(S): 10 INJECTION INTRAVENOUS at 17:56

## 2019-01-13 RX ADMIN — APIXABAN 5 MILLIGRAM(S): 2.5 TABLET, FILM COATED ORAL at 17:56

## 2019-01-13 RX ADMIN — Medication 50 MILLIGRAM(S): at 11:55

## 2019-01-13 RX ADMIN — Medication 40 MILLIGRAM(S): at 06:16

## 2019-01-13 NOTE — CONSULT NOTE ADULT - SUBJECTIVE AND OBJECTIVE BOX
Patient is a 73y old  Female who presents with a chief complaint of shortness of breath of 1 month intermittent duration (12 Jan 2019 23:21)      HPI:  73 year old female with a past medical history of Chronic Kidney Disease V, Hypertension, Coronary Artery Disease and history of Myocardial Infarction status post Coronary Artery Bypass Graft, Heart Failure with reduced Ejection Fraction, moderate Pulmonary Hypertension, moderate Aortic Valve stenosis, Atrial Fibrillation status post radiofrequency ablation and recent cardioversion yesterday presenting for shortness of breath of 1 month intermittent duration.  Patient reports that her Nephrologist prescribed Lasix 40 mg twice a day around two weeks which improved her shortness of breath symptoms however she started having a cough for the past week and tonight her symptoms got worse with difficulty breathing, unable to lie on her usual 2 pillows and increased lower extremity swelling. Denies fevers, chills, chest pain, diaphoresis, lower extremity pain, recent travels, prolonged immobilization, gaining weight. (12 Jan 2019 23:21)      PAST MEDICAL & SURGICAL HISTORY:  Thyroid nodule  Degenerative joint disease  Osteoarthritis  Diverticulosis  GERD (gastroesophageal reflux disease)  Heart failure with reduced ejection fraction  Pulmonary hypertension, moderate to severe  Aortic stenosis, moderate  Atrial fibrillation: status post cardioversion  CKD (chronic kidney disease): stage 5, no dialysis  MI (myocardial infarction)  CAD (coronary artery disease)  Kidney stone  HTN (hypertension)  S/P CABG (coronary artery bypass graft)  AV fistula  H/O abdominal hysterectomy  H/O cardiac radiofrequency ablation      PREVIOUS DIAGNOSTIC TESTING:      ECHO  FINDINGS:    STRESS  FINDINGS:    CATHETERIZATION  FINDINGS:    MEDICATIONS  (STANDING):  furosemide   Injectable 40 milliGRAM(s) IV Push every 12 hours    MEDICATIONS  (PRN):      FAMILY HISTORY:  Family history of lung cancer (Father)  Family history of CHF (congestive heart failure) (Mother)      SOCIAL HISTORY:  CIGARETTES:    ALCOHOL:    REVIEW OF SYSTEMS:  CONSTITUTIONAL: No fever, weight loss, or fatigue  NECK: No pain or stiffness  RESPIRATORY: No cough, wheezing, chills or hemoptysis; No shortness of breath  CARDIOVASCULAR: No chest pain, palpitations, dizziness, or leg swelling  GASTROINTESTINAL: No abdominal or epigastric pain. No nausea, vomiting, or hematemesis; No diarrhea or constipation. No melena or hematochezia.  GENITOURINARY: No dysuria, frequency, hematuria, or incontinence  NEUROLOGICAL: No headaches, memory loss, loss of strength, numbness, or tremors  SKIN: No itching, burning, rashes, or lesions   ENDOCRINE: No heat or cold intolerance; No hair loss  MUSCULOSKELETAL: No joint pain or swelling; No muscle, back, or extremity pain  HEME/LYMPH: No easy bruising, or bleeding gums          Vital Signs Last 24 Hrs  T(C): 36.2 (13 Jan 2019 08:46), Max: 36.7 (12 Jan 2019 19:59)  T(F): 97.2 (13 Jan 2019 08:46), Max: 98 (12 Jan 2019 19:59)  HR: 96 (13 Jan 2019 08:46) (71 - 96)  BP: 140/60 (13 Jan 2019 08:46) (140/60 - 164/102)  BP(mean): --  RR: 20 (13 Jan 2019 08:46) (18 - 20)  SpO2: 98% (13 Jan 2019 08:46) (91% - 98%)        PHYSICAL EXAM:  GENERAL: NAD, well-groomed, well-developed  HEAD:  Atraumatic, Normocephalic  NECK: Supple, No JVD, Normal thyroid  NERVOUS SYSTEM:  Alert & Oriented X3, Good concentration  CHEST/LUNG: Clear to percussion bilaterally; No rales, rhonchi, wheezing, or rubs  HEART: Regular rate and rhythm; No murmurs, rubs, or gallops  ABDOMEN: Soft, Nontender, Nondistended; Bowel sounds present  EXTREMITIES:  2+ Peripheral Pulses, No clubbing, cyanosis, or edema  SKIN: No rashes or lesions    INTERPRETATION OF TELEMETRY:    ECG:    I&O's Detail      LABS:                        9.2    7.35  )-----------( 256      ( 13 Jan 2019 07:56 )             31.5     01-13    141  |  98  |  69<HH>  ----------------------------<  117<H>  3.8   |  26  |  3.6<H>    Ca    10.1      13 Jan 2019 07:56  Phos  4.0     01-12  Mg     2.0     01-13    TPro  7.0  /  Alb  4.0  /  TBili  0.6  /  DBili  x   /  AST  10  /  ALT  8   /  AlkPhos  52  01-13    CARDIAC MARKERS ( 13 Jan 2019 07:56 )  x     / 0.03 ng/mL / 19 U/L / x     / 1.4 ng/mL  CARDIAC MARKERS ( 12 Jan 2019 20:36 )  x     / 0.03 ng/mL / x     / x     / x          PT/INR - ( 12 Jan 2019 20:36 )   PT: 23.20 sec;   INR: 2.03 ratio         PTT - ( 12 Jan 2019 20:36 )  PTT:35.6 sec    I&O's Summary      RADIOLOGY & ADDITIONAL STUDIES:

## 2019-01-13 NOTE — CONSULT NOTE ADULT - ASSESSMENT
history mi cad cabg    afib post ablation  on eliquis   presents   with CHF     cont diuresis iv lasix

## 2019-01-13 NOTE — PROGRESS NOTE ADULT - ASSESSMENT
#Acute exacerbation of Heart Failure with reduced Ejection Fraction in the setting of Moderate Aortic Stenosis and Moderate Pulmonary Hypertension;  - Status post Lasix 40 IV once   - cw Lasix 40 IV bid  - follow TTE  - HOB 30-45, daily weight, fluid < 1.5L/day, aspiration precautions  - Cardiology consult with Dr Dash  not compliant with low salt diet   +trops ,down trending ,no active CP or new EKG changes    #Atrial Fibrillation status post radiofrequency ablation and recent cardioversion ;   currently NSR with PVCs,   on Eliquis 5 bid, Amiodarone 100, Cardizem 180    #Coronary Artery Disease, history of Myocardial Infarction, status post Coronary Artery Bypass Graft;   Isosorbide mono, Metoprolol home dose    #Hypertension;   cw home meds  controlled    #Chronic Kidney Disease stage IV-V not on hemodialysis;  stable at baseline      DVT prophylaxis; on Eliquis  Ambulate as tolerated  Diet; low sodium heart healthy  Full code #Acute exacerbation of Heart Failure with reduced Ejection Fraction in the setting of Moderate Aortic Stenosis and Moderate Pulmonary Hypertension;  - Status post Lasix 40 IV once in ER  - cw Lasix 40 IV bid  consider switching to PO tomorrow  - follow TTE  - HOB 30-45, daily weight, fluid < 1.5L/day, aspiration precautions  - Cardiology consult with Dr Dash  not compliant with low salt diet   +trops ,down trending ,no active CP or new EKG changes    #Atrial Fibrillation status post radiofrequency ablation and recent cardioversion ;   currently NSR with PVCs,   on Eliquis 5 bid, Amiodarone 100, Cardizem 180    #Coronary Artery Disease, history of Myocardial Infarction, status post Coronary Artery Bypass Graft;   Isosorbide mono, Metoprolol home dose    #Hypertension;   cw home meds  controlled    #Chronic Kidney Disease stage IV-V not on hemodialysis;  stable at baseline      DVT prophylaxis; on Eliquis  Ambulate as tolerated  Diet; low sodium heart healthy  Full code    #DISPO: after medical optimization

## 2019-01-13 NOTE — PROGRESS NOTE ADULT - SUBJECTIVE AND OBJECTIVE BOX
LENGTH OF HOSPITAL STAY: 1d    CHIEF COMPLAINT:   Patient is a 73y old  Female who presents with a chief complaint of shortness of breath of 1 month intermittent duration (13 Jan 2019 10:27)    EVENTS OVERNIGHT:   no issues   on RA now   much comfortable      HISTORY OF PRESENTING ILLNESS:    HPI:  73 year old female with a past medical history of Chronic Kidney Disease V, Hypertension, Coronary Artery Disease and history of Myocardial Infarction status post Coronary Artery Bypass Graft, Heart Failure with reduced Ejection Fraction, moderate Pulmonary Hypertension, moderate Aortic Valve stenosis, Atrial Fibrillation status post radiofrequency ablation and recent cardioversion yesterday presenting for shortness of breath of 1 month intermittent duration.  Patient reports that her Nephrologist prescribed Lasix 40 mg twice a day around two weeks which improved her shortness of breath symptoms however she started having a cough for the past week and tonight her symptoms got worse with difficulty breathing, unable to lie on her usual 2 pillows and increased lower extremity swelling. Denies fevers, chills, chest pain, diaphoresis, lower extremity pain, recent travels, prolonged immobilization, gaining weight. (12 Jan 2019 23:21)    PAST MEDICAL & SURGICAL HISTORY  PAST MEDICAL & SURGICAL HISTORY:  Thyroid nodule  Degenerative joint disease  Osteoarthritis  Diverticulosis  GERD (gastroesophageal reflux disease)  Heart failure with reduced ejection fraction  Pulmonary hypertension, moderate to severe  Aortic stenosis, moderate  Atrial fibrillation: status post cardioversion  CKD (chronic kidney disease): stage 5, no dialysis  MI (myocardial infarction)  CAD (coronary artery disease)  Kidney stone  HTN (hypertension)  S/P CABG (coronary artery bypass graft)  AV fistula  H/O abdominal hysterectomy  H/O cardiac radiofrequency ablation    SOCIAL HISTORY:    ALLERGIES:  No Known Allergies    MEDICATIONS:  STANDING MEDICATIONS  allopurinol 100 milliGRAM(s) Oral daily  amiodarone    Tablet 200 milliGRAM(s) Oral daily  apixaban 5 milliGRAM(s) Oral every 12 hours  calcitriol   Capsule 0.5 MICROGram(s) Oral daily  cyanocobalamin 1000 MICROGram(s) Oral daily  diltiazem    milliGRAM(s) Oral daily  famotidine    Tablet 40 milliGRAM(s) Oral two times a day  furosemide   Injectable 40 milliGRAM(s) IV Push every 12 hours  isosorbide   mononitrate ER Tablet (IMDUR) 30 milliGRAM(s) Oral daily  isosorbide   mononitrate ER Tablet (IMDUR) 30 milliGRAM(s) Oral at bedtime  metoprolol tartrate 50 milliGRAM(s) Oral two times a day    PRN MEDICATIONS    VITALS:   T(F): 97.2  HR: 96  BP: 140/60  RR: 20  SpO2: 94%    LABS:                        9.2    7.35  )-----------( 256      ( 13 Jan 2019 07:56 )             31.5     01-13    141  |  98  |  69<HH>  ----------------------------<  117<H>  3.8   |  26  |  3.6<H>    Ca    10.1      13 Jan 2019 07:56  Phos  4.0     01-12  Mg     2.0     01-13    TPro  7.0  /  Alb  4.0  /  TBili  0.6  /  DBili  x   /  AST  10  /  ALT  8   /  AlkPhos  52  01-13    PT/INR - ( 12 Jan 2019 20:36 )   PT: 23.20 sec;   INR: 2.03 ratio         PTT - ( 12 Jan 2019 20:36 )  PTT:35.6 sec      Creatine Kinase, Serum: 19 U/L (01-13-19 @ 07:56)  Troponin T, Serum: 0.03 ng/mL <HH> (01-13-19 @ 07:56)  Troponin T, Serum: 0.03 ng/mL <HH> (01-12-19 @ 20:36)      CARDIAC MARKERS ( 13 Jan 2019 07:56 )  x     / 0.03 ng/mL / 19 U/L / x     / 1.4 ng/mL  CARDIAC MARKERS ( 12 Jan 2019 20:36 )  x     / 0.03 ng/mL / x     / x     / x        < from: 12 Lead ECG (01.12.19 @ 21:15) >  Diagnosis Line Sinus rhythm with occasional Premature ventricular complexes  Cannot rule out Inferior infarct , age undetermined  Abnormal ECG    < end of copied text >    RADIOLOGY:  < from: Xray Chest 2 Views PA/Lat (01.12.19 @ 22:30) >  Impression:      Increased bilateral interstitial opacities with superimposed airspace   opacity at the right lung base. Bilateral pleural effusions. Findings are   suggestive offluid overload but infection is not excluded. Recommend   clinical correlation and continued follow-up.        < end of copied text >    < from: Transthoracic Echocardiogram (01.13.19 @ 10:53) >  Summary:   1. Left ventricular ejection fraction, by visual estimation, is 45 to   50%.   2. Mildly increased LV wall thickness.   3. Mild to moderate mitral valve regurgitation.   4. Thickening and calcification of the anterior and posterior mitral   valve leaflets.   5. Mild-moderate tricuspid regurgitation.   6. Mild aortic regurgitation.   7. Estimated pulmonary artery systolic pressure is 60.9 mmHg assuming a   right atrial pressure of 15 mmHg, which is consistent with severe   pulmonary hypertension.   8. Peak transaortic gradient is 32.7 mmHg, mean transaortic gradient   equals 15.5 mmHg, the calculated aortic valve area equals 1.39 cm² by the   continuity equation consistent with moderate aortic stenosis.    < end of copied text >    PHYSICAL EXAM:  GEN: No acute distress  HEENT: within normal range  LUNGS: Clear to auscultation bilaterally   HEART: S1/S2 present. RRR.   ABD: Soft, non-tender, non-distended. Bowel sounds present  EXT: + LE edema   NEURO: AAOX3  non focal

## 2019-01-14 ENCOUNTER — TRANSCRIPTION ENCOUNTER (OUTPATIENT)
Age: 74
End: 2019-01-14

## 2019-01-14 VITALS — WEIGHT: 164.91 LBS

## 2019-01-14 DIAGNOSIS — Z02.9 ENCOUNTER FOR ADMINISTRATIVE EXAMINATIONS, UNSPECIFIED: ICD-10-CM

## 2019-01-14 LAB
ALBUMIN SERPL ELPH-MCNC: 3.8 G/DL — SIGNIFICANT CHANGE UP (ref 3.5–5.2)
ALP SERPL-CCNC: 54 U/L — SIGNIFICANT CHANGE UP (ref 30–115)
ALT FLD-CCNC: 7 U/L — SIGNIFICANT CHANGE UP (ref 0–41)
ANION GAP SERPL CALC-SCNC: 18 MMOL/L — HIGH (ref 7–14)
AST SERPL-CCNC: 10 U/L — SIGNIFICANT CHANGE UP (ref 0–41)
BASOPHILS # BLD AUTO: 0.05 K/UL — SIGNIFICANT CHANGE UP (ref 0–0.2)
BASOPHILS NFR BLD AUTO: 0.7 % — SIGNIFICANT CHANGE UP (ref 0–1)
BILIRUB SERPL-MCNC: 0.7 MG/DL — SIGNIFICANT CHANGE UP (ref 0.2–1.2)
BUN SERPL-MCNC: 67 MG/DL — CRITICAL HIGH (ref 10–20)
CALCIUM SERPL-MCNC: 10.1 MG/DL — SIGNIFICANT CHANGE UP (ref 8.5–10.1)
CHLORIDE SERPL-SCNC: 96 MMOL/L — LOW (ref 98–110)
CK MB CFR SERPL CALC: 1.1 NG/ML — SIGNIFICANT CHANGE UP (ref 0.6–6.3)
CK MB CFR SERPL CALC: 1.2 NG/ML — SIGNIFICANT CHANGE UP (ref 0.6–6.3)
CK SERPL-CCNC: 16 U/L — SIGNIFICANT CHANGE UP (ref 0–225)
CK SERPL-CCNC: 18 U/L — SIGNIFICANT CHANGE UP (ref 0–225)
CO2 SERPL-SCNC: 29 MMOL/L — SIGNIFICANT CHANGE UP (ref 17–32)
CREAT SERPL-MCNC: 3.2 MG/DL — HIGH (ref 0.7–1.5)
EOSINOPHIL # BLD AUTO: 0.19 K/UL — SIGNIFICANT CHANGE UP (ref 0–0.7)
EOSINOPHIL NFR BLD AUTO: 2.6 % — SIGNIFICANT CHANGE UP (ref 0–8)
GLUCOSE SERPL-MCNC: 117 MG/DL — HIGH (ref 70–99)
HCT VFR BLD CALC: 32.5 % — LOW (ref 37–47)
HGB BLD-MCNC: 9.7 G/DL — LOW (ref 12–16)
IMM GRANULOCYTES NFR BLD AUTO: 0.3 % — SIGNIFICANT CHANGE UP (ref 0.1–0.3)
LYMPHOCYTES # BLD AUTO: 0.54 K/UL — LOW (ref 1.2–3.4)
LYMPHOCYTES # BLD AUTO: 7.5 % — LOW (ref 20.5–51.1)
MAGNESIUM SERPL-MCNC: 1.8 MG/DL — SIGNIFICANT CHANGE UP (ref 1.8–2.4)
MCHC RBC-ENTMCNC: 26.4 PG — LOW (ref 27–31)
MCHC RBC-ENTMCNC: 29.8 G/DL — LOW (ref 32–37)
MCV RBC AUTO: 88.3 FL — SIGNIFICANT CHANGE UP (ref 81–99)
MONOCYTES # BLD AUTO: 1.08 K/UL — HIGH (ref 0.1–0.6)
MONOCYTES NFR BLD AUTO: 15 % — HIGH (ref 1.7–9.3)
NEUTROPHILS # BLD AUTO: 5.34 K/UL — SIGNIFICANT CHANGE UP (ref 1.4–6.5)
NEUTROPHILS NFR BLD AUTO: 73.9 % — SIGNIFICANT CHANGE UP (ref 42.2–75.2)
NRBC # BLD: 0 /100 WBCS — SIGNIFICANT CHANGE UP (ref 0–0)
PLATELET # BLD AUTO: 270 K/UL — SIGNIFICANT CHANGE UP (ref 130–400)
POTASSIUM SERPL-MCNC: 3.8 MMOL/L — SIGNIFICANT CHANGE UP (ref 3.5–5)
POTASSIUM SERPL-SCNC: 3.8 MMOL/L — SIGNIFICANT CHANGE UP (ref 3.5–5)
PROT SERPL-MCNC: 7.1 G/DL — SIGNIFICANT CHANGE UP (ref 6–8)
RBC # BLD: 3.68 M/UL — LOW (ref 4.2–5.4)
RBC # FLD: 16.3 % — HIGH (ref 11.5–14.5)
SODIUM SERPL-SCNC: 143 MMOL/L — SIGNIFICANT CHANGE UP (ref 135–146)
TROPONIN T SERPL-MCNC: 0.02 NG/ML — HIGH
TROPONIN T SERPL-MCNC: 0.02 NG/ML — HIGH
WBC # BLD: 7.22 K/UL — SIGNIFICANT CHANGE UP (ref 4.8–10.8)
WBC # FLD AUTO: 7.22 K/UL — SIGNIFICANT CHANGE UP (ref 4.8–10.8)

## 2019-01-14 RX ORDER — SODIUM POLYSTYRENE SULFONATE 4.1 MEQ/G
1 POWDER, FOR SUSPENSION ORAL
Qty: 0 | Refills: 0 | COMMUNITY

## 2019-01-14 RX ORDER — FUROSEMIDE 40 MG
1 TABLET ORAL
Qty: 0 | Refills: 0 | COMMUNITY

## 2019-01-14 RX ORDER — SPIRONOLACTONE 25 MG/1
1 TABLET, FILM COATED ORAL
Qty: 30 | Refills: 0 | OUTPATIENT
Start: 2019-01-14 | End: 2019-02-12

## 2019-01-14 RX ORDER — SPIRONOLACTONE 25 MG/1
25 TABLET, FILM COATED ORAL DAILY
Qty: 0 | Refills: 0 | Status: DISCONTINUED | OUTPATIENT
Start: 2019-01-14 | End: 2019-01-14

## 2019-01-14 RX ORDER — AMIODARONE HYDROCHLORIDE 400 MG/1
100 TABLET ORAL DAILY
Qty: 0 | Refills: 0 | Status: DISCONTINUED | OUTPATIENT
Start: 2019-01-14 | End: 2019-01-14

## 2019-01-14 RX ORDER — FUROSEMIDE 40 MG
0 TABLET ORAL
Qty: 0 | Refills: 0 | COMMUNITY

## 2019-01-14 RX ADMIN — Medication 50 MILLIGRAM(S): at 06:01

## 2019-01-14 RX ADMIN — Medication 40 MILLIGRAM(S): at 06:00

## 2019-01-14 RX ADMIN — Medication 180 MILLIGRAM(S): at 06:01

## 2019-01-14 RX ADMIN — Medication 100 MILLIGRAM(S): at 11:24

## 2019-01-14 RX ADMIN — AMIODARONE HYDROCHLORIDE 200 MILLIGRAM(S): 400 TABLET ORAL at 05:58

## 2019-01-14 RX ADMIN — CALCITRIOL 0.5 MICROGRAM(S): 0.5 CAPSULE ORAL at 11:24

## 2019-01-14 RX ADMIN — ISOSORBIDE MONONITRATE 30 MILLIGRAM(S): 60 TABLET, EXTENDED RELEASE ORAL at 11:24

## 2019-01-14 RX ADMIN — PREGABALIN 1000 MICROGRAM(S): 225 CAPSULE ORAL at 11:24

## 2019-01-14 RX ADMIN — APIXABAN 5 MILLIGRAM(S): 2.5 TABLET, FILM COATED ORAL at 05:58

## 2019-01-14 RX ADMIN — FAMOTIDINE 40 MILLIGRAM(S): 10 INJECTION INTRAVENOUS at 05:59

## 2019-01-14 NOTE — DISCHARGE NOTE ADULT - PATIENT PORTAL LINK FT
You can access the [facility] Patient Portal, offered by [facility], by registering with the following website: [email or web address]

## 2019-01-14 NOTE — CONSULT NOTE ADULT - SUBJECTIVE AND OBJECTIVE BOX
Patient is a 73y old  Female who presents with a chief complaint of shortness of breath of 1 month intermittent duration (2019 11:11)    HPI: Pt is a 72 yo F with hx of HTN, CKD, CAD sp CABG, pulmonary HTN, AV stenosis and AFib sp ablation on  and cardioversion on  presented back to the hospital with c/o SOB and chest congestion x 2 days. Pt takes Lasix 40 mg BID at home and has not missed a dose but sts SOB was worsening with LE edema. Pt reports that while in the hospital she received IV Lasix and is now feeling much better. Pt converted back to AFib while admitted in the hospital but denies any chest pain, palpitations or dizziness. Pt has been compliant with her rate control meds at home. She takes Cardizem 180 mg, Amio 100 mg and Metoprolol 50 mg Q12.     PAST MEDICAL & SURGICAL HISTORY:  Thyroid nodule  Degenerative joint disease  Osteoarthritis  Diverticulosis  GERD (gastroesophageal reflux disease)  Heart failure with reduced ejection fraction  Pulmonary hypertension, moderate to severe  Aortic stenosis, moderate  Atrial fibrillation: status post cardioversion  CKD (chronic kidney disease): stage 5, no dialysis  MI (myocardial infarction)  CAD (coronary artery disease)  Kidney stone  HTN (hypertension)  S/P CABG (coronary artery bypass graft)  AV fistula  H/O abdominal hysterectomy  H/O cardiac radiofrequency ablation    PREVIOUS DIAGNOSTIC TESTING:      ECHO  FINDINGS:  Transthoracic Echocardiogram (19 @ 10:53)  EXAM:  2-D ECHO (TTE) COMPLETE        PROCEDURE DATE:  2019      INTERPRETATION:  REPORT:    TRANSTHORACIC ECHOCARDIOGRAM REPORT      Patient Name:   NOAH QUIROS Accession #: [id number]  Medical Rec #:  [id number]           Height: 66.0 in 167.6 cm  YOB: 1945          Weight:      167.0 lb 75.75 kg  Patient Age:    73 years           BSA:         1.85 m²  Patient Gender: F                  BP:          140/70 mmHg       Date of Exam:        2019 10:53:41 AM  Referring Physician: [id number] HUDSON ZAMBRANO  Sonographer:         Ara Kan  Reading Physician:   Nemesio Luna M.D.    Procedure:     2D Echo/Doppler/Color Doppler Complete.  Indications:   I50.9 - Heart Failure, unspecified  Diagnosis:     I50.9 - Heart failure, unspecified  Study Details: Technically good study. Study quality was adversely   affected due                 to arrhythmia.      Summary:   1. Left ventricular ejection fraction, by visual estimation, is 45 to   50%.   2. Mildly increased LV wall thickness.   3. Mild to moderate mitral valve regurgitation.   4. Thickening and calcification of the anterior and posterior mitral   valve leaflets.   5. Mild-moderate tricuspid regurgitation.   6. Mild aortic regurgitation.   7. Estimated pulmonary artery systolic pressure is 60.9 mmHg assuming a   right atrial pressure of 15 mmHg, which is consistent with severe   pulmonary hypertension.   8. Peak transaortic gradient is 32.7 mmHg, mean transaortic gradient   equals 15.5 mmHg, the calculated aortic valve area equals 1.39 cm² by the   continuity equation consistent with moderate aortic stenosis.    PHYSICIAN INTERPRETATION:  Left Ventricle: The left ventricular internal cavity size is normal. Left   ventricular wall thickness is mildly increased. Left ventricular ejection   fraction, by visual estimation, is 45 to 50%.  Right Ventricle: Normal right ventricular size and function.  Left Atrium: Mildly enlarged left atrium.  Right Atrium: Normal right atrial size.  Pericardium: There is no evidence of pericardial effusion.  Mitral Valve: Mild to moderate mitral valve regurgitation is seen.   Thickening and calcification of the anterior and posterior mitral valve   leaflets. Peak transmitral mean gradient equals 3.1 mmHg, calculated   mitral valve area by pressure half time equals 4.22 cm² consistent with   No evidence of mitral stenosis. No evidence of mitral stenosis.  Tricuspid Valve: Mild-moderate tricuspid regurgitation is visualized.   Estimated pulmonary artery systolic pressure is 60.9 mmHg assuming a   right atrial pressure of 15 mmHg, which is consistent with severe   pulmonary hypertension.  Aortic Valve: Mild aortic valve regurgitation is seen. Peak Av velocity   is 2.86 m/s, mean transaortic gradient equals 15.5 mmHg, the calculated   aortic valve area equals 1.39 cm² by the continuity equation consistent   with moderate aortic stenosis.  Pulmonic Valve: Structurally normal pulmonic valve, with normal leaflet   excursion. No indication of pulmonic valve regurgitation.  Aorta: The aortic root and ascending aorta are structurally normal, with   no evidence of dilitation.  Pulmonary Artery: The main pulmonary artery is normal in size.      MEDICATIONS  (STANDING):  allopurinol 100 milliGRAM(s) Oral daily  amiodarone    Tablet 200 milliGRAM(s) Oral daily  apixaban 5 milliGRAM(s) Oral every 12 hours  calcitriol   Capsule 0.5 MICROGram(s) Oral daily  cyanocobalamin 1000 MICROGram(s) Oral daily  diltiazem    milliGRAM(s) Oral daily  famotidine    Tablet 40 milliGRAM(s) Oral two times a day  isosorbide   mononitrate ER Tablet (IMDUR) 30 milliGRAM(s) Oral daily  isosorbide   mononitrate ER Tablet (IMDUR) 30 milliGRAM(s) Oral at bedtime  metoprolol tartrate 50 milliGRAM(s) Oral two times a day  spironolactone 25 milliGRAM(s) Oral daily    MEDICATIONS  (PRN):      FAMILY HISTORY:  Family history of lung cancer (Father)  Family history of CHF (congestive heart failure) (Mother)      SOCIAL HISTORY:    CIGARETTES: No    ALCOHOL: No    Past Surgical History:    Allergies:    No Known Allergies      REVIEW OF SYSTEMS:  CONSTITUTIONAL: No fever, weight loss, chills, shakes, or fatigue  RESPIRATORY: No cough, wheezing, hemoptysis, or shortness of breath  CARDIOVASCULAR: No chest pain, dyspnea, palpitations, dizziness, syncope, paroxysmal nocturnal dyspnea, orthopnea, or arm or leg swelling  GASTROINTESTINAL: No abdominal  or epigastric pain, nausea, vomiting, hematemesis, diarrhea, constipation, melena or bright red blood.  MUSCULOSKELETAL: No joint pain or swelling, muscle, back, or extremity pain    Vital Signs Last 24 Hrs  T(C): 35.7 (2019 05:47), Max: 36.2 (2019 20:15)  T(F): 96.3 (2019 05:47), Max: 97.2 (2019 20:15)  HR: 119 (2019 05:47) (103 - 119)  BP: 126/68 (2019 05:47) (126/68 - 134/64)  BP(mean): --  RR: 18 (2019 05:47) (18 - 18)  SpO2: 92% (2019 07:28) (88% - 99%)    PHYSICAL EXAM:  GENERAL: In no apparent distress, well nourished, and hydrated.  HEART: Regular rate with irregular rhythm; No murmurs, rubs, or gallops.  PULMONARY: Clear to auscultation and perfusion.  No rales, wheezing, or rhonchi bilaterally.  EXTREMITIES:  2+ Peripheral Pulses, No clubbing, cyanosis, or edema    INTERPRETATION OF TELEMETRY: AFib @ 88 bpm    EC Lead ECG (19 @ 21:15)  Ventricular Rate 72 BPM    Atrial Rate 72 BPM    P-R Interval 184 ms    QRS Duration 98 ms    Q-T Interval 398 ms    QTC Calculation(Bezet) 435 ms    P Axis 66 degrees    R Axis 35 degrees    T Axis 30 degrees    Diagnosis Line Sinus rhythm with occasional Premature ventricular complexes  Cannot rule out Inferior infarct , age undetermined  Abnormal ECG    Confirmed by Nemesio Luna ([id number]) on 2019 10:41:10 PM      I&O's Detail    2019 07:01  -  2019 07:00  --------------------------------------------------------  IN:    Oral Fluid: 100 mL  Total IN: 100 mL    OUT:    Voided: 350 mL  Total OUT: 350 mL    Total NET: -250 mL          LABS:                        9.7    7.22  )-----------( 270      ( 2019 06:29 )             32.5     -14    143  |  96<L>  |  67<HH>  ----------------------------<  117<H>  3.8   |  29  |  3.2<H>    Ca    10.1      2019 06:29  Phos  4.0     -12  Mg     1.8         TPro  7.1  /  Alb  3.8  /  TBili  0.7  /  DBili  x   /  AST  10  /  ALT  7   /  AlkPhos  54  14    CARDIAC MARKERS ( 2019 06:29 )  x     / 0.02 ng/mL / 18 U/L / x     / 1.2 ng/mL  CARDIAC MARKERS ( 2019 07:56 )  x     / 0.03 ng/mL / 19 U/L / x     / 1.4 ng/mL  CARDIAC MARKERS ( 2019 20:36 )  x     / 0.03 ng/mL / x     / x     / x          PT/INR - ( 2019 20:36 )   PT: 23.20 sec;   INR: 2.03 ratio         PTT - ( 2019 20:36 )  PTT:35.6 sec    BNP  I&O's Detail    2019 07:01  -  2019 07:00  --------------------------------------------------------  IN:    Oral Fluid: 100 mL  Total IN: 100 mL    OUT:    Voided: 350 mL  Total OUT: 350 mL    Total NET: -250 mL        Daily     Daily Weight in k.8 (2019 06:53)    RADIOLOGY & ADDITIONAL STUDIES:    Xray Chest 2 Views PA/Lat (19 @ 22:30)  EXAM:  XR CHEST PA LAT 2V            PROCEDURE DATE:  2019        INTERPRETATION:  Clinical History / Reason for exam: Shortness of breath.    Comparison : Chest radiograph 2018.    Technique/Positioning: Satisfactory.    Findings:    Support devices: None.    Cardiac/mediastinum/hilum: Stable cardiomegaly.    Lung parenchyma/Pleura: Increased bilateral interstitial opacities with   superimposed airspace opacity at the right lung base. There are bilateral   pleural effusions. Nopneumothorax.    Skeleton/soft tissues: No acute bony abnormalities.    Impression:      Increased bilateral interstitial opacities with superimposed airspace   opacity at the right lung base. Bilateral pleural effusions. Findings are   suggestive offluid overload but infection is not excluded. Recommend   clinical correlation and continued follow-up.    LUTHER TEE M.D., ATTENDING RADIOLOGIST  This document has been electronically signed. 2019  9:43AM Patient is a 73y old  Female who presents with a chief complaint of shortness of breath of 1 month intermittent duration (2019 11:11)    HPI: Pt is a 72 yo F with hx of HTN, CKD, CAD sp CABG, pulmonary HTN, AV stenosis and AFib sp ablation on  and cardioversion on  presented back to the hospital with c/o SOB and chest congestion x 2 days. Pt takes Lasix 40 mg BID at home and has not missed a dose but sts SOB was worsening with LE edema. Pt reports that while in the hospital she received IV Lasix and is now feeling much better. Pt converted back to AFib while admitted in the hospital but denies any chest pain, palpitations or dizziness. Pt has been compliant with her rate control meds at home. She takes Cardizem 180 mg, Amio 100 mg and Metoprolol 50 mg Q12.     PAST MEDICAL & SURGICAL HISTORY:  Thyroid nodule  Degenerative joint disease  Osteoarthritis  Diverticulosis  GERD (gastroesophageal reflux disease)  Heart failure with reduced ejection fraction  Pulmonary hypertension, moderate to severe  Aortic stenosis, moderate  Atrial fibrillation: status post cardioversion  CKD (chronic kidney disease): stage 5, no dialysis  MI (myocardial infarction)  CAD (coronary artery disease)  Kidney stone  HTN (hypertension)  S/P CABG (coronary artery bypass graft)  AV fistula  H/O abdominal hysterectomy  H/O cardiac radiofrequency ablation    PREVIOUS DIAGNOSTIC TESTING:      ECHO  FINDINGS:  Transthoracic Echocardiogram (19 @ 10:53)  EXAM:  2-D ECHO (TTE) COMPLETE        PROCEDURE DATE:  2019      INTERPRETATION:  REPORT:    TRANSTHORACIC ECHOCARDIOGRAM REPORT      Patient Name:   NOAH QUIROS Accession #: [id number]  Medical Rec #:  [id number]           Height: 66.0 in 167.6 cm  YOB: 1945          Weight:      167.0 lb 75.75 kg  Patient Age:    73 years           BSA:         1.85 m²  Patient Gender: F                  BP:          140/70 mmHg       Date of Exam:        2019 10:53:41 AM  Referring Physician: [id number] HUDSON ZAMBRANO  Sonographer:         Ara Kan  Reading Physician:   Nemeiso Luna M.D.    Procedure:     2D Echo/Doppler/Color Doppler Complete.  Indications:   I50.9 - Heart Failure, unspecified  Diagnosis:     I50.9 - Heart failure, unspecified  Study Details: Technically good study. Study quality was adversely   affected due                 to arrhythmia.      Summary:   1. Left ventricular ejection fraction, by visual estimation, is 45 to   50%.   2. Mildly increased LV wall thickness.   3. Mild to moderate mitral valve regurgitation.   4. Thickening and calcification of the anterior and posterior mitral   valve leaflets.   5. Mild-moderate tricuspid regurgitation.   6. Mild aortic regurgitation.   7. Estimated pulmonary artery systolic pressure is 60.9 mmHg assuming a   right atrial pressure of 15 mmHg, which is consistent with severe   pulmonary hypertension.   8. Peak transaortic gradient is 32.7 mmHg, mean transaortic gradient   equals 15.5 mmHg, the calculated aortic valve area equals 1.39 cm² by the   continuity equation consistent with moderate aortic stenosis.    PHYSICIAN INTERPRETATION:  Left Ventricle: The left ventricular internal cavity size is normal. Left   ventricular wall thickness is mildly increased. Left ventricular ejection   fraction, by visual estimation, is 45 to 50%.  Right Ventricle: Normal right ventricular size and function.  Left Atrium: Mildly enlarged left atrium.  Right Atrium: Normal right atrial size.  Pericardium: There is no evidence of pericardial effusion.  Mitral Valve: Mild to moderate mitral valve regurgitation is seen.   Thickening and calcification of the anterior and posterior mitral valve   leaflets. Peak transmitral mean gradient equals 3.1 mmHg, calculated   mitral valve area by pressure half time equals 4.22 cm² consistent with   No evidence of mitral stenosis. No evidence of mitral stenosis.  Tricuspid Valve: Mild-moderate tricuspid regurgitation is visualized.   Estimated pulmonary artery systolic pressure is 60.9 mmHg assuming a   right atrial pressure of 15 mmHg, which is consistent with severe   pulmonary hypertension.  Aortic Valve: Mild aortic valve regurgitation is seen. Peak Av velocity   is 2.86 m/s, mean transaortic gradient equals 15.5 mmHg, the calculated   aortic valve area equals 1.39 cm² by the continuity equation consistent   with moderate aortic stenosis.  Pulmonic Valve: Structurally normal pulmonic valve, with normal leaflet   excursion. No indication of pulmonic valve regurgitation.  Aorta: The aortic root and ascending aorta are structurally normal, with   no evidence of dilitation.  Pulmonary Artery: The main pulmonary artery is normal in size.      MEDICATIONS  (STANDING):  allopurinol 100 milliGRAM(s) Oral daily  amiodarone    Tablet 200 milliGRAM(s) Oral daily  apixaban 5 milliGRAM(s) Oral every 12 hours  calcitriol   Capsule 0.5 MICROGram(s) Oral daily  cyanocobalamin 1000 MICROGram(s) Oral daily  diltiazem    milliGRAM(s) Oral daily  famotidine    Tablet 40 milliGRAM(s) Oral two times a day  isosorbide   mononitrate ER Tablet (IMDUR) 30 milliGRAM(s) Oral daily  isosorbide   mononitrate ER Tablet (IMDUR) 30 milliGRAM(s) Oral at bedtime  metoprolol tartrate 50 milliGRAM(s) Oral two times a day  spironolactone 25 milliGRAM(s) Oral daily    MEDICATIONS  (PRN):      FAMILY HISTORY:  Family history of lung cancer (Father)  Family history of CHF (congestive heart failure) (Mother)      SOCIAL HISTORY:    CIGARETTES: No    ALCOHOL: No    Past Surgical History:    Allergies:    No Known Allergies      REVIEW OF SYSTEMS:  CONSTITUTIONAL: No fever, weight loss, chills, shakes, or fatigue  RESPIRATORY: No cough, wheezing, hemoptysis, or shortness of breath  CARDIOVASCULAR: No chest pain, dyspnea, palpitations, dizziness, syncope, paroxysmal nocturnal dyspnea, orthopnea, or arm or leg swelling  GASTROINTESTINAL: No abdominal  or epigastric pain, nausea, vomiting, hematemesis, diarrhea, constipation, melena or bright red blood.  MUSCULOSKELETAL: No joint pain or swelling, muscle, back, or extremity pain    Vital Signs Last 24 Hrs  T(C): 35.7 (2019 05:47), Max: 36.2 (2019 20:15)  T(F): 96.3 (2019 05:47), Max: 97.2 (2019 20:15)  HR: 119 (2019 05:47) (103 - 119)  BP: 126/68 (2019 05:47) (126/68 - 134/64)  BP(mean): --  RR: 18 (2019 05:47) (18 - 18)  SpO2: 92% (2019 07:28) (88% - 99%)    PHYSICAL EXAM:  GENERAL: In no apparent distress, well nourished, and hydrated.  HEART: irregular rate and rhythm; No murmurs, rubs, or gallops.  PULMONARY: Clear to auscultation and perfusion.  No rales, wheezing, or rhonchi bilaterally.  EXTREMITIES:  2+ Peripheral Pulses, No clubbing, cyanosis, or edema    INTERPRETATION OF TELEMETRY: AFib @ 88 bpm    EC Lead ECG (19 @ 21:15)  Ventricular Rate 72 BPM    Atrial Rate 72 BPM    P-R Interval 184 ms    QRS Duration 98 ms    Q-T Interval 398 ms    QTC Calculation(Bezet) 435 ms    P Axis 66 degrees    R Axis 35 degrees    T Axis 30 degrees    Diagnosis Line Sinus rhythm with occasional Premature ventricular complexes  Cannot rule out Inferior infarct , age undetermined  Abnormal ECG    Confirmed by Nemesio Luna ([id number]) on 2019 10:41:10 PM      I&O's Detail    2019 07:01  -  2019 07:00  --------------------------------------------------------  IN:    Oral Fluid: 100 mL  Total IN: 100 mL    OUT:    Voided: 350 mL  Total OUT: 350 mL    Total NET: -250 mL          LABS:                        9.7    7.22  )-----------( 270      ( 2019 06:29 )             32.5     14    143  |  96<L>  |  67<HH>  ----------------------------<  117<H>  3.8   |  29  |  3.2<H>    Ca    10.1      2019 06:29  Phos  4.0     12  Mg     1.8         TPro  7.1  /  Alb  3.8  /  TBili  0.7  /  DBili  x   /  AST  10  /  ALT  7   /  AlkPhos  54  01-14    CARDIAC MARKERS ( 2019 06:29 )  x     / 0.02 ng/mL / 18 U/L / x     / 1.2 ng/mL  CARDIAC MARKERS ( 2019 07:56 )  x     / 0.03 ng/mL / 19 U/L / x     / 1.4 ng/mL  CARDIAC MARKERS ( 2019 20:36 )  x     / 0.03 ng/mL / x     / x     / x          PT/INR - ( 2019 20:36 )   PT: 23.20 sec;   INR: 2.03 ratio         PTT - ( 2019 20:36 )  PTT:35.6 sec    BNP  I&O's Detail    2019 07:01  -  2019 07:00  --------------------------------------------------------  IN:    Oral Fluid: 100 mL  Total IN: 100 mL    OUT:    Voided: 350 mL  Total OUT: 350 mL    Total NET: -250 mL        Daily     Daily Weight in k.8 (2019 06:53)    RADIOLOGY & ADDITIONAL STUDIES:    Xray Chest 2 Views PA/Lat (19 @ 22:30)  EXAM:  XR CHEST PA LAT 2V            PROCEDURE DATE:  2019        INTERPRETATION:  Clinical History / Reason for exam: Shortness of breath.    Comparison : Chest radiograph 2018.    Technique/Positioning: Satisfactory.    Findings:    Support devices: None.    Cardiac/mediastinum/hilum: Stable cardiomegaly.    Lung parenchyma/Pleura: Increased bilateral interstitial opacities with   superimposed airspace opacity at the right lung base. There are bilateral   pleural effusions. Nopneumothorax.    Skeleton/soft tissues: No acute bony abnormalities.    Impression:      Increased bilateral interstitial opacities with superimposed airspace   opacity at the right lung base. Bilateral pleural effusions. Findings are   suggestive offluid overload but infection is not excluded. Recommend   clinical correlation and continued follow-up.    LUTHER TEE M.D., ATTENDING RADIOLOGIST  This document has been electronically signed. 2019  9:43AM

## 2019-01-14 NOTE — DISCHARGE NOTE ADULT - PLAN OF CARE
optimal fluid status low salt diet   cw medication sand follow up with cardiologist and electrophysiologist rate control and avoid complications take meds as prescribed by Dr Rehman  follow up in 3-4 weeks  may need another cardioversion in 3-4 weeks symptom control take medication and follow up with PMD optimal cardiac function low medications and follow up with cardiologist

## 2019-01-14 NOTE — DISCHARGE NOTE ADULT - HOSPITAL COURSE
73 year old female with a past medical history of Chronic Kidney Disease V, Hypertension, Coronary Artery Disease and history of Myocardial Infarction status post Coronary Artery Bypass Graft, Heart Failure with reduced Ejection Fraction, moderate Pulmonary Hypertension, moderate Aortic Valve stenosis, Atrial Fibrillation status post radiofrequency ablation and recent cardioversion yesterday presenting for shortness of breath of 1 month intermittent duration.  Patient reports that her Nephrologist prescribed Lasix 40 mg twice a day around two weeks which improved her shortness of breath symptoms however she started having a cough for the past week and tonight her symptoms got worse with difficulty breathing, unable to lie on her usual 2 pillows and increased lower extremity swelling. Denies fevers, chills, chest pain, diaphoresis, lower extremity pain, recent travels, prolonged immobilization, gaining weight. (12 Jan 2019 23:21)    #Acute exacerbation of Heart Failure with reduced Ejection Fraction in the setting of Moderate Aortic Stenosis and Moderate Pulmonary Hypertension;  likely 2/2 fluids while ablation  and non compliant with diet   - Status post Lasix 40 IV once in ER  was getting iv lasix 40 q12 in hospital   -being seen by cardio and EP  d/c lasix and started on aldactone  25 mg q24  -echo showed 45% EF      #Atrial Fibrillation status post radiofrequency ablation and recent cardioversion ;,   in a fib again   on Eliquis 5 bid, Amiodarone 100, Cardizem 180 and metoprolol 50 q12   EP Dr santiago will plan probably another ablation in 3-4 week      #Coronary Artery Disease, history of Myocardial Infarction, status post Coronary Artery Bypass Graft;   Isosorbide mono, Metoprolol home dose    #Hypertension;   cw home meds  controlled    #Chronic Kidney Disease stage IV-V not on hemodialysis;  stable at baseline    #vitamin B12 deficiency;  cw home dose of b12      DVT prophylaxis; on Eliquis  Ambulate as tolerated  Diet; low sodium heart healthy  Full code    #DISPO:  today after discussing with attending  being seen by both cardio and EP   out pt follow up in 3-4 weeks

## 2019-01-14 NOTE — PROGRESS NOTE ADULT - ASSESSMENT
just post afib ablation followed by JOÃO and cardioversion  acute pulmonary edema possibly due to IV fluid she received during ablation and then cardioversion  again is in Afib, rate controlled  mild cardiomyopathy, EF 45-50%, MR, AI, TR    current therapy  will keep on amiodarone  will start Aldacton 25 mg daily, d/c lasix,   I spoke to dr Sherie KATZ who did ablation and cardioversion, my suggestion was 4 weeks of Amiodarone and if still is in A.Fib another cardioversion, if fails then rate control, if converts then rhythm control just post afib ablation followed by JOÃO and cardioversion  acute pulmonary edema possibly due to IV fluid she received during ablation and then cardioversion  again is in Afib, rate controlled  mild cardiomyopathy, mild possible acute systolic HF (prior EF by JOÃO was 50-55%, now 45-50%?)  EF 45-50%, MR, AI, TR    current therapy  will keep on amiodarone  will start Aldacton 25 mg daily, d/c lasix,   I spoke to dr Sherie KATZ who did ablation and cardioversion, my suggestion was 4 weeks of Amiodarone and if still is in A.Fib another cardioversion, if fails then rate control, if converts then rhythm control

## 2019-01-14 NOTE — PROGRESS NOTE ADULT - ASSESSMENT
#Acute exacerbation of Heart Failure with reduced Ejection Fraction in the setting of Moderate Aortic Stenosis and Moderate Pulmonary Hypertension;  likely 2/2 fluids while ablation  and non compliant with diet   - Status post Lasix 40 IV once in ER  was getting iv lasix 40 q12 in hospital   -being seen by cardio and EP  d/c lasix and started on aldactone  25 mg q24  -echo showed 45% EF      #Atrial Fibrillation status post radiofrequency ablation and recent cardioversion ;,   on Eliquis 5 bid, Amiodarone 100, Cardizem 180 and metoprolol 50 q12   EP Dr santiago will plan probably another ablation in 3-4 week      #Coronary Artery Disease, history of Myocardial Infarction, status post Coronary Artery Bypass Graft;   Isosorbide mono, Metoprolol home dose    #Hypertension;   cw home meds  controlled    #Chronic Kidney Disease stage IV-V not on hemodialysis;  stable at baseline      DVT prophylaxis; on Eliquis  Ambulate as tolerated  Diet; low sodium heart healthy  Full code    #DISPO: possible today after discussing with attending  being seen by both cardio and EP #Acute exacerbation of Heart Failure with reduced Ejection Fraction in the setting of Moderate Aortic Stenosis and Moderate Pulmonary Hypertension;  likely 2/2 fluids while ablation  and non compliant with diet   - Status post Lasix 40 IV once in ER  was getting iv lasix 40 q12 in hospital   -being seen by cardio and EP  d/c lasix and started on aldactone  25 mg q24  -echo showed 45% EF      #Atrial Fibrillation status post radiofrequency ablation and recent cardioversion ;,   on Eliquis 5 bid, Amiodarone 100, Cardizem 180 and metoprolol 50 q12   EP Dr santiago will plan probably another ablation in 3-4 week      #Coronary Artery Disease, history of Myocardial Infarction, status post Coronary Artery Bypass Graft;   Isosorbide mono, Metoprolol home dose    #Hypertension;   cw home meds  controlled    #Chronic Kidney Disease stage IV-V not on hemodialysis;  stable at baseline    #vitamin B12 deficiency;  cw home dose of b12      DVT prophylaxis; on Eliquis  Ambulate as tolerated  Diet; low sodium heart healthy  Full code    #DISPO: possible today after discussing with attending  being seen by both cardio and EP

## 2019-01-14 NOTE — DISCHARGE NOTE ADULT - MEDICATION SUMMARY - MEDICATIONS TO TAKE
I will START or STAY ON the medications listed below when I get home from the hospital:    spironolactone 25 mg oral tablet  -- 1 tab(s) by mouth once a day  -- Indication: For CONGESTIVE HEART FAILURE, UNSPECIFIED HEART FAILURE TYPE    isosorbide mononitrate 30 mg oral tablet, extended release  -- 1 tab(s) by mouth once a day (in the morning)  -- Indication: For CAD    isosorbide mononitrate 30 mg oral tablet, extended release  -- 1 tab(s) by mouth once a day (at bedtime)  -- Indication: For CAD    amiodarone 100 mg oral tablet  -- 100 milligram(s) by mouth once a day   -- Avoid grapefruit and grapefruit juice while taking this medication.  Avoid prolonged or excessive exposure to direct and/or artificial sunlight while taking this medication.  Do not take this drug if you are pregnant.  It is very important that you take or use this exactly as directed.  Do not skip doses or discontinue unless directed by your doctor.  May cause drowsiness or dizziness.    -- Indication: For A fib    dilTIAZem 180 mg/24 hours oral capsule, extended release  -- 1 cap(s) by mouth once a day  -- Indication: For A fib    Eliquis 5 mg oral tablet  -- 1 tab(s) by mouth 2 times a day  -- Indication: For A fib    allopurinol 100 mg oral tablet  -- 1 tab(s) by mouth once a day  -- Indication: For Gout    metoprolol tartrate 50 mg oral tablet  -- 1 tab(s) by mouth 2 times a day  -- Indication: For HTN/A fib    famotidine 40 mg oral tablet  -- 1 tab(s) by mouth 2 times a day  -- Indication: For GERD (gastroesophageal reflux disease)    calcitriol 0.5 mcg oral capsule  -- 1 cap(s) by mouth once a day  -- Indication: For multivitamin    Vitamin B-12 100 mcg oral tablet  -- 1 tab(s) by mouth once a day  -- Indication: For  B 12 deficiency

## 2019-01-14 NOTE — CONSULT NOTE ADULT - REASON FOR ADMISSION
shortness of breath of 1 month intermittent duration
shortness of breath of 1 month intermittent duration

## 2019-01-14 NOTE — PROGRESS NOTE ADULT - SUBJECTIVE AND OBJECTIVE BOX
NOAH QUIROS 74yo W  Female from home brought to the ER for C/O increasing SOB, orthopnea and leg edema.  The pt was admitted for SYS CHF exacerbation, afib, pulmonary edema and leg edema.  The pt was treated with IV Lasix with improvement of respiratory symptoms.  THe pt was evaluated by her cardiologist and the EPS specialist.  The PMHx includes:  HTN, ASHD, CAD, old MI, sp CABG, SYS CHF, A fib sp failed cardioversion and ablation, valvular disease, Pul hypertension, CKD, chronic anemia, Hyperlipidemia, GERD, diverticulosis, OA, DDD, DJD, anxiety and depression.     Hospital course:  the pt was tx with IV lasix and monitored in the F8 tele unit.  She was evaluated by cardio and EPS.  She had negative CE.  The ECHO showed mildly dec EF 45-50%, mild wallthickening, mod MR, mod TR, mild AI, and pul a pressures c/ severe pul HTN.  The pt is sx much improved and was cleared for D/C by cardiology today.    MEDICATIONS  (STANDING):  allopurinol 100 milliGRAM(s) Oral daily  amiodarone    Tablet 100 milliGRAM(s) Oral daily  apixaban 5 milliGRAM(s) Oral every 12 hours  calcitriol   Capsule 0.5 MICROGram(s) Oral daily  cyanocobalamin 1000 MICROGram(s) Oral daily  diltiazem    milliGRAM(s) Oral daily  famotidine    Tablet 40 milliGRAM(s) Oral two times a day  isosorbide   mononitrate ER Tablet (IMDUR) 30 milliGRAM(s) Oral daily  isosorbide   mononitrate ER Tablet (IMDUR) 30 milliGRAM(s) Oral at bedtime  metoprolol tartrate 50 milliGRAM(s) Oral two times a day  spironolactone 25 milliGRAM(s) Oral daily    MEDICATIONS  (PRN):      Allergies    No Known Allergies        Vital Signs Last 24 Hrs  T(C): 37.3 (14 Jan 2019 13:58), Max: 37.3 (14 Jan 2019 13:58)  T(F): 99.1 (14 Jan 2019 13:58), Max: 99.1 (14 Jan 2019 13:58)  HR: 109 (14 Jan 2019 13:58) (109 - 119)  BP: 110/76 (14 Jan 2019 13:58) (110/76 - 126/68)  BP(mean): --  RR: 18 (14 Jan 2019 13:58) (18 - 18)  SpO2: 92% (14 Jan 2019 07:28) (92% - 92%)    PHYSICAL EXAM:      Constitutional:  alert and elderly and chronically ill looking but in NAD    Eyes:  normal    ENMT: normal, dry oral mucosa    Neck:  supple, mild JVD, no bruits    Back: TH kyphosis    Respiratory:  shallow respirations, scattered rhonchi, bibasilar rales    Cardiovascular:  s1S2 tachy and irreg    Gastrointestinal:  globose soft and benign    Genitourinary:  no simeon    Extremities:  arthritic changes, bipedal edema, dec pedal pulses          LABS:                        9.7    7.22  )-----------( 270      ( 14 Jan 2019 06:29 )             32.5     01-14    143  |  96<L>  |  67<HH>  ----------------------------<  117<H>  3.8   |  29  |  3.2<H>    Ca    10.1      14 Jan 2019 06:29  Mg     1.8     01-14    TPro  7.1  /  Alb  3.8  /  TBili  0.7  /  DBili  x   /  AST  10  /  ALT  7   /  AlkPhos  54  01-14          RADIOLOGY & ADDITIONAL TESTS:    EKG:  afib with RVR    CXR:  cardiomegaly, BL inc vascular markings/pul congestion    ECHO:  EF 45-50%,  mild wall thickening,  mild to mod MR, mild to mod TR, mild AI, pul artery pressures c/w severe pulmonarty hypertension NOAH QUIROS 74yo W  Female from home brought to the ER for C/O increasing SOB, orthopnea and leg edema.  The pt was admitted for SYS CHF exacerbation, pulmonary edema and leg edema.  The pt was treated with IV Lasix with improvement of respiratory symptoms and clinical status.  The pt was evaluated by her cardiologist and the EPS specialist.  The PMHx includes:  HTN, ASHD, CAD, old MI, sp CABG, SYS CHF, A fib sp failed cardioversion and ablation, valvular disease, Pul hypertension, CKD, chronic anemia, Hyperlipidemia, GERD, diverticulosis, OA, DDD, DJD, anxiety and depression.     Hospital course:  the pt was tx with IV lasix and monitored in the F8 tele unit.  She was evaluated by cardio and EPS.  She had negative CE.  The ECHO showed mildly dec EF 45-50%, mild wallthickening, mod MR, mod TR, mild AI, and pul a pressures c/ severe pul HTN.  The pt is sx much improved and was cleared for D/C by cardiology today.    MEDICATIONS  (STANDING):  allopurinol 100 milliGRAM(s) Oral daily  amiodarone    Tablet 100 milliGRAM(s) Oral daily  apixaban 5 milliGRAM(s) Oral every 12 hours  calcitriol   Capsule 0.5 MICROGram(s) Oral daily  cyanocobalamin 1000 MICROGram(s) Oral daily  diltiazem    milliGRAM(s) Oral daily  famotidine    Tablet 40 milliGRAM(s) Oral two times a day  isosorbide   mononitrate ER Tablet (IMDUR) 30 milliGRAM(s) Oral daily  isosorbide   mononitrate ER Tablet (IMDUR) 30 milliGRAM(s) Oral at bedtime  metoprolol tartrate 50 milliGRAM(s) Oral two times a day  spironolactone 25 milliGRAM(s) Oral daily    MEDICATIONS  (PRN):      Allergies    No Known Allergies        Vital Signs Last 24 Hrs  T(C): 37.3 (14 Jan 2019 13:58), Max: 37.3 (14 Jan 2019 13:58)  T(F): 99.1 (14 Jan 2019 13:58), Max: 99.1 (14 Jan 2019 13:58)  HR: 109 (14 Jan 2019 13:58) (109 - 119)  BP: 110/76 (14 Jan 2019 13:58) (110/76 - 126/68)  BP(mean): --  RR: 18 (14 Jan 2019 13:58) (18 - 18)  SpO2: 92% (14 Jan 2019 07:28) (92% - 92%)    PHYSICAL EXAM:      Constitutional:  alert and elderly and chronically ill looking but in NAD    Eyes:  normal    ENMT: normal, dry oral mucosa    Neck:  supple, mild JVD, no bruits    Back: TH kyphosis    Respiratory:  shallow respirations, scattered rhonchi, bibasilar rales    Cardiovascular:  s1S2 tachy and irreg    Gastrointestinal:  globose soft and benign    Genitourinary:  no simeon    Extremities:  arthritic changes, bipedal edema, dec pedal pulses          LABS:                        9.7    7.22  )-----------( 270      ( 14 Jan 2019 06:29 )             32.5     01-14    143  |  96<L>  |  67<HH>  ----------------------------<  117<H>  3.8   |  29  |  3.2<H>  GFR12, 14  Ca    10.1      14 Jan 2019 06:29  Mg     1.8     01-14  CK 16, MB 1.1, trop 0.03, 0.03, 0.02  BNP  16.993  TPro  7.1  /  Alb  3.8  /  TBili  0.7  /  DBili  x   /  AST  10  /  ALT  7   /  AlkPhos  54  01-14          RADIOLOGY & ADDITIONAL TESTS:    EKG:  NSR 72/min, PVCs,  NB pt reverted to afib on tele  CXR:  cardiomegaly, BL inc vascular markings/pul congestion    ECHO:  EF 45-50%,  mild wall thickening,  mild to mod MR, mild to mod TR, mild AI, pul artery pressures c/w severe pulmonarty hypertension

## 2019-01-14 NOTE — CONSULT NOTE ADULT - ASSESSMENT
Assessment: Pt is a 72 yo F with hx of HTN, CKD, CAD sp CABG, pulmonary HTN, AV stenosis and AFib sp ablation on 12/14 and cardioversion on 1/11 presented back to the hospital with c/o SOB and chest congestion x 2 days. Pt takes Lasix 40 mg BID at home and has not missed a dose but sts SOB was worsening with LE edema. Pt reports that while in the hospital she received IV Lasix and is now feeling much better. Pt converted back to AFib while admitted in the hospital but denies any chest pain, palpitations or dizziness.    Plan:  AFib, rate controlled  CHADSVASC 5  - Continue Eliquis 5 mg Q12 h  - Continue Cardizem 180mg, Amiodarone 100 mg and Metoprolol 50 mg Q12  - Follow up with Dr Rehman in 3-4 weeks

## 2019-01-14 NOTE — CONSULT NOTE ADULT - ATTENDING COMMENTS
Patient seen and examined.    74 yo F with history of HTN, CKD, CAD s/p CABG, pulmonary HTN, and AFib s/p ablation on 12/14/18 and cardioversion on 1/11 admitted with dyspnea found to have pulm edema after having an increased intake of salt on Friday night when she went to a party. Patient denies palpitations or dyspnea today. She has been diuresed and feels well overall.     Recommend  - Continue rate control with Cardizem and Metoprolol  - Continue Amio  - Continue anticoagulation  - Outpatient follow up with me in 3-4 weeks

## 2019-01-14 NOTE — PROGRESS NOTE ADULT - SUBJECTIVE AND OBJECTIVE BOX
Subjective:  patient presented with sob, some pulmonary congestion, responded well to Lasix iv, feels well now, converted back to A.Fib with HR at 90s, while taking 3 medicine for HR control.      MEDICATIONS  (STANDING):  allopurinol 100 milliGRAM(s) Oral daily  amiodarone    Tablet 200 milliGRAM(s) Oral daily  apixaban 5 milliGRAM(s) Oral every 12 hours  calcitriol   Capsule 0.5 MICROGram(s) Oral daily  cyanocobalamin 1000 MICROGram(s) Oral daily  diltiazem    milliGRAM(s) Oral daily  famotidine    Tablet 40 milliGRAM(s) Oral two times a day  furosemide   Injectable 40 milliGRAM(s) IV Push every 12 hours  isosorbide   mononitrate ER Tablet (IMDUR) 30 milliGRAM(s) Oral daily  isosorbide   mononitrate ER Tablet (IMDUR) 30 milliGRAM(s) Oral at bedtime  metoprolol tartrate 50 milliGRAM(s) Oral two times a day    MEDICATIONS  (PRN):            Vital Signs Last 24 Hrs  T(C): 35.7 (14 Jan 2019 05:47), Max: 36.2 (13 Jan 2019 20:15)  T(F): 96.3 (14 Jan 2019 05:47), Max: 97.2 (13 Jan 2019 20:15)  HR: 119 (14 Jan 2019 05:47) (103 - 119)  BP: 126/68 (14 Jan 2019 05:47) (126/68 - 134/64)  BP(mean): --  RR: 18 (14 Jan 2019 05:47) (18 - 18)  SpO2: 92% (14 Jan 2019 07:28) (88% - 99%)             REVIEW OF SYSTEMS:  CONSTITUTIONAL: no fever, no chills, no diaphoresis  CARDIOLOGY: no chest pain, no more SOB, no palpitation   RESPIRATORY: no more dyspnea, no wheeze, no orthopnea,   NEUROLOGICAL: no dizziness, headache,   GI: no abdominal pain, no dyspepsia, no nausea, no vomiting, no diarrhea.    HEENT: no congestion, no nasal bleeding  SKIN: no ecchymosis, no petechia              PHYSICAL EXAM:  · CONSTITUTIONAL: Looks stable, in no respiratory distress  now  . NECK: Supple, no JVD, no bruit  · RESPIRATORY: Normal air entry to lung base, no wheeze, no crackle, no wet rales  · CARDIOVASCULAR: irregular heart beat, S1, A2, P2, no murmur  · EXTREMITIES: No cyanosis, no clubbing, no edema  · VASCULAR: Pulses are irregular, equal, bilateral	  TELEMETRY: now is in A.Fib, rate controlled    ECG: < from: 12 Lead ECG (01.12.19 @ 21:15) >  Sinus rhythm with occasional Premature ventricular complexes  Cannot rule out Inferior infarct , age undetermined    < end of copied text >      TTE: < from: Transthoracic Echocardiogram (01.13.19 @ 10:53) >   1. Left ventricular ejection fraction, by visual estimation, is 45 to   50%.   2. Mildly increased LV wall thickness.   3. Mild to moderate mitral valve regurgitation.   4. Thickening and calcification of the anterior and posterior mitral   valve leaflets.   5. Mild-moderate tricuspid regurgitation.   6. Mild aortic regurgitation.   7. Estimated pulmonary artery systolic pressure is 60.9 mmHg assuming a   right atrial pressure of 15 mmHg, which is consistent with severe   pulmonary hypertension.   8. Peak transaortic gradient is 32.7 mmHg, mean transaortic gradient   equals 15.5 mmHg, the calculated aortic valve area equals 1.39 cm² by the   continuity equation consistent with moderate aortic stenosis.      < end of copied text >      LABS:                        9.7    7.22  )-----------( 270      ( 14 Jan 2019 06:29 )             32.5     01-14    143  |  96<L>  |  67<HH>  ----------------------------<  117<H>  3.8   |  29  |  3.2<H>    Ca    10.1      14 Jan 2019 06:29  Phos  4.0     01-12  Mg     1.8     01-14    TPro  7.1  /  Alb  3.8  /  TBili  0.7  /  DBili  x   /  AST  10  /  ALT  7   /  AlkPhos  54  01-14    CARDIAC MARKERS ( 14 Jan 2019 06:29 )  x     / 0.02 ng/mL / 18 U/L / x     / 1.2 ng/mL  CARDIAC MARKERS ( 13 Jan 2019 07:56 )  x     / 0.03 ng/mL / 19 U/L / x     / 1.4 ng/mL  CARDIAC MARKERS ( 12 Jan 2019 20:36 )  x     / 0.03 ng/mL / x     / x     / x          PT/INR - ( 12 Jan 2019 20:36 )   PT: 23.20 sec;   INR: 2.03 ratio         PTT - ( 12 Jan 2019 20:36 )  PTT:35.6 sec    I&O's Summary    13 Jan 2019 07:01  -  14 Jan 2019 07:00  --------------------------------------------------------  IN: 100 mL / OUT: 350 mL / NET: -250 mL      BNP  RADIOLOGY & ADDITIONAL STUDIES: < from: Xray Chest 2 Views PA/Lat (01.12.19 @ 22:30) >    Increased bilateral interstitial opacities with superimposed airspace   opacity at the right lung base. Bilateral pleural effusions. Findings are   suggestive offluid overload but infection is not excluded. Recommend   clinical correlation and continued follow-up.      < end of copied text >      IMPRESSION AND PLAN:

## 2019-01-14 NOTE — PROGRESS NOTE ADULT - ASSESSMENT
SOB, orthopnea, increased leg edema due to acute on chronic systolic CHF, pulmonary edema and afib  Hx of HTN, ASHD, CAD, old MI, sp CABG, afib failed cardioversion and ablation in the past, valvular disease pulmonary hypertension  Hx of Hyperlipidemia  Hx of ex tobacco use, COPD  Hx of CKD  Hx of chronic anemia  Hx of GERD, diverticulosis  Hx of OA, DDD, DJD  Hx of anxiety and depression    pt tx with IV diuretics with improvement of sx  CE negative  ECHO: + for EF 45-50%m, wall thickening, valvular disease, pul hypertension  pt was monitored in holding tele area  pt was evaluated by Cardio and EPS and cleared today for D/C  pt given the complex multiple med issues is a high risk for readmission

## 2019-01-14 NOTE — DISCHARGE NOTE ADULT - CARE PROVIDER_API CALL
Bipin Child), Cardiology; Internal Medicine; Nuclear Cardiology  [address] [place] [address]  [address]  [place] [address]  Phone: [phone]  Fax: [phone]    Stephanie Rehman), Medicine  Physicians  [address] [place] [address]  Phone: [phone]  Fax: [phone]

## 2019-01-14 NOTE — PROGRESS NOTE ADULT - REASON FOR ADMISSION
shortness of breath of 1 month intermittent duration
shortness of breath of 1 month intermittent duration/ SYS CHF exacerbation

## 2019-01-14 NOTE — PROGRESS NOTE ADULT - SUBJECTIVE AND OBJECTIVE BOX
LENGTH OF HOSPITAL STAY: 2d    CHIEF COMPLAINT:   Patient is a 73y old  Female who presents with a chief complaint of shortness of breath of 1 month intermittent duration (14 Jan 2019 12:11)    EVENTS OVERNIGHT: no issues   getting better ,at RA ,not requiring Ox      HISTORY OF PRESENTING ILLNESS:    HPI:  73 year old female with a past medical history of Chronic Kidney Disease V, Hypertension, Coronary Artery Disease and history of Myocardial Infarction status post Coronary Artery Bypass Graft, Heart Failure with reduced Ejection Fraction, moderate Pulmonary Hypertension, moderate Aortic Valve stenosis, Atrial Fibrillation status post radiofrequency ablation and recent cardioversion yesterday presenting for shortness of breath of 1 month intermittent duration.  Patient reports that her Nephrologist prescribed Lasix 40 mg twice a day around two weeks which improved her shortness of breath symptoms however she started having a cough for the past week and tonight her symptoms got worse with difficulty breathing, unable to lie on her usual 2 pillows and increased lower extremity swelling. Denies fevers, chills, chest pain, diaphoresis, lower extremity pain, recent travels, prolonged immobilization, gaining weight. (12 Jan 2019 23:21)    PAST MEDICAL & SURGICAL HISTORY  PAST MEDICAL & SURGICAL HISTORY:  Thyroid nodule  Degenerative joint disease  Osteoarthritis  Diverticulosis  GERD (gastroesophageal reflux disease)  Heart failure with reduced ejection fraction  Pulmonary hypertension, moderate to severe  Aortic stenosis, moderate  Atrial fibrillation: status post cardioversion  CKD (chronic kidney disease): stage 5, no dialysis  MI (myocardial infarction)  CAD (coronary artery disease)  Kidney stone  HTN (hypertension)  S/P CABG (coronary artery bypass graft)  AV fistula  H/O abdominal hysterectomy  H/O cardiac radiofrequency ablation    SOCIAL HISTORY:    ALLERGIES:  No Known Allergies    MEDICATIONS:  STANDING MEDICATIONS  allopurinol 100 milliGRAM(s) Oral daily  amiodarone    Tablet 100 milliGRAM(s) Oral daily  apixaban 5 milliGRAM(s) Oral every 12 hours  calcitriol   Capsule 0.5 MICROGram(s) Oral daily  cyanocobalamin 1000 MICROGram(s) Oral daily  diltiazem    milliGRAM(s) Oral daily  famotidine    Tablet 40 milliGRAM(s) Oral two times a day  isosorbide   mononitrate ER Tablet (IMDUR) 30 milliGRAM(s) Oral daily  isosorbide   mononitrate ER Tablet (IMDUR) 30 milliGRAM(s) Oral at bedtime  metoprolol tartrate 50 milliGRAM(s) Oral two times a day  spironolactone 25 milliGRAM(s) Oral daily    PRN MEDICATIONS    VITALS:   T(F): 99.1  HR: 109  BP: 110/76  RR: 18  SpO2: 92%    LABS:                        9.7    7.22  )-----------( 270      ( 14 Jan 2019 06:29 )             32.5     01-14    143  |  96<L>  |  67<HH>  ----------------------------<  117<H>  3.8   |  29  |  3.2<H>    Ca    10.1      14 Jan 2019 06:29  Phos  4.0     01-12  Mg     1.8     01-14    TPro  7.1  /  Alb  3.8  /  TBili  0.7  /  DBili  x   /  AST  10  /  ALT  7   /  AlkPhos  54  01-14    PT/INR - ( 12 Jan 2019 20:36 )   PT: 23.20 sec;   INR: 2.03 ratio         PTT - ( 12 Jan 2019 20:36 )  PTT:35.6 sec      Creatine Kinase, Serum: 16 U/L (01-14-19 @ 12:16)  Troponin T, Serum: 0.02 ng/mL <H> (01-14-19 @ 12:16)  Creatine Kinase, Serum: 18 U/L (01-14-19 @ 06:29)  Troponin T, Serum: 0.02 ng/mL <H> (01-14-19 @ 06:29)      CARDIAC MARKERS ( 14 Jan 2019 12:16 )  x     / 0.02 ng/mL / 16 U/L / x     / 1.1 ng/mL  CARDIAC MARKERS ( 14 Jan 2019 06:29 )  x     / 0.02 ng/mL / 18 U/L / x     / 1.2 ng/mL  CARDIAC MARKERS ( 13 Jan 2019 07:56 )  x     / 0.03 ng/mL / 19 U/L / x     / 1.4 ng/mL  CARDIAC MARKERS ( 12 Jan 2019 20:36 )  x     / 0.03 ng/mL / x     / x     / x        < from: 12 Lead ECG (01.12.19 @ 21:15) >  Diagnosis Line Sinus rhythm with occasional Premature ventricular complexes  Cannot rule out Inferior infarct , age undetermined  Abnormal ECG    < end of copied text >      RADIOLOGY:  < from: Transthoracic Echocardiogram (01.13.19 @ 10:53) >  Summary:   1. Left ventricular ejection fraction, by visual estimation, is 45 to   50%.   2. Mildly increased LV wall thickness.   3. Mild to moderate mitral valve regurgitation.   4. Thickening and calcification of the anterior and posterior mitral   valve leaflets.   5. Mild-moderate tricuspid regurgitation.   6. Mild aortic regurgitation.   7. Estimated pulmonary artery systolic pressure is 60.9 mmHg assuming a   right atrial pressure of 15 mmHg, which is consistent with severe   pulmonary hypertension.   8. Peak transaortic gradient is 32.7 mmHg, mean transaortic gradient   equals 15.5 mmHg, the calculated aortic valve area equals 1.39 cm² by the   continuity equation consistent with moderate aortic stenosis.    < end of copied text >    PHYSICAL EXAM:  GEN: No acute distress  HEENT: within normal range  LUNGS: Clear to auscultation bilaterally   HEART: S1/S2 present. RRR.   ABD: Soft, non-tender, non-distended. Bowel sounds present  EXT: minimal LE edema   NEURO: AAOX3  non focal

## 2019-01-14 NOTE — PROGRESS NOTE ADULT - ATTENDING COMMENTS
in cardiac point patient stable to be d/c home after ambulation
pt stable for d/c after case discussed with resident in supervisory capacity

## 2019-01-14 NOTE — DISCHARGE NOTE ADULT - MEDICATION SUMMARY - MEDICATIONS TO STOP TAKING
I will STOP taking the medications listed below when I get home from the hospital:    dilTIAZem 180 mg/24 hours oral capsule, extended release  -- 1 cap(s) by mouth once a day    furosemide 40 mg oral tablet    Kayexalate oral and rectal powder  -- 1 application by mouth and rectally Tuesday, Thursday, Saturday

## 2019-01-14 NOTE — DISCHARGE NOTE ADULT - CARE PLAN
Principal Discharge DX:	Congestive heart failure, unspecified HF chronicity, unspecified heart failure type  Goal:	optimal fluid status  Assessment and plan of treatment:	low salt diet   cw medication sand follow up with cardiologist and electrophysiologist  Secondary Diagnosis:	Atrial fibrillation  Goal:	rate control and avoid complications  Assessment and plan of treatment:	take meds as prescribed by Dr Rehman  follow up in 3-4 weeks  may need another cardioversion in 3-4 weeks  Secondary Diagnosis:	GERD (gastroesophageal reflux disease)  Goal:	symptom control  Assessment and plan of treatment:	take medication and follow up with PMD  Secondary Diagnosis:	CAD (coronary artery disease)  Goal:	optimal cardiac function  Assessment and plan of treatment:	low medications and follow up with cardiologist

## 2019-01-16 DIAGNOSIS — N18.5 CHRONIC KIDNEY DISEASE, STAGE 5: ICD-10-CM

## 2019-01-16 DIAGNOSIS — I50.23 ACUTE ON CHRONIC SYSTOLIC (CONGESTIVE) HEART FAILURE: ICD-10-CM

## 2019-01-16 DIAGNOSIS — K21.9 GASTRO-ESOPHAGEAL REFLUX DISEASE WITHOUT ESOPHAGITIS: ICD-10-CM

## 2019-01-16 DIAGNOSIS — Z87.891 PERSONAL HISTORY OF NICOTINE DEPENDENCE: ICD-10-CM

## 2019-01-16 DIAGNOSIS — I27.20 PULMONARY HYPERTENSION, UNSPECIFIED: ICD-10-CM

## 2019-01-16 DIAGNOSIS — I08.3 COMBINED RHEUMATIC DISORDERS OF MITRAL, AORTIC AND TRICUSPID VALVES: ICD-10-CM

## 2019-01-16 DIAGNOSIS — I35.0 NONRHEUMATIC AORTIC (VALVE) STENOSIS: ICD-10-CM

## 2019-01-16 DIAGNOSIS — E63.8 OTHER SPECIFIED NUTRITIONAL DEFICIENCIES: ICD-10-CM

## 2019-01-16 DIAGNOSIS — Z91.11 PATIENT'S NONCOMPLIANCE WITH DIETARY REGIMEN: ICD-10-CM

## 2019-01-16 DIAGNOSIS — I25.2 OLD MYOCARDIAL INFARCTION: ICD-10-CM

## 2019-01-16 DIAGNOSIS — Z82.49 FAMILY HISTORY OF ISCHEMIC HEART DISEASE AND OTHER DISEASES OF THE CIRCULATORY SYSTEM: ICD-10-CM

## 2019-01-16 DIAGNOSIS — I25.10 ATHEROSCLEROTIC HEART DISEASE OF NATIVE CORONARY ARTERY WITHOUT ANGINA PECTORIS: ICD-10-CM

## 2019-01-16 DIAGNOSIS — Z87.442 PERSONAL HISTORY OF URINARY CALCULI: ICD-10-CM

## 2019-01-16 DIAGNOSIS — I42.9 CARDIOMYOPATHY, UNSPECIFIED: ICD-10-CM

## 2019-01-16 DIAGNOSIS — Z90.710 ACQUIRED ABSENCE OF BOTH CERVIX AND UTERUS: ICD-10-CM

## 2019-01-16 DIAGNOSIS — I13.2 HYPERTENSIVE HEART AND CHRONIC KIDNEY DISEASE WITH HEART FAILURE AND WITH STAGE 5 CHRONIC KIDNEY DISEASE, OR END STAGE RENAL DISEASE: ICD-10-CM

## 2019-01-16 DIAGNOSIS — J81.0 ACUTE PULMONARY EDEMA: ICD-10-CM

## 2019-01-16 DIAGNOSIS — Z79.01 LONG TERM (CURRENT) USE OF ANTICOAGULANTS: ICD-10-CM

## 2019-01-16 DIAGNOSIS — M19.90 UNSPECIFIED OSTEOARTHRITIS, UNSPECIFIED SITE: ICD-10-CM

## 2019-01-16 DIAGNOSIS — I48.91 UNSPECIFIED ATRIAL FIBRILLATION: ICD-10-CM

## 2019-01-16 DIAGNOSIS — K57.90 DIVERTICULOSIS OF INTESTINE, PART UNSPECIFIED, WITHOUT PERFORATION OR ABSCESS WITHOUT BLEEDING: ICD-10-CM

## 2019-02-20 ENCOUNTER — APPOINTMENT (OUTPATIENT)
Dept: CARDIOLOGY | Facility: CLINIC | Age: 74
End: 2019-02-20

## 2019-02-20 VITALS
SYSTOLIC BLOOD PRESSURE: 119 MMHG | WEIGHT: 167 LBS | DIASTOLIC BLOOD PRESSURE: 72 MMHG | BODY MASS INDEX: 26.95 KG/M2 | HEART RATE: 98 BPM

## 2019-02-20 DIAGNOSIS — R06.02 SHORTNESS OF BREATH: ICD-10-CM

## 2019-02-20 DIAGNOSIS — N18.9 CHRONIC KIDNEY DISEASE, UNSPECIFIED: ICD-10-CM

## 2019-02-20 DIAGNOSIS — I25.10 ATHEROSCLEROTIC HEART DISEASE OF NATIVE CORONARY ARTERY W/OUT ANGINA PECTORIS: ICD-10-CM

## 2019-02-20 DIAGNOSIS — F41.9 ANXIETY DISORDER, UNSPECIFIED: ICD-10-CM

## 2019-02-20 PROBLEM — M19.90 UNSPECIFIED OSTEOARTHRITIS, UNSPECIFIED SITE: Chronic | Status: ACTIVE | Noted: 2019-01-13

## 2019-02-20 PROBLEM — K57.90 DIVERTICULOSIS OF INTESTINE, PART UNSPECIFIED, WITHOUT PERFORATION OR ABSCESS WITHOUT BLEEDING: Chronic | Status: ACTIVE | Noted: 2019-01-13

## 2019-02-20 PROBLEM — I27.20 PULMONARY HYPERTENSION, UNSPECIFIED: Chronic | Status: ACTIVE | Noted: 2019-01-12

## 2019-02-20 PROBLEM — I50.20 UNSPECIFIED SYSTOLIC (CONGESTIVE) HEART FAILURE: Chronic | Status: ACTIVE | Noted: 2019-01-12

## 2019-02-20 PROBLEM — E04.1 NONTOXIC SINGLE THYROID NODULE: Chronic | Status: ACTIVE | Noted: 2019-01-13

## 2019-02-20 PROBLEM — K21.9 GASTRO-ESOPHAGEAL REFLUX DISEASE WITHOUT ESOPHAGITIS: Chronic | Status: ACTIVE | Noted: 2019-01-13

## 2019-02-20 PROBLEM — I35.0 NONRHEUMATIC AORTIC (VALVE) STENOSIS: Chronic | Status: ACTIVE | Noted: 2019-01-12

## 2019-02-20 RX ORDER — AMIODARONE HYDROCHLORIDE 100 MG/1
100 TABLET ORAL
Refills: 0 | Status: COMPLETED | COMMUNITY
Start: 2019-01-09 | End: 2019-02-20

## 2019-02-20 RX ORDER — AMIODARONE HYDROCHLORIDE 200 MG/1
200 TABLET ORAL DAILY
Qty: 90 | Refills: 3 | Status: DISCONTINUED | COMMUNITY
End: 2019-02-20

## 2019-02-20 NOTE — PHYSICAL EXAM
[General Appearance - Well Developed] : well developed [Normal Appearance] : normal appearance [Well Groomed] : well groomed [General Appearance - Well Nourished] : well nourished [No Deformities] : no deformities [General Appearance - In No Acute Distress] : no acute distress [Normal Conjunctiva] : the conjunctiva exhibited no abnormalities [Normal Oropharynx] : normal oropharynx [Respiration, Rhythm And Depth] : normal respiratory rhythm and effort [Exaggerated Use Of Accessory Muscles For Inspiration] : no accessory muscle use [Auscultation Breath Sounds / Voice Sounds] : lungs were clear to auscultation bilaterally [Heart Sounds] : normal S1 and S2 [Bowel Sounds] : normal bowel sounds [Abdomen Soft] : soft [Abdomen Tenderness] : non-tender [Abnormal Walk] : normal gait [Nail Clubbing] : no clubbing of the fingernails [Cyanosis, Localized] : no localized cyanosis [FreeTextEntry1] : trace pitting edema bilaterally [Skin Color & Pigmentation] : normal skin color and pigmentation [] : no rash [No Venous Stasis] : no venous stasis [Oriented To Time, Place, And Person] : oriented to person, place, and time [Impaired Insight] : insight and judgment were intact [Affect] : the affect was normal

## 2019-02-20 NOTE — REVIEW OF SYSTEMS
[see HPI] : see HPI [Anxiety] : anxiety [Easy Bleeding] : no tendency for easy bleeding [Easy Bruising] : no tendency for easy bruising [Negative] : Heme/Lymph

## 2019-02-20 NOTE — DISCUSSION/SUMMARY
[FreeTextEntry1] : Ms. Shaw presents today with her daughter for follow up for persistent AFib s/p recent RF ablation and cardioversion. She is back in Afib but rates are better controlled. She occasionally will have episodes at home with rapid HR (130s two days ago). I have asked her to check her BP and take an extra Metoprolol Tartrate 25mg when she has rapid ventricular rates. We also discussed the option of a repeat ablation, but she is not interested at this time as she overall feels better. \par \par I have asked her to continue her present medications including anticoagulation with Eliquis 5mg BID for her CHADS VASc score of 3 (HTN, Age, Female). We also discussed discontinuing the amiodarone as it is not working to keep her in sinus rhythm. At this time, we will work on rate control. Given her dyspnea on exertion, I would like to check an echo to evaluate for cardiomyopathy as a potential etiology. She has an appointment with Dr. Child next month. \par \par I have also advised the patient to go to the nearest emergency room if she experiences any chest pain, palpitations, dyspnea, syncope, or has any other compelling symptoms.\par \par Follow up in 3 months. \par

## 2019-02-20 NOTE — HISTORY OF PRESENT ILLNESS
[FreeTextEntry1] : Cardiologist: Dr. Child\par \par 74 yo F with history of AFib on Eliquis s/p multiple cardioversions and RFA on 12/14/18, CAD s/p 4v CABG, AFlutter s/p ablation in Jan 2008, CHF (EF 45%), CKD with fistula on left arm, CHF exacerbation in the fall likely from AFib with RVR and anxiety. Patient states she was feeling well after her ablation for a few days and then went back into AFib. She had dyspnea and palpitations when she was in AFib. She had a cardioversion last month and is back in atrial fibrillation, but rates seem to be better controlled. She currently admits to dyspnea on exertion but otherwise says she feels better overall. She admits to pain in legs. She states two days ago her HR was in the 130s and this caused her to be more anxious. She is not sure if anxiety is contributing to her symptoms. \par \par The patient denies any other cardiovascular complaints including chest pain, dyspnea, dizziness, lightheadedness, presyncope or syncope. No bleeding with Eliquis 5mg BID. \par

## 2019-03-14 ENCOUNTER — INPATIENT (INPATIENT)
Facility: HOSPITAL | Age: 74
LOS: 6 days | Discharge: HOME HEALTH | End: 2019-03-21
Attending: INTERNAL MEDICINE | Admitting: INTERNAL MEDICINE
Payer: MEDICARE

## 2019-03-14 VITALS
HEART RATE: 97 BPM | RESPIRATION RATE: 18 BRPM | OXYGEN SATURATION: 94 % | DIASTOLIC BLOOD PRESSURE: 70 MMHG | TEMPERATURE: 98 F | SYSTOLIC BLOOD PRESSURE: 158 MMHG

## 2019-03-14 DIAGNOSIS — I77.0 ARTERIOVENOUS FISTULA, ACQUIRED: Chronic | ICD-10-CM

## 2019-03-14 DIAGNOSIS — Z90.710 ACQUIRED ABSENCE OF BOTH CERVIX AND UTERUS: Chronic | ICD-10-CM

## 2019-03-14 DIAGNOSIS — Z98.890 OTHER SPECIFIED POSTPROCEDURAL STATES: Chronic | ICD-10-CM

## 2019-03-14 DIAGNOSIS — Z95.1 PRESENCE OF AORTOCORONARY BYPASS GRAFT: Chronic | ICD-10-CM

## 2019-03-14 LAB
ALBUMIN SERPL ELPH-MCNC: 3.7 G/DL — SIGNIFICANT CHANGE UP (ref 3.5–5.2)
ALP SERPL-CCNC: 70 U/L — SIGNIFICANT CHANGE UP (ref 30–115)
ALT FLD-CCNC: 11 U/L — SIGNIFICANT CHANGE UP (ref 0–41)
ANION GAP SERPL CALC-SCNC: 18 MMOL/L — HIGH (ref 7–14)
APTT BLD: 35.5 SEC — SIGNIFICANT CHANGE UP (ref 27–39.2)
AST SERPL-CCNC: 11 U/L — SIGNIFICANT CHANGE UP (ref 0–41)
BASE EXCESS BLDV CALC-SCNC: 2 MMOL/L — SIGNIFICANT CHANGE UP (ref -2–2)
BASOPHILS # BLD AUTO: 0.05 K/UL — SIGNIFICANT CHANGE UP (ref 0–0.2)
BASOPHILS NFR BLD AUTO: 0.4 % — SIGNIFICANT CHANGE UP (ref 0–1)
BILIRUB SERPL-MCNC: 0.6 MG/DL — SIGNIFICANT CHANGE UP (ref 0.2–1.2)
BUN SERPL-MCNC: 93 MG/DL — CRITICAL HIGH (ref 10–20)
CA-I SERPL-SCNC: 1.28 MMOL/L — SIGNIFICANT CHANGE UP (ref 1.12–1.3)
CALCIUM SERPL-MCNC: 10 MG/DL — SIGNIFICANT CHANGE UP (ref 8.5–10.1)
CHLORIDE SERPL-SCNC: 98 MMOL/L — SIGNIFICANT CHANGE UP (ref 98–110)
CO2 SERPL-SCNC: 25 MMOL/L — SIGNIFICANT CHANGE UP (ref 17–32)
CREAT SERPL-MCNC: 3.9 MG/DL — HIGH (ref 0.7–1.5)
EOSINOPHIL # BLD AUTO: 0.19 K/UL — SIGNIFICANT CHANGE UP (ref 0–0.7)
EOSINOPHIL NFR BLD AUTO: 1.3 % — SIGNIFICANT CHANGE UP (ref 0–8)
GAS PNL BLDV: 139 MMOL/L — SIGNIFICANT CHANGE UP (ref 136–145)
GAS PNL BLDV: SIGNIFICANT CHANGE UP
GLUCOSE SERPL-MCNC: 170 MG/DL — HIGH (ref 70–99)
HCO3 BLDV-SCNC: 30 MMOL/L — HIGH (ref 22–29)
HCT VFR BLD CALC: 32.5 % — LOW (ref 37–47)
HCT VFR BLDA CALC: 53.3 % — HIGH (ref 34–44)
HGB BLD CALC-MCNC: 17.4 G/DL — SIGNIFICANT CHANGE UP (ref 14–18)
HGB BLD-MCNC: 9.6 G/DL — LOW (ref 12–16)
IMM GRANULOCYTES NFR BLD AUTO: 0.8 % — HIGH (ref 0.1–0.3)
INR BLD: 2.13 RATIO — HIGH (ref 0.65–1.3)
LACTATE BLDV-MCNC: 0.9 MMOL/L — SIGNIFICANT CHANGE UP (ref 0.5–1.6)
LYMPHOCYTES # BLD AUTO: 0.4 K/UL — LOW (ref 1.2–3.4)
LYMPHOCYTES # BLD AUTO: 2.8 % — LOW (ref 20.5–51.1)
MAGNESIUM SERPL-MCNC: 2 MG/DL — SIGNIFICANT CHANGE UP (ref 1.8–2.4)
MCHC RBC-ENTMCNC: 25.9 PG — LOW (ref 27–31)
MCHC RBC-ENTMCNC: 29.5 G/DL — LOW (ref 32–37)
MCV RBC AUTO: 87.6 FL — SIGNIFICANT CHANGE UP (ref 81–99)
MONOCYTES # BLD AUTO: 1.33 K/UL — HIGH (ref 0.1–0.6)
MONOCYTES NFR BLD AUTO: 9.4 % — HIGH (ref 1.7–9.3)
NEUTROPHILS # BLD AUTO: 12.14 K/UL — HIGH (ref 1.4–6.5)
NEUTROPHILS NFR BLD AUTO: 85.3 % — HIGH (ref 42.2–75.2)
NRBC # BLD: 0 /100 WBCS — SIGNIFICANT CHANGE UP (ref 0–0)
NT-PROBNP SERPL-SCNC: HIGH PG/ML (ref 0–300)
PCO2 BLDV: 56 MMHG — HIGH (ref 41–51)
PH BLDV: 7.33 — SIGNIFICANT CHANGE UP (ref 7.26–7.43)
PLATELET # BLD AUTO: 270 K/UL — SIGNIFICANT CHANGE UP (ref 130–400)
PO2 BLDV: 17 MMHG — LOW (ref 20–40)
POTASSIUM BLDV-SCNC: 3.5 MMOL/L — SIGNIFICANT CHANGE UP (ref 3.3–5.6)
POTASSIUM SERPL-MCNC: 4.4 MMOL/L — SIGNIFICANT CHANGE UP (ref 3.5–5)
POTASSIUM SERPL-SCNC: 4.4 MMOL/L — SIGNIFICANT CHANGE UP (ref 3.5–5)
PROT SERPL-MCNC: 7.1 G/DL — SIGNIFICANT CHANGE UP (ref 6–8)
PROTHROM AB SERPL-ACNC: 24.3 SEC — HIGH (ref 9.95–12.87)
RBC # BLD: 3.71 M/UL — LOW (ref 4.2–5.4)
RBC # FLD: 18.6 % — HIGH (ref 11.5–14.5)
SAO2 % BLDV: 25 % — SIGNIFICANT CHANGE UP
SODIUM SERPL-SCNC: 141 MMOL/L — SIGNIFICANT CHANGE UP (ref 135–146)
TROPONIN T SERPL-MCNC: 0.04 NG/ML — CRITICAL HIGH
WBC # BLD: 14.22 K/UL — HIGH (ref 4.8–10.8)
WBC # FLD AUTO: 14.22 K/UL — HIGH (ref 4.8–10.8)

## 2019-03-14 RX ORDER — FUROSEMIDE 40 MG
40 TABLET ORAL ONCE
Qty: 0 | Refills: 0 | Status: COMPLETED | OUTPATIENT
Start: 2019-03-14 | End: 2019-03-14

## 2019-03-14 RX ORDER — CEFEPIME 1 G/1
1000 INJECTION, POWDER, FOR SOLUTION INTRAMUSCULAR; INTRAVENOUS EVERY 8 HOURS
Qty: 0 | Refills: 0 | Status: DISCONTINUED | OUTPATIENT
Start: 2019-03-14 | End: 2019-03-15

## 2019-03-14 RX ADMIN — CEFEPIME 100 MILLIGRAM(S): 1 INJECTION, POWDER, FOR SOLUTION INTRAMUSCULAR; INTRAVENOUS at 22:24

## 2019-03-14 RX ADMIN — Medication 40 MILLIGRAM(S): at 22:36

## 2019-03-14 NOTE — ED PROVIDER NOTE - CARE PLAN
Principal Discharge DX:	Pneumonia  Secondary Diagnosis:	CHF (congestive heart failure)  Secondary Diagnosis:	CKD (chronic kidney disease)

## 2019-03-14 NOTE — ED PROVIDER NOTE - CLINICAL SUMMARY MEDICAL DECISION MAKING FREE TEXT BOX
74 yo hx chf, ckd, cad, htn, afib here for 1-2 mo cough, sob, sorto, orthopnea and increased LE edema  w/u c/f chf exacerbation, cxr w/ opacities. in setting of prolonged cough and leukocytosis, will treat  Cr baseline  trop minimally elevated, BNP very elevated  admit for chf, possible pna

## 2019-03-14 NOTE — ED PROVIDER NOTE - PHYSICAL EXAMINATION
AOx4, sitting up in stretcher, mildly tachypneic, speaking in full sentences.   Skin - warm and dry, no acute rash.   Head - normocephalic, atraumatic.   Eyes - PERRLA/EOMI, conjunctiva and sclera clear.   ENT- MM moist, no nasal discharge.  Pharynx unremarkable.    Neck - supple nt, no meningeal signs.   Heart - RRR s1s2 nl, no rub/murmur.   Lungs- diffuse crackles and rhonchi, no retractions.   Abdomen - soft ntnd no r/g.   Extremities- moves all, +equal distal pulses, brisk cap refill, sensation wnl, normal ROM. 1-2+ b/l LE edema, calves nttp b/l.

## 2019-03-14 NOTE — ED PROVIDER NOTE - OBJECTIVE STATEMENT
74 yo F with a hx of CKD, CAD, HTN, and A-fib on eliquis, CHF (last EF 45-50%), presents with cough and exertional dyspnea. Progressively worsening over the past 1 month. Cough productive. Patient dyspneic after only a few steps which his worse than baseline. Describes orthopnea and worsening LE edema. Denies CP, nausea, vomiting, diaphoresis, abd pain, calf pain, hemoptysis, recent surgeries/travel/trauma, hx of DVT/PE.

## 2019-03-14 NOTE — ED ADULT NURSE NOTE - PMH
Aortic stenosis, moderate    Atrial fibrillation  status post cardioversion  CAD (coronary artery disease)    CKD (chronic kidney disease)  stage 5, no dialysis  Degenerative joint disease    Diverticulosis    GERD (gastroesophageal reflux disease)    Heart failure with reduced ejection fraction    HTN (hypertension)    Kidney stone    MI (myocardial infarction)    Osteoarthritis    Pulmonary hypertension, moderate to severe    Thyroid nodule

## 2019-03-14 NOTE — ED PROVIDER NOTE - NS ED ROS FT
Constitutional: See HPI.  Eyes: No visual changes, eye pain or discharge. No Photophobia  ENMT: No neck pain or stiffness. No limited ROM  Cardiac: No chest pain with exertion.  Respiratory: see hpi   GI: No nausea, vomiting, diarrhea or abdominal pain.  : No dysuria, frequency or burning. No Discharge  MS: No myalgia, muscle weakness, joint pain or back pain.  Neuro: No headache or weakness. No LOC.  Skin: No skin rash.  Except as documented in the HPI, all other systems are negative.

## 2019-03-14 NOTE — ED PROVIDER NOTE - ATTENDING CONTRIBUTION TO CARE
73 hx chf (EF 45-55% 2 mo ago), ckd, cad, htn, afib on eliquis  pt here for cough 1-2 months, worsening, productive  also w/ SALDANA, orthopnea and increasing LE edema   no fever. no exertional cp although some discomfort w/ coughing  no ap, n,v,d    vs mild hypoxemia  gen- NAD, aaox3  card-irreg, irreg  lungs-diffuse rhonchi and wheezing  abd-sntnd, no guarding or rebound  neuro- full str/sensation, cn ii-xii grossly intact, normal coordination  extremities- b/l symmetric le edema    likely chf, r/o pna  r/o atypical acs  r/o anemia, r/o lyte abnormality    basic labs, trop, bnp, cxr, coags, vbg  likely admit

## 2019-03-14 NOTE — ED ADULT NURSE NOTE - NSIMPLEMENTINTERV_GEN_ALL_ED
Implemented All Universal Safety Interventions:  [place] to call system. Call bell, personal items and telephone within reach. Instruct patient to call for assistance. Room bathroom lighting operational. Non-slip footwear when patient is off stretcher. Physically safe environment: no spills, clutter or unnecessary equipment. Stretcher in lowest position, wheels locked, appropriate side rails in place.

## 2019-03-15 LAB
ANION GAP SERPL CALC-SCNC: 18 MMOL/L — HIGH (ref 7–14)
BASOPHILS # BLD AUTO: 0.03 K/UL — SIGNIFICANT CHANGE UP (ref 0–0.2)
BASOPHILS NFR BLD AUTO: 0.2 % — SIGNIFICANT CHANGE UP (ref 0–1)
BUN SERPL-MCNC: 90 MG/DL — CRITICAL HIGH (ref 10–20)
CALCIUM SERPL-MCNC: 9.6 MG/DL — SIGNIFICANT CHANGE UP (ref 8.5–10.1)
CHLORIDE SERPL-SCNC: 99 MMOL/L — SIGNIFICANT CHANGE UP (ref 98–110)
CK MB CFR SERPL CALC: 1.9 NG/ML — SIGNIFICANT CHANGE UP (ref 0.6–6.3)
CK SERPL-CCNC: 23 U/L — SIGNIFICANT CHANGE UP (ref 0–225)
CO2 SERPL-SCNC: 23 MMOL/L — SIGNIFICANT CHANGE UP (ref 17–32)
CREAT SERPL-MCNC: 3.6 MG/DL — HIGH (ref 0.7–1.5)
EOSINOPHIL # BLD AUTO: 0.16 K/UL — SIGNIFICANT CHANGE UP (ref 0–0.7)
EOSINOPHIL NFR BLD AUTO: 1.3 % — SIGNIFICANT CHANGE UP (ref 0–8)
GLUCOSE SERPL-MCNC: 132 MG/DL — HIGH (ref 70–99)
HCT VFR BLD CALC: 29 % — LOW (ref 37–47)
HGB BLD-MCNC: 8.7 G/DL — LOW (ref 12–16)
IMM GRANULOCYTES NFR BLD AUTO: 0.5 % — HIGH (ref 0.1–0.3)
LYMPHOCYTES # BLD AUTO: 0.45 K/UL — LOW (ref 1.2–3.4)
LYMPHOCYTES # BLD AUTO: 3.7 % — LOW (ref 20.5–51.1)
MAGNESIUM SERPL-MCNC: 1.9 MG/DL — SIGNIFICANT CHANGE UP (ref 1.8–2.4)
MCHC RBC-ENTMCNC: 26.3 PG — LOW (ref 27–31)
MCHC RBC-ENTMCNC: 30 G/DL — LOW (ref 32–37)
MCV RBC AUTO: 87.6 FL — SIGNIFICANT CHANGE UP (ref 81–99)
MONOCYTES # BLD AUTO: 1.46 K/UL — HIGH (ref 0.1–0.6)
MONOCYTES NFR BLD AUTO: 12 % — HIGH (ref 1.7–9.3)
NEUTROPHILS # BLD AUTO: 10.02 K/UL — HIGH (ref 1.4–6.5)
NEUTROPHILS NFR BLD AUTO: 82.3 % — HIGH (ref 42.2–75.2)
NRBC # BLD: 0 /100 WBCS — SIGNIFICANT CHANGE UP (ref 0–0)
PHOSPHATE SERPL-MCNC: 3.8 MG/DL — SIGNIFICANT CHANGE UP (ref 2.1–4.9)
PLATELET # BLD AUTO: 239 K/UL — SIGNIFICANT CHANGE UP (ref 130–400)
POTASSIUM SERPL-MCNC: 3.7 MMOL/L — SIGNIFICANT CHANGE UP (ref 3.5–5)
POTASSIUM SERPL-SCNC: 3.7 MMOL/L — SIGNIFICANT CHANGE UP (ref 3.5–5)
RBC # BLD: 3.31 M/UL — LOW (ref 4.2–5.4)
RBC # FLD: 18.1 % — HIGH (ref 11.5–14.5)
SODIUM SERPL-SCNC: 140 MMOL/L — SIGNIFICANT CHANGE UP (ref 135–146)
TROPONIN T SERPL-MCNC: 0.03 NG/ML — CRITICAL HIGH
WBC # BLD: 12.18 K/UL — HIGH (ref 4.8–10.8)
WBC # FLD AUTO: 12.18 K/UL — HIGH (ref 4.8–10.8)

## 2019-03-15 RX ORDER — CHLORHEXIDINE GLUCONATE 213 G/1000ML
1 SOLUTION TOPICAL ONCE
Qty: 0 | Refills: 0 | Status: COMPLETED | OUTPATIENT
Start: 2019-03-15 | End: 2019-03-15

## 2019-03-15 RX ORDER — METOPROLOL TARTRATE 50 MG
50 TABLET ORAL
Qty: 0 | Refills: 0 | Status: DISCONTINUED | OUTPATIENT
Start: 2019-03-15 | End: 2019-03-20

## 2019-03-15 RX ORDER — APIXABAN 2.5 MG/1
5 TABLET, FILM COATED ORAL EVERY 12 HOURS
Qty: 0 | Refills: 0 | Status: DISCONTINUED | OUTPATIENT
Start: 2019-03-15 | End: 2019-03-21

## 2019-03-15 RX ORDER — ALLOPURINOL 300 MG
100 TABLET ORAL DAILY
Qty: 0 | Refills: 0 | Status: DISCONTINUED | OUTPATIENT
Start: 2019-03-15 | End: 2019-03-21

## 2019-03-15 RX ORDER — ACETAMINOPHEN 500 MG
650 TABLET ORAL ONCE
Qty: 0 | Refills: 0 | Status: COMPLETED | OUTPATIENT
Start: 2019-03-15 | End: 2019-03-15

## 2019-03-15 RX ORDER — CHLORHEXIDINE GLUCONATE 213 G/1000ML
1 SOLUTION TOPICAL
Qty: 0 | Refills: 0 | Status: DISCONTINUED | OUTPATIENT
Start: 2019-03-15 | End: 2019-03-21

## 2019-03-15 RX ORDER — ISOSORBIDE MONONITRATE 60 MG/1
30 TABLET, EXTENDED RELEASE ORAL DAILY
Qty: 0 | Refills: 0 | Status: DISCONTINUED | OUTPATIENT
Start: 2019-03-15 | End: 2019-03-21

## 2019-03-15 RX ORDER — DILTIAZEM HCL 120 MG
180 CAPSULE, EXT RELEASE 24 HR ORAL DAILY
Qty: 0 | Refills: 0 | Status: DISCONTINUED | OUTPATIENT
Start: 2019-03-15 | End: 2019-03-19

## 2019-03-15 RX ORDER — SPIRONOLACTONE 25 MG/1
25 TABLET, FILM COATED ORAL DAILY
Qty: 0 | Refills: 0 | Status: DISCONTINUED | OUTPATIENT
Start: 2019-03-15 | End: 2019-03-19

## 2019-03-15 RX ORDER — PREGABALIN 225 MG/1
1000 CAPSULE ORAL DAILY
Qty: 0 | Refills: 0 | Status: DISCONTINUED | OUTPATIENT
Start: 2019-03-15 | End: 2019-03-21

## 2019-03-15 RX ORDER — ACETAMINOPHEN 500 MG
650 TABLET ORAL EVERY 6 HOURS
Qty: 0 | Refills: 0 | Status: DISCONTINUED | OUTPATIENT
Start: 2019-03-15 | End: 2019-03-21

## 2019-03-15 RX ORDER — FAMOTIDINE 10 MG/ML
40 INJECTION INTRAVENOUS
Qty: 0 | Refills: 0 | Status: DISCONTINUED | OUTPATIENT
Start: 2019-03-15 | End: 2019-03-21

## 2019-03-15 RX ORDER — FLUTICASONE PROPIONATE 50 MCG
1 SPRAY, SUSPENSION NASAL
Qty: 0 | Refills: 0 | Status: DISCONTINUED | OUTPATIENT
Start: 2019-03-15 | End: 2019-03-21

## 2019-03-15 RX ORDER — FUROSEMIDE 40 MG
40 TABLET ORAL DAILY
Qty: 0 | Refills: 0 | Status: DISCONTINUED | OUTPATIENT
Start: 2019-03-15 | End: 2019-03-19

## 2019-03-15 RX ORDER — CALCITRIOL 0.5 UG/1
0.5 CAPSULE ORAL DAILY
Qty: 0 | Refills: 0 | Status: DISCONTINUED | OUTPATIENT
Start: 2019-03-15 | End: 2019-03-19

## 2019-03-15 RX ORDER — AMIODARONE HYDROCHLORIDE 400 MG/1
100 TABLET ORAL DAILY
Qty: 0 | Refills: 0 | Status: DISCONTINUED | OUTPATIENT
Start: 2019-03-15 | End: 2019-03-18

## 2019-03-15 RX ADMIN — Medication 40 MILLIGRAM(S): at 05:27

## 2019-03-15 RX ADMIN — SPIRONOLACTONE 25 MILLIGRAM(S): 25 TABLET, FILM COATED ORAL at 05:25

## 2019-03-15 RX ADMIN — FAMOTIDINE 40 MILLIGRAM(S): 10 INJECTION INTRAVENOUS at 17:47

## 2019-03-15 RX ADMIN — Medication 180 MILLIGRAM(S): at 05:25

## 2019-03-15 RX ADMIN — Medication 50 MILLIGRAM(S): at 05:25

## 2019-03-15 RX ADMIN — CALCITRIOL 0.5 MICROGRAM(S): 0.5 CAPSULE ORAL at 11:16

## 2019-03-15 RX ADMIN — Medication 1 SPRAY(S): at 05:27

## 2019-03-15 RX ADMIN — CEFEPIME 100 MILLIGRAM(S): 1 INJECTION, POWDER, FOR SOLUTION INTRAMUSCULAR; INTRAVENOUS at 05:30

## 2019-03-15 RX ADMIN — ISOSORBIDE MONONITRATE 30 MILLIGRAM(S): 60 TABLET, EXTENDED RELEASE ORAL at 11:16

## 2019-03-15 RX ADMIN — Medication 100 MILLIGRAM(S): at 11:15

## 2019-03-15 RX ADMIN — Medication 50 MILLIGRAM(S): at 17:47

## 2019-03-15 RX ADMIN — Medication 650 MILLIGRAM(S): at 06:35

## 2019-03-15 RX ADMIN — Medication 650 MILLIGRAM(S): at 06:54

## 2019-03-15 RX ADMIN — APIXABAN 5 MILLIGRAM(S): 2.5 TABLET, FILM COATED ORAL at 17:47

## 2019-03-15 RX ADMIN — Medication 650 MILLIGRAM(S): at 19:58

## 2019-03-15 RX ADMIN — Medication 650 MILLIGRAM(S): at 20:28

## 2019-03-15 RX ADMIN — PREGABALIN 1000 MICROGRAM(S): 225 CAPSULE ORAL at 11:16

## 2019-03-15 RX ADMIN — APIXABAN 5 MILLIGRAM(S): 2.5 TABLET, FILM COATED ORAL at 05:23

## 2019-03-15 RX ADMIN — Medication 1 SPRAY(S): at 17:47

## 2019-03-15 RX ADMIN — Medication 200 MILLIGRAM(S): at 19:59

## 2019-03-15 RX ADMIN — Medication 200 MILLIGRAM(S): at 03:06

## 2019-03-15 RX ADMIN — FAMOTIDINE 40 MILLIGRAM(S): 10 INJECTION INTRAVENOUS at 05:23

## 2019-03-15 RX ADMIN — Medication 1 SPRAY(S): at 03:07

## 2019-03-15 NOTE — H&P ADULT - ASSESSMENT
72 y/o female with pmh of ckd 5, HTN, CAD, ho MI s/p CABG, CHF w/ reduced ejection fraction, moderate Pulmonary Hypertension, moderate Aortic Valve stenosis, afib s/p ablation presenting for cough and shortness of breath.     # Cough with wbc of 14, with no fevers, likely 2/2 post nasal drip  - cxr findings likely 2/2 congestion and cardiac etiology, however follow up final results  - started on flonase  - c/w robitussin  - c/w cefepime q8h for now    # Shortness of breath likely secondary to Acute on chronic CHF with reduced ejection fraction  - pt recently admitted for exacerbation  - c/w IV lasix @ 40 mg daily  - strict is and os  - daily weights  - recent 2d eccho in jan shows ef of 45 to 50  - follow up cardiac enzymes   - consider tele monitoring if patient continues to have shortness of breath despite diuresis and treatment    # Afib on AC  - c/w Eliquis 5 bid  - c/w Amiodarone 100  - c/w Cardizem 180mg daily     # Coronary Artery Disease, history of Myocardial Infarction, status post Coronary Artery Bypass Graft  - c/w Isosorbide mono  - c/w metoprolol    # HTN. 145/81  - c/w metoprolol    # Chronic Kidney Disease stage IV-V not on hemodialysis; urine output 4-5x/day as per last admission  - will continue iv diuresis for now    Activity: ambulate as tolerated  Diet; low sodium heart healthy  DVT ppx on Eliquis  gi ppx: not indicated  Full code  dispo: from home

## 2019-03-15 NOTE — H&P ADULT - NSICDXPASTMEDICALHX_GEN_ALL_CORE_FT
PAST MEDICAL HISTORY:  Aortic stenosis, moderate     Atrial fibrillation status post cardioversion    CAD (coronary artery disease)     CKD (chronic kidney disease) stage 5, no dialysis    Degenerative joint disease     Diverticulosis     GERD (gastroesophageal reflux disease)     Heart failure with reduced ejection fraction     HTN (hypertension)     Kidney stone     MI (myocardial infarction)     Osteoarthritis     Pulmonary hypertension, moderate to severe     Thyroid nodule

## 2019-03-15 NOTE — H&P ADULT - HISTORY OF PRESENT ILLNESS
72 y/o female with pmh of ckd 5, HTN, CAD, ho MI s/p CABG, CHF w/ reduced ejection fraction, moderate Pulmonary Hypertension, moderate Aortic Valve stenosis, afib s/p ablation presenting for cough and shortness of breath.  Patient reports that for about a 3 month period she has shortness of breath.  Recently, within the last week the patient explains that the shortness of breath has worsened and that now it is associuated with a cough.  She explains that the cough is productive of yellow phlegm, not in large amounts and not blood tinged.  She explains that the cough keeps her up at night and is exacerbated with laying down and with positional changes.  She has been treated for the cough as an outpatient but explains this was not effective.  No fevers, no chills, no night sweats, no abd pain, no chest pain, no headaches, no dizziness, no lightheadedness.  Patient does report a post nasal drip with congestion in the nose.

## 2019-03-15 NOTE — H&P ADULT - NSICDXPASTSURGICALHX_GEN_ALL_CORE_FT
PAST SURGICAL HISTORY:  AV fistula     H/O abdominal hysterectomy     H/O cardiac radiofrequency ablation     S/P CABG (coronary artery bypass graft)

## 2019-03-15 NOTE — PROGRESS NOTE ADULT - ASSESSMENT
SOB due to volume overload, CHF and worsening renal failure    Hx of  HTN, ASHD, CAD, old MI, CABG, afib, sp failed ablation, valvular dis ( AS, MR, TR), Pul HTN  Hx of Hyperlipidemia  Hx of CKD stage 5  hx of cronic anemia  hx of Oa, DDD, DJD  Hx of GERD, diverticulosis  Hx of thyroid nodule  Hx of hysterectomy    pt 's ECHO reviewed which shows EF 45-50%, AS, MR, TR, Pul HTN,   pt's troponin sl elevated but probably of renal origin  pt placed on IV diuretics  monitor renal parameters and electrolytes  cont all home meds  cardio consult  renal consult  emotional support rendered

## 2019-03-16 ENCOUNTER — TRANSCRIPTION ENCOUNTER (OUTPATIENT)
Age: 74
End: 2019-03-16

## 2019-03-16 LAB
ALBUMIN SERPL ELPH-MCNC: 3.5 G/DL — SIGNIFICANT CHANGE UP (ref 3.5–5.2)
ALP SERPL-CCNC: 46 U/L — SIGNIFICANT CHANGE UP (ref 30–115)
ALT FLD-CCNC: 8 U/L — SIGNIFICANT CHANGE UP (ref 0–41)
ANION GAP SERPL CALC-SCNC: 16 MMOL/L — HIGH (ref 7–14)
AST SERPL-CCNC: 8 U/L — SIGNIFICANT CHANGE UP (ref 0–41)
BASOPHILS # BLD AUTO: 0.05 K/UL — SIGNIFICANT CHANGE UP (ref 0–0.2)
BASOPHILS NFR BLD AUTO: 0.4 % — SIGNIFICANT CHANGE UP (ref 0–1)
BILIRUB SERPL-MCNC: 0.5 MG/DL — SIGNIFICANT CHANGE UP (ref 0.2–1.2)
BUN SERPL-MCNC: 91 MG/DL — CRITICAL HIGH (ref 10–20)
CALCIUM SERPL-MCNC: 9.8 MG/DL — SIGNIFICANT CHANGE UP (ref 8.5–10.1)
CHLORIDE SERPL-SCNC: 100 MMOL/L — SIGNIFICANT CHANGE UP (ref 98–110)
CO2 SERPL-SCNC: 24 MMOL/L — SIGNIFICANT CHANGE UP (ref 17–32)
CREAT SERPL-MCNC: 3.5 MG/DL — HIGH (ref 0.7–1.5)
EOSINOPHIL # BLD AUTO: 0.22 K/UL — SIGNIFICANT CHANGE UP (ref 0–0.7)
EOSINOPHIL NFR BLD AUTO: 1.9 % — SIGNIFICANT CHANGE UP (ref 0–8)
GLUCOSE SERPL-MCNC: 135 MG/DL — HIGH (ref 70–99)
HCT VFR BLD CALC: 29.6 % — LOW (ref 37–47)
HCV AB S/CO SERPL IA: 0.18 S/CO — SIGNIFICANT CHANGE UP (ref 0–0.79)
HCV AB SERPL-IMP: SIGNIFICANT CHANGE UP
HGB BLD-MCNC: 8.8 G/DL — LOW (ref 12–16)
IMM GRANULOCYTES NFR BLD AUTO: 0.8 % — HIGH (ref 0.1–0.3)
LYMPHOCYTES # BLD AUTO: 0.38 K/UL — LOW (ref 1.2–3.4)
LYMPHOCYTES # BLD AUTO: 3.3 % — LOW (ref 20.5–51.1)
MCHC RBC-ENTMCNC: 26.4 PG — LOW (ref 27–31)
MCHC RBC-ENTMCNC: 29.7 G/DL — LOW (ref 32–37)
MCV RBC AUTO: 88.9 FL — SIGNIFICANT CHANGE UP (ref 81–99)
MONOCYTES # BLD AUTO: 1.5 K/UL — HIGH (ref 0.1–0.6)
MONOCYTES NFR BLD AUTO: 12.9 % — HIGH (ref 1.7–9.3)
NEUTROPHILS # BLD AUTO: 9.39 K/UL — HIGH (ref 1.4–6.5)
NEUTROPHILS NFR BLD AUTO: 80.7 % — HIGH (ref 42.2–75.2)
NRBC # BLD: 0 /100 WBCS — SIGNIFICANT CHANGE UP (ref 0–0)
PLATELET # BLD AUTO: 242 K/UL — SIGNIFICANT CHANGE UP (ref 130–400)
POTASSIUM SERPL-MCNC: 3.7 MMOL/L — SIGNIFICANT CHANGE UP (ref 3.5–5)
POTASSIUM SERPL-SCNC: 3.7 MMOL/L — SIGNIFICANT CHANGE UP (ref 3.5–5)
PROT SERPL-MCNC: 6.1 G/DL — SIGNIFICANT CHANGE UP (ref 6–8)
RBC # BLD: 3.33 M/UL — LOW (ref 4.2–5.4)
RBC # FLD: 18.2 % — HIGH (ref 11.5–14.5)
SODIUM SERPL-SCNC: 140 MMOL/L — SIGNIFICANT CHANGE UP (ref 135–146)
WBC # BLD: 11.63 K/UL — HIGH (ref 4.8–10.8)
WBC # FLD AUTO: 11.63 K/UL — HIGH (ref 4.8–10.8)

## 2019-03-16 RX ORDER — ONDANSETRON 8 MG/1
4 TABLET, FILM COATED ORAL EVERY 6 HOURS
Qty: 0 | Refills: 0 | Status: DISCONTINUED | OUTPATIENT
Start: 2019-03-16 | End: 2019-03-21

## 2019-03-16 RX ADMIN — CHLORHEXIDINE GLUCONATE 1 APPLICATION(S): 213 SOLUTION TOPICAL at 06:44

## 2019-03-16 RX ADMIN — Medication 50 MILLIGRAM(S): at 05:58

## 2019-03-16 RX ADMIN — PREGABALIN 1000 MICROGRAM(S): 225 CAPSULE ORAL at 11:15

## 2019-03-16 RX ADMIN — Medication 180 MILLIGRAM(S): at 05:58

## 2019-03-16 RX ADMIN — ISOSORBIDE MONONITRATE 30 MILLIGRAM(S): 60 TABLET, EXTENDED RELEASE ORAL at 11:15

## 2019-03-16 RX ADMIN — Medication 200 MILLIGRAM(S): at 05:58

## 2019-03-16 RX ADMIN — Medication 650 MILLIGRAM(S): at 20:26

## 2019-03-16 RX ADMIN — Medication 200 MILLIGRAM(S): at 16:28

## 2019-03-16 RX ADMIN — FAMOTIDINE 40 MILLIGRAM(S): 10 INJECTION INTRAVENOUS at 05:58

## 2019-03-16 RX ADMIN — Medication 650 MILLIGRAM(S): at 19:29

## 2019-03-16 RX ADMIN — APIXABAN 5 MILLIGRAM(S): 2.5 TABLET, FILM COATED ORAL at 17:10

## 2019-03-16 RX ADMIN — ONDANSETRON 4 MILLIGRAM(S): 8 TABLET, FILM COATED ORAL at 10:17

## 2019-03-16 RX ADMIN — Medication 1 SPRAY(S): at 21:04

## 2019-03-16 RX ADMIN — APIXABAN 5 MILLIGRAM(S): 2.5 TABLET, FILM COATED ORAL at 05:58

## 2019-03-16 RX ADMIN — Medication 100 MILLIGRAM(S): at 11:15

## 2019-03-16 RX ADMIN — Medication 40 MILLIGRAM(S): at 05:58

## 2019-03-16 RX ADMIN — SPIRONOLACTONE 25 MILLIGRAM(S): 25 TABLET, FILM COATED ORAL at 05:58

## 2019-03-16 RX ADMIN — CALCITRIOL 0.5 MICROGRAM(S): 0.5 CAPSULE ORAL at 11:15

## 2019-03-16 RX ADMIN — Medication 50 MILLIGRAM(S): at 17:10

## 2019-03-16 RX ADMIN — FAMOTIDINE 40 MILLIGRAM(S): 10 INJECTION INTRAVENOUS at 17:10

## 2019-03-16 NOTE — PROGRESS NOTE ADULT - ASSESSMENT
SOB due to volume overload, CHF and worsening renal failure    Hx of  HTN, ASHD, CAD, old MI, CABG, afib, sp failed ablation, valvular dis ( AS, MR, TR), Pul HTN  Hx of Hyperlipidemia  Hx of CKD stage 5  hx of cronic anemia  hx of Oa, DDD, DJD  Hx of GERD, diverticulosis  Hx of thyroid nodule  Hx of hysterectomy    pt 's ECHO reviewed which shows EF 45-50%, AS, MR, TR, Pul HTN,   pt's troponin sl elevated but probably of renal origin  pt placed on IV diuretics with improvement of SOB  monitor renal parameters and electrolytes  cont all home meds  cardio consult  renal consult

## 2019-03-16 NOTE — DISCHARGE NOTE PROVIDER - NSDCCPCAREPLAN_GEN_ALL_CORE_FT
PRINCIPAL DISCHARGE DIAGNOSIS  Diagnosis: CHF exacerbation  Assessment and Plan of Treatment: Patient was diuresed appropriately and evalauted to rule out infectious causes of shortness of breath. Patient is stable and advised to do a follow up outpatient with PCP.      SECONDARY DISCHARGE DIAGNOSES  Diagnosis: CKD (chronic kidney disease)  Assessment and Plan of Treatment: stage 5, no dialysis PRINCIPAL DISCHARGE DIAGNOSIS  Diagnosis: CHF exacerbation  Assessment and Plan of Treatment: Patient was diuresed appropriately and evalauted to rule out infectious causes of shortness of breath. Patient is stable and advised to do a follow up outpatient with PCP.      SECONDARY DISCHARGE DIAGNOSES  Diagnosis: CKD (chronic kidney disease)  Assessment and Plan of Treatment: stage 5, worsening kidney fuction, py needs to follow up with nephro doctor within 3 days

## 2019-03-16 NOTE — DISCHARGE NOTE PROVIDER - PROVIDER TOKENS
PROVIDER:[TOKEN:[[id number]]] PROVIDER:[TOKEN:[[id number]]],PROVIDER:[TOKEN:[[id number]],FOLLOWUP:[1-3 days]] PROVIDER:[TOKEN:[[id number]],FOLLOWUP:[1-3 days]],PROVIDER:[TOKEN:[[id number]],FOLLOWUP:[1-3 days]],PROVIDER:[TOKEN:[[id number]],FOLLOWUP:[1 week]]

## 2019-03-16 NOTE — DISCHARGE NOTE PROVIDER - CARE PROVIDERS DIRECT ADDRESSES
[email or web address],DirectAddress_Unknown [email or web address],DirectAddress_Unknown,DirectAddress_Unknown

## 2019-03-16 NOTE — DISCHARGE NOTE PROVIDER - CARE PROVIDER_API CALL
Garett Naylor)  Internal Medicine  [address] [place] [address], [address]  [place] [address]  Phone: [phone]  Fax: [phone]  Follow Up Time: Garett Naylor)  Internal Medicine  [address], [address]  [place] [address]  Phone: [phone]  Fax: [phone]  Follow Up Time:     León Oliva)  Internal Medicine; Nephrology  [address] [place] [address]  Phone: [phone]  Fax: [phone]  Follow Up Time: 1-3 days Garett Naylor)  Internal Medicine  [address] [facility], [facility] [address]  [place] [address]  Phone: [phone]  Fax: [phone]  Follow Up Time: 1-3 days    León Oliva)  Internal Medicine; Nephrology  [address] [place] [address]  Phone: [phone]  Fax: [phone]  Follow Up Time: 1-3 days    Bipin Child)  Cardiology; Internal Medicine; Nuclear Cardiology  [address] [place] [address], [address] [place] [address]  Phone: [phone]  Fax: [phone]  Follow Up Time: 1 week

## 2019-03-16 NOTE — DISCHARGE NOTE PROVIDER - HOSPITAL COURSE
73 year old female with PMH of CKD, HTN, CAD, MI s/p CABG, Afib s/p ablation presented for cough and shortness of breath. Patient says everyone in her family is sick, and she developed post nasal drainage last week that increased into productive cough. She was evaluated and treated for CHF exacerbation. Infectious causes of the SOB were ruled out. Patient is stable and can be discharged home to follow up with PCP. 73 year old female with PMH of CKD, HTN, CAD, MI s/p CABG, Afib s/p ablation presented for cough and shortness of breath. Patient says everyone in her family is sick, and she developed post nasal drainage last week that increased into productive cough. She was evaluated and treated for CHF exacerbation. Infectious causes of the SOB were ruled out. Patient is stable and can be discharged home to follow up with PCP and nephrologist within 3 days.

## 2019-03-17 LAB
ALBUMIN SERPL ELPH-MCNC: 3.4 G/DL — LOW (ref 3.5–5.2)
ALP SERPL-CCNC: 50 U/L — SIGNIFICANT CHANGE UP (ref 30–115)
ALT FLD-CCNC: 9 U/L — SIGNIFICANT CHANGE UP (ref 0–41)
ANION GAP SERPL CALC-SCNC: 18 MMOL/L — HIGH (ref 7–14)
AST SERPL-CCNC: 10 U/L — SIGNIFICANT CHANGE UP (ref 0–41)
BILIRUB SERPL-MCNC: 0.3 MG/DL — SIGNIFICANT CHANGE UP (ref 0.2–1.2)
BUN SERPL-MCNC: 92 MG/DL — CRITICAL HIGH (ref 10–20)
CALCIUM SERPL-MCNC: 9.9 MG/DL — SIGNIFICANT CHANGE UP (ref 8.5–10.1)
CHLORIDE SERPL-SCNC: 100 MMOL/L — SIGNIFICANT CHANGE UP (ref 98–110)
CO2 SERPL-SCNC: 22 MMOL/L — SIGNIFICANT CHANGE UP (ref 17–32)
CREAT SERPL-MCNC: 4 MG/DL — HIGH (ref 0.7–1.5)
GLUCOSE SERPL-MCNC: 147 MG/DL — HIGH (ref 70–99)
HCT VFR BLD CALC: 31.4 % — LOW (ref 37–47)
HGB BLD-MCNC: 9 G/DL — LOW (ref 12–16)
MCHC RBC-ENTMCNC: 25.5 PG — LOW (ref 27–31)
MCHC RBC-ENTMCNC: 28.7 G/DL — LOW (ref 32–37)
MCV RBC AUTO: 89 FL — SIGNIFICANT CHANGE UP (ref 81–99)
NRBC # BLD: 0 /100 WBCS — SIGNIFICANT CHANGE UP (ref 0–0)
PLATELET # BLD AUTO: 255 K/UL — SIGNIFICANT CHANGE UP (ref 130–400)
POTASSIUM SERPL-MCNC: 4.2 MMOL/L — SIGNIFICANT CHANGE UP (ref 3.5–5)
POTASSIUM SERPL-SCNC: 4.2 MMOL/L — SIGNIFICANT CHANGE UP (ref 3.5–5)
PROT SERPL-MCNC: 6.5 G/DL — SIGNIFICANT CHANGE UP (ref 6–8)
RBC # BLD: 3.53 M/UL — LOW (ref 4.2–5.4)
RBC # FLD: 18.1 % — HIGH (ref 11.5–14.5)
SODIUM SERPL-SCNC: 140 MMOL/L — SIGNIFICANT CHANGE UP (ref 135–146)
WBC # BLD: 11.1 K/UL — HIGH (ref 4.8–10.8)
WBC # FLD AUTO: 11.1 K/UL — HIGH (ref 4.8–10.8)

## 2019-03-17 RX ADMIN — AMIODARONE HYDROCHLORIDE 100 MILLIGRAM(S): 400 TABLET ORAL at 05:27

## 2019-03-17 RX ADMIN — PREGABALIN 1000 MICROGRAM(S): 225 CAPSULE ORAL at 11:32

## 2019-03-17 RX ADMIN — FAMOTIDINE 40 MILLIGRAM(S): 10 INJECTION INTRAVENOUS at 05:27

## 2019-03-17 RX ADMIN — Medication 40 MILLIGRAM(S): at 05:27

## 2019-03-17 RX ADMIN — Medication 650 MILLIGRAM(S): at 13:43

## 2019-03-17 RX ADMIN — Medication 100 MILLIGRAM(S): at 11:32

## 2019-03-17 RX ADMIN — Medication 1 SPRAY(S): at 05:28

## 2019-03-17 RX ADMIN — APIXABAN 5 MILLIGRAM(S): 2.5 TABLET, FILM COATED ORAL at 05:27

## 2019-03-17 RX ADMIN — APIXABAN 5 MILLIGRAM(S): 2.5 TABLET, FILM COATED ORAL at 17:16

## 2019-03-17 RX ADMIN — Medication 200 MILLIGRAM(S): at 09:05

## 2019-03-17 RX ADMIN — Medication 180 MILLIGRAM(S): at 05:27

## 2019-03-17 RX ADMIN — Medication 1 SPRAY(S): at 17:16

## 2019-03-17 RX ADMIN — CALCITRIOL 0.5 MICROGRAM(S): 0.5 CAPSULE ORAL at 11:32

## 2019-03-17 RX ADMIN — Medication 50 MILLIGRAM(S): at 05:27

## 2019-03-17 RX ADMIN — Medication 200 MILLIGRAM(S): at 03:56

## 2019-03-17 RX ADMIN — FAMOTIDINE 40 MILLIGRAM(S): 10 INJECTION INTRAVENOUS at 17:16

## 2019-03-17 RX ADMIN — ISOSORBIDE MONONITRATE 30 MILLIGRAM(S): 60 TABLET, EXTENDED RELEASE ORAL at 11:32

## 2019-03-17 RX ADMIN — Medication 50 MILLIGRAM(S): at 17:16

## 2019-03-17 RX ADMIN — SPIRONOLACTONE 25 MILLIGRAM(S): 25 TABLET, FILM COATED ORAL at 05:27

## 2019-03-17 NOTE — PROGRESS NOTE ADULT - ASSESSMENT
SOB due to volume overload, CHF and worsening renal failure    Hx of  HTN, ASHD, CAD, old MI, CABG, afib, sp failed ablation, valvular dis ( AS, MR, TR), Pul HTN  Hx of Hyperlipidemia  Hx of CKD stage 5  hx of cronic anemia  hx of Oa, DDD, DJD  Hx of GERD, diverticulosis  Hx of thyroid nodule  Hx of hysterectomy    pt 's ECHO reviewed which shows EF 45-50%, AS, MR, TR, Pul HTN,   pt's troponin sl elevated but probably of renal origin  pt placed on IV diuretics with improvement of SOB  monitor renal parameters and electrolytes  cont all home meds  cardio consult: amiodarone inc to 200mg, to cont IV diuresis  renal consult

## 2019-03-17 NOTE — PROGRESS NOTE ADULT - ASSESSMENT
SOB due to volume overload, CHF and worsening renal failure    Hx of  HTN, ASHD, CAD, old MI, CABG, afib, sp failed ablation, valvular dis ( AS, MR, TR), Pul HTN  Hx of Hyperlipidemia  Hx of CKD stage 5  hx of cronic anemia  hx of Oa, DDD, DJD  Hx of GERD, diverticulosis  Hx of thyroid nodule  Hx of hysterectomy    pt 's ECHO reviewed which shows EF 45-50%, AS, MR, TR, Pul HTN,   pt's troponin sl elevated but probably of renal origin  pt placed on IV diuretics with improvement of SOB  monitor renal parameters and electrolytes  cont all home meds  cardio consult:  inc amiodarone to 200mg  renal consult

## 2019-03-17 NOTE — CONSULT NOTE ADULT - SUBJECTIVE AND OBJECTIVE BOX
Patient is a 73y old  Female who presents with a chief complaint of Cough and shortness of breath due to volume overload, CHF (16 Mar 2019 19:05)      HPI:  74 y/o female with pmh of ckd 5, HTN, CAD, h/o MI s/p CABG, CHF w/ reduced ejection fraction, moderate Pulmonary Hypertension, moderate Aortic Valve stenosis, afib s/p ablation presenting for cough and shortness of breath.  Patient reports that for about a 3 month period she has shortness of breath.  Recently, within the last week the patient explains that the shortness of breath has worsened and that now it is associuated with a cough.  She explains that the cough is productive of yellow phlegm, not in large amounts and not blood tinged.  She explains that the cough keeps her up at night and is exacerbated with laying down and with positional changes.  She has been treated for the cough as an outpatient but explains this was not effective.  No fevers, no chills, no night sweats, no abd pain, no chest pain, no headaches, no dizziness, no lightheadedness.  Patient does report a post nasal drip with congestion in the nose. (15 Mar 2019 01:05).  pt with mild leg edema.   pt on admission had ekg with afib with rapid v response.        PAST MEDICAL & SURGICAL HISTORY:  Thyroid nodule  Degenerative joint disease  Osteoarthritis  Diverticulosis  GERD (gastroesophageal reflux disease)  Heart failure with reduced ejection fraction  Pulmonary hypertension, moderate to severe  Aortic stenosis, moderate  Atrial fibrillation: status post cardioversion  CKD (chronic kidney disease): stage 5, no dialysis  MI (myocardial infarction)  CAD (coronary artery disease)  Kidney stone  HTN (hypertension)  S/P CABG (coronary artery bypass graft)  AV fistula  H/O abdominal hysterectomy  H/O cardiac radiofrequency ablation      PREVIOUS DIAGNOSTIC TESTING:      ECHO  FINDINGS:< from: Transthoracic Echocardiogram (01.13.19 @ 10:53) >  Summary:   1. Left ventricular ejection fraction, by visual estimation, is 45 to   50%.   2. Mildly increased LV wall thickness.   3. Mild to moderate mitral valve regurgitation.   4. Thickening and calcification of the anterior and posterior mitral   valve leaflets.   5. Mild-moderate tricuspid regurgitation.   6. Mild aortic regurgitation.   7. Estimated pulmonary artery systolic pressure is 60.9 mmHg assuming a   right atrial pressure of 15 mmHg, which is consistent with severe   pulmonary hypertension.   8. Peak transaortic gradient is 32.7 mmHg, mean transaortic gradient   equals 15.5 mmHg, the calculated aortic valve area equals 1.39 cm² by the   continuity equation consistent with moderate aortic stenosis.    MECATIONS  (STANDING):  allopurinol 100 milliGRAM(s) Oral daily  amiodarone    Tablet 100 milliGRAM(s) Oral daily  apixaban 5 milliGRAM(s) Oral every 12 hours  calcitriol   Capsule 0.5 MICROGram(s) Oral daily  chlorhexidine 4% Liquid 1 Application(s) Topical <User Schedule>  cyanocobalamin 1000 MICROGram(s) Oral daily  diltiazem    milliGRAM(s) Oral daily  famotidine    Tablet 40 milliGRAM(s) Oral two times a day  fluticasone propionate 50 MICROgram(s)/spray Nasal Spray 1 Spray(s) Both Nostrils two times a day  furosemide   Injectable 40 milliGRAM(s) IV Push daily  isosorbide   mononitrate ER Tablet (IMDUR) 30 milliGRAM(s) Oral daily  metoprolol tartrate 50 milliGRAM(s) Oral two times a day  spironolactone 25 milliGRAM(s) Oral daily    MEDICATIONS  (PRN):  acetaminophen   Tablet .. 650 milliGRAM(s) Oral every 6 hours PRN Mild Pain (1 - 3)  guaiFENesin   Syrup  (Sugar-Free) 200 milliGRAM(s) Oral every 6 hours PRN Cough  ondansetron Injectable 4 milliGRAM(s) IV Push every 6 hours PRN Nausea and/or Vomiting      FAMILY HISTORY:  Family history of lung cancer (Father)  Family history of CHF (congestive heart failure) (Mother)      SOCIAL HISTORY:  CIGARETTES: none  ALCOHOL: none  DRUGS: none                      REVIEW OF SYSTEMS:  CONSTITUTIONAL: No distress, Looks stable  NECK: No pain   RESPIRATORY:(+) cough, wheezing,(+) shortness of breath  CARDIOVASCULAR: No chest pain, (+)SOB, palpitations,(+) leg swelling  GASTROINTESTINAL: No abdominal or epigastric pain. No nausea, vomiting, or hematemesis;  No melena.  NEUROLOGICAL: No dizziness, headaches, memory loss, loss of strength  SKIN: No itching, burning, rashes, or lesions   ENDOCRINE: No heat or cold intolerance  MUSCULOSKELETAL: No joint pain, No  swelling; No muscle pain  PSYCHIATRIC: No depression, anxiety, mood swings, or difficulty sleeping  ALLERGY: No hives, itching, rash          Vital Signs Last 24 Hrs  T(C): 35.7 (17 Mar 2019 12:58), Max: 36.6 (16 Mar 2019 20:20)  T(F): 96.3 (17 Mar 2019 12:58), Max: 97.8 (16 Mar 2019 20:20)  HR: 85 (17 Mar 2019 12:58) (70 - 85)  BP: 102/52 (17 Mar 2019 12:58) (102/52 - 126/53)  BP(mean): --  RR: 18 (17 Mar 2019 12:58) (18 - 19)  SpO2: 98% (16 Mar 2019 19:33) (90% - 98%)                      PHYSICAL EXAM:  GENERAL: No distress, well developed  HEAD:  Atraumatic, Normocephalic  NECK: Supple, (+) JVD, No Bruit of either carotid arteries  NERVOUS SYSTEM:  Alert, Awake, Oriented to time, place, person; Normal memory and speech; Normal motor Strength 5/5 B/L upper and lower extremities  CHEST/LUNG: bilateral decreased breath sounds at bases; No wheeze, crackle, rales, (+)rhonchi  HEART: irRegular ,irregular heart beat, S1, A2, P2, No S3, No S4, No gallop, No murmur  ABDOMEN: Soft, Non tender, Non distended; Bowel sounds present  EXTREMITIES:  2+ Peripheral Pulses, No clubbing, plus 1 bilateral lower ext edema.  SKIN: No rashes or lesions        ECG:< from: 12 Lead ECG (03.14.19 @ 20:33) >  Diagnosis Line Atrial fibrillation with rapid ventricular response  PossibleInferior infarct , age undetermined  Abnormal ECG      CXRAY    < from: Xray Chest 2 Views PA/Lat (03.14.19 @ 20:08) >  Enlarged cardiac silhouette with vascular congestion, bilateral pleural   effusions and bibasilar opacities. Constellation of findings are   consistent with CHF.      < end of copied text >      I&O's Detail      LABS:                        9.0    11.10 )-----------( 255      ( 17 Mar 2019 07:25 )             31.4     03-17    140  |  100  |  92<HH>  ----------------------------<  147<H>  4.2   |  22  |  4.0<H>    Ca    9.9      17 Mar 2019 07:25    TPro  6.5  /  Alb  3.4<L>  /  TBili  0.3  /  DBili  x   /  AST  10  /  ALT  9   /  AlkPhos  50  03-17            I&O's Summary      RADIOLOGY & ADDITIONAL STUDIES:

## 2019-03-17 NOTE — CONSULT NOTE ADULT - ASSESSMENT
valvular chf and back in afib with rapid v rate  cont iv lasix and monitor renal fxn closely  f/u renal  cont anticoagulation  increase amio to 200 q24  repeat ekg in am  cont other home meds

## 2019-03-18 LAB
ALBUMIN SERPL ELPH-MCNC: 3.4 G/DL — LOW (ref 3.5–5.2)
ALP SERPL-CCNC: 54 U/L — SIGNIFICANT CHANGE UP (ref 30–115)
ALT FLD-CCNC: 10 U/L — SIGNIFICANT CHANGE UP (ref 0–41)
ANION GAP SERPL CALC-SCNC: 17 MMOL/L — HIGH (ref 7–14)
AST SERPL-CCNC: 12 U/L — SIGNIFICANT CHANGE UP (ref 0–41)
BILIRUB SERPL-MCNC: 0.3 MG/DL — SIGNIFICANT CHANGE UP (ref 0.2–1.2)
BUN SERPL-MCNC: 98 MG/DL — CRITICAL HIGH (ref 10–20)
CALCIUM SERPL-MCNC: 10.1 MG/DL — SIGNIFICANT CHANGE UP (ref 8.5–10.1)
CHLORIDE SERPL-SCNC: 94 MMOL/L — LOW (ref 98–110)
CO2 SERPL-SCNC: 23 MMOL/L — SIGNIFICANT CHANGE UP (ref 17–32)
CREAT SERPL-MCNC: 4.3 MG/DL — CRITICAL HIGH (ref 0.7–1.5)
GLUCOSE SERPL-MCNC: 151 MG/DL — HIGH (ref 70–99)
HCT VFR BLD CALC: 31.6 % — LOW (ref 37–47)
HGB BLD-MCNC: 9.1 G/DL — LOW (ref 12–16)
MCHC RBC-ENTMCNC: 25.6 PG — LOW (ref 27–31)
MCHC RBC-ENTMCNC: 28.8 G/DL — LOW (ref 32–37)
MCV RBC AUTO: 88.8 FL — SIGNIFICANT CHANGE UP (ref 81–99)
NRBC # BLD: 0 /100 WBCS — SIGNIFICANT CHANGE UP (ref 0–0)
PLATELET # BLD AUTO: 271 K/UL — SIGNIFICANT CHANGE UP (ref 130–400)
POTASSIUM SERPL-MCNC: 4.6 MMOL/L — SIGNIFICANT CHANGE UP (ref 3.5–5)
POTASSIUM SERPL-SCNC: 4.6 MMOL/L — SIGNIFICANT CHANGE UP (ref 3.5–5)
PROT SERPL-MCNC: 6.4 G/DL — SIGNIFICANT CHANGE UP (ref 6–8)
RBC # BLD: 3.56 M/UL — LOW (ref 4.2–5.4)
RBC # FLD: 18 % — HIGH (ref 11.5–14.5)
SODIUM SERPL-SCNC: 134 MMOL/L — LOW (ref 135–146)
WBC # BLD: 11.48 K/UL — HIGH (ref 4.8–10.8)
WBC # FLD AUTO: 11.48 K/UL — HIGH (ref 4.8–10.8)

## 2019-03-18 RX ADMIN — CALCITRIOL 0.5 MICROGRAM(S): 0.5 CAPSULE ORAL at 11:31

## 2019-03-18 RX ADMIN — Medication 100 MILLIGRAM(S): at 11:31

## 2019-03-18 RX ADMIN — Medication 650 MILLIGRAM(S): at 17:58

## 2019-03-18 RX ADMIN — Medication 650 MILLIGRAM(S): at 23:21

## 2019-03-18 RX ADMIN — APIXABAN 5 MILLIGRAM(S): 2.5 TABLET, FILM COATED ORAL at 17:47

## 2019-03-18 RX ADMIN — Medication 40 MILLIGRAM(S): at 05:37

## 2019-03-18 RX ADMIN — FAMOTIDINE 40 MILLIGRAM(S): 10 INJECTION INTRAVENOUS at 05:33

## 2019-03-18 RX ADMIN — PREGABALIN 1000 MICROGRAM(S): 225 CAPSULE ORAL at 11:31

## 2019-03-18 RX ADMIN — SPIRONOLACTONE 25 MILLIGRAM(S): 25 TABLET, FILM COATED ORAL at 08:15

## 2019-03-18 RX ADMIN — FAMOTIDINE 40 MILLIGRAM(S): 10 INJECTION INTRAVENOUS at 17:47

## 2019-03-18 RX ADMIN — Medication 200 MILLIGRAM(S): at 00:34

## 2019-03-18 RX ADMIN — Medication 50 MILLIGRAM(S): at 17:47

## 2019-03-18 RX ADMIN — Medication 50 MILLIGRAM(S): at 05:37

## 2019-03-18 RX ADMIN — APIXABAN 5 MILLIGRAM(S): 2.5 TABLET, FILM COATED ORAL at 05:33

## 2019-03-18 RX ADMIN — Medication 200 MILLIGRAM(S): at 16:59

## 2019-03-18 RX ADMIN — Medication 180 MILLIGRAM(S): at 05:33

## 2019-03-18 RX ADMIN — ISOSORBIDE MONONITRATE 30 MILLIGRAM(S): 60 TABLET, EXTENDED RELEASE ORAL at 11:31

## 2019-03-18 RX ADMIN — Medication 650 MILLIGRAM(S): at 16:58

## 2019-03-18 RX ADMIN — Medication 1 SPRAY(S): at 05:37

## 2019-03-18 RX ADMIN — Medication 200 MILLIGRAM(S): at 22:50

## 2019-03-18 RX ADMIN — Medication 650 MILLIGRAM(S): at 22:50

## 2019-03-18 NOTE — CONSULT NOTE ADULT - SUBJECTIVE AND OBJECTIVE BOX
Patient is a 73y old  Female who presents with a chief complaint of Cough and shortness of breath due to volume overload, CHF, valvular disease and afib with RVR (17 Mar 2019 18:24)      HPI:  74 y/o female with pmh of ckd 5, HTN, CAD, ho MI s/p CABG, CHF w/ reduced ejection fraction, moderate Pulmonary Hypertension, moderate Aortic Valve stenosis, afib s/p ablation presenting for cough and shortness of breath.  Patient reports that for about a 3 month period she has shortness of breath.  Recently, within the last week the patient explains that the shortness of breath has worsened and that now it is associuated with a cough.  She explains that the cough is productive of yellow phlegm, not in large amounts and not blood tinged.  She explains that the cough keeps her up at night and is exacerbated with laying down and with positional changes.  She has been treated for the cough as an outpatient but explains this was not effective.  No fevers, no chills, no night sweats, no abd pain, no chest pain, no headaches, no dizziness, no lightheadedness.  Patient does report a post nasal drip with congestion in the nose. (15 Mar 2019 01:05)      PAST MEDICAL & SURGICAL HISTORY:  Thyroid nodule  Degenerative joint disease  Osteoarthritis  Diverticulosis  GERD (gastroesophageal reflux disease)  Heart failure with reduced ejection fraction  Pulmonary hypertension, moderate to severe  Aortic stenosis, moderate  Atrial fibrillation: status post cardioversion  CKD (chronic kidney disease): stage 5, no dialysis  MI (myocardial infarction)  CAD (coronary artery disease)  Kidney stone  HTN (hypertension)  S/P CABG (coronary artery bypass graft)  AV fistula  H/O abdominal hysterectomy  H/O cardiac radiofrequency ablation      PREVIOUS DIAGNOSTIC TESTING:      ECHO  FINDINGS:    STRESS TEST  FINDINGS:    CATHETERIZATION  FINDINGS:    MEDICATIONS  (STANDING):  allopurinol 100 milliGRAM(s) Oral daily  apixaban 5 milliGRAM(s) Oral every 12 hours  calcitriol   Capsule 0.5 MICROGram(s) Oral daily  chlorhexidine 4% Liquid 1 Application(s) Topical <User Schedule>  cyanocobalamin 1000 MICROGram(s) Oral daily  diltiazem    milliGRAM(s) Oral daily  famotidine    Tablet 40 milliGRAM(s) Oral two times a day  fluticasone propionate 50 MICROgram(s)/spray Nasal Spray 1 Spray(s) Both Nostrils two times a day  furosemide   Injectable 40 milliGRAM(s) IV Push daily  isosorbide   mononitrate ER Tablet (IMDUR) 30 milliGRAM(s) Oral daily  metoprolol tartrate 50 milliGRAM(s) Oral two times a day  spironolactone 25 milliGRAM(s) Oral daily    MEDICATIONS  (PRN):  acetaminophen   Tablet .. 650 milliGRAM(s) Oral every 6 hours PRN Mild Pain (1 - 3)  guaiFENesin   Syrup  (Sugar-Free) 200 milliGRAM(s) Oral every 6 hours PRN Cough  ondansetron Injectable 4 milliGRAM(s) IV Push every 6 hours PRN Nausea and/or Vomiting      FAMILY HISTORY:  Family history of lung cancer (Father)  Family history of CHF (congestive heart failure) (Mother)      SOCIAL HISTORY:  CIGARETTES:  ALCOHOL:  DRUGS:                      REVIEW OF SYSTEMS:  CONSTITUTIONAL: frequent cough, sputum, productive cough, no distress  NECK: No pain  RESPIRATORY: frequent cough, no wheezing, but shortness of breath  CARDIOVASCULAR: No chest pain, but SOB and leg swelling  GASTROINTESTINAL: No abdominal or epigastric pain. No nausea, vomiting, or hematemesis;  No melena.  NEUROLOGICAL: No dizziness, headaches  SKIN: No itching, burning, rashes, or lesions   ENDOCRINE: No heat or cold intolerance  MUSCULOSKELETAL: joint pain  PSYCHIATRIC: depression & anxiety,  ALLERGY: No hives, itching, rash          Vital Signs Last 24 Hrs  T(C): 36.2 (18 Mar 2019 13:32), Max: 36.4 (17 Mar 2019 20:56)  T(F): 97.2 (18 Mar 2019 13:32), Max: 97.6 (17 Mar 2019 20:56)  HR: 83 (18 Mar 2019 13:32) (83 - 125)  BP: 118/53 (18 Mar 2019 13:32) (102/71 - 118/53)  BP(mean): --  RR: 18 (18 Mar 2019 13:32) (18 - 18)  SpO2: 96% (18 Mar 2019 10:51) (87% - 99%)                      PHYSICAL EXAM:  GENERAL: No distress, lost weight  HEAD:  Atraumatic, Normocephalic  NECK: Supple, No JVD, No Bruit of either carotid arteries  NERVOUS SYSTEM:  Alert, Awake, Oriented to time, place, person; Normal memory and speech; Normal motor Strength 5/5 B/L upper and lower extremities  CHEST/LUNG: decreased air entry to lung base bilaterally; No wheeze, but coarse right lung crackle, no wet rales, but rhonchi  HEART: Irregular heart beat, S1, A2, P2, No S3, No gallop, 2/6 systolic ejection murmur  ABDOMEN: Soft, Non tender, Non distended; Bowel sounds present  EXTREMITIES:  2+ Peripheral Pulses, No clubbing, No edema  SKIN: No rashes or lesions    ECG: < from: 12 Lead ECG (03.18.19 @ 05:51) >  Atrial fibrillation with rapid ventricular response with premature ventricular  or aberrantly conducted complexes  Nonspecific T wave abnormality    < end of copied text >      I&O's Detail      LABS:                        9.1    11.48 )-----------( 271      ( 18 Mar 2019 06:09 )             31.6     03-18    134<L>  |  94<L>  |  98<HH>  ----------------------------<  151<H>  4.6   |  23  |  4.3<HH>    Ca    10.1      18 Mar 2019 06:09    TPro  6.4  /  Alb  3.4<L>  /  TBili  0.3  /  DBili  x   /  AST  12  /  ALT  10  /  AlkPhos  54  03-18            I&O's Summary      RADIOLOGY & ADDITIONAL STUDIES: < from: Xray Chest 2 Views PA/Lat (03.14.19 @ 20:08) >  Pulmonary vascular congestion and bibasilar   airspace opacities. Bilateral pleural effusions with fluid in the major   fissure. No pneumothorax.    < end of copied text >

## 2019-03-18 NOTE — PROGRESS NOTE ADULT - ASSESSMENT
SOB due to volume overload, CHF, COPD exacerbation  and worsening renal failure    Hx of  HTN, ASHD, CAD, old MI, CABG, afib, sp failed ablation, valvular dis ( AS, MR, TR), Pul HTN  Hx of Hyperlipidemia  Hx of CKD stage 5  hx of cronic anemia  hx of Oa, DDD, DJD  Hx of GERD, diverticulosis  Hx of thyroid nodule  Hx of hysterectomy    pt 's ECHO reviewed which shows EF 45-50%, AS, MR, TR, Pul HTN,   pt's troponin sl elevated but probably of renal origin  pt placed on IV diuretics which were inc by renal to furosemide 60 mg today   monitor renal parameters and electrolytes  cont all home meds  cardio consult:  amiodarone D/C, cont cardizem 180mg  renal consult and recommendations appreciated

## 2019-03-18 NOTE — CONSULT NOTE ADULT - ATTENDING COMMENTS
I was Physically Present for the key portions of the evaluation   I agree with the above History  , Physical examination Assessment and plan   I have Reviewed , Modified or appended where appropriate.  Please check A and P as above   1- FRANC on CKD 4 or progression of CKD / CHF  edema lower ext on exam  agree with increasing lasix to 60 IV q24h   Hold aldactone  strict I and O   daily weights  BMP in AM   may need RRT soon   will follow

## 2019-03-18 NOTE — PROGRESS NOTE ADULT - ASSESSMENT
# CHF exacerbation / Pneumonia:  - chest x-ray findings likely 2/2 congestion, Reduced ejection fraction on recent echo.  - Cardiac enzymes stable.  - Continue with IV Lasix 40mg daily   - start Levaquin 750 mg IV once then 500 IV q 38 hrs (renally dosed) , fu EKG in the AM for qtc   - Elevated BNP 2900 on admission   - strict is and os, daily weights    # Suspected Vitamin B12 Deficiency  -Continue with cyanocobalamin    # Afib  - Continue with Eliquis, Cardizem 180 mg  , BB  - d/c amiodarone per Dr. Child     # Coronary Artery Disease, history of Myocardial Infarction, status post Coronary Artery Bypass Graft, HTN  -Continue with Imdur and Metoprolol    # AKIN on CKD:   - AM cr 4.3 from 3.9 on admission   - Nephrology consult     # DVT prophylaxis -On Eliquis  # GI ppx: famotidine  # from Home

## 2019-03-18 NOTE — CONSULT NOTE ADULT - SUBJECTIVE AND OBJECTIVE BOX
NEPHROLOGY CONSULTATION NOTE    Patient is a 73y Female whom presented to the hospital with pmh of ckd 5, HTN, CAD, ho MI s/p CABG, CHF w/ reduced ejection fraction, moderate Pulmonary Hypertension, moderate Aortic Valve stenosis, afib s/p ablation presenting for cough and shortness of breath.  Patient reports that for about a 3 month period she has shortness of breath.  Recently, within the last week the patient explains that the shortness of breath has worsened and that now it is associated with a cough.  She explains that the cough is productive of yellow phlegm, not in large amounts and not blood tinged.  She explains that the cough keeps her up at night and is exacerbated with laying down and with positional changes.  She has been treated for the cough as an outpatient but explains this was not effective.  No fevers, no chills, no night sweats, no abd pain, no chest pain, no headaches, no dizziness, no lightheadedness.  Patient does report a post nasal drip with congestion in the nose.     PAST MEDICAL & SURGICAL HISTORY:  Thyroid nodule  Degenerative joint disease  Osteoarthritis  Diverticulosis  GERD (gastroesophageal reflux disease)  Heart failure with reduced ejection fraction  Pulmonary hypertension, moderate to severe  Aortic stenosis, moderate  Atrial fibrillation: status post cardioversion  CKD (chronic kidney disease): stage 5, no dialysis  MI (myocardial infarction)  CAD (coronary artery disease)  Kidney stone  HTN (hypertension)  S/P CABG (coronary artery bypass graft)  AV fistula  H/O abdominal hysterectomy  H/O cardiac radiofrequency ablation    Allergies:  No Known Allergies    Home Medications Reviewed  Hospital Medications:   MEDICATIONS  (STANDING):  allopurinol 100 milliGRAM(s) Oral daily  apixaban 5 milliGRAM(s) Oral every 12 hours  calcitriol   Capsule 0.5 MICROGram(s) Oral daily  chlorhexidine 4% Liquid 1 Application(s) Topical <User Schedule>  cyanocobalamin 1000 MICROGram(s) Oral daily  diltiazem    milliGRAM(s) Oral daily  famotidine    Tablet 40 milliGRAM(s) Oral two times a day  fluticasone propionate 50 MICROgram(s)/spray Nasal Spray 1 Spray(s) Both Nostrils two times a day  furosemide   Injectable 40 milliGRAM(s) IV Push daily  isosorbide   mononitrate ER Tablet (IMDUR) 30 milliGRAM(s) Oral daily  levoFLOXacin IVPB 750 milliGRAM(s) IV Intermittent once  metoprolol tartrate 50 milliGRAM(s) Oral two times a day  spironolactone 25 milliGRAM(s) Oral daily      SOCIAL HISTORY:  Denies ETOH,Smoking,   FAMILY HISTORY:  Family history of lung cancer (Father)  Family history of CHF (congestive heart failure) (Mother)        REVIEW OF SYSTEMS:  CONSTITUTIONAL: + weakness,no fevers or chills  EYES/ENT: No visual changes;  No vertigo or throat pain   NECK: No pain or stiffness  RESPIRATORY: + cough, no wheezing, hemoptysis; + shortness of breath  CARDIOVASCULAR: No chest pain or palpitations.  GASTROINTESTINAL: No abdominal or epigastric pain. No nausea, vomiting, or hematemesis; No diarrhea or constipation. No melena or hematochezia.  GENITOURINARY: No dysuria, frequency, foamy urine, urinary urgency, incontinence or hematuria  NEUROLOGICAL: No numbness or weakness  SKIN: No itching, burning, rashes, or lesions   VASCULAR: No bilateral lower extremity edema.   All other review of systems is negative unless indicated above.    VITALS:  T(F): 97.2 (03-18-19 @ 13:32), Max: 97.6 (03-17-19 @ 20:56)  HR: 83 (03-18-19 @ 13:32)  BP: 118/53 (03-18-19 @ 13:32)  RR: 18 (03-18-19 @ 13:32)  SpO2: 96% (03-18-19 @ 10:51)        I&O's Detail        PHYSICAL EXAM:  Constitutional: NAD  HEENT: anicteric sclera, oropharynx clear, MMM  Neck: No JVD  Respiratory: decrease air entry bilaterally mainly  at bases, bilateral crackles  Cardiovascular: S1, S2, RRR  Gastrointestinal: BS+, soft, NT/ND  Extremities: No cyanosis or clubbing. +++ peripheral edema  Neurological: A/O x 3, no focal deficits  Psychiatric: Normal mood, normal affect  : No CVA tenderness. No simeon.   Skin: No rashes      LABS:  03-18    134<L>  |  94<L>  |  98<HH>  Serum Pro-Brain Natriuretic Peptide: 43002 pg/mL (03.14.19 @ 20:10)    ----------------------------<  151<H>  4.6   |  23  |  4.3<HH>    Ca    10.1      18 Mar 2019 06:09    TPro  6.4  /  Alb  3.4<L>  /  TBili  0.3  /  DBili      /  AST  12  /  ALT  10  /  AlkPhos  54  03-18    Creatinine Trend: 4.3 <--, 4.0 <--, 3.5 <--, 3.6 <--, 3.9 <--                        9.1    11.48 )-----------( 271      ( 18 Mar 2019 06:09 )             31.6       Urine Studies:              RADIOLOGY & ADDITIONAL STUDIES:      < from: Xray Chest 2 Views PA/Lat (03.14.19 @ 20:08) >  Enlarged cardiac silhouette with vascular congestion, bilateral pleural   effusions and bibasilar opacities. Constellation of findings are   consistent with CHF.      < end of copied text >      < from: US Renal (05.09.18 @ 17:10) >  Increased bilateral renal echogenicity consistent with medical renal   disease.    Nonobstructing left renal mid to lower pole calculus. No hydronephrosis.    Multiple bilateral renal cysts.          < end of copied text >    < from: Transthoracic Echocardiogram (01.13.19 @ 10:53) >   1. Left ventricular ejection fraction, by visual estimation, is 45 to   50%.   2. Mildly increased LV wall thickness.   3. Mild to moderate mitral valve regurgitation.   4. Thickening and calcification of the anterior and posterior mitral   valve leaflets.   5. Mild-moderate tricuspid regurgitation.   6. Mild aortic regurgitation.   7. Estimated pulmonary artery systolic pressure is 60.9 mmHg assuming a   right atrial pressure of 15 mmHg, which is consistent with severe   pulmonary hypertension.   8. Peak transaortic gradient is 32.7 mmHg, mean transaortic gradient   equals 15.5 mmHg, the calculated aortic valve area equals 1.39 cm² by the   continuity equation consistent with moderate aortic stenosis.    < end of copied text > NEPHROLOGY CONSULTATION NOTE    Patient is a 73y Female whom presented to the hospital with pmh of ckd 5, HTN, CAD, ho MI s/p CABG, CHF w/ reduced ejection fraction, moderate Pulmonary Hypertension, moderate Aortic Valve stenosis, afib s/p ablation presenting for cough and shortness of breath.  Patient reports that for about a 3 month period she has shortness of breath.  Recently, within the last week the patient explains that the shortness of breath has worsened and that now it is associated with a cough.  She explains that the cough is productive of yellow phlegm, not in large amounts and not blood tinged.  She explains that the cough keeps her up at night and is exacerbated with laying down and with positional changes.  She has been treated for the cough as an outpatient but explains this was not effective.  No fevers, no chills, no night sweats, no abd pain, no chest pain, no headaches, no dizziness, no lightheadedness.  Patient does report a post nasal drip with congestion in the nose.     PAST MEDICAL & SURGICAL HISTORY:  Thyroid nodule  Degenerative joint disease  Osteoarthritis  Diverticulosis  GERD (gastroesophageal reflux disease)  Heart failure with reduced ejection fraction  Pulmonary hypertension, moderate to severe  Aortic stenosis, moderate  Atrial fibrillation: status post cardioversion  CKD (chronic kidney disease): stage 5, no dialysis  MI (myocardial infarction)  CAD (coronary artery disease)  Kidney stone  HTN (hypertension)  S/P CABG (coronary artery bypass graft)  AV fistula  H/O abdominal hysterectomy  H/O cardiac radiofrequency ablation    Allergies:  No Known Allergies    Home Medications Reviewed  Hospital Medications:   MEDICATIONS  (STANDING):  allopurinol 100 milliGRAM(s) Oral daily  apixaban 5 milliGRAM(s) Oral every 12 hours  calcitriol   Capsule 0.5 MICROGram(s) Oral daily  chlorhexidine 4% Liquid 1 Application(s) Topical <User Schedule>  cyanocobalamin 1000 MICROGram(s) Oral daily  diltiazem    milliGRAM(s) Oral daily  famotidine    Tablet 40 milliGRAM(s) Oral two times a day  fluticasone propionate 50 MICROgram(s)/spray Nasal Spray 1 Spray(s) Both Nostrils two times a day  furosemide   Injectable 40 milliGRAM(s) IV Push daily  isosorbide   mononitrate ER Tablet (IMDUR) 30 milliGRAM(s) Oral daily  levoFLOXacin IVPB 750 milliGRAM(s) IV Intermittent once  metoprolol tartrate 50 milliGRAM(s) Oral two times a day  spironolactone 25 milliGRAM(s) Oral daily      SOCIAL HISTORY:  Denies ETOH,Smoking,   FAMILY HISTORY:  Family history of lung cancer (Father)  Family history of CHF (congestive heart failure) (Mother)        REVIEW OF SYSTEMS:  CONSTITUTIONAL: + weakness,no fevers or chills  EYES/ENT: No visual changes;  No vertigo or throat pain   NECK: No pain or stiffness  RESPIRATORY: + cough, no wheezing, hemoptysis; + shortness of breath  CARDIOVASCULAR: No chest pain or palpitations.  GASTROINTESTINAL: No abdominal or epigastric pain. No nausea, vomiting, or hematemesis; No diarrhea or constipation. No melena or hematochezia.  GENITOURINARY: No dysuria, frequency, foamy urine, urinary urgency, incontinence or hematuria  NEUROLOGICAL: No numbness or weakness  SKIN: No itching, burning, rashes, or lesions   VASCULAR: No bilateral lower extremity edema.   All other review of systems is negative unless indicated above.    VITALS:  T(F): 97.2 (03-18-19 @ 13:32), Max: 97.6 (03-17-19 @ 20:56)  HR: 83 (03-18-19 @ 13:32)  BP: 118/53 (03-18-19 @ 13:32)  RR: 18 (03-18-19 @ 13:32)  SpO2: 96% (03-18-19 @ 10:51)        I&O's Detail        PHYSICAL EXAM:  Constitutional: NAD  HEENT: anicteric sclera, oropharynx clear, MMM  Neck: No JVD  Respiratory: decrease air entry bilaterally mainly  at bases, bilateral crackles  Cardiovascular: S1, S2, irregular   Gastrointestinal: BS+, soft, NT/ND  Extremities: No cyanosis or clubbing. +++ peripheral edema  Neurological: A/O x 3, no focal deficits  Psychiatric: Normal mood, normal affect  : No CVA tenderness. No simeon.   Skin: No rashes      LABS:  03-18    134<L>  |  94<L>  |  98<HH>  Serum Pro-Brain Natriuretic Peptide: 71734 pg/mL (03.14.19 @ 20:10)    ----------------------------<  151<H>  4.6   |  23  |  4.3<HH>    Ca    10.1      18 Mar 2019 06:09    TPro  6.4  /  Alb  3.4<L>  /  TBili  0.3  /  DBili      /  AST  12  /  ALT  10  /  AlkPhos  54  03-18    Creatinine Trend: 4.3 <--, 4.0 <--, 3.5 <--, 3.6 <--, 3.9 <--                        9.1    11.48 )-----------( 271      ( 18 Mar 2019 06:09 )             31.6       Urine Studies:              RADIOLOGY & ADDITIONAL STUDIES:      < from: Xray Chest 2 Views PA/Lat (03.14.19 @ 20:08) >  Enlarged cardiac silhouette with vascular congestion, bilateral pleural   effusions and bibasilar opacities. Constellation of findings are   consistent with CHF.      < end of copied text >      < from: US Renal (05.09.18 @ 17:10) >  Increased bilateral renal echogenicity consistent with medical renal   disease.    Nonobstructing left renal mid to lower pole calculus. No hydronephrosis.    Multiple bilateral renal cysts.          < end of copied text >    < from: Transthoracic Echocardiogram (01.13.19 @ 10:53) >   1. Left ventricular ejection fraction, by visual estimation, is 45 to   50%.   2. Mildly increased LV wall thickness.   3. Mild to moderate mitral valve regurgitation.   4. Thickening and calcification of the anterior and posterior mitral   valve leaflets.   5. Mild-moderate tricuspid regurgitation.   6. Mild aortic regurgitation.   7. Estimated pulmonary artery systolic pressure is 60.9 mmHg assuming a   right atrial pressure of 15 mmHg, which is consistent with severe   pulmonary hypertension.   8. Peak transaortic gradient is 32.7 mmHg, mean transaortic gradient   equals 15.5 mmHg, the calculated aortic valve area equals 1.39 cm² by the   continuity equation consistent with moderate aortic stenosis.    < end of copied text >

## 2019-03-18 NOTE — CONSULT NOTE ADULT - ASSESSMENT
sob, cough, sputum, frequent productive cough, can not stop it, underlying COPD, many years of high  smoking, at least bronchitis and possible PNA , today XRAY: AIR TRAPPED, COPD, PARANCHYMAL CHANGES, INFILTRATION  hypoxia at rest and in walking due to COPD exacerbation  Mild pulmonary congestion due to advanced CKD, hypoxia  chronic A.Fib, failed multiple cardioversion and even ablation    keep on Lasix 40 daily, f/b electrolytes and renal function  O2, antibiotic,   current regimen of 180 mg Cardizem d/c Amiodarone  if HR rises up add Metoprolol 25 mg 3 times daily  as much as possible avoid Albuterol-atrovent

## 2019-03-18 NOTE — CONSULT NOTE ADULT - ASSESSMENT
74 y/o female with pmh of ckd 5, HTN, CAD, ho MI s/p CABG, CHF w/ reduced ejection fraction, moderate Pulmonary Hypertension, moderate Aortic Valve stenosis, afib s/p ablation presenting for cough and shortness of breath was found to be decompensated heart failure secondary to possible bronchitis with FRANC on CKD.      #FRANC on CKD V  r/o cardiorenal syndrome   baseline creatinine 3.7   patient is volume overloaded  increase Lasix to 60 mg IV od   check urine electrolytes,  osmolality, protein to creatinine ratio  accurate intake /out put daily body weight  D/C aldactone , avoid nephrotoxic drugs  kidney ultrasound, adjust medications according to GFR  no need for renal replacement therapy now    #HTN: Blood pressure controlled, continue with metoprolol, nitrate, and Lasix 72 y/o female with pmh of ckd 5, HTN, CAD, ho MI s/p CABG, CHF w/ reduced ejection fraction, moderate Pulmonary Hypertension, moderate Aortic Valve stenosis, afib s/p ablation presenting for cough and shortness of breath was found to be decompensated heart failure secondary to possible bronchitis with FRANC on CKD.      #FRANC on CKD V  r/o cardiorenal syndrome   baseline creatinine 3.7   patient is volume overloaded  increase Lasix to 60 mg IV od   check urine electrolytes,  osmolality, protein to creatinine ratio  accurate intake /out put daily body weight  D/C aldactone for now , avoid nephrotoxic drugs  kidney ultrasound, adjust medications according to GFR  no need for renal replacement therapy now    #HTN: Blood pressure controlled, continue with metoprolol, nitrate, and Lasix

## 2019-03-19 LAB
ANION GAP SERPL CALC-SCNC: 15 MMOL/L — HIGH (ref 7–14)
BUN SERPL-MCNC: 103 MG/DL — CRITICAL HIGH (ref 10–20)
CALCIUM SERPL-MCNC: 10.5 MG/DL — HIGH (ref 8.5–10.1)
CHLORIDE SERPL-SCNC: 96 MMOL/L — LOW (ref 98–110)
CO2 SERPL-SCNC: 25 MMOL/L — SIGNIFICANT CHANGE UP (ref 17–32)
CREAT ?TM UR-MCNC: 46 MG/DL — SIGNIFICANT CHANGE UP
CREAT SERPL-MCNC: 4.5 MG/DL — CRITICAL HIGH (ref 0.7–1.5)
GLUCOSE SERPL-MCNC: 142 MG/DL — HIGH (ref 70–99)
HCT VFR BLD CALC: 29.1 % — LOW (ref 37–47)
HGB BLD-MCNC: 8.7 G/DL — LOW (ref 12–16)
MAGNESIUM SERPL-MCNC: 2.2 MG/DL — SIGNIFICANT CHANGE UP (ref 1.8–2.4)
MCHC RBC-ENTMCNC: 25.9 PG — LOW (ref 27–31)
MCHC RBC-ENTMCNC: 29.9 G/DL — LOW (ref 32–37)
MCV RBC AUTO: 86.6 FL — SIGNIFICANT CHANGE UP (ref 81–99)
MRSA PCR RESULT.: NEGATIVE — SIGNIFICANT CHANGE UP
NRBC # BLD: 0 /100 WBCS — SIGNIFICANT CHANGE UP (ref 0–0)
OSMOLALITY UR: 332 MOS/KG — SIGNIFICANT CHANGE UP (ref 50–1400)
PLATELET # BLD AUTO: 271 K/UL — SIGNIFICANT CHANGE UP (ref 130–400)
POTASSIUM SERPL-MCNC: 4.6 MMOL/L — SIGNIFICANT CHANGE UP (ref 3.5–5)
POTASSIUM SERPL-SCNC: 4.6 MMOL/L — SIGNIFICANT CHANGE UP (ref 3.5–5)
POTASSIUM UR-SCNC: 22 MMOL/L — SIGNIFICANT CHANGE UP
PROT ?TM UR-MCNC: 28 MG/DLG/24H — SIGNIFICANT CHANGE UP
PROT/CREAT UR-RTO: 0.6 RATIO — HIGH (ref 0–0.2)
RBC # BLD: 3.36 M/UL — LOW (ref 4.2–5.4)
RBC # FLD: 18 % — HIGH (ref 11.5–14.5)
SODIUM SERPL-SCNC: 136 MMOL/L — SIGNIFICANT CHANGE UP (ref 135–146)
SODIUM UR-SCNC: 61 MMOL/L — SIGNIFICANT CHANGE UP
WBC # BLD: 10.37 K/UL — SIGNIFICANT CHANGE UP (ref 4.8–10.8)
WBC # FLD AUTO: 10.37 K/UL — SIGNIFICANT CHANGE UP (ref 4.8–10.8)

## 2019-03-19 PROCEDURE — 93010 ELECTROCARDIOGRAM REPORT: CPT

## 2019-03-19 PROCEDURE — 93971 EXTREMITY STUDY: CPT | Mod: 26,RT

## 2019-03-19 RX ORDER — ERYTHROPOIETIN 10000 [IU]/ML
5000 INJECTION, SOLUTION INTRAVENOUS; SUBCUTANEOUS
Qty: 0 | Refills: 0 | Status: DISCONTINUED | OUTPATIENT
Start: 2019-03-19 | End: 2019-03-21

## 2019-03-19 RX ORDER — FUROSEMIDE 40 MG
40 TABLET ORAL DAILY
Qty: 0 | Refills: 0 | Status: DISCONTINUED | OUTPATIENT
Start: 2019-03-19 | End: 2019-03-20

## 2019-03-19 RX ORDER — FUROSEMIDE 40 MG
20 TABLET ORAL ONCE
Qty: 0 | Refills: 0 | Status: COMPLETED | OUTPATIENT
Start: 2019-03-19 | End: 2019-03-19

## 2019-03-19 RX ORDER — SPIRONOLACTONE 25 MG/1
25 TABLET, FILM COATED ORAL EVERY 12 HOURS
Qty: 0 | Refills: 0 | Status: DISCONTINUED | OUTPATIENT
Start: 2019-03-19 | End: 2019-03-21

## 2019-03-19 RX ORDER — DILTIAZEM HCL 120 MG
120 CAPSULE, EXT RELEASE 24 HR ORAL DAILY
Qty: 0 | Refills: 0 | Status: DISCONTINUED | OUTPATIENT
Start: 2019-03-19 | End: 2019-03-20

## 2019-03-19 RX ORDER — FERROUS SULFATE 325(65) MG
325 TABLET ORAL THREE TIMES A DAY
Qty: 0 | Refills: 0 | Status: DISCONTINUED | OUTPATIENT
Start: 2019-03-19 | End: 2019-03-21

## 2019-03-19 RX ADMIN — Medication 20 MILLIGRAM(S): at 11:41

## 2019-03-19 RX ADMIN — Medication 1 SPRAY(S): at 05:52

## 2019-03-19 RX ADMIN — Medication 100 MILLIGRAM(S): at 11:41

## 2019-03-19 RX ADMIN — APIXABAN 5 MILLIGRAM(S): 2.5 TABLET, FILM COATED ORAL at 05:52

## 2019-03-19 RX ADMIN — Medication 325 MILLIGRAM(S): at 22:14

## 2019-03-19 RX ADMIN — FAMOTIDINE 40 MILLIGRAM(S): 10 INJECTION INTRAVENOUS at 05:51

## 2019-03-19 RX ADMIN — Medication 1 SPRAY(S): at 17:36

## 2019-03-19 RX ADMIN — Medication 50 MILLIGRAM(S): at 05:51

## 2019-03-19 RX ADMIN — Medication 40 MILLIGRAM(S): at 05:51

## 2019-03-19 RX ADMIN — Medication 180 MILLIGRAM(S): at 05:52

## 2019-03-19 RX ADMIN — APIXABAN 5 MILLIGRAM(S): 2.5 TABLET, FILM COATED ORAL at 17:35

## 2019-03-19 RX ADMIN — PREGABALIN 1000 MICROGRAM(S): 225 CAPSULE ORAL at 11:42

## 2019-03-19 RX ADMIN — Medication 50 MILLIGRAM(S): at 17:36

## 2019-03-19 RX ADMIN — Medication 325 MILLIGRAM(S): at 12:45

## 2019-03-19 RX ADMIN — CHLORHEXIDINE GLUCONATE 1 APPLICATION(S): 213 SOLUTION TOPICAL at 05:50

## 2019-03-19 RX ADMIN — ISOSORBIDE MONONITRATE 30 MILLIGRAM(S): 60 TABLET, EXTENDED RELEASE ORAL at 11:42

## 2019-03-19 RX ADMIN — SPIRONOLACTONE 25 MILLIGRAM(S): 25 TABLET, FILM COATED ORAL at 17:36

## 2019-03-19 RX ADMIN — ERYTHROPOIETIN 5000 UNIT(S): 10000 INJECTION, SOLUTION INTRAVENOUS; SUBCUTANEOUS at 12:45

## 2019-03-19 RX ADMIN — FAMOTIDINE 40 MILLIGRAM(S): 10 INJECTION INTRAVENOUS at 17:35

## 2019-03-19 RX ADMIN — SPIRONOLACTONE 25 MILLIGRAM(S): 25 TABLET, FILM COATED ORAL at 05:51

## 2019-03-19 NOTE — PROGRESS NOTE ADULT - ASSESSMENT
72 y/o female with pmh of ckd 5, HTN, CAD, ho MI s/p CABG, CHF w/ reduced ejection fraction, moderate Pulmonary Hypertension, moderate Aortic Valve stenosis, afib s/p ablation presenting for cough and shortness of breath was found to be decompensated heart failure secondary to possible bronchitis with FRANC on CKD.  # creatinine was trending up  # check repeat today  # increase lasix to 40  # calcium noted, d/c calcitriol, check ph   # BP on the low side, decrease cardizem to 120  #anemia , start feso4 po q8, and procrit 5000 units s/c weekly  # no indication for RRT  # will follow

## 2019-03-19 NOTE — PHYSICAL THERAPY INITIAL EVALUATION ADULT - GENERAL OBSERVATIONS, REHAB EVAL
Pt seen [id number] for a total of 35 minutes. Pt encountered sitting in chair, No apparent distress agreeable to PT.

## 2019-03-19 NOTE — PHYSICAL THERAPY INITIAL EVALUATION ADULT - PLANNED THERAPY INTERVENTIONS, PT EVAL
ROM/balance training/bed mobility training/gait training/stretching/strengthening/transfer training/stair negotiation

## 2019-03-19 NOTE — PHYSICAL THERAPY INITIAL EVALUATION ADULT - LEVEL OF INDEPENDENCE: GAIT, REHAB EVAL
Pt also ambulated 15ft without Assistive Device, she demo'd increased lateral sway and more impaired balance./contact guard

## 2019-03-19 NOTE — PROGRESS NOTE ADULT - ASSESSMENT
Shortness of breath due to Volume overload, exacerbation of Systolic CHF and worsening renal function    pt cont on ID diuretics  pt ff by renal and cardio Shortness of breath due to Volume overload, exacerbation of Systolic CHF, COPD exacerbation and worsening renal function  Hx of HTN, ASHD, CAD, MI, sp CABG, afib, valvular disease ( AS, MR), Pul HTN, failed ablation  Hx of DLD  Hx of CKD 5  Hx of chronic anemia  Hx of OA, DDD, DJD  Hx of GERD, diverticulosis       pt cont on Iv  diuretics  pt ff by renal and cardio  cont all home meds  monitor renal parameters and electrolytes

## 2019-03-19 NOTE — PHYSICAL THERAPY INITIAL EVALUATION ADULT - PERSONAL SAFETY AND JUDGMENT, REHAB EVAL
Pt had some difficulty negotiating rolling walker. When walking withou the rolling walker she had increased lateral sway./at risk behaviors demonstrated

## 2019-03-19 NOTE — MEDICAL STUDENT PROGRESS NOTE(EDUCATION) - NS MD HP STUD ASPLAN PLAN FT
1. CHF exacerbation vs. bronchitis   - Chest x-ray from 3/18/19 shows improvement in pleural effusion when compared to the one from 3/14/19 however cough and increased sputum production remain.   - Will start Levaquin 750 mg IV once then 500 IV q38hrs (renally dosed) for possible bronchitis  - Bilateral edema noted on both lower extremities but more significant on R side; will order venous duplex of the right leg   - Increase IV Lasix dose from 40 mg to 60 mg  - Continue strict Is and Os and daily weight   - Encourage patient to continue to elevate her legs and ambulate as tolerated     2. Acute Kidney Injury on Chronic Kidney Disease   - Creatine has increased to 4.3 from 3.9 on admission   - As per nephrology recommendations will order renal ultrasound and U/A     3. AFib   - Amiodarone has been discharged as per Dr. Child   - Will manage with eliquis, cardizem and metoprolol     4. History of MI s/p CABG   - Will continue with Imdur and Metoprolol     5. DVT prophylaxis   - Continue with Eliquis     6. GI prophylaxis   - Continue with famotidine     7. Disposition   - From home # CHF exacerbation / bronchitis   - Chest x-ray from 3/18/19 shows improvement in pleural effusion when compared to the one from 3/14/19 however cough and increased sputum production remain.   - Will start Levaquin 750 mg IV once then 500 IV q48hrs (renally dosed)   - Bilateral edema noted on both lower extremities but more significant on R side; will order venous duplex of the right leg   - Increase IV Lasix dose from 40 mg to 60 mg daily   - Continue strict Is and Os and daily weight   - Encourage patient to continue to elevate her legs and ambulate as tolerated     # Acute Kidney Injury on Chronic Kidney Disease stage 5:   - Creatine has increased to 4.5 from 3.9 on admission   - As per nephrology recommendations will order renal ultrasound and U/A     # AFib   - Amiodarone has been d/c as per Dr. Child   - Will manage with Eliquis Cardizem and metoprolol     # History of MI s/p CABG - Will continue with Imdur and Metoprolol   # DVT prophylaxis - Continue with Eliquis   # GI prophylaxis - Continue with famotidine   # Disposition  - From home # CHF exacerbation / bronchitis   - Chest x-ray from 3/18/19 shows improvement in pleural effusion when compared to the one from 3/14/19 however cough and increased sputum production remain.   - Will start Levaquin 750 mg IV once then 500 IV q48hrs (renally dosed)   - Bilateral edema noted on both lower extremities but more significant on R side; will order venous duplex of the right leg   - Increase IV Lasix dose from 40 mg to 60 mg daily   - restart aldactone 25 mg po q 12 hrs   - Continue strict Is and Os and daily weight   - Encourage patient to continue to elevate her legs and ambulate as tolerated     # Acute Kidney Injury on Chronic Kidney Disease stage 5:   - Creatine has increased to 4.5 from 3.9 on admission   - As per nephrology recommendations will order renal ultrasound and U/A     # AFib   - Amiodarone has been d/c as per Dr. Child   - Will manage with Eliquis Cardizem and metoprolol     # History of MI s/p CABG - Will continue with Imdur and Metoprolol   # DVT prophylaxis - Continue with Eliquis   # GI prophylaxis - Continue with famotidine   # Disposition  - From home

## 2019-03-19 NOTE — PHYSICAL THERAPY INITIAL EVALUATION ADULT - CRITERIA FOR SKILLED THERAPEUTIC INTERVENTIONS
functional limitations in following categories/predicted duration of therapy intervention/anticipated equipment needs at discharge/impairments found/therapy frequency/rehab potential/risk reduction/prevention

## 2019-03-19 NOTE — MEDICAL STUDENT PROGRESS NOTE(EDUCATION) - SUBJECTIVE AND OBJECTIVE BOX
S:  73 year old female with PMH of CKD stage 5, HTN, CAD, MI s/p CABG, Afib s/p ablation presented for cough and shortness of breath. Currently admitted to medicine with a primary diagnosis of CHF exacerbation.     Today is hospital day 5. No acute issues overnight. Patient reports difficulty sleeping due to anxiety about things at home. She is tolerating her diet well, urinating and having bowel movements appropriately. She no longer complains of SOB, but her cough does persist. She often coughs up sputum to clear the congestion.     O:  Vitals: T: 96.4 HR: 97, /56 RR: 18   Labs: BUN 98 (3/18/19) has been increasing; creatine 4.3 ( increased from 3.9 on admission)  Imaging: Compared to chest x-ray from 3/14/19, x-ray from 3/18/19 shows decreased pulmonary vascular congestion and pleural effusions     Physical Exam:   General: well developed, NAD   CV: irregular rhythm, no gallop or murmur   Respiratory: slightly decreased breath sounds at bases; no wheezes or crackles   Abdomen: soft, non tender, normal bowel sounds   Extremities: pitting edema of lower extremities, more pronounced on R side than L side, 2 + peripheral pulses, ecchymosis of R arm

## 2019-03-19 NOTE — PHYSICAL THERAPY INITIAL EVALUATION ADULT - IMPAIRMENTS FOUND, PT EVAL
ergonomics and body mechanics/fine motor/gait, locomotion, and balance/muscle strength/aerobic capacity/endurance/gross motor/integumentary integrity/poor safety awareness

## 2019-03-20 LAB
ANION GAP SERPL CALC-SCNC: 18 MMOL/L — HIGH (ref 7–14)
BUN SERPL-MCNC: 106 MG/DL — CRITICAL HIGH (ref 10–20)
CALCIUM SERPL-MCNC: 10.2 MG/DL — HIGH (ref 8.5–10.1)
CHLORIDE SERPL-SCNC: 98 MMOL/L — SIGNIFICANT CHANGE UP (ref 98–110)
CO2 SERPL-SCNC: 21 MMOL/L — SIGNIFICANT CHANGE UP (ref 17–32)
CREAT SERPL-MCNC: 4.5 MG/DL — CRITICAL HIGH (ref 0.7–1.5)
GLUCOSE SERPL-MCNC: 187 MG/DL — HIGH (ref 70–99)
HCT VFR BLD CALC: 28.5 % — LOW (ref 37–47)
HGB BLD-MCNC: 8.4 G/DL — LOW (ref 12–16)
MAGNESIUM SERPL-MCNC: 2.1 MG/DL — SIGNIFICANT CHANGE UP (ref 1.8–2.4)
MCHC RBC-ENTMCNC: 25.8 PG — LOW (ref 27–31)
MCHC RBC-ENTMCNC: 29.5 G/DL — LOW (ref 32–37)
MCV RBC AUTO: 87.4 FL — SIGNIFICANT CHANGE UP (ref 81–99)
NRBC # BLD: 0 /100 WBCS — SIGNIFICANT CHANGE UP (ref 0–0)
PLATELET # BLD AUTO: 272 K/UL — SIGNIFICANT CHANGE UP (ref 130–400)
POTASSIUM SERPL-MCNC: 4.3 MMOL/L — SIGNIFICANT CHANGE UP (ref 3.5–5)
POTASSIUM SERPL-SCNC: 4.3 MMOL/L — SIGNIFICANT CHANGE UP (ref 3.5–5)
RBC # BLD: 3.26 M/UL — LOW (ref 4.2–5.4)
RBC # FLD: 18.3 % — HIGH (ref 11.5–14.5)
SODIUM SERPL-SCNC: 137 MMOL/L — SIGNIFICANT CHANGE UP (ref 135–146)
WBC # BLD: 8.84 K/UL — SIGNIFICANT CHANGE UP (ref 4.8–10.8)
WBC # FLD AUTO: 8.84 K/UL — SIGNIFICANT CHANGE UP (ref 4.8–10.8)

## 2019-03-20 RX ORDER — METOPROLOL TARTRATE 50 MG
25 TABLET ORAL EVERY 8 HOURS
Qty: 0 | Refills: 0 | Status: DISCONTINUED | OUTPATIENT
Start: 2019-03-20 | End: 2019-03-21

## 2019-03-20 RX ORDER — DILTIAZEM HCL 120 MG
180 CAPSULE, EXT RELEASE 24 HR ORAL DAILY
Qty: 0 | Refills: 0 | Status: DISCONTINUED | OUTPATIENT
Start: 2019-03-21 | End: 2019-03-21

## 2019-03-20 RX ORDER — FUROSEMIDE 40 MG
60 TABLET ORAL DAILY
Qty: 0 | Refills: 0 | Status: DISCONTINUED | OUTPATIENT
Start: 2019-03-21 | End: 2019-03-21

## 2019-03-20 RX ADMIN — FAMOTIDINE 40 MILLIGRAM(S): 10 INJECTION INTRAVENOUS at 05:28

## 2019-03-20 RX ADMIN — ISOSORBIDE MONONITRATE 30 MILLIGRAM(S): 60 TABLET, EXTENDED RELEASE ORAL at 11:20

## 2019-03-20 RX ADMIN — SPIRONOLACTONE 25 MILLIGRAM(S): 25 TABLET, FILM COATED ORAL at 17:36

## 2019-03-20 RX ADMIN — APIXABAN 5 MILLIGRAM(S): 2.5 TABLET, FILM COATED ORAL at 05:28

## 2019-03-20 RX ADMIN — SPIRONOLACTONE 25 MILLIGRAM(S): 25 TABLET, FILM COATED ORAL at 05:28

## 2019-03-20 RX ADMIN — PREGABALIN 1000 MICROGRAM(S): 225 CAPSULE ORAL at 11:20

## 2019-03-20 RX ADMIN — Medication 650 MILLIGRAM(S): at 19:31

## 2019-03-20 RX ADMIN — Medication 1 SPRAY(S): at 05:28

## 2019-03-20 RX ADMIN — Medication 325 MILLIGRAM(S): at 21:40

## 2019-03-20 RX ADMIN — Medication 650 MILLIGRAM(S): at 06:39

## 2019-03-20 RX ADMIN — Medication 25 MILLIGRAM(S): at 21:40

## 2019-03-20 RX ADMIN — APIXABAN 5 MILLIGRAM(S): 2.5 TABLET, FILM COATED ORAL at 17:35

## 2019-03-20 RX ADMIN — Medication 325 MILLIGRAM(S): at 05:28

## 2019-03-20 RX ADMIN — Medication 120 MILLIGRAM(S): at 05:28

## 2019-03-20 RX ADMIN — Medication 1 SPRAY(S): at 17:36

## 2019-03-20 RX ADMIN — Medication 50 MILLIGRAM(S): at 05:28

## 2019-03-20 RX ADMIN — Medication 200 MILLIGRAM(S): at 06:40

## 2019-03-20 RX ADMIN — FAMOTIDINE 40 MILLIGRAM(S): 10 INJECTION INTRAVENOUS at 17:36

## 2019-03-20 RX ADMIN — Medication 100 MILLIGRAM(S): at 11:20

## 2019-03-20 RX ADMIN — Medication 325 MILLIGRAM(S): at 13:26

## 2019-03-20 RX ADMIN — Medication 200 MILLIGRAM(S): at 19:31

## 2019-03-20 RX ADMIN — Medication 25 MILLIGRAM(S): at 13:27

## 2019-03-20 RX ADMIN — Medication 650 MILLIGRAM(S): at 21:32

## 2019-03-20 RX ADMIN — Medication 40 MILLIGRAM(S): at 05:30

## 2019-03-20 NOTE — MEDICAL STUDENT PROGRESS NOTE(EDUCATION) - NS MD HP STUD ASPLAN PLAN FT
#Acute on chronic HFrEF   - Chest x-ray from 3/18/19 shows improvement in pleural effusion when compared to the one from 3/14/19 and coughing/SOB have improved   - Will switch Levaquin 750 mg IV  to PO  - Bilateral edema noted on both lower extremities but more significant on R side; pending official read for venous duplex of the right leg   - Continue IV Lasix 60 mg daily   - Continue aldactone 25 mg po q 12 hrs   - Continue strict Is and Os and daily weight   - Encourage patient to continue to elevate her legs and ambulate as tolerated     # Acute Kidney Injury on Chronic Kidney Disease stage 5:   - suspect cardiorenal syndrome   - Creatine and BUN continue to trend upward; baseline creatinine is 3.6   --> (3/18/19) BUN 98 creatine 4.3 (3/20/19)  creatinine 4.5   - Renal US showed no hydronephrosis   - Follow up for nephrology reccomendations     # AFib rate controlled   - Continue to manage with Eliquis Cardizem and metoprolol     # CAD   - History of MI s/p CABG  - Will continue with Imdur and Metoprolol   # DVT prophylaxis - Continue with Eliquis   # GI prophylaxis - Continue with famotidine   # Disposition  - From home # Acute on chronic HFrEF / Bronchitis:   - Chest x-ray from 3/18/19 shows improvement in pleural effusion when compared to the one from 3/14/19 and coughing/SOB have improved   - Will switch Levaquin IV , 250 mg po q 48 hrs for 3 doses ( start on 3/22)  - Bilateral edema noted on both lower extremities but more significant on R side; pending official read for venous duplex of the right leg   - Continue IV Lasix 60 mg daily   - Continue aldactone 25 mg po q 12 hrs   - Continue strict Is and Os and daily weight   - Encourage patient to continue to elevate her legs and ambulate as tolerated     # Acute Kidney Injury on Chronic Kidney Disease stage 5:   - suspect cardiorenal syndrome   - Creatine and BUN continue to trend upward; baseline creatinine is 3.6   --> (3/18/19) BUN 98 creatine 4.3 , (3/20/19)  creatinine 4.5   - Renal US showed no hydronephrosis   - Follow up for nephrology recommendations     # AFib rate controlled   - Continue to manage with Eliquis , Cardizem,  and metoprolol     # CAD : History of MI s/p CABG, Will continue with Imdur and Metoprolol     # DVT prophylaxis - Continue with Eliquis   # GI prophylaxis - Continue with famotidine   # Disposition  - From home # Acute on chronic HFrEF / Acute Bronchitis:   - Chest x-ray from 3/18/19 shows improvement in pleural effusion when compared to the one from 3/14/19 and coughing/SOB have improved   - Will switch Levaquin IV , 250 mg po q 48 hrs for 3 doses ( start on 3/22)  - Bilateral edema noted on both lower extremities but more significant on R side; pending official read for venous duplex of the right leg   - Continue IV Lasix 60 mg daily   - Continue aldactone 25 mg po q 12 hrs   - Continue strict Is and Os and daily weight   - Encourage patient to continue to elevate her legs and ambulate as tolerated     # Acute Kidney Injury on Chronic Kidney Disease stage 5:   - suspect cardiorenal syndrome   - Creatine and BUN continue to trend upward; baseline creatinine is 3.6   --> (3/18/19) BUN 98 creatine 4.3 , (3/20/19)  creatinine 4.5   - Renal US showed no hydronephrosis   - Follow up for nephrology recommendations     # AFib rate controlled   - Continue to manage with Eliquis , Cardizem 180 mg daily,  and metoprolol 25 mg q 8 hrs (hold for SBP < 100)    # CAD : History of MI s/p CABG, Will continue with Imdur and Metoprolol     # DVT prophylaxis - Continue with Eliquis   # GI prophylaxis - Continue with famotidine   # Disposition  - From home

## 2019-03-20 NOTE — PROGRESS NOTE ADULT - ASSESSMENT
Shortness of breath due to Volume overload, exacerbation of Systolic CHF, COPD exacerbation and worsening renal function  Hx of HTN, ASHD, CAD, MI, sp CABG, afib, valvular disease ( AS, MR), Pul HTN, failed ablation  Hx of DLD  Hx of CKD 5  Hx of chronic anemia  Hx of OA, DDD, DJD  Hx of GERD, diverticulosis       pt cont on Iv  diuretics  pt ff by renal and cardio  cont all home meds  monitor renal parameters and electrolytes

## 2019-03-20 NOTE — DIETITIAN INITIAL EVALUATION ADULT. - SOURCE
Pt reports good appetite and PO intake, typically ~75% during LOS. Stable to PTA.  PTA pt supplements vit D and iron. NKFA. Last BM 3/19-  no GI distress. Denies chew/swallowing difficulty. BS 18, skin intact. Pt #, wt gain likely r/t volume overload/edema. BMI at stated wt: 27.0. IBW: 130#+/-10%/patient

## 2019-03-20 NOTE — PROGRESS NOTE ADULT - ASSESSMENT
74 y/o female with pmh of ckd 5, HTN, CAD, ho MI s/p CABG, CHF w/ reduced ejection fraction, moderate Pulmonary Hypertension, moderate Aortic Valve stenosis, afib s/p ablation presenting for cough and shortness of breath was found to be decompensated heart failure secondary to possible bronchitis with FRANC on CKD.  # creatinine stable mid 4  # resume lasix 60 po q24h   # calcium noted, d/c calcitriol, check ph   # BP on the low side, decrease cardizem to 120  #anemia , continue Procrit and Fe SO4 follow Hb   # no indication for RRT  # will follow

## 2019-03-20 NOTE — DIETITIAN INITIAL EVALUATION ADULT. - NS AS NUTRI INTERV MEALS SNACK
Continue w/ DASH/TLC. Add 60g Protein restriction and high fiber diet as pt w/ altered renal labs + hx of diverticulosis.

## 2019-03-20 NOTE — DIETITIAN INITIAL EVALUATION ADULT. - OTHER INFO
Reason for assessment: LOS. Pt presented to ED w/ chief complaint SOB, SALDANA, cough, and orthopnea. Pt w/ volume overload, exacerbation of systolic CHF, COPD exacerbation and worsening renal function. NO RRT at this time per nephro.

## 2019-03-20 NOTE — DIETITIAN INITIAL EVALUATION ADULT. - MD RECOMMEND
RECS: Continue w/ DASH/TLC. Add 60g Protein restriction and high fiber diet as pt w/ altered renal labs + hx of diverticulosis.

## 2019-03-20 NOTE — DIETITIAN INITIAL EVALUATION ADULT. - ENERGY NEEDS
estimated energy needs: 1550-1680kcal (MSJx1.2-1.3AF)  estimated protein needs: 61-75g (0.8-1.0g/kg) pt w/ FRANC on CKD 5  estimated fluid needs: per LIP

## 2019-03-20 NOTE — MEDICAL STUDENT PROGRESS NOTE(EDUCATION) - NS MD HP STUD ASPLAN ASSES FT
74 y/o F with an extensive PMH admitted for acute on chronic HFrEF after complaints of SOB and increased productive cough for about 1 week.

## 2019-03-20 NOTE — MEDICAL STUDENT PROGRESS NOTE(EDUCATION) - SUBJECTIVE AND OBJECTIVE BOX
S:  73 year old female with PMH of CKD stage 5, HTN, CAD, MI s/p CABG, Afib s/p ablation presented for cough and shortness of breath. Currently admitted to medicine with a primary diagnosis of CHF exacerbation.     Today is hospital day 6. No acute issues overnight. She is tolerating her diet well, urinating and having bowel movements appropriately. She no longer complains of SOB and her cough has improved since yesterday. She often coughs up sputum to clear the congestion.     O:  Vitals: T: 96.7 HR: 71, /60 RR: 18   Labs:   (3/18/19) BUN 98 creatine 4.3   (3/20/19)  creatinine 4.5   Imaging: Compared to chest x-ray from 3/14/19, x-ray from 3/18/19 shows decreased pulmonary vascular congestion and pleural effusions   Venous duplex: ordered, pending official read  Physical Exam:   General: well developed, NAD   CV: irregular rhythm, no gallop or murmur   Respiratory: slightly decreased breath sounds at bases; no wheezes or crackles   Abdomen: soft, non tender, normal bowel sounds   Extremities: pitting edema of lower extremities, more pronounced on R side than L side, 2 + peripheral pulses, ecchymosis of R arm

## 2019-03-21 ENCOUNTER — TRANSCRIPTION ENCOUNTER (OUTPATIENT)
Age: 74
End: 2019-03-21

## 2019-03-21 ENCOUNTER — APPOINTMENT (OUTPATIENT)
Dept: CARDIOLOGY | Facility: CLINIC | Age: 74
End: 2019-03-21

## 2019-03-21 VITALS — TEMPERATURE: 98 F | DIASTOLIC BLOOD PRESSURE: 66 MMHG | RESPIRATION RATE: 18 BRPM | SYSTOLIC BLOOD PRESSURE: 131 MMHG

## 2019-03-21 LAB
ANION GAP SERPL CALC-SCNC: 17 MMOL/L — HIGH (ref 7–14)
BUN SERPL-MCNC: 109 MG/DL — CRITICAL HIGH (ref 10–20)
CALCIUM SERPL-MCNC: 10.2 MG/DL — HIGH (ref 8.5–10.1)
CHLORIDE SERPL-SCNC: 97 MMOL/L — LOW (ref 98–110)
CO2 SERPL-SCNC: 25 MMOL/L — SIGNIFICANT CHANGE UP (ref 17–32)
CREAT SERPL-MCNC: 4.7 MG/DL — CRITICAL HIGH (ref 0.7–1.5)
GLUCOSE SERPL-MCNC: 131 MG/DL — HIGH (ref 70–99)
HCT VFR BLD CALC: 30.6 % — LOW (ref 37–47)
HGB BLD-MCNC: 9 G/DL — LOW (ref 12–16)
MAGNESIUM SERPL-MCNC: 2.3 MG/DL — SIGNIFICANT CHANGE UP (ref 1.8–2.4)
MCHC RBC-ENTMCNC: 25.8 PG — LOW (ref 27–31)
MCHC RBC-ENTMCNC: 29.4 G/DL — LOW (ref 32–37)
MCV RBC AUTO: 87.7 FL — SIGNIFICANT CHANGE UP (ref 81–99)
NRBC # BLD: 0 /100 WBCS — SIGNIFICANT CHANGE UP (ref 0–0)
PLATELET # BLD AUTO: 304 K/UL — SIGNIFICANT CHANGE UP (ref 130–400)
POTASSIUM SERPL-MCNC: 4.7 MMOL/L — SIGNIFICANT CHANGE UP (ref 3.5–5)
POTASSIUM SERPL-SCNC: 4.7 MMOL/L — SIGNIFICANT CHANGE UP (ref 3.5–5)
RBC # BLD: 3.49 M/UL — LOW (ref 4.2–5.4)
RBC # FLD: 18 % — HIGH (ref 11.5–14.5)
SODIUM SERPL-SCNC: 139 MMOL/L — SIGNIFICANT CHANGE UP (ref 135–146)
WBC # BLD: 9.41 K/UL — SIGNIFICANT CHANGE UP (ref 4.8–10.8)
WBC # FLD AUTO: 9.41 K/UL — SIGNIFICANT CHANGE UP (ref 4.8–10.8)

## 2019-03-21 RX ORDER — FUROSEMIDE 40 MG
3 TABLET ORAL
Qty: 0 | Refills: 0 | DISCHARGE
Start: 2019-03-21

## 2019-03-21 RX ORDER — SODIUM BICARBONATE 1 MEQ/ML
1 SYRINGE (ML) INTRAVENOUS
Qty: 30 | Refills: 0
Start: 2019-03-21 | End: 2019-03-30

## 2019-03-21 RX ORDER — METOPROLOL TARTRATE 50 MG
1 TABLET ORAL
Qty: 0 | Refills: 0 | DISCHARGE
Start: 2019-03-21

## 2019-03-21 RX ORDER — DILTIAZEM HCL 120 MG
1 CAPSULE, EXT RELEASE 24 HR ORAL
Qty: 0 | Refills: 0 | DISCHARGE
Start: 2019-03-21

## 2019-03-21 RX ORDER — SODIUM BICARBONATE 1 MEQ/ML
325 SYRINGE (ML) INTRAVENOUS EVERY 8 HOURS
Qty: 0 | Refills: 0 | Status: DISCONTINUED | OUTPATIENT
Start: 2019-03-21 | End: 2019-03-21

## 2019-03-21 RX ORDER — SPIRONOLACTONE 25 MG/1
1 TABLET, FILM COATED ORAL
Qty: 0 | Refills: 0 | DISCHARGE
Start: 2019-03-21

## 2019-03-21 RX ORDER — DILTIAZEM HCL 120 MG
1 CAPSULE, EXT RELEASE 24 HR ORAL
Qty: 0 | Refills: 0 | COMMUNITY

## 2019-03-21 RX ADMIN — Medication 325 MILLIGRAM(S): at 11:13

## 2019-03-21 RX ADMIN — Medication 100 MILLIGRAM(S): at 11:12

## 2019-03-21 RX ADMIN — Medication 325 MILLIGRAM(S): at 13:16

## 2019-03-21 RX ADMIN — Medication 60 MILLIGRAM(S): at 05:29

## 2019-03-21 RX ADMIN — PREGABALIN 1000 MICROGRAM(S): 225 CAPSULE ORAL at 11:12

## 2019-03-21 RX ADMIN — Medication 25 MILLIGRAM(S): at 13:16

## 2019-03-21 RX ADMIN — CHLORHEXIDINE GLUCONATE 1 APPLICATION(S): 213 SOLUTION TOPICAL at 05:30

## 2019-03-21 RX ADMIN — ISOSORBIDE MONONITRATE 30 MILLIGRAM(S): 60 TABLET, EXTENDED RELEASE ORAL at 11:12

## 2019-03-21 RX ADMIN — Medication 25 MILLIGRAM(S): at 05:29

## 2019-03-21 RX ADMIN — Medication 200 MILLIGRAM(S): at 09:14

## 2019-03-21 RX ADMIN — Medication 650 MILLIGRAM(S): at 11:44

## 2019-03-21 RX ADMIN — Medication 325 MILLIGRAM(S): at 05:29

## 2019-03-21 RX ADMIN — Medication 1 SPRAY(S): at 05:29

## 2019-03-21 RX ADMIN — FAMOTIDINE 40 MILLIGRAM(S): 10 INJECTION INTRAVENOUS at 05:28

## 2019-03-21 RX ADMIN — APIXABAN 5 MILLIGRAM(S): 2.5 TABLET, FILM COATED ORAL at 05:29

## 2019-03-21 RX ADMIN — SPIRONOLACTONE 25 MILLIGRAM(S): 25 TABLET, FILM COATED ORAL at 05:28

## 2019-03-21 RX ADMIN — Medication 180 MILLIGRAM(S): at 05:28

## 2019-03-21 NOTE — DISCHARGE NOTE NURSING/CASE MANAGEMENT/SOCIAL WORK - NSDCDPATPORTLINK_GEN_ALL_CORE
You can access the [facility] Patient Portal, offered by [facility], by registering with the following website: [email or web address]

## 2019-03-21 NOTE — PROGRESS NOTE ADULT - PROVIDER SPECIALTY LIST ADULT
Internal Medicine
Nephrology
Internal Medicine

## 2019-03-21 NOTE — MEDICAL STUDENT PROGRESS NOTE(EDUCATION) - NS MD HP STUD ASPLAN PLAN FT
# Acute on chronic HFrEF / Acute Bronchitis:   - Chest x-ray from 3/18/19 shows improvement in pleural effusion when compared to the one from 3/14/19 and coughing/SOB have improved   - s/p Levaquin IV 2 doses  , Will start 250 mg po q 48 hrs for 3 doses tomorrow   - Bilateral edema noted on both lower extremities but more significant on R side; venous duplex of the right leg came back negative - no DVT  - Continue PO Lasix 60 mg daily   - Continue aldactone 25 mg po q 12 hrs   - Continue strict Is and Os and daily weight   - Encourage patient to continue to elevate her legs and ambulate as tolerated     # Acute Kidney Injury on Chronic Kidney Disease stage 5:   - suspect cardiorenal syndrome   - Creatine and BUN continue to trend upward; baseline creatinine is 3.6   - AM cr 4.7 ,    - Renal US showed no hydronephrosis   - Start on sodium bicarbonate PO q8  - AV fistula in place L arm, but no indication or HD per nephrology     # AFib rate controlled   - Continue to manage with Eliquis but adjust dose , Cardizem 180 mg daily,  and metoprolol 25 mg q 8 hrs (hold for SBP < 100)    # CAD : History of MI s/p CABG, Will continue with Imdur and Metoprolol     # DVT prophylaxis - Continue with Eliquis   # GI prophylaxis - Continue with famotidine   # Disposition  - From home

## 2019-03-21 NOTE — DISCHARGE NOTE NURSING/CASE MANAGEMENT/SOCIAL WORK - NSDCPEPT PROEDHF_GEN_ALL_CORE
Call primary care provider for follow up after discharge/Low salt diet/Monitor weight daily/Report signs and symptoms to primary care provider/Activities as tolerated

## 2019-03-21 NOTE — MEDICAL STUDENT PROGRESS NOTE(EDUCATION) - SUBJECTIVE AND OBJECTIVE BOX
S:  73 year old female with PMH of CKD stage 5, HTN, CAD, MI s/p CABG, Afib s/p ablation presented for cough and shortness of breath. Currently admitted to medicine with a primary diagnosis of CHF exacerbation.     Today is hospital day 7. No acute issues overnight. She is tolerating her diet well, urinating and having bowel movements appropriately. She no longer complains of SOB and her cough has improved. Would like to go home.     O:  Vitals: T: 96.1 HR: 86, /67 RR: 18   Labs:   (3/18/19) BUN 98 creatine 4.3   (3/20/19)  creatinine 4.5   (3/21/19)  creatinine 4.7  Imaging: Compared to chest x-ray from 3/14/19, x-ray from 3/18/19 shows decreased pulmonary vascular congestion and pleural effusions   Venous duplex: ordered, pending official read  Physical Exam:   General: well developed, NAD   CV: irregular rhythm, no gallop or murmur   Respiratory:  no wheezes or crackles   Abdomen: soft, non tender, normal bowel sounds   Extremities: pitting edema of lower extremities, more pronounced on R side than L side, 2 + peripheral pulses, reduced ecchymosis of R arm

## 2019-03-21 NOTE — PROGRESS NOTE ADULT - ASSESSMENT
72 y/o female with pmh of ckd 5, HTN, CAD, ho MI s/p CABG, CHF w/ reduced ejection fraction, moderate Pulmonary Hypertension, moderate Aortic Valve stenosis, afib s/p ablation presenting for cough and shortness of breath was found to be decompensated heart failure secondary to possible bronchitis with FRANC on CKD.  # creatinine stable   # continue lasix 60 /aldactone   # calcium noted, off calcitriol, check vit d , PTH leves, if pth elevated will start sensipar   # start sodium bicarbonate po q 8   #anemia , continue Procrit and Fe SO4   # no indication for RRT  # will follow

## 2019-03-21 NOTE — PROGRESS NOTE ADULT - ASSESSMENT
# Acute on chronic HFrEF / Acute Bronchitis:   - Chest x-ray from 3/18/19 shows improvement in pleural effusion when compared to the one from 3/14/19 and coughing/SOB have improved   - s/p Levaquin IV 2 doses    - venous duplex of the right leg is negative   - Continue PO Lasix 60 mg daily   - Continue aldactone 25 mg po q 12 hrs   - Continue strict Is and Os and daily weight   - Encourage patient to continue to elevate her legs and ambulate as tolerated     # Acute Kidney Injury on Chronic Kidney Disease stage 5:   - suspect cardiorenal syndrome   - Creatine and BUN continue to trend upward; baseline creatinine is 3.6   - AM cr 4.7 ,    - Renal US showed no hydronephrosis   - no indication or HD per nephrology , started on po bicarb     # AFib rate controlled   - Continue to manage with Eliquis , Cardizem 180 mg daily,  and metoprolol 25 mg q 8 hrs (hold for SBP < 100)    # CAD : History of MI s/p CABG, Will continue with Imdur and Metoprolol     # DVT prophylaxis - Continue with Eliquis   # GI prophylaxis - Continue with famotidine   # Disposition  - From home # Acute on chronic HFrEF / Acute Bronchitis:   - Chest x-ray from 3/18/19 shows improvement in pleural effusion when compared to the one from 3/14/19 and coughing/SOB have improved   - s/p Levaquin IV 2 doses    - venous duplex of the right leg is negative   - Continue PO Lasix 60 mg daily   - Continue aldactone 25 mg po q 12 hrs   - Continue strict Is and Os and daily weight   - Encourage patient to continue to elevate her legs and ambulate as tolerated     # Acute Kidney Injury on Chronic Kidney Disease stage 5:   - suspect cardiorenal syndrome   - Creatine and BUN continue to trend upward; baseline creatinine is 3.6   - AM cr 4.7 ,    - Renal US showed no hydronephrosis   - no indication or HD per nephrology , started on po bicarb     # AFib rate controlled   - Continue to manage with Eliquis , Cardizem 180 mg daily,  and metoprolol 25 mg q 8 hrs (hold for SBP < 100)    # CAD : History of MI s/p CABG, Will continue with Imdur and Metoprolol     # DVT prophylaxis - Continue with Eliquis   # GI prophylaxis - Continue with famotidine   # Disposition  - From home     pt to be d/c today to home with home care  pt and daughter notified , counselled importance to follow up with renal within 3 days   med rec reviewed with Dr. Chris

## 2019-03-21 NOTE — MEDICAL STUDENT PROGRESS NOTE(EDUCATION) - NS MD HP STUD ASPLAN ASSES FT
74 y/o female with pmh of ckd 5, HTN, CAD, ho MI s/p CABG, CHF w/ reduced ejection fraction, afib s/p ablation presenting for cough and shortness of breath was found to have decompensated heart failure secondary to possible bronchitis with FRANC on CKD.

## 2019-03-21 NOTE — PROGRESS NOTE ADULT - SUBJECTIVE AND OBJECTIVE BOX
NOAH QUIROS 72yo W Female from home came to the ER for c/o SOB, SALDANA, cough and orthopnea.  The pt denies CP, fever, chills, sputum.  The pt is noted to have volume overload and CHF with BNP of 29,004.  The pt has had multiple admissions in the past few months.  The PMHx includes:  HTN, ASHD, CAD, old MI, CHF, afib, sp failed ablation, sp CABG,  valvular disease ( AS, MR, TR)  Pul HTN Hyperlipidemia, CKD 5, OA, DDD, DJD, GERD, Diverticulosis, anxiety, depression.    INTERVAL HPI/OVERNIGHT EVENTS:  pt has improvement of SOB with continued diuresis, renal parameters trending upwards, ff by renal who do not feel dialysis is needed at this time, transitioned to oral diuretic and placed pt on bicarb, medically improved,  may be D/C home with renal ff up    MEDICATIONS  (STANDING):  allopurinol 100 milliGRAM(s) Oral daily  amiodarone    Tablet 200 milliGRAM(s) Oral daily  apixaban 5 milliGRAM(s) Oral every 12 hours  calcitriol   Capsule 0.5 MICROGram(s) Oral daily  chlorhexidine 4% Liquid 1 Application(s) Topical <User Schedule>  cyanocobalamin 1000 MICROGram(s) Oral daily  diltiazem    milliGRAM(s) Oral daily  famotidine    Tablet 40 milliGRAM(s) Oral two times a day  fluticasone propionate 50 MICROgram(s)/spray Nasal Spray 1 Spray(s) Both Nostrils two times a day  furosemide   Injectable 40 milliGRAM(s) IV Push dailyD/C  furosemide 40mg po q 24  isosorbide   mononitrate ER Tablet (IMDUR) 30 milliGRAM(s) Oral daily  metoprolol tartrate 50 milliGRAM(s) Oral two times a day  spironolactone 25 milliGRAM(s) Oral daily    MEDICATIONS  (PRN):  acetaminophen   Tablet .. 650 milliGRAM(s) Oral every 6 hours PRN Mild Pain (1 - 3)  guaiFENesin   Syrup  (Sugar-Free) 200 milliGRAM(s) Oral every 6 hours PRN Cough      Allergies    No Known Allergies    	  Vital Signs Last 24 Hrs    T(F): 97.8  HR: 105  BP: 131/66    RR: 18   SpO2: --96%    PHYSICAL EXAM:      Constitutional:  alert and oriented x 3, anxious but in NAD    Eyes:  nonicteric    ENMT:  normal    Neck:  supple, + JVD, no bruits    Respiratory:  shallow respirations, dec bibasilar BS, crackles    Cardiovascular:  S1S2 irreg and tachy    Gastrointestinal:  globose, soft and benign    Genitourinary:  no simeon    Extremities:  moves all ext, arthritic changes, bipedal edema    Neurological:  nonfocal    Skin:  no rash    Lymph Nodes:  not enlarged    LABS:                        9  89.4-----------( 304                 230      139  |  97  |  109  ----------------------------<  131  4.7   |  25  |  4.7    GFR  16, 13, 14, 12, 11, 10, 9  CK 19, 18, 16, MB 1.4, 1.2, 1.1, troponin 0.03, 0.02, 0.04  BNP  29, 004  Ca    9.6      15 Mar 2019 06:35  Phos  3.8     03-15  Mg     1.9, 2.2, 2.1, 2.3    TPro  7.1  /  Alb  3.7  /  TBili  0.6  /  DBili  x   /  AST  11  /  ALT  11  /  AlkPhos  70  03-14    PT/INR - ( 14 Mar 2019 20:10 )   PT: 24.30 sec;   INR: 2.13 ratio         PTT - ( 14 Mar 2019 20:10 )  PTT:35.5 sec      RADIOLOGY & ADDITIONAL TESTS:    EKG  afib 101/min, inf Qs    CXR:  inc BL interstitial opacities with superimposed airspace opacity of R base, BL pl effusions, cardiomegaly    ECHO:  LVEF  45-50%, mild wall thickness, Mod MR, mod TR, mod AS, elevated pul a pressure c/w Pul HTN    renal sono:  echogenic kidneys c/w medical renal disease, no hydronephrosis, moderate ascites    venous doppler:  negative for DVT
NOAH QUIROS 73y Female  MRN#: [id number]       SUBJECTIVE  73 year old female with PMH of CKD, HTN, CAD, MI s/p CABG, Afib s/p ablation presented for cough and shortness of breath. Patient says everyone in her family is sick, and she developed post nasal drainage last week that increased into productive cough. She feels slightly better today.     OBJECTIVE  PAST MEDICAL & SURGICAL HISTORY  Thyroid nodule  Degenerative joint disease  Osteoarthritis  Diverticulosis  GERD (gastroesophageal reflux disease)  Heart failure with reduced ejection fraction  Pulmonary hypertension, moderate to severe  Aortic stenosis, moderate  Atrial fibrillation: status post cardioversion  CKD (chronic kidney disease): stage 5, no dialysis  MI (myocardial infarction)  CAD (coronary artery disease)  Kidney stone  HTN (hypertension)  S/P CABG (coronary artery bypass graft)  AV fistula  H/O abdominal hysterectomy  H/O cardiac radiofrequency ablation    ALLERGIES:  No Known Allergies    MEDICATIONS:  STANDING MEDICATIONS  allopurinol 100 milliGRAM(s) Oral daily  amiodarone    Tablet 100 milliGRAM(s) Oral daily  apixaban 5 milliGRAM(s) Oral every 12 hours  calcitriol   Capsule 0.5 MICROGram(s) Oral daily  chlorhexidine 4% Liquid 1 Application(s) Topical <User Schedule>  cyanocobalamin 1000 MICROGram(s) Oral daily  diltiazem    milliGRAM(s) Oral daily  famotidine    Tablet 40 milliGRAM(s) Oral two times a day  fluticasone propionate 50 MICROgram(s)/spray Nasal Spray 1 Spray(s) Both Nostrils two times a day  furosemide   Injectable 40 milliGRAM(s) IV Push daily  isosorbide   mononitrate ER Tablet (IMDUR) 30 milliGRAM(s) Oral daily  metoprolol tartrate 50 milliGRAM(s) Oral two times a day  spironolactone 25 milliGRAM(s) Oral daily    PRN MEDICATIONS  guaiFENesin   Syrup  (Sugar-Free) 200 milliGRAM(s) Oral every 6 hours PRN      VITAL SIGNS: Last 24 Hours  T(C): 37.2 (15 Mar 2019 12:30), Max: 37.2 (15 Mar 2019 12:30)  T(F): 98.9 (15 Mar 2019 12:30), Max: 98.9 (15 Mar 2019 12:30)  HR: 78 (15 Mar 2019 12:30) (78 - 150)  BP: 119/56 (15 Mar 2019 12:30) (119/56 - 158/70)  BP(mean): --  RR: 18 (15 Mar 2019 12:30) (18 - 18)  SpO2: 95% (14 Mar 2019 22:26) (94% - 95%)    LABS:                        8.7    12.18 )-----------( 239      ( 15 Mar 2019 06:35 )             29.0     03-15    140  |  99  |  90<HH>  ----------------------------<  132<H>  3.7   |  23  |  3.6<H>    Ca    9.6      15 Mar 2019 06:35  Phos  3.8     03-15  Mg     1.9     03-15    TPro  7.1  /  Alb  3.7  /  TBili  0.6  /  DBili  x   /  AST  11  /  ALT  11  /  AlkPhos  70  03-14    PT/INR - ( 14 Mar 2019 20:10 )   PT: 24.30 sec;   INR: 2.13 ratio         PTT - ( 14 Mar 2019 20:10 )  PTT:35.5 sec      Creatine Kinase, Serum: 23 U/L (03-15-19 @ 06:35)  Troponin T, Serum: 0.03 ng/mL <HH> (03-15-19 @ 06:35)  Troponin T, Serum: 0.04 ng/mL <HH> (03-14-19 @ 20:10)      CARDIAC MARKERS ( 15 Mar 2019 06:35 )  x     / 0.03 ng/mL / 23 U/L / x     / 1.9 ng/mL  CARDIAC MARKERS ( 14 Mar 2019 20:10 )  x     / 0.04 ng/mL / x     / x     / x          RADIOLOGY:  < from: Transthoracic Echocardiogram (01.13.19 @ 10:53) >  1. Left ventricular ejection fraction, by visual estimation, is 45 to   50%.   2. Mildly increased LV wall thickness.   3. Mild to moderate mitral valve regurgitation.   4. Thickening and calcification of the anterior and posterior mitral   valve leaflets.   5. Mild-moderate tricuspid regurgitation.   6. Mild aortic regurgitation.   7. Estimated pulmonary artery systolic pressure is 60.9 mmHg assuming a   right atrial pressure of 15 mmHg, which is consistent with severe   pulmonary hypertension.   8. Peak transaortic gradient is 32.7 mmHg, mean transaortic gradient   equals 15.5 mmHg, the calculated aortic valve area equals 1.39 cm² by the   continuity equation consistent with moderate aortic stenosis.    < end of copied text >    < from: Xray Chest 2 Views PA/Lat (03.14.19 @ 20:08) >  Impression:      Enlarged cardiac silhouette with vascular congestion, bilateral pleural   effusions and bibasilar opacities. Constellation of findings are   consistent with CHF.    < end of copied text >      PHYSICAL EXAM:  GENERAL: NAD, well-developed, AAOx3  HEENT:  Atraumatic, Normocephalic.  PULMONARY: Shallow Breathing, mild crackles at the base bilaterally.  CARDIOVASCULAR: irregular, no murmurs.  GASTROINTESTINAL: Soft, Nontender, Nondistended  MUSCULOSKELETAL:  2+ Peripheral Pulses, +1 edema    ADMISSION SUMMARY  Patient is a 73y old Female who presents with a chief complaint of Cough and shortness of breath (15 Mar 2019 01:05)    ASSESSMENT & PLAN    # CHF exacerbation  - chest x-ray findings likely 2/2 congestion, Reduced ejection fraction on recent echo. Cardiac enzymes stable.  -Continue with IV lasix- patient improved  -Continue with Cefepime for now. Will de-escalate tomorrow.  -Elevated BNP  - strict is and os  - daily weights    # Suspected Vitamin B12 Deficiency  -Continue with cynacobalamin    # Afib  - Continue with Eliquis, cardiazem and amiodarone    # Coronary Artery Disease, history of Myocardial Infarction, status post Coronary Artery Bypass Graft, HTN  -Continue with Imdur and Metoprolol    # CKD  -Stable    # DVT prophylaxis  -On eliquis
NOAH QUIROS 74yo W Female from home came to the ER for c/o SOB, SALDANA, cough and orthopnea.  The pt denies CP, fever, chills, sputum.  The pt is noted to have volume overload and CHF with BNP of 29,004.  The pt has had multiple admissions in the past few months.  The PMHx includes:  HTN, ASHD, CAD, old MI, CHF, afib, sp failed ablation, sp CABG,  valvular disease ( AS, MR, TR)  Pul HTN Hyperlipidemia, CKD 5, OA, DDD, DJD, GERD, Diverticulosis, anxiety, depression.    INTERVAL HPI/OVERNIGHT EVENTS:  pt has some improvement of SOB with continued diuresis, renal parameters trending upwards, ff by renal    MEDICATIONS  (STANDING):  allopurinol 100 milliGRAM(s) Oral daily  amiodarone    Tablet 200 milliGRAM(s) Oral daily  apixaban 5 milliGRAM(s) Oral every 12 hours  calcitriol   Capsule 0.5 MICROGram(s) Oral daily  chlorhexidine 4% Liquid 1 Application(s) Topical <User Schedule>  cyanocobalamin 1000 MICROGram(s) Oral daily  diltiazem    milliGRAM(s) Oral daily  famotidine    Tablet 40 milliGRAM(s) Oral two times a day  fluticasone propionate 50 MICROgram(s)/spray Nasal Spray 1 Spray(s) Both Nostrils two times a day  furosemide   Injectable 40 milliGRAM(s) IV Push daily  isosorbide   mononitrate ER Tablet (IMDUR) 30 milliGRAM(s) Oral daily  metoprolol tartrate 50 milliGRAM(s) Oral two times a day  spironolactone 25 milliGRAM(s) Oral daily    MEDICATIONS  (PRN):  acetaminophen   Tablet .. 650 milliGRAM(s) Oral every 6 hours PRN Mild Pain (1 - 3)  guaiFENesin   Syrup  (Sugar-Free) 200 milliGRAM(s) Oral every 6 hours PRN Cough      Allergies    No Known Allergies    	  Vital Signs Last 24 Hrs    T(F): 97.2  HR: 105  BP: 125/56    RR: 18   SpO2: --96%    PHYSICAL EXAM:      Constitutional:  alert and oriented x 3, anxious but in NAD    Eyes:  nonicteric    ENMT:  normal    Neck:  supple, + JVD, no bruits    Respiratory:  shallow respirations, dec bibasilar BS, crackles    Cardiovascular:  S1S2 irreg and tachy    Gastrointestinal:  globose, soft and benign    Genitourinary:  no simeon    Extremities:  moves all ext, arthritic changes, bipedal edema    Neurological:  nonfocal    Skin:  no rash    Lymph Nodes:  not enlarged    LABS:                        8.4   8.8-----------( 272                 28      137  |  98  |  106  ----------------------------<  187  4.3   |  21  |  4.5    GFR  16, 13, 14, 12, 11, 10, 9  CK 19, 18, 16, MB 1.4, 1.2, 1.1, troponin 0.03, 0.02, 0.04  BNP  29, 004  Ca    9.6      15 Mar 2019 06:35  Phos  3.8     03-15  Mg     1.9, 2.2, 2.1    TPro  7.1  /  Alb  3.7  /  TBili  0.6  /  DBili  x   /  AST  11  /  ALT  11  /  AlkPhos  70  03-14    PT/INR - ( 14 Mar 2019 20:10 )   PT: 24.30 sec;   INR: 2.13 ratio         PTT - ( 14 Mar 2019 20:10 )  PTT:35.5 sec      RADIOLOGY & ADDITIONAL TESTS:    EKG  afib 101/min, inf Qs    CXR:  inc BL interstitial opacities with superimposed airspace opacity of R base, BL pl effusions, cardiomegaly    ECHO:  LVEF  45-50%, mild wall thickness, Mod MR, mod TR, mod AS, elevated pul a pressure c/w Pul HTN    renal sono:  echogenic kidneys c/w medical renal disease, no hydronephrosis, moderate ascites    venous doppler:  negative for DVT
NOAH QUIROS 72yo W Female from home came to the ER for c/o SOB, SALDANA, cough and orthopnea.  The pt denies CP, fever, chills, sputum.  The pt is noted to have volume overload and CHF with BNP of 29,004.  The pt has had multiple admissions in the past few months.  The PMHx includes:  HTN, ASHD, CAD, old MI, CHF, afib, sp failed ablation, sp CABG,  valvular disease ( AS, MR, TR)  Pul HTN Hyperlipidemia, CKD 5, OA, DDD, DJD, GERD, Diverticulosis, anxiety, depression.    INTERVAL HPI/OVERNIGHT EVENTS:  pt on IV diuretics,  some improvement of SOB    MEDICATIONS  (STANDING):  allopurinol 100 milliGRAM(s) Oral daily  amiodarone    Tablet 100 milliGRAM(s) Oral daily  apixaban 5 milliGRAM(s) Oral every 12 hours  calcitriol   Capsule 0.5 MICROGram(s) Oral daily  chlorhexidine 4% Liquid 1 Application(s) Topical <User Schedule>  cyanocobalamin 1000 MICROGram(s) Oral daily  diltiazem    milliGRAM(s) Oral daily  famotidine    Tablet 40 milliGRAM(s) Oral two times a day  fluticasone propionate 50 MICROgram(s)/spray Nasal Spray 1 Spray(s) Both Nostrils two times a day  furosemide   Injectable 40 milliGRAM(s) IV Push daily  isosorbide   mononitrate ER Tablet (IMDUR) 30 milliGRAM(s) Oral daily  metoprolol tartrate 50 milliGRAM(s) Oral two times a day  spironolactone 25 milliGRAM(s) Oral daily    MEDICATIONS  (PRN):  acetaminophen   Tablet .. 650 milliGRAM(s) Oral every 6 hours PRN Mild Pain (1 - 3)  guaiFENesin   Syrup  (Sugar-Free) 200 milliGRAM(s) Oral every 6 hours PRN Cough      Allergies    No Known Allergies    	  Vital Signs Last 24 Hrs  T(C): 36.4 (15 Mar 2019 20:10), Max: 37.2 (15 Mar 2019 12:30)  T(F): 97.5 (15 Mar 2019 20:10), Max: 98.9 (15 Mar 2019 12:30)  HR: 113 (15 Mar 2019 20:10) (78 - 150)  BP: 136/61 (15 Mar 2019 20:10) (119/56 - 145/99)  BP(mean): --  RR: 20 (15 Mar 2019 20:10) (18 - 20)  SpO2: --    PHYSICAL EXAM:      Constitutional:  alert and oriented x 3, uncomfortable but in NAD    Eyes:  nonicteric    ENMT:  normal    Neck:  supple, + JVD, no bruits    Respiratory:  shallow respirations, dec bibasilar BS, crackles    Cardiovascular:  S1S2 irreg and tachy    Gastrointestinal:  globose, soft and benign    Genitourinary:  no simeon    Extremities:  moves all ext, arthritic changes, bipedal edema    Neurological:  nonfocal    Skin:  no rash    Lymph Nodes:  not enlarged    LABS:                        8.7    12.18 )-----------( 239      ( 15 Mar 2019 06:35 )             29.0     03-15    140  |  99  |  90<HH>  ----------------------------<  132<H>  3.7   |  23  |  3.6<H>  GFR  16, 13, 14  CK 19, 18, 16, MB 1.4, 1.2, 1.1, troponin 0.03, 0.02, 0.04  BNP  29, 004  Ca    9.6      15 Mar 2019 06:35  Phos  3.8     03-15  Mg     1.9     03-15    TPro  7.1  /  Alb  3.7  /  TBili  0.6  /  DBili  x   /  AST  11  /  ALT  11  /  AlkPhos  70  03-14    PT/INR - ( 14 Mar 2019 20:10 )   PT: 24.30 sec;   INR: 2.13 ratio         PTT - ( 14 Mar 2019 20:10 )  PTT:35.5 sec      RADIOLOGY & ADDITIONAL TESTS:    EKG  afib 101/min, inf Qs    CXR:  inc BL interstitial opacities with superimposed airspace opacity of R base, BL pl effusions, cardiomegaly    ECHO:  LVEF  45-50%, mild wall thickness, Mod MR, mod TR, mod AS, elevated pul a pressure c/w Pul HTN
NOAH QUIROS 72yo W Female from home came to the ER for c/o SOB, SALDANA, cough and orthopnea.  The pt denies CP, fever, chills, sputum.  The pt is noted to have volume overload and CHF with BNP of 29,004.  The pt has had multiple admissions in the past few months.  The PMHx includes:  HTN, ASHD, CAD, old MI, CHF, afib, sp failed ablation, sp CABG,  valvular disease ( AS, MR, TR)  Pul HTN Hyperlipidemia, CKD 5, OA, DDD, DJD, GERD, Diverticulosis, anxiety, depression.    INTERVAL HPI/OVERNIGHT EVENTS:  pt on IV diuretics, improvement of SOB    MEDICATIONS  (STANDING):  allopurinol 100 milliGRAM(s) Oral daily  amiodarone    Tablet 100 milliGRAM(s) Oral daily  apixaban 5 milliGRAM(s) Oral every 12 hours  calcitriol   Capsule 0.5 MICROGram(s) Oral daily  chlorhexidine 4% Liquid 1 Application(s) Topical <User Schedule>  cyanocobalamin 1000 MICROGram(s) Oral daily  diltiazem    milliGRAM(s) Oral daily  famotidine    Tablet 40 milliGRAM(s) Oral two times a day  fluticasone propionate 50 MICROgram(s)/spray Nasal Spray 1 Spray(s) Both Nostrils two times a day  furosemide   Injectable 40 milliGRAM(s) IV Push daily  isosorbide   mononitrate ER Tablet (IMDUR) 30 milliGRAM(s) Oral daily  metoprolol tartrate 50 milliGRAM(s) Oral two times a day  spironolactone 25 milliGRAM(s) Oral daily    MEDICATIONS  (PRN):  acetaminophen   Tablet .. 650 milliGRAM(s) Oral every 6 hours PRN Mild Pain (1 - 3)  guaiFENesin   Syrup  (Sugar-Free) 200 milliGRAM(s) Oral every 6 hours PRN Cough      Allergies    No Known Allergies    	  Vital Signs Last 24 Hrs    T(F): 97.1  HR: 90  BP: 117/83  BP(mean): --  RR: 20   SpO2: --    PHYSICAL EXAM:      Constitutional:  alert and oriented x 3, uncomfortable but in NAD    Eyes:  nonicteric    ENMT:  normal    Neck:  supple, + JVD, no bruits    Respiratory:  shallow respirations, dec bibasilar BS, crackles    Cardiovascular:  S1S2 irreg and tachy    Gastrointestinal:  globose, soft and benign    Genitourinary:  no simeon    Extremities:  moves all ext, arthritic changes, bipedal edema    Neurological:  nonfocal    Skin:  no rash    Lymph Nodes:  not enlarged    LABS:                        8.8    11-----------( 242                   29       140  |  100  |  91  ----------------------------<  135  3.7   |  24  |  3.5    GFR  16, 13, 14, 12  CK 19, 18, 16, MB 1.4, 1.2, 1.1, troponin 0.03, 0.02, 0.04  BNP  29, 004  Ca    9.6      15 Mar 2019 06:35  Phos  3.8     03-15  Mg     1.9     03-15    TPro  7.1  /  Alb  3.7  /  TBili  0.6  /  DBili  x   /  AST  11  /  ALT  11  /  AlkPhos  70  03-14    PT/INR - ( 14 Mar 2019 20:10 )   PT: 24.30 sec;   INR: 2.13 ratio         PTT - ( 14 Mar 2019 20:10 )  PTT:35.5 sec      RADIOLOGY & ADDITIONAL TESTS:    EKG  afib 101/min, inf Qs    CXR:  inc BL interstitial opacities with superimposed airspace opacity of R base, BL pl effusions, cardiomegaly    ECHO:  LVEF  45-50%, mild wall thickness, Mod MR, mod TR, mod AS, elevated pul a pressure c/w Pul HTN
NOAH QUIROS 73y Female  MRN#: [id number]       SUBJECTIVE  73 year old female with PMH of CKD, HTN, CAD, MI s/p CABG, Afib s/p ablation presented for cough and shortness of breath. Patient says everyone in her family is sick, and she developed post nasal drainage last week that increased into productive cough. No overnight events noted today,      OBJECTIVE  PAST MEDICAL & SURGICAL HISTORY  Thyroid nodule  Degenerative joint disease  Osteoarthritis  Diverticulosis  GERD (gastroesophageal reflux disease)  Heart failure with reduced ejection fraction  Pulmonary hypertension, moderate to severe  Aortic stenosis, moderate  Atrial fibrillation: status post cardioversion  CKD (chronic kidney disease): stage 5, no dialysis  MI (myocardial infarction)  CAD (coronary artery disease)  Kidney stone  HTN (hypertension)  S/P CABG (coronary artery bypass graft)  AV fistula  H/O abdominal hysterectomy  H/O cardiac radiofrequency ablation    ALLERGIES:  No Known Allergies    MEDICATIONS:  STANDING MEDICATIONS  allopurinol 100 milliGRAM(s) Oral daily  amiodarone    Tablet 100 milliGRAM(s) Oral daily  apixaban 5 milliGRAM(s) Oral every 12 hours  calcitriol   Capsule 0.5 MICROGram(s) Oral daily  chlorhexidine 4% Liquid 1 Application(s) Topical <User Schedule>  cyanocobalamin 1000 MICROGram(s) Oral daily  diltiazem    milliGRAM(s) Oral daily  famotidine    Tablet 40 milliGRAM(s) Oral two times a day  fluticasone propionate 50 MICROgram(s)/spray Nasal Spray 1 Spray(s) Both Nostrils two times a day  furosemide   Injectable 40 milliGRAM(s) IV Push daily  isosorbide   mononitrate ER Tablet (IMDUR) 30 milliGRAM(s) Oral daily  metoprolol tartrate 50 milliGRAM(s) Oral two times a day  spironolactone 25 milliGRAM(s) Oral daily    PRN MEDICATIONS  acetaminophen   Tablet .. 650 milliGRAM(s) Oral every 6 hours PRN  guaiFENesin   Syrup  (Sugar-Free) 200 milliGRAM(s) Oral every 6 hours PRN      VITAL SIGNS: Last 24 Hours  T(C): 35.8 (16 Mar 2019 05:05), Max: 37.2 (15 Mar 2019 12:30)  T(F): 96.5 (16 Mar 2019 05:05), Max: 98.9 (15 Mar 2019 12:30)  HR: 94 (16 Mar 2019 05:05) (78 - 113)  BP: 137/80 (16 Mar 2019 05:05) (119/56 - 137/80)  BP(mean): --  RR: 18 (16 Mar 2019 05:05) (18 - 20)  SpO2: --    LABS:                        8.8    11.63 )-----------( 242      ( 16 Mar 2019 07:32 )             29.6     03-16    140  |  100  |  91<HH>  ----------------------------<  135<H>  3.7   |  24  |  3.5<H>    Ca    9.8      16 Mar 2019 07:32  Phos  3.8     03-15  Mg     1.9     03-15    TPro  6.1  /  Alb  3.5  /  TBili  0.5  /  DBili  x   /  AST  8   /  ALT  8   /  AlkPhos  46  03-16    PT/INR - ( 14 Mar 2019 20:10 )   PT: 24.30 sec;   INR: 2.13 ratio         PTT - ( 14 Mar 2019 20:10 )  PTT:35.5 sec    CARDIAC MARKERS ( 15 Mar 2019 06:35 )  x     / 0.03 ng/mL / 23 U/L / x     / 1.9 ng/mL  CARDIAC MARKERS ( 14 Mar 2019 20:10 )  x     / 0.04 ng/mL / x     / x     / x          RADIOLOGY:      PHYSICAL EXAM:  GENERAL: NAD, well-developed, AAOx3  HEENT:  Atraumatic, Normocephalic.  PULMONARY: Shallow Breathing, mild crackles at the base bilaterally.  CARDIOVASCULAR: irregular, no murmurs.  GASTROINTESTINAL: Soft, Nontender, Nondistended  MUSCULOSKELETAL:  2+ Peripheral Pulses, +1 edema    ADMISSION SUMMARY  Patient is a 73y old Female who presents with a chief complaint of Cough and shortness of breath (15 Mar 2019 01:05)    ASSESSMENT & PLAN    # CHF exacerbation  - chest x-ray findings likely 2/2 congestion, Reduced ejection fraction on recent echo. -Cardiac enzymes stable.  -Continue with IV lasix- patient improved  -Elevated BNP  - strict is and os  - daily weights    # Suspected Vitamin B12 Deficiency  -Continue with cynacobalamin    # Afib  - Continue with Eliquis, cardiazem and amiodarone    # Coronary Artery Disease, history of Myocardial Infarction, status post Coronary Artery Bypass Graft, HTN  -Continue with Imdur and Metoprolol    # CKD  -Stable    # DVT prophylaxis  -On eliquis
NOAH QUIROS 74yo W Female from home came to the ER for c/o SOB, SALDANA, cough and orthopnea.  The pt denies CP, fever, chills, sputum.  The pt is noted to have volume overload and CHF with BNP of 29,004.  The pt has had multiple admissions in the past few months.  The PMHx includes:  HTN, ASHD, CAD, old MI, CHF, afib, sp failed ablation, sp CABG,  valvular disease ( AS, MR, TR)  Pul HTN Hyperlipidemia, CKD 5, OA, DDD, DJD, GERD, Diverticulosis, anxiety, depression.    INTERVAL HPI/OVERNIGHT EVENTS:  pt evaluated by renal, furosemide inc to 60 mg IV, some  improvement of SOB, evaluated by cardiology    MEDICATIONS  (STANDING):  allopurinol 100 milliGRAM(s) Oral daily  amiodarone    Tablet 200 milliGRAM(s) Oral daily  apixaban 5 milliGRAM(s) Oral every 12 hours  calcitriol   Capsule 0.5 MICROGram(s) Oral daily  chlorhexidine 4% Liquid 1 Application(s) Topical <User Schedule>  cyanocobalamin 1000 MICROGram(s) Oral daily  diltiazem    milliGRAM(s) Oral daily  famotidine    Tablet 40 milliGRAM(s) Oral two times a day  fluticasone propionate 50 MICROgram(s)/spray Nasal Spray 1 Spray(s) Both Nostrils two times a day  furosemide   Injectable 40 milliGRAM(s) IV Push daily  isosorbide   mononitrate ER Tablet (IMDUR) 30 milliGRAM(s) Oral daily  metoprolol tartrate 50 milliGRAM(s) Oral two times a day  spironolactone 25 milliGRAM(s) Oral daily    MEDICATIONS  (PRN):  acetaminophen   Tablet .. 650 milliGRAM(s) Oral every 6 hours PRN Mild Pain (1 - 3)  guaiFENesin   Syrup  (Sugar-Free) 200 milliGRAM(s) Oral every 6 hours PRN Cough      Allergies    No Known Allergies    	  Vital Signs Last 24 Hrs    T(F): 96.5  HR: 67  BP: 117/69    RR: 18   SpO2: --96%    PHYSICAL EXAM:      Constitutional:  alert and oriented x 3, anxious but in NAD    Eyes:  nonicteric    ENMT:  normal    Neck:  supple, + JVD, no bruits    Respiratory:  shallow respirations, dec bibasilar BS, crackles    Cardiovascular:  S1S2 irreg and tachy    Gastrointestinal:  globose, soft and benign    Genitourinary:  no simeon    Extremities:  moves all ext, arthritic changes, bipedal edema    Neurological:  nonfocal    Skin:  no rash    Lymph Nodes:  not enlarged    LABS:                        9 11-----------( 271                 31       134  |  94  |  98  ----------------------------<  151  4.2   |  23  |  4.3    GFR  16, 13, 14, 12, 11, 10  CK 19, 18, 16, MB 1.4, 1.2, 1.1, troponin 0.03, 0.02, 0.04  BNP  29, 004  Ca    9.6      15 Mar 2019 06:35  Phos  3.8     03-15  Mg     1.9     03-15    TPro  7.1  /  Alb  3.7  /  TBili  0.6  /  DBili  x   /  AST  11  /  ALT  11  /  AlkPhos  70  03-14    PT/INR - ( 14 Mar 2019 20:10 )   PT: 24.30 sec;   INR: 2.13 ratio         PTT - ( 14 Mar 2019 20:10 )  PTT:35.5 sec      RADIOLOGY & ADDITIONAL TESTS:    EKG  afib 101/min, inf Qs    CXR:  inc BL interstitial opacities with superimposed airspace opacity of R base, BL pl effusions, cardiomegaly    ECHO:  LVEF  45-50%, mild wall thickness, Mod MR, mod TR, mod AS, elevated pul a pressure c/w Pul HTN
NOAH QUIROS 74yo W Female from home came to the ER for c/o SOB, SALDANA, cough and orthopnea.  The pt denies CP, fever, chills, sputum.  The pt is noted to have volume overload and CHF with BNP of 29,004.  The pt has had multiple admissions in the past few months.  The PMHx includes:  HTN, ASHD, CAD, old MI, CHF, afib, sp failed ablation, sp CABG,  valvular disease ( AS, MR, TR)  Pul HTN Hyperlipidemia, CKD 5, OA, DDD, DJD, GERD, Diverticulosis, anxiety, depression.    INTERVAL HPI/OVERNIGHT EVENTS:  pt has some improvement in sx, renal parameters trending upwards, ff by renal    MEDICATIONS  (STANDING):  allopurinol 100 milliGRAM(s) Oral daily  amiodarone    Tablet 200 milliGRAM(s) Oral daily  apixaban 5 milliGRAM(s) Oral every 12 hours  calcitriol   Capsule 0.5 MICROGram(s) Oral daily  chlorhexidine 4% Liquid 1 Application(s) Topical <User Schedule>  cyanocobalamin 1000 MICROGram(s) Oral daily  diltiazem    milliGRAM(s) Oral daily  famotidine    Tablet 40 milliGRAM(s) Oral two times a day  fluticasone propionate 50 MICROgram(s)/spray Nasal Spray 1 Spray(s) Both Nostrils two times a day  furosemide   Injectable 40 milliGRAM(s) IV Push daily  isosorbide   mononitrate ER Tablet (IMDUR) 30 milliGRAM(s) Oral daily  metoprolol tartrate 50 milliGRAM(s) Oral two times a day  spironolactone 25 milliGRAM(s) Oral daily    MEDICATIONS  (PRN):  acetaminophen   Tablet .. 650 milliGRAM(s) Oral every 6 hours PRN Mild Pain (1 - 3)  guaiFENesin   Syrup  (Sugar-Free) 200 milliGRAM(s) Oral every 6 hours PRN Cough      Allergies    No Known Allergies    	  Vital Signs Last 24 Hrs    T(F): 98.6  HR: 85  BP: 107/71    RR: 18   SpO2: --96%    PHYSICAL EXAM:      Constitutional:  alert and oriented x 3, anxious but in NAD    Eyes:  nonicteric    ENMT:  normal    Neck:  supple, + JVD, no bruits    Respiratory:  shallow respirations, dec bibasilar BS, crackles    Cardiovascular:  S1S2 irreg and tachy    Gastrointestinal:  globose, soft and benign    Genitourinary:  no simeon    Extremities:  moves all ext, arthritic changes, bipedal edema    Neurological:  nonfocal    Skin:  no rash    Lymph Nodes:  not enlarged    LABS:                        8.7   10.3-----------( 271                 29      136  |  96  |  103  ----------------------------<  142  4.6   |  25  |  4.5    GFR  16, 13, 14, 12, 11, 10, 9  CK 19, 18, 16, MB 1.4, 1.2, 1.1, troponin 0.03, 0.02, 0.04  BNP  29, 004  Ca    9.6      15 Mar 2019 06:35  Phos  3.8     03-15  Mg     1.9, 2.2    TPro  7.1  /  Alb  3.7  /  TBili  0.6  /  DBili  x   /  AST  11  /  ALT  11  /  AlkPhos  70  03-14    PT/INR - ( 14 Mar 2019 20:10 )   PT: 24.30 sec;   INR: 2.13 ratio         PTT - ( 14 Mar 2019 20:10 )  PTT:35.5 sec      RADIOLOGY & ADDITIONAL TESTS:    EKG  afib 101/min, inf Qs    CXR:  inc BL interstitial opacities with superimposed airspace opacity of R base, BL pl effusions, cardiomegaly    ECHO:  LVEF  45-50%, mild wall thickness, Mod MR, mod TR, mod AS, elevated pul a pressure c/w Pul HTN
NOAH QUIROS 74yo W Female from home came to the ER for c/o SOB, SALDANA, cough and orthopnea.  The pt denies CP, fever, chills, sputum.  The pt is noted to have volume overload and CHF with BNP of 29,004.  The pt has had multiple admissions in the past few months.  The PMHx includes:  HTN, ASHD, CAD, old MI, CHF, afib, sp failed ablation, sp CABG,  valvular disease ( AS, MR, TR)  Pul HTN Hyperlipidemia, CKD 5, OA, DDD, DJD, GERD, Diverticulosis, anxiety, depression.    INTERVAL HPI/OVERNIGHT EVENTS:  pt on IV diuretics, O2, evaluated by cardio who inc amiodarone to 200mg daily, feels minimally better    MEDICATIONS  (STANDING):  allopurinol 100 milliGRAM(s) Oral daily  amiodarone    Tablet 100 milliGRAM(s) Oral daily  apixaban 5 milliGRAM(s) Oral every 12 hours  calcitriol   Capsule 0.5 MICROGram(s) Oral daily  chlorhexidine 4% Liquid 1 Application(s) Topical <User Schedule>  cyanocobalamin 1000 MICROGram(s) Oral daily  diltiazem    milliGRAM(s) Oral daily  famotidine    Tablet 40 milliGRAM(s) Oral two times a day  fluticasone propionate 50 MICROgram(s)/spray Nasal Spray 1 Spray(s) Both Nostrils two times a day  furosemide   Injectable 40 milliGRAM(s) IV Push daily  isosorbide   mononitrate ER Tablet (IMDUR) 30 milliGRAM(s) Oral daily  metoprolol tartrate 50 milliGRAM(s) Oral two times a day  spironolactone 25 milliGRAM(s) Oral daily    MEDICATIONS  (PRN):  acetaminophen   Tablet .. 650 milliGRAM(s) Oral every 6 hours PRN Mild Pain (1 - 3)  guaiFENesin   Syrup  (Sugar-Free) 200 milliGRAM(s) Oral every 6 hours PRN Cough      Allergies    No Known Allergies    	  Vital Signs Last 24 Hrs    T(F): 96.3  HR: 83  BP: 102/52  BP(mean): --  RR: 18   SpO2: --2L    PHYSICAL EXAM:      Constitutional:  alert and oriented x 3, uncomfortable but in NAD    Eyes:  nonicteric    ENMT:  normal    Neck:  supple, + JVD, no bruits    Respiratory:  shallow respirations, dec bibasilar BS, crackles    Cardiovascular:  S1S2 irreg and tachy    Gastrointestinal:  globose, soft and benign    Genitourinary:  no simeon    Extremities:  moves all ext, arthritic changes, bipedal edema    Neurological:  nonfocal    Skin:  no rash    Lymph Nodes:  not enlarged    LABS:                        9 11-----------( 242                   31       140  |  100  |  92  ----------------------------<  147  4.2   |  22  |  4.0    GFR  16, 13, 14, 12, 11  CK 19, 18, 16, MB 1.4, 1.2, 1.1, troponin 0.03, 0.02, 0.04  BNP  29, 004  Ca    9.6      15 Mar 2019 06:35  Phos  3.8     03-15  Mg     1.9     03-15    TPro  7.1  /  Alb  3.7  /  TBili  0.6  /  DBili  x   /  AST  11  /  ALT  11  /  AlkPhos  70  03-14    PT/INR - ( 14 Mar 2019 20:10 )   PT: 24.30 sec;   INR: 2.13 ratio         PTT - ( 14 Mar 2019 20:10 )  PTT:35.5 sec      RADIOLOGY & ADDITIONAL TESTS:    EKG  afib 101/min, inf Qs    CXR:  inc BL interstitial opacities with superimposed airspace opacity of R base, BL pl effusions, cardiomegaly    ECHO:  LVEF  45-50%, mild wall thickness, Mod MR, mod TR, mod AS, elevated pul a pressure c/w Pul HTN
NOAH QUIROS 74yo W Female from home came to the ER for c/o SOB, SALDANA, cough and orthopnea.  The pt denies CP, fever, chills, sputum.  The pt is noted to have volume overload and CHF with BNP of 29,004.  The pt has had multiple admissions in the past few months.  The PMHx includes:  HTN, ASHD, CAD, old MI, CHF, afib, sp failed ablation, sp CABG,  valvular disease ( AS, MR, TR)  Pul HTN Hyperlipidemia, CKD 5, OA, DDD, DJD, GERD, Diverticulosis, anxiety, depression.    INTERVAL HPI/OVERNIGHT EVENTS:  pt on IV diuretics, improvement of SOB, evaluated by cardiology, amiodarone inc to 200mg    MEDICATIONS  (STANDING):  allopurinol 100 milliGRAM(s) Oral daily  amiodarone    Tablet 200 milliGRAM(s) Oral daily  apixaban 5 milliGRAM(s) Oral every 12 hours  calcitriol   Capsule 0.5 MICROGram(s) Oral daily  chlorhexidine 4% Liquid 1 Application(s) Topical <User Schedule>  cyanocobalamin 1000 MICROGram(s) Oral daily  diltiazem    milliGRAM(s) Oral daily  famotidine    Tablet 40 milliGRAM(s) Oral two times a day  fluticasone propionate 50 MICROgram(s)/spray Nasal Spray 1 Spray(s) Both Nostrils two times a day  furosemide   Injectable 40 milliGRAM(s) IV Push daily  isosorbide   mononitrate ER Tablet (IMDUR) 30 milliGRAM(s) Oral daily  metoprolol tartrate 50 milliGRAM(s) Oral two times a day  spironolactone 25 milliGRAM(s) Oral daily    MEDICATIONS  (PRN):  acetaminophen   Tablet .. 650 milliGRAM(s) Oral every 6 hours PRN Mild Pain (1 - 3)  guaiFENesin   Syrup  (Sugar-Free) 200 milliGRAM(s) Oral every 6 hours PRN Cough      Allergies    No Known Allergies    	  Vital Signs Last 24 Hrs    T(F): 96.3  HR: 85  BP: 102/52  BP(mean): --  RR: 18   SpO2: --    PHYSICAL EXAM:      Constitutional:  alert and oriented x 3, anxious but in NAD    Eyes:  nonicteric    ENMT:  normal    Neck:  supple, + JVD, no bruits    Respiratory:  shallow respirations, dec bibasilar BS, crackles    Cardiovascular:  S1S2 irreg and tachy    Gastrointestinal:  globose, soft and benign    Genitourinary:  no simeon    Extremities:  moves all ext, arthritic changes, bipedal edema    Neurological:  nonfocal    Skin:  no rash    Lymph Nodes:  not enlarged    LABS:                        9 11-----------( 255                   31       140  |  100  |  92  ----------------------------<  147  4.2   |  22  |  4.0    GFR  16, 13, 14, 12, 11  CK 19, 18, 16, MB 1.4, 1.2, 1.1, troponin 0.03, 0.02, 0.04  BNP  29, 004  Ca    9.6      15 Mar 2019 06:35  Phos  3.8     03-15  Mg     1.9     03-15    TPro  7.1  /  Alb  3.7  /  TBili  0.6  /  DBili  x   /  AST  11  /  ALT  11  /  AlkPhos  70  03-14    PT/INR - ( 14 Mar 2019 20:10 )   PT: 24.30 sec;   INR: 2.13 ratio         PTT - ( 14 Mar 2019 20:10 )  PTT:35.5 sec      RADIOLOGY & ADDITIONAL TESTS:    EKG  afib 101/min, inf Qs    CXR:  inc BL interstitial opacities with superimposed airspace opacity of R base, BL pl effusions, cardiomegaly    ECHO:  LVEF  45-50%, mild wall thickness, Mod MR, mod TR, mod AS, elevated pul a pressure c/w Pul HTN
Nephrology progress note    Patient is seen and examined, events over the last 24 h noted .  Denied nay complaints  has cough non productive     Allergies:  No Known Allergies    Hospital Medications:   MEDICATIONS  (STANDING):  allopurinol 100 milliGRAM(s) Oral daily  apixaban 5 milliGRAM(s) Oral every 12 hours  chlorhexidine 4% Liquid 1 Application(s) Topical <User Schedule>  cyanocobalamin 1000 MICROGram(s) Oral daily  epoetin krishna Injectable 5000 Unit(s) SubCutaneous every 7 days  famotidine    Tablet 40 milliGRAM(s) Oral two times a day  ferrous    sulfate 325 milliGRAM(s) Oral three times a day  fluticasone propionate 50 MICROgram(s)/spray Nasal Spray 1 Spray(s) Both Nostrils two times a day  isosorbide   mononitrate ER Tablet (IMDUR) 30 milliGRAM(s) Oral daily  metoprolol tartrate 25 milliGRAM(s) Oral every 8 hours  spironolactone 25 milliGRAM(s) Oral every 12 hours        VITALS:  T(F): 96.7 (03-20-19 @ 04:48), Max: 98.9 (03-19-19 @ 12:30)  HR: 71 (03-20-19 @ 04:48)  BP: 122/60 (03-20-19 @ 04:48)  RR: 18 (03-20-19 @ 04:48)      03-18 @ 07:01  -  03-19 @ 07:00  --------------------------------------------------------  IN: 360 mL / OUT: 0 mL / NET: 360 mL          PHYSICAL EXAM:  Constitutional: NAD  HEENT: anicteric sclera, oropharynx clear, MMM  Neck: No JVD  Respiratory: Crackles fine at base   Cardiovascular: S1, S2, RRR  Gastrointestinal: BS+, soft, NT/ND  Extremities: No cyanosis or clubbing. No peripheral edema  :  No simeon.   Skin: No rashes    LABS:  03-20    137  |  98  |  106<HH>  ----------------------------<  187<H>  4.3   |  21  |  4.5<HH>  Creatinine Trend: 4.5<--, 4.5<--, 4.3<--, 4.0<--, 3.5<--, 3.6<--  Ca    10.2<H>      20 Mar 2019 08:54  Mg     2.1     03-20                            8.4    8.84  )-----------( 272      ( 20 Mar 2019 08:54 )             28.5       Urine Studies:    Protein/Creatinine Ratio Calculation: 0.6 Ratio (03-19 @ 07:48)  Creatinine, Random Urine: 46 mg/dL (03-19 @ 07:48)  Osmolality, Random Urine: 332 mos/kg (03-19 @ 07:48)  Potassium, Random Urine: 22 mmol/L (03-19 @ 07:48)  Sodium, Random Urine: 61.0 mmoL/L (03-19 @ 07:48)    RADIOLOGY & ADDITIONAL STUDIES:
SUBJECTIVE:    Patient is a 73y old Female who presents with a chief complaint of Cough and shortness of breath (18 Mar 2019 13:42)    Currently admitted to medicine with the primary diagnosis of CHF exacerbation     Today is hospital day 4d. This morning she is resting comfortably in bed and reports no new issues or overnight events.     PAST MEDICAL & SURGICAL HISTORY  Thyroid nodule  Degenerative joint disease  Osteoarthritis  Diverticulosis  GERD (gastroesophageal reflux disease)  Heart failure with reduced ejection fraction  Pulmonary hypertension, moderate to severe  Aortic stenosis, moderate  Atrial fibrillation: status post cardioversion  CKD (chronic kidney disease): stage 5, no dialysis  MI (myocardial infarction)  CAD (coronary artery disease)  Kidney stone  HTN (hypertension)  S/P CABG (coronary artery bypass graft)  AV fistula  H/O abdominal hysterectomy  H/O cardiac radiofrequency ablation    SOCIAL HISTORY:  Negative for smoking/alcohol/drug use.     ALLERGIES:  No Known Allergies    MEDICATIONS:  STANDING MEDICATIONS  allopurinol 100 milliGRAM(s) Oral daily  apixaban 5 milliGRAM(s) Oral every 12 hours  calcitriol   Capsule 0.5 MICROGram(s) Oral daily  chlorhexidine 4% Liquid 1 Application(s) Topical <User Schedule>  cyanocobalamin 1000 MICROGram(s) Oral daily  diltiazem    milliGRAM(s) Oral daily  famotidine    Tablet 40 milliGRAM(s) Oral two times a day  fluticasone propionate 50 MICROgram(s)/spray Nasal Spray 1 Spray(s) Both Nostrils two times a day  furosemide   Injectable 40 milliGRAM(s) IV Push daily  isosorbide   mononitrate ER Tablet (IMDUR) 30 milliGRAM(s) Oral daily  metoprolol tartrate 50 milliGRAM(s) Oral two times a day  spironolactone 25 milliGRAM(s) Oral daily    PRN MEDICATIONS  acetaminophen   Tablet .. 650 milliGRAM(s) Oral every 6 hours PRN  guaiFENesin   Syrup  (Sugar-Free) 200 milliGRAM(s) Oral every 6 hours PRN  ondansetron Injectable 4 milliGRAM(s) IV Push every 6 hours PRN    VITALS:   T(F): 97.2  HR: 83  BP: 118/53  RR: 18  SpO2: 96%    LABS:                        9.1    11.48 )-----------( 271      ( 18 Mar 2019 06:09 )             31.6     03-18    134<L>  |  94<L>  |  98<HH>  ----------------------------<  151<H>  4.6   |  23  |  4.3<HH>    Ca    10.1      18 Mar 2019 06:09    TPro  6.4  /  Alb  3.4<L>  /  TBili  0.3  /  DBili  x   /  AST  12  /  ALT  10  /  AlkPhos  54  03-18      RADIOLOGY:    < from: Xray Chest 2 Views PA/Lat (03.14.19 @ 20:08) >  Impression:      Enlarged cardiac silhouette with vascular congestion, bilateral pleural   effusions and bibasilar opacities. Constellation of findings are   consistent with CHF.      < end of copied text >      PHYSICAL EXAM:  GEN: No acute distress  LUNGS: decreased breath sounds bilaterally   HEART: S1/S2 present. irregular , rate controlled   ABD: Soft, non-tender, non-distended. Bowel sounds present  EXT: No ll edema , intact pulsations   NEURO: AAOX3
SUBJECTIVE:    Patient is a 73y old Female who presents with a chief complaint of Cough and shortness of breath (21 Mar 2019 08:26)  Currently admitted to medicine with the primary diagnosis of CHF exacerbation  Today is hospital day 7d. This morning she is resting comfortably in bed and reports no new issues or overnight events.   no complains this AM, endorses feeling better, asking for going home.     PAST MEDICAL & SURGICAL HISTORY  Thyroid nodule  Degenerative joint disease  Osteoarthritis  Diverticulosis  GERD (gastroesophageal reflux disease)  Heart failure with reduced ejection fraction  Pulmonary hypertension, moderate to severe  Aortic stenosis, moderate  Atrial fibrillation: status post cardioversion  CKD (chronic kidney disease): stage 5, no dialysis  MI (myocardial infarction)  CAD (coronary artery disease)  Kidney stone  HTN (hypertension)  S/P CABG (coronary artery bypass graft)  AV fistula  H/O abdominal hysterectomy  H/O cardiac radiofrequency ablation    SOCIAL HISTORY:  Negative for smoking/alcohol/drug use.     ALLERGIES:  No Known Allergies    MEDICATIONS:  STANDING MEDICATIONS  allopurinol 100 milliGRAM(s) Oral daily  apixaban 5 milliGRAM(s) Oral every 12 hours  chlorhexidine 4% Liquid 1 Application(s) Topical <User Schedule>  cyanocobalamin 1000 MICROGram(s) Oral daily  diltiazem    milliGRAM(s) Oral daily  epoetin krishna Injectable 5000 Unit(s) SubCutaneous every 7 days  famotidine    Tablet 40 milliGRAM(s) Oral two times a day  ferrous    sulfate 325 milliGRAM(s) Oral three times a day  fluticasone propionate 50 MICROgram(s)/spray Nasal Spray 1 Spray(s) Both Nostrils two times a day  furosemide    Tablet 60 milliGRAM(s) Oral daily  isosorbide   mononitrate ER Tablet (IMDUR) 30 milliGRAM(s) Oral daily  metoprolol tartrate 25 milliGRAM(s) Oral every 8 hours  sodium bicarbonate 325 milliGRAM(s) Oral every 8 hours  spironolactone 25 milliGRAM(s) Oral every 12 hours    PRN MEDICATIONS  acetaminophen   Tablet .. 650 milliGRAM(s) Oral every 6 hours PRN  guaiFENesin   Syrup  (Sugar-Free) 200 milliGRAM(s) Oral every 6 hours PRN  ondansetron Injectable 4 milliGRAM(s) IV Push every 6 hours PRN    VITALS:   T(F): 96.1  HR: 86  BP: 147/67  RR: 18  SpO2: 100%    LABS:                        9.0    9.41  )-----------( 304      ( 21 Mar 2019 08:24 )             30.6     03-21    139  |  97<L>  |  109<HH>  ----------------------------<  131<H>  4.7   |  25  |  4.7<HH>    Ca    10.2<H>      21 Mar 2019 08:24  Mg     2.3     03-21      PHYSICAL EXAM:  GEN: No acute distress  LUNGS: Clear to auscultation bilaterally   HEART: S1/S2 present. RRR.   ABD: Soft, non-tender, non-distended. Bowel sounds present  EXT: NC/NC/NE/2+PP/BARBA  NEURO: AAOX3
seen and examined  no distress  lying comfortable       Standing Inpatient Medications  allopurinol 100 milliGRAM(s) Oral daily  apixaban 5 milliGRAM(s) Oral every 12 hours  chlorhexidine 4% Liquid 1 Application(s) Topical <User Schedule>  cyanocobalamin 1000 MICROGram(s) Oral daily  diltiazem    milliGRAM(s) Oral daily  epoetin krishna Injectable 5000 Unit(s) SubCutaneous every 7 days  famotidine    Tablet 40 milliGRAM(s) Oral two times a day  ferrous    sulfate 325 milliGRAM(s) Oral three times a day  fluticasone propionate 50 MICROgram(s)/spray Nasal Spray 1 Spray(s) Both Nostrils two times a day  furosemide    Tablet 60 milliGRAM(s) Oral daily  isosorbide   mononitrate ER Tablet (IMDUR) 30 milliGRAM(s) Oral daily  metoprolol tartrate 25 milliGRAM(s) Oral every 8 hours  spironolactone 25 milliGRAM(s) Oral every 12 hours    PRN Inpatient Medications  acetaminophen   Tablet .. 650 milliGRAM(s) Oral every 6 hours PRN  guaiFENesin   Syrup  (Sugar-Free) 200 milliGRAM(s) Oral every 6 hours PRN  ondansetron Injectable 4 milliGRAM(s) IV Push every 6 hours PRN          VITALS/PHYSICAL EXAM  --------------------------------------------------------------------------------  T(C): 35.6 (03-21-19 @ 04:35), Max: 36.2 (03-20-19 @ 21:45)  HR: 86 (03-21-19 @ 04:35) (64 - 105)  BP: 147/67 (03-21-19 @ 04:35) (120/57 - 147/67)  RR: 18 (03-21-19 @ 04:35) (18 - 18)  SpO2: --  Wt(kg): --  Height (cm): 167.64 (03-20-19 @ 13:31)      03-20-19 @ 07:01 - 03-21-19 @ 07:00  --------------------------------------------------------  IN: 40 mL / OUT: 300 mL / NET: -260 mL      Physical Exam:  	Gen: NAD  	Pulm: decrease BS  B/L  	CV:  S1S2; no rub  	Abd: +distended  	LE: edema        LABS/STUDIES  --------------------------------------------------------------------------------              8.4    8.84  >-----------<  272      [03-20-19 @ 08:54]              28.5     137  |  98  |  106  ----------------------------<  187      [03-20-19 @ 08:54]  4.3   |  21  |  4.5        Ca     10.2     [03-20-19 @ 08:54]      Mg     2.1     [03-20-19 @ 08:54]            Creatinine Trend:  SCr 4.5 [03-20 @ 08:54]  SCr 4.5 [03-19 @ 07:41]  SCr 4.3 [03-18 @ 06:09]  SCr 4.0 [03-17 @ 07:25]  SCr 3.5 [03-16 @ 07:32]      Urine Creatinine 46      [03-19-19 @ 07:48]  Urine Protein 28      [03-19-19 @ 07:48]  Urine Sodium 61.0      [03-19-19 @ 07:48]  Urine Potassium 22      [03-19-19 @ 07:48]  Urine Osmolality 332      [03-19-19 @ 07:48]    Iron 38, TIBC 300, %sat 13      [11-29-18 @ 07:49]  Ferritin 444      [11-29-18 @ 07:49]  TSH 4.02      [11-26-18 @ 06:16]    HCV 0.18, Nonreact      [03-15-19 @ 06:35]
seen and examined  no distress  sitting on her chair       Standing Inpatient Medications  allopurinol 100 milliGRAM(s) Oral daily  apixaban 5 milliGRAM(s) Oral every 12 hours  calcitriol   Capsule 0.5 MICROGram(s) Oral daily  chlorhexidine 4% Liquid 1 Application(s) Topical <User Schedule>  cyanocobalamin 1000 MICROGram(s) Oral daily  diltiazem    milliGRAM(s) Oral daily  famotidine    Tablet 40 milliGRAM(s) Oral two times a day  fluticasone propionate 50 MICROgram(s)/spray Nasal Spray 1 Spray(s) Both Nostrils two times a day  furosemide   Injectable 20 milliGRAM(s) IV Push once  isosorbide   mononitrate ER Tablet (IMDUR) 30 milliGRAM(s) Oral daily  metoprolol tartrate 50 milliGRAM(s) Oral two times a day    PRN Inpatient Medications  acetaminophen   Tablet .. 650 milliGRAM(s) Oral every 6 hours PRN  guaiFENesin   Syrup  (Sugar-Free) 200 milliGRAM(s) Oral every 6 hours PRN  ondansetron Injectable 4 milliGRAM(s) IV Push every 6 hours PRN          VITALS/PHYSICAL EXAM  --------------------------------------------------------------------------------  T(C): 35.8 (03-19-19 @ 05:23), Max: 36.2 (03-18-19 @ 13:32)  HR: 97 (03-19-19 @ 05:23) (67 - 97)  BP: 126/56 (03-19-19 @ 05:23) (117/69 - 126/56)  RR: 18 (03-19-19 @ 05:23) (18 - 18)  SpO2: 96% (03-18-19 @ 10:51) (90% - 96%)  Wt(kg): --        03-18-19 @ 07:01  -  03-19-19 @ 07:00  --------------------------------------------------------  IN: 360 mL / OUT: 0 mL / NET: 360 mL      Physical Exam:  	Gen: NAD  	Pulm: decrease BS B/L  	CV:  S1S2; no rub  	Abd: +distended  	LE:  edema  	    LABS/STUDIES  --------------------------------------------------------------------------------              8.7    10.37 >-----------<  271      [03-19-19 @ 07:41]              29.1     134  |  94  |  98  ----------------------------<  151      [03-18-19 @ 06:09]  4.6   |  23  |  4.3        Ca     10.1     [03-18-19 @ 06:09]    TPro  6.4  /  Alb  3.4  /  TBili  0.3  /  DBili  x   /  AST  12  /  ALT  10  /  AlkPhos  54  [03-18-19 @ 06:09]          Creatinine Trend:  SCr 4.3 [03-18 @ 06:09]  SCr 4.0 [03-17 @ 07:25]  SCr 3.5 [03-16 @ 07:32]  SCr 3.6 [03-15 @ 06:35]  SCr 3.9 [03-14 @ 20:10]        Iron 38, TIBC 300, %sat 13      [11-29-18 @ 07:49]  Ferritin 444      [11-29-18 @ 07:49]  TSH 4.02      [11-26-18 @ 06:16]    HCV 0.18, Nonreact      [03-15-19 @ 06:35]

## 2019-03-21 NOTE — PROGRESS NOTE ADULT - ASSESSMENT
Shortness of breath due to Volume overload, exacerbation of Systolic CHF and valvular disease, COPD exacerbation and worsening renal function  Hx of HTN, ASHD, CAD, MI, sp CABG, afib, valvular disease ( AS, MR), Pul HTN, failed ablation  Hx of DLD  Hx of CKD 5  Hx of chronic anemia  Hx of OA, DDD, DJD  Hx of GERD, diverticulosis       pt transitioned to oral diuretics, placed on bicarb   pt ff by renal:  no indication for dialysis at this time  cont all home meds  cardio consult appreciated  monitor renal parameters and electrolytes  pt medically stable for D/C with ff up with renal

## 2019-03-21 NOTE — PROGRESS NOTE ADULT - REASON FOR ADMISSION
Cough and shortness of breath
Cough and shortness of breath due to volume overload, CHF, COPD exacerbation and worsening renal failure
Cough and shortness of breath
Cough and shortness of breath due to volume overload and CHF (EF 45-50)
Cough and shortness of breath due to volume overload, CHF
Cough and shortness of breath due to volume overload, CHF and afib with RVR
Cough and shortness of breath due to volume overload, CHF, valvular disease and afib with RVR
Cough and shortness of breath due to volume overload, CHF, worsening renal failure
Cough and shortness of breath due to volume overload, CHF, worsening renal function and COPD exacerbation

## 2019-03-26 DIAGNOSIS — J44.0 CHRONIC OBSTRUCTIVE PULMONARY DISEASE WITH (ACUTE) LOWER RESPIRATORY INFECTION: ICD-10-CM

## 2019-03-26 DIAGNOSIS — Z90.710 ACQUIRED ABSENCE OF BOTH CERVIX AND UTERUS: ICD-10-CM

## 2019-03-26 DIAGNOSIS — Z95.1 PRESENCE OF AORTOCORONARY BYPASS GRAFT: ICD-10-CM

## 2019-03-26 DIAGNOSIS — N17.9 ACUTE KIDNEY FAILURE, UNSPECIFIED: ICD-10-CM

## 2019-03-26 DIAGNOSIS — I25.10 ATHEROSCLEROTIC HEART DISEASE OF NATIVE CORONARY ARTERY WITHOUT ANGINA PECTORIS: ICD-10-CM

## 2019-03-26 DIAGNOSIS — I12.0 HYPERTENSIVE CHRONIC KIDNEY DISEASE WITH STAGE 5 CHRONIC KIDNEY DISEASE OR END STAGE RENAL DISEASE: ICD-10-CM

## 2019-03-26 DIAGNOSIS — I27.20 PULMONARY HYPERTENSION, UNSPECIFIED: ICD-10-CM

## 2019-03-26 DIAGNOSIS — J40 BRONCHITIS, NOT SPECIFIED AS ACUTE OR CHRONIC: ICD-10-CM

## 2019-03-26 DIAGNOSIS — I25.2 OLD MYOCARDIAL INFARCTION: ICD-10-CM

## 2019-03-26 DIAGNOSIS — R09.82 POSTNASAL DRIP: ICD-10-CM

## 2019-03-26 DIAGNOSIS — I50.23 ACUTE ON CHRONIC SYSTOLIC (CONGESTIVE) HEART FAILURE: ICD-10-CM

## 2019-03-26 DIAGNOSIS — M19.90 UNSPECIFIED OSTEOARTHRITIS, UNSPECIFIED SITE: ICD-10-CM

## 2019-03-26 DIAGNOSIS — F41.9 ANXIETY DISORDER, UNSPECIFIED: ICD-10-CM

## 2019-03-26 DIAGNOSIS — M51.9 UNSPECIFIED THORACIC, THORACOLUMBAR AND LUMBOSACRAL INTERVERTEBRAL DISC DISORDER: ICD-10-CM

## 2019-03-26 DIAGNOSIS — Z82.49 FAMILY HISTORY OF ISCHEMIC HEART DISEASE AND OTHER DISEASES OF THE CIRCULATORY SYSTEM: ICD-10-CM

## 2019-03-26 DIAGNOSIS — I35.0 NONRHEUMATIC AORTIC (VALVE) STENOSIS: ICD-10-CM

## 2019-03-26 DIAGNOSIS — I48.2 CHRONIC ATRIAL FIBRILLATION: ICD-10-CM

## 2019-03-26 DIAGNOSIS — Z86.718 PERSONAL HISTORY OF OTHER VENOUS THROMBOSIS AND EMBOLISM: ICD-10-CM

## 2019-03-26 DIAGNOSIS — K21.9 GASTRO-ESOPHAGEAL REFLUX DISEASE WITHOUT ESOPHAGITIS: ICD-10-CM

## 2019-03-26 DIAGNOSIS — Z86.711 PERSONAL HISTORY OF PULMONARY EMBOLISM: ICD-10-CM

## 2019-03-26 DIAGNOSIS — J44.1 CHRONIC OBSTRUCTIVE PULMONARY DISEASE WITH (ACUTE) EXACERBATION: ICD-10-CM

## 2019-03-26 DIAGNOSIS — Z79.01 LONG TERM (CURRENT) USE OF ANTICOAGULANTS: ICD-10-CM

## 2019-03-26 DIAGNOSIS — N18.5 CHRONIC KIDNEY DISEASE, STAGE 5: ICD-10-CM

## 2019-03-26 DIAGNOSIS — R06.02 SHORTNESS OF BREATH: ICD-10-CM

## 2019-03-26 DIAGNOSIS — Z87.891 PERSONAL HISTORY OF NICOTINE DEPENDENCE: ICD-10-CM

## 2019-03-26 DIAGNOSIS — K57.90 DIVERTICULOSIS OF INTESTINE, PART UNSPECIFIED, WITHOUT PERFORATION OR ABSCESS WITHOUT BLEEDING: ICD-10-CM

## 2019-03-26 DIAGNOSIS — Z87.442 PERSONAL HISTORY OF URINARY CALCULI: ICD-10-CM

## 2019-03-26 DIAGNOSIS — E53.8 DEFICIENCY OF OTHER SPECIFIED B GROUP VITAMINS: ICD-10-CM

## 2019-03-26 DIAGNOSIS — I36.1 NONRHEUMATIC TRICUSPID (VALVE) INSUFFICIENCY: ICD-10-CM

## 2019-03-26 DIAGNOSIS — R09.02 HYPOXEMIA: ICD-10-CM

## 2019-03-26 DIAGNOSIS — E78.5 HYPERLIPIDEMIA, UNSPECIFIED: ICD-10-CM

## 2019-03-29 ENCOUNTER — FORM ENCOUNTER (OUTPATIENT)
Age: 74
End: 2019-03-29

## 2019-03-30 ENCOUNTER — OUTPATIENT (OUTPATIENT)
Dept: OUTPATIENT SERVICES | Facility: HOSPITAL | Age: 74
LOS: 1 days | Discharge: HOME | End: 2019-03-30

## 2019-03-30 DIAGNOSIS — Z90.710 ACQUIRED ABSENCE OF BOTH CERVIX AND UTERUS: Chronic | ICD-10-CM

## 2019-03-30 DIAGNOSIS — I77.0 ARTERIOVENOUS FISTULA, ACQUIRED: Chronic | ICD-10-CM

## 2019-03-30 DIAGNOSIS — Z95.1 PRESENCE OF AORTOCORONARY BYPASS GRAFT: Chronic | ICD-10-CM

## 2019-03-30 DIAGNOSIS — Z98.890 OTHER SPECIFIED POSTPROCEDURAL STATES: Chronic | ICD-10-CM

## 2019-03-30 DIAGNOSIS — Z12.31 ENCOUNTER FOR SCREENING MAMMOGRAM FOR MALIGNANT NEOPLASM OF BREAST: ICD-10-CM

## 2019-04-01 ENCOUNTER — FORM ENCOUNTER (OUTPATIENT)
Age: 74
End: 2019-04-01

## 2019-04-02 ENCOUNTER — OUTPATIENT (OUTPATIENT)
Dept: OUTPATIENT SERVICES | Facility: HOSPITAL | Age: 74
LOS: 1 days | Discharge: HOME | End: 2019-04-02
Payer: MEDICARE

## 2019-04-02 DIAGNOSIS — Z90.710 ACQUIRED ABSENCE OF BOTH CERVIX AND UTERUS: Chronic | ICD-10-CM

## 2019-04-02 DIAGNOSIS — Z98.890 OTHER SPECIFIED POSTPROCEDURAL STATES: Chronic | ICD-10-CM

## 2019-04-02 DIAGNOSIS — I77.0 ARTERIOVENOUS FISTULA, ACQUIRED: Chronic | ICD-10-CM

## 2019-04-02 DIAGNOSIS — Z95.1 PRESENCE OF AORTOCORONARY BYPASS GRAFT: Chronic | ICD-10-CM

## 2019-04-02 DIAGNOSIS — R92.8 OTHER ABNORMAL AND INCONCLUSIVE FINDINGS ON DIAGNOSTIC IMAGING OF BREAST: ICD-10-CM

## 2019-04-02 PROCEDURE — 77065 DX MAMMO INCL CAD UNI: CPT | Mod: 26,LT

## 2019-04-02 PROCEDURE — G0279: CPT | Mod: 26,LT

## 2019-04-19 NOTE — DISCHARGE NOTE ADULT - IF YOU ARE A SMOKER, IT IS IMPORTANT FOR YOUR HEALTH TO STOP SMOKING. PLEASE BE AWARE THAT SECOND HAND SMOKE IS ALSO HARMFUL.
Left message to call office. BRYAN      ----- Message from Vance Lombardi MD sent at 4/18/2019  9:25 AM EDT -----  Notify the patient that her urine culture did reveal a bacterial infection, but the medication she was prescribed should have helped with her symptoms     Statement Selected

## 2019-05-08 ENCOUNTER — INPATIENT (INPATIENT)
Facility: HOSPITAL | Age: 74
LOS: 1 days | Discharge: HOME HEALTH | End: 2019-05-10
Attending: INTERNAL MEDICINE | Admitting: INTERNAL MEDICINE
Payer: MEDICARE

## 2019-05-08 VITALS
TEMPERATURE: 98 F | DIASTOLIC BLOOD PRESSURE: 72 MMHG | HEART RATE: 137 BPM | SYSTOLIC BLOOD PRESSURE: 121 MMHG | RESPIRATION RATE: 18 BRPM | OXYGEN SATURATION: 97 %

## 2019-05-08 DIAGNOSIS — Z90.710 ACQUIRED ABSENCE OF BOTH CERVIX AND UTERUS: Chronic | ICD-10-CM

## 2019-05-08 DIAGNOSIS — Z98.890 OTHER SPECIFIED POSTPROCEDURAL STATES: Chronic | ICD-10-CM

## 2019-05-08 DIAGNOSIS — I77.0 ARTERIOVENOUS FISTULA, ACQUIRED: Chronic | ICD-10-CM

## 2019-05-08 DIAGNOSIS — Z95.1 PRESENCE OF AORTOCORONARY BYPASS GRAFT: Chronic | ICD-10-CM

## 2019-05-08 LAB
ALBUMIN SERPL ELPH-MCNC: 3.8 G/DL — SIGNIFICANT CHANGE UP (ref 3.5–5.2)
ALP SERPL-CCNC: 85 U/L — SIGNIFICANT CHANGE UP (ref 30–115)
ALT FLD-CCNC: 17 U/L — SIGNIFICANT CHANGE UP (ref 0–41)
ANION GAP SERPL CALC-SCNC: 17 MMOL/L — HIGH (ref 7–14)
APTT BLD: 32.6 SEC — SIGNIFICANT CHANGE UP (ref 27–39.2)
AST SERPL-CCNC: 27 U/L — SIGNIFICANT CHANGE UP (ref 0–41)
BILIRUB SERPL-MCNC: 0.5 MG/DL — SIGNIFICANT CHANGE UP (ref 0.2–1.2)
BUN SERPL-MCNC: 63 MG/DL — CRITICAL HIGH (ref 10–20)
CALCIUM SERPL-MCNC: 9.6 MG/DL — SIGNIFICANT CHANGE UP (ref 8.5–10.1)
CHLORIDE SERPL-SCNC: 100 MMOL/L — SIGNIFICANT CHANGE UP (ref 98–110)
CO2 SERPL-SCNC: 21 MMOL/L — SIGNIFICANT CHANGE UP (ref 17–32)
CREAT SERPL-MCNC: 3.5 MG/DL — HIGH (ref 0.7–1.5)
GLUCOSE SERPL-MCNC: 164 MG/DL — HIGH (ref 70–99)
HCT VFR BLD CALC: 33.2 % — LOW (ref 37–47)
HGB BLD-MCNC: 9.7 G/DL — LOW (ref 12–16)
INR BLD: 1.85 RATIO — HIGH (ref 0.65–1.3)
LIDOCAIN IGE QN: 90 U/L — HIGH (ref 7–60)
MAGNESIUM SERPL-MCNC: 2.2 MG/DL — SIGNIFICANT CHANGE UP (ref 1.8–2.4)
MCHC RBC-ENTMCNC: 27.1 PG — SIGNIFICANT CHANGE UP (ref 27–31)
MCHC RBC-ENTMCNC: 29.2 G/DL — LOW (ref 32–37)
MCV RBC AUTO: 92.7 FL — SIGNIFICANT CHANGE UP (ref 81–99)
NRBC # BLD: 0 /100 WBCS — SIGNIFICANT CHANGE UP (ref 0–0)
NT-PROBNP SERPL-SCNC: HIGH PG/ML (ref 0–300)
PLATELET # BLD AUTO: 226 K/UL — SIGNIFICANT CHANGE UP (ref 130–400)
POTASSIUM SERPL-MCNC: 4.9 MMOL/L — SIGNIFICANT CHANGE UP (ref 3.5–5)
POTASSIUM SERPL-SCNC: 4.9 MMOL/L — SIGNIFICANT CHANGE UP (ref 3.5–5)
PROT SERPL-MCNC: 6.8 G/DL — SIGNIFICANT CHANGE UP (ref 6–8)
PROTHROM AB SERPL-ACNC: 21.2 SEC — HIGH (ref 9.95–12.87)
RBC # BLD: 3.58 M/UL — LOW (ref 4.2–5.4)
RBC # FLD: 20.2 % — HIGH (ref 11.5–14.5)
SODIUM SERPL-SCNC: 138 MMOL/L — SIGNIFICANT CHANGE UP (ref 135–146)
TROPONIN T SERPL-MCNC: 0.03 NG/ML — CRITICAL HIGH
WBC # BLD: 11.18 K/UL — HIGH (ref 4.8–10.8)
WBC # FLD AUTO: 11.18 K/UL — HIGH (ref 4.8–10.8)

## 2019-05-08 PROCEDURE — 99291 CRITICAL CARE FIRST HOUR: CPT | Mod: GC

## 2019-05-08 PROCEDURE — 93010 ELECTROCARDIOGRAM REPORT: CPT

## 2019-05-08 PROCEDURE — 71045 X-RAY EXAM CHEST 1 VIEW: CPT | Mod: 26

## 2019-05-08 RX ORDER — FUROSEMIDE 40 MG
40 TABLET ORAL ONCE
Qty: 0 | Refills: 0 | Status: COMPLETED | OUTPATIENT
Start: 2019-05-08 | End: 2019-05-08

## 2019-05-08 RX ADMIN — Medication 40 MILLIGRAM(S): at 21:49

## 2019-05-08 NOTE — ED PROVIDER NOTE - ATTENDING CONTRIBUTION TO CARE
Pt with hx of ESRD recently started on HD TTS (last hd yesterday, full) presenting with shortness of breath x 1 day. Pt states after HD yesterday she felt like her BP was low and had a bullion cube in water. No fever or chills, no cough, no chest pain. SOB began this AM and pt was found to be in afib rvr. On exam pt with new pedal edema and shortness of breath. clear lungs, tachypneic without accessory muscle use. Concern for fluid overload. Will obtain  1) EKGs/CXR/ASA/O2 if hypoxic/cardiac monitor/LABS2) reassess 3) Admit to telemetry. Pt is comfortable on nasal canula and does not require PPV at this time.

## 2019-05-08 NOTE — ED PROVIDER NOTE - PHYSICAL EXAMINATION
VITAL SIGNS: I have reviewed nursing notes and confirm.  CONSTITUTIONAL: Well-developed; chronically ill,  in no acute distress. on NC at 4L which she is normally not on.   SKIN: skin exam is warm and dry, no acute rash.   HEAD: Normocephalic; atraumatic.  EYES:  EOM intact; conjunctiva and sclera clear.  ENT: No nasal discharge; airway clear. moist oral mucosa;  NECK: Supple; non tender.  CARD: S1, S2 normal; no murmurs, gallops, or rubs. Regular rate and rhythm. posterior tibial and radial pulses 2+  RESP: No wheezes, rales or rhonchi. cta b/l. no use of accessory muscles. no retractions  ABD: Normal bowel sounds; soft; non-distended; non-tender; no rebound.   EXT: Normal ROM. No  cyanosis. _pittingedema.   BACK: No cva tenderness  LYMPH: No acute cervical adenopathy.  NEURO: Alert, oriented, grossly unremarkable.    PSYCH: Cooperative, appropriate.

## 2019-05-08 NOTE — ED PROVIDER NOTE - CLINICAL SUMMARY MEDICAL DECISION MAKING FREE TEXT BOX
Pt HR improved without rate control. Given iv lasix (pt on prior to HD). admitted to medicine for continued management.

## 2019-05-08 NOTE — ED PROVIDER NOTE - OBJECTIVE STATEMENT
74y/o F w/ hx of of afib s/p ablation in dec Dr. Baptiste (cardio-[facility]), pt has received 4 dialysis treatment this 2weeks, tuthsat. (still makes urine; nephro Dr. Christopher, lasix stopped 4/24  by nephrologist since pt received dialysis.  last dialysis was yesterday, and received full dialysis.  pt now presents with sob worse with lying down, inc swelling of her legs.   no cough, congestion, runny nose.  no cp.   pt recently got fistula made; arm is bluish and vascular informed them it is normal, but pt no pain or swelling to the arm.

## 2019-05-09 ENCOUNTER — TRANSCRIPTION ENCOUNTER (OUTPATIENT)
Age: 74
End: 2019-05-09

## 2019-05-09 LAB
ANION GAP SERPL CALC-SCNC: 17 MMOL/L — HIGH (ref 7–14)
BUN SERPL-MCNC: 71 MG/DL — CRITICAL HIGH (ref 10–20)
CALCIUM SERPL-MCNC: 9.5 MG/DL — SIGNIFICANT CHANGE UP (ref 8.5–10.1)
CHLORIDE SERPL-SCNC: 101 MMOL/L — SIGNIFICANT CHANGE UP (ref 98–110)
CK MB CFR SERPL CALC: 2.5 NG/ML — SIGNIFICANT CHANGE UP (ref 0.6–6.3)
CK SERPL-CCNC: 18 U/L — SIGNIFICANT CHANGE UP (ref 0–225)
CO2 SERPL-SCNC: 24 MMOL/L — SIGNIFICANT CHANGE UP (ref 17–32)
CREAT SERPL-MCNC: 3.8 MG/DL — HIGH (ref 0.7–1.5)
GLUCOSE SERPL-MCNC: 202 MG/DL — HIGH (ref 70–99)
MAGNESIUM SERPL-MCNC: 2.2 MG/DL — SIGNIFICANT CHANGE UP (ref 1.8–2.4)
POTASSIUM SERPL-MCNC: 4.2 MMOL/L — SIGNIFICANT CHANGE UP (ref 3.5–5)
POTASSIUM SERPL-SCNC: 4.2 MMOL/L — SIGNIFICANT CHANGE UP (ref 3.5–5)
SODIUM SERPL-SCNC: 142 MMOL/L — SIGNIFICANT CHANGE UP (ref 135–146)
TROPONIN T SERPL-MCNC: 0.03 NG/ML — CRITICAL HIGH
TROPONIN T SERPL-MCNC: 0.04 NG/ML — CRITICAL HIGH

## 2019-05-09 RX ORDER — ALLOPURINOL 300 MG
100 TABLET ORAL DAILY
Refills: 0 | Status: DISCONTINUED | OUTPATIENT
Start: 2019-05-09 | End: 2019-05-10

## 2019-05-09 RX ORDER — DOCUSATE SODIUM 100 MG
100 CAPSULE ORAL THREE TIMES A DAY
Refills: 0 | Status: DISCONTINUED | OUTPATIENT
Start: 2019-05-09 | End: 2019-05-10

## 2019-05-09 RX ORDER — PREGABALIN 225 MG/1
1000 CAPSULE ORAL DAILY
Refills: 0 | Status: DISCONTINUED | OUTPATIENT
Start: 2019-05-09 | End: 2019-05-10

## 2019-05-09 RX ORDER — CHLORHEXIDINE GLUCONATE 213 G/1000ML
1 SOLUTION TOPICAL
Refills: 0 | Status: DISCONTINUED | OUTPATIENT
Start: 2019-05-09 | End: 2019-05-10

## 2019-05-09 RX ORDER — CALCITRIOL 0.5 UG/1
0.5 CAPSULE ORAL DAILY
Refills: 0 | Status: DISCONTINUED | OUTPATIENT
Start: 2019-05-09 | End: 2019-05-10

## 2019-05-09 RX ORDER — DILTIAZEM HCL 120 MG
180 CAPSULE, EXT RELEASE 24 HR ORAL AT BEDTIME
Refills: 0 | Status: DISCONTINUED | OUTPATIENT
Start: 2019-05-09 | End: 2019-05-10

## 2019-05-09 RX ORDER — FAMOTIDINE 10 MG/ML
40 INJECTION INTRAVENOUS
Refills: 0 | Status: DISCONTINUED | OUTPATIENT
Start: 2019-05-09 | End: 2019-05-10

## 2019-05-09 RX ORDER — SPIRONOLACTONE 25 MG/1
25 TABLET, FILM COATED ORAL EVERY 12 HOURS
Refills: 0 | Status: DISCONTINUED | OUTPATIENT
Start: 2019-05-09 | End: 2019-05-09

## 2019-05-09 RX ORDER — SENNA PLUS 8.6 MG/1
2 TABLET ORAL AT BEDTIME
Refills: 0 | Status: DISCONTINUED | OUTPATIENT
Start: 2019-05-09 | End: 2019-05-10

## 2019-05-09 RX ORDER — DILTIAZEM HCL 120 MG
180 CAPSULE, EXT RELEASE 24 HR ORAL DAILY
Refills: 0 | Status: DISCONTINUED | OUTPATIENT
Start: 2019-05-09 | End: 2019-05-09

## 2019-05-09 RX ORDER — ISOSORBIDE MONONITRATE 60 MG/1
30 TABLET, EXTENDED RELEASE ORAL
Refills: 0 | Status: DISCONTINUED | OUTPATIENT
Start: 2019-05-09 | End: 2019-05-10

## 2019-05-09 RX ORDER — FUROSEMIDE 40 MG
40 TABLET ORAL
Refills: 0 | Status: DISCONTINUED | OUTPATIENT
Start: 2019-05-09 | End: 2019-05-10

## 2019-05-09 RX ORDER — APIXABAN 2.5 MG/1
2.5 TABLET, FILM COATED ORAL EVERY 12 HOURS
Refills: 0 | Status: DISCONTINUED | OUTPATIENT
Start: 2019-05-09 | End: 2019-05-10

## 2019-05-09 RX ORDER — SODIUM BICARBONATE 1 MEQ/ML
325 SYRINGE (ML) INTRAVENOUS EVERY 8 HOURS
Refills: 0 | Status: DISCONTINUED | OUTPATIENT
Start: 2019-05-09 | End: 2019-05-10

## 2019-05-09 RX ORDER — METOPROLOL TARTRATE 50 MG
25 TABLET ORAL DAILY
Refills: 0 | Status: DISCONTINUED | OUTPATIENT
Start: 2019-05-09 | End: 2019-05-10

## 2019-05-09 RX ORDER — METOPROLOL TARTRATE 50 MG
25 TABLET ORAL EVERY 8 HOURS
Refills: 0 | Status: DISCONTINUED | OUTPATIENT
Start: 2019-05-09 | End: 2019-05-09

## 2019-05-09 RX ORDER — APIXABAN 2.5 MG/1
5 TABLET, FILM COATED ORAL EVERY 12 HOURS
Refills: 0 | Status: DISCONTINUED | OUTPATIENT
Start: 2019-05-09 | End: 2019-05-09

## 2019-05-09 RX ADMIN — SPIRONOLACTONE 25 MILLIGRAM(S): 25 TABLET, FILM COATED ORAL at 05:26

## 2019-05-09 RX ADMIN — Medication 100 MILLIGRAM(S): at 17:09

## 2019-05-09 RX ADMIN — APIXABAN 5 MILLIGRAM(S): 2.5 TABLET, FILM COATED ORAL at 05:26

## 2019-05-09 RX ADMIN — ISOSORBIDE MONONITRATE 30 MILLIGRAM(S): 60 TABLET, EXTENDED RELEASE ORAL at 05:26

## 2019-05-09 RX ADMIN — Medication 180 MILLIGRAM(S): at 21:12

## 2019-05-09 RX ADMIN — Medication 40 MILLIGRAM(S): at 17:13

## 2019-05-09 RX ADMIN — Medication 40 MILLIGRAM(S): at 05:28

## 2019-05-09 RX ADMIN — FAMOTIDINE 40 MILLIGRAM(S): 10 INJECTION INTRAVENOUS at 05:27

## 2019-05-09 RX ADMIN — APIXABAN 2.5 MILLIGRAM(S): 2.5 TABLET, FILM COATED ORAL at 17:11

## 2019-05-09 RX ADMIN — FAMOTIDINE 40 MILLIGRAM(S): 10 INJECTION INTRAVENOUS at 17:12

## 2019-05-09 RX ADMIN — Medication 325 MILLIGRAM(S): at 05:26

## 2019-05-09 RX ADMIN — Medication 25 MILLIGRAM(S): at 05:27

## 2019-05-09 RX ADMIN — PREGABALIN 1000 MICROGRAM(S): 225 CAPSULE ORAL at 17:11

## 2019-05-09 RX ADMIN — CALCITRIOL 0.5 MICROGRAM(S): 0.5 CAPSULE ORAL at 17:10

## 2019-05-09 RX ADMIN — Medication 100 MILLIGRAM(S): at 05:29

## 2019-05-09 RX ADMIN — Medication 180 MILLIGRAM(S): at 05:26

## 2019-05-09 NOTE — CONSULT NOTE ADULT - SUBJECTIVE AND OBJECTIVE BOX
NEPHROLOGY CONSULTATION NOTE    Patient is a 73y Female with PMH of ESRD on HD (TTS), A-fib s/p failed ablation, HTN, CAD, MI s/p CABG, CHF w/ reduced ejection fraction, moderate Pulmonary Hypertension, moderate Aortic Valve stenosis presented to hospital for SOB. Patient reports dyspnea on excretion and laying supine. Also reports increasing leg swelling around the same period. Denies chest pain, cough, rhinorrhea, sore throat, fever, chills.   Patient was recently started on HD, and mentions that after dialysis her BP drops to 90s and she occasionally feels palpitations.    In ED patient was in A-fib with RVR (), given 1 dose 40mg IV Lasix.     PAST MEDICAL & SURGICAL HISTORY:  Thyroid nodule  Degenerative joint disease  Osteoarthritis  Diverticulosis  GERD (gastroesophageal reflux disease)  Heart failure with reduced ejection fraction  Pulmonary hypertension, moderate to severe  Aortic stenosis, moderate  Atrial fibrillation: status post cardioversion  CKD (chronic kidney disease): stage 5, no dialysis  MI (myocardial infarction)  CAD (coronary artery disease)  Kidney stone  HTN (hypertension)  S/P CABG (coronary artery bypass graft)  AV fistula  H/O abdominal hysterectomy  H/O cardiac radiofrequency ablation    Allergies:  No Known Allergies    Home Medications:  allopurinol 100 mg oral tablet: 1 tab(s) orally once a day (12 Jan 2019 23:48)  calcitriol 0.5 mcg oral capsule: 1 cap(s) orally once a day (12 Jan 2019 23:48)  dilTIAZem 180 mg/24 hours oral capsule, extended release: 1 cap(s) orally once a day (21 Mar 2019 12:01)  Eliquis 5 mg oral tablet: 1 tab(s) orally 2 times a day (12 Jan 2019 23:48)  famotidine 40 mg oral tablet: 1 tab(s) orally 2 times a day (12 Jan 2019 23:48)  furosemide 20 mg oral tablet: 3 tab(s) orally once a day (21 Mar 2019 12:01)  isosorbide mononitrate 30 mg oral tablet, extended release: 1 tab(s) orally once a day (at bedtime) (12 Jan 2019 23:48)  isosorbide mononitrate 30 mg oral tablet, extended release: 1 tab(s) orally once a day (in the morning) (12 Jan 2019 23:48)  metoprolol tartrate 25 mg oral tablet: 1 tab(s) orally every 8 hours (21 Mar 2019 12:01)  spironolactone 25 mg oral tablet: 1 tab(s) orally every 12 hours (21 Mar 2019 12:01)  Vitamin B-12 100 mcg oral tablet: 1 tab(s) orally once a day (12 Jan 2019 23:48)    Hospital Medications:   MEDICATIONS  (STANDING):  allopurinol 100 milliGRAM(s) Oral daily  apixaban 2.5 milliGRAM(s) Oral every 12 hours  calcitriol   Capsule 0.5 MICROGram(s) Oral daily  chlorhexidine 4% Liquid 1 Application(s) Topical <User Schedule>  cyanocobalamin 1000 MICROGram(s) Oral daily  diltiazem    milliGRAM(s) Oral daily  docusate sodium 100 milliGRAM(s) Oral three times a day  famotidine    Tablet 40 milliGRAM(s) Oral two times a day  furosemide   Injectable 40 milliGRAM(s) IV Push two times a day  isosorbide   mononitrate ER Tablet (IMDUR) 30 milliGRAM(s) Oral <User Schedule>  metoprolol tartrate 25 milliGRAM(s) Oral every 8 hours  sodium bicarbonate 325 milliGRAM(s) Oral every 8 hours      SOCIAL HISTORY:  Denies ETOH,Smoking,   FAMILY HISTORY:  Family history of lung cancer (Father)  Family history of CHF (congestive heart failure) (Mother)        REVIEW OF SYSTEMS:    All other review of systems is negative unless indicated above.    VITALS:  T(F): 97.4 (05-09-19 @ 05:29), Max: 97.8 (05-08-19 @ 18:35)  HR: 95 (05-09-19 @ 05:29)  BP: 129/56 (05-09-19 @ 05:29)  RR: 18 (05-09-19 @ 05:29)  SpO2: 100% (05-08-19 @ 21:42)    05-08 @ 07:01  -  05-09 @ 07:00  --------------------------------------------------------  IN: 60 mL / OUT: 250 mL / NET: -190 mL    05-09 @ 07:01  -  05-09 @ 11:52  --------------------------------------------------------  IN: 220 mL / OUT: 400 mL / NET: -180 mL      Height (cm): 167.64 (05-08 @ 21:35)      I&O's Detail    08 May 2019 07:01  -  09 May 2019 07:00  --------------------------------------------------------  IN:    Oral Fluid: 60 mL  Total IN: 60 mL    OUT:    Voided: 250 mL  Total OUT: 250 mL    Total NET: -190 mL      09 May 2019 07:01  -  09 May 2019 11:52  --------------------------------------------------------  IN:    Oral Fluid: 220 mL  Total IN: 220 mL    OUT:    Voided: 400 mL  Total OUT: 400 mL    Total NET: -180 mL            PHYSICAL EXAM:  Constitutional: NAD  Respiratory: b/l basal rhonchi  Cardiovascular: S1, S2, irregularly irregular  Gastrointestinal: BS+, soft, NT/ND  Extremities: 1+ peripheral edema  Neurological: A/O x 3  : No cailin.     Vascular Access: + left side tesio    LABS:  05-09    142  |  101  |  71<HH>  ----------------------------<  202<H>  4.2   |  24  |  3.8<H>    Ca    9.5      09 May 2019 09:20  Mg     2.2     05-09    TPro  6.8  /  Alb  3.8  /  TBili  0.5  /  DBili      /  AST  27  /  ALT  17  /  AlkPhos  85  05-08    Creatinine Trend: 3.8 <--, 3.5 <--                        9.7    11.18 )-----------( 226      ( 08 May 2019 18:56 )             33.2       < from: 12 Lead ECG (05.08.19 @ 18:40) >  Diagnosis Line Atrial fibrillation with rapid ventricular response with premature ventricular  or aberrantly conducted complexes  Anterior infarct , age undetermined  T wave abnormality, consider lateral ischemia  Abnormal ECG    < end of copied text >      Urine Studies:              RADIOLOGY & ADDITIONAL STUDIES:  < from: Xray Chest 1 View AP/PA (05.08.19 @ 19:55) >    Impression:      Small bibasilar opacities.    < end of copied text >

## 2019-05-09 NOTE — H&P ADULT - HISTORY OF PRESENT ILLNESS
74 y/o female with PMH of ESRD on HD (TTS), A-fib s/p failed ablation, HTN, CAD, MI s/p CABG, CHF w/ reduced ejection fraction, moderate Pulmonary Hypertension, moderate Aortic Valve stenosis presenting for worsening shortness of breath of 2 weeks duration. Patient reports dyspnea on excretion and laying supine. Denies cough, rhinorrhea, sore throat, fever, chills. Had a dialysis session yesterday that was uncomplicated. Last week a day after a dialysis session,  she had an episode of hypotension 72 y/o female with PMH of ESRD on HD (TTS), A-fib s/p failed ablation, HTN, CAD, MI s/p CABG, CHF w/ reduced ejection fraction, moderate Pulmonary Hypertension, moderate Aortic Valve stenosis presenting for worsening shortness of breath of 2 weeks duration. Patient reports dyspnea on excretion and laying supine. Also reports increasing Denies cough, rhinorrhea, sore throat, fever, chills. Last week, one day after HD session, patient had an episode of hypotension, ate some salty snack and started feeling palpitations after. This episode recurred again yesterday. Patient still has episodes of palpitations 2-3 a week. 74 y/o female with PMH of ESRD on HD (TTS), A-fib s/p failed ablation, HTN, CAD, MI s/p CABG, CHF w/ reduced ejection fraction, moderate Pulmonary Hypertension, moderate Aortic Valve stenosis presenting for worsening shortness of breath of 2 weeks duration. Patient reports dyspnea on excretion and laying supine. Also reports increasing leg swelling around the same period. Denies chest pain, cough, rhinorrhea, sore throat, fever, chills. Patient still has episodes of palpitations 2-3 a week. Last week, one day after HD session, patient had an episode of hypotension, ate some salty snack and started feeling palpitations after. This episode recurred again yesterday. 74 y/o female with PMH of ESRD on HD (TTS), A-fib s/p failed ablation, HTN, CAD, MI s/p CABG, CHF w/ reduced ejection fraction, moderate Pulmonary Hypertension, moderate Aortic Valve stenosis presenting for worsening shortness of breath of 2 weeks duration. Patient reports dyspnea on excretion and laying supine. Also reports increasing leg swelling around the same period. Denies chest pain, cough, rhinorrhea, sore throat, fever, chills. Patient still has episodes of palpitations 2-3 a week. Last week, one day after HD session, patient had an episode of hypotension, ate some salty snack and started feeling palpitations after. This episode recurred again yesterday.     In ED patient was in A-fib with RVR (), given 1 dose 40mg IV Lasix.

## 2019-05-09 NOTE — H&P ADULT - NSICDXFAMILYHX_GEN_ALL_CORE_FT
FAMILY HISTORY:  Father  Still living? No  Family history of lung cancer, Age at diagnosis: Age Unknown    Mother  Still living? Unknown  Family history of CHF (congestive heart failure), Age at diagnosis: Age Unknown

## 2019-05-09 NOTE — PROGRESS NOTE ADULT - ASSESSMENT
Shortness of breath due to volume overload, ESRD, acute on chronic systolic CHF and A fib  Hx of HTN, ASHD, CAD, sp CABG, afib, failed ablation, rate controlled on Eliquis, pulmonary hypertension, moderate AS  hx of ESRD, started HD (TTS)  Hx of GERD, diverticulosis  Hx of OA, DDD, DJD    pt is in volume overload, last HD 5/7, for HD today  Nephrology consult and ff up  Cardio consult, Dr Subramanian:  dec Eliquis to 2.5mg 2x perday, D/C aldactone, cont metoprolol and cardizem and stagger the doses, no further cardiac w/up  monitor electrolytes and renal parameters  O2 as needed  cont home meds

## 2019-05-09 NOTE — H&P ADULT - ASSESSMENT
74 y/o female with pmh of ckd 5, HTN, CAD, ho MI s/p CABG, CHF w/ reduced ejection fraction, moderate Pulmonary Hypertension, moderate AS,  afib s/p ablation presenting for cough and shortness of breath.     # Worsening SOB, likely CHF exacerbation due to uncontrolled Afib  -s/p 1 dose 40mg IV Lasix in ED  -Patient still makes urine (around 2 cups daily)  -Will start 40mg IV Lasix BID  -CXR suggestive of congestion, f/u official read  -BNP 27k  -Low suspicion for infectious etiology, no fever, WBC count  -Troponin .03, unchanged from baseline, will get 1 more set  -strict is and os  -daily weights  -Cardiology consult: Dr. Child  -Echo 1/19: EF 45-50%, G1DD, Severe pulm HTN, Moderate AS    # Afib on AC  - was uncontrolled on admission, now in the 90s  - c/w Eliquis 5mg bid  - c/w Cardizem 180mg qd, Lopressor 25mg q8     # ESRD on HD TTS  - Still makes urine  - nephrology consult for HD    # Coronary Artery Disease, history of Myocardial Infarction, status post Coronary Artery Bypass Graft  - c/w Isosorbide mono  - c/w metoprolol    # HTN  - c/w metoprolol, Cardizem    #Suspected B12 and Vitamin-D deficiency  -On PO Calciferol and Cyanocobalamin     Activity: As tolerated  Diet: DASH  DVT ppx: On Eliquis  GI ppx: on Pepcid 40 BID  Code: Discussed with patient, she is considering DNI, undecided yet  Dispo: from home 74 y/o female with pmh of ckd 5, HTN, CAD, ho MI s/p CABG, CHF w/ reduced ejection fraction, moderate Pulmonary Hypertension, moderate AS,  afib s/p ablation presenting for cough and shortness of breath.     # Worsening SOB, likely CHF exacerbation due to uncontrolled Afib  -s/p 1 dose 40mg IV Lasix in ED  -Patient still makes urine (around 2 cups daily)  -Will start 40mg IV Lasix BID  -CXR suggestive of congestion, f/u official read  -BNP 27k  -Low suspicion for infectious etiology, no fever, WBC count  -Troponin .03, unchanged from baseline, will get 1 more set  -strict is and os  -daily weights  -Cardiology consult: Dr. Child  -Echo 1/19: EF 45-50%, G1DD, Severe pulm HTN, Moderate AS    # Afib on AC  - was uncontrolled on admission, now in the 90s  - c/w Eliquis 5mg bid  - c/w Cardizem 180mg qd, Lopressor 25mg q8     # ESRD on HD TTS  - Still makes urine  - gets HD through catheter pending maturation of left AV fistula (vascular aware of blue arm discoloration)    - nephrology consult for HD    # Coronary Artery Disease, history of Myocardial Infarction, status post Coronary Artery Bypass Graft  - c/w Isosorbide mono  - c/w metoprolol    # HTN  - c/w metoprolol, Cardizem    #Constipation  -Has not had a bowel movement in several days  -Will start Colace, Senna     #Suspected B12 and Vitamin-D deficiency  -On PO Calciferol and Cyanocobalamin     Activity: As tolerated  Diet: Renal restrictions, DASH  DVT ppx: On Eliquis  GI ppx: on Pepcid 40 BID  Code: Discussed with patient, she is considering DNI, undecided yet  Dispo: from home

## 2019-05-09 NOTE — CONSULT NOTE ADULT - ATTENDING COMMENTS
Pt seen and examined  p/w SOB and edema  HD today  3 L as sony   decrease eliquis to 2.5 mg BID in ESRD pt  On IV lasix still makes urine may need higher dose

## 2019-05-09 NOTE — PROGRESS NOTE ADULT - SUBJECTIVE AND OBJECTIVE BOX
NOAH QUIROS 74yo W Female from home came to the ER for SOB, SALDANA, orthopnea and swelling of legs.  The pt haaCHF, afib, ESRD and just starte with HD (TTS).  The pt is being admitted for volume overload for urgent HD.  The PMHX includes:   HTN, ASHD, CAD, sp CABG,  Afib, failed ablation, CHF with low EF, moderate AS, Pul HTN, GERD, diverticulosis, anxiety and depression.    INTERVAL HPI/OVERNIGHT EVENTS: pt getting dialyzed    MEDICATIONS  (STANDING):  allopurinol 100 milliGRAM(s) Oral daily  apixaban 2.5 milliGRAM(s) Oral every 12 hours  calcitriol   Capsule 0.5 MICROGram(s) Oral daily  chlorhexidine 4% Liquid 1 Application(s) Topical <User Schedule>  cyanocobalamin 1000 MICROGram(s) Oral daily  diltiazem    milliGRAM(s) Oral at bedtime  docusate sodium 100 milliGRAM(s) Oral three times a day  famotidine    Tablet 40 milliGRAM(s) Oral two times a day  furosemide   Injectable 40 milliGRAM(s) IV Push two times a day  isosorbide   mononitrate ER Tablet (IMDUR) 30 milliGRAM(s) Oral <User Schedule>  metoprolol succinate ER 25 milliGRAM(s) Oral daily  sodium bicarbonate 325 milliGRAM(s) Oral every 8 hours    MEDICATIONS  (PRN):  senna 2 Tablet(s) Oral at bedtime PRN Constipation      Allergies    No Known Allergies    Intolerances          Vital Signs Last 24 Hrs  T(C): 36.3 (09 May 2019 05:29), Max: 36.6 (08 May 2019 18:35)  T(F): 97.4 (09 May 2019 05:29), Max: 97.8 (08 May 2019 18:35)  HR: 85 (09 May 2019 15:39) (80 - 137)  BP: 123/65 (09 May 2019 15:39) (101/56 - 129/56)  BP(mean): --  RR: 16 (09 May 2019 15:39) (16 - 18)  SpO2: 98% (09 May 2019 12:35) (97% - 100%)    PHYSICAL EXAM:  alert and oriented, chronically ill looking, uncomfortable but in NAD, gen pallor    Eyes:  nonicteric    ENMT:  dental defects    Neck:  supple + JVD    Respiratory:  shallow respirations, scattered rhonchi    Cardiovascular:  S1S2 irreg    Gastrointestinal:  globose, soft and benign    Genitourinary:  no simeon    Extremities:  moves all ext, arthritic changes, edema    Vascular: dec pedal pulses    Neurological:  nonfocal      LABS:                        9.7    11.18 )-----------( 226      ( 08 May 2019 18:56 )             33.2     05-09    142  |  101  |  71<HH>  ----------------------------<  202<H>  4.2   |  24  |  3.8<H>    Ca    9.5      09 May 2019 09:20  Mg     2.2     05-09    TPro  6.8  /  Alb  3.8  /  TBili  0.5  /  DBili  x   /  AST  27  /  ALT  17  /  AlkPhos  85  05-08    PT/INR - ( 08 May 2019 18:56 )   PT: 21.20 sec;   INR: 1.85 ratio         PTT - ( 08 May 2019 18:56 )  PTT:32.6 sec      RADIOLOGY & ADDITIONAL TESTS:    CXR: small bibasilar opacities, cardiomegaly, post sternotomy

## 2019-05-09 NOTE — CONSULT NOTE ADULT - SUBJECTIVE AND OBJECTIVE BOX
Patient is a 73y old  Female who presents with a chief complaint of Shortness of breath (09 May 2019 00:33)      HPI: I accidentally found out she was in the hospital (did not received consult, but it was ordered), complains of driop in the BP during HD, HD just started, complains of SOB, SALDANA, orthopnea, leg edema and rapid HR when is hypotensive.    74 y/o female with PMH of ESRD on HD (TTS), A-fib s/p failed ablation, HTN, CAD, MI s/p CABG, CHF w/ reduced ejection fraction, moderate Pulmonary Hypertension, moderate Aortic Valve stenosis presenting for worsening shortness of breath of 2 weeks duration. Patient reports dyspnea on excretion and laying supine. Also reports increasing leg swelling around the same period. Denies chest pain, cough, rhinorrhea, sore throat, fever, chills. Patient still has episodes of palpitations 2-3 a week. Last week, one day after HD session, patient had an episode of hypotension, ate some salty snack and started feeling palpitations after. This episode recurred again yesterday.     In ED patient was in A-fib with RVR (), given 1 dose 40mg IV Lasix. (09 May 2019 00:33)      PAST MEDICAL & SURGICAL HISTORY:  Thyroid nodule  Degenerative joint disease  Osteoarthritis  Diverticulosis  GERD (gastroesophageal reflux disease)  Heart failure with reduced ejection fraction  Pulmonary hypertension, moderate to severe  Aortic stenosis, moderate  Atrial fibrillation: status post cardioversion  CKD (chronic kidney disease): stage 5, no dialysis  MI (myocardial infarction)  CAD (coronary artery disease)  Kidney stone  HTN (hypertension)  S/P CABG (coronary artery bypass graft)  AV fistula  H/O abdominal hysterectomy  H/O cardiac radiofrequency ablation      PREVIOUS DIAGNOSTIC TESTING:      ECHO  FINDINGS: < from: Transthoracic Echocardiogram (01.13.19 @ 10:53) >  1. Left ventricular ejection fraction, by visual estimation, is 45 to   50%.   2. Mildly increased LV wall thickness.   3. Mild to moderate mitral valve regurgitation.   4. Thickening and calcification of the anterior and posterior mitral   valve leaflets.   5. Mild-moderate tricuspid regurgitation.   6. Mild aortic regurgitation.   7. Estimated pulmonary artery systolic pressure is 60.9 mmHg assuming a   right atrial pressure of 15 mmHg, which is consistent with severe   pulmonary hypertension.   8. Peak transaortic gradient is 32.7 mmHg, mean transaortic gradient   equals 15.5 mmHg, the calculated aortic valve area equals 1.39 cm² by the   continuity equation consistent with moderate aortic stenosis.    < end of copied text >      STRESS TEST  FINDINGS:    CATHETERIZATION  FINDINGS:     MEDICATIONS  (STANDING):  allopurinol 100 milliGRAM(s) Oral daily  apixaban 2.5 milliGRAM(s) Oral every 12 hours  calcitriol   Capsule 0.5 MICROGram(s) Oral daily  chlorhexidine 4% Liquid 1 Application(s) Topical <User Schedule>  cyanocobalamin 1000 MICROGram(s) Oral daily  diltiazem    milliGRAM(s) Oral daily  docusate sodium 100 milliGRAM(s) Oral three times a day  famotidine    Tablet 40 milliGRAM(s) Oral two times a day  furosemide   Injectable 40 milliGRAM(s) IV Push two times a day  isosorbide   mononitrate ER Tablet (IMDUR) 30 milliGRAM(s) Oral <User Schedule>  metoprolol tartrate 25 milliGRAM(s) Oral every 8 hours  sodium bicarbonate 325 milliGRAM(s) Oral every 8 hours    MEDICATIONS  (PRN):  senna 2 Tablet(s) Oral at bedtime PRN Constipation      FAMILY HISTORY:  Family history of lung cancer (Father)  Family history of CHF (congestive heart failure) (Mother)      SOCIAL HISTORY:  CIGARETTES: smoker, quit  ALCOHOL: no  DRUGS: no                      REVIEW OF SYSTEMS:  CONSTITUTIONAL: distress  NECK: No pain   RESPIRATORY: shortness of breath  CARDIOVASCULAR: No chest pain, but palpitations & leg swelling  GASTROINTESTINAL: No abdominal or epigastric pain. No nausea, vomiting, or hematemesis;  No melena.  NEUROLOGICAL: No dizziness, headaches, memory loss, loss of strength  SKIN: No itching, burning, rashes, or lesions   ENDOCRINE: No heat or cold intolerance  MUSCULOSKELETAL:  joint pain, No  swelling; No muscle pain  PSYCHIATRIC:  depression, anxiety,        Vital Signs Last 24 Hrs  T(C): 36.3 (09 May 2019 05:29), Max: 36.6 (08 May 2019 18:35)  T(F): 97.4 (09 May 2019 05:29), Max: 97.8 (08 May 2019 18:35)  HR: 95 (09 May 2019 05:29) (95 - 137)  BP: 129/56 (09 May 2019 05:29) (121/55 - 129/56)  BP(mean): --  RR: 18 (09 May 2019 05:29) (18 - 18)  SpO2: 100% (08 May 2019 21:42) (97% - 100%)                      PHYSICAL EXAM:  GENERAL: No distress, well developed  HEAD:  Atraumatic, Normocephalic  NECK: Supple, No JVD, No Bruit   NERVOUS SYSTEM:  Alert, Awake, Oriented to time, place, person; Normal memory and speech; Normal motor Strength 5/5 B/L upper and lower extremities  CHEST/LUNG: Normal air entry to lung base bilaterally; No wheeze, crackle, rales, rhonchi  HEART: IRRegular heart beat, S1, A2, P2, No gallop, 3/6 systolic aortic murmur  ABDOMEN: Soft, Non tender, Non distended; Bowel sounds present  EXTREMITIES:  leg edemaSKIN: No rashes or lesions    TELEMETRY:    ECG:    I&O's Detail    08 May 2019 07:01  -  09 May 2019 07:00  --------------------------------------------------------  IN:    Oral Fluid: 60 mL  Total IN: 60 mL    OUT:    Voided: 250 mL  Total OUT: 250 mL    Total NET: -190 mL      09 May 2019 07:01  -  09 May 2019 10:55  --------------------------------------------------------  IN:    Oral Fluid: 220 mL  Total IN: 220 mL    OUT:    Voided: 400 mL  Total OUT: 400 mL    Total NET: -180 mL          LABS:                        9.7    11.18 )-----------( 226      ( 08 May 2019 18:56 )             33.2     05-08    138  |  100  |  63<HH>  ----------------------------<  164<H>  4.9   |  21  |  3.5<H>    Ca    9.6      08 May 2019 18:56  Mg     2.2     05-09    TPro  6.8  /  Alb  3.8  /  TBili  0.5  /  DBili  x   /  AST  27  /  ALT  17  /  AlkPhos  85  05-08    CARDIAC MARKERS ( 08 May 2019 18:56 )  x     / 0.03 ng/mL / x     / x     / x          PT/INR - ( 08 May 2019 18:56 )   PT: 21.20 sec;   INR: 1.85 ratio         PTT - ( 08 May 2019 18:56 )  PTT:32.6 sec    I&O's Summary    08 May 2019 07:01  -  09 May 2019 07:00  --------------------------------------------------------  IN: 60 mL / OUT: 250 mL / NET: -190 mL    09 May 2019 07:01  -  09 May 2019 10:55  --------------------------------------------------------  IN: 220 mL / OUT: 400 mL / NET: -180 mL        RADIOLOGY & ADDITIONAL STUDIES:

## 2019-05-09 NOTE — H&P ADULT - NSHPLABSRESULTS_GEN_ALL_CORE
9.7    11.18 )-----------( 226      ( 08 May 2019 18:56 )             33.2         05-08    138  |  100  |  63<HH>  ----------------------------<  164<H>  4.9   |  21  |  3.5<H>    Ca    9.6      08 May 2019 18:56  Mg     2.2     05-08    TPro  6.8  /  Alb  3.8  /  TBili  0.5  /  DBili  x   /  AST  27  /  ALT  17  /  AlkPhos  85  05-08        CARDIAC MARKERS ( 08 May 2019 18:56 )  x     / 0.03 ng/mL / x     / x     / x

## 2019-05-09 NOTE — DISCHARGE NOTE NURSING/CASE MANAGEMENT/SOCIAL WORK - NSDCDPATPORTLINK_GEN_ALL_CORE
You can access the [facility] Portal, offered by [facility], by registering with the following website: [email or web address]

## 2019-05-09 NOTE — H&P ADULT - NSHPPHYSICALEXAM_GEN_ALL_CORE
PHYSICAL EXAM:T(C): 36.1 (05-08-19 @ 21:35), Max: 36.6 (05-08-19 @ 18:35)  HR: 95 (05-08-19 @ 21:35) (95 - 137)  BP: 121/55 (05-08-19 @ 21:35) (121/55 - 128/75)  RR: 18 (05-08-19 @ 21:35) (18 - 18)  SpO2: 100% (05-08-19 @ 21:42) (97% - 100%)    GENERAL: NAD, well-developed, on nasal cannula  HEAD:  Atraumatic, Normocephalic  EYES: EOMI, PERRLA, conjunctiva and sclera clear  NECK: Supple, No JVD  CHEST/LUNG: Bibasilar crackles  HEART: Irregular rate and rhythm; No murmurs appreciated  ABDOMEN: Distended, soft, nontender, positive bowel sounds  EXTREMITIES:  2+ pitting edema, right lower extremity healed wound at interior of thigh  PSYCH: AAOx3  NEUROLOGY: non-focal PHYSICAL EXAM:T(C): 36.1 (05-08-19 @ 21:35), Max: 36.6 (05-08-19 @ 18:35)  HR: 95 (05-08-19 @ 21:35) (95 - 137)  BP: 121/55 (05-08-19 @ 21:35) (121/55 - 128/75)  RR: 18 (05-08-19 @ 21:35) (18 - 18)  SpO2: 100% (05-08-19 @ 21:42) (97% - 100%)    GENERAL: NAD, well-developed, on nasal cannula  HEAD:  Atraumatic, Normocephalic  EYES: EOMI, PERRLA, conjunctiva and sclera clear  NECK: Supple, No JVD  CHEST/LUNG: Bibasilar crackles  HEART: Irregular rate and rhythm; No murmurs appreciated  ABDOMEN: Distended, soft, nontender, positive bowel sounds  EXTREMITIES:  2+ pitting edema, right lower extremity healed wound at interior of thigh. Left upper extremity swollen and blue, subcutaneous hematoma  PSYCH: AAOx3  NEUROLOGY: non-focal

## 2019-05-09 NOTE — CONSULT NOTE ADULT - ASSESSMENT
SOB, leg edema, ESRD, just started HD, all due to volume overload  chronic A.fib, mainly rate controlled, occasions of tachycardia, specially during hypotension after HD, mainly due to low BP  Mild-mild/moderateAS    Patient in ESRD was taking Eliquis 5 mg bid, should be decreased to 2.5 bid, and Aldactone is not indicated, stop it  occasions of Hypotension, is due to HD, spread out Cardizem and Metoprolol, possibly benefits from Metoprolol succinate 25 mg at AM and Cardizem  at pm  no further cardiac w/u  needs mopre HD, she is clearly volume overloaded and is not responding to Lasix

## 2019-05-09 NOTE — CONSULT NOTE ADULT - ASSESSMENT
Patient with ESRD - HD (TTS) presented to hospital for SOB, orthopnea, palpitations  PMH ESRD on HD (TTS), A-fib s/p failed ablation, HTN, CAD, MI s/p CABG, CHF w/ reduced ejection fraction, moderate Pulmonary Hypertension, moderate Aortic Valve stenosis    # ESRD - HD (TTS)   - s/p HD on 5/7, for HD today with 3hr 3K bath UF 3L as tolerated.   - will monitor vitals on HD    - BP noted, at goal, adjust time of meds as per cardio note. monitor BP   - Hb at goal, no need for JOSE at the moment   - Ca at goal, check Ph, iPTH   - Afib with RVR: seen by cardio, notes appreciated   - will follow

## 2019-05-10 ENCOUNTER — TRANSCRIPTION ENCOUNTER (OUTPATIENT)
Age: 74
End: 2019-05-10

## 2019-05-10 VITALS
SYSTOLIC BLOOD PRESSURE: 115 MMHG | TEMPERATURE: 98 F | RESPIRATION RATE: 18 BRPM | HEART RATE: 81 BPM | DIASTOLIC BLOOD PRESSURE: 52 MMHG

## 2019-05-10 LAB
ANION GAP SERPL CALC-SCNC: 17 MMOL/L — HIGH (ref 7–14)
BUN SERPL-MCNC: 50 MG/DL — HIGH (ref 10–20)
CALCIUM SERPL-MCNC: 9.3 MG/DL — SIGNIFICANT CHANGE UP (ref 8.5–10.1)
CHLORIDE SERPL-SCNC: 98 MMOL/L — SIGNIFICANT CHANGE UP (ref 98–110)
CO2 SERPL-SCNC: 24 MMOL/L — SIGNIFICANT CHANGE UP (ref 17–32)
CREAT SERPL-MCNC: 3 MG/DL — HIGH (ref 0.7–1.5)
GLUCOSE SERPL-MCNC: 136 MG/DL — HIGH (ref 70–99)
HCT VFR BLD CALC: 32.8 % — LOW (ref 37–47)
HGB BLD-MCNC: 9.4 G/DL — LOW (ref 12–16)
MAGNESIUM SERPL-MCNC: 2.1 MG/DL — SIGNIFICANT CHANGE UP (ref 1.8–2.4)
MCHC RBC-ENTMCNC: 27.2 PG — SIGNIFICANT CHANGE UP (ref 27–31)
MCHC RBC-ENTMCNC: 28.7 G/DL — LOW (ref 32–37)
MCV RBC AUTO: 95.1 FL — SIGNIFICANT CHANGE UP (ref 81–99)
NRBC # BLD: 0 /100 WBCS — SIGNIFICANT CHANGE UP (ref 0–0)
PLATELET # BLD AUTO: 149 K/UL — SIGNIFICANT CHANGE UP (ref 130–400)
POTASSIUM SERPL-MCNC: 4.7 MMOL/L — SIGNIFICANT CHANGE UP (ref 3.5–5)
POTASSIUM SERPL-SCNC: 4.7 MMOL/L — SIGNIFICANT CHANGE UP (ref 3.5–5)
RBC # BLD: 3.45 M/UL — LOW (ref 4.2–5.4)
RBC # FLD: 19.9 % — HIGH (ref 11.5–14.5)
SODIUM SERPL-SCNC: 139 MMOL/L — SIGNIFICANT CHANGE UP (ref 135–146)
WBC # BLD: 9.85 K/UL — SIGNIFICANT CHANGE UP (ref 4.8–10.8)
WBC # FLD AUTO: 9.85 K/UL — SIGNIFICANT CHANGE UP (ref 4.8–10.8)

## 2019-05-10 RX ORDER — APIXABAN 2.5 MG/1
1 TABLET, FILM COATED ORAL
Qty: 60 | Refills: 0
Start: 2019-05-10 | End: 2019-06-08

## 2019-05-10 RX ORDER — METOPROLOL TARTRATE 50 MG
50 TABLET ORAL
Refills: 0 | Status: DISCONTINUED | OUTPATIENT
Start: 2019-05-10 | End: 2019-05-10

## 2019-05-10 RX ORDER — FUROSEMIDE 40 MG
3 TABLET ORAL
Qty: 39 | Refills: 0
Start: 2019-05-10 | End: 2019-06-08

## 2019-05-10 RX ORDER — DILTIAZEM HCL 120 MG
60 CAPSULE, EXT RELEASE 24 HR ORAL ONCE
Refills: 0 | Status: COMPLETED | OUTPATIENT
Start: 2019-05-10 | End: 2019-05-10

## 2019-05-10 RX ORDER — DILTIAZEM HCL 120 MG
1 CAPSULE, EXT RELEASE 24 HR ORAL
Qty: 30 | Refills: 0
Start: 2019-05-10 | End: 2019-06-08

## 2019-05-10 RX ORDER — METOPROLOL TARTRATE 50 MG
25 TABLET ORAL ONCE
Refills: 0 | Status: COMPLETED | OUTPATIENT
Start: 2019-05-10 | End: 2019-05-10

## 2019-05-10 RX ORDER — SODIUM BICARBONATE 1 MEQ/ML
1 SYRINGE (ML) INTRAVENOUS
Qty: 30 | Refills: 0
Start: 2019-05-10 | End: 2019-05-19

## 2019-05-10 RX ORDER — DILTIAZEM HCL 120 MG
240 CAPSULE, EXT RELEASE 24 HR ORAL AT BEDTIME
Refills: 0 | Status: DISCONTINUED | OUTPATIENT
Start: 2019-05-10 | End: 2019-05-10

## 2019-05-10 RX ORDER — ISOSORBIDE MONONITRATE 60 MG/1
1 TABLET, EXTENDED RELEASE ORAL
Qty: 0 | Refills: 0 | DISCHARGE

## 2019-05-10 RX ORDER — METOPROLOL TARTRATE 50 MG
1 TABLET ORAL
Qty: 60 | Refills: 0
Start: 2019-05-10 | End: 2019-06-08

## 2019-05-10 RX ADMIN — Medication 25 MILLIGRAM(S): at 09:18

## 2019-05-10 RX ADMIN — Medication 325 MILLIGRAM(S): at 05:32

## 2019-05-10 RX ADMIN — Medication 25 MILLIGRAM(S): at 05:30

## 2019-05-10 RX ADMIN — Medication 50 MILLIGRAM(S): at 17:16

## 2019-05-10 RX ADMIN — Medication 100 MILLIGRAM(S): at 13:49

## 2019-05-10 RX ADMIN — Medication 40 MILLIGRAM(S): at 05:29

## 2019-05-10 RX ADMIN — PREGABALIN 1000 MICROGRAM(S): 225 CAPSULE ORAL at 11:15

## 2019-05-10 RX ADMIN — FAMOTIDINE 40 MILLIGRAM(S): 10 INJECTION INTRAVENOUS at 05:29

## 2019-05-10 RX ADMIN — ISOSORBIDE MONONITRATE 30 MILLIGRAM(S): 60 TABLET, EXTENDED RELEASE ORAL at 05:31

## 2019-05-10 RX ADMIN — APIXABAN 2.5 MILLIGRAM(S): 2.5 TABLET, FILM COATED ORAL at 05:29

## 2019-05-10 RX ADMIN — APIXABAN 2.5 MILLIGRAM(S): 2.5 TABLET, FILM COATED ORAL at 17:15

## 2019-05-10 RX ADMIN — FAMOTIDINE 40 MILLIGRAM(S): 10 INJECTION INTRAVENOUS at 17:15

## 2019-05-10 RX ADMIN — Medication 325 MILLIGRAM(S): at 13:48

## 2019-05-10 RX ADMIN — Medication 100 MILLIGRAM(S): at 11:14

## 2019-05-10 RX ADMIN — Medication 60 MILLIGRAM(S): at 14:47

## 2019-05-10 RX ADMIN — CALCITRIOL 0.5 MICROGRAM(S): 0.5 CAPSULE ORAL at 11:13

## 2019-05-10 RX ADMIN — Medication 25 MILLIGRAM(S): at 11:13

## 2019-05-10 NOTE — DISCHARGE NOTE PROVIDER - HOSPITAL COURSE
72 y/o female with PMH of ESRD on HD (TTS), A-fib s/p failed ablation, HTN, CAD, MI s/p CABG, CHF w/ reduced ejection fraction, moderate Pulmonary Hypertension, moderate Aortic Valve stenosis presenting for worsening shortness of breath of 2 weeks duration. Patient reports dyspnea on excretion and laying supine. Also reports increasing leg swelling around the same period. Pt found to be in Afib with RVR on admission. Admitted for SOB secondary to CHF exacerbation with Afib RVR. Pt was dialyzed and given BB with good response. Breathing improved and heart rate controlled. cardiology recommended changes to rate control medication and pt did well.    On day of discharge pt vitals stable. In Afib but rate controlled. Pt instructed to follow up with PMD and cardiology

## 2019-05-10 NOTE — PROGRESS NOTE ADULT - SUBJECTIVE AND OBJECTIVE BOX
Nephrology progress note    Patient is seen and examined, events over the last 24 h noted .    Allergies:  No Known Allergies    Hospital Medications:   MEDICATIONS  (STANDING):  allopurinol 100 milliGRAM(s) Oral daily  apixaban 2.5 milliGRAM(s) Oral every 12 hours  calcitriol   Capsule 0.5 MICROGram(s) Oral daily  chlorhexidine 4% Liquid 1 Application(s) Topical <User Schedule>  cyanocobalamin 1000 MICROGram(s) Oral daily  diltiazem    milliGRAM(s) Oral at bedtime  docusate sodium 100 milliGRAM(s) Oral three times a day  famotidine    Tablet 40 milliGRAM(s) Oral two times a day  furosemide   Injectable 40 milliGRAM(s) IV Push two times a day  isosorbide   mononitrate ER Tablet (IMDUR) 30 milliGRAM(s) Oral <User Schedule>  metoprolol succinate ER 25 milliGRAM(s) Oral daily  sodium bicarbonate 325 milliGRAM(s) Oral every 8 hours        VITALS:  T(F): 97.6 (05-10-19 @ 05:36), Max: 97.6 (05-10-19 @ 05:36)  HR: 101 (05-10-19 @ 05:36)  BP: 145/63 (05-10-19 @ 05:36)  RR: 18 (05-10-19 @ 05:36)  SpO2: 100% (05-09-19 @ 19:53)  Wt(kg): --    05-08 @ 07:01  -  05-09 @ 07:00  --------------------------------------------------------  IN: 60 mL / OUT: 250 mL / NET: -190 mL    05-09 @ 07:01  -  05-10 @ 07:00  --------------------------------------------------------  IN: 400 mL / OUT: 3300 mL / NET: -2900 mL    05-10 @ 07:01  -  05-10 @ 12:46  --------------------------------------------------------  IN: 220 mL / OUT: 400 mL / NET: -180 mL          PHYSICAL EXAM:  Constitutional: NAD  HEENT: anicteric sclera, oropharynx clear, MMM  Neck: No JVD  Respiratory: CTAB, no wheezes, rales or rhonchi  Cardiovascular: S1, S2, tachy irregular  Gastrointestinal: BS+, soft, NT/ND  Extremities:  mild peripheral edema  Neurological: A/O x 3  : No CVA tenderness. No simeon.   Skin: No rashes  Vascular Access: AVF left arm and Bernardo cath    LABS:  05-10    139  |  98  |  50<H>  ----------------------------<  136<H>  4.7   |  24  |  3.0<H>    Ca    9.3      10 May 2019 06:25  Mg     2.1     05-10    TPro  6.8  /  Alb  3.8  /  TBili  0.5  /  DBili      /  AST  27  /  ALT  17  /  AlkPhos  85  05-08                          9.4    9.85  )-----------( 149      ( 10 May 2019 06:25 )             32.8       Urine Studies:      RADIOLOGY & ADDITIONAL STUDIES:  < from: Xray Chest 1 View AP/PA (05.08.19 @ 19:55) >  Impression:      Small bibasilar opacities.    < end of copied text >

## 2019-05-10 NOTE — PROGRESS NOTE ADULT - ASSESSMENT
ESRD, volume overload, responded well to HD  initial Hypotension during the HD, stable now, needs gradual adding to the time of HD  Afib, tachy, not well controlled yet    Metoprolol 50 bid  Cardizem cd 240 at night  Cardizem short acting 60 mg for now and 240 from tonight  continue with the rest of the medicine  if HR, BP, and general condition remains stable, she can be d/c home, and in cardiac point f/u with me in 3-4 weeks

## 2019-05-10 NOTE — DISCHARGE NOTE PROVIDER - CARE PROVIDER_API CALL
Garett Naylor)  Internal Medicine  [address] [place] [address], [place] [address]  Phone: [phone]  Fax: [phone]  Follow Up Time:     Bipin Child)  Cardiology; Internal Medicine; Nuclear Cardiology  [address] [place] [address]  Phone: [phone]  Fax: [phone]  Follow Up Time:

## 2019-05-10 NOTE — DISCHARGE NOTE PROVIDER - NSDCCPCAREPLAN_GEN_ALL_CORE_FT
PRINCIPAL DISCHARGE DIAGNOSIS  Diagnosis: CHF (congestive heart failure)  Assessment and Plan of Treatment: Fluid restriction      SECONDARY DISCHARGE DIAGNOSES  Diagnosis: Afib  Assessment and Plan of Treatment: rate control

## 2019-05-10 NOTE — CHART NOTE - NSCHARTNOTEFT_GEN_A_CORE
<<<RESIDENT DISCHARGE NOTE>>>     NOAH QUIROS  [id number]    VITAL SIGNS:  T(F): 97.8 (05-10-19 @ 13:39), Max: 97.8 (05-10-19 @ 13:39)  HR: 81 (05-10-19 @ 13:39)  BP: 115/52 (05-10-19 @ 13:39)  SpO2: 100% (05-09-19 @ 19:53)      PHYSICAL EXAMINATION:  General: No distress  Head & Neck: PERRLA  Pulmonary: CTA B/L   Cardiovascular: Irregular rate and rhythm  Gastrointestinal/Abdomen & Pelvis: Soft non tender non distended  Neurologic/Motor: Non focal    TEST RESULTS:                        9.4    9.85  )-----------( 149      ( 10 May 2019 06:25 )             32.8       05-10    139  |  98  |  50<H>  ----------------------------<  136<H>  4.7   |  24  |  3.0<H>    Ca    9.3      10 May 2019 06:25  Mg     2.1     05-10    TPro  6.8  /  Alb  3.8  /  TBili  0.5  /  DBili  x   /  AST  27  /  ALT  17  /  AlkPhos  85  05-08      FINAL DISCHARGE INTERVIEW:  Resident(s) Present: (Name: Dr. Lenz), RN Present: (Name: Ronda RN)    DISCHARGE MEDICATION RECONCILIATION  reviewed with Attending (Name: Dr. Chris)    DISPOSITION:   [* ] Home,    [  ] Home with Visiting Nursing Services,   [    ]  SNF/ NH,    [   ] Acute Rehab (4A),   [   ] Other (Specify:_________)

## 2019-05-10 NOTE — PROGRESS NOTE ADULT - SUBJECTIVE AND OBJECTIVE BOX
Subjective:    feels much better, less SOB, much less edema, breathing improved, but has had episodes of Afib with tachycardia, no chest pain, no fever. She still taking lower dose of Cardizem and Metoprolol than her home doses      MEDICATIONS  (STANDING):  allopurinol 100 milliGRAM(s) Oral daily  apixaban 2.5 milliGRAM(s) Oral every 12 hours  calcitriol   Capsule 0.5 MICROGram(s) Oral daily  chlorhexidine 4% Liquid 1 Application(s) Topical <User Schedule>  cyanocobalamin 1000 MICROGram(s) Oral daily  diltiazem    milliGRAM(s) Oral at bedtime  docusate sodium 100 milliGRAM(s) Oral three times a day  famotidine    Tablet 40 milliGRAM(s) Oral two times a day  isosorbide   mononitrate ER Tablet (IMDUR) 30 milliGRAM(s) Oral <User Schedule>  metoprolol tartrate 50 milliGRAM(s) Oral two times a day  sodium bicarbonate 325 milliGRAM(s) Oral every 8 hours    MEDICATIONS  (PRN):  senna 2 Tablet(s) Oral at bedtime PRN Constipation            Vital Signs Last 24 Hrs  T(C): 36.6 (10 May 2019 13:39), Max: 36.6 (10 May 2019 13:39)  T(F): 97.8 (10 May 2019 13:39), Max: 97.8 (10 May 2019 13:39)  HR: 81 (10 May 2019 13:39) (81 - 110)  BP: 115/52 (10 May 2019 13:39) (115/52 - 145/63)  BP(mean): --  RR: 18 (10 May 2019 13:39) (16 - 18)  SpO2: 100% (09 May 2019 19:53) (100% - 100%)             REVIEW OF SYSTEMS:  CONSTITUTIONAL: no fever, no chills, no diaphoresis  CARDIOLOGY: no chest pain, SOB, palpitation, no diaphoresis, no faint   RESPIRATORY: no dyspnea, no wheeze, no orthopnea, no PND   NEUROLOGICAL: headache, No focal deficits to report.  GI: no abdominal pain, no dyspepsia, no nausea, no vomiting, no diarrhea.    HEENT: no congestion, no nasal bleeding  SKIN: no ecchymosis, no petechia             PHYSICAL EXAM:  · CONSTITUTIONAL: Looks stable, in no respiratory distres  . NECK: Supple, no JVD  · RESPIRATORY: Normal air entry to lung base, no wheeze, no crackle, no wet rales  · CARDIOVASCULAR: S1, A2, P2, no click, irregular rate,  no rub,  · EXTREMITIES: No cyanosis, no clubbing, but edema  · VASCULAR: Pulses are irregular, equal, bilateral in upper and lower extremities  	  TELEMETRY: afib, HR at 100 +/- 10    ECG: < from: 12 Lead ECG (05.08.19 @ 18:40) >  Atrial fibrillation with rapid ventricular response with premature ventricular  or aberrantly conducted complexes  Anterior infarct , age undetermined  T wave abnormality, consider lateral ischemia    < end of copied text >      TTE:    LABS:                        9.4    9.85  )-----------( 149      ( 10 May 2019 06:25 )             32.8     05-10    139  |  98  |  50<H>  ----------------------------<  136<H>  4.7   |  24  |  3.0<H>    Ca    9.3      10 May 2019 06:25  Mg     2.1     05-10    TPro  6.8  /  Alb  3.8  /  TBili  0.5  /  DBili  x   /  AST  27  /  ALT  17  /  AlkPhos  85  05-08    CARDIAC MARKERS ( 09 May 2019 16:44 )  x     / 0.04 ng/mL / 18 U/L / x     / 2.5 ng/mL  CARDIAC MARKERS ( 09 May 2019 09:20 )  x     / 0.03 ng/mL / x     / x     / x      CARDIAC MARKERS ( 08 May 2019 18:56 )  x     / 0.03 ng/mL / x     / x     / x          PT/INR - ( 08 May 2019 18:56 )   PT: 21.20 sec;   INR: 1.85 ratio         PTT - ( 08 May 2019 18:56 )  PTT:32.6 sec    I&O's Summary    09 May 2019 07:01  -  10 May 2019 07:00  --------------------------------------------------------  IN: 400 mL / OUT: 3300 mL / NET: -2900 mL    10 May 2019 07:01  -  10 May 2019 14:55  --------------------------------------------------------  IN: 440 mL / OUT: 900 mL / NET: -460 mL      BNP  RADIOLOGY & ADDITIONAL STUDIES:    IMPRESSION AND PLAN:

## 2019-05-10 NOTE — PROGRESS NOTE ADULT - ASSESSMENT
Patient with ESRD - HD (TTS) presented to hospital for SOB, orthopnea, palpitationsfound to be inRApid Afib.  PMH ESRD on HD (TTS), A-fib s/p failed ablation, HTN, CAD, MI s/p CABG, CHF w/ reduced ejection fraction, moderate Pulmonary Hypertension, moderate Aortic Valve stenosis    # ESRD - HD (TTS)  - pt is on IV LAsix which can be done on off HD days only 80 po   - Ca at goal, check Ph, iPTH   # Afib with RVR: on Cardizem Eliquis seen by cardio, notes appreciated   - will follow

## 2019-05-13 RX ORDER — SODIUM BICARBONATE 325 MG/1
325 TABLET ORAL
Refills: 0 | Status: ACTIVE | COMMUNITY
Start: 2019-05-13

## 2019-05-13 RX ORDER — METOPROLOL TARTRATE 50 MG/1
50 TABLET, FILM COATED ORAL TWICE DAILY
Refills: 0 | Status: ACTIVE | COMMUNITY

## 2019-05-13 RX ORDER — ALLOPURINOL 100 MG/1
100 TABLET ORAL DAILY
Refills: 0 | Status: ACTIVE | COMMUNITY

## 2019-05-13 RX ORDER — DILTIAZEM HYDROCHLORIDE 240 MG/1
240 CAPSULE, COATED, EXTENDED RELEASE ORAL
Refills: 0 | Status: ACTIVE | COMMUNITY
Start: 2019-05-13

## 2019-05-13 RX ORDER — PANTOPRAZOLE 40 MG/1
40 TABLET, DELAYED RELEASE ORAL DAILY
Qty: 30 | Refills: 0 | Status: ACTIVE | COMMUNITY
Start: 2019-01-09

## 2019-05-13 RX ORDER — APIXABAN 2.5 MG/1
2.5 TABLET, FILM COATED ORAL
Refills: 0 | Status: ACTIVE | COMMUNITY
Start: 2019-05-13

## 2019-05-13 RX ORDER — FUROSEMIDE 20 MG/1
20 TABLET ORAL
Refills: 0 | Status: ACTIVE | COMMUNITY

## 2019-05-13 RX ORDER — APIXABAN 5 MG/1
5 TABLET, FILM COATED ORAL
Qty: 60 | Refills: 3 | Status: DISCONTINUED | COMMUNITY
End: 2019-05-13

## 2019-05-13 RX ORDER — CALCITRIOL 0.5 UG/1
0.5 CAPSULE, LIQUID FILLED ORAL DAILY
Refills: 0 | Status: ACTIVE | COMMUNITY

## 2019-05-13 RX ORDER — DILTIAZEM HYDROCHLORIDE 180 MG/1
180 CAPSULE, COATED, EXTENDED RELEASE ORAL
Refills: 0 | Status: DISCONTINUED | COMMUNITY
End: 2019-05-13

## 2019-05-13 RX ORDER — ISOSORBIDE DINITRATE 30 MG/1
30 TABLET ORAL DAILY
Refills: 0 | Status: ACTIVE | COMMUNITY

## 2019-05-15 ENCOUNTER — APPOINTMENT (OUTPATIENT)
Dept: CARDIOLOGY | Facility: CLINIC | Age: 74
End: 2019-05-15

## 2019-05-15 VITALS
OXYGEN SATURATION: 96 % | DIASTOLIC BLOOD PRESSURE: 70 MMHG | SYSTOLIC BLOOD PRESSURE: 120 MMHG | TEMPERATURE: 98 F | RESPIRATION RATE: 16 BRPM | HEART RATE: 88 BPM

## 2019-05-15 DIAGNOSIS — I27.20 PULMONARY HYPERTENSION, UNSPECIFIED: ICD-10-CM

## 2019-05-15 DIAGNOSIS — K57.90 DIVERTICULOSIS OF INTESTINE, PART UNSPECIFIED, WITHOUT PERFORATION OR ABSCESS WITHOUT BLEEDING: ICD-10-CM

## 2019-05-15 DIAGNOSIS — I25.2 OLD MYOCARDIAL INFARCTION: ICD-10-CM

## 2019-05-15 DIAGNOSIS — I50.23 ACUTE ON CHRONIC SYSTOLIC (CONGESTIVE) HEART FAILURE: ICD-10-CM

## 2019-05-15 DIAGNOSIS — K21.9 GASTRO-ESOPHAGEAL REFLUX DISEASE WITHOUT ESOPHAGITIS: ICD-10-CM

## 2019-05-15 DIAGNOSIS — N18.6 END STAGE RENAL DISEASE: ICD-10-CM

## 2019-05-15 DIAGNOSIS — I48.1 PERSISTENT ATRIAL FIBRILLATION: ICD-10-CM

## 2019-05-15 DIAGNOSIS — Z99.2 END STAGE RENAL DISEASE: ICD-10-CM

## 2019-05-15 DIAGNOSIS — I13.2 HYPERTENSIVE HEART AND CHRONIC KIDNEY DISEASE WITH HEART FAILURE AND WITH STAGE 5 CHRONIC KIDNEY DISEASE, OR END STAGE RENAL DISEASE: ICD-10-CM

## 2019-05-15 DIAGNOSIS — Z87.891 PERSONAL HISTORY OF NICOTINE DEPENDENCE: ICD-10-CM

## 2019-05-15 DIAGNOSIS — E04.1 NONTOXIC SINGLE THYROID NODULE: ICD-10-CM

## 2019-05-15 DIAGNOSIS — Z99.2 DEPENDENCE ON RENAL DIALYSIS: ICD-10-CM

## 2019-05-15 DIAGNOSIS — I95.3 HYPOTENSION OF HEMODIALYSIS: ICD-10-CM

## 2019-05-15 DIAGNOSIS — I25.10 ATHEROSCLEROTIC HEART DISEASE OF NATIVE CORONARY ARTERY WITHOUT ANGINA PECTORIS: ICD-10-CM

## 2019-05-15 DIAGNOSIS — Z98.890 OTHER SPECIFIED POSTPROCEDURAL STATES: ICD-10-CM

## 2019-05-15 DIAGNOSIS — Z79.01 LONG TERM (CURRENT) USE OF ANTICOAGULANTS: ICD-10-CM

## 2019-05-15 DIAGNOSIS — I35.0 NONRHEUMATIC AORTIC (VALVE) STENOSIS: ICD-10-CM

## 2019-05-15 DIAGNOSIS — Z80.2 FAMILY HISTORY OF MALIGNANT NEOPLASM OF OTHER RESPIRATORY AND INTRATHORACIC ORGANS: ICD-10-CM

## 2019-05-15 DIAGNOSIS — M19.90 UNSPECIFIED OSTEOARTHRITIS, UNSPECIFIED SITE: ICD-10-CM

## 2019-05-15 DIAGNOSIS — K59.00 CONSTIPATION, UNSPECIFIED: ICD-10-CM

## 2019-05-15 DIAGNOSIS — I48.2 CHRONIC ATRIAL FIBRILLATION: ICD-10-CM

## 2019-05-15 DIAGNOSIS — I10 ESSENTIAL (PRIMARY) HYPERTENSION: ICD-10-CM

## 2019-05-15 DIAGNOSIS — I50.9 HEART FAILURE, UNSPECIFIED: ICD-10-CM

## 2019-05-15 DIAGNOSIS — Z82.49 FAMILY HISTORY OF ISCHEMIC HEART DISEASE AND OTHER DISEASES OF THE CIRCULATORY SYSTEM: ICD-10-CM

## 2019-05-15 DIAGNOSIS — I48.91 UNSPECIFIED ATRIAL FIBRILLATION: ICD-10-CM

## 2019-05-15 DIAGNOSIS — Z95.1 PRESENCE OF AORTOCORONARY BYPASS GRAFT: ICD-10-CM

## 2019-05-15 NOTE — COUNSELING
[Healthy eating counseling provided] : healthy eating [Weight management counseling provided] : Weight management [de-identified] : \par

## 2019-05-15 NOTE — HISTORY OF PRESENT ILLNESS
[FreeTextEntry1] : Pt seen at home for eval of SOB s/p recent discharge from [facility] with dx: CHF.  patient is unable to leave home as it requires a considerable and taxing effort, requires assistive devices to leave home.\par  [de-identified] : This is a 72 y/o female currently enrolled in the [facility] seen at home with PMH of ESRD on HD (TTS), A-fib s/p failed ablation, HTN, CAD, MI s/p CABG, CHF w/ reduced ejection fraction, moderate Pulmonary Hypertension, moderate Aortic Valve stenosis who presented to ED for worsening shortness of breath. Pt was in Afib with RVR on admission. . Pt was dialyzed and given BB with good response. Breathing improved and heart rate controlled. Pt started dialysis 3 days a week and has let arm fistula.  Currently using lt chest wall port.  On arrival to home pt is awake, alert and oriented x3. Her daughter was present at time of visit.  HC SOC is also today.  pt reports nephrologist instructed her to have 1 hour extra dialysis on Fri for residual fluid. pt also instructed to stop lasix completely by nephrologist. \par

## 2019-05-15 NOTE — PHYSICAL EXAM
[No Acute Distress] : no acute distress [Well Developed] : well developed [Well-Appearing] : well-appearing [Well Nourished] : well nourished [PERRL] : pupils equal round and reactive to light [EOMI] : extraocular movements intact [Normal Sclera/Conjunctiva] : normal sclera/conjunctiva [Supple] : supple [No JVD] : no jugular venous distention [No Lymphadenopathy] : no lymphadenopathy [No Respiratory Distress] : no respiratory distress  [Clear to Auscultation] : lungs were clear to auscultation bilaterally [Non Tender] : non-tender [No Accessory Muscle Use] : no accessory muscle use [Normal Bowel Sounds] : normal bowel sounds [de-identified] : irregular rhythm, controlled rate [de-identified] : LLL mild rales. [de-identified] : 1+ b/l LE

## 2019-05-15 NOTE — PLAN
[FreeTextEntry1] : chf- daily weights, fluid restriction\par a-fib- c/w ditiazem, metoprolol\par htn- metoprolol\par ESRD- c/w dialysis t,th,sat, and f/u with neprologist

## 2019-05-15 NOTE — REVIEW OF SYSTEMS
[Patient Intake Form Reviewed] : Patient intake form was reviewed [Orthopnea] : orthopnea [Lower Ext Edema] : lower extremity edema [Dyspnea on Exertion] : dyspnea on exertion [Negative] : Heme/Lymph

## 2019-07-16 ENCOUNTER — FORM ENCOUNTER (OUTPATIENT)
Age: 74
End: 2019-07-16

## 2019-07-17 ENCOUNTER — RESULT REVIEW (OUTPATIENT)
Age: 74
End: 2019-07-17

## 2019-07-17 ENCOUNTER — OUTPATIENT (OUTPATIENT)
Dept: OUTPATIENT SERVICES | Facility: HOSPITAL | Age: 74
LOS: 1 days | Discharge: HOME | End: 2019-07-17
Payer: MEDICARE

## 2019-07-17 DIAGNOSIS — Z98.890 OTHER SPECIFIED POSTPROCEDURAL STATES: Chronic | ICD-10-CM

## 2019-07-17 DIAGNOSIS — Z95.1 PRESENCE OF AORTOCORONARY BYPASS GRAFT: Chronic | ICD-10-CM

## 2019-07-17 DIAGNOSIS — Z90.710 ACQUIRED ABSENCE OF BOTH CERVIX AND UTERUS: Chronic | ICD-10-CM

## 2019-07-17 DIAGNOSIS — I77.0 ARTERIOVENOUS FISTULA, ACQUIRED: Chronic | ICD-10-CM

## 2019-07-17 PROCEDURE — 19081 BX BREAST 1ST LESION STRTCTC: CPT | Mod: LT

## 2019-07-17 PROCEDURE — 88305 TISSUE EXAM BY PATHOLOGIST: CPT | Mod: 26

## 2019-07-18 LAB — SURGICAL PATHOLOGY STUDY: SIGNIFICANT CHANGE UP

## 2019-07-22 DIAGNOSIS — N64.2 ATROPHY OF BREAST: ICD-10-CM

## 2019-07-22 DIAGNOSIS — D24.2 BENIGN NEOPLASM OF LEFT BREAST: ICD-10-CM

## 2019-07-22 DIAGNOSIS — R92.1 MAMMOGRAPHIC CALCIFICATION FOUND ON DIAGNOSTIC IMAGING OF BREAST: ICD-10-CM

## 2019-08-12 ENCOUNTER — INPATIENT (INPATIENT)
Facility: HOSPITAL | Age: 74
LOS: 1 days | Discharge: HOME HEALTH | End: 2019-08-14
Attending: INTERNAL MEDICINE | Admitting: INTERNAL MEDICINE
Payer: MEDICARE

## 2019-08-12 VITALS
OXYGEN SATURATION: 94 % | HEART RATE: 62 BPM | SYSTOLIC BLOOD PRESSURE: 139 MMHG | TEMPERATURE: 97 F | RESPIRATION RATE: 18 BRPM | DIASTOLIC BLOOD PRESSURE: 63 MMHG

## 2019-08-12 DIAGNOSIS — Z98.890 OTHER SPECIFIED POSTPROCEDURAL STATES: Chronic | ICD-10-CM

## 2019-08-12 DIAGNOSIS — I27.20 PULMONARY HYPERTENSION, UNSPECIFIED: ICD-10-CM

## 2019-08-12 DIAGNOSIS — I77.0 ARTERIOVENOUS FISTULA, ACQUIRED: Chronic | ICD-10-CM

## 2019-08-12 DIAGNOSIS — I50.30 UNSPECIFIED DIASTOLIC (CONGESTIVE) HEART FAILURE: ICD-10-CM

## 2019-08-12 DIAGNOSIS — N18.6 END STAGE RENAL DISEASE: ICD-10-CM

## 2019-08-12 DIAGNOSIS — Z95.1 PRESENCE OF AORTOCORONARY BYPASS GRAFT: Chronic | ICD-10-CM

## 2019-08-12 DIAGNOSIS — Z90.710 ACQUIRED ABSENCE OF BOTH CERVIX AND UTERUS: Chronic | ICD-10-CM

## 2019-08-12 LAB
ALBUMIN SERPL ELPH-MCNC: 3.9 G/DL — SIGNIFICANT CHANGE UP (ref 3.5–5.2)
ALP SERPL-CCNC: 79 U/L — SIGNIFICANT CHANGE UP (ref 30–115)
ALT FLD-CCNC: 10 U/L — SIGNIFICANT CHANGE UP (ref 0–41)
ANION GAP SERPL CALC-SCNC: 13 MMOL/L — SIGNIFICANT CHANGE UP (ref 7–14)
AST SERPL-CCNC: 15 U/L — SIGNIFICANT CHANGE UP (ref 0–41)
BASE EXCESS BLDV CALC-SCNC: 5.2 MMOL/L — HIGH (ref -2–2)
BASOPHILS # BLD AUTO: 0.08 K/UL — SIGNIFICANT CHANGE UP (ref 0–0.2)
BASOPHILS NFR BLD AUTO: 0.9 % — SIGNIFICANT CHANGE UP (ref 0–1)
BILIRUB SERPL-MCNC: 0.5 MG/DL — SIGNIFICANT CHANGE UP (ref 0.2–1.2)
BUN SERPL-MCNC: 37 MG/DL — HIGH (ref 10–20)
CA-I SERPL-SCNC: 1.24 MMOL/L — SIGNIFICANT CHANGE UP (ref 1.12–1.3)
CALCIUM SERPL-MCNC: 10.1 MG/DL — SIGNIFICANT CHANGE UP (ref 8.5–10.1)
CHLORIDE SERPL-SCNC: 99 MMOL/L — SIGNIFICANT CHANGE UP (ref 98–110)
CO2 SERPL-SCNC: 30 MMOL/L — SIGNIFICANT CHANGE UP (ref 17–32)
CREAT SERPL-MCNC: 4 MG/DL — HIGH (ref 0.7–1.5)
EOSINOPHIL # BLD AUTO: 0.48 K/UL — SIGNIFICANT CHANGE UP (ref 0–0.7)
EOSINOPHIL NFR BLD AUTO: 5.5 % — SIGNIFICANT CHANGE UP (ref 0–8)
GAS PNL BLDV: 141 MMOL/L — SIGNIFICANT CHANGE UP (ref 136–145)
GAS PNL BLDV: SIGNIFICANT CHANGE UP
GLUCOSE SERPL-MCNC: 127 MG/DL — HIGH (ref 70–99)
HCO3 BLDV-SCNC: 32 MMOL/L — HIGH (ref 22–29)
HCT VFR BLD CALC: 38.9 % — SIGNIFICANT CHANGE UP (ref 37–47)
HCT VFR BLDA CALC: 34.8 % — SIGNIFICANT CHANGE UP (ref 34–44)
HGB BLD CALC-MCNC: 11.4 G/DL — LOW (ref 14–18)
HGB BLD-MCNC: 11.3 G/DL — LOW (ref 12–16)
IMM GRANULOCYTES NFR BLD AUTO: 0.5 % — HIGH (ref 0.1–0.3)
LACTATE BLDV-MCNC: 0.9 MMOL/L — SIGNIFICANT CHANGE UP (ref 0.5–1.6)
LDH SERPL L TO P-CCNC: 164 — SIGNIFICANT CHANGE UP (ref 50–242)
LIDOCAIN IGE QN: 38 U/L — SIGNIFICANT CHANGE UP (ref 7–60)
LYMPHOCYTES # BLD AUTO: 0.51 K/UL — LOW (ref 1.2–3.4)
LYMPHOCYTES # BLD AUTO: 5.9 % — LOW (ref 20.5–51.1)
MAGNESIUM SERPL-MCNC: 2.1 MG/DL — SIGNIFICANT CHANGE UP (ref 1.8–2.4)
MCHC RBC-ENTMCNC: 25.6 PG — LOW (ref 27–31)
MCHC RBC-ENTMCNC: 29 G/DL — LOW (ref 32–37)
MCV RBC AUTO: 88 FL — SIGNIFICANT CHANGE UP (ref 81–99)
MONOCYTES # BLD AUTO: 0.98 K/UL — HIGH (ref 0.1–0.6)
MONOCYTES NFR BLD AUTO: 11.3 % — HIGH (ref 1.7–9.3)
NEUTROPHILS # BLD AUTO: 6.62 K/UL — HIGH (ref 1.4–6.5)
NEUTROPHILS NFR BLD AUTO: 75.9 % — HIGH (ref 42.2–75.2)
NRBC # BLD: 0 /100 WBCS — SIGNIFICANT CHANGE UP (ref 0–0)
NT-PROBNP SERPL-SCNC: HIGH PG/ML (ref 0–300)
PCO2 BLDV: 58 MMHG — HIGH (ref 41–51)
PH BLDV: 7.36 — SIGNIFICANT CHANGE UP (ref 7.26–7.43)
PHOSPHATE SERPL-MCNC: 5.4 MG/DL — HIGH (ref 2.1–4.9)
PLATELET # BLD AUTO: 265 K/UL — SIGNIFICANT CHANGE UP (ref 130–400)
PO2 BLDV: 27 MMHG — SIGNIFICANT CHANGE UP (ref 20–40)
POTASSIUM BLDV-SCNC: 3.5 MMOL/L — SIGNIFICANT CHANGE UP (ref 3.3–5.6)
POTASSIUM SERPL-MCNC: 4 MMOL/L — SIGNIFICANT CHANGE UP (ref 3.5–5)
POTASSIUM SERPL-SCNC: 4 MMOL/L — SIGNIFICANT CHANGE UP (ref 3.5–5)
PROT SERPL-MCNC: 7.5 G/DL — SIGNIFICANT CHANGE UP (ref 6–8)
RBC # BLD: 4.42 M/UL — SIGNIFICANT CHANGE UP (ref 4.2–5.4)
RBC # FLD: 19.6 % — HIGH (ref 11.5–14.5)
SAO2 % BLDV: 41 % — SIGNIFICANT CHANGE UP
SODIUM SERPL-SCNC: 142 MMOL/L — SIGNIFICANT CHANGE UP (ref 135–146)
TROPONIN T SERPL-MCNC: 0.03 NG/ML — CRITICAL HIGH
TROPONIN T SERPL-MCNC: 0.03 NG/ML — CRITICAL HIGH
WBC # BLD: 8.71 K/UL — SIGNIFICANT CHANGE UP (ref 4.8–10.8)
WBC # FLD AUTO: 8.71 K/UL — SIGNIFICANT CHANGE UP (ref 4.8–10.8)

## 2019-08-12 PROCEDURE — 71045 X-RAY EXAM CHEST 1 VIEW: CPT | Mod: 26

## 2019-08-12 PROCEDURE — 99285 EMERGENCY DEPT VISIT HI MDM: CPT | Mod: GC

## 2019-08-12 PROCEDURE — 93010 ELECTROCARDIOGRAM REPORT: CPT

## 2019-08-12 RX ORDER — SENNA PLUS 8.6 MG/1
2 TABLET ORAL AT BEDTIME
Refills: 0 | Status: DISCONTINUED | OUTPATIENT
Start: 2019-08-12 | End: 2019-08-14

## 2019-08-12 RX ORDER — FAMOTIDINE 10 MG/ML
20 INJECTION INTRAVENOUS
Refills: 0 | Status: DISCONTINUED | OUTPATIENT
Start: 2019-08-12 | End: 2019-08-14

## 2019-08-12 RX ORDER — DOCUSATE SODIUM 100 MG
100 CAPSULE ORAL THREE TIMES A DAY
Refills: 0 | Status: DISCONTINUED | OUTPATIENT
Start: 2019-08-12 | End: 2019-08-14

## 2019-08-12 RX ORDER — LANOLIN ALCOHOL/MO/W.PET/CERES
3 CREAM (GRAM) TOPICAL AT BEDTIME
Refills: 0 | Status: DISCONTINUED | OUTPATIENT
Start: 2019-08-12 | End: 2019-08-14

## 2019-08-12 RX ORDER — ALLOPURINOL 300 MG
1 TABLET ORAL
Qty: 0 | Refills: 0 | DISCHARGE

## 2019-08-12 RX ORDER — DILTIAZEM HCL 120 MG
240 CAPSULE, EXT RELEASE 24 HR ORAL DAILY
Refills: 0 | Status: DISCONTINUED | OUTPATIENT
Start: 2019-08-12 | End: 2019-08-14

## 2019-08-12 RX ORDER — PREGABALIN 225 MG/1
1 CAPSULE ORAL
Qty: 0 | Refills: 0 | DISCHARGE

## 2019-08-12 RX ORDER — ASPIRIN/CALCIUM CARB/MAGNESIUM 324 MG
325 TABLET ORAL ONCE
Refills: 0 | Status: COMPLETED | OUTPATIENT
Start: 2019-08-12 | End: 2019-08-12

## 2019-08-12 RX ORDER — SODIUM BICARBONATE 1 MEQ/ML
325 SYRINGE (ML) INTRAVENOUS EVERY 8 HOURS
Refills: 0 | Status: DISCONTINUED | OUTPATIENT
Start: 2019-08-12 | End: 2019-08-12

## 2019-08-12 RX ORDER — CALCITRIOL 0.5 UG/1
0.5 CAPSULE ORAL DAILY
Refills: 0 | Status: DISCONTINUED | OUTPATIENT
Start: 2019-08-12 | End: 2019-08-12

## 2019-08-12 RX ORDER — ALLOPURINOL 300 MG
100 TABLET ORAL DAILY
Refills: 0 | Status: DISCONTINUED | OUTPATIENT
Start: 2019-08-12 | End: 2019-08-12

## 2019-08-12 RX ORDER — ISOSORBIDE MONONITRATE 60 MG/1
30 TABLET, EXTENDED RELEASE ORAL DAILY
Refills: 0 | Status: DISCONTINUED | OUTPATIENT
Start: 2019-08-12 | End: 2019-08-14

## 2019-08-12 RX ORDER — APIXABAN 2.5 MG/1
2.5 TABLET, FILM COATED ORAL
Refills: 0 | Status: DISCONTINUED | OUTPATIENT
Start: 2019-08-12 | End: 2019-08-12

## 2019-08-12 RX ORDER — FUROSEMIDE 40 MG
60 TABLET ORAL DAILY
Refills: 0 | Status: DISCONTINUED | OUTPATIENT
Start: 2019-08-12 | End: 2019-08-14

## 2019-08-12 RX ORDER — APIXABAN 2.5 MG/1
5 TABLET, FILM COATED ORAL
Refills: 0 | Status: DISCONTINUED | OUTPATIENT
Start: 2019-08-12 | End: 2019-08-14

## 2019-08-12 RX ORDER — FUROSEMIDE 40 MG
20 TABLET ORAL ONCE
Refills: 0 | Status: COMPLETED | OUTPATIENT
Start: 2019-08-12 | End: 2019-08-12

## 2019-08-12 RX ORDER — CALCITRIOL 0.5 UG/1
1 CAPSULE ORAL
Qty: 0 | Refills: 0 | DISCHARGE

## 2019-08-12 RX ORDER — METOPROLOL TARTRATE 50 MG
50 TABLET ORAL
Refills: 0 | Status: DISCONTINUED | OUTPATIENT
Start: 2019-08-12 | End: 2019-08-14

## 2019-08-12 RX ADMIN — ISOSORBIDE MONONITRATE 30 MILLIGRAM(S): 60 TABLET, EXTENDED RELEASE ORAL at 13:43

## 2019-08-12 RX ADMIN — APIXABAN 5 MILLIGRAM(S): 2.5 TABLET, FILM COATED ORAL at 18:29

## 2019-08-12 RX ADMIN — Medication 20 MILLIGRAM(S): at 10:50

## 2019-08-12 RX ADMIN — Medication 100 MILLIGRAM(S): at 21:27

## 2019-08-12 RX ADMIN — Medication 3 MILLIGRAM(S): at 23:39

## 2019-08-12 RX ADMIN — Medication 240 MILLIGRAM(S): at 13:50

## 2019-08-12 RX ADMIN — Medication 60 MILLIGRAM(S): at 13:53

## 2019-08-12 RX ADMIN — FAMOTIDINE 20 MILLIGRAM(S): 10 INJECTION INTRAVENOUS at 21:27

## 2019-08-12 RX ADMIN — Medication 325 MILLIGRAM(S): at 10:51

## 2019-08-12 RX ADMIN — Medication 50 MILLIGRAM(S): at 18:29

## 2019-08-12 NOTE — H&P ADULT - HISTORY OF PRESENT ILLNESS
73 y/o female with PMH of ESRD on HD (TTS), A-fib on Eliquis s/p failed ablation, HTN, CAD, MI s/p CABG, HFpEF, moderate Pulmonary Hypertension, moderate Aortic Valve stenosis presenting for worsening shortness of breath of 3 days duration. Pt states that she has had similar episodes on the mornings of her dialysis which resolve with nasal cannula. Today the SOB got significantly worse prompting her to go to the hospital. Pt denies missing any sessions. Pt states that she started HD about 2 months ago and often gets hypotensive during her sessions.   Denies any headaches, changes in vision/hearing, chest pain, palpitations, cough, sore throat, fevers, chills, night sweats, n/v/d, abdominal pains, weakness, fatigue, joint/muscle pains.   In the ED pt was placed on nasal cannula which alleviated her symptoms. Given Lasix 20x1. EKG showed Afib w/ PVCs 73 y/o female with PMH of ESRD on HD (TTS), A-fib on Eliquis s/p failed ablation, HTN, CAD, MI s/p CABG, HFpEF, moderate Pulmonary Hypertension, moderate Aortic Valve stenosis presenting for worsening shortness of breath of 3 days duration. Pt states that she has had similar episodes on the mornings of her dialysis which resolve with nasal cannula. Today the SOB got significantly worse prompting her to go to the hospital. Pt denies missing any sessions. Pt states that she started HD about 2 months ago and often gets hypotensive during her sessions. Pt's abdomen has been getting more distended since starting her dialysis.   Denies any headaches, changes in vision/hearing, chest pain, palpitations, cough, sore throat, fevers, chills, night sweats, n/v/d, abdominal pains, weakness, fatigue, joint/muscle pains.   In the ED pt was placed on nasal cannula which alleviated her symptoms. Given Lasix 20x1. EKG showed Afib w/ PVCs

## 2019-08-12 NOTE — H&P ADULT - ASSESSMENT
In Progress    75 y/o F w/ PMH of CKD on HD, HTN, CAD, MI s/p CABG, CHF, Pulmonary HTN, AS, Afib s/p ablation presenting for shortness of breath x3 days. Abdomen fluid distended. CXR clear.     SOB likely secondary to possible CHF exacerbation vs ESRD   - s/p 1 dose 20mg IV Lasix in ED  - c/w Lasix 60 IV daily   - Patient still makes urine  - CXR clear   - f/u CXR in AM  - BNP 65K  - Low suspicion for infectious etiology, no fever, WBC count  - Troponin .03, unchanged from baseline, will get 1 more set  - strict is and os  - daily weights (baseline -  ))  - restrict fluid to 1.5L per day  - Consider Cardiology consult if worsening: Dr. Child  - Echo 1/19: EF 45-50%, G1DD, Severe pulm HTN, Moderate AS    # ESRD on HD TTS  - Still makes urine  - c/w IV Lasix 60 iv  - nephrology consult for HD    # Afib on AC  - rate controlled   - c/w Eliquis 2.5mg bid  - c/w Cardizem 180mg qd, Lopressor 25mg q8     # Coronary Artery Disease, history of Myocardial Infarction, status post Coronary Artery Bypass Graft  - c/w Isosorbide mono  - c/w metoprolol    # HTN  - c/w metoprolol, Cardizem    #Suspected B12 and Vitamin-D deficiency  -On PO Calciferol and Cyanocobalamin     Activity: As tolerated  Diet: Renal restrictions  DVT ppx: On Eliquis  GI ppx:  Dispo: from home In Progress    73 y/o F w/ PMH of CKD on HD, HTN, CAD, MI s/p CABG, CHF, Pulmonary HTN, AS, Afib s/p ablation presenting for shortness of breath x3 days. Abdomen fluid distended. CXR clear.     SOB likely secondary to possible CHF exacerbation vs ESRD   - s/p 1 dose 20mg IV Lasix in ED  - c/w Lasix 60 IV daily   - Patient still makes urine  - CXR clear   - f/u CXR in AM  - BNP 65K, last admission 25K   - Low suspicion for infectious etiology, no fever, WBC count  - Troponin .03, unchanged from baseline, will get 1 more set  - strict is and os  - daily weights (baseline - 81 kg)  - restrict fluid to 1.5L per day  - Consider Cardiology consult if worsening: Dr. Child  - Echo 1/19: EF 45-50%, G1DD, Severe pulm HTN, Moderate AS    # ESRD on HD TTS  - Still makes urine  - c/w IV Lasix 60 iv  - nephrology consult for HD  - fluid overload in abdomen    # Afib on AC  - rate controlled   - c/w Eliquis 5mg bid  - c/w Cardizem 240 daily, Lopressor 50 BID    # CAD, history of MI s/p CABG  - c/w Isosorbide mono ER 30  - c/w metoprolol    # HTN  - c/w metoprolol, Cardizem    Activity: As tolerated  Diet: Renal restrictions  DVT ppx: On Eliquis  GI ppx: pepcid   Dispo: from home  FULL CODE

## 2019-08-12 NOTE — ED PROVIDER NOTE - PROGRESS NOTE DETAILS
pocus shows b/l b lines w b/l pleural effusions. results elroy pt, unable to reach pmd dr hernandez, 3 calls over the past hour and line remains busy, will admit to hospitalist service, case dw mar

## 2019-08-12 NOTE — ED PROVIDER NOTE - NS ED ROS FT
Constitutional: (-) fever (-) vomiting  Eyes/ENT: (-) vision changes  Cardiovascular: (-) chest pain, (+) sob  Respiratory: (-) cough, (+) shortness of breath  Gastrointestinal: (-) vomiting, (-) diarrhea, (-) abdominal pain  : (-) dysuria   Musculoskeletal: (-) back pain  Integumentary: (-) rash  Neurological: (-)loc  Allergic/Immunologic: (-) pruritus  Endocrine: No history of thyroid disease or diabetes.

## 2019-08-12 NOTE — ED PROVIDER NOTE - ATTENDING CONTRIBUTION TO CARE
75 y/o female h/o  afib on Eliquis, CAD, CHF, HTN, AS, CKD, HTN, s/p CABG, hysterectomy, RLE vascular bypass, recently started on dialysis (T-Th-Sat), last dialyzed 2 d/a now presents with SOB x 3d. Pt states sx constant, better with supplemental oxygen, denies fever, hemoptysis, orthopnea, PND, chest pain, LE pain or swelling, recent immobilization or travel or other associated complaints at present. She states she has been having similar sx since starting dialysis and has been having relief with supplemental oxygen at dialysis center, was told by Dr Minor that she has not been compliant with her oral fluid intake.    Old chart reviewed.  I have reviewed and agree with the nursing note, except as documented in my note.    VSS, awake, alert, non-toxic appearing, sitting comfortably, in NAD, ears clear, oropharynx clear, no skin rash or lesions, no tracheal deviation, non-labored breathing without accessory muscle use apparent or pursed lip breathing, no retractions, speaks full sentences, chest CTAB, no w/r/r, +S1/S2, irreg irreg rhythm, no m/r/g, abdomen soft, NT, ND, +BS, AO x 3, clear speech.

## 2019-08-12 NOTE — H&P ADULT - NSHPPHYSICALEXAM_GEN_ALL_CORE
GENERAL: NAD, AOAx3, well-developed, on nasal cannula  HEAD:  Atraumatic, Normocephalic  EYES: EOMI, PERRLA, redness in L conjunctiva   NECK: Supple, No JVD  CHEST: Bibasilar crackles  HEART: irregular rate and rhythm, systolic murmur in apex   ABDOMEN: Distended, soft, nontender, positive bowel sounds  EXTREMITIES:  2+ pitting edema, right lower extremity healing wound at toes, AV fistula on Left upper extremity   NEURO: Alert, oriented, grossly unremarkable, no focal deficits, cn ii-xii grossly intact

## 2019-08-12 NOTE — ED PROVIDER NOTE - PHYSICAL EXAMINATION
CONSTITUTIONAL: Well-developed; well-nourished; in no acute distress, speaking in full sentences  SKIN: warm, dry  HEAD: Normocephalic; atraumatic  EYES: PERRL, EOMI, no conjunctival erythema  ENT: No nasal discharge; airway clear, mucous membranes moist, nc in place  NECK: Supple; non tender, FROM  CARD: irreg,  no murmurs, gallops, or rubs. radial 2+  RESP: bibasilar crackles   ABD: soft nt, no rebound, no guarding, no rigidity,   EXT: moves all extremities, No clubbing, cyanosis  NEURO: Alert, oriented, grossly unremarkable, no focal deficits, cn ii-xii grossly intact  PSYCH: Cooperative, appropriate

## 2019-08-12 NOTE — ED PROVIDER NOTE - OBJECTIVE STATEMENT
73yo F pmhx a fib on eliquis, cabg, hd t/th/sat, ckd, pvd, former smoker presents CC sob  which began 3 days ago. pt states symptoms resolve with 2LNC. sob does not appear to be alleviated or exacerbated by anything. no chest pain, n/v, cough.     abd us  lungs ctabl  RLE scar with distal hyperemia 75yo F pmhx a fib on eliquis, cabg, hd t/th/sat, ckd, pvd, former smoker presents CC sob  which began 3 days ago. pt states symptoms resolved with 2LNC. sob does not appear to be alleviated or exacerbated by anything. no chest pain, n/v, cough. pt reports compliance with HD.     nephro palladino  pmd hernandez  [facility]

## 2019-08-12 NOTE — H&P ADULT - NSHPLABSRESULTS_GEN_ALL_CORE
11.3   8.71  )-----------( 265      ( 12 Aug 2019 08:44 )             38.9     08-12    142  |  99  |  37<H>  ----------------------------<  127<H>  4.0   |  30  |  4.0<H>    Ca    10.1      12 Aug 2019 08:44    TPro  7.5  /  Alb  3.9  /  TBili  0.5  /  DBili  x   /  AST  15  /  ALT  10  /  AlkPhos  79  08-12    CARDIAC MARKERS ( 12 Aug 2019 08:44 )  x     / 0.03 ng/mL / x     / x     / x          LIVER FUNCTIONS - ( 12 Aug 2019 08:44 )  Alb: 3.9 g/dL / Pro: 7.5 g/dL / ALK PHOS: 79 U/L / ALT: 10 U/L / AST: 15 U/L / GGT: x

## 2019-08-12 NOTE — ED ADULT NURSE NOTE - OBJECTIVE STATEMENT
patient c.o of shortness of breathe couple of days has no home oxugen. , patient sattes she had right stent placed in her leg recently. patient hd due tomorrow no missed sessions. patient denies any chest pain, - abdominal pain, h

## 2019-08-13 PROCEDURE — 71045 X-RAY EXAM CHEST 1 VIEW: CPT | Mod: 26

## 2019-08-13 RX ADMIN — ISOSORBIDE MONONITRATE 30 MILLIGRAM(S): 60 TABLET, EXTENDED RELEASE ORAL at 18:10

## 2019-08-13 RX ADMIN — Medication 3 MILLIGRAM(S): at 22:04

## 2019-08-13 RX ADMIN — Medication 240 MILLIGRAM(S): at 05:55

## 2019-08-13 RX ADMIN — Medication 100 MILLIGRAM(S): at 05:55

## 2019-08-13 RX ADMIN — APIXABAN 5 MILLIGRAM(S): 2.5 TABLET, FILM COATED ORAL at 05:54

## 2019-08-13 RX ADMIN — Medication 60 MILLIGRAM(S): at 05:55

## 2019-08-13 RX ADMIN — Medication 50 MILLIGRAM(S): at 05:56

## 2019-08-13 RX ADMIN — Medication 100 MILLIGRAM(S): at 18:10

## 2019-08-13 RX ADMIN — FAMOTIDINE 20 MILLIGRAM(S): 10 INJECTION INTRAVENOUS at 05:55

## 2019-08-13 RX ADMIN — APIXABAN 5 MILLIGRAM(S): 2.5 TABLET, FILM COATED ORAL at 18:10

## 2019-08-13 RX ADMIN — Medication 50 MILLIGRAM(S): at 18:10

## 2019-08-13 RX ADMIN — FAMOTIDINE 20 MILLIGRAM(S): 10 INJECTION INTRAVENOUS at 18:10

## 2019-08-13 NOTE — PROGRESS NOTE ADULT - SUBJECTIVE AND OBJECTIVE BOX
NOAH QUIROS 74y Female  MRN#: [id number]         SUBJECTIVE  Patient is a 74y old Female who presents with a chief complaint of Shortness of breath (12 Aug 2019 11:44)  Currently admitted to medicine with the primary diagnosis of Shortness of breath    Today is hospital day 1d, and this morning pt is resting comfortably.         OBJECTIVE  PAST MEDICAL & SURGICAL HISTORY  Thyroid nodule  Degenerative joint disease  Osteoarthritis  Diverticulosis  GERD (gastroesophageal reflux disease)  Heart failure with reduced ejection fraction  Pulmonary hypertension, moderate to severe  Aortic stenosis, moderate  Atrial fibrillation: status post cardioversion  CKD (chronic kidney disease): stage 5, no dialysis  MI (myocardial infarction)  CAD (coronary artery disease)  Kidney stone  HTN (hypertension)  S/P CABG (coronary artery bypass graft)  AV fistula  H/O abdominal hysterectomy  H/O cardiac radiofrequency ablation    ALLERGIES:  No Known Allergies    MEDICATIONS:  STANDING MEDICATIONS  apixaban 5 milliGRAM(s) Oral two times a day  diltiazem    milliGRAM(s) Oral daily  docusate sodium 100 milliGRAM(s) Oral three times a day  famotidine    Tablet 20 milliGRAM(s) Oral two times a day  furosemide   Injectable 60 milliGRAM(s) IV Push daily  isosorbide   mononitrate ER Tablet (IMDUR) 30 milliGRAM(s) Oral daily  melatonin 3 milliGRAM(s) Oral at bedtime  metoprolol tartrate 50 milliGRAM(s) Oral two times a day    PRN MEDICATIONS  senna 2 Tablet(s) Oral at bedtime PRN      Home Medications:  famotidine 40 mg oral tablet: 1 tab(s) orally 2 times a day (12 Jan 2019 23:48)  isosorbide mononitrate 30 mg oral tablet, extended release: 1 tab(s) orally once a day (in the morning) (12 Jan 2019 23:48)      VITAL SIGNS: Last 24 Hours  T(C): 36.4 (13 Aug 2019 05:54), Max: 36.4 (12 Aug 2019 10:47)  T(F): 97.6 (13 Aug 2019 05:54), Max: 97.6 (12 Aug 2019 10:47)  HR: 54 (13 Aug 2019 09:14) (54 - 91)  BP: 132/58 (13 Aug 2019 09:14) (96/55 - 132/58)  BP(mean): --  RR: 18 (13 Aug 2019 05:54) (17 - 18)  SpO2: 97% (13 Aug 2019 09:14) (96% - 98%)    LABS:                        11.3   8.71  )-----------( 265      ( 12 Aug 2019 08:44 )             38.9     08-12    142  |  99  |  37<H>  ----------------------------<  127<H>  4.0   |  30  |  4.0<H>    Ca    10.1      12 Aug 2019 08:44  Phos  5.4     08-12  Mg     2.1     08-12    TPro  7.5  /  Alb  3.9  /  TBili  0.5  /  DBili  x   /  AST  15  /  ALT  10  /  AlkPhos  79  08-12          Troponin T, Serum: 0.03 ng/mL <HH> (08-12-19 @ 16:30)      CARDIAC MARKERS ( 12 Aug 2019 16:30 )  x     / 0.03 ng/mL / x     / x     / x      CARDIAC MARKERS ( 12 Aug 2019 08:44 )  x     / 0.03 ng/mL / x     / x     / x          I&O's Summary    12 Aug 2019 07:01  -  13 Aug 2019 07:00  --------------------------------------------------------  IN: 0 mL / OUT: 0 mL / NET: 0 mL          PHYSICAL EXAM:    General: Not in distress.   HEENT: Moist mucus membranes. PERRLA.  Cardio: Regular rate and rhythm, S1, S2, no murmur, rub, or gallop.  Pulm: Clear to auscultation bilaterally. No wheezing, or rhonchi  Abdomen: Soft, non-tender, non-distended. Normoactive bowel sounds.  Extremities: No cyanosis or edema bilaterally. No calf tenderness to palpation.  Neuro: A&O x3. No focal deficits.       RADIOLOGY:

## 2019-08-13 NOTE — PROGRESS NOTE ADULT - ASSESSMENT
SOB due to volume overload  ESRD  Hx of HTN, Arteriosclerotic heart disease, CAD, MI, Atrial fibrillation, Coronary artery bypass grafting, failed cardioversion and ablation, AS  Hx of Pul HTN, ILD  Hx of DLD  Hx of Osteoarthritis, DDD, DJD, gout  Hx of ESRD, HD/TTS  Hx of anxiety and depression    IV diuretics  HD/TTS today  Renal consult  cont all meds  Sono of abd/pelvis

## 2019-08-13 NOTE — PROGRESS NOTE ADULT - SUBJECTIVE AND OBJECTIVE BOX
NOAH QUIROS 73yo W Female brought to the ER for inc SOB x 3 days due to ESRD and fluid overload.  Pt on HD/TTS 3x per week.  Patient  has complex cardiac Hx.  Admitted for IV diuretics and cont dialysis.  The PMHX includes:  HTN, Arteriosclerotic heart disease, old MI, CAD, sp Coronary artery bypass grafting,  SYS CHF, EF 40%, Atrial fibrillation, status post failed ablation and cardioversion, AS,   Pul HTN, DLD, Osteoarthritis, DDD, DJD, gout, ESRD, HD/TTS, GERD, diverticulosis.    INTERVAL HPI/OVERNIGHT EVENTS:  patient  feels better today, had dialysis    MEDICATIONS  (STANDING):  apixaban 5 milliGRAM(s) Oral two times a day  diltiazem    milliGRAM(s) Oral daily  docusate sodium 100 milliGRAM(s) Oral three times a day  famotidine    Tablet 20 milliGRAM(s) Oral two times a day  furosemide   Injectable 60 milliGRAM(s) IV Push daily  isosorbide   mononitrate ER Tablet (IMDUR) 30 milliGRAM(s) Oral daily  melatonin 3 milliGRAM(s) Oral at bedtime  metoprolol tartrate 50 milliGRAM(s) Oral two times a day    MEDICATIONS  (PRN):  senna 2 Tablet(s) Oral at bedtime PRN Constipation      Allergies    No Known Allergies    Vital Signs Last 24 Hrs  T(C): 37.1 (13 Aug 2019 20:10), Max: 37.1 (13 Aug 2019 20:10)  T(F): 98.7 (13 Aug 2019 20:10), Max: 98.7 (13 Aug 2019 20:10)  HR: 88 (13 Aug 2019 20:10) (46 - 105)  BP: 135/59 (13 Aug 2019 20:10) (94/47 - 135/59)  BP(mean): --  RR: 18 (13 Aug 2019 20:10) (18 - 18)  SpO2: 98% (13 Aug 2019 22:02) (96% - 98%)    PHYSICAL EXAM:      Constitutional:  patient  alert and oriented, elderly and chronically ill looking but in NAD    Eyes:  nonicteric    ENMT:  dry oral mucosa    Neck:  supple, + JVD    Respiratory:  shallow respirations, scattered rhonchi    Cardiovascular:  S1S2  irreg    Gastrointestinal: globose and distended, nontender    Genitourinary:  no simeon    Extremities:  moves all ext, + arthritic changes    LABS:                        11.3   8.71  )-----------( 265      ( 12 Aug 2019 08:44 )             38.9     08-12    142  |  99  |  37<H>  ----------------------------<  127<H>  4.0   |  30  |  4.0<H>  IPos 5.4  Ca    10.1      12 Aug 2019 08:44  Phos  5.4     08-12  Mg     2.1     08-12  troponin  0.03  TPro  7.5  /  Alb  3.9  /  TBili  0.5  /  DBili  x   /  AST  15  /  ALT  10  /  AlkPhos  79  08-12          RADIOLOGY & ADDITIONAL TESTS:  CXR:  cardiomegaly inc vascular markings, stable opacity in the R costophrenic angle    EKG:  Atrial fibrillation 79/min PVCs

## 2019-08-13 NOTE — CONSULT NOTE ADULT - SUBJECTIVE AND OBJECTIVE BOX
NEPHROLOGY CONSULTATION NOTE    75 y/o female with PMH of ESRD on HD (TTS), A-fib on Eliquis s/p failed ablation, HTN, CAD, MI s/p CABG, HFpEF, moderate Pulmonary Hypertension, moderate Aortic Valve stenosis presenting for worsening shortness of breath of 3 days duration. Pt states that she has had similar episodes on the mornings of her dialysis which resolve with nasal cannula. Today the SOB got significantly worse prompting her to go to the hospital. Pt denies missing any sessions. Pt states that she started HD about 2 months ago and often gets hypotensive during her sessions. Pt's abdomen has been getting more distended since starting her dialysis.   Denies any headaches, changes in vision/hearing, chest pain, palpitations, cough, sore throat, fevers, chills, night sweats, n/v/d, abdominal pains, weakness, fatigue, joint/muscle pains.   In the ED pt was placed on nasal cannula which alleviated her symptoms. Given Lasix 20x1. EKG showed Afib w/ PVCs (8/12/19)    Today: pt denies any n/v, fever, chills, abdominal pain, chest pain.    PAST MEDICAL & SURGICAL HISTORY:  Thyroid nodule  Degenerative joint disease  Osteoarthritis  Diverticulosis  GERD (gastroesophageal reflux disease)  Heart failure with reduced ejection fraction  Pulmonary hypertension, moderate to severe  Aortic stenosis, moderate  Atrial fibrillation: status post cardioversion  CKD (chronic kidney disease): stage 5, no dialysis  MI (myocardial infarction)  CAD (coronary artery disease)  Kidney stone  HTN (hypertension)  S/P CABG (coronary artery bypass graft)  AV fistula  H/O abdominal hysterectomy  H/O cardiac radiofrequency ablation    Allergies:  No Known Allergies    Home Medications:  famotidine 40 mg oral tablet: 1 tab(s) orally 2 times a day (12 Jan 2019 23:48)  isosorbide mononitrate 30 mg oral tablet, extended release: 1 tab(s) orally once a day (in the morning) (12 Jan 2019 23:48)    Hospital Medications:   MEDICATIONS  (STANDING):  apixaban 5 milliGRAM(s) Oral two times a day  diltiazem    milliGRAM(s) Oral daily  docusate sodium 100 milliGRAM(s) Oral three times a day  famotidine    Tablet 20 milliGRAM(s) Oral two times a day  furosemide   Injectable 60 milliGRAM(s) IV Push daily  isosorbide   mononitrate ER Tablet (IMDUR) 30 milliGRAM(s) Oral daily  melatonin 3 milliGRAM(s) Oral at bedtime  metoprolol tartrate 50 milliGRAM(s) Oral two times a day      SOCIAL HISTORY:  Denies ETOH,Smoking,   FAMILY HISTORY:  Family history of lung cancer (Father)  Family history of CHF (congestive heart failure) (Mother)        REVIEW OF SYSTEMS:    All other review of systems is negative unless indicated above.    VITALS:  T(F): 97.6 (08-13-19 @ 05:54), Max: 97.6 (08-12-19 @ 20:48)  HR: 54 (08-13-19 @ 09:14)  BP: 132/58 (08-13-19 @ 09:14)  RR: 18 (08-13-19 @ 05:54)  SpO2: 97% (08-13-19 @ 09:14)    08-12 @ 07:01  -  08-13 @ 07:00  --------------------------------------------------------  IN: 0 mL / OUT: 0 mL / NET: 0 mL    08-13 @ 07:01  -  08-13 @ 12:18  --------------------------------------------------------  IN: 0 mL / OUT: 50 mL / NET: -50 mL            I&O's Detail    12 Aug 2019 07:01  -  13 Aug 2019 07:00  --------------------------------------------------------  IN:  Total IN: 0 mL    OUT:  Total OUT: 0 mL    Total NET: 0 mL      13 Aug 2019 07:01  -  13 Aug 2019 12:18  --------------------------------------------------------  IN:  Total IN: 0 mL    OUT:    Voided: 50 mL  Total OUT: 50 mL    Total NET: -50 mL            PHYSICAL EXAM:  Constitutional: NAD  Respiratory: CTAB, no wheezes, rales or rhonchi  Cardiovascular: S1, S2, RRR  Gastrointestinal: BS+, soft, NT/ND  Extremities: No peripheral edema  Neurological: A/O x 3  : No simeon.     Vascular Access:    LABS:  08-12    142  |  99  |  37<H>  ----------------------------<  127<H>  4.0   |  30  |  4.0<H>    Ca    10.1      12 Aug 2019 08:44  Phos  5.4     08-12  Mg     2.1     08-12    TPro  7.5  /  Alb  3.9  /  TBili  0.5  /  DBili      /  AST  15  /  ALT  10  /  AlkPhos  79  08-12    Creatinine Trend: 4.0 <--                        11.3   8.71  )-----------( 265      ( 12 Aug 2019 08:44 )             38.9     Blood Gas Profile - Venous (08.12.19 @ 09:28)    pH, Venous: 7.36    pCO2, Venous: 58 mmHg    pO2, Venous: 27 mmHg    HCO3, Venous: 32 mmoL/L    Base Excess, Venous: 5.2 mmoL/L    Oxygen Saturation, Venous: 41 %    Troponin T, Serum: 0.03: Critical value: ng/mL (08.12.19 @ 16:30)    Serum Pro-Brain Natriuretic Peptide: 16189 pg/mL (08.12.19 @ 08:44)    Urine Studies:              RADIOLOGY & ADDITIONAL STUDIES: NEPHROLOGY CONSULTATION NOTE    73 y/o female with PMH of ESRD on HD (TTS), A-fib on Eliquis s/p failed ablation, HTN, CAD, MI s/p CABG, HFpEF, moderate Pulmonary Hypertension, moderate Aortic Valve stenosis presenting for worsening shortness of breath of 3 days duration. Pt states that she has had similar episodes on the mornings of her dialysis which resolve with nasal cannula. Today the SOB got significantly worse prompting her to go to the hospital. Pt denies missing any sessions. Pt states that she started HD about 2 months ago and often gets hypotensive during her sessions. Pt's abdomen has been getting more distended since starting her dialysis.   Denies any headaches, changes in vision/hearing, chest pain, palpitations, cough, sore throat, fevers, chills, night sweats, n/v/d, abdominal pains, weakness, fatigue, joint/muscle pains.   In the ED pt was placed on nasal cannula which alleviated her symptoms. Given Lasix 20x1. EKG showed Afib w/ PVCs (8/12/19)    Today: pt states that usually during dialysis her BP drops and she is often told to lying down to help with BP. Denies any n/v, fever, chills, abdominal pain, chest pain.    PAST MEDICAL & SURGICAL HISTORY:  Thyroid nodule  Degenerative joint disease  Osteoarthritis  Diverticulosis  GERD (gastroesophageal reflux disease)  Heart failure with reduced ejection fraction  Pulmonary hypertension, moderate to severe  Aortic stenosis, moderate  Atrial fibrillation: status post cardioversion  CKD (chronic kidney disease): stage 5, no dialysis  MI (myocardial infarction)  CAD (coronary artery disease)  Kidney stone  HTN (hypertension)  S/P CABG (coronary artery bypass graft)  AV fistula  H/O abdominal hysterectomy  H/O cardiac radiofrequency ablation    Allergies:  No Known Allergies    Home Medications:  famotidine 40 mg oral tablet: 1 tab(s) orally 2 times a day (12 Jan 2019 23:48)  isosorbide mononitrate 30 mg oral tablet, extended release: 1 tab(s) orally once a day (in the morning) (12 Jan 2019 23:48)    Hospital Medications:   MEDICATIONS  (STANDING):  apixaban 5 milliGRAM(s) Oral two times a day  diltiazem    milliGRAM(s) Oral daily  docusate sodium 100 milliGRAM(s) Oral three times a day  famotidine    Tablet 20 milliGRAM(s) Oral two times a day  furosemide   Injectable 60 milliGRAM(s) IV Push daily  isosorbide   mononitrate ER Tablet (IMDUR) 30 milliGRAM(s) Oral daily  melatonin 3 milliGRAM(s) Oral at bedtime  metoprolol tartrate 50 milliGRAM(s) Oral two times a day      SOCIAL HISTORY:  Denies ETOH,Smoking,   FAMILY HISTORY:  Family history of lung cancer (Father)  Family history of CHF (congestive heart failure) (Mother)        REVIEW OF SYSTEMS:    All other review of systems is negative unless indicated above.    VITALS:  T(F): 97.6 (08-13-19 @ 05:54), Max: 97.6 (08-12-19 @ 20:48)  HR: 54 (08-13-19 @ 09:14)  BP: 132/58 (08-13-19 @ 09:14)  RR: 18 (08-13-19 @ 05:54)  SpO2: 97% (08-13-19 @ 09:14)    08-12 @ 07:01  -  08-13 @ 07:00  --------------------------------------------------------  IN: 0 mL / OUT: 0 mL / NET: 0 mL    08-13 @ 07:01  -  08-13 @ 12:18  --------------------------------------------------------  IN: 0 mL / OUT: 50 mL / NET: -50 mL            I&O's Detail    12 Aug 2019 07:01  -  13 Aug 2019 07:00  --------------------------------------------------------  IN:  Total IN: 0 mL    OUT:  Total OUT: 0 mL    Total NET: 0 mL      13 Aug 2019 07:01  -  13 Aug 2019 12:18  --------------------------------------------------------  IN:  Total IN: 0 mL    OUT:    Voided: 50 mL  Total OUT: 50 mL    Total NET: -50 mL            PHYSICAL EXAM:  Constitutional: NAD  Respiratory: CTAB, no wheezes, rales or rhonchi  Cardiovascular: S1, S2, RRR, systolic murmur +  Gastrointestinal: BS+, distension +  Extremities: No peripheral edema  Neurological: A/O x 3  : No simeon.     Vascular Access:    LABS:  08-12    142  |  99  |  37<H>  ----------------------------<  127<H>  4.0   |  30  |  4.0<H>    Ca    10.1      12 Aug 2019 08:44  Phos  5.4     08-12  Mg     2.1     08-12    TPro  7.5  /  Alb  3.9  /  TBili  0.5  /  DBili      /  AST  15  /  ALT  10  /  AlkPhos  79  08-12    Creatinine Trend: 4.0 <--                        11.3   8.71  )-----------( 265      ( 12 Aug 2019 08:44 )             38.9     Blood Gas Profile - Venous (08.12.19 @ 09:28)    pH, Venous: 7.36    pCO2, Venous: 58 mmHg    pO2, Venous: 27 mmHg    HCO3, Venous: 32 mmoL/L    Base Excess, Venous: 5.2 mmoL/L    Oxygen Saturation, Venous: 41 %    Troponin T, Serum: 0.03: Critical value: ng/mL (08.12.19 @ 16:30)    Serum Pro-Brain Natriuretic Peptide: 97583 pg/mL (08.12.19 @ 08:44)    Urine Studies:              RADIOLOGY & ADDITIONAL STUDIES:  < from: Xray Chest 1 View-PORTABLE IMMEDIATE (08.12.19 @ 09:05) >  Impression:      Chronic focal opacity at the right costophrenic angle.    < end of copied text >

## 2019-08-13 NOTE — PROGRESS NOTE ADULT - ASSESSMENT
73 y/o F w/ PMH of CKD on HD, HTN, CAD, MI s/p CABG, CHF, Pulmonary HTN, AS, Afib s/p ablation presenting for shortness of breath x3 days. Abdomen fluid distended. CXR clear.     SOB likely secondary to possible CHF exacerbation vs ESRD     - BNP 65k. CXR showing vascular congestion.   - C/w IV lasix 60mg QD for today. WIll switch to PO tmrw if pt continues to show improvement  - Troponin .03, unchanged from baseline  - strict is and os  - daily weights (baseline - 81 kg)  - restrict fluid to 1.5L per day  - Consider Cardiology consult if worsening: Dr. Child  - Echo 1/19: EF 45-50%, G1DD, Severe pulm HTN, Moderate AS    # ESRD on HD TTS  - Pt was non-oliguric yesterday. yet to void today. If no voiding, will D/C lasix  - Plan for HD today.    # Afib on AC  - rate controlled Cardizem 240 daily, Lopressor 50 BID  - c/w Eliquis 5mg bid    # CAD, history of MI s/p CABG  - c/w Isosorbide mono ER 30  - c/w metoprolol    # HTN  - c/w metoprolol, Cardizem    Activity: As tolerated  Diet: Renal restrictions  DVT ppx: On Eliquis  GI ppx: pepcid   Dispo: from home  FULL CODE

## 2019-08-13 NOTE — CONSULT NOTE ADULT - ASSESSMENT
Patient with ESRD - HD (TTS) presented to hospital for SOB.  PMH ESRD on HD (TTS), A-fib on Eliquis s/p failed ablation, HTN, CAD, MI s/p CABG, HFpEF, moderate Pulmonary Hypertension, moderate Aortic Valve stenosis    # ESRD - HD (TTS)   - for HD today with 3hr 3K bath UF 3L as tolerated.   - BP at goal, keep meds current, monitor BP post HD. might need to lower HTN meds to help with more fluid removal on HD.   - Ca, Ph at goal, no binders.   - Hb at goal, no need for JOSE at the moment.   - bradycardia noted, on Metoprolol and cardizem, consider cardio eval for meds adjustment.   - check US abdomen for distension, likely ascites.   - will follow.

## 2019-08-14 ENCOUNTER — TRANSCRIPTION ENCOUNTER (OUTPATIENT)
Age: 74
End: 2019-08-14

## 2019-08-14 VITALS — OXYGEN SATURATION: 94 % | SYSTOLIC BLOOD PRESSURE: 91 MMHG | HEART RATE: 88 BPM | DIASTOLIC BLOOD PRESSURE: 50 MMHG

## 2019-08-14 LAB
ANION GAP SERPL CALC-SCNC: 15 MMOL/L — HIGH (ref 7–14)
BUN SERPL-MCNC: 34 MG/DL — HIGH (ref 10–20)
CALCIUM SERPL-MCNC: 9.5 MG/DL — SIGNIFICANT CHANGE UP (ref 8.5–10.1)
CHLORIDE SERPL-SCNC: 97 MMOL/L — LOW (ref 98–110)
CO2 SERPL-SCNC: 28 MMOL/L — SIGNIFICANT CHANGE UP (ref 17–32)
CREAT SERPL-MCNC: 4.4 MG/DL — CRITICAL HIGH (ref 0.7–1.5)
GLUCOSE SERPL-MCNC: 114 MG/DL — HIGH (ref 70–99)
HCT VFR BLD CALC: 37.7 % — SIGNIFICANT CHANGE UP (ref 37–47)
HGB BLD-MCNC: 10.6 G/DL — LOW (ref 12–16)
MCHC RBC-ENTMCNC: 25.4 PG — LOW (ref 27–31)
MCHC RBC-ENTMCNC: 28.1 G/DL — LOW (ref 32–37)
MCV RBC AUTO: 90.2 FL — SIGNIFICANT CHANGE UP (ref 81–99)
NRBC # BLD: 0 /100 WBCS — SIGNIFICANT CHANGE UP (ref 0–0)
PLATELET # BLD AUTO: 236 K/UL — SIGNIFICANT CHANGE UP (ref 130–400)
POTASSIUM SERPL-MCNC: 4.2 MMOL/L — SIGNIFICANT CHANGE UP (ref 3.5–5)
POTASSIUM SERPL-SCNC: 4.2 MMOL/L — SIGNIFICANT CHANGE UP (ref 3.5–5)
RBC # BLD: 4.18 M/UL — LOW (ref 4.2–5.4)
RBC # FLD: 19.4 % — HIGH (ref 11.5–14.5)
SODIUM SERPL-SCNC: 140 MMOL/L — SIGNIFICANT CHANGE UP (ref 135–146)
WBC # BLD: 7.38 K/UL — SIGNIFICANT CHANGE UP (ref 4.8–10.8)
WBC # FLD AUTO: 7.38 K/UL — SIGNIFICANT CHANGE UP (ref 4.8–10.8)

## 2019-08-14 RX ORDER — APIXABAN 2.5 MG/1
1 TABLET, FILM COATED ORAL
Qty: 0 | Refills: 0 | DISCHARGE
Start: 2019-08-14

## 2019-08-14 RX ORDER — NEOMYCIN/POLYMYXIN B/DEXAMETHA 0.1 %
1 SUSPENSION, DROPS(FINAL DOSAGE FORM)(ML) OPHTHALMIC (EYE)
Qty: 1 | Refills: 0
Start: 2019-08-14 | End: 2019-08-20

## 2019-08-14 RX ORDER — BACITRACIN ZINC 500 UNIT/G
1 OINTMENT IN PACKET (EA) TOPICAL THREE TIMES A DAY
Refills: 0 | Status: DISCONTINUED | OUTPATIENT
Start: 2019-08-14 | End: 2019-08-14

## 2019-08-14 RX ADMIN — Medication 240 MILLIGRAM(S): at 06:32

## 2019-08-14 RX ADMIN — APIXABAN 5 MILLIGRAM(S): 2.5 TABLET, FILM COATED ORAL at 06:31

## 2019-08-14 RX ADMIN — Medication 50 MILLIGRAM(S): at 06:32

## 2019-08-14 RX ADMIN — FAMOTIDINE 20 MILLIGRAM(S): 10 INJECTION INTRAVENOUS at 06:32

## 2019-08-14 RX ADMIN — FAMOTIDINE 20 MILLIGRAM(S): 10 INJECTION INTRAVENOUS at 17:54

## 2019-08-14 RX ADMIN — Medication 60 MILLIGRAM(S): at 06:32

## 2019-08-14 RX ADMIN — Medication 1 APPLICATION(S): at 13:47

## 2019-08-14 RX ADMIN — APIXABAN 5 MILLIGRAM(S): 2.5 TABLET, FILM COATED ORAL at 17:54

## 2019-08-14 RX ADMIN — ISOSORBIDE MONONITRATE 30 MILLIGRAM(S): 60 TABLET, EXTENDED RELEASE ORAL at 13:45

## 2019-08-14 RX ADMIN — Medication 100 MILLIGRAM(S): at 06:32

## 2019-08-14 NOTE — DISCHARGE NOTE PROVIDER - HOSPITAL COURSE
75 y/o F w/ PMH of CKD on HD, HTN, CAD, MI s/p CABG, CHF, Pulmonary HTN, AS, Afib s/p ablation presenting for shortness of breath x3 days. Abdomen fluid distended. CXR clear.         SOB likely secondary to possible CHF exacerbation vs ESRD         - BNP 65k. CXR showing vascular congestion.     - C/w IV lasix 60mg QD for today. WIll switch to PO tmrw if pt continues to show improvement    - Troponin .03, unchanged from baseline    - strict is and os    - daily weights (baseline - 81 kg)    - restrict fluid to 1.5L per day    - Consider Cardiology consult if worsening: Dr. Child    - Echo 1/19: EF 45-50%, G1DD, Severe pulm HTN, Moderate AS        # ESRD on HD TTS    - Pt was non-oliguric yesterday. yet to void today. If no voiding, will D/C lasix    - Plan for HD today.        # Afib on AC    - rate controlled Cardizem 240 daily, Lopressor 50 BID    - c/w Eliquis 5mg bid        # CAD, history of MI s/p CABG    - c/w Isosorbide mono ER 30    - c/w metoprolol        # HTN    - c/w metoprolol, Cardizem        Activity: As tolerated    Diet: Renal restrictions    DVT ppx: On Eliquis    GI ppx: pepcid     Dispo: from home    FULL CODE

## 2019-08-14 NOTE — PROGRESS NOTE ADULT - SUBJECTIVE AND OBJECTIVE BOX
Nephrology progress note    Patient is seen and examined, events over the last 24 h noted .  no new complaints.    Allergies:  No Known Allergies    Hospital Medications:   MEDICATIONS  (STANDING):  apixaban 5 milliGRAM(s) Oral two times a day  BACItracin   Ointment 1 Application(s) Topical three times a day  diltiazem    milliGRAM(s) Oral daily  docusate sodium 100 milliGRAM(s) Oral three times a day  famotidine    Tablet 20 milliGRAM(s) Oral two times a day  furosemide   Injectable 60 milliGRAM(s) IV Push daily  isosorbide   mononitrate ER Tablet (IMDUR) 30 milliGRAM(s) Oral daily  melatonin 3 milliGRAM(s) Oral at bedtime  metoprolol tartrate 50 milliGRAM(s) Oral two times a day        VITALS:  T(F): 98.1 (08-14-19 @ 05:57), Max: 98.7 (08-13-19 @ 20:10)  HR: 106 (08-14-19 @ 05:57)  BP: 115/58 (08-14-19 @ 05:57)  RR: 18 (08-14-19 @ 05:57)  SpO2: 94% (08-14-19 @ 09:08)      08-12 @ 07:01  -  08-13 @ 07:00  --------------------------------------------------------  IN: 0 mL / OUT: 0 mL / NET: 0 mL    08-13 @ 07:01  -  08-14 @ 07:00  --------------------------------------------------------  IN: 0 mL / OUT: 3050 mL / NET: -3050 mL          PHYSICAL EXAM:  Constitutional: NAD  Respiratory: CTAB, no wheezes, rales or rhonchi  Cardiovascular: S1, S2, RRR  Gastrointestinal: BS+, soft, NT/ND  Extremities: No peripheral edema  :  No simeon.       LABS:  08-14    140  |  97<L>  |  34<H>  ----------------------------<  114<H>  4.2   |  28  |  4.4<HH>    Ca    9.5      14 Aug 2019 07:02  Phos  5.4     08-12  Mg     2.1     08-12                            10.6   7.38  )-----------( 236      ( 14 Aug 2019 07:02 )             37.7       Urine Studies:      RADIOLOGY & ADDITIONAL STUDIES:  < from: Xray Chest 1 View- PORTABLE-Routine (08.13.19 @ 06:56) >  Impression:      Right lower lobe opacity. Follow-up as needed.    < end of copied text >

## 2019-08-14 NOTE — DISCHARGE NOTE NURSING/CASE MANAGEMENT/SOCIAL WORK - NSDCDPATPORTLINK_GEN_ALL_CORE
You can access the [email or web address], offered by [facility], by registering with the following website: [email or web address]

## 2019-08-14 NOTE — DISCHARGE NOTE PROVIDER - NSDCCPCAREPLAN_GEN_ALL_CORE_FT
PRINCIPAL DISCHARGE DIAGNOSIS  Diagnosis: Shortness of breath  Assessment and Plan of Treatment: Your shortness of breath is likely associated with your ESD COntinue with HD as sheduled and your water pill (Lasix) as prscribed. Follow up with your PMD (Dr. Chris) and your cardiologist (Dr. Child) within 1-2 weeks of discharge.

## 2019-08-14 NOTE — DISCHARGE NOTE NURSING/CASE MANAGEMENT/SOCIAL WORK - NSDCPEEMAIL_GEN_ALL_CORE
[facility] email [email or web address]

## 2019-08-14 NOTE — PROGRESS NOTE ADULT - ASSESSMENT
Patient with ESRD - HD (TTS) presented to hospital for SOB.  PMH ESRD on HD (TTS), A-fib on Eliquis s/p failed ablation, HTN, CAD, MI s/p CABG, HFpEF, moderate Pulmonary Hypertension, moderate Aortic Valve stenosis    # ESRD - HD (TTS)   - s/p HD yesterday, SOB improved post HD. no need for HD today   - BP at goal, keep meds current, monitor BP post HD.    - Ca, Ph at goal, no binders.   - Hb at goal, no need for JOSE at the moment.   - tachycardia noted, on Metoprolol and cardizem   - check US abdomen for distension, likely ascites.   - Needs to adjust dry weight on outpatient dialysis.   - Patient is otherwise stable and ok to d/c from renal stand point.   - ? d/c planning   - if remains admitted, will follow

## 2019-08-14 NOTE — DISCHARGE NOTE PROVIDER - CARE PROVIDER_API CALL
Bipin Child)  Cardiology; Internal Medicine; Nuclear Cardiology  [address] [place] [address]  Phone: [phone]  Fax: [phone]  Follow Up Time:

## 2019-08-16 DIAGNOSIS — K21.9 GASTRO-ESOPHAGEAL REFLUX DISEASE WITHOUT ESOPHAGITIS: ICD-10-CM

## 2019-08-16 DIAGNOSIS — N18.5 CHRONIC KIDNEY DISEASE, STAGE 5: ICD-10-CM

## 2019-08-16 DIAGNOSIS — M19.90 UNSPECIFIED OSTEOARTHRITIS, UNSPECIFIED SITE: ICD-10-CM

## 2019-08-16 DIAGNOSIS — Z95.1 PRESENCE OF AORTOCORONARY BYPASS GRAFT: ICD-10-CM

## 2019-08-16 DIAGNOSIS — I50.9 HEART FAILURE, UNSPECIFIED: ICD-10-CM

## 2019-08-16 DIAGNOSIS — I48.91 UNSPECIFIED ATRIAL FIBRILLATION: ICD-10-CM

## 2019-08-16 DIAGNOSIS — I25.2 OLD MYOCARDIAL INFARCTION: ICD-10-CM

## 2019-08-16 DIAGNOSIS — I25.10 ATHEROSCLEROTIC HEART DISEASE OF NATIVE CORONARY ARTERY WITHOUT ANGINA PECTORIS: ICD-10-CM

## 2019-08-16 DIAGNOSIS — R06.02 SHORTNESS OF BREATH: ICD-10-CM

## 2019-08-16 DIAGNOSIS — M10.9 GOUT, UNSPECIFIED: ICD-10-CM

## 2019-08-16 DIAGNOSIS — F32.9 MAJOR DEPRESSIVE DISORDER, SINGLE EPISODE, UNSPECIFIED: ICD-10-CM

## 2019-08-16 DIAGNOSIS — I13.2 HYPERTENSIVE HEART AND CHRONIC KIDNEY DISEASE WITH HEART FAILURE AND WITH STAGE 5 CHRONIC KIDNEY DISEASE, OR END STAGE RENAL DISEASE: ICD-10-CM

## 2019-08-16 DIAGNOSIS — F41.9 ANXIETY DISORDER, UNSPECIFIED: ICD-10-CM

## 2019-08-16 DIAGNOSIS — I35.0 NONRHEUMATIC AORTIC (VALVE) STENOSIS: ICD-10-CM

## 2019-09-13 ENCOUNTER — MESSAGE (OUTPATIENT)
Age: 74
End: 2019-09-13

## 2019-11-11 NOTE — PACU DISCHARGE NOTE - THE ANESTHESIA ORDERS USED IN THE PACU ORDER SET WILL BE DISCONTINUED UPON TRANSFER OF THIS PATIENT
Shoe recommendations: (try [email or web address], [email or web address] , [email or web address], or shoes.com for discounted prices) you can visit DSW shoes in [place] as well    Asics (GT 1000 or gel foundations), new balance, fortino (stabil c3),  David (transcend), vionic, propet, Hoka (One)  (tennis shoe)    soft brand, clarks, crocs, naot, aerosoles, naturalizers, SAS, ecco, nancy, vidya santos (dress shoes)    Vionic, volitiles, burkenstocks, fitflops, naot, propet (sandals)    Nike comfort thong sandals, crocs,dr comfort  (house shoes)  
Statement Selected

## 2019-12-13 ENCOUNTER — APPOINTMENT (OUTPATIENT)
Dept: VASCULAR SURGERY | Facility: CLINIC | Age: 74
End: 2019-12-13
Payer: MEDICARE

## 2019-12-13 VITALS — BODY MASS INDEX: 24.21 KG/M2 | DIASTOLIC BLOOD PRESSURE: 60 MMHG | SYSTOLIC BLOOD PRESSURE: 90 MMHG | WEIGHT: 150 LBS

## 2019-12-13 PROCEDURE — 99203 OFFICE O/P NEW LOW 30 MIN: CPT

## 2019-12-13 PROCEDURE — 93925 LOWER EXTREMITY STUDY: CPT

## 2019-12-13 RX ORDER — MULTIVITAMIN
TABLET ORAL
Refills: 0 | Status: ACTIVE | COMMUNITY

## 2019-12-13 RX ORDER — CALCIUM ACETATE 667 MG/1
CAPSULE ORAL
Refills: 0 | Status: ACTIVE | COMMUNITY

## 2019-12-13 NOTE — DATA REVIEWED
[FreeTextEntry1] : I performed an arterial duplex which was medically necessary to evaluate for arterial disease. It showed patent bilateral SFA stents without any significant disease.\par

## 2019-12-13 NOTE — HISTORY OF PRESENT ILLNESS
[FreeTextEntry1] : 73 y/o female with h/o ESRD on HD (tues/thurs/sat), PVD, who was under the care of Dr. Blood, underwent bilateral SFA angioplasty and stent few months ago, now with ulcerations to right toes and left heel worsening, c/o rest pain at night. She presents here for second opinion.

## 2019-12-13 NOTE — CONSULT LETTER
[Dear  ___] : Dear  [unfilled], [Consult Letter:] : I had the pleasure of evaluating your patient, [unfilled]. [FreeTextEntry2] : Dear Dr. Sherif Phelps, [Please see my note below.] : Please see my note below.

## 2019-12-13 NOTE — ASSESSMENT
[FreeTextEntry1] : 75 y/o female with h/o CAD s/p CABG,  ESRD on HD (tues/thurs/sat), PVD, who was under the care of Dr. Blood, underwent bilateral SFA angioplasty and stent few months ago, now with ulcerations to right toes and left heel worsening, c/o rest pain at night. She presents here for second opinion.\par I performed an arterial duplex which was medically necessary to evaluate for arterial disease. 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 I have offered her a right lower extremity angiogram and she wants to proceed. She was advised to continue wound care.

## 2019-12-18 ENCOUNTER — INPATIENT (INPATIENT)
Facility: HOSPITAL | Age: 74
LOS: 2 days | Discharge: HOME | End: 2019-12-21
Attending: SURGERY | Admitting: SURGERY
Payer: MEDICARE

## 2019-12-18 VITALS
TEMPERATURE: 98 F | SYSTOLIC BLOOD PRESSURE: 102 MMHG | HEART RATE: 123 BPM | DIASTOLIC BLOOD PRESSURE: 71 MMHG | RESPIRATION RATE: 18 BRPM

## 2019-12-18 DIAGNOSIS — Z98.890 OTHER SPECIFIED POSTPROCEDURAL STATES: Chronic | ICD-10-CM

## 2019-12-18 DIAGNOSIS — Z98.49 CATARACT EXTRACTION STATUS, UNSPECIFIED EYE: Chronic | ICD-10-CM

## 2019-12-18 DIAGNOSIS — I77.0 ARTERIOVENOUS FISTULA, ACQUIRED: Chronic | ICD-10-CM

## 2019-12-18 DIAGNOSIS — Z96.0 PRESENCE OF UROGENITAL IMPLANTS: Chronic | ICD-10-CM

## 2019-12-18 DIAGNOSIS — Z95.1 PRESENCE OF AORTOCORONARY BYPASS GRAFT: Chronic | ICD-10-CM

## 2019-12-18 DIAGNOSIS — Z90.710 ACQUIRED ABSENCE OF BOTH CERVIX AND UTERUS: Chronic | ICD-10-CM

## 2019-12-18 LAB
ANION GAP SERPL CALC-SCNC: 21 MMOL/L — HIGH (ref 7–14)
APTT BLD: 31.5 SEC — SIGNIFICANT CHANGE UP (ref 27–39.2)
BLD GP AB SCN SERPL QL: SIGNIFICANT CHANGE UP
BUN SERPL-MCNC: 41 MG/DL — HIGH (ref 10–20)
CALCIUM SERPL-MCNC: 10.1 MG/DL — SIGNIFICANT CHANGE UP (ref 8.5–10.1)
CHLORIDE SERPL-SCNC: 96 MMOL/L — LOW (ref 98–110)
CO2 SERPL-SCNC: 20 MMOL/L — SIGNIFICANT CHANGE UP (ref 17–32)
CREAT SERPL-MCNC: 4.4 MG/DL — CRITICAL HIGH (ref 0.7–1.5)
GLUCOSE BLDC GLUCOMTR-MCNC: 80 MG/DL — SIGNIFICANT CHANGE UP (ref 70–99)
GLUCOSE SERPL-MCNC: 81 MG/DL — SIGNIFICANT CHANGE UP (ref 70–99)
HCT VFR BLD CALC: 36.4 % — LOW (ref 37–47)
HGB BLD-MCNC: 11.2 G/DL — LOW (ref 12–16)
INR BLD: 1.32 RATIO — HIGH (ref 0.65–1.3)
MAGNESIUM SERPL-MCNC: 2.6 MG/DL — HIGH (ref 1.8–2.4)
MCHC RBC-ENTMCNC: 30.2 PG — SIGNIFICANT CHANGE UP (ref 27–31)
MCHC RBC-ENTMCNC: 30.8 G/DL — LOW (ref 32–37)
MCV RBC AUTO: 98.1 FL — SIGNIFICANT CHANGE UP (ref 81–99)
NRBC # BLD: 0 /100 WBCS — SIGNIFICANT CHANGE UP (ref 0–0)
PHOSPHATE SERPL-MCNC: 6.3 MG/DL — HIGH (ref 2.1–4.9)
PLATELET # BLD AUTO: 227 K/UL — SIGNIFICANT CHANGE UP (ref 130–400)
POTASSIUM SERPL-MCNC: 5 MMOL/L — SIGNIFICANT CHANGE UP (ref 3.5–5)
POTASSIUM SERPL-SCNC: 5 MMOL/L — SIGNIFICANT CHANGE UP (ref 3.5–5)
PROTHROM AB SERPL-ACNC: 15.1 SEC — HIGH (ref 9.95–12.87)
RBC # BLD: 3.71 M/UL — LOW (ref 4.2–5.4)
RBC # FLD: 18.6 % — HIGH (ref 11.5–14.5)
SODIUM SERPL-SCNC: 137 MMOL/L — SIGNIFICANT CHANGE UP (ref 135–146)
WBC # BLD: 8.05 K/UL — SIGNIFICANT CHANGE UP (ref 4.8–10.8)
WBC # FLD AUTO: 8.05 K/UL — SIGNIFICANT CHANGE UP (ref 4.8–10.8)

## 2019-12-18 PROCEDURE — 99222 1ST HOSP IP/OBS MODERATE 55: CPT

## 2019-12-18 RX ORDER — ACETAMINOPHEN 500 MG
650 TABLET ORAL EVERY 6 HOURS
Refills: 0 | Status: DISCONTINUED | OUTPATIENT
Start: 2019-12-18 | End: 2019-12-19

## 2019-12-18 RX ORDER — DEXTROSE 50 % IN WATER 50 %
15 SYRINGE (ML) INTRAVENOUS ONCE
Refills: 0 | Status: DISCONTINUED | OUTPATIENT
Start: 2019-12-18 | End: 2019-12-19

## 2019-12-18 RX ORDER — HEPARIN SODIUM 5000 [USP'U]/ML
5000 INJECTION INTRAVENOUS; SUBCUTANEOUS EVERY 8 HOURS
Refills: 0 | Status: DISCONTINUED | OUTPATIENT
Start: 2019-12-18 | End: 2019-12-19

## 2019-12-18 RX ORDER — INSULIN LISPRO 100/ML
VIAL (ML) SUBCUTANEOUS EVERY 6 HOURS
Refills: 0 | Status: DISCONTINUED | OUTPATIENT
Start: 2019-12-18 | End: 2019-12-19

## 2019-12-18 RX ORDER — APIXABAN 2.5 MG/1
5 TABLET, FILM COATED ORAL
Refills: 0 | Status: DISCONTINUED | OUTPATIENT
Start: 2019-12-18 | End: 2019-12-18

## 2019-12-18 RX ORDER — PANTOPRAZOLE SODIUM 20 MG/1
1 TABLET, DELAYED RELEASE ORAL
Qty: 0 | Refills: 0 | DISCHARGE

## 2019-12-18 RX ORDER — FAMOTIDINE 10 MG/ML
40 INJECTION INTRAVENOUS
Refills: 0 | Status: DISCONTINUED | OUTPATIENT
Start: 2019-12-18 | End: 2019-12-19

## 2019-12-18 RX ORDER — GLUCAGON INJECTION, SOLUTION 0.5 MG/.1ML
1 INJECTION, SOLUTION SUBCUTANEOUS ONCE
Refills: 0 | Status: DISCONTINUED | OUTPATIENT
Start: 2019-12-18 | End: 2019-12-19

## 2019-12-18 RX ORDER — ISOSORBIDE MONONITRATE 60 MG/1
1 TABLET, EXTENDED RELEASE ORAL
Qty: 0 | Refills: 0 | DISCHARGE

## 2019-12-18 RX ORDER — DEXTROSE 50 % IN WATER 50 %
25 SYRINGE (ML) INTRAVENOUS ONCE
Refills: 0 | Status: DISCONTINUED | OUTPATIENT
Start: 2019-12-18 | End: 2019-12-19

## 2019-12-18 RX ORDER — FAMOTIDINE 10 MG/ML
1 INJECTION INTRAVENOUS
Qty: 0 | Refills: 0 | DISCHARGE

## 2019-12-18 RX ORDER — ALLOPURINOL 300 MG
100 TABLET ORAL DAILY
Refills: 0 | Status: DISCONTINUED | OUTPATIENT
Start: 2019-12-18 | End: 2019-12-19

## 2019-12-18 RX ORDER — DEXTROSE 50 % IN WATER 50 %
12.5 SYRINGE (ML) INTRAVENOUS ONCE
Refills: 0 | Status: DISCONTINUED | OUTPATIENT
Start: 2019-12-18 | End: 2019-12-19

## 2019-12-18 RX ORDER — OXYCODONE HYDROCHLORIDE 5 MG/1
5 TABLET ORAL EVERY 6 HOURS
Refills: 0 | Status: DISCONTINUED | OUTPATIENT
Start: 2019-12-18 | End: 2019-12-19

## 2019-12-18 RX ORDER — APIXABAN 2.5 MG/1
1 TABLET, FILM COATED ORAL
Qty: 0 | Refills: 0 | DISCHARGE

## 2019-12-18 RX ORDER — SODIUM CHLORIDE 9 MG/ML
1000 INJECTION, SOLUTION INTRAVENOUS
Refills: 0 | Status: DISCONTINUED | OUTPATIENT
Start: 2019-12-18 | End: 2019-12-19

## 2019-12-18 RX ORDER — METOPROLOL TARTRATE 50 MG
50 TABLET ORAL
Refills: 0 | Status: DISCONTINUED | OUTPATIENT
Start: 2019-12-18 | End: 2019-12-19

## 2019-12-18 RX ORDER — SODIUM CHLORIDE 9 MG/ML
1000 INJECTION INTRAMUSCULAR; INTRAVENOUS; SUBCUTANEOUS
Refills: 0 | Status: DISCONTINUED | OUTPATIENT
Start: 2019-12-19 | End: 2019-12-19

## 2019-12-18 RX ORDER — ISOSORBIDE DINITRATE 5 MG/1
1 TABLET ORAL
Qty: 0 | Refills: 0 | DISCHARGE

## 2019-12-18 RX ORDER — CALCITRIOL 0.5 UG/1
0.5 CAPSULE ORAL DAILY
Refills: 0 | Status: DISCONTINUED | OUTPATIENT
Start: 2019-12-18 | End: 2019-12-19

## 2019-12-18 RX ORDER — CHLORHEXIDINE GLUCONATE 213 G/1000ML
1 SOLUTION TOPICAL
Refills: 0 | Status: DISCONTINUED | OUTPATIENT
Start: 2019-12-18 | End: 2019-12-19

## 2019-12-18 RX ADMIN — Medication 50 MILLIGRAM(S): at 20:02

## 2019-12-18 RX ADMIN — OXYCODONE HYDROCHLORIDE 5 MILLIGRAM(S): 5 TABLET ORAL at 20:03

## 2019-12-18 NOTE — H&P ADULT - HISTORY OF PRESENT ILLNESS
75yo F with PMHx of ESRD on Tue/Thur/Sat HD, PVD, Afib, pulmonary HTN, anemia, CABG, HF with reduced EF and MI presents to [facility] as a direct admit for RLE angiogram on 12/19. Patient underwent bilateral SFA angioplasty and stent a few months ago now with ulcerations to her right toes and worsening ulcerations to the left heel. Patient has complaints of rest pain at night. Arterial duplex on performed 12/13 showed patent bilateral SFA stents without any significant disease. Patient is scheduled to undergo RLE angiogram to evaluate for tibial vessel disease 75yo F with PMHx of ESRD on Tue/Thur/Sat HD, PVD, Afib, pulmonary HTN, anemia, CABG, HF with reduced EF and MI presents to [facility] as a direct admit for RLE angiogram on 12/19. Patient underwent bilateral SFA angioplasty and stent a few months ago now with ulcerations to her right toes and worsening ulcerations to the left heel. Patient has complaints of rest pain at night that has been present for 1 month but worsening recently. Arterial duplex on performed 12/13 showed patent bilateral SFA stents without any significant disease. Patient is scheduled to undergo RLE angiogram to evaluate for tibial vessel disease.

## 2019-12-18 NOTE — H&P ADULT - NSICDXPASTSURGICALHX_GEN_ALL_CORE_FT
PAST SURGICAL HISTORY:  AV fistula     H/O abdominal hysterectomy     H/O cardiac radiofrequency ablation     History of kidney surgery     S/P CABG (coronary artery bypass graft)     S/P cataract surgery PAST SURGICAL HISTORY:  AV fistula     H/O abdominal hysterectomy     H/O cardiac radiofrequency ablation     History of kidney surgery     S/P CABG (coronary artery bypass graft)     S/P cataract surgery     S/P ureteral stent placement

## 2019-12-18 NOTE — H&P ADULT - NSICDXFAMILYHX_GEN_ALL_CORE_FT
FAMILY HISTORY:  Family history of diabetes mellitus in mother    Father  Still living? No  Family history of lung cancer, Age at diagnosis: Age Unknown    Mother  Still living? Unknown  Family history of CHF (congestive heart failure), Age at diagnosis: Age Unknown

## 2019-12-18 NOTE — H&P ADULT - NSHPPHYSICALEXAM_GEN_ALL_CORE
PHYSICAL EXAM:  GENERAL: NAD, well-appearing  CHEST/LUNG: Clear to auscultation bilaterally  HEART: Regular rate and rhythm  ABDOMEN: Soft, Nontender, Nondistended;   EXTREMITIES:  No clubbing, cyanosis, or edema PHYSICAL EXAM:  GENERAL: NAD, well-appearing  CHEST/LUNG: Clear to auscultation bilaterally  HEART: Irregular rate and rhythm  ABDOMEN: Soft, Nontender, Nondistended;   EXTREMITIES:  RLE with well healed surgical scar on medial aspect, R foot with doppler audible PT but absent DP, toes are cold with areas of necrotic tissue and exquisitely tender to palpation, R heal ulcer, L foot with audible by doppler DP and PT with areas of necrosis over toes and ulcer on L heal

## 2019-12-18 NOTE — H&P ADULT - ASSESSMENT
75yo F with PMHx of ESRD on Tue/Thur/Sat HD, PVD, Afib, pulmonary HTN, anemia, CABG, HF with reduced EF and MI presents to [facility] as a direct admit for RLE angiogram on 12/19. Patient underwent bilateral SFA angioplasty and stent a few months ago now with ulcerations to her right toes and worsening ulcerations to the left heel. Arterial duplex on performed 12/13 showed patent bilateral SFA stents without any significant disease. Patient is scheduled to undergo RLE angiogram 12/19 to evaluate for tibial vessel disease.    Plan:  - Pre op for OR 12/19  - NPO @ midnight

## 2019-12-19 ENCOUNTER — APPOINTMENT (OUTPATIENT)
Dept: VASCULAR SURGERY | Facility: HOSPITAL | Age: 74
End: 2019-12-19

## 2019-12-19 LAB
GLUCOSE BLDC GLUCOMTR-MCNC: 103 MG/DL — HIGH (ref 70–99)
GLUCOSE BLDC GLUCOMTR-MCNC: 106 MG/DL — HIGH (ref 70–99)
GLUCOSE BLDC GLUCOMTR-MCNC: 111 MG/DL — HIGH (ref 70–99)
GLUCOSE BLDC GLUCOMTR-MCNC: 86 MG/DL — SIGNIFICANT CHANGE UP (ref 70–99)

## 2019-12-19 PROCEDURE — 76937 US GUIDE VASCULAR ACCESS: CPT | Mod: 26

## 2019-12-19 PROCEDURE — 36245 INS CATH ABD/L-EXT ART 1ST: CPT

## 2019-12-19 PROCEDURE — 75625 CONTRAST EXAM ABDOMINL AORTA: CPT | Mod: 26

## 2019-12-19 PROCEDURE — 93010 ELECTROCARDIOGRAM REPORT: CPT

## 2019-12-19 PROCEDURE — 75716 ARTERY X-RAYS ARMS/LEGS: CPT | Mod: 26

## 2019-12-19 RX ORDER — CLOPIDOGREL BISULFATE 75 MG/1
1 TABLET, FILM COATED ORAL
Qty: 90 | Refills: 4
Start: 2019-12-19 | End: 2021-03-12

## 2019-12-19 RX ORDER — CHLORHEXIDINE GLUCONATE 213 G/1000ML
1 SOLUTION TOPICAL
Refills: 0 | Status: DISCONTINUED | OUTPATIENT
Start: 2019-12-19 | End: 2019-12-21

## 2019-12-19 RX ORDER — HYDROMORPHONE HYDROCHLORIDE 2 MG/ML
0.5 INJECTION INTRAMUSCULAR; INTRAVENOUS; SUBCUTANEOUS
Refills: 0 | Status: DISCONTINUED | OUTPATIENT
Start: 2019-12-19 | End: 2019-12-19

## 2019-12-19 RX ORDER — METOPROLOL TARTRATE 50 MG
50 TABLET ORAL ONCE
Refills: 0 | Status: COMPLETED | OUTPATIENT
Start: 2019-12-19 | End: 2019-12-19

## 2019-12-19 RX ORDER — ACETAMINOPHEN 500 MG
650 TABLET ORAL ONCE
Refills: 0 | Status: COMPLETED | OUTPATIENT
Start: 2019-12-19 | End: 2019-12-19

## 2019-12-19 RX ORDER — ACETAMINOPHEN 500 MG
650 TABLET ORAL EVERY 6 HOURS
Refills: 0 | Status: DISCONTINUED | OUTPATIENT
Start: 2019-12-19 | End: 2019-12-21

## 2019-12-19 RX ORDER — ONDANSETRON 8 MG/1
4 TABLET, FILM COATED ORAL ONCE
Refills: 0 | Status: DISCONTINUED | OUTPATIENT
Start: 2019-12-19 | End: 2019-12-19

## 2019-12-19 RX ORDER — FAMOTIDINE 10 MG/ML
40 INJECTION INTRAVENOUS
Refills: 0 | Status: DISCONTINUED | OUTPATIENT
Start: 2019-12-19 | End: 2019-12-19

## 2019-12-19 RX ORDER — MEPERIDINE HYDROCHLORIDE 50 MG/ML
12.5 INJECTION INTRAMUSCULAR; INTRAVENOUS; SUBCUTANEOUS
Refills: 0 | Status: DISCONTINUED | OUTPATIENT
Start: 2019-12-19 | End: 2019-12-19

## 2019-12-19 RX ORDER — HYDROMORPHONE HYDROCHLORIDE 2 MG/ML
1 INJECTION INTRAMUSCULAR; INTRAVENOUS; SUBCUTANEOUS
Refills: 0 | Status: DISCONTINUED | OUTPATIENT
Start: 2019-12-19 | End: 2019-12-19

## 2019-12-19 RX ORDER — INSULIN LISPRO 100/ML
VIAL (ML) SUBCUTANEOUS EVERY 6 HOURS
Refills: 0 | Status: DISCONTINUED | OUTPATIENT
Start: 2019-12-19 | End: 2019-12-21

## 2019-12-19 RX ORDER — APIXABAN 2.5 MG/1
5 TABLET, FILM COATED ORAL
Refills: 0 | Status: DISCONTINUED | OUTPATIENT
Start: 2019-12-20 | End: 2019-12-21

## 2019-12-19 RX ORDER — ALLOPURINOL 300 MG
100 TABLET ORAL DAILY
Refills: 0 | Status: DISCONTINUED | OUTPATIENT
Start: 2019-12-19 | End: 2019-12-21

## 2019-12-19 RX ORDER — ATORVASTATIN CALCIUM 80 MG/1
1 TABLET, FILM COATED ORAL
Qty: 90 | Refills: 4
Start: 2019-12-19 | End: 2021-03-12

## 2019-12-19 RX ORDER — HEPARIN SODIUM 5000 [USP'U]/ML
5000 INJECTION INTRAVENOUS; SUBCUTANEOUS EVERY 8 HOURS
Refills: 0 | Status: DISCONTINUED | OUTPATIENT
Start: 2019-12-19 | End: 2019-12-19

## 2019-12-19 RX ORDER — OXYCODONE HYDROCHLORIDE 5 MG/1
5 TABLET ORAL EVERY 6 HOURS
Refills: 0 | Status: DISCONTINUED | OUTPATIENT
Start: 2019-12-19 | End: 2019-12-21

## 2019-12-19 RX ORDER — METOPROLOL TARTRATE 50 MG
50 TABLET ORAL
Refills: 0 | Status: DISCONTINUED | OUTPATIENT
Start: 2019-12-19 | End: 2019-12-21

## 2019-12-19 RX ORDER — CALCITRIOL 0.5 UG/1
0.5 CAPSULE ORAL DAILY
Refills: 0 | Status: DISCONTINUED | OUTPATIENT
Start: 2019-12-19 | End: 2019-12-21

## 2019-12-19 RX ORDER — SODIUM CHLORIDE 9 MG/ML
1000 INJECTION INTRAMUSCULAR; INTRAVENOUS; SUBCUTANEOUS
Refills: 0 | Status: DISCONTINUED | OUTPATIENT
Start: 2019-12-19 | End: 2019-12-19

## 2019-12-19 RX ORDER — METOPROLOL TARTRATE 50 MG
25 TABLET ORAL ONCE
Refills: 0 | Status: COMPLETED | OUTPATIENT
Start: 2019-12-19 | End: 2019-12-19

## 2019-12-19 RX ADMIN — Medication 650 MILLIGRAM(S): at 19:49

## 2019-12-19 RX ADMIN — Medication 650 MILLIGRAM(S): at 19:10

## 2019-12-19 RX ADMIN — Medication 650 MILLIGRAM(S): at 15:01

## 2019-12-19 RX ADMIN — FAMOTIDINE 40 MILLIGRAM(S): 10 INJECTION INTRAVENOUS at 06:04

## 2019-12-19 RX ADMIN — SODIUM CHLORIDE 10 MILLILITER(S): 9 INJECTION INTRAMUSCULAR; INTRAVENOUS; SUBCUTANEOUS at 10:55

## 2019-12-19 RX ADMIN — Medication 50 MILLIGRAM(S): at 15:48

## 2019-12-19 RX ADMIN — Medication 650 MILLIGRAM(S): at 15:30

## 2019-12-19 RX ADMIN — Medication 50 MILLIGRAM(S): at 06:04

## 2019-12-19 RX ADMIN — OXYCODONE HYDROCHLORIDE 5 MILLIGRAM(S): 5 TABLET ORAL at 23:05

## 2019-12-19 RX ADMIN — CALCITRIOL 0.5 MICROGRAM(S): 0.5 CAPSULE ORAL at 16:54

## 2019-12-19 RX ADMIN — OXYCODONE HYDROCHLORIDE 5 MILLIGRAM(S): 5 TABLET ORAL at 14:00

## 2019-12-19 RX ADMIN — OXYCODONE HYDROCHLORIDE 5 MILLIGRAM(S): 5 TABLET ORAL at 13:31

## 2019-12-19 RX ADMIN — Medication 100 MILLIGRAM(S): at 16:53

## 2019-12-19 RX ADMIN — Medication 650 MILLIGRAM(S): at 23:32

## 2019-12-19 RX ADMIN — OXYCODONE HYDROCHLORIDE 5 MILLIGRAM(S): 5 TABLET ORAL at 06:05

## 2019-12-19 RX ADMIN — Medication 25 MILLIGRAM(S): at 13:50

## 2019-12-19 NOTE — PROGRESS NOTE ADULT - SUBJECTIVE AND OBJECTIVE BOX
GENERAL SURGERY PROGRESS NOTE     NOAH QUIROS  74y  Female  Hospital day: 2    OVERNIGHT EVENTS: No acute events overnight, direct admission yesterday for right lower extremity angiogram today     T(F): 97.1 (12-19-19 @ 05:10), Max: 98.4 (12-18-19 @ 15:00)  HR: 99 (12-19-19 @ 05:10) (99 - 123)  BP: 115/68 (12-19-19 @ 05:10) (102/71 - 115/68)  RR: 18 (12-19-19 @ 05:10) (18 - 18)    DIET/FLUIDS: calcitriol   Capsule 0.5 MICROGram(s) Oral daily  dextrose 5%. 1000 milliLiter(s) IV Continuous <Continuous>    GI proph:  famotidine    Tablet 40 milliGRAM(s) Oral two times a day    AC/ proph: heparin  Injectable 5000 Unit(s) SubCutaneous every 8 hours    PHYSICAL EXAM:  GENERAL: NAD, well-appearing  CHEST/LUNG: Clear to auscultation bilaterally  HEART: Regular rate and rhythm  ABDOMEN: Soft, Nontender, Nondistended;   EXTREMITIES: RLE with well healed surgical scar on medial aspect, R foot with doppler audible PT but absent DP, toes are cold with areas of necrotic tissue and exquisitely tender to palpation, R heal ulcer, L foot with audible by doppler DP and PT with areas of necrosis over toes and ulcer on L heal    LABS    POCT Blood Glucose.: 111 mg/dL (19 Dec 2019 06:12)  POCT Blood Glucose.: 80 mg/dL (18 Dec 2019 21:58)                          11.2   8.05  )-----------( 227      ( 18 Dec 2019 21:32 )             36.4         12-18    137  |  96<L>  |  41<H>  ----------------------------<  81  5.0   |  20  |  4.4<HH>      Calcium, Total Serum: 10.1 mg/dL (12-18-19 @ 21:32)    Coags:     15.10  ----< 1.32    ( 18 Dec 2019 21:32 )     31.5       RADIOLOGY & ADDITIONAL TESTS:  *No new imaging

## 2019-12-19 NOTE — CONSULT NOTE ADULT - SUBJECTIVE AND OBJECTIVE BOX
NEPHROLOGY CONSULTATION NOTE    75yo F with PMHx of ESRD on Tue/Thur/Sat HD, PVD, Afib, pulmonary HTN, anemia, CABG, HF with reduced EF and MI presents to [facility] as a direct admit for RLE angiogram on 12/19. Patient underwent bilateral SFA angioplasty and stent a few months ago now with ulcerations to her right toes and worsening ulcerations to the left heel. Patient has complaints of rest pain at night that has been present for 1 month but worsening recently. Arterial duplex on performed 12/13 showed patent bilateral SFA stents without any significant disease.    She was seen post op in [facility]  s/p RLE angiogram to evaluate for tibial vessel disease. she feels well however noted to be in rapid a fib w/ HR in 140's (varying 120's - 160).   h says she seomtimes gets palpitations like this at home and was told to take an extra half dose metoprolol (normal dose 50 BID) she did not skip B blocker today    Last HD was Tue    PAST MEDICAL & SURGICAL HISTORY:  Thyroid nodule  Degenerative joint disease  Osteoarthritis  Diverticulosis  GERD (gastroesophageal reflux disease)  Heart failure with reduced ejection fraction  Pulmonary hypertension, moderate to severe  Aortic stenosis, moderate  Atrial fibrillation: status post cardioversion  CKD (chronic kidney disease): stage 5, no dialysis  MI (myocardial infarction)  CAD (coronary artery disease)  Kidney stone  HTN (hypertension)  S/P ureteral stent placement  History of kidney surgery  S/P cataract surgery  S/P CABG (coronary artery bypass graft)  AV fistula  H/O abdominal hysterectomy  H/O cardiac radiofrequency ablation    Allergies:  No Known Allergies    Home Medications Reviewed  Hospital Medications:   MEDICATIONS  (STANDING):  acetaminophen   Tablet .. 650 milliGRAM(s) Oral every 6 hours  allopurinol 100 milliGRAM(s) Oral daily  calcitriol   Capsule 0.5 MICROGram(s) Oral daily  chlorhexidine 4% Liquid 1 Application(s) Topical <User Schedule>  insulin lispro (HumaLOG) corrective regimen sliding scale   SubCutaneous every 6 hours  metoprolol tartrate 50 milliGRAM(s) Oral two times a day  metoprolol tartrate 50 milliGRAM(s) Oral once      SOCIAL HISTORY:  Denies ETOH,Smoking,   FAMILY HISTORY:  Family history of diabetes mellitus in mother  Family history of lung cancer (Father)  Family history of CHF (congestive heart failure) (Mother)        REVIEW OF SYSTEMS:  CONSTITUTIONAL: No weakness, fevers or chills  EYES/ENT: No visual changes;  No vertigo or throat pain   NECK: No pain or stiffness  RESPIRATORY: No cough, wheezing, hemoptysis; No shortness of breath  CARDIOVASCULAR: No chest pain or palpitations.  GASTROINTESTINAL: No abdominal or epigastric pain. No nausea, vomiting, or hematemesis; No diarrhea or constipation. No melena or hematochezia.  GENITOURINARY: No dysuria, frequency, foamy urine, urinary urgency, incontinence or hematuria  NEUROLOGICAL: No numbness or weakness  SKIN: No itching, burning, rashes, or lesions   VASCULAR: No bilateral lower extremity edema.   All other review of systems is negative unless indicated above.    VITALS:  T(F): 97.6 (12-19-19 @ 13:00), Max: 98 (12-19-19 @ 10:55)  HR: 142 (12-19-19 @ 15:00)  BP: 99/74 (12-19-19 @ 15:00)  RR: 24 (12-19-19 @ 15:00)  SpO2: 96% (12-19-19 @ 15:00)    Height (cm): 167.64 (12-19 @ 08:40)  Weight (kg): 68 (12-19 @ 08:40)  BMI (kg/m2): 24.2 (12-19 @ 08:40)  BSA (m2): 1.77 (12-19 @ 08:40)    I&O's Detail        PHYSICAL EXAM:  Constitutional: NAD  HEENT: anicteric sclera, oropharynx clear, MMM  Neck: No JVD  Respiratory: CTAB, no wheezes, rales or rhonchi  Cardiovascular: S1, S2, RRR  Gastrointestinal: BS+, soft, NT/ND  Extremities: No cyanosis or clubbing. No peripheral edema  Neurological: A/O x 3, no focal deficits  Psychiatric: Normal mood, normal affect    Skin: No rashes  Vascular Access:    LABS:  12-18    137  |  96<L>  |  41<H>  ----------------------------<  81  5.0   |  20  |  4.4<HH>    Ca    10.1      18 Dec 2019 21:32  Phos  6.3     12-18  Mg     2.6     12-18      Creatinine Trend: 4.4 <--                        11.2   8.05  )-----------( 227      ( 18 Dec 2019 21:32 )             36.4     Urine Studies:              RADIOLOGY & ADDITIONAL STUDIES:

## 2019-12-19 NOTE — BRIEF OPERATIVE NOTE - OPERATION/FINDINGS
Aortogram, Bilateral lower limb angiogram.  Bilateral Iliac artery balloon and stenting.  Has peroneal runoff to the right foot.  2 vessel runoff to the left foot.

## 2019-12-19 NOTE — CONSULT NOTE ADULT - ASSESSMENT
73yo F with PMHx of ESRD on Tue/Thur/Sat HD, PVD, Afib, pulmonary HTN, anemia, CABG, HF with reduced EF and MI presents to [facility] as a direct admit for RLE angiogram on 12/19.    seen  post op, in rapid a fib     As there is no emergent need for HD will hold off for now  please check BMP if K > 6 will need HD today otherwise plan on Hd tomorrow  consider Cardiology input   consider monitoring o/n to esnure hemodynamic instability    discussed w/ Vasc surgery

## 2019-12-20 LAB
ANION GAP SERPL CALC-SCNC: 19 MMOL/L — HIGH (ref 7–14)
BUN SERPL-MCNC: 54 MG/DL — HIGH (ref 10–20)
CALCIUM SERPL-MCNC: 9.6 MG/DL — SIGNIFICANT CHANGE UP (ref 8.5–10.1)
CHLORIDE SERPL-SCNC: 96 MMOL/L — LOW (ref 98–110)
CO2 SERPL-SCNC: 21 MMOL/L — SIGNIFICANT CHANGE UP (ref 17–32)
CREAT SERPL-MCNC: 5.7 MG/DL — CRITICAL HIGH (ref 0.7–1.5)
GLUCOSE BLDC GLUCOMTR-MCNC: 101 MG/DL — HIGH (ref 70–99)
GLUCOSE BLDC GLUCOMTR-MCNC: 97 MG/DL — SIGNIFICANT CHANGE UP (ref 70–99)
GLUCOSE BLDC GLUCOMTR-MCNC: 98 MG/DL — SIGNIFICANT CHANGE UP (ref 70–99)
GLUCOSE SERPL-MCNC: 96 MG/DL — SIGNIFICANT CHANGE UP (ref 70–99)
HCT VFR BLD CALC: 35.6 % — LOW (ref 37–47)
HGB BLD-MCNC: 10.7 G/DL — LOW (ref 12–16)
MAGNESIUM SERPL-MCNC: 2.6 MG/DL — HIGH (ref 1.8–2.4)
MCHC RBC-ENTMCNC: 30.1 G/DL — LOW (ref 32–37)
MCHC RBC-ENTMCNC: 30.5 PG — SIGNIFICANT CHANGE UP (ref 27–31)
MCV RBC AUTO: 101.4 FL — HIGH (ref 81–99)
NRBC # BLD: 0 /100 WBCS — SIGNIFICANT CHANGE UP (ref 0–0)
PHOSPHATE SERPL-MCNC: 8.5 MG/DL — HIGH (ref 2.1–4.9)
PLATELET # BLD AUTO: 212 K/UL — SIGNIFICANT CHANGE UP (ref 130–400)
POTASSIUM SERPL-MCNC: 5.3 MMOL/L — HIGH (ref 3.5–5)
POTASSIUM SERPL-SCNC: 5.3 MMOL/L — HIGH (ref 3.5–5)
RBC # BLD: 3.51 M/UL — LOW (ref 4.2–5.4)
RBC # FLD: 18.6 % — HIGH (ref 11.5–14.5)
SODIUM SERPL-SCNC: 136 MMOL/L — SIGNIFICANT CHANGE UP (ref 135–146)
WBC # BLD: 7.15 K/UL — SIGNIFICANT CHANGE UP (ref 4.8–10.8)
WBC # FLD AUTO: 7.15 K/UL — SIGNIFICANT CHANGE UP (ref 4.8–10.8)

## 2019-12-20 RX ORDER — DILTIAZEM HCL 120 MG
30 CAPSULE, EXT RELEASE 24 HR ORAL EVERY 8 HOURS
Refills: 0 | Status: DISCONTINUED | OUTPATIENT
Start: 2019-12-20 | End: 2019-12-21

## 2019-12-20 RX ADMIN — Medication 30 MILLIGRAM(S): at 21:43

## 2019-12-20 RX ADMIN — APIXABAN 5 MILLIGRAM(S): 2.5 TABLET, FILM COATED ORAL at 05:24

## 2019-12-20 RX ADMIN — Medication 50 MILLIGRAM(S): at 18:34

## 2019-12-20 RX ADMIN — Medication 650 MILLIGRAM(S): at 05:56

## 2019-12-20 RX ADMIN — Medication 50 MILLIGRAM(S): at 05:36

## 2019-12-20 RX ADMIN — CHLORHEXIDINE GLUCONATE 1 APPLICATION(S): 213 SOLUTION TOPICAL at 05:16

## 2019-12-20 RX ADMIN — Medication 650 MILLIGRAM(S): at 18:35

## 2019-12-20 RX ADMIN — Medication 100 MILLIGRAM(S): at 11:08

## 2019-12-20 RX ADMIN — Medication 650 MILLIGRAM(S): at 00:10

## 2019-12-20 RX ADMIN — Medication 30 MILLIGRAM(S): at 16:03

## 2019-12-20 RX ADMIN — CALCITRIOL 0.5 MICROGRAM(S): 0.5 CAPSULE ORAL at 11:11

## 2019-12-20 RX ADMIN — Medication 650 MILLIGRAM(S): at 11:09

## 2019-12-20 RX ADMIN — OXYCODONE HYDROCHLORIDE 5 MILLIGRAM(S): 5 TABLET ORAL at 00:10

## 2019-12-20 RX ADMIN — APIXABAN 5 MILLIGRAM(S): 2.5 TABLET, FILM COATED ORAL at 18:35

## 2019-12-20 RX ADMIN — Medication 650 MILLIGRAM(S): at 05:24

## 2019-12-20 RX ADMIN — OXYCODONE HYDROCHLORIDE 5 MILLIGRAM(S): 5 TABLET ORAL at 15:52

## 2019-12-20 NOTE — PROGRESS NOTE ADULT - SUBJECTIVE AND OBJECTIVE BOX
Nephrology progress note    Patient is seen and examined, events over the last 24 h noted .    Allergies:  No Known Allergies    Hospital Medications:   MEDICATIONS  (STANDING):  acetaminophen   Tablet .. 650 milliGRAM(s) Oral every 6 hours  allopurinol 100 milliGRAM(s) Oral daily  apixaban 5 milliGRAM(s) Oral two times a day  calcitriol   Capsule 0.5 MICROGram(s) Oral daily  chlorhexidine 4% Liquid 1 Application(s) Topical <User Schedule>  diltiazem    Tablet 30 milliGRAM(s) Oral every 8 hours  insulin lispro (HumaLOG) corrective regimen sliding scale   SubCutaneous every 6 hours  metoprolol tartrate 50 milliGRAM(s) Oral two times a day        VITALS:  T(F): 97.3 (12-20-19 @ 03:00), Max: 98 (12-19-19 @ 10:55)  HR: 108 (12-20-19 @ 07:00)  BP: 97/68 (12-20-19 @ 07:00)  RR: 20 (12-20-19 @ 07:00)  SpO2: 100% (12-20-19 @ 07:00)  Wt(kg): --    12-19 @ 07:01  -  12-20 @ 07:00  --------------------------------------------------------  IN: 380 mL / OUT: 0 mL / NET: 380 mL          PHYSICAL EXAM:  Constitutional: NAD  HEENT: anicteric sclera, oropharynx clear, MMM  Neck: No JVD  Respiratory: CTAB, no wheezes, rales or rhonchi  Cardiovascular: S1, S2, RRR  Gastrointestinal: BS+, soft, NT/ND  Extremities: No cyanosis or clubbing. No peripheral edema  :  No simeon.   Skin: No rashes    LABS:  12-20    136  |  96<L>  |  54<H>  ----------------------------<  96  5.3<H>   |  21  |  5.7<HH>    Ca    9.6      20 Dec 2019 04:10  Phos  8.5     12-20  Mg     2.6     12-20                            10.7   7.15  )-----------( 212      ( 20 Dec 2019 04:10 )             35.6       Urine Studies:      RADIOLOGY & ADDITIONAL STUDIES: Nephrology progress note    Patient is seen and examined, events over the last 24 h noted .  no new complaints.    Allergies:  No Known Allergies      Home Medications:  allopurinol 100 mg oral tablet: 1 tab(s) orally once a day (18 Dec 2019 17:33)  calcitriol 0.5 mcg oral capsule: 1 cap(s) orally once a day (18 Dec 2019 17:33)  Pepcid 40 mg oral tablet: 1 tab(s) orally 2 times a day (18 Dec 2019 17:33)    Hospital Medications:   MEDICATIONS  (STANDING):  acetaminophen   Tablet .. 650 milliGRAM(s) Oral every 6 hours  allopurinol 100 milliGRAM(s) Oral daily  apixaban 5 milliGRAM(s) Oral two times a day  calcitriol   Capsule 0.5 MICROGram(s) Oral daily  chlorhexidine 4% Liquid 1 Application(s) Topical <User Schedule>  diltiazem    Tablet 30 milliGRAM(s) Oral every 8 hours  insulin lispro (HumaLOG) corrective regimen sliding scale   SubCutaneous every 6 hours  metoprolol tartrate 50 milliGRAM(s) Oral two times a day        VITALS:  T(F): 97.3 (12-20-19 @ 03:00), Max: 98 (12-19-19 @ 10:55)  HR: 108 (12-20-19 @ 07:00)  BP: 97/68 (12-20-19 @ 07:00)  RR: 20 (12-20-19 @ 07:00)  SpO2: 100% (12-20-19 @ 07:00)      12-19 @ 07:01  -  12-20 @ 07:00  --------------------------------------------------------  IN: 380 mL / OUT: 0 mL / NET: 380 mL          PHYSICAL EXAM:  Constitutional: NAD  Respiratory: CTAB, no wheezes, rales or rhonchi  Cardiovascular: S1, S2, irregular  Gastrointestinal: BS+, soft, NT/ND  Extremities: No peripheral edema  :  No simeon.       LABS:  12-20    136  |  96<L>  |  54<H>  ----------------------------<  96  5.3<H>   |  21  |  5.7<HH>    Ca    9.6      20 Dec 2019 04:10  Phos  8.5     12-20  Mg     2.6     12-20                        10.7   7.15  )-----------( 212      ( 20 Dec 2019 04:10 )             35.6       Urine Studies:      RADIOLOGY & ADDITIONAL STUDIES:

## 2019-12-20 NOTE — CONSULT NOTE ADULT - ATTENDING COMMENTS
addendum: I saw the patient in HD at 2 PM, very comfortable, no distress, no chest pain, tolerated HD with normal BP and HR of 100-118/ min  will add Cardizem 30 q 8 h and will continue with Metoprolol  Anticipate D/C tomorrow morning if HR remains below 100 and BP above 90 mmhg.  the only reason I asked to keep her was getting in house HD to monitor the HR and get a chance to give Cardizem 30 q 8 hours.

## 2019-12-20 NOTE — PROGRESS NOTE ADULT - SUBJECTIVE AND OBJECTIVE BOX
GENERAL SURGERY PROGRESS NOTE     NOAH QUIROS  74y  Female  Hospital day: 3  POD: 1  Procedure: Arteriogram iliac bilateral    OVERNIGHT EVENTS: Patient went into afib yesterday postoperatively, received one stat dose of metoprolol 50 along with home dose of 50 BID. Patient remains in PACU for cardiology evaluation     T(F): 97.3 (12-20-19 @ 03:00), Max: 98 (12-19-19 @ 10:55)  HR: 130 (12-20-19 @ 05:30) (73 - 148)  BP: 117/82 (12-20-19 @ 05:30) (85/52 - 119/56)  RR: 23 (12-20-19 @ 05:30) (14 - 27)  SpO2: 96% (12-20-19 @ 05:30) (92% - 99%)    DIET/FLUIDS: calcitriol   Capsule 0.5 MICROGram(s) Oral daily    PHYSICAL EXAM:  GENERAL: NAD, well-appearing  CHEST/LUNG: Clear to auscultation bilaterally  HEART: Regular rate and rhythm  ABDOMEN: Soft, Nontender, Nondistended;   EXTREMITIES:  No clubbing, cyanosis, or edema    LABS    POCT Blood Glucose.: 103 mg/dL (19 Dec 2019 23:38)  POCT Blood Glucose.: 106 mg/dL (19 Dec 2019 17:58)  POCT Blood Glucose.: 86 mg/dL (19 Dec 2019 14:34)  POCT Blood Glucose.: 111 mg/dL (19 Dec 2019 06:12)                          10.7   7.15  )-----------( 212      ( 20 Dec 2019 04:10 )             35.6         12-20    136  |  96<L>  |  54<H>  ----------------------------<  96  5.3<H>   |  21  |  5.7<HH>      Calcium, Total Serum: 9.6 mg/dL (12-20-19 @ 04:10)    Coags:     15.10  ----< 1.32    ( 18 Dec 2019 21:32 )     31.5        RADIOLOGY & ADDITIONAL TESTS:  < from: 12 Lead ECG (12.19.19 @ 13:46) >  Ventricular Rate 115 BPM  Atrial Rate 115 BPM  QRS Duration 88 ms  Q-T Interval 340 ms  QTC Calculation(Bezet) 470 ms  R Axis 62 degrees  T Axis -74 degrees  Diagnosis Line Atrial fibrillation with rapid ventricular response with premature ventricular  or aberrantly conducted complexes  Abnormal QRS-T angle, consider primary T wave abnormality  Abnormal ECG

## 2019-12-20 NOTE — CONSULT NOTE ADULT - ASSESSMENT
multiple medical problm generally stable  PAD is getting w/u for it  severe anxiety  ESRD on HD well stable  chronic afib, now mildly tachycardia    keep in the hospital  do HD today in the hospital  add cardizem 30 q 8 hours  continue with 50 bid Metoprolol   i will see her in the afternoon to see the HR and BP response to current regimen to decide the long term management.

## 2019-12-20 NOTE — PROGRESS NOTE ADULT - ATTENDING COMMENTS
Seen on HD feeling well  discussed w/ fellow, above note reviwed   seen by cardio "add cardizem 30 q 8 hours continue with 50 bid Metoprolol"  currently HR improved   Regular scheduled HD tomorrow

## 2019-12-20 NOTE — CHART NOTE - NSCHARTNOTEFT_GEN_A_CORE
Pt remained atrial fibrillation with RVR over night  Asymptomatic    Spoke with Dr. Child. Adjusting medications for HR control, adding Cardizem 30 mg po q8hr  Pt will go to  for telemetry    HD today in patient    SPECTRA [id number]

## 2019-12-21 ENCOUNTER — TRANSCRIPTION ENCOUNTER (OUTPATIENT)
Age: 74
End: 2019-12-21

## 2019-12-21 VITALS
SYSTOLIC BLOOD PRESSURE: 104 MMHG | HEART RATE: 126 BPM | TEMPERATURE: 96 F | DIASTOLIC BLOOD PRESSURE: 57 MMHG | RESPIRATION RATE: 18 BRPM

## 2019-12-21 DIAGNOSIS — N18.9 CHRONIC KIDNEY DISEASE, UNSPECIFIED: ICD-10-CM

## 2019-12-21 DIAGNOSIS — I48.91 UNSPECIFIED ATRIAL FIBRILLATION: ICD-10-CM

## 2019-12-21 PROCEDURE — 99238 HOSP IP/OBS DSCHRG MGMT 30/<: CPT

## 2019-12-21 RX ORDER — CLOPIDOGREL BISULFATE 75 MG/1
1 TABLET, FILM COATED ORAL
Qty: 30 | Refills: 4
Start: 2019-12-21 | End: 2020-01-01

## 2019-12-21 RX ORDER — APIXABAN 2.5 MG/1
1 TABLET, FILM COATED ORAL
Qty: 0 | Refills: 0 | DISCHARGE
Start: 2019-12-21

## 2019-12-21 RX ORDER — DILTIAZEM HCL 120 MG
1 CAPSULE, EXT RELEASE 24 HR ORAL
Qty: 90 | Refills: 0
Start: 2019-12-21 | End: 2020-01-19

## 2019-12-21 RX ADMIN — Medication 30 MILLIGRAM(S): at 05:15

## 2019-12-21 RX ADMIN — Medication 50 MILLIGRAM(S): at 05:15

## 2019-12-21 RX ADMIN — CALCITRIOL 0.5 MICROGRAM(S): 0.5 CAPSULE ORAL at 11:53

## 2019-12-21 RX ADMIN — Medication 650 MILLIGRAM(S): at 11:53

## 2019-12-21 RX ADMIN — APIXABAN 5 MILLIGRAM(S): 2.5 TABLET, FILM COATED ORAL at 05:15

## 2019-12-21 RX ADMIN — OXYCODONE HYDROCHLORIDE 5 MILLIGRAM(S): 5 TABLET ORAL at 00:04

## 2019-12-21 RX ADMIN — Medication 100 MILLIGRAM(S): at 11:53

## 2019-12-21 RX ADMIN — Medication 650 MILLIGRAM(S): at 00:04

## 2019-12-21 RX ADMIN — Medication 650 MILLIGRAM(S): at 05:15

## 2019-12-21 NOTE — DISCHARGE NOTE PROVIDER - NSDCMRMEDTOKEN_GEN_ALL_CORE_FT
allopurinol 100 mg oral tablet: 1 tab(s) orally once a day  atorvastatin 20 mg oral tablet: 1 tab(s) orally once a day (at bedtime)   calcitriol 0.5 mcg oral capsule: 1 cap(s) orally once a day  Maxitrol ophthalmic suspension: 1 drop(s) in each affected eye 2 times a day   metoprolol tartrate 50 mg oral tablet: 1 tab(s) orally 2 times a day  Pepcid 40 mg oral tablet: 1 tab(s) orally 2 times a day  Plavix 75 mg oral tablet: 1 tab(s) orally once a day allopurinol 100 mg oral tablet: 1 tab(s) orally once a day  atorvastatin 20 mg oral tablet: 1 tab(s) orally once a day (at bedtime)   calcitriol 0.5 mcg oral capsule: 1 cap(s) orally once a day  dilTIAZem 30 mg oral tablet: 1 tab(s) orally every 8 hours MDD:3  Maxitrol ophthalmic suspension: 1 drop(s) in each affected eye 2 times a day   metoprolol tartrate 50 mg oral tablet: 1 tab(s) orally 2 times a day  Pepcid 40 mg oral tablet: 1 tab(s) orally 2 times a day  Plavix 75 mg oral tablet: 1 tab(s) orally once a day MDD:1

## 2019-12-21 NOTE — PROGRESS NOTE ADULT - PROBLEM SELECTOR PLAN 1
pt's hr well controlled on metoprolol 50 q12 and cardizem 30 po q8  cont current meds  cont anticoagulation  no further in hospital cardiac tx required at this time

## 2019-12-21 NOTE — DISCHARGE NOTE NURSING/CASE MANAGEMENT/SOCIAL WORK - PATIENT PORTAL LINK FT
MD at bedside speaking to patient and parents.   You can access the FollowMyHealth Patient Portal offered by [facility] by registering at the following website: [email or web address]. By joining [employer]’s FollowMyHealth portal, you will also be able to view your health information using other applications (apps) compatible with our system.

## 2019-12-21 NOTE — PROGRESS NOTE ADULT - SUBJECTIVE AND OBJECTIVE BOX
seen and examined  no distress  no new complaints   on o2     PAST HISTORY  --------------------------------------------------------------------------------  No significant changes to PMH, PSH, FHx, SHx, unless otherwise noted    ALLERGIES & MEDICATIONS  --------------------------------------------------------------------------------  Allergies    No Known Allergies          Standing Inpatient Medications  acetaminophen   Tablet .. 650 milliGRAM(s) Oral every 6 hours  allopurinol 100 milliGRAM(s) Oral daily  apixaban 5 milliGRAM(s) Oral two times a day  calcitriol   Capsule 0.5 MICROGram(s) Oral daily  chlorhexidine 4% Liquid 1 Application(s) Topical <User Schedule>  diltiazem    Tablet 30 milliGRAM(s) Oral every 8 hours  insulin lispro (HumaLOG) corrective regimen sliding scale   SubCutaneous every 6 hours  metoprolol tartrate 50 milliGRAM(s) Oral two times a day    PRN Inpatient Medications  oxyCODONE    IR 5 milliGRAM(s) Oral every 6 hours PRN        VITALS/PHYSICAL EXAM  --------------------------------------------------------------------------------  T(C): 36.2 (12-20-19 @ 21:40), Max: 36.8 (12-20-19 @ 17:04)  HR: 106 (12-20-19 @ 21:40) (80 - 132)  BP: 99/61 (12-20-19 @ 21:40) (74/53 - 117/82)  RR: 18 (12-20-19 @ 21:40) (18 - 24)  SpO2: 94% (12-20-19 @ 21:40) (94% - 100%)  Wt(kg): --  Height (cm): 167.6 (12-20-19 @ 17:04)  Weight (kg): 68.039 (12-20-19 @ 17:04)  BMI (kg/m2): 24.2 (12-20-19 @ 17:04)  BSA (m2): 1.77 (12-20-19 @ 17:04)      12-19-19 @ 07:01  -  12-20-19 @ 07:00  --------------------------------------------------------  IN: 380 mL / OUT: 300 mL / NET: 80 mL    12-20-19 @ 07:01  -  12-21-19 @ 04:56  --------------------------------------------------------  IN: 0 mL / OUT: 700 mL / NET: -700 mL      Physical Exam:  	Gen: NAD,on o2   	Pulm:  B/L pamchi at bases   	CV: S1S2; no rub  	Abd: distended  	LE: edema  	    LABS/STUDIES  --------------------------------------------------------------------------------              10.7   7.15  >-----------<  212      [12-20-19 @ 04:10]              35.6     136  |  96  |  54  ----------------------------<  96      [12-20-19 @ 04:10]  5.3   |  21  |  5.7        Ca     9.6     [12-20-19 @ 04:10]      Mg     2.6     [12-20-19 @ 04:10]      Phos  8.5     [12-20-19 @ 04:10]            Creatinine Trend:  SCr 5.7 [12-20 @ 04:10]  SCr 4.4 [12-18 @ 21:32]

## 2019-12-21 NOTE — PROGRESS NOTE ADULT - SUBJECTIVE AND OBJECTIVE BOX
Subjective:  pt feels well and wishes to go home. pt with no cp, sob nor palpitations.  pt wishes to go home.        MEDICATIONS  (STANDING):  acetaminophen   Tablet .. 650 milliGRAM(s) Oral every 6 hours  allopurinol 100 milliGRAM(s) Oral daily  apixaban 5 milliGRAM(s) Oral two times a day  calcitriol   Capsule 0.5 MICROGram(s) Oral daily  chlorhexidine 4% Liquid 1 Application(s) Topical <User Schedule>  diltiazem    Tablet 30 milliGRAM(s) Oral every 8 hours  insulin lispro (HumaLOG) corrective regimen sliding scale   SubCutaneous every 6 hours  metoprolol tartrate 50 milliGRAM(s) Oral two times a day    MEDICATIONS  (PRN):  oxyCODONE    IR 5 milliGRAM(s) Oral every 6 hours PRN Severe Pain (7 - 10)            Vital Signs Last 24 Hrs  T(C): 36.4 (21 Dec 2019 04:55), Max: 36.8 (20 Dec 2019 17:04)  T(F): 97.6 (21 Dec 2019 04:55), Max: 98.2 (20 Dec 2019 17:04)  HR: 104 (21 Dec 2019 04:55) (83 - 106)  BP: 118/73 (21 Dec 2019 04:55) (99/61 - 118/73)  BP(mean): --  RR: 18 (21 Dec 2019 04:55) (18 - 20)  SpO2: 100% (21 Dec 2019 04:55) (94% - 100%)             REVIEW OF SYSTEMS:  CONSTITUTIONAL: no fever, no chills, no diaphoresis  CARDIOLOGY: no chest pain, no SOB, no palpitation, no diaphoresis, no faint   RESPIRATORY: no dyspnea, no wheeze, no orthopnea, no PND   NEUROLOGICAL: no dizziness, headache, focal deficits to report.  GI: no abdominal pain, no dyspepsia, no nausea, no vomiting, no diarrhea.    HEENT: no congestion, no nasal bleeding  SKIN: no ecchymosis             PHYSICAL EXAM:  · CONSTITUTIONAL: Looks stable, in no distress  . NECK: Supple, no JVD, no bruit on either carotid side   · RESPIRATORY: Normal air entry to lung base, no wheeze, no crackle, no wet rales  · CARDIOVASCULAR: Normal S1, A2, P2, 2/6 eden apex, no click, irregular rate,  no rub,  · EXTREMITIES: No cyanosis, no clubbing, no edema, dp plus 1 bilateral, warm, bilateral groins, nt, no hematoma, no bruits.   · VASCULAR: Pulses are regular, equal, bilateral in upper and lower extremities  	  TELEMETRY: afib with mod v response 80 to 90 at rest    ECG:  < from: 12 Lead ECG (12.19.19 @ 13:46) >  Diagnosis Line Atrial fibrillation with rapid ventricular response with premature ventricular  or aberrantly conducted complexes  Abnormal QRS-T angle, consider primary T wave abnormality  Abnormal ECG    ECHO< from: Transthoracic Echocardiogram (01.13.19 @ 10:53) >  Summary:   1. Left ventricular ejection fraction, by visual estimation, is 45 to   50%.   2. Mildly increased LV wall thickness.   3. Mild to moderate mitral valve regurgitation.   4. Thickening and calcification of the anterior and posterior mitral   valve leaflets.   5. Mild-moderate tricuspid regurgitation.   6. Mild aortic regurgitation.   7. Estimated pulmonary artery systolic pressure is 60.9 mmHg assuming a   right atrial pressure of 15 mmHg, which is consistent with severe   pulmonary hypertension.   8. Peak transaortic gradient is 32.7 mmHg, mean transaortic gradient   equals 15.5 mmHg, the calculated aortic valve area equals 1.39 cm² by the   continuity equation consistent with moderate aortic stenosis.    < end of copied text >      LABS:                        10.7   7.15  )-----------( 212      ( 20 Dec 2019 04:10 )             35.6     12-20    136  |  96<L>  |  54<H>  ----------------------------<  96  5.3<H>   |  21  |  5.7<HH>    Ca    9.6      20 Dec 2019 04:10  Phos  8.5     12-20  Mg     2.6     12-20              I&O's Summary    20 Dec 2019 07:01  -  21 Dec 2019 07:00  --------------------------------------------------------  IN: 0 mL / OUT: 700 mL / NET: -700 mL      BNP  RADIOLOGY & ADDITIONAL STUDIES:    IMPRESSION AND PLAN:

## 2019-12-21 NOTE — PROGRESS NOTE ADULT - SUBJECTIVE AND OBJECTIVE BOX
GENERAL SURGERY PROGRESS NOTE     NOAH QUIROS  74y  Female  Hospital day: 4  POD: 2  Procedure: Arteriogram iliac bilateral    OVERNIGHT EVENTS: No acute events overnight, HR better controlled overnight, last recorded 104. Asymptomatic. Per cardiology started on cardizem overnight.     T(F): 97.6 (12-21-19 @ 04:55), Max: 98.2 (12-20-19 @ 17:04)  HR: 104 (12-21-19 @ 04:55) (80 - 132)  BP: 118/73 (12-21-19 @ 04:55) (74/53 - 118/73)  RR: 18 (12-21-19 @ 04:55) (18 - 24)  SpO2: 100% (12-21-19 @ 04:55) (94% - 100%)    DIET/FLUIDS: calcitriol   Capsule 0.5 MICROGram(s) Oral daily    PHYSICAL EXAM:  GENERAL: NAD, well-appearing  CHEST/LUNG: Clear to auscultation bilaterally  HEART: Regular rate and rhythm  ABDOMEN: Soft, Nontender, Nondistended;   EXTREMITIES:  No clubbing, cyanosis, or edema    LABS    POCT Blood Glucose.: 97 mg/dL (20 Dec 2019 21:40)  POCT Blood Glucose.: 98 mg/dL (20 Dec 2019 16:08)                          10.7   7.15  )-----------( 212      ( 20 Dec 2019 04:10 )             35.6         12-20    136  |  96<L>  |  54<H>  ----------------------------<  96  5.3<H>   |  21  |  5.7<HH>      RADIOLOGY & ADDITIONAL TESTS:  *No new imaging

## 2019-12-21 NOTE — DISCHARGE NOTE PROVIDER - CARE PROVIDER_API CALL
Jonatan Rizo)  Vascular Surgery  [address] [place] [address], [address]  [place] [address]  Phone: [phone]  Fax: [phone]  Follow Up Time:     Bipin Child)  Cardiology; Internal Medicine; Nuclear Cardiology  [address] [place] [address], [address]  [place] [address]  Phone: [phone]  Fax: [phone]  Follow Up Time:

## 2019-12-21 NOTE — DISCHARGE NOTE PROVIDER - CARE PROVIDERS DIRECT ADDRESSES
[email or web address]

## 2019-12-21 NOTE — DISCHARGE NOTE PROVIDER - HOSPITAL COURSE
75yo F with PMHx of ESRD on Tue/Thur/Sat HD, PVD, Afib, pulmonary HTN, anemia, CABG, HF with reduced EF and MI presents to [facility] as a direct admit for RLE angiogram on 12/19. Presents with ulcerations to her right toes and worsening ulcerations to the left heel. Patient has complaints of rest pain at night that has been present for 1 month but worsening recently. Arterial duplex on performed 12/13 showed patent bilateral SFA stents without any significant disease.     On HD 2 patient underwent RLE angiogram and b/l iliac artery balloon and stenting with peroneal run off to the right foot, two vessel runoff to the left foot.     Post operatively patient was seen to be in Afib requiring metoprolol for rate control.  Patient was assessed by cardiology, addition of Cardizem was made and patient is vitally stable for discharge.

## 2019-12-21 NOTE — DISCHARGE NOTE PROVIDER - NSDCCPCAREPLAN_GEN_ALL_CORE_FT
PRINCIPAL DISCHARGE DIAGNOSIS  Diagnosis: Peripheral vascular disease  Assessment and Plan of Treatment:       SECONDARY DISCHARGE DIAGNOSES  Diagnosis: Atrial fibrillation  Assessment and Plan of Treatment: Atrial fibrillation

## 2019-12-21 NOTE — PROGRESS NOTE ADULT - ASSESSMENT
73yo F with PMHx of ESRD on Tue/Thur/Sat HD, PVD, Afib, pulmonary HTN, anemia, CABG, HF with reduced EF and MI presents to [facility] as a direct admit for RLE angiogram on 12/19. Patient underwent bilateral SFA angioplasty and stent a few months ago now with ulcerations to her right toes and worsening ulcerations to the left heel. Arterial duplex on performed 12/13 showed patent bilateral SFA stents without any significant disease. Patient is scheduled to undergo RLE angiogram 12/19 to evaluate for tibial vessel disease.    Plan:   -OR today for RLE angiogram   -NPO since midnight   -Pre-op labs
75yo F with PMHx of ESRD on Tue/Thur/Sat HD, PVD, Afib, pulmonary HTN, anemia, CABG, HF with reduced EF and MI presents to [facility] as a direct admit for RLE angiogram on 12/19. Patient underwent bilateral SFA angioplasty and stent a few months ago now with ulcerations to her right toes and worsening ulcerations to the left heel. Arterial duplex on performed 12/13 showed patent bilateral SFA stents without any significant disease. Patient is scheduled to undergo RLE angiogram 12/19 to evaluate for tibial vessel disease.    Plan:   -Cardiology to see, appreciate recs   -Close hemodynamic monitoring  -Continue home meds  -Regular diet   -Neurovascular checks
75yo F with PMHx of ESRD on Tue/Thur/Sat HD, PVD, Afib, pulmonary HTN, anemia, CABG, HF with reduced EF and MI presents to [facility] as a direct admit for RLE angiogram on 12/19. Patient underwent bilateral SFA angioplasty and stent a few months ago now with ulcerations to her right toes and worsening ulcerations to the left heel. Arterial duplex on performed 12/13 showed patent bilateral SFA stents without any significant disease. Patient is scheduled to undergo RLE angiogram 12/19 to evaluate for tibial vessel disease.    Plan:   -Cardizem 30 q8 added to afib regimen along with 50 metoprolol BID   -Close hemodynamic monitoring  -Requested to go to  for telemetry monitoring, sent to [facility]; f/u transfer back to    -HD today, per nephro no HD today   -Nephro recommendations: hold calcitriol, start renagel 2 tablets po q8  -Continue home meds  -Regular diet   -Neurovascular checks
75yo F with PMHx of ESRD on Tue/Thur/Sat HD, PVD, Afib, pulmonary HTN, anemia, CABG, HF with reduced EF and MI presents to [facility] as a direct admit for RLE angiogram on 12/19.      # ESRD - s/p hd yesterday, no need for HD today   # BP noted, if remains low, start midodrine 5 q 8  # ph 8.5, hold calcitriol , start renagel 2 tablets po q 8   # Hb at goal. no need for JOSE at the moment.  # Afib with RVR: followed by cardio   # will follow
75yo F with PMHx of ESRD on Tue/Thur/Sat HD, PVD, Afib, pulmonary HTN, anemia, CABG, HF with reduced EF and MI presents to [facility] as a direct admit for RLE angiogram on 12/19.      # ESRD - HD: HD today with 3hr 2K bath UF 0.5L as tolerated  # BP on lower side, will remove very little UF of HD. monitor BP post HD. keep MAP > 65%  # Hb at goal. no need for JOSE at the moment.  # Ca at goal. hyperph noted, start sevelamer 1600 mg qac. check iPTH  # Afib with RVR: improved since yesterday, seen by cardio. notes appreciated. plan for transfer to  and further management.

## 2019-12-21 NOTE — PROGRESS NOTE ADULT - REASON FOR ADMISSION
Direct admit for RLE angiogram

## 2019-12-21 NOTE — DISCHARGE NOTE PROVIDER - NSDCFUADDINST_GEN_ALL_CORE_FT
Patient Name: NOAH QUIROS  MRN: [id number]  74y Female  Location: [address] [address]  ([facility]     You may take Tylenol, 650mg every 6 hours as needed and/or Motrin, 600mg every 6 hours as needed for mild pain control    Additional Instructions:  Please call the clinic and confirm your appointment for follow up, see the follow up instructions and the physician's office number  below. Please call the clinic for any questions or concerns.    12-21-19 @ 13:30

## 2019-12-23 ENCOUNTER — INBOUND DOCUMENT (OUTPATIENT)
Age: 74
End: 2019-12-23

## 2019-12-24 ENCOUNTER — OUTPATIENT (OUTPATIENT)
Dept: OUTPATIENT SERVICES | Facility: HOSPITAL | Age: 74
LOS: 1 days | Discharge: HOME | End: 2019-12-24

## 2019-12-24 DIAGNOSIS — Z95.1 PRESENCE OF AORTOCORONARY BYPASS GRAFT: Chronic | ICD-10-CM

## 2019-12-24 DIAGNOSIS — Z90.710 ACQUIRED ABSENCE OF BOTH CERVIX AND UTERUS: Chronic | ICD-10-CM

## 2019-12-24 DIAGNOSIS — E83.52 HYPERCALCEMIA: ICD-10-CM

## 2019-12-24 DIAGNOSIS — I77.0 ARTERIOVENOUS FISTULA, ACQUIRED: Chronic | ICD-10-CM

## 2019-12-24 DIAGNOSIS — Z98.890 OTHER SPECIFIED POSTPROCEDURAL STATES: Chronic | ICD-10-CM

## 2019-12-24 DIAGNOSIS — Z98.49 CATARACT EXTRACTION STATUS, UNSPECIFIED EYE: Chronic | ICD-10-CM

## 2019-12-24 DIAGNOSIS — Z96.0 PRESENCE OF UROGENITAL IMPLANTS: Chronic | ICD-10-CM

## 2019-12-31 ENCOUNTER — OUTPATIENT (OUTPATIENT)
Dept: OUTPATIENT SERVICES | Facility: HOSPITAL | Age: 74
LOS: 1 days | Discharge: HOME | End: 2019-12-31

## 2019-12-31 DIAGNOSIS — I50.22 CHRONIC SYSTOLIC (CONGESTIVE) HEART FAILURE: ICD-10-CM

## 2019-12-31 DIAGNOSIS — I70.223 ATHEROSCLEROSIS OF NATIVE ARTERIES OF EXTREMITIES WITH REST PAIN, BILATERAL LEGS: ICD-10-CM

## 2019-12-31 DIAGNOSIS — Z90.710 ACQUIRED ABSENCE OF BOTH CERVIX AND UTERUS: Chronic | ICD-10-CM

## 2019-12-31 DIAGNOSIS — I48.20 CHRONIC ATRIAL FIBRILLATION, UNSPECIFIED: ICD-10-CM

## 2019-12-31 DIAGNOSIS — I25.10 ATHEROSCLEROTIC HEART DISEASE OF NATIVE CORONARY ARTERY WITHOUT ANGINA PECTORIS: ICD-10-CM

## 2019-12-31 DIAGNOSIS — E83.39 OTHER DISORDERS OF PHOSPHORUS METABOLISM: ICD-10-CM

## 2019-12-31 DIAGNOSIS — Z99.2 DEPENDENCE ON RENAL DIALYSIS: ICD-10-CM

## 2019-12-31 DIAGNOSIS — I27.20 PULMONARY HYPERTENSION, UNSPECIFIED: ICD-10-CM

## 2019-12-31 DIAGNOSIS — K57.90 DIVERTICULOSIS OF INTESTINE, PART UNSPECIFIED, WITHOUT PERFORATION OR ABSCESS WITHOUT BLEEDING: ICD-10-CM

## 2019-12-31 DIAGNOSIS — I13.2 HYPERTENSIVE HEART AND CHRONIC KIDNEY DISEASE WITH HEART FAILURE AND WITH STAGE 5 CHRONIC KIDNEY DISEASE, OR END STAGE RENAL DISEASE: ICD-10-CM

## 2019-12-31 DIAGNOSIS — Z98.890 OTHER SPECIFIED POSTPROCEDURAL STATES: Chronic | ICD-10-CM

## 2019-12-31 DIAGNOSIS — K21.9 GASTRO-ESOPHAGEAL REFLUX DISEASE WITHOUT ESOPHAGITIS: ICD-10-CM

## 2019-12-31 DIAGNOSIS — Z96.0 PRESENCE OF UROGENITAL IMPLANTS: Chronic | ICD-10-CM

## 2019-12-31 DIAGNOSIS — Z95.1 PRESENCE OF AORTOCORONARY BYPASS GRAFT: ICD-10-CM

## 2019-12-31 DIAGNOSIS — E21.3 HYPERPARATHYROIDISM, UNSPECIFIED: ICD-10-CM

## 2019-12-31 DIAGNOSIS — F41.9 ANXIETY DISORDER, UNSPECIFIED: ICD-10-CM

## 2019-12-31 DIAGNOSIS — L97.529 NON-PRESSURE CHRONIC ULCER OF OTHER PART OF LEFT FOOT WITH UNSPECIFIED SEVERITY: ICD-10-CM

## 2019-12-31 DIAGNOSIS — I25.2 OLD MYOCARDIAL INFARCTION: ICD-10-CM

## 2019-12-31 DIAGNOSIS — Z98.49 CATARACT EXTRACTION STATUS, UNSPECIFIED EYE: Chronic | ICD-10-CM

## 2019-12-31 DIAGNOSIS — I73.9 PERIPHERAL VASCULAR DISEASE, UNSPECIFIED: ICD-10-CM

## 2019-12-31 DIAGNOSIS — I77.0 ARTERIOVENOUS FISTULA, ACQUIRED: Chronic | ICD-10-CM

## 2019-12-31 DIAGNOSIS — D64.9 ANEMIA, UNSPECIFIED: ICD-10-CM

## 2019-12-31 DIAGNOSIS — N18.6 END STAGE RENAL DISEASE: ICD-10-CM

## 2019-12-31 DIAGNOSIS — Z95.1 PRESENCE OF AORTOCORONARY BYPASS GRAFT: Chronic | ICD-10-CM

## 2019-12-31 DIAGNOSIS — L97.519 NON-PRESSURE CHRONIC ULCER OF OTHER PART OF RIGHT FOOT WITH UNSPECIFIED SEVERITY: ICD-10-CM

## 2020-01-01 ENCOUNTER — OUTPATIENT (OUTPATIENT)
Dept: OUTPATIENT SERVICES | Facility: HOSPITAL | Age: 75
LOS: 1 days | Discharge: HOME | End: 2020-01-01

## 2020-01-01 ENCOUNTER — TRANSCRIPTION ENCOUNTER (OUTPATIENT)
Age: 75
End: 2020-01-01

## 2020-01-01 ENCOUNTER — APPOINTMENT (OUTPATIENT)
Dept: VASCULAR SURGERY | Facility: CLINIC | Age: 75
End: 2020-01-01
Payer: MEDICARE

## 2020-01-01 ENCOUNTER — OUTPATIENT (OUTPATIENT)
Dept: OUTPATIENT SERVICES | Facility: HOSPITAL | Age: 75
LOS: 1 days | Discharge: HOME | End: 2020-01-01
Payer: MEDICARE

## 2020-01-01 ENCOUNTER — RESULT REVIEW (OUTPATIENT)
Age: 75
End: 2020-01-01

## 2020-01-01 ENCOUNTER — INPATIENT (INPATIENT)
Facility: HOSPITAL | Age: 75
LOS: 0 days | Discharge: HOME HEALTH | End: 2020-10-27
Attending: SURGERY | Admitting: SURGERY
Payer: MEDICARE

## 2020-01-01 ENCOUNTER — LABORATORY RESULT (OUTPATIENT)
Age: 75
End: 2020-01-01

## 2020-01-01 ENCOUNTER — APPOINTMENT (OUTPATIENT)
Dept: VASCULAR SURGERY | Facility: HOSPITAL | Age: 75
End: 2020-01-01

## 2020-01-01 ENCOUNTER — INPATIENT (INPATIENT)
Facility: HOSPITAL | Age: 75
LOS: 0 days | Discharge: HOME | End: 2020-06-23
Attending: SURGERY | Admitting: SURGERY
Payer: MEDICARE

## 2020-01-01 VITALS
OXYGEN SATURATION: 97 % | TEMPERATURE: 98 F | DIASTOLIC BLOOD PRESSURE: 63 MMHG | RESPIRATION RATE: 15 BRPM | HEIGHT: 66 IN | HEART RATE: 78 BPM | SYSTOLIC BLOOD PRESSURE: 143 MMHG | WEIGHT: 150.36 LBS

## 2020-01-01 VITALS
HEIGHT: 66 IN | OXYGEN SATURATION: 97 % | WEIGHT: 145.06 LBS | DIASTOLIC BLOOD PRESSURE: 59 MMHG | SYSTOLIC BLOOD PRESSURE: 116 MMHG | HEART RATE: 70 BPM | TEMPERATURE: 98 F | RESPIRATION RATE: 18 BRPM

## 2020-01-01 VITALS
WEIGHT: 143.96 LBS | SYSTOLIC BLOOD PRESSURE: 110 MMHG | OXYGEN SATURATION: 98 % | HEART RATE: 90 BPM | DIASTOLIC BLOOD PRESSURE: 75 MMHG | HEIGHT: 66 IN | RESPIRATION RATE: 18 BRPM | TEMPERATURE: 98 F

## 2020-01-01 VITALS
TEMPERATURE: 98 F | HEIGHT: 66 IN | SYSTOLIC BLOOD PRESSURE: 110 MMHG | RESPIRATION RATE: 12 BRPM | HEART RATE: 91 BPM | WEIGHT: 141.1 LBS | OXYGEN SATURATION: 100 % | DIASTOLIC BLOOD PRESSURE: 63 MMHG

## 2020-01-01 VITALS
HEART RATE: 111 BPM | OXYGEN SATURATION: 96 % | SYSTOLIC BLOOD PRESSURE: 102 MMHG | RESPIRATION RATE: 22 BRPM | DIASTOLIC BLOOD PRESSURE: 57 MMHG

## 2020-01-01 VITALS
WEIGHT: 139.99 LBS | DIASTOLIC BLOOD PRESSURE: 63 MMHG | SYSTOLIC BLOOD PRESSURE: 102 MMHG | RESPIRATION RATE: 18 BRPM | HEIGHT: 66 IN | HEART RATE: 93 BPM | TEMPERATURE: 98 F

## 2020-01-01 VITALS — RESPIRATION RATE: 16 BRPM | DIASTOLIC BLOOD PRESSURE: 63 MMHG | HEART RATE: 56 BPM | SYSTOLIC BLOOD PRESSURE: 148 MMHG

## 2020-01-01 VITALS
RESPIRATION RATE: 18 BRPM | HEIGHT: 66 IN | SYSTOLIC BLOOD PRESSURE: 111 MMHG | TEMPERATURE: 98 F | DIASTOLIC BLOOD PRESSURE: 69 MMHG | HEART RATE: 121 BPM | WEIGHT: 139.99 LBS

## 2020-01-01 VITALS
RESPIRATION RATE: 19 BRPM | HEART RATE: 80 BPM | DIASTOLIC BLOOD PRESSURE: 55 MMHG | SYSTOLIC BLOOD PRESSURE: 112 MMHG | TEMPERATURE: 96 F | WEIGHT: 141.54 LBS

## 2020-01-01 VITALS — HEART RATE: 96 BPM | SYSTOLIC BLOOD PRESSURE: 108 MMHG | DIASTOLIC BLOOD PRESSURE: 57 MMHG

## 2020-01-01 DIAGNOSIS — Z98.890 OTHER SPECIFIED POSTPROCEDURAL STATES: Chronic | ICD-10-CM

## 2020-01-01 DIAGNOSIS — I50.22 CHRONIC SYSTOLIC (CONGESTIVE) HEART FAILURE: ICD-10-CM

## 2020-01-01 DIAGNOSIS — I70.245 ATHEROSCLEROSIS OF NATIVE ARTERIES OF LEFT LEG WITH ULCERATION OF OTHER PART OF FOOT: ICD-10-CM

## 2020-01-01 DIAGNOSIS — Z95.1 PRESENCE OF AORTOCORONARY BYPASS GRAFT: Chronic | ICD-10-CM

## 2020-01-01 DIAGNOSIS — I13.2 HYPERTENSIVE HEART AND CHRONIC KIDNEY DISEASE WITH HEART FAILURE AND WITH STAGE 5 CHRONIC KIDNEY DISEASE, OR END STAGE RENAL DISEASE: ICD-10-CM

## 2020-01-01 DIAGNOSIS — Z01.818 ENCOUNTER FOR OTHER PREPROCEDURAL EXAMINATION: ICD-10-CM

## 2020-01-01 DIAGNOSIS — Z02.9 ENCOUNTER FOR ADMINISTRATIVE EXAMINATIONS, UNSPECIFIED: ICD-10-CM

## 2020-01-01 DIAGNOSIS — Z90.710 ACQUIRED ABSENCE OF BOTH CERVIX AND UTERUS: Chronic | ICD-10-CM

## 2020-01-01 DIAGNOSIS — I27.20 PULMONARY HYPERTENSION, UNSPECIFIED: ICD-10-CM

## 2020-01-01 DIAGNOSIS — Z95.1 PRESENCE OF AORTOCORONARY BYPASS GRAFT: ICD-10-CM

## 2020-01-01 DIAGNOSIS — L97.429 NON-PRESSURE CHRONIC ULCER OF LEFT HEEL AND MIDFOOT WITH UNSPECIFIED SEVERITY: ICD-10-CM

## 2020-01-01 DIAGNOSIS — L97.419 NON-PRESSURE CHRONIC ULCER OF RIGHT HEEL AND MIDFOOT WITH UNSPECIFIED SEVERITY: ICD-10-CM

## 2020-01-01 DIAGNOSIS — Z96.0 PRESENCE OF UROGENITAL IMPLANTS: Chronic | ICD-10-CM

## 2020-01-01 DIAGNOSIS — I25.2 OLD MYOCARDIAL INFARCTION: ICD-10-CM

## 2020-01-01 DIAGNOSIS — E11.51 TYPE 2 DIABETES MELLITUS WITH DIABETIC PERIPHERAL ANGIOPATHY WITHOUT GANGRENE: ICD-10-CM

## 2020-01-01 DIAGNOSIS — L97.529 NON-PRESSURE CHRONIC ULCER OF OTHER PART OF LEFT FOOT WITH UNSPECIFIED SEVERITY: ICD-10-CM

## 2020-01-01 DIAGNOSIS — Z11.59 ENCOUNTER FOR SCREENING FOR OTHER VIRAL DISEASES: ICD-10-CM

## 2020-01-01 DIAGNOSIS — M19.90 UNSPECIFIED OSTEOARTHRITIS, UNSPECIFIED SITE: ICD-10-CM

## 2020-01-01 DIAGNOSIS — I70.235 ATHEROSCLEROSIS OF NATIVE ARTERIES OF RIGHT LEG WITH ULCERATION OF OTHER PART OF FOOT: ICD-10-CM

## 2020-01-01 DIAGNOSIS — I12.0 HYPERTENSIVE CHRONIC KIDNEY DISEASE WITH STAGE 5 CHRONIC KIDNEY DISEASE OR END STAGE RENAL DISEASE: ICD-10-CM

## 2020-01-01 DIAGNOSIS — I77.0 ARTERIOVENOUS FISTULA, ACQUIRED: Chronic | ICD-10-CM

## 2020-01-01 DIAGNOSIS — Z98.49 CATARACT EXTRACTION STATUS, UNSPECIFIED EYE: Chronic | ICD-10-CM

## 2020-01-01 DIAGNOSIS — I25.10 ATHEROSCLEROTIC HEART DISEASE OF NATIVE CORONARY ARTERY WITHOUT ANGINA PECTORIS: ICD-10-CM

## 2020-01-01 DIAGNOSIS — E11.621 TYPE 2 DIABETES MELLITUS WITH FOOT ULCER: ICD-10-CM

## 2020-01-01 DIAGNOSIS — Z79.01 LONG TERM (CURRENT) USE OF ANTICOAGULANTS: ICD-10-CM

## 2020-01-01 DIAGNOSIS — N18.6 END STAGE RENAL DISEASE: ICD-10-CM

## 2020-01-01 DIAGNOSIS — Z90.710 ACQUIRED ABSENCE OF BOTH CERVIX AND UTERUS: ICD-10-CM

## 2020-01-01 DIAGNOSIS — L97.519 NON-PRESSURE CHRONIC ULCER OF OTHER PART OF RIGHT FOOT WITH UNSPECIFIED SEVERITY: ICD-10-CM

## 2020-01-01 DIAGNOSIS — I48.91 UNSPECIFIED ATRIAL FIBRILLATION: ICD-10-CM

## 2020-01-01 DIAGNOSIS — Z99.2 DEPENDENCE ON RENAL DIALYSIS: ICD-10-CM

## 2020-01-01 DIAGNOSIS — I73.9 PERIPHERAL VASCULAR DISEASE, UNSPECIFIED: ICD-10-CM

## 2020-01-01 DIAGNOSIS — Z79.02 LONG TERM (CURRENT) USE OF ANTITHROMBOTICS/ANTIPLATELETS: ICD-10-CM

## 2020-01-01 DIAGNOSIS — I70.234 ATHEROSCLEROSIS OF NATIVE ARTERIES OF RIGHT LEG WITH ULCERATION OF HEEL AND MIDFOOT: ICD-10-CM

## 2020-01-01 DIAGNOSIS — Z91.11 PATIENT'S NONCOMPLIANCE WITH DIETARY REGIMEN: ICD-10-CM

## 2020-01-01 DIAGNOSIS — I48.20 CHRONIC ATRIAL FIBRILLATION, UNSPECIFIED: ICD-10-CM

## 2020-01-01 DIAGNOSIS — I35.0 NONRHEUMATIC AORTIC (VALVE) STENOSIS: ICD-10-CM

## 2020-01-01 DIAGNOSIS — K21.9 GASTRO-ESOPHAGEAL REFLUX DISEASE WITHOUT ESOPHAGITIS: ICD-10-CM

## 2020-01-01 DIAGNOSIS — R00.0 TACHYCARDIA, UNSPECIFIED: ICD-10-CM

## 2020-01-01 DIAGNOSIS — N18.9 CHRONIC KIDNEY DISEASE, UNSPECIFIED: ICD-10-CM

## 2020-01-01 DIAGNOSIS — Z95.5 PRESENCE OF CORONARY ANGIOPLASTY IMPLANT AND GRAFT: ICD-10-CM

## 2020-01-01 DIAGNOSIS — I70.92 CHRONIC TOTAL OCCLUSION OF ARTERY OF THE EXTREMITIES: ICD-10-CM

## 2020-01-01 DIAGNOSIS — I70.244 ATHEROSCLEROSIS OF NATIVE ARTERIES OF LEFT LEG WITH ULCERATION OF HEEL AND MIDFOOT: ICD-10-CM

## 2020-01-01 DIAGNOSIS — E11.69 TYPE 2 DIABETES MELLITUS WITH OTHER SPECIFIED COMPLICATION: ICD-10-CM

## 2020-01-01 DIAGNOSIS — I10 ESSENTIAL (PRIMARY) HYPERTENSION: ICD-10-CM

## 2020-01-01 DIAGNOSIS — E11.22 TYPE 2 DIABETES MELLITUS WITH DIABETIC CHRONIC KIDNEY DISEASE: ICD-10-CM

## 2020-01-01 DIAGNOSIS — I50.20 UNSPECIFIED SYSTOLIC (CONGESTIVE) HEART FAILURE: ICD-10-CM

## 2020-01-01 LAB
ALBUMIN SERPL ELPH-MCNC: 4 G/DL — SIGNIFICANT CHANGE UP (ref 3.5–5.2)
ALBUMIN SERPL ELPH-MCNC: 4.3 G/DL — SIGNIFICANT CHANGE UP (ref 3.5–5.2)
ALP SERPL-CCNC: 73 U/L — SIGNIFICANT CHANGE UP (ref 30–115)
ALP SERPL-CCNC: 80 U/L — SIGNIFICANT CHANGE UP (ref 30–115)
ALT FLD-CCNC: 11 U/L — SIGNIFICANT CHANGE UP (ref 0–41)
ALT FLD-CCNC: 8 U/L — SIGNIFICANT CHANGE UP (ref 0–41)
ANION GAP SERPL CALC-SCNC: 11 MMOL/L — SIGNIFICANT CHANGE UP (ref 7–14)
ANION GAP SERPL CALC-SCNC: 14 MMOL/L — SIGNIFICANT CHANGE UP (ref 7–14)
ANION GAP SERPL CALC-SCNC: 18 MMOL/L — HIGH (ref 7–14)
ANION GAP SERPL CALC-SCNC: 18 MMOL/L — HIGH (ref 7–14)
APTT BLD: 31.6 SEC — SIGNIFICANT CHANGE UP (ref 27–39.2)
APTT BLD: 32.9 SEC — SIGNIFICANT CHANGE UP (ref 27–39.2)
APTT BLD: 35.3 SEC — SIGNIFICANT CHANGE UP (ref 27–39.2)
APTT BLD: 83.2 SEC — CRITICAL HIGH (ref 27–39.2)
AST SERPL-CCNC: 16 U/L — SIGNIFICANT CHANGE UP (ref 0–41)
AST SERPL-CCNC: 16 U/L — SIGNIFICANT CHANGE UP (ref 0–41)
BASOPHILS # BLD AUTO: 0.07 K/UL — SIGNIFICANT CHANGE UP (ref 0–0.2)
BASOPHILS # BLD AUTO: 0.09 K/UL — SIGNIFICANT CHANGE UP (ref 0–0.2)
BASOPHILS NFR BLD AUTO: 1.1 % — HIGH (ref 0–1)
BASOPHILS NFR BLD AUTO: 1.2 % — HIGH (ref 0–1)
BILIRUB SERPL-MCNC: 0.5 MG/DL — SIGNIFICANT CHANGE UP (ref 0.2–1.2)
BILIRUB SERPL-MCNC: 0.6 MG/DL — SIGNIFICANT CHANGE UP (ref 0.2–1.2)
BLD GP AB SCN SERPL QL: SIGNIFICANT CHANGE UP
BUN SERPL-MCNC: 35 MG/DL — HIGH (ref 10–20)
BUN SERPL-MCNC: 37 MG/DL — HIGH (ref 10–20)
BUN SERPL-MCNC: 51 MG/DL — HIGH (ref 10–20)
BUN SERPL-MCNC: 57 MG/DL — HIGH (ref 10–20)
CALCIUM SERPL-MCNC: 10 MG/DL — SIGNIFICANT CHANGE UP (ref 8.5–10.1)
CALCIUM SERPL-MCNC: 10.1 MG/DL — SIGNIFICANT CHANGE UP (ref 8.5–10.1)
CALCIUM SERPL-MCNC: 6.1 MG/DL — LOW (ref 8.5–10.1)
CALCIUM SERPL-MCNC: 9.8 MG/DL — SIGNIFICANT CHANGE UP (ref 8.5–10.1)
CHLORIDE SERPL-SCNC: 101 MMOL/L — SIGNIFICANT CHANGE UP (ref 98–110)
CHLORIDE SERPL-SCNC: 101 MMOL/L — SIGNIFICANT CHANGE UP (ref 98–110)
CHLORIDE SERPL-SCNC: 114 MMOL/L — HIGH (ref 98–110)
CHLORIDE SERPL-SCNC: 98 MMOL/L — SIGNIFICANT CHANGE UP (ref 98–110)
CK MB CFR SERPL CALC: 1.2 NG/ML — SIGNIFICANT CHANGE UP (ref 0.6–6.3)
CK SERPL-CCNC: 18 U/L — SIGNIFICANT CHANGE UP (ref 0–225)
CO2 SERPL-SCNC: 15 MMOL/L — LOW (ref 17–32)
CO2 SERPL-SCNC: 18 MMOL/L — SIGNIFICANT CHANGE UP (ref 17–32)
CO2 SERPL-SCNC: 23 MMOL/L — SIGNIFICANT CHANGE UP (ref 17–32)
CO2 SERPL-SCNC: 27 MMOL/L — SIGNIFICANT CHANGE UP (ref 17–32)
CREAT SERPL-MCNC: 3.3 MG/DL — HIGH (ref 0.7–1.5)
CREAT SERPL-MCNC: 3.9 MG/DL — HIGH (ref 0.7–1.5)
CREAT SERPL-MCNC: 4.6 MG/DL — CRITICAL HIGH (ref 0.7–1.5)
CREAT SERPL-MCNC: 4.8 MG/DL — CRITICAL HIGH (ref 0.7–1.5)
EOSINOPHIL # BLD AUTO: 0.68 K/UL — SIGNIFICANT CHANGE UP (ref 0–0.7)
EOSINOPHIL # BLD AUTO: 0.81 K/UL — HIGH (ref 0–0.7)
EOSINOPHIL NFR BLD AUTO: 12.7 % — HIGH (ref 0–8)
EOSINOPHIL NFR BLD AUTO: 9.3 % — HIGH (ref 0–8)
GLUCOSE SERPL-MCNC: 118 MG/DL — HIGH (ref 70–99)
GLUCOSE SERPL-MCNC: 77 MG/DL — SIGNIFICANT CHANGE UP (ref 70–99)
GLUCOSE SERPL-MCNC: 83 MG/DL — SIGNIFICANT CHANGE UP (ref 70–99)
GLUCOSE SERPL-MCNC: 96 MG/DL — SIGNIFICANT CHANGE UP (ref 70–99)
HCT VFR BLD CALC: 28.3 % — LOW (ref 37–47)
HCT VFR BLD CALC: 36.9 % — LOW (ref 37–47)
HCT VFR BLD CALC: 38.7 % — SIGNIFICANT CHANGE UP (ref 37–47)
HCT VFR BLD CALC: 42.4 % — SIGNIFICANT CHANGE UP (ref 37–47)
HGB BLD-MCNC: 11.1 G/DL — LOW (ref 12–16)
HGB BLD-MCNC: 11.7 G/DL — LOW (ref 12–16)
HGB BLD-MCNC: 13.3 G/DL — SIGNIFICANT CHANGE UP (ref 12–16)
HGB BLD-MCNC: 8.6 G/DL — LOW (ref 12–16)
IMM GRANULOCYTES NFR BLD AUTO: 0.3 % — SIGNIFICANT CHANGE UP (ref 0.1–0.3)
IMM GRANULOCYTES NFR BLD AUTO: 0.3 % — SIGNIFICANT CHANGE UP (ref 0.1–0.3)
INR BLD: 1.33 RATIO — HIGH (ref 0.65–1.3)
INR BLD: 1.44 RATIO — HIGH (ref 0.65–1.3)
INR BLD: 1.45 RATIO — HIGH (ref 0.65–1.3)
INR BLD: 1.49 RATIO — HIGH (ref 0.65–1.3)
LYMPHOCYTES # BLD AUTO: 0.58 K/UL — LOW (ref 1.2–3.4)
LYMPHOCYTES # BLD AUTO: 0.64 K/UL — LOW (ref 1.2–3.4)
LYMPHOCYTES # BLD AUTO: 8.8 % — LOW (ref 20.5–51.1)
LYMPHOCYTES # BLD AUTO: 9.1 % — LOW (ref 20.5–51.1)
MAGNESIUM SERPL-MCNC: 2.7 MG/DL — HIGH (ref 1.8–2.4)
MCHC RBC-ENTMCNC: 29.7 PG — SIGNIFICANT CHANGE UP (ref 27–31)
MCHC RBC-ENTMCNC: 30.1 G/DL — LOW (ref 32–37)
MCHC RBC-ENTMCNC: 30.2 G/DL — LOW (ref 32–37)
MCHC RBC-ENTMCNC: 30.4 G/DL — LOW (ref 32–37)
MCHC RBC-ENTMCNC: 30.7 PG — SIGNIFICANT CHANGE UP (ref 27–31)
MCHC RBC-ENTMCNC: 31.4 G/DL — LOW (ref 32–37)
MCHC RBC-ENTMCNC: 31.4 PG — HIGH (ref 27–31)
MCHC RBC-ENTMCNC: 31.7 PG — HIGH (ref 27–31)
MCV RBC AUTO: 101 FL — HIGH (ref 81–99)
MCV RBC AUTO: 102.2 FL — HIGH (ref 81–99)
MCV RBC AUTO: 103.3 FL — HIGH (ref 81–99)
MCV RBC AUTO: 98.2 FL — SIGNIFICANT CHANGE UP (ref 81–99)
MONOCYTES # BLD AUTO: 0.72 K/UL — HIGH (ref 0.1–0.6)
MONOCYTES # BLD AUTO: 0.96 K/UL — HIGH (ref 0.1–0.6)
MONOCYTES NFR BLD AUTO: 11.3 % — HIGH (ref 1.7–9.3)
MONOCYTES NFR BLD AUTO: 13.1 % — HIGH (ref 1.7–9.3)
NEUTROPHILS # BLD AUTO: 4.16 K/UL — SIGNIFICANT CHANGE UP (ref 1.4–6.5)
NEUTROPHILS # BLD AUTO: 4.92 K/UL — SIGNIFICANT CHANGE UP (ref 1.4–6.5)
NEUTROPHILS NFR BLD AUTO: 65.5 % — SIGNIFICANT CHANGE UP (ref 42.2–75.2)
NEUTROPHILS NFR BLD AUTO: 67.3 % — SIGNIFICANT CHANGE UP (ref 42.2–75.2)
NRBC # BLD: 0 /100 WBCS — SIGNIFICANT CHANGE UP (ref 0–0)
PHOSPHATE SERPL-MCNC: 4.1 MG/DL — SIGNIFICANT CHANGE UP (ref 2.1–4.9)
PLATELET # BLD AUTO: 116 K/UL — LOW (ref 130–400)
PLATELET # BLD AUTO: 153 K/UL — SIGNIFICANT CHANGE UP (ref 130–400)
PLATELET # BLD AUTO: 168 K/UL — SIGNIFICANT CHANGE UP (ref 130–400)
PLATELET # BLD AUTO: 181 K/UL — SIGNIFICANT CHANGE UP (ref 130–400)
POTASSIUM SERPL-MCNC: 3.6 MMOL/L — SIGNIFICANT CHANGE UP (ref 3.5–5)
POTASSIUM SERPL-MCNC: 4.6 MMOL/L — SIGNIFICANT CHANGE UP (ref 3.5–5)
POTASSIUM SERPL-MCNC: 4.7 MMOL/L — SIGNIFICANT CHANGE UP (ref 3.5–5)
POTASSIUM SERPL-MCNC: 5.3 MMOL/L — HIGH (ref 3.5–5)
POTASSIUM SERPL-SCNC: 3.6 MMOL/L — SIGNIFICANT CHANGE UP (ref 3.5–5)
POTASSIUM SERPL-SCNC: 4.6 MMOL/L — SIGNIFICANT CHANGE UP (ref 3.5–5)
POTASSIUM SERPL-SCNC: 4.7 MMOL/L — SIGNIFICANT CHANGE UP (ref 3.5–5)
POTASSIUM SERPL-SCNC: 5.3 MMOL/L — HIGH (ref 3.5–5)
PROT SERPL-MCNC: 7.3 G/DL — SIGNIFICANT CHANGE UP (ref 6–8)
PROT SERPL-MCNC: 7.8 G/DL — SIGNIFICANT CHANGE UP (ref 6–8)
PROTHROM AB SERPL-ACNC: 15.3 SEC — HIGH (ref 9.95–12.87)
PROTHROM AB SERPL-ACNC: 16.6 SEC — HIGH (ref 9.95–12.87)
PROTHROM AB SERPL-ACNC: 16.7 SEC — HIGH (ref 9.95–12.87)
PROTHROM AB SERPL-ACNC: 17.1 SEC — HIGH (ref 9.95–12.87)
RBC # BLD: 2.74 M/UL — LOW (ref 4.2–5.4)
RBC # BLD: 3.61 M/UL — LOW (ref 4.2–5.4)
RBC # BLD: 3.94 M/UL — LOW (ref 4.2–5.4)
RBC # BLD: 4.2 M/UL — SIGNIFICANT CHANGE UP (ref 4.2–5.4)
RBC # FLD: 14.4 % — SIGNIFICANT CHANGE UP (ref 11.5–14.5)
RBC # FLD: 15 % — HIGH (ref 11.5–14.5)
RBC # FLD: 15.4 % — HIGH (ref 11.5–14.5)
RBC # FLD: 15.9 % — HIGH (ref 11.5–14.5)
SODIUM SERPL-SCNC: 137 MMOL/L — SIGNIFICANT CHANGE UP (ref 135–146)
SODIUM SERPL-SCNC: 139 MMOL/L — SIGNIFICANT CHANGE UP (ref 135–146)
SODIUM SERPL-SCNC: 140 MMOL/L — SIGNIFICANT CHANGE UP (ref 135–146)
SODIUM SERPL-SCNC: 142 MMOL/L — SIGNIFICANT CHANGE UP (ref 135–146)
TROPONIN T SERPL-MCNC: 0.01 NG/ML — SIGNIFICANT CHANGE UP
TROPONIN T SERPL-MCNC: 0.04 NG/ML — CRITICAL HIGH
TROPONIN T SERPL-MCNC: 0.05 NG/ML — CRITICAL HIGH
WBC # BLD: 5.21 K/UL — SIGNIFICANT CHANGE UP (ref 4.8–10.8)
WBC # BLD: 6.36 K/UL — SIGNIFICANT CHANGE UP (ref 4.8–10.8)
WBC # BLD: 7.31 K/UL — SIGNIFICANT CHANGE UP (ref 4.8–10.8)
WBC # BLD: 8.48 K/UL — SIGNIFICANT CHANGE UP (ref 4.8–10.8)
WBC # FLD AUTO: 5.21 K/UL — SIGNIFICANT CHANGE UP (ref 4.8–10.8)
WBC # FLD AUTO: 6.36 K/UL — SIGNIFICANT CHANGE UP (ref 4.8–10.8)
WBC # FLD AUTO: 7.31 K/UL — SIGNIFICANT CHANGE UP (ref 4.8–10.8)
WBC # FLD AUTO: 8.48 K/UL — SIGNIFICANT CHANGE UP (ref 4.8–10.8)

## 2020-01-01 PROCEDURE — 37233: CPT

## 2020-01-01 PROCEDURE — 71045 X-RAY EXAM CHEST 1 VIEW: CPT | Mod: 26

## 2020-01-01 PROCEDURE — 93010 ELECTROCARDIOGRAM REPORT: CPT

## 2020-01-01 PROCEDURE — 37224: CPT | Mod: RT

## 2020-01-01 PROCEDURE — 99213 OFFICE O/P EST LOW 20 MIN: CPT

## 2020-01-01 PROCEDURE — 36247 INS CATH ABD/L-EXT ART 3RD: CPT | Mod: 59

## 2020-01-01 PROCEDURE — 37231: CPT

## 2020-01-01 PROCEDURE — 75710 ARTERY X-RAYS ARM/LEG: CPT | Mod: 26,59

## 2020-01-01 PROCEDURE — 37229: CPT

## 2020-01-01 PROCEDURE — 71046 X-RAY EXAM CHEST 2 VIEWS: CPT | Mod: 26

## 2020-01-01 PROCEDURE — 75630 X-RAY AORTA LEG ARTERIES: CPT | Mod: 26,59

## 2020-01-01 PROCEDURE — 76937 US GUIDE VASCULAR ACCESS: CPT | Mod: 26

## 2020-01-01 PROCEDURE — 37229: CPT | Mod: RT

## 2020-01-01 PROCEDURE — 75625 CONTRAST EXAM ABDOMINL AORTA: CPT | Mod: 26

## 2020-01-01 PROCEDURE — 93925 LOWER EXTREMITY STUDY: CPT

## 2020-01-01 PROCEDURE — 36247 INS CATH ABD/L-EXT ART 3RD: CPT | Mod: RT,59

## 2020-01-01 PROCEDURE — 36248 INS CATH ABD/L-EXT ART ADDL: CPT

## 2020-01-01 PROCEDURE — 37225: CPT

## 2020-01-01 PROCEDURE — 37232: CPT | Mod: RT

## 2020-01-01 PROCEDURE — 37235: CPT

## 2020-01-01 PROCEDURE — 93926 LOWER EXTREMITY STUDY: CPT

## 2020-01-01 PROCEDURE — 99238 HOSP IP/OBS DSCHRG MGMT 30/<: CPT

## 2020-01-01 RX ORDER — CALCITRIOL 0.5 UG/1
1 CAPSULE ORAL
Qty: 0 | Refills: 0 | DISCHARGE

## 2020-01-01 RX ORDER — DILTIAZEM HCL 120 MG
1 CAPSULE, EXT RELEASE 24 HR ORAL
Qty: 0 | Refills: 0 | DISCHARGE

## 2020-01-01 RX ORDER — MEPERIDINE HYDROCHLORIDE 50 MG/ML
12.5 INJECTION INTRAMUSCULAR; INTRAVENOUS; SUBCUTANEOUS
Refills: 0 | Status: DISCONTINUED | OUTPATIENT
Start: 2020-01-01 | End: 2020-01-01

## 2020-01-01 RX ORDER — METOPROLOL TARTRATE 50 MG
50 TABLET ORAL
Refills: 0 | Status: DISCONTINUED | OUTPATIENT
Start: 2020-01-01 | End: 2020-01-01

## 2020-01-01 RX ORDER — ACETAMINOPHEN 500 MG
1000 TABLET ORAL ONCE
Refills: 0 | Status: COMPLETED | OUTPATIENT
Start: 2020-01-01 | End: 2020-01-01

## 2020-01-01 RX ORDER — METOPROLOL TARTRATE 50 MG
25 TABLET ORAL ONCE
Refills: 0 | Status: COMPLETED | OUTPATIENT
Start: 2020-01-01 | End: 2020-01-01

## 2020-01-01 RX ORDER — ALLOPURINOL 300 MG
100 TABLET ORAL
Refills: 0 | Status: DISCONTINUED | OUTPATIENT
Start: 2020-01-01 | End: 2020-01-01

## 2020-01-01 RX ORDER — SOD,AMMONIUM,POTASSIUM LACTATE
1 CREAM (GRAM) TOPICAL
Qty: 60 | Refills: 2
Start: 2020-01-01 | End: 2021-01-01

## 2020-01-01 RX ORDER — OXYCODONE AND ACETAMINOPHEN 5; 325 MG/1; MG/1
1 TABLET ORAL ONCE
Refills: 0 | Status: DISCONTINUED | OUTPATIENT
Start: 2020-01-01 | End: 2020-01-01

## 2020-01-01 RX ORDER — DILTIAZEM HCL 120 MG
30 CAPSULE, EXT RELEASE 24 HR ORAL EVERY 8 HOURS
Refills: 0 | Status: DISCONTINUED | OUTPATIENT
Start: 2020-01-01 | End: 2020-01-01

## 2020-01-01 RX ORDER — DILTIAZEM HCL 120 MG
30 CAPSULE, EXT RELEASE 24 HR ORAL ONCE
Refills: 0 | Status: COMPLETED | OUTPATIENT
Start: 2020-01-01 | End: 2020-01-01

## 2020-01-01 RX ORDER — SODIUM CHLORIDE 9 MG/ML
1000 INJECTION INTRAMUSCULAR; INTRAVENOUS; SUBCUTANEOUS
Refills: 0 | Status: DISCONTINUED | OUTPATIENT
Start: 2020-01-01 | End: 2020-01-01

## 2020-01-01 RX ORDER — ASPIRIN/CALCIUM CARB/MAGNESIUM 324 MG
325 TABLET ORAL ONCE
Refills: 0 | Status: COMPLETED | OUTPATIENT
Start: 2020-01-01 | End: 2020-01-01

## 2020-01-01 RX ORDER — OXYCODONE AND ACETAMINOPHEN 5; 325 MG/1; MG/1
1 TABLET ORAL EVERY 4 HOURS
Refills: 0 | Status: DISCONTINUED | OUTPATIENT
Start: 2020-01-01 | End: 2020-01-01

## 2020-01-01 RX ORDER — DILTIAZEM HCL 120 MG
30 CAPSULE, EXT RELEASE 24 HR ORAL EVERY 6 HOURS
Refills: 0 | Status: DISCONTINUED | OUTPATIENT
Start: 2020-01-01 | End: 2020-01-01

## 2020-01-01 RX ORDER — METOPROLOL TARTRATE 50 MG
1 TABLET ORAL
Qty: 0 | Refills: 0 | DISCHARGE

## 2020-01-01 RX ORDER — MORPHINE SULFATE 50 MG/1
2 CAPSULE, EXTENDED RELEASE ORAL EVERY 4 HOURS
Refills: 0 | Status: DISCONTINUED | OUTPATIENT
Start: 2020-01-01 | End: 2020-01-01

## 2020-01-01 RX ORDER — COLLAGENASE SANTYL 250 [ARB'U]/G
250 OINTMENT TOPICAL DAILY
Qty: 1 | Refills: 2 | Status: ACTIVE | COMMUNITY

## 2020-01-01 RX ORDER — ONDANSETRON 8 MG/1
4 TABLET, FILM COATED ORAL ONCE
Refills: 0 | Status: DISCONTINUED | OUTPATIENT
Start: 2020-01-01 | End: 2020-01-01

## 2020-01-01 RX ORDER — ALLOPURINOL 300 MG
100 TABLET ORAL DAILY
Refills: 0 | Status: DISCONTINUED | OUTPATIENT
Start: 2020-01-01 | End: 2020-01-01

## 2020-01-01 RX ORDER — APIXABAN 2.5 MG/1
2.5 TABLET, FILM COATED ORAL
Refills: 0 | Status: DISCONTINUED | OUTPATIENT
Start: 2020-01-01 | End: 2020-01-01

## 2020-01-01 RX ORDER — METOPROLOL TARTRATE 50 MG
50 TABLET ORAL ONCE
Refills: 0 | Status: COMPLETED | OUTPATIENT
Start: 2020-01-01 | End: 2020-01-01

## 2020-01-01 RX ORDER — OXYCODONE AND ACETAMINOPHEN 5; 325 MG/1; MG/1
2 TABLET ORAL ONCE
Refills: 0 | Status: DISCONTINUED | OUTPATIENT
Start: 2020-01-01 | End: 2020-01-01

## 2020-01-01 RX ORDER — APIXABAN 2.5 MG/1
2.5 TABLET, FILM COATED ORAL EVERY 12 HOURS
Refills: 0 | Status: DISCONTINUED | OUTPATIENT
Start: 2020-01-01 | End: 2020-01-01

## 2020-01-01 RX ORDER — ALLOPURINOL 300 MG
1 TABLET ORAL
Qty: 0 | Refills: 0 | DISCHARGE

## 2020-01-01 RX ORDER — HYDROMORPHONE HYDROCHLORIDE 2 MG/ML
0.5 INJECTION INTRAMUSCULAR; INTRAVENOUS; SUBCUTANEOUS
Refills: 0 | Status: DISCONTINUED | OUTPATIENT
Start: 2020-01-01 | End: 2020-01-01

## 2020-01-01 RX ORDER — METOPROLOL TARTRATE 50 MG
50 TABLET ORAL DAILY
Refills: 0 | Status: DISCONTINUED | OUTPATIENT
Start: 2020-01-01 | End: 2020-01-01

## 2020-01-01 RX ORDER — MORPHINE SULFATE 50 MG/1
4 CAPSULE, EXTENDED RELEASE ORAL
Refills: 0 | Status: DISCONTINUED | OUTPATIENT
Start: 2020-01-01 | End: 2020-01-01

## 2020-01-01 RX ORDER — HYDROMORPHONE HYDROCHLORIDE 2 MG/ML
1 INJECTION INTRAMUSCULAR; INTRAVENOUS; SUBCUTANEOUS
Refills: 0 | Status: DISCONTINUED | OUTPATIENT
Start: 2020-01-01 | End: 2020-01-01

## 2020-01-01 RX ORDER — HEPARIN SODIUM 5000 [USP'U]/ML
5000 INJECTION INTRAVENOUS; SUBCUTANEOUS EVERY 8 HOURS
Refills: 0 | Status: DISCONTINUED | OUTPATIENT
Start: 2020-01-01 | End: 2020-01-01

## 2020-01-01 RX ORDER — SODIUM CHLORIDE 9 MG/ML
1000 INJECTION, SOLUTION INTRAVENOUS
Refills: 0 | Status: DISCONTINUED | OUTPATIENT
Start: 2020-01-01 | End: 2020-01-01

## 2020-01-01 RX ORDER — PANTOPRAZOLE SODIUM 20 MG/1
40 TABLET, DELAYED RELEASE ORAL
Refills: 0 | Status: DISCONTINUED | OUTPATIENT
Start: 2020-01-01 | End: 2020-01-01

## 2020-01-01 RX ORDER — SOD,AMMONIUM,POTASSIUM LACTATE
1 CREAM (GRAM) TOPICAL
Refills: 0 | Status: DISCONTINUED | OUTPATIENT
Start: 2020-01-01 | End: 2020-01-01

## 2020-01-01 RX ORDER — DILTIAZEM HCL 120 MG
30 CAPSULE, EXT RELEASE 24 HR ORAL THREE TIMES A DAY
Refills: 0 | Status: DISCONTINUED | OUTPATIENT
Start: 2020-01-01 | End: 2020-01-01

## 2020-01-01 RX ORDER — INFLUENZA VIRUS VACCINE 15; 15; 15; 15 UG/.5ML; UG/.5ML; UG/.5ML; UG/.5ML
0.5 SUSPENSION INTRAMUSCULAR ONCE
Refills: 0 | Status: DISCONTINUED | OUTPATIENT
Start: 2020-01-01 | End: 2020-01-01

## 2020-01-01 RX ORDER — FAMOTIDINE 10 MG/ML
1 INJECTION INTRAVENOUS
Qty: 0 | Refills: 0 | DISCHARGE

## 2020-01-01 RX ORDER — SOD,AMMONIUM,POTASSIUM LACTATE
1 CREAM (GRAM) TOPICAL
Qty: 0 | Refills: 0 | DISCHARGE
Start: 2020-01-01

## 2020-01-01 RX ADMIN — Medication 100 MILLIGRAM(S): at 18:03

## 2020-01-01 RX ADMIN — Medication 30 MILLIGRAM(S): at 19:11

## 2020-01-01 RX ADMIN — Medication 30 MILLIGRAM(S): at 22:58

## 2020-01-01 RX ADMIN — Medication 325 MILLIGRAM(S): at 10:46

## 2020-01-01 RX ADMIN — PANTOPRAZOLE SODIUM 40 MILLIGRAM(S): 20 TABLET, DELAYED RELEASE ORAL at 06:05

## 2020-01-01 RX ADMIN — SODIUM CHLORIDE 100 MILLILITER(S): 9 INJECTION, SOLUTION INTRAVENOUS at 15:44

## 2020-01-01 RX ADMIN — Medication 50 MILLIGRAM(S): at 05:52

## 2020-01-01 RX ADMIN — SODIUM CHLORIDE 10 MILLILITER(S): 9 INJECTION INTRAMUSCULAR; INTRAVENOUS; SUBCUTANEOUS at 17:52

## 2020-01-01 RX ADMIN — Medication 325 MILLIGRAM(S): at 15:44

## 2020-01-01 RX ADMIN — APIXABAN 2.5 MILLIGRAM(S): 2.5 TABLET, FILM COATED ORAL at 18:04

## 2020-01-01 RX ADMIN — Medication 30 MILLIGRAM(S): at 14:07

## 2020-01-01 RX ADMIN — Medication 100 MILLIGRAM(S): at 12:29

## 2020-01-01 RX ADMIN — Medication 400 MILLIGRAM(S): at 10:49

## 2020-01-01 RX ADMIN — OXYCODONE AND ACETAMINOPHEN 1 TABLET(S): 5; 325 TABLET ORAL at 02:42

## 2020-01-01 RX ADMIN — APIXABAN 2.5 MILLIGRAM(S): 2.5 TABLET, FILM COATED ORAL at 05:52

## 2020-01-01 RX ADMIN — Medication 30 MILLIGRAM(S): at 06:05

## 2020-01-01 RX ADMIN — Medication 30 MILLIGRAM(S): at 18:03

## 2020-01-01 RX ADMIN — Medication 100 MILLIGRAM(S): at 05:52

## 2020-01-01 RX ADMIN — Medication 50 MILLIGRAM(S): at 20:49

## 2020-01-01 RX ADMIN — Medication 30 MILLIGRAM(S): at 12:53

## 2020-01-01 RX ADMIN — Medication 25 MILLIGRAM(S): at 13:56

## 2020-01-01 RX ADMIN — Medication 30 MILLIGRAM(S): at 05:52

## 2020-01-06 NOTE — CONSULT NOTE ADULT - SUBJECTIVE AND OBJECTIVE BOX
Patient is a 74y old  Female who presents with a chief complaint of Direct admit for RLE angiogram (20 Dec 2019 08:49)      HPI:  patient had LE angio developed tachycardia on her chronic afib, she has had multiple admission for the afib tachycardia, has a severe anxiety as well, she is not having any chest pain, sob.  she generally has been stable on 100 mg daily of metoprolol    73yo F with PMHx of ESRD on Tue/Thur/Sat HD, PVD, Afib, pulmonary HTN, anemia, CABG, HF with reduced EF and MI presents to [facility] as a direct admit for RLE angiogram on 12/19. Patient underwent bilateral SFA angioplasty and stent a few months ago now with ulcerations to her right toes and worsening ulcerations to the left heel. Patient has complaints of rest pain at night that has been present for 1 month but worsening recently. Arterial duplex on performed 12/13 showed patent bilateral SFA stents without any significant disease. Patient is scheduled to undergo RLE angiogram to evaluate for tibial vessel disease. (18 Dec 2019 16:12)      PAST MEDICAL & SURGICAL HISTORY:  Thyroid nodule  Degenerative joint disease  Osteoarthritis  Diverticulosis  GERD (gastroesophageal reflux disease)  Heart failure with reduced ejection fraction  Pulmonary hypertension, moderate to severe  Aortic stenosis, moderate  Atrial fibrillation: status post cardioversion  CKD (chronic kidney disease): stage 5, no dialysis  MI (myocardial infarction)  CAD (coronary artery disease)  Kidney stone  HTN (hypertension)  S/P ureteral stent placement  History of kidney surgery  S/P cataract surgery  S/P CABG (coronary artery bypass graft)  AV fistula  H/O abdominal hysterectomy  H/O cardiac radiofrequency ablation      PREVIOUS DIAGNOSTIC TESTING:      ECHO  FINDINGS:     STRESS TEST  FINDINGS:    CATHETERIZAT< from: Transthoracic Echocardiogram (01.13.19 @ 10:53) >   1. Left ventricular ejection fraction, by visual estimation, is 45 to   50%.   2. Mildly increased LV wall thickness.   3. Mild to moderate mitral valve regurgitation.   4. Thickening and calcification of the anterior and posterior mitral   valve leaflets.   5. Mild-moderate tricuspid regurgitation.   6. Mild aortic regurgitation.   7. Estimated pulmonary artery systolic pressure is 60.9 mmHg assuming a   right atrial pressure of 15 mmHg, which is consistent with severe   pulmonary hypertension.   8. Peak transaortic gradient is 32.7 mmHg, mean transaortic gradient   equals 15.5 mmHg, the calculated aortic valve area equals 1.39 cm² by the   continuity equation consistent with moderate aortic stenosis.    < end of copied text >  ION  FINDINGS:    MEDICATIONS  (STANDING):  acetaminophen   Tablet .. 650 milliGRAM(s) Oral every 6 hours  allopurinol 100 milliGRAM(s) Oral daily  apixaban 5 milliGRAM(s) Oral two times a day  calcitriol   Capsule 0.5 MICROGram(s) Oral daily  chlorhexidine 4% Liquid 1 Application(s) Topical <User Schedule>  diltiazem    Tablet 30 milliGRAM(s) Oral every 8 hours  insulin lispro (HumaLOG) corrective regimen sliding scale   SubCutaneous every 6 hours  metoprolol tartrate 50 milliGRAM(s) Oral two times a day    MEDICATIONS  (PRN):  oxyCODONE    IR 5 milliGRAM(s) Oral every 6 hours PRN Severe Pain (7 - 10)      FAMILY HISTORY:  Family history of diabetes mellitus in mother  Family history of lung cancer (Father)  Family history of CHF (congestive heart failure) (Mother)      SOCIAL HISTORY:  CIGARETTES: ex smoker  ALCOHOL: no  DRUGS: no                      REVIEW OF SYSTEMS:  CONSTITUTIONAL: No distress, Looks stable  NECK: No pain   RESPIRATORY: No cough, wheezing, shortness of breath  CARDIOVASCULAR: No chest pain, SOB, but palpitations, no leg swelling  GASTROINTESTINAL: No abdominal or epigastric pain. No nausea, vomiting, or hematemesis;  No melena.  NEUROLOGICAL: No dizziness, headaches, memory loss, loss of strength  SKIN: No itching, burning, rashes, or lesions         Vital Signs Last 24 Hrs  T(C): 36.3 (20 Dec 2019 03:00), Max: 36.7 (19 Dec 2019 10:55)  T(F): 97.3 (20 Dec 2019 03:00), Max: 98 (19 Dec 2019 10:55)  HR: 108 (20 Dec 2019 07:00) (73 - 148)  BP: 97/68 (20 Dec 2019 07:00) (85/52 - 119/56)  BP(mean): --  RR: 20 (20 Dec 2019 07:00) (14 - 27)  SpO2: 100% (20 Dec 2019 07:00) (92% - 100%)                      PHYSICAL EXAM:  GENERAL: No distress, well developed  HEAD:  Atraumatic, Normocephalic  NECK: Supple, No JVD, No Bruit   NERVOUS SYSTEM:  Alert, Awake, Oriented to time, place, person; Normal memory and speech; Normal motor Strength 5/5 B/L upper and lower extremities  CHEST/LUNG: Normal air entry to lung base bilaterally; No wheeze, crackle, rales, rhonchi  HEART: Irregular heart beat, S1, A2, P2, No S3, No gallop, 2/6 systolic apical murmur  ABDOMEN: Soft, Non tender, Non distended; Bowel sounds present  EXTREMITIES:  1+ Peripheral Pulses, No clubbing, No edema  SKIN: No rashes or lesions    TELEMETRY: afib HR at 100-118    ECG: < from: 12 Lead ECG (12.19.19 @ 13:46) >  Atrial fibrillation with rapid ventricular response with premature ventricular  or aberrantly conducted complexes  Abnormal QRS-T angle, consider primary T wave abnormality    < end of copied text >      I&O's Detail    19 Dec 2019 07:01  -  20 Dec 2019 07:00  --------------------------------------------------------  IN:    Oral Fluid: 360 mL    sodium chloride 0.9%: 20 mL  Total IN: 380 mL    OUT:  Total OUT: 0 mL    Total NET: 380 mL          LABS:                        10.7   7.15  )-----------( 212      ( 20 Dec 2019 04:10 )             35.6     12-20    136  |  96<L>  |  54<H>  ----------------------------<  96  5.3<H>   |  21  |  5.7<HH>    Ca    9.6      20 Dec 2019 04:10  Phos  8.5     12-20  Mg     2.6     12-20          PT/INR - ( 18 Dec 2019 21:32 )   PT: 15.10 sec;   INR: 1.32 ratio         PTT - ( 18 Dec 2019 21:32 )  PTT:31.5 sec    I&O's Summary    19 Dec 2019 07:01  -  20 Dec 2019 07:00  --------------------------------------------------------  IN: 380 mL / OUT: 0 mL / NET: 380 mL        RADIOLOGY & ADDITIONAL STUDIES: 133

## 2020-01-10 ENCOUNTER — APPOINTMENT (OUTPATIENT)
Dept: VASCULAR SURGERY | Facility: CLINIC | Age: 75
End: 2020-01-10
Payer: MEDICARE

## 2020-01-10 PROCEDURE — 99213 OFFICE O/P EST LOW 20 MIN: CPT

## 2020-01-10 NOTE — ASSESSMENT
[FreeTextEntry1] : The patient presently has unremitting rest pain in her right foot with enlarging ulceration of her second digit. Her left foot is not that bad. I suggested that she undergo a retrograde angiogram the right posterior tibial artery access to revascularize her right tibial system for limb salvage. Given her risks associated with dialysis dependence and vein limitation of believe this is the optimal first line approach prior to submitting her for bypass surgery.\par \par Dr. Portillo will be performing the procedure.

## 2020-01-10 NOTE — CONSULT LETTER
[Courtesy Letter:] : I had the pleasure of seeing your patient, [unfilled], in my office today. [Dear  ___] : Dear  [unfilled], [Please see my note below.] : Please see my note below. [Consult Closing:] : Thank you very much for allowing me to participate in the care of this patient.  If you have any questions, please do not hesitate to contact me. [FreeTextEntry2] : Dr Stephanie Rehman\par  [Sincerely,] : Sincerely,

## 2020-01-10 NOTE — DATA REVIEWED
[FreeTextEntry1] : Angiogram shows no aortoiliac disease. Widely patent bilateral SFA stents. On the left she is continuous flow through an anterior tibial artery and on the right she has severe tibial vessel disease with a reconstituted posterior tibial vessel at the ankle.

## 2020-02-04 NOTE — ASU PATIENT PROFILE, ADULT - PSH
AV fistula    H/O abdominal hysterectomy    H/O cardiac radiofrequency ablation    History of kidney surgery    S/P CABG (coronary artery bypass graft)    S/P cataract surgery    S/P ureteral stent placement

## 2020-02-04 NOTE — ASU DISCHARGE PLAN (ADULT/PEDIATRIC) - CALL YOUR DOCTOR IF YOU HAVE ANY OF THE FOLLOWING:
Bleeding that does not stop/Fever greater than (need to indicate Fahrenheit or Celsius)/Pain not relieved by Medications/Swelling that gets worse/Numbness, tingling, color or temperature change to extremity/Wound/Surgical Site with redness, or foul smelling discharge or pus

## 2020-02-24 NOTE — ASU DISCHARGE PLAN (ADULT/PEDIATRIC) - CALL YOUR DOCTOR IF YOU HAVE ANY OF THE FOLLOWING:
n/a
Wound/Surgical Site with redness, or foul smelling discharge or pus/Numbness, tingling, color or temperature change to extremity/Fever greater than (need to indicate Fahrenheit or Celsius)/Pain not relieved by Medications/Nausea and vomiting that does not stop/Swelling that gets worse/Unable to urinate/Bleeding that does not stop

## 2020-02-24 NOTE — ASU DISCHARGE PLAN (ADULT/PEDIATRIC) - CARE PROVIDER_API CALL
Josemanuel Portillo)  Surgery; Vascular Surgery  [address] [place] [address]  Phone: [phone]  Fax: [phone]  Follow Up Time:

## 2020-02-24 NOTE — H&P ADULT - HISTORY OF PRESENT ILLNESS
75yo F with PMHx of ESRD on Tue/Thur/Sat HD, PVD, Afib, pulmonary HTN, anemia, CABG, HF with reduced EF and MI, s/p bilateral SFA angioplasty and stent 2019, last angiogram 12/19/19 with Bilateral Iliac artery balloon and stenting.Has peroneal runoff to the right foot. 2 vessel runoff to the left foot  Pt c/o unremitting rest pain in her right foot with enlarging ulceration of her second digit. She presents for recommended retrograde angiogram the right posterior tibial artery access to revascularize her right tibial system for limb salvage.   Pt denies CP, SOB, dizziness, diaphoresis, syncope, fevers, chills, abd pain, back pain

## 2020-02-24 NOTE — CHART NOTE - NSCHARTNOTEFT_GEN_A_CORE
PACU ANESTHESIA ADMISSION NOTE      ____ Intubated  TV:______       Rate: ______      FiO2: ______    ___x_ Patent Airway    __x__ Full return of protective reflexes    ____ Full recovery from anesthesia / sedation to baseline status    Vitals:  HR 99  /71  RR 15  O2sat. 95%  Temp: 36.2C      Mental Status:  _x___ Awake   _____ Alert   _x____ Drowsy   _____ Sedated    Nausea/Vomiting: ____ Yes, See Post - Op Orders      _x___ No    Pain Scale (0-10): __x___    Treatment: ____ None    __x__ See Post - Op/PCA Orders    Post - Operative Fluids:   ____ Oral   __x__ See Post - Op Orders    Plan:  Discharge to:   __x__Home       _____Floor      _____Critical Care    _____ Other:_________________    Comments: s/p IV sedation. No anesthesia complications. Pt's condition is stable in PACU. Full report is given to PACU RN.

## 2020-02-24 NOTE — H&P ADULT - ATTENDING COMMENTS
Recent angiogram revealed occluded origin of PT artery   She is here today for retrograde angiogram and recanalization of PT artery

## 2020-02-24 NOTE — ASU PREOP CHECKLIST - NSBLOODTRANS_GEN_A_CORE_SIUH
Detail Level: Detailed Foceps Text (A Tick Located On (Location)....): was removed with forceps. The tick was grasped with the forceps as close to the skin as possible and removed with gentle upward pressure. Removal Method (Required For Billing): Forceps (cpt 54573) Incision 40003 Text (A Tick Located On (Location)....): was removed utilizing an incision created by a 15-blade scalpel. This was complicated by the difficulty of visualizing and removing all of the tick parts. Anesthesia Volume In Cc: 0.5 Incision 92772 Text (A Tick Located On (Location)....): was removed utilizing an incision created by a 15-blade scalpel. Curette Text (A Tick Located On (Location)....): was removed with a curette. The skin immediately surrounding the tick was removed with a curette. Lidocaine Text (A Tick Located On (Location)....): was removed with lidocaine infiltration. After the lidocaine was infiltrated the tick crawled out of the skin on its own. no...

## 2020-02-24 NOTE — H&P ADULT - NSICDXPASTSURGICALHX_GEN_ALL_CORE_FT
PAST SURGICAL HISTORY:  AV fistula     H/O abdominal hysterectomy     H/O cardiac radiofrequency ablation     History of kidney surgery     S/P CABG (coronary artery bypass graft)     S/P cataract surgery     S/P ureteral stent placement

## 2020-03-11 NOTE — CONSULT LETTER
[Dear  ___] : Dear  [unfilled], [Courtesy Letter:] : I had the pleasure of seeing your patient, [unfilled], in my office today. [Please see my note below.] : Please see my note below. [FreeTextEntry2] : Dear Dr. Sherif Phelps,\par Dear Dr. Sandoval Knox,

## 2020-06-08 NOTE — H&P PST ADULT - NSICDXPASTMEDICALHX_GEN_ALL_CORE_FT
PAST MEDICAL HISTORY:  Aortic stenosis, moderate     Atrial fibrillation status post cardioversion    CAD (coronary artery disease)     Degenerative joint disease     Diverticulosis     ESRD on hemodialysis T,T,S    GERD (gastroesophageal reflux disease)     Heart failure with reduced ejection fraction     HTN (hypertension)     HTN (hypertension)     Kidney stone     MI (myocardial infarction)     Osteoarthritis     Pulmonary hypertension, moderate to severe     Swollen feet ON OCCASSION    Thyroid nodule

## 2020-06-08 NOTE — H&P PST ADULT - REASON FOR ADMISSION
PT PRESENTS TO PAST WITH NO SOB, CP, PALPITATIONS, DYSURIA, UTI OR URI AT PRESENT.   PT ABLE TO WALK UP 1/2  FLIGHTS OF STEPS WITH NO SOB-VERY SLOW- WITH CANE.   AS PER THE PT, THIS IS HIS/HER COMPLETE MEDICAL AND SURGICAL HX, INCLUDING MEDICATIONS PRESCRIBED AND OVER THE COUNTER  PT PRESENTS TO PAST WITH H/O-  LEFT LOWER EXTREMITY DISCOMFORT UPON AMBULATION.  PT SCHEDULED FOR  LEFT LOWER EXTREMITY ANGIOGRAM-POSSIBLE ENDOVASCULAR REVASCULARIZATION.

## 2020-06-08 NOTE — H&P PST ADULT - RS GEN PE MLT RESP DETAILS PC
no chest wall tenderness/normal/airway patent/respirations non-labored/breath sounds equal/clear to auscultation bilaterally/good air movement

## 2020-06-22 NOTE — CONSULT NOTE ADULT - ASSESSMENT
pt c/o cough, stuffy, nose, sore throat since Tuesday and now c/o midsternal/epigastric chest pain radiating to the back, since Friday, more constant today since 3pm. pt did not take anything for the pain. PMH- Asthma, HTN, Anxiety.
Pt with ESRD on HD TTS, HTN, Afib, admitted for angiogranm for ischemic Left foot ulcer.     ESRD - CXR with worsening CHF, pt is non compliant with fluid restriction  HD for 2-3 hrs today 2 K bath  UF 2.5 L as sony  renal diet, check phos    Afib - on Cardizem and Eliquis  PVD/Lt foot ulcer - followed by vasc sx  Will follow
chronic afib  tachycardia due to being NPO and missing couple of doses of her routine medications as well as contrast exposure  temporary volume overload due to iv contrast, afib, tachycardia, responded well to HD    continue with out patient medicine: Metoprolol tartarate 50 BID ( she had trouble with succinate, Cardizem 30 q 8 hours, and the rest  if stable anticipate d/c at AM (in cardiac point),  isaiah f/u with me already has the appointment

## 2020-06-22 NOTE — CHART NOTE - NSCHARTNOTEFT_GEN_A_CORE
PACU ANESTHESIA ADMISSION NOTE      Procedure: Angiogram, lower extremity, left    Post op diagnosis:  Ischemic ulcer diabetic foot      ____  Intubated  TV:______       Rate: ______      FiO2: ______    __x__  Patent Airway    __x__  Full return of protective reflexes    __x__  Full recovery from anesthesia / back to baseline status    Vitals:  T(C): 97.9  HR: 80  BP: 125/64  RR: 18 (06-22-20 @ 07:20) (18 - 18)  SpO2: 92%     Mental Status:  __x__ Awake   _____ Alert   _____ Drowsy   _____ Sedated    Nausea/Vomiting:  __x__ NO  ______Yes,   See Post - Op Orders          Pain Scale (0-10):  ___0__    Treatment: __x__ None    ____ See Post - Op/PCA Orders    Post - Operative Fluids:   __x__ Oral   ____ See Post - Op Orders    Plan: Discharge:   __x__Home       _____Floor     _____Critical Care    _____  Other:_________________    Comments: uneventful anesthesia course no complications. VItals stable. Pt transferred to PACU

## 2020-06-22 NOTE — CONSULT NOTE ADULT - SUBJECTIVE AND OBJECTIVE BOX
NEPHROLOGY CONSULTATION NOTE    Patient is a 74y Female whom presented to the hospital with ESRD on HD TTS, HTN, AS, CAD,  Afib on Eliquis admitted for Lt LE angiogram had angioplasty, complains of SOB. Non compliant with fluid restriction. LAst HD 6/20  No CP, nausea vomiitng  PAST MEDICAL & SURGICAL HISTORY:  Swollen feet: ON OCCASSION  HTN (hypertension)  ESRD on hemodialysis: T,T,S  Thyroid nodule  Degenerative joint disease  Osteoarthritis  Diverticulosis  GERD (gastroesophageal reflux disease)  Heart failure with reduced ejection fraction  Pulmonary hypertension, moderate to severe  Aortic stenosis, moderate  Atrial fibrillation: status post cardioversion  MI (myocardial infarction)  CAD (coronary artery disease)  Kidney stone  HTN (hypertension)  S/P ureteral stent placement  History of kidney surgery  S/P cataract surgery  S/P CABG (coronary artery bypass graft)  AV fistula  H/O abdominal hysterectomy  H/O cardiac radiofrequency ablation    Allergies:  No Known Allergies    Home Medications Reviewed  Hospital Medications:   MEDICATIONS  (STANDING):  allopurinol 100 milliGRAM(s) Oral two times a day  apixaban 2.5 milliGRAM(s) Oral every 12 hours  diltiazem    Tablet 30 milliGRAM(s) Oral every 6 hours  metoprolol succinate ER 50 milliGRAM(s) Oral daily  sodium chloride 0.9%. 1000 milliLiter(s) (10 mL/Hr) IV Continuous <Continuous>      SOCIAL HISTORY:  Denies ETOH,Smoking,   FAMILY HISTORY:  Family history of diabetes mellitus in mother  Family history of lung cancer (Father)  Family history of CHF (congestive heart failure) (Mother)        REVIEW OF SYSTEMS:  CONSTITUTIONAL: No weakness, fevers or chills  EYES/ENT: No visual changes;  No vertigo or throat pain   NECK: No pain or stiffness  RESPIRATORY: No cough, wheezing, hemoptysis; Has some shortness of breath  CARDIOVASCULAR: No chest pain or palpitations.  GASTROINTESTINAL: No abdominal or epigastric pain. No nausea, vomiting  SKIN: No itching, burning, rashes, or lesions   VASCULAR: No bilateral lower extremity edema.   All other review of systems is negative unless indicated above.    VITALS:  T(F): 98 (06-22-20 @ 11:00), Max: 98 (06-22-20 @ 11:00)  HR: 95 (06-22-20 @ 13:50)  BP: 120/58 (06-22-20 @ 13:50)  RR: 16 (06-22-20 @ 13:50)  SpO2: 97% (06-22-20 @ 13:50)    06-22 @ 07:01  -  06-22 @ 14:17  --------------------------------------------------------  IN: 350 mL / OUT: 0 mL / NET: 350 mL      Height (cm): 167.64 (06-22 @ 07:20)  Weight (kg): 65.8 (06-22 @ 07:20)  BMI (kg/m2): 23.4 (06-22 @ 07:20)  BSA (m2): 1.74 (06-22 @ 07:20)      I&O's Detail    22 Jun 2020 07:01  -  22 Jun 2020 14:17  --------------------------------------------------------  IN:    IV PiggyBack: 100 mL    Oral Fluid: 240 mL    sodium chloride 0.9%.: 10 mL  Total IN: 350 mL    OUT:  Total OUT: 0 mL    Total NET: 350 mL            PHYSICAL EXAM:  Constitutional: NAD  HEENT: anicteric sclera,   Neck: No JVD  Respiratory: BS diminished at bases  Cardiovascular: S1, S2, RRR  Gastrointestinal: BS+, soft, NT/ND  Extremities:No peripheral edema  Neurological: A/O x 3  Psychiatric: Normal mood, normal affect  : No CVA tenderness. No simeon.   Skin: No rashes  Vascular Access: AVF    LABS:  06-22    137  |  101  |  57<H>  ----------------------------<  96  5.3<H>   |  18  |  4.8<HH>    Ca    9.8      22 Jun 2020 12:25  Mg     2.7     06-22      Creatinine Trend: 4.8 <--, 4.6 <--                        13.3   8.48  )-----------( 153      ( 22 Jun 2020 12:25 )             42.4     Urine Studies:              RADIOLOGY & ADDITIONAL STUDIES:    < from: Xray Chest 1 View-PORTABLE IMMEDIATE (06.22.20 @ 12:43) >    Cardiomegaly. CHF.    Worsened.    < end of copied text >

## 2020-06-22 NOTE — CHART NOTE - NSCHARTNOTEFT_GEN_A_CORE
Pt seen and examined at bed site in PACU  s/p angiogram  Pt is dyspneaic. Noted in Atrial Fibrillation with RVR -140s  /57  O2 sat 88-90 NC 3L    Full labs sent  Pt was given home dose Cardizem 30 mg po x 1 when BP became 120 systolic    - waiting for nephro and cardiac consults  - admit to tele for observation  - f/u CXR and electrolytes

## 2020-06-22 NOTE — ASU DISCHARGE PLAN (ADULT/PEDIATRIC) - CARE PROVIDER_API CALL
Josemanuel Portillo  SURGERY  [address] [place] [id number]  Phone: [phone]  Fax: [phone]  Established Patient  Follow Up Time: 1 week

## 2020-06-22 NOTE — ASU PATIENT PROFILE, ADULT - PMH
Aortic stenosis, moderate    Atrial fibrillation  status post cardioversion  CAD (coronary artery disease)    Degenerative joint disease    Diverticulosis    ESRD on hemodialysis  T,T,S  GERD (gastroesophageal reflux disease)    Heart failure with reduced ejection fraction    HTN (hypertension)    HTN (hypertension)    Kidney stone    MI (myocardial infarction)    Osteoarthritis    Pulmonary hypertension, moderate to severe    Swollen feet  ON OCCASSION  Thyroid nodule

## 2020-06-22 NOTE — CONSULT NOTE ADULT - SUBJECTIVE AND OBJECTIVE BOX
Patient is a 74y old  Female who presents with a chief complaint of angiogram (22 Jun 2020 14:16)      HPI: She is well known to me.   patient had a peripheral angiography today after the procedure developed tachycardia, she has a chronic afib and very sensitive to any procedure or any delay in taking her medications, develops tachycardia, later on started dyspnea, orthopnea, with impression of the pulmonary edema due to afib & tachycardia plus receiving iv contrast, an urgent HD was planned, responded well, i saw her prior and post HD responded well, asymptomatic, even was asking me to be d/c home.      PAST MEDICAL & SURGICAL HISTORY:  Occasional leg edema, mainly prior to HD  HTN (hypertension)  ESRD on hemodialysis: T,T,S  Thyroid nodule  Degenerative joint disease  Osteoarthritis  Diverticulosis  GERD (gastroesophageal reflux disease)  Heart failure with reduced ejection fraction  Pulmonary hypertension, moderate to severe  Aortic stenosis, moderate  Atrial fibrillation: status post cardioversion  MI (myocardial infarction)  CAD (coronary artery disease)  Kidney stone  HTN (hypertension)  S/P ureteral stent placement  History of kidney surgery  S/P cataract surgery  S/P CABG (coronary artery bypass graft)  AV fistula  H/O abdominal hysterectomy  H/O cardiac radiofrequency ablation      PREVIOUS DIAGNOSTIC TESTING:      ECHO  FINDINGS: < from: Transthoracic Echocardiogram (01.13.19 @ 10:53) >   1. Left ventricular ejection fraction, by visual estimation, is 45 to   50%.   2. Mildly increased LV wall thickness.   3. Mild to moderate mitral valve regurgitation.   4. Thickening and calcification of the anterior and posterior mitral   valve leaflets.   5. Mild-moderate tricuspid regurgitation.   6. Mild aortic regurgitation.   7. Estimated pulmonary artery systolic pressure is 60.9 mmHg assuming a   right atrial pressure of 15 mmHg, which is consistent with severe   pulmonary hypertension.   8. Peak transaortic gradient is 32.7 mmHg, mean transaortic gradient   equals 15.5 mmHg, the calculated aortic valve area equals 1.39 cm² by the   continuity equation consistent with moderate aortic stenosis.      < end of copied text >      STRESS TEST  FINDINGS:  no major ischemia in prior adenosine nuclear stress test    MEDICATIONS  (STANDING):  allopurinol 100 milliGRAM(s) Oral two times a day  apixaban 2.5 milliGRAM(s) Oral every 12 hours  diltiazem    Tablet 30 milliGRAM(s) Oral every 6 hours  metoprolol tartrate 50 milliGRAM(s) Oral two times a day    MEDICATIONS  (PRN):      FAMILY HISTORY:  Family history of diabetes mellitus in mother  Family history of lung cancer (Father)  Family history of CHF (congestive heart failure) (Mother)      SOCIAL HISTORY:  CIGARETTES: ex smoker  ALCOHOL: no  DRUGS: no                      REVIEW OF SYSTEMS:  CONSTITUTIONAL: No distress, Looks stable now after completion of HD, initially had dyspnea, orthopnea  NECK: No pain   RESPIRATORY: No cough, wheezing, a chronic shortness of breath  CARDIOVASCULAR: No chest pain, but palpitations, no leg swelling  GASTROINTESTINAL: No abdominal or epigastric pain. No nausea, vomiting, or hematemesis;  No melena.  NEUROLOGICAL: No dizziness, headaches, memory loss, loss of strength  ENDOCRINE: No heat or cold intolerance  MUSCULOSKELETAL: No joint pain, No  swelling; No muscle pain  PSYCHIATRIC: anxiety & depression        Vital Signs Last 24 Hrs  T(C): 36.1 (22 Jun 2020 20:38), Max: 36.7 (22 Jun 2020 11:00)  T(F): 96.9 (22 Jun 2020 20:38), Max: 98 (22 Jun 2020 11:00)  HR: 95 (22 Jun 2020 20:38) (70 - 134)  BP: 113/57 (22 Jun 2020 20:38) (87/55 - 125/64)  BP(mean): --  RR: 18 (22 Jun 2020 20:38) (15 - 20)  SpO2: 95% (22 Jun 2020 18:44) (95% - 100%)                      PHYSICAL EXAM:  I saw her at the end of HD  GENERAL: No distress, stable, able o talk well  HEAD:  Atraumatic, Normocephalic  NECK: Supple, No JVD, No Bruit   NERVOUS SYSTEM:  Alert, Awake, Oriented to time, place, person; Normal memory and speech;   CHEST/LUNG: Normal air entry to lung base bilaterally after the HD; No wheeze, crackle, rales  HEART: Irregular heart beat, S1, A2, P2, No S3, No gallop, 2/6 systolic apical and basal murmur  ABDOMEN: Soft, Non tender, Non distended; Bowel sounds present  EXTREMITIES:  1+ Peripheral Pulses, No clubbing, No edema    TELEMETRY: afib, tachycardia    ECG: < from: 12 Lead ECG (06.22.20 @ 12:12) >  e Atrial fibrillation with rapid ventricular response  T wave abnormality, consider lateral ischemia    < end of copied text >  the lateral T inversion is chronic    I&O's Detail    22 Jun 2020 07:01  -  22 Jun 2020 21:19  --------------------------------------------------------  IN:    IV PiggyBack: 100 mL    Oral Fluid: 240 mL    sodium chloride 0.9%: 10 mL  Total IN: 350 mL    OUT:    Other: 2000 mL  Total OUT: 2000 mL    Total NET: -1650 mL          LABS:                        13.3   8.48  )-----------( 153      ( 22 Jun 2020 12:25 )             42.4     06-22    137  |  101  |  57<H>  ----------------------------<  96  5.3<H>   |  18  |  4.8<HH>    Ca    9.8      22 Jun 2020 12:25  Mg     2.7     06-22      CARDIAC MARKERS ( 22 Jun 2020 12:25 )  x     / 0.04 ng/mL / x     / x     / x          PT/INR - ( 22 Jun 2020 12:25 )   PT: 15.30 sec;   INR: 1.33 ratio         PTT - ( 22 Jun 2020 12:25 )  PTT:83.2 sec    I&O's Summary    22 Jun 2020 07:01  -  22 Jun 2020 21:19  --------------------------------------------------------  IN: 350 mL / OUT: 2000 mL / NET: -1650 mL        RADIOLOGY & ADDITIONAL STUDIES: < from: Xray Chest 1 View-PORTABLE IMMEDIATE (06.22.20 @ 12:43) >    Cardiomegaly. CHF.    Worsened.    < end of copied text >

## 2020-06-23 NOTE — PROGRESS NOTE ADULT - SUBJECTIVE AND OBJECTIVE BOX
NOAH QUIROS  74y Female   [id number]    Hospital Day:   Post Operative Day:  Procedure:s/p  left pedal angio peroneal and at plasty   Patient is a 74y old  Female who presents with a chief complaint of angiogram (2020 14:16)    PAST MEDICAL & SURGICAL HISTORY:  Swollen feet: ON OCCASSION  HTN (hypertension)  ESRD on hemodialysis: T,T,S  Thyroid nodule  Degenerative joint disease  Osteoarthritis  Diverticulosis  GERD (gastroesophageal reflux disease)  Heart failure with reduced ejection fraction  Pulmonary hypertension, moderate to severe  Aortic stenosis, moderate  Atrial fibrillation: status post cardioversion  MI (myocardial infarction)  CAD (coronary artery disease)  Kidney stone  HTN (hypertension)  S/P ureteral stent placement  History of kidney surgery  S/P cataract surgery  S/P CABG (coronary artery bypass graft)  AV fistula  H/O abdominal hysterectomy  H/O cardiac radiofrequency ablation      Events of the Last 24h:  Vital Signs Last 24 Hrs  T(C): 36.1 (2020 20:38), Max: 36.7 (2020 11:00)  T(F): 96.9 (2020 20:38), Max: 98 (2020 11:00)  HR: 95 (2020 22:54) (70 - 134)  BP: 113/57 (2020 20:38) (87/55 - 125/64)  BP(mean): --  RR: 18 (2020 20:38) (15 - 20)  SpO2: 95% (2020 18:44) (95% - 100%)        Diet, Renal Restrictions:   For patients receiving Renal Replacement - No Protein Restr, No Conc K, No Conc Phos, Low Sodium (20 @ 12:46)      I&O's Summary    2020 07:  -  2020 02:07  --------------------------------------------------------  IN: 350 mL / OUT: 2000 mL / NET: -1650 mL     I&O's Detail    2020 07:  -  2020 02:07  --------------------------------------------------------  IN:    IV PiggyBack: 100 mL    Oral Fluid: 240 mL    sodium chloride 0.9%: 10 mL  Total IN: 350 mL    OUT:    Other: 2000 mL  Total OUT: 2000 mL    Total NET: -1650 mL          MEDICATIONS  (STANDING):  allopurinol 100 milliGRAM(s) Oral two times a day  apixaban 2.5 milliGRAM(s) Oral every 12 hours  diltiazem    Tablet 30 milliGRAM(s) Oral every 6 hours  metoprolol tartrate 50 milliGRAM(s) Oral two times a day    MEDICATIONS  (PRN):      PHYSICAL EXAM:    GENERAL: NAD    HEENT: NCAT    CHEST/LUNGS: CTAB    HEART: RRR,  No murmurs, rubs, or gallops    ABDOMEN: SNTND +BS    GROIN:  right groin incision clean dry intact no hematoma no bleeding     EXTREMITIES:  FROM, No clubbing, cyanosis, or edema, palpable pulse    NEURO: No focal neurological deficits    SKIN: No rashes or lesions    INCISION/WOUNDS:                          13.3   8.48  )-----------( 153      ( 2020 12:25 )             42.4        CBC Full  -  ( 2020 12:25 )  WBC Count : 8.48 K/uL  RBC Count : 4.20 M/uL  Hemoglobin : 13.3 g/dL  Hematocrit : 42.4 %  Platelet Count - Automated : 153 K/uL  Mean Cell Volume : 101.0 fL  Mean Cell Hemoglobin : 31.7 pg  Mean Cell Hemoglobin Concentration : 31.4 g/dL  Auto Neutrophil # : x  Auto Lymphocyte # : x  Auto Monocyte # : x  Auto Eosinophil # : x  Auto Basophil # : x  Auto Neutrophil % : x  Auto Lymphocyte % : x  Auto Monocyte % : x  Auto Eosinophil % : x  Auto Basophil % : x               137   |  101   |  57                 Ca: 9.8    BMP:   ----------------------------< 96     M.7   (20 @ 12:25)             5.3    |  18    | 4.8                Ph: x            PT/INR - ( 2020 12:25 )   PT: 15.30 sec;   INR: 1.33 ratio         PTT - ( 2020 12:25 )  PTT:83.2 sec  CARDIAC MARKERS ( 2020 21:46 )  x     / 0.05 ng/mL / x     / x     / x      CARDIAC MARKERS ( 2020 12:25 )  x     / 0.04 ng/mL / x     / x     / x

## 2020-06-23 NOTE — DISCHARGE NOTE PROVIDER - CARE PROVIDERS DIRECT ADDRESSES
[email or web address],DirectAddress_Unknown

## 2020-06-23 NOTE — DISCHARGE NOTE PROVIDER - NSDCMRMEDTOKEN_GEN_ALL_CORE_FT
allopurinol 100 mg oral tablet: 1 tab(s) orally 2 times a day  calcium acetate-magnesium carbonate 300 mg-300 mg oral tablet: orally once a day  dilTIAZem 30 mg oral tablet: 1 tab(s) orally 4 times a day  Eliquis 2.5 mg oral tablet: 1 tab(s) orally 2 times a day  famotidine 40 mg oral tablet: 1 tab(s) orally once a day (at bedtime)  metoprolol succinate 50 mg oral tablet, extended release: 1 tab(s) orally once a day

## 2020-06-23 NOTE — PROGRESS NOTE ADULT - SUBJECTIVE AND OBJECTIVE BOX
seen and examined  no distress   no new complaints   s/p hd yesterday         PAST HISTORY  --------------------------------------------------------------------------------  No significant changes to PMH, PSH, FHx, SHx, unless otherwise noted    ALLERGIES & MEDICATIONS  --------------------------------------------------------------------------------  Allergies    No Known Allergies    Intolerances      Standing Inpatient Medications  allopurinol 100 milliGRAM(s) Oral two times a day  apixaban 2.5 milliGRAM(s) Oral every 12 hours  diltiazem    Tablet 30 milliGRAM(s) Oral every 8 hours  metoprolol tartrate 50 milliGRAM(s) Oral two times a day    PRN Inpatient Medications  oxycodone    5 mG/acetaminophen 325 mG 1 Tablet(s) Oral every 4 hours PRN          VITALS/PHYSICAL EXAM  --------------------------------------------------------------------------------  T(C): 35.6 (06-23-20 @ 05:41), Max: 36.7 (06-22-20 @ 11:00)  HR: 80 (06-23-20 @ 05:41) (80 - 134)  BP: 112/55 (06-23-20 @ 05:41) (87/55 - 125/64)  RR: 19 (06-23-20 @ 05:41) (15 - 20)  SpO2: 95% (06-22-20 @ 18:44) (95% - 100%)  Wt(kg): --  Height (cm): 167.64 (06-22-20 @ 07:20)  Weight (kg): 65.8 (06-22-20 @ 07:20)  BMI (kg/m2): 23.4 (06-22-20 @ 07:20)  BSA (m2): 1.74 (06-22-20 @ 07:20)      06-22-20 @ 07:01  -  06-23-20 @ 07:00  --------------------------------------------------------  IN: 350 mL / OUT: 2000 mL / NET: -1650 mL      Physical Exam:  	Gen: NAD  	Pulm: decrease BS  B/L  	CV:  S1S2; no rub  	Abd:  soft, nontender/nondistended  	LE:  no edema  	Vascular access: AV FISTULA     LABS/STUDIES  --------------------------------------------------------------------------------              13.3   8.48  >-----------<  153      [06-22-20 @ 12:25]              42.4     137  |  101  |  57  ----------------------------<  96      [06-22-20 @ 12:25]  5.3   |  18  |  4.8        Ca     9.8     [06-22-20 @ 12:25]      Mg     2.7     [06-22-20 @ 12:25]      PT/INR: PT 15.30, INR 1.33       [06-22-20 @ 12:25]  PTT: 83.2       [06-22-20 @ 12:25]    Troponin 0.05      [06-22-20 @ 21:46]    Creatinine Trend:  SCr 4.8 [06-22 @ 12:25]  SCr 4.6 [06-08 @ 11:07]

## 2020-06-23 NOTE — DISCHARGE NOTE PROVIDER - PROVIDER TOKENS
PROVIDER:[TOKEN:[[id number]],FOLLOWUP:[2 weeks]],PROVIDER:[TOKEN:[[id number]],FOLLOWUP:[2 weeks]]

## 2020-06-23 NOTE — DISCHARGE NOTE PROVIDER - NSDCCPCAREPLAN_GEN_ALL_CORE_FT
PRINCIPAL DISCHARGE DIAGNOSIS  Diagnosis: PVD (peripheral vascular disease)  Assessment and Plan of Treatment: Patient is s/p RLE angiogram with peroneal and AT plasty. For pain, take tylenol as needed. Please follow up with Dr. Portillo in 2 weeks.      SECONDARY DISCHARGE DIAGNOSES  Diagnosis: CKD (chronic kidney disease) requiring chronic dialysis  Assessment and Plan of Treatment: Patient underwent dialysis for 2-3 hrs 6/22 (2 K bath  UF 2.5 L as sony). She was planned to go for dialysis today as well but refused and wishes to follow up with her nephrologist as an outpatient.    Diagnosis: Afib  Assessment and Plan of Treatment: Continue home medications and follow up with you Dr. Child in 2 weeks.

## 2020-06-23 NOTE — DISCHARGE NOTE PROVIDER - HOSPITAL COURSE
ESRD on Tue/Thur/Sat HD, PVD s/p endovascular revascularization, Afib, pulmonary HTN, anemia, CABG, HF with reduced EF and MI presented to [facility] 6/22 for an elective LLE angiogram with AT and peroneal angioplasty. Patient was found to be in rapid a fib post operatively and was admitted for monitoring. Cardiology was consulted and recommended to continue metoprolol and Cardizem and follow up as an outpatient. She remains in afib but is now rate controlled. Patient underwent HD for 2-3 hrs 6/22 (2 K bath  UF 2.5). She is now medically stable and ready for discharge.

## 2020-06-23 NOTE — PROGRESS NOTE ADULT - ASSESSMENT
Pt with ESRD on HD TTS, HTN, Afib, admitted for angiogranm for ischemic Left foot ulcer.     ESRD - CXR with worsening CHF, pt is non compliant with fluid restriction  s/p hd yesterday, hd again today 2 h, 1.5 liters   renal diet, check phos  BP on the low side , might need to add  midodrine prior to hd     Afib - on Cardizem and Eliquis  PVD/Lt foot ulcer - followed by vasc sx, s/p angio with plasty   Will follow

## 2020-06-23 NOTE — DISCHARGE NOTE PROVIDER - CARE PROVIDER_API CALL
Josemanuel Portillo  SURGERY  [address] [place] [address]  Phone: [phone]  Fax: [phone]  Follow Up Time: 2 weeks    Bipin Child)  Cardiology; Internal Medicine; Nuclear Cardiology  [address] [place] [address]  Phone: [phone]  Fax: [phone]  Follow Up Time: 2 weeks

## 2020-06-23 NOTE — DISCHARGE NOTE NURSING/CASE MANAGEMENT/SOCIAL WORK - PATIENT PORTAL LINK FT
You can access the FollowMyHealth Patient Portal offered by [facility] by registering at the following website: [email or web address]. By joining [employer]’s FollowMyHealth portal, you will also be able to view your health information using other applications (apps) compatible with our system.

## 2020-07-14 PROBLEM — N18.6 END STAGE RENAL DISEASE: Chronic | Status: ACTIVE | Noted: 2020-01-01

## 2020-07-14 PROBLEM — M79.89 OTHER SPECIFIED SOFT TISSUE DISORDERS: Chronic | Status: ACTIVE | Noted: 2020-01-01

## 2020-07-14 PROBLEM — I10 ESSENTIAL (PRIMARY) HYPERTENSION: Chronic | Status: ACTIVE | Noted: 2020-01-01

## 2020-08-15 NOTE — PATIENT PROFILE ADULT - PRO INTERPRETER NEED 2
English anticipated discharge recommendation/risk reduction/prevention/rehab potential/impairments found

## 2020-09-16 PROBLEM — I70.235 ATHEROSCLEROSIS OF NATIVE ARTERIES OF RIGHT LEG WITH ULCERATION OF OTHER PART OF FOOT: Status: ACTIVE | Noted: 2019-12-13

## 2020-10-02 NOTE — H&P PST ADULT - ADDITIONAL PE
FROM neck, full set of dentures, upper and lower FROM neck, full set of dentures, upper and lower; aortic murmur, Lt CW tessio catheter. LUE old AV fistula; Rt UE precautions

## 2020-10-02 NOTE — H&P PST ADULT - PRIMARY CARE PROVIDER
PMD: Neyda (last appt     ) Cardio: Mateusz (last appt   ) Renal: Palumbini PMD: Neyda; Cardio: Jossiemi, Renal: Iván

## 2020-10-02 NOTE — H&P PST ADULT - HISTORY OF PRESENT ILLNESS
75y Female presents today for presurgical testing for right lower extremity angiogram, possible endovascular revascularization, tibial access. Patient states that 4 toes on her right foot are always cold, are painful and sometimes tingle. Patient reports going to [facility] Tue/Thur/Sat, having first begun approximately 8-9 months ago.  Patient denies any CP, palpitations, SOB, cough, or dysuria. No recent URI or UTI.  Stated exercise tolerance is FOS <1, limited by foot pain, ambulates with cane.           ROSCOE screen reviewed    Patient denies any recent personal exposure to COVID19. Denies any sick contacts. Patient denies any travel within the past 30 days. Patient was instructed to quarantine until after procedure    Anesthesia Alert  NO--Difficult Airway  NO--History of neck surgery or radiation  NO--Limited ROM of neck  NO--History of Malignant hyperthermia  NO--No personal or family history of Pseudocholinesterase deficiency.  NO--Prior Anesthesia Complication  NO--Latex Allergy  NO--Loose teeth - dentures, full upper and lower dentures  NO--History of Rheumatoid Arthritis  NO--ROSCOE  NO--Other_____    Patient states that this is their complete medical history and list of medications   75y Female presents today for presurgical testing for right lower extremity angiogram, possible endovascular revascularization, tibial access. Patient states that 4 toes on her right foot are always cold, are painful and sometimes tingle. Patient reports going to [email or web address] Tue/Thur/Sat, having first begun approximately 8-9 months ago.  Patient denies any CP, palpitations, SOB, cough, or dysuria. No recent URI or UTI.  Stated exercise tolerance is FOS <1, limited by foot pain, ambulates with cane.           ROSCOE screen reviewed    Patient denies any recent personal exposure to COVID19. Denies any sick contacts. Patient denies any travel within the past 30 days. Patient was instructed to quarantine until after procedure    Anesthesia Alert  NO--Difficult Airway  NO--History of neck surgery or radiation  NO--Limited ROM of neck  NO--History of Malignant hyperthermia  NO--No personal or family history of Pseudocholinesterase deficiency.  NO--Prior Anesthesia Complication  NO--Latex Allergy  NO--Loose teeth - dentures, full upper and lower dentures  NO--History of Rheumatoid Arthritis  NO--ROSCOE  YES--Other -RIGHT ARM PRECAUTIONS. OLD AV FISTULA LUE    Patient states that this is their complete medical history and list of medications

## 2020-10-21 NOTE — PATIENT PROFILE ADULT - IS PATIENT POST-MENOPAUSAL?
Quality 110: Preventive Care And Screening: Influenza Immunization: Influenza Immunization not Administered because Patient Refused.
Detail Level: Detailed
yes

## 2020-10-26 NOTE — CHART NOTE - NSCHARTNOTEFT_GEN_A_CORE
PACU ANESTHESIA ADMISSION NOTE      Procedure: Angiogram, Right femoral vessel      Post op diagnosis:  Ischemic ulcer diabetic Right foot    Ischemic toe ulcer        ____  Intubated  TV:______       Rate: ______      FiO2: ______    __x__  Patent Airway    __x__  Full return of protective reflexes    __x__  Full recovery from anesthesia / back to baseline status    Vitals:  T(C): 98.4  HR: 110  BP: 110/75   RR: 18   SpO2: 98%    Mental Status:  __x__ Awake   _____ Alert   _____ Drowsy   _____ Sedated    Nausea/Vomiting:  __x__ NO  ______Yes,   See Post - Op Orders          Pain Scale (0-10):  __0___    Treatment: __x_ None    ____ See Post - Op/PCA Orders    Post - Operative Fluids:   ____ Oral   __x__ See Post - Op Orders    Plan: Discharge:   __x__Home       _____Floor     _____Critical Care    _____  Other:_________________    Comments: uneventful anesthesia course no complications. VItals stable. Pt transferred to PACU. demonstrated AF with RVR in PACU. will be monitored and possibly admitted over night.

## 2020-10-26 NOTE — ASU DISCHARGE PLAN (ADULT/PEDIATRIC) - CARE PROVIDER_API CALL
Josemanuel Portillo  SURGERY  [address] [place] [id number]  Phone: [phone]  Fax: [phone]  Follow Up Time: 2 weeks

## 2020-10-26 NOTE — BRIEF OPERATIVE NOTE - NSICDXBRIEFPREOP_GEN_ALL_CORE_FT
PRE-OP DIAGNOSIS:  Ulcer of foot due to diabetes 26-Oct-2020 17:45:33  Josemanuel Portillo  Ischemic ulcer diabetic foot 26-Oct-2020 17:45:16  Josemanuel Portillo  Ischemic ulcer diabetic foot 26-Oct-2020 17:44:40  Josemanuel Portillo

## 2020-10-26 NOTE — BRIEF OPERATIVE NOTE - NSICDXBRIEFPOSTOP_GEN_ALL_CORE_FT
POST-OP DIAGNOSIS:  Ischemic ulcer diabetic foot 26-Oct-2020 17:46:07  Josemanuel Portillo  Ischemic toe ulcer 26-Oct-2020 17:45:49  Josemanuel Portillo

## 2020-10-26 NOTE — ASU DISCHARGE PLAN (ADULT/PEDIATRIC) - ASU DC SPECIAL INSTRUCTIONSFT
No heavy lifting or strenuous activity for 2 weeks  Pain medication over the counter as needed such as tylenol  Follow up in 2 weeks

## 2020-10-27 NOTE — DISCHARGE NOTE NURSING/CASE MANAGEMENT/SOCIAL WORK - PATIENT PORTAL LINK FT
You can access the FollowMyHealth Patient Portal offered by [facility] by registering at the following website: [email or web address]. By joining [employer]’s FollowMyHealth portal, you will also be able to view your health information using other applications (apps) compatible with our system.

## 2020-10-27 NOTE — DISCHARGE NOTE PROVIDER - HOSPITAL COURSE
75F presented for elective right angiogram, angioplasty of SFA stent and PT, atherectomy of AT was performed. Patient has hx of afib, was in RVR in the PACU. Patient was admitted to tele for overnight observation. Home meds were restarted and patient was rate controlled without issues. Patient has scheduled dialysis today, ready for discharge after dialysis. Podiatry recommendations obtained for local wound care for foot ulcer.

## 2020-10-27 NOTE — DISCHARGE NOTE PROVIDER - NSDCMRMEDTOKEN_GEN_ALL_CORE_FT
allopurinol 100 mg oral tablet: 1 tab(s) orally once a day  calcium acetate-magnesium carbonate 300 mg-300 mg oral tablet: 3  orally with each meal  dilTIAZem 30 mg oral tablet: 1 tab(s) orally 3 times a day  Eliquis 2.5 mg oral tablet: 1 tab(s) orally 2 times a day  famotidine 40 mg oral tablet: 1 tab(s) orally once a day (at bedtime)  Metoprolol Tartrate 50 mg oral tablet: 1 tab(s) orally 2 times a day   allopurinol 100 mg oral tablet: 1 tab(s) orally once a day  ammonium lactate 12% topical lotion: 1 application topically 2 times a day  calcium acetate-magnesium carbonate 300 mg-300 mg oral tablet: 3  orally with each meal  dilTIAZem 30 mg oral tablet: 1 tab(s) orally 3 times a day  Eliquis 2.5 mg oral tablet: 1 tab(s) orally 2 times a day  famotidine 40 mg oral tablet: 1 tab(s) orally once a day (at bedtime)  Metoprolol Tartrate 50 mg oral tablet: 1 tab(s) orally 2 times a day

## 2020-10-27 NOTE — PROGRESS NOTE ADULT - SUBJECTIVE AND OBJECTIVE BOX
Vascular Surgery Post Operative Note      Procedure: Status post  RLE Angiogram DCB of SFA stent , AT atherctomy and pt angioplasty       Operative Findings:  · Operative Findings	right DFU  Procedure- DCB of SFA stent, AT [place]hawk atherectomy, PT angioplasty      Post Operative day #1  Patient resting comfortably no complaints     Patient has a known history of afib, during recovery in PACU found to be in RVR- ekg done full set of labs ordered - negative ,  home cardizem and home metoprolol dose given , telemetry , spoke with cardiology fellow     [facility]      HTN (hypertension)    ESRD on hemodialysis    Thyroid nodule    Degenerative joint disease    Osteoarthritis    Diverticulosis    GERD (gastroesophageal reflux disease)    Heart failure with reduced ejection fraction    Pulmonary hypertension, moderate to severe    Aortic stenosis, moderate    Atrial fibrillation    CKD (chronic kidney disease)    MI (myocardial infarction)    CAD (coronary artery disease)    Kidney stone    HTN (hypertension)    Ischemic ulcer diabetic foot    Ischemic toe ulcer    Ulcer of foot due to diabetes    Ischemic ulcer diabetic foot    Ischemic ulcer diabetic foot    Angiogram, femoral vessel    S/P ureteral stent placement    History of kidney surgery    S/P cataract surgery    History of cardioversion    S/P CABG (coronary artery bypass graft)    AV fistula    H/O abdominal hysterectomy    H/O cardiac radiofrequency ablation    History of open heart surgery    SysAdmin_VstLnk      No Known Allergies    allopurinol 100 milliGRAM(s) Oral daily  apixaban 2.5 milliGRAM(s) Oral every 12 hours  diltiazem    Tablet 30 milliGRAM(s) Oral every 8 hours  influenza   Vaccine 0.5 milliLiter(s) IntraMuscular once  metoprolol tartrate 50 milliGRAM(s) Oral two times a day  morphine  - Injectable 2 milliGRAM(s) IV Push every 4 hours PRN  pantoprazole    Tablet 40 milliGRAM(s) Oral before breakfast          T(C): 35.6 (10-26-20 @ 23:59), Max: 36.7 (10-26-20 @ 18:03)  HR: 97 (10-26-20 @ 23:59) (90 - 143)  BP: 79/48 (10-26-20 @ 23:59) (79/48 - 125/71)  RR: 18 (10-26-20 @ 23:59) (12 - 26)  SpO2: 93% (10-26-20 @ 21:00) (93% - 98%)      General:Alert and oriented times 3, not in acute distress   Heart: Regular rate and rhythm, no rubs , murmurs or gallops  Lungs: Clear to auscultation bilaterally, no wheezes, rales, rhonci appreciated  Abdomen: Soft , positive bowel sounds, no tenderness, no distention, no peritoneal signs   Exremites: left Groin- incision clean dry and intact,  warm extremities,  good color, no swelling, motor and sensation , pulses                     8.6    5.21  )-----------( 116      ( 10-26 @ 22:23 )             28.3                    140   |  114   |  37                 Ca: 6.1    BMP:   ----------------------------< 77     Mg: x     (10-26-20 @ 22:23)             3.6    |  15    | 3.3                Ph: 4.1              PT/INR - ( 26 Oct 2020 22:23 )   PT: 16.70 sec;   INR: 1.45 ratio         PTT - ( 26 Oct 2020 22:23 )  PTT:31.6 sec    CARDIAC MARKERS ( 26 Oct 2020 22:23 )  x     / 0.01 ng/mL / 18 U/L / x     / 1.2 ng/mL

## 2020-10-27 NOTE — DISCHARGE NOTE PROVIDER - PROVIDER TOKENS
PROVIDER:[TOKEN:[[id number]],FOLLOWUP:[2 weeks]],PROVIDER:[TOKEN:[[id number]],FOLLOWUP:[1 week]] PROVIDER:[TOKEN:[[id number]],FOLLOWUP:[2 weeks]],PROVIDER:[TOKEN:[[id number]],FOLLOWUP:[1 week]],PROVIDER:[TOKEN:[[id number]],FOLLOWUP:[1 week]]

## 2020-10-27 NOTE — PROGRESS NOTE ADULT - ASSESSMENT
Restarted home medications  Groin checks  Pulse checks  Monitor for afib  Telemetry monitoring   Cardiology aware   Consult for Dr. Child placed

## 2020-10-27 NOTE — DISCHARGE NOTE PROVIDER - NSDCCPTREATMENT_GEN_ALL_CORE_FT
PRINCIPAL PROCEDURE  Procedure: Angiogram, femoral vessel  Findings and Treatment: No strenuous activity for 2 weeks  Follow up in 2 weeks  Call office if any questions

## 2020-10-27 NOTE — DISCHARGE NOTE PROVIDER - NSDCCPCAREPLAN_GEN_ALL_CORE_FT
PRINCIPAL DISCHARGE DIAGNOSIS  Diagnosis: Diabetic foot ulcer  Assessment and Plan of Treatment:

## 2020-10-27 NOTE — CONSULT NOTE ADULT - SUBJECTIVE AND OBJECTIVE BOX
Podiatry Consult Note    Subjective:  NOAH QUIROS is a pleasant well-nourished, well developed 75y Female in no acute distress, alert awake, and oriented to person, place and time.   Patient is a 75y old  Female who presents with a chief complaint of Afib (27 Oct 2020 11:24)  Seen by bedside for discharge wound care order for bilateral feet;     PAST MEDICAL & SURGICAL HISTORY:  Swollen feet  ON OCCASSION  HTN (hypertension)  ESRD on hemodialysis  T,T,S  Thyroid nodule  Degenerative joint disease  Osteoarthritis  Diverticulosis  GERD (gastroesophageal reflux disease)  Heart failure with reduced ejection fraction  Pulmonary hypertension, moderate to severe  Aortic stenosis, moderate  Atrial fibrillation  status post cardioversion  MI (myocardial infarction)  CAD (coronary artery disease)  Kidney stone  HTN (hypertension)  S/P ureteral stent placement  History of kidney surgery  S/P cataract surgery  S/P CABG (coronary artery bypass graft)  AV fistula  H/O abdominal hysterectomy  H/O cardiac radiofrequency ablation    Objective:  Vital Signs Last 24 Hrs  T(C): 35.6 (27 Oct 2020 04:00), Max: 36.7 (26 Oct 2020 18:03)  T(F): 96 (27 Oct 2020 04:00), Max: 98.1 (26 Oct 2020 18:03)  HR: 90 (27 Oct 2020 08:18) (90 - 143)  BP: 101/50 (27 Oct 2020 08:18) (79/48 - 125/71)  BP(mean): --  RR: 18 (27 Oct 2020 04:00) (12 - 26)  SpO2: 95% (26 Oct 2020 23:25) (93% - 98%)                        8.6    5.21  )-----------( 116      ( 26 Oct 2020 22:23 )             28.3                 10-26    140  |  114<H>  |  37<H>  ----------------------------<  77  3.6   |  15<L>  |  3.3<H>    Ca    6.1<L>      26 Oct 2020 22:23  Phos  4.1     10-26    Physical Exam - Lower Extremity Focused:   Derm:   Stable ulcers noted on dorsum aspect of bilateral feet; no active bleeding, no drainage, no malodor, no rubor, no sign of infection;   Bilateral feet xerosis cutis    Vascular: DP and PT Pulses Diminished;   Neuro: Protective Sensation Diminished     Assessment:  Stable ulcers on dorsum of bilateral feet;     Plan:  Chart reviewed and Patient evaluated. All Questions and Concerns Addressed and Answered  Discussed diagnosis and treatment with patient  Wound Flushed w/ normal saline; dressing with betadine / DSD / Kerlix; q24  Patient can be discharged with current dressing;   Local Wound Care; Apply ammonium lactate for bilateral feet xerosis, then apply betadine / DSD / Kerlix on bilateral feet; q24;   Request VNS for dressing change;   Patient can follow up as an outpatient to Podiatry Clinic at [address] [place] [address] to Dr. Mar a week post discharge;   Discussed Plan w/ Dr. Mar.     Podiatry

## 2020-10-27 NOTE — DISCHARGE NOTE PROVIDER - NSDCFUADDINST_GEN_ALL_CORE_FT
As per podiatry:  wound care to bilateral feet: apply ammonium lactate lotion to dry areas, apply Betadyne to dry ulcers, dsd and kerlix. Change daily

## 2020-10-27 NOTE — DISCHARGE NOTE PROVIDER - NSDCHHNEEDSERVICE_GEN_ALL_CORE
Rehabilitation services/Observation and assessment Observation and assessment/Rehabilitation services/Wound care and assessment

## 2020-10-27 NOTE — DISCHARGE NOTE PROVIDER - CARE PROVIDERS DIRECT ADDRESSES
[email or web address] ,[email or web address]

## 2020-10-27 NOTE — DISCHARGE NOTE PROVIDER - CARE PROVIDER_API CALL
Josemanuel Portillo  SURGERY  [address] [place] [address], Jamal [address] [place] [address]  Phone: [phone]  Fax: [phone]  Follow Up Time: 2 weeks    Bipin Child)  Cardiology; Internal Medicine; Nuclear Cardiology  [address] [place] [address], [address]  [place] [address]  Phone: [phone]  Fax: [phone]  Follow Up Time: 1 week   Josemanuel Portillo  SURGERY  [address] [facility], [address]  [place] [address]  Phone: [phone]  Fax: [phone]  Follow Up Time: 2 weeks    Bipin Child)  Cardiology; Internal Medicine; Nuclear Cardiology  [address] [facility], [address]  [place] [address]  Phone: [phone]  Fax: [phone]  Follow Up Time: 1 week    Aj Mar  PODIATRIC MEDICINE  [address] [facility] [place] [address]  Phone: [phone]  Fax: [phone]  Follow Up Time: 1 week

## 2020-10-27 NOTE — DISCHARGE NOTE NURSING/CASE MANAGEMENT/SOCIAL WORK - NSDCPEELIQUISCOMP_GEN_ALL_CORE
Attending Attestation





- Resident


Resident Name: Johann Campos





- HPI


HPI: 





03/18/18 16:00


Pt presents to the ED after sent in for altered mental status.  + lethargy and 

slurred speech on arrival to the Ed.  Patient has documented slurred speech on 

previous visits.  


03/18/18 16:05








- Physicial Exam


PE: 





03/18/18 16:06


Patient is sommulent and diaphoretic.  He is arousable to loud voice and 

answers questions, although he is difficult to understand.  He follows commands 

and has a non focal neuro exam. 





- Medical Decision Making





03/18/18 15:55


Pt presents to the ED after found altered by his neighbor.  Slurred speech, 

which may be his baseline as per previous notes.  Unequal pupils.  Differential 

includes intracranial bleed or mass, electrolyte derrangement, sepsis, drug 

intoxication or overdose.  Will check labs, fingerstick, electrolytes and drug 

panel
History of Present Illness





- General


Chief Complaint: Altered Mental Status


Stated Complaint: ALTERED MENTAL STATUS


Time Seen by Provider: 03/18/18 15:13


History Source: EMS


Exam Limitations: Clinical Condition





- History of Present Illness


Initial Comments: 





03/18/18 16:30


Patient is a 64M with history of HTN, HLD, DM2, alcohol abuse, seizures, 

urinary retention here today with altered mental status. EMS reports that 

neighbors found him down, last known well is unknown. EMS reports that he was 

lethargic and non-responsive, but arousable. EMS states that his medications 

were scattered around his apartment. EMS denies alcohol or other drugs on the 

scene. Patient has slurred speech, which noted in other notes as his baseline. 

Patient is not able to cooperate in history taking.





Past History





- Past Medical History


Allergies/Adverse Reactions: 


 Allergies











Allergy/AdvReac Type Severity Reaction Status Date / Time


 


No Known Allergies Allergy   Verified 11/26/17 02:04











Home Medications: 


Ambulatory Orders





Nifedipine [Adalat cc] 60 mg PO DAILY 11/15/16 


Gabapentin [Neurontin -] 100 mg PO TID #90 capsule 01/27/17 


Insulin (Levemir) [Levemir Vial] 35 units SQ AM  ml 01/27/17 


Lisinopril [Prinivil] 5 mg PO DAILY #30 tablet 01/27/17 


Quetiapine Fumarate [Seroquel] 200 mg PO HS 02/22/18 


Insulin (Levemir) [Levemir Vial] 10 units SQ HS  ml 02/26/18 


Tamsulosin HCl [Flomax -] 0.4 mg PO DAILY@0830 #30 cap.er.24h 02/26/18 


Atorvastatin Ca [Lipitor] 10 mg PO HS 03/18/18 


Cholecalciferol (Vitamin D3) [Vitamin D3 -] 400 unit PO DAILY 03/18/18 


Citalopram Hydrobromide [Citalopram HBr] 10 mg PO DAILY 03/18/18 


Pantoprazole Sodium [Protonix -] 20 mg PO DAILY 03/18/18 


Thiamine HCl 50 mg PO DAILY 03/18/18 








CVA: Yes


COPD: No


Diabetes: Yes


Dialysis: Yes


HTN: Yes


Liver Disease: Yes


Psychiatric Problems: Yes


Seizures: Yes





- Immunization History


Immunization Up to Date: Yes





- Suicide/Smoking/Psychosocial Hx


Smoking Status: No


Smoking History: Unknown if ever smoked


Have you smoked in the past 12 months: No


Number of Cigarettes Smoked Daily: 0


Cigars Per Day: 0


Hx Alcohol Use: No


Drug/Substance Use Hx: No


Substance Use Type: None


Hx Substance Use Treatment: No





**Review of Systems





- Review of Systems


Able to Perform ROS?: No (2/2 clinical condition)





*Physical Exam





- Vital Signs


 Last Vital Signs











Temp Pulse Resp BP Pulse Ox


 


 94 F L  101 H  15   127/81   100 


 


 03/18/18 16:01  03/18/18 16:01  03/18/18 16:01  03/18/18 16:01  03/18/18 16:01














- Physical Exam


Comments: 





03/18/18 16:35


GENERAL: Sleepy but arousable, not able to redirect towards questioning


HEAD: No signs of trauma, normocephalic, atraumatic


EYES: Right larger than left eye, cataracts, EOMI, sclera anicteric, 

conjunctiva clear


ENT: Auricles normal inspection, hearing grossly normal, nares patent, 

oropharynx clear without


exudates. 


NECK: Normal ROM, no lymphadenopathy, JVD, or masses


LUNGS: No distress, speaks full sentences, clear to auscultation bilaterally


HEART: Regular rate and rhythm, normal S1 and S2, no murmurs, rubs or gallops, 

peripheral pulses normal and equal bilaterally.


ABDOMEN: Nontender, normoactive bowel sounds.  No guarding, no rebound.  No 

masses


EXTREMITIES: Normal inspection, Normal range of motion, no edema.  Cold 

extremities.


NEUROLOGICAL: Cranial nerves II through XII grossly intact.  Moving all 

extremities. Confused, unable to cooperate with exam.


SKIN: Warm, Dry, normal turgor, no rashes or lesions noted.











ED Treatment Course





- LABORATORY


CBC & Chemistry Diagram: 


 03/18/18 15:52





 03/18/18 15:52





- RADIOLOGY


Radiology Studies Ordered: 














 Category Date Time Status


 


 HEAD CT (STROKE) [CT] Stat CT Scan  03/18/18 15:13 Completed


 


 CXRPORT [CHEST X-RAY PORTABLE*] [RAD] Stat Radiology  03/18/18 15:48 Taken














Medical Decision Making





- Medical Decision Making





03/18/18 16:40


64M here today with history of DM, HTN, HLD, seizures, etoh abuse, urinary 

retention here today with AMS. Vital signs stable. Patient does not smell of 

alcohol. Fingerstick 329 in the field. Fingerstick in 200s in the field. 

Differential diagnosis includes, but is not limited to: intoxication, dka, 

hyperammonemia, sepsis, hypothermia. 


03/18/18 17:38


 Laboratory Tests











  03/18/18 03/18/18 03/18/18





  15:52 15:52 15:52


 


WBC  10.3 H D  


 


Hgb  12.0  


 


Hct  35.2 L  


 


Plt Count  167  


 


INR   1.08 


 


VBG pH   


 


Anion Gap   


 


BUN   


 


Creatinine   


 


Random Glucose   


 


Troponin I   


 


Urine Nitrite    Negative


 


Ur Leukocyte Esterase    Negative


 


Salicylates   


 


Acetaminophen   


 


Alcohol, Quantitative   














  03/18/18 03/18/18 03/18/18





  15:52 15:52 16:00


 


WBC   


 


Hgb   


 


Hct   


 


Plt Count   


 


INR   


 


VBG pH    7.29 L


 


Anion Gap  7 L  


 


BUN  27 H D  


 


Creatinine  1.6 H D  


 


Random Glucose  226 H D  


 


Troponin I  < 0.02  


 


Urine Nitrite   


 


Ur Leukocyte Esterase   


 


Salicylates   < 1.700 


 


Acetaminophen   < 2.000 


 


Alcohol, Quantitative   < 5.0 








CBC normal. INR normal. VBG shows slight acidosis. Toxic panel negative, no gap

, no hypercarbia.





Patient reassessed, is still confused but improved. Believe patient most likely 

had seizure. Will admit to medicine tele. Admitted to [facility] via [facility].





*DC/Admit/Observation/Transfer


Diagnosis at time of Disposition: 


 Seizure








- Discharge Dispostion


Condition at time of disposition: Stable


Admit: Yes





- Referrals





- Patient Instructions





- Post Discharge Activity
Apixaban/Eliquis is used to treat and prevent blood clots. If you are not able to swallow the tablets whole, they may be crushed and mixed in water, apple juice, or applesauce and promptly taken within four hours. Never skip a dose of Apixaban/Eliquis. If you forget to take your Apixaban/Eliquis, take a dose as soon as you remember. If it is almost time for your next Apixaban/Eliquis dose, wait until then and take a regular dose. DO NOT take an extra pill to ‘catch up’.  NEVER TAKE A DOUBLE DOSE. Notify your doctor that you missed a dose. Take Apixaban/Eliquis at the same time each morning and evening. Apixaban/Eliquis may be taken with other medication or food.

## 2020-11-18 PROBLEM — I70.245 ATHEROSCLEROSIS OF NATIVE ARTERIES OF LEFT LEG WITH ULCERATION OF OTHER PART OF FOOT: Status: ACTIVE | Noted: 2019-12-13

## 2021-01-01 ENCOUNTER — INPATIENT (INPATIENT)
Facility: HOSPITAL | Age: 76
LOS: 6 days | End: 2021-01-29
Attending: INTERNAL MEDICINE | Admitting: INTERNAL MEDICINE
Payer: MEDICARE

## 2021-01-01 VITALS — HEART RATE: 75 BPM | OXYGEN SATURATION: 96 % | HEIGHT: 66 IN | RESPIRATION RATE: 18 BRPM | TEMPERATURE: 97 F

## 2021-01-01 VITALS — HEART RATE: 154 BPM | RESPIRATION RATE: 15 BRPM

## 2021-01-01 DIAGNOSIS — Z98.890 OTHER SPECIFIED POSTPROCEDURAL STATES: Chronic | ICD-10-CM

## 2021-01-01 DIAGNOSIS — Z98.49 CATARACT EXTRACTION STATUS, UNSPECIFIED EYE: Chronic | ICD-10-CM

## 2021-01-01 DIAGNOSIS — Z96.0 PRESENCE OF UROGENITAL IMPLANTS: Chronic | ICD-10-CM

## 2021-01-01 DIAGNOSIS — Z95.1 PRESENCE OF AORTOCORONARY BYPASS GRAFT: Chronic | ICD-10-CM

## 2021-01-01 DIAGNOSIS — I77.0 ARTERIOVENOUS FISTULA, ACQUIRED: Chronic | ICD-10-CM

## 2021-01-01 DIAGNOSIS — Z90.710 ACQUIRED ABSENCE OF BOTH CERVIX AND UTERUS: Chronic | ICD-10-CM

## 2021-01-01 LAB
24R-OH-CALCIDIOL SERPL-MCNC: 19 NG/ML — LOW (ref 30–80)
A1AT SERPL-MCNC: 225 MG/DL — HIGH (ref 90–200)
A1C WITH ESTIMATED AVERAGE GLUCOSE RESULT: 5.5 % — SIGNIFICANT CHANGE UP (ref 4–5.6)
ALBUMIN FLD-MCNC: 2.8 G/DL — SIGNIFICANT CHANGE UP
ALBUMIN SERPL ELPH-MCNC: 2.4 G/DL — LOW (ref 3.5–5.2)
ALBUMIN SERPL ELPH-MCNC: 2.7 G/DL — LOW (ref 3.5–5.2)
ALBUMIN SERPL ELPH-MCNC: 3 G/DL — LOW (ref 3.5–5.2)
ALBUMIN SERPL ELPH-MCNC: 3.1 G/DL — LOW (ref 3.5–5.2)
ALBUMIN SERPL ELPH-MCNC: 3.2 G/DL — LOW (ref 3.5–5.2)
ALBUMIN SERPL ELPH-MCNC: 3.3 G/DL — LOW (ref 3.5–5.2)
ALBUMIN SERPL ELPH-MCNC: 3.4 G/DL — LOW (ref 3.5–5.2)
ALBUMIN SERPL ELPH-MCNC: 3.4 G/DL — LOW (ref 3.5–5.2)
ALBUMIN SERPL ELPH-MCNC: 3.9 G/DL — SIGNIFICANT CHANGE UP (ref 3.5–5.2)
ALP SERPL-CCNC: 102 U/L — SIGNIFICANT CHANGE UP (ref 30–115)
ALP SERPL-CCNC: 108 U/L — SIGNIFICANT CHANGE UP (ref 30–115)
ALP SERPL-CCNC: 115 U/L — SIGNIFICANT CHANGE UP (ref 30–115)
ALP SERPL-CCNC: 116 U/L — HIGH (ref 30–115)
ALP SERPL-CCNC: 122 U/L — HIGH (ref 30–115)
ALP SERPL-CCNC: 51 U/L — SIGNIFICANT CHANGE UP (ref 30–115)
ALP SERPL-CCNC: 81 U/L — SIGNIFICANT CHANGE UP (ref 30–115)
ALT FLD-CCNC: 10 U/L — SIGNIFICANT CHANGE UP (ref 0–41)
ALT FLD-CCNC: 1036 U/L — HIGH (ref 0–41)
ALT FLD-CCNC: 105 U/L — HIGH (ref 0–41)
ALT FLD-CCNC: 106 U/L — HIGH (ref 0–41)
ALT FLD-CCNC: 1161 U/L — HIGH (ref 0–41)
ALT FLD-CCNC: 1413 U/L — HIGH (ref 0–41)
ALT FLD-CCNC: 1528 U/L — HIGH (ref 0–41)
ALT FLD-CCNC: 843 U/L — HIGH (ref 0–41)
ALT FLD-CCNC: 844 U/L — HIGH (ref 0–41)
ANA TITR SER: NEGATIVE — SIGNIFICANT CHANGE UP
ANION GAP SERPL CALC-SCNC: 11 MMOL/L — SIGNIFICANT CHANGE UP (ref 7–14)
ANION GAP SERPL CALC-SCNC: 13 MMOL/L — SIGNIFICANT CHANGE UP (ref 7–14)
ANION GAP SERPL CALC-SCNC: 16 MMOL/L — HIGH (ref 7–14)
ANION GAP SERPL CALC-SCNC: 17 MMOL/L — HIGH (ref 7–14)
ANION GAP SERPL CALC-SCNC: 18 MMOL/L — HIGH (ref 7–14)
ANION GAP SERPL CALC-SCNC: 20 MMOL/L — HIGH (ref 7–14)
ANION GAP SERPL CALC-SCNC: 22 MMOL/L — HIGH (ref 7–14)
ANION GAP SERPL CALC-SCNC: 22 MMOL/L — HIGH (ref 7–14)
ANION GAP SERPL CALC-SCNC: 24 MMOL/L — HIGH (ref 7–14)
ANION GAP SERPL CALC-SCNC: 31 MMOL/L — HIGH (ref 7–14)
ANION GAP SERPL CALC-SCNC: 33 MMOL/L — HIGH (ref 7–14)
ANISOCYTOSIS BLD QL: SIGNIFICANT CHANGE UP
APTT BLD: 37.2 SEC — SIGNIFICANT CHANGE UP (ref 27–39.2)
APTT BLD: 37.5 SEC — SIGNIFICANT CHANGE UP (ref 27–39.2)
APTT BLD: 40.4 SEC — HIGH (ref 27–39.2)
AST SERPL-CCNC: 1169 U/L — HIGH (ref 0–41)
AST SERPL-CCNC: 1208 U/L — HIGH (ref 0–41)
AST SERPL-CCNC: 1510 U/L — HIGH (ref 0–41)
AST SERPL-CCNC: 1737 U/L — HIGH (ref 0–41)
AST SERPL-CCNC: 201 U/L — HIGH (ref 0–41)
AST SERPL-CCNC: 202 U/L — HIGH (ref 0–41)
AST SERPL-CCNC: 44 U/L — HIGH (ref 0–41)
AST SERPL-CCNC: 509 U/L — HIGH (ref 0–41)
AST SERPL-CCNC: 847 U/L — HIGH (ref 0–41)
B PERT IGG+IGM PNL SER: CLEAR — SIGNIFICANT CHANGE UP
BASE EXCESS BLDA CALC-SCNC: -11.4 MMOL/L — LOW (ref -2–2)
BASE EXCESS BLDA CALC-SCNC: -14.5 MMOL/L — LOW (ref -2–2)
BASE EXCESS BLDA CALC-SCNC: -14.5 MMOL/L — LOW (ref -2–2)
BASE EXCESS BLDA CALC-SCNC: -19.2 MMOL/L — LOW (ref -2–2)
BASE EXCESS BLDA CALC-SCNC: -2.2 MMOL/L — LOW (ref -2–2)
BASE EXCESS BLDA CALC-SCNC: -24 MMOL/L — LOW (ref -2–2)
BASE EXCESS BLDA CALC-SCNC: -4 MMOL/L — LOW (ref -2–2)
BASE EXCESS BLDA CALC-SCNC: -4.4 MMOL/L — LOW (ref -2–2)
BASE EXCESS BLDA CALC-SCNC: -4.4 MMOL/L — LOW (ref -2–2)
BASE EXCESS BLDA CALC-SCNC: -4.6 MMOL/L — LOW (ref -2–2)
BASE EXCESS BLDA CALC-SCNC: -4.7 MMOL/L — LOW (ref -2–2)
BASE EXCESS BLDA CALC-SCNC: -5.6 MMOL/L — LOW (ref -2–2)
BASE EXCESS BLDA CALC-SCNC: -8.6 MMOL/L — LOW (ref -2–2)
BASE EXCESS BLDA CALC-SCNC: -9 MMOL/L — SIGNIFICANT CHANGE UP (ref -2–2)
BASE EXCESS BLDA CALC-SCNC: -9.3 MMOL/L — LOW (ref -2–2)
BASE EXCESS BLDV CALC-SCNC: -4 MMOL/L — LOW (ref -2–2)
BASOPHILS # BLD AUTO: 0 K/UL — SIGNIFICANT CHANGE UP (ref 0–0.2)
BASOPHILS # BLD AUTO: 0.02 K/UL — SIGNIFICANT CHANGE UP (ref 0–0.2)
BASOPHILS # BLD AUTO: 0.03 K/UL — SIGNIFICANT CHANGE UP (ref 0–0.2)
BASOPHILS # BLD AUTO: 0.05 K/UL — SIGNIFICANT CHANGE UP (ref 0–0.2)
BASOPHILS # BLD AUTO: 0.07 K/UL — SIGNIFICANT CHANGE UP (ref 0–0.2)
BASOPHILS # BLD AUTO: 0.09 K/UL — SIGNIFICANT CHANGE UP (ref 0–0.2)
BASOPHILS NFR BLD AUTO: 0 % — SIGNIFICANT CHANGE UP (ref 0–1)
BASOPHILS NFR BLD AUTO: 0.1 % — SIGNIFICANT CHANGE UP (ref 0–1)
BASOPHILS NFR BLD AUTO: 0.2 % — SIGNIFICANT CHANGE UP (ref 0–1)
BASOPHILS NFR BLD AUTO: 0.3 % — SIGNIFICANT CHANGE UP (ref 0–1)
BASOPHILS NFR BLD AUTO: 0.6 % — SIGNIFICANT CHANGE UP (ref 0–1)
BILIRUB SERPL-MCNC: 0.5 MG/DL — SIGNIFICANT CHANGE UP (ref 0.2–1.2)
BILIRUB SERPL-MCNC: 0.5 MG/DL — SIGNIFICANT CHANGE UP (ref 0.2–1.2)
BILIRUB SERPL-MCNC: 0.7 MG/DL — SIGNIFICANT CHANGE UP (ref 0.2–1.2)
BILIRUB SERPL-MCNC: 1.2 MG/DL — SIGNIFICANT CHANGE UP (ref 0.2–1.2)
BILIRUB SERPL-MCNC: 1.5 MG/DL — HIGH (ref 0.2–1.2)
BILIRUB SERPL-MCNC: 2.1 MG/DL — HIGH (ref 0.2–1.2)
BILIRUB SERPL-MCNC: 2.5 MG/DL — HIGH (ref 0.2–1.2)
BILIRUB SERPL-MCNC: 3.6 MG/DL — HIGH (ref 0.2–1.2)
BILIRUB SERPL-MCNC: 4.4 MG/DL — HIGH (ref 0.2–1.2)
BLD GP AB SCN SERPL QL: SIGNIFICANT CHANGE UP
BUN SERPL-MCNC: 12 MG/DL — SIGNIFICANT CHANGE UP (ref 10–20)
BUN SERPL-MCNC: 14 MG/DL — SIGNIFICANT CHANGE UP (ref 10–20)
BUN SERPL-MCNC: 14 MG/DL — SIGNIFICANT CHANGE UP (ref 10–20)
BUN SERPL-MCNC: 17 MG/DL — SIGNIFICANT CHANGE UP (ref 10–20)
BUN SERPL-MCNC: 18 MG/DL — SIGNIFICANT CHANGE UP (ref 10–20)
BUN SERPL-MCNC: 19 MG/DL — SIGNIFICANT CHANGE UP (ref 10–20)
BUN SERPL-MCNC: 22 MG/DL — HIGH (ref 10–20)
BUN SERPL-MCNC: 33 MG/DL — HIGH (ref 10–20)
BUN SERPL-MCNC: 33 MG/DL — HIGH (ref 10–20)
BUN SERPL-MCNC: 38 MG/DL — HIGH (ref 10–20)
BUN SERPL-MCNC: 38 MG/DL — HIGH (ref 10–20)
CA-I SERPL-SCNC: 1.06 MMOL/L — LOW (ref 1.12–1.3)
CALCIUM SERPL-MCNC: 10 MG/DL — SIGNIFICANT CHANGE UP (ref 8.5–10.1)
CALCIUM SERPL-MCNC: 10.2 MG/DL — HIGH (ref 8.5–10.1)
CALCIUM SERPL-MCNC: 8.5 MG/DL — SIGNIFICANT CHANGE UP (ref 8.5–10.1)
CALCIUM SERPL-MCNC: 8.5 MG/DL — SIGNIFICANT CHANGE UP (ref 8.5–10.1)
CALCIUM SERPL-MCNC: 8.8 MG/DL — SIGNIFICANT CHANGE UP (ref 8.5–10.1)
CALCIUM SERPL-MCNC: 9 MG/DL — SIGNIFICANT CHANGE UP (ref 8.5–10.1)
CALCIUM SERPL-MCNC: 9.4 MG/DL — SIGNIFICANT CHANGE UP (ref 8.5–10.1)
CALCIUM SERPL-MCNC: 9.5 MG/DL — SIGNIFICANT CHANGE UP (ref 8.5–10.1)
CALCIUM SERPL-MCNC: 9.6 MG/DL — SIGNIFICANT CHANGE UP (ref 8.5–10.1)
CALCIUM SERPL-MCNC: 9.8 MG/DL — SIGNIFICANT CHANGE UP (ref 8.5–10.1)
CALCIUM SERPL-MCNC: 9.9 MG/DL — SIGNIFICANT CHANGE UP (ref 8.4–10.5)
CALCIUM SERPL-MCNC: 9.9 MG/DL — SIGNIFICANT CHANGE UP (ref 8.5–10.1)
CERULOPLASMIN SERPL-MCNC: 31 MG/DL — SIGNIFICANT CHANGE UP (ref 16–45)
CHLORIDE SERPL-SCNC: 100 MMOL/L — SIGNIFICANT CHANGE UP (ref 98–110)
CHLORIDE SERPL-SCNC: 102 MMOL/L — SIGNIFICANT CHANGE UP (ref 98–110)
CHLORIDE SERPL-SCNC: 102 MMOL/L — SIGNIFICANT CHANGE UP (ref 98–110)
CHLORIDE SERPL-SCNC: 103 MMOL/L — SIGNIFICANT CHANGE UP (ref 98–110)
CHLORIDE SERPL-SCNC: 106 MMOL/L — SIGNIFICANT CHANGE UP (ref 98–110)
CHLORIDE SERPL-SCNC: 94 MMOL/L — LOW (ref 98–110)
CHLORIDE SERPL-SCNC: 96 MMOL/L — LOW (ref 98–110)
CHLORIDE SERPL-SCNC: 96 MMOL/L — LOW (ref 98–110)
CHLORIDE SERPL-SCNC: 97 MMOL/L — LOW (ref 98–110)
CHLORIDE SERPL-SCNC: 98 MMOL/L — SIGNIFICANT CHANGE UP (ref 98–110)
CHLORIDE SERPL-SCNC: 98 MMOL/L — SIGNIFICANT CHANGE UP (ref 98–110)
CHOLEST SERPL-MCNC: 140 MG/DL — SIGNIFICANT CHANGE UP
CK SERPL-CCNC: 696 U/L — HIGH (ref 0–225)
CMV DNA CSF QL NAA+PROBE: SIGNIFICANT CHANGE UP
CO2 SERPL-SCNC: 10 MMOL/L — LOW (ref 17–32)
CO2 SERPL-SCNC: 11 MMOL/L — LOW (ref 17–32)
CO2 SERPL-SCNC: 13 MMOL/L — LOW (ref 17–32)
CO2 SERPL-SCNC: 14 MMOL/L — LOW (ref 17–32)
CO2 SERPL-SCNC: 16 MMOL/L — LOW (ref 17–32)
CO2 SERPL-SCNC: 17 MMOL/L — SIGNIFICANT CHANGE UP (ref 17–32)
CO2 SERPL-SCNC: 19 MMOL/L — SIGNIFICANT CHANGE UP (ref 17–32)
CO2 SERPL-SCNC: 20 MMOL/L — SIGNIFICANT CHANGE UP (ref 17–32)
CO2 SERPL-SCNC: 20 MMOL/L — SIGNIFICANT CHANGE UP (ref 17–32)
COLOR FLD: YELLOW — SIGNIFICANT CHANGE UP
CREAT SERPL-MCNC: 0.7 MG/DL — SIGNIFICANT CHANGE UP (ref 0.7–1.5)
CREAT SERPL-MCNC: 0.9 MG/DL — SIGNIFICANT CHANGE UP (ref 0.7–1.5)
CREAT SERPL-MCNC: 1 MG/DL — SIGNIFICANT CHANGE UP (ref 0.7–1.5)
CREAT SERPL-MCNC: 1 MG/DL — SIGNIFICANT CHANGE UP (ref 0.7–1.5)
CREAT SERPL-MCNC: 1.3 MG/DL — SIGNIFICANT CHANGE UP (ref 0.7–1.5)
CREAT SERPL-MCNC: 1.6 MG/DL — HIGH (ref 0.7–1.5)
CREAT SERPL-MCNC: 1.9 MG/DL — HIGH (ref 0.7–1.5)
CREAT SERPL-MCNC: 2.8 MG/DL — HIGH (ref 0.7–1.5)
CREAT SERPL-MCNC: 3.7 MG/DL — HIGH (ref 0.7–1.5)
CREAT SERPL-MCNC: 4.2 MG/DL — CRITICAL HIGH (ref 0.7–1.5)
CREAT SERPL-MCNC: 4.6 MG/DL — CRITICAL HIGH (ref 0.7–1.5)
CULTURE RESULTS: SIGNIFICANT CHANGE UP
D DIMER BLD IA.RAPID-MCNC: 3122 NG/ML DDU — HIGH (ref 0–230)
D DIMER BLD IA.RAPID-MCNC: 3212 NG/ML DDU — HIGH (ref 0–230)
D DIMER BLD IA.RAPID-MCNC: 8636 NG/ML DDU — HIGH (ref 0–230)
EBV EA AB SER IA-ACNC: 98.7 U/ML — HIGH
EBV EA AB TITR SER IF: POSITIVE
EBV EA IGG SER-ACNC: POSITIVE
EBV NA IGG SER IA-ACNC: 301 U/ML — HIGH
EBV PATRN SPEC IB-IMP: SIGNIFICANT CHANGE UP
EBV VCA IGG AVIDITY SER QL IA: POSITIVE
EBV VCA IGM SER IA-ACNC: <10 U/ML — SIGNIFICANT CHANGE UP
EBV VCA IGM SER IA-ACNC: >750 U/ML — HIGH
EBV VCA IGM TITR FLD: NEGATIVE — SIGNIFICANT CHANGE UP
ENDOMYSIUM IGA TITR SER IF: NEGATIVE — SIGNIFICANT CHANGE UP
ENDOMYSIUM IGA TITR SER: SIGNIFICANT CHANGE UP
EOSINOPHIL # BLD AUTO: 0 K/UL — SIGNIFICANT CHANGE UP (ref 0–0.7)
EOSINOPHIL # BLD AUTO: 0 K/UL — SIGNIFICANT CHANGE UP (ref 0–0.7)
EOSINOPHIL # BLD AUTO: 0.01 K/UL — SIGNIFICANT CHANGE UP (ref 0–0.7)
EOSINOPHIL # BLD AUTO: 0.02 K/UL — SIGNIFICANT CHANGE UP (ref 0–0.7)
EOSINOPHIL # BLD AUTO: 0.07 K/UL — SIGNIFICANT CHANGE UP (ref 0–0.7)
EOSINOPHIL NFR BLD AUTO: 0 % — SIGNIFICANT CHANGE UP (ref 0–8)
EOSINOPHIL NFR BLD AUTO: 0.1 % — SIGNIFICANT CHANGE UP (ref 0–8)
EOSINOPHIL NFR BLD AUTO: 0.5 % — SIGNIFICANT CHANGE UP (ref 0–8)
ESTIMATED AVERAGE GLUCOSE: 111 MG/DL — SIGNIFICANT CHANGE UP (ref 68–114)
ETHANOL SERPL-MCNC: <10 MG/DL — SIGNIFICANT CHANGE UP
FERRITIN SERPL-MCNC: HIGH NG/ML (ref 15–150)
FERRITIN SERPL-MCNC: HIGH NG/ML (ref 15–150)
FIBRINOGEN PPP-MCNC: 140 MG/DL — LOW (ref 204.4–570.6)
FIBRINOGEN PPP-MCNC: 233 MG/DL — SIGNIFICANT CHANGE UP (ref 204.4–570.6)
FIBRINOGEN PPP-MCNC: 271 MG/DL — SIGNIFICANT CHANGE UP (ref 204.4–570.6)
FIBRINOGEN PPP-MCNC: 417 MG/DL — SIGNIFICANT CHANGE UP (ref 204.4–570.6)
FLUID INTAKE SUBSTANCE CLASS: SIGNIFICANT CHANGE UP
FLUID SEGMENTED GRANULOCYTES: SIGNIFICANT CHANGE UP %
FOLATE SERPL-MCNC: >20 NG/ML — SIGNIFICANT CHANGE UP
GAS PNL BLDA: SIGNIFICANT CHANGE UP
GAS PNL BLDV: 138 MMOL/L — SIGNIFICANT CHANGE UP (ref 136–145)
GAS PNL BLDV: SIGNIFICANT CHANGE UP
GIANT PLATELETS BLD QL SMEAR: PRESENT — SIGNIFICANT CHANGE UP
GLIADIN PEPTIDE IGA SER-ACNC: 5.8 UNITS — SIGNIFICANT CHANGE UP
GLIADIN PEPTIDE IGA SER-ACNC: NEGATIVE — SIGNIFICANT CHANGE UP
GLIADIN PEPTIDE IGG SER-ACNC: 6.5 UNITS — SIGNIFICANT CHANGE UP
GLIADIN PEPTIDE IGG SER-ACNC: NEGATIVE — SIGNIFICANT CHANGE UP
GLUCOSE BLDC GLUCOMTR-MCNC: 111 MG/DL — HIGH (ref 70–99)
GLUCOSE BLDC GLUCOMTR-MCNC: 141 MG/DL — HIGH (ref 70–99)
GLUCOSE BLDC GLUCOMTR-MCNC: 46 MG/DL — CRITICAL LOW (ref 70–99)
GLUCOSE FLD-MCNC: 123 MG/DL — SIGNIFICANT CHANGE UP
GLUCOSE SERPL-MCNC: 121 MG/DL — HIGH (ref 70–99)
GLUCOSE SERPL-MCNC: 127 MG/DL — HIGH (ref 70–99)
GLUCOSE SERPL-MCNC: 137 MG/DL — HIGH (ref 70–99)
GLUCOSE SERPL-MCNC: 143 MG/DL — HIGH (ref 70–99)
GLUCOSE SERPL-MCNC: 147 MG/DL — HIGH (ref 70–99)
GLUCOSE SERPL-MCNC: 148 MG/DL — HIGH (ref 70–99)
GLUCOSE SERPL-MCNC: 157 MG/DL — HIGH (ref 70–99)
GLUCOSE SERPL-MCNC: 159 MG/DL — HIGH (ref 70–99)
GLUCOSE SERPL-MCNC: 161 MG/DL — HIGH (ref 70–99)
GLUCOSE SERPL-MCNC: 164 MG/DL — HIGH (ref 70–99)
GLUCOSE SERPL-MCNC: 180 MG/DL — HIGH (ref 70–99)
GRAM STN FLD: SIGNIFICANT CHANGE UP
HAPTOGLOB SERPL-MCNC: <20 MG/DL — LOW (ref 34–200)
HAV IGM SER-ACNC: SIGNIFICANT CHANGE UP
HBV CORE IGM SER-ACNC: SIGNIFICANT CHANGE UP
HBV SURFACE AG SER-ACNC: SIGNIFICANT CHANGE UP
HCO3 BLDA-SCNC: -19 MMOL/L — CRITICAL LOW (ref 23–27)
HCO3 BLDA-SCNC: 11 MMOL/L — LOW (ref 23–27)
HCO3 BLDA-SCNC: 12 MMOL/L — LOW (ref 21–29)
HCO3 BLDA-SCNC: 12 MMOL/L — LOW (ref 21–29)
HCO3 BLDA-SCNC: 14 MMOL/L — LOW (ref 23–27)
HCO3 BLDA-SCNC: 18 MMOL/L — LOW (ref 23–27)
HCO3 BLDA-SCNC: 18 MMOL/L — LOW (ref 23–27)
HCO3 BLDA-SCNC: 19 MMOL/L — LOW (ref 23–27)
HCO3 BLDA-SCNC: 19 MMOL/L — LOW (ref 23–27)
HCO3 BLDA-SCNC: 20 MMOL/L — LOW (ref 23–27)
HCO3 BLDA-SCNC: 21 MMOL/L — LOW (ref 23–27)
HCO3 BLDA-SCNC: 22 MMOL/L — LOW (ref 23–27)
HCO3 BLDA-SCNC: 8 MMOL/L — CRITICAL LOW (ref 23–27)
HCO3 BLDA-SCNC: SIGNIFICANT CHANGE UP MMOL/L (ref 21–29)
HCO3 BLDV-SCNC: 20 MMOL/L — LOW (ref 22–29)
HCT VFR BLD CALC: 27.7 % — LOW (ref 37–47)
HCT VFR BLD CALC: 29.6 % — LOW (ref 37–47)
HCT VFR BLD CALC: 30.4 % — LOW (ref 37–47)
HCT VFR BLD CALC: 32.6 % — LOW (ref 37–47)
HCT VFR BLD CALC: 33 % — LOW (ref 37–47)
HCT VFR BLD CALC: 33.7 % — LOW (ref 37–47)
HCT VFR BLD CALC: 34 % — LOW (ref 37–47)
HCT VFR BLD CALC: 37.6 % — SIGNIFICANT CHANGE UP (ref 37–47)
HCT VFR BLD CALC: 39.5 % — SIGNIFICANT CHANGE UP (ref 37–47)
HCT VFR BLD CALC: 44.1 % — SIGNIFICANT CHANGE UP (ref 37–47)
HCT VFR BLD CALC: 44.3 % — SIGNIFICANT CHANGE UP (ref 37–47)
HCT VFR BLD CALC: 46 % — SIGNIFICANT CHANGE UP (ref 37–47)
HCT VFR BLDA CALC: 41 % — SIGNIFICANT CHANGE UP (ref 34–44)
HCV AB S/CO SERPL IA: 0.13 S/CO — SIGNIFICANT CHANGE UP (ref 0–0.99)
HCV AB SERPL-IMP: SIGNIFICANT CHANGE UP
HDLC SERPL-MCNC: 23 MG/DL — LOW
HEPARIN-PF4 AB RESULT: <0.6 U/ML — SIGNIFICANT CHANGE UP (ref 0–0.9)
HGB BLD CALC-MCNC: 13.4 G/DL — LOW (ref 14–18)
HGB BLD-MCNC: 10.8 G/DL — LOW (ref 12–16)
HGB BLD-MCNC: 11 G/DL — LOW (ref 12–16)
HGB BLD-MCNC: 11.2 G/DL — LOW (ref 12–16)
HGB BLD-MCNC: 11.6 G/DL — LOW (ref 12–16)
HGB BLD-MCNC: 12.9 G/DL — SIGNIFICANT CHANGE UP (ref 12–16)
HGB BLD-MCNC: 13.1 G/DL — SIGNIFICANT CHANGE UP (ref 12–16)
HGB BLD-MCNC: 13.6 G/DL — SIGNIFICANT CHANGE UP (ref 12–16)
HGB BLD-MCNC: 14 G/DL — SIGNIFICANT CHANGE UP (ref 12–16)
HGB BLD-MCNC: 14.1 G/DL — SIGNIFICANT CHANGE UP (ref 12–16)
HGB BLD-MCNC: 9 G/DL — LOW (ref 12–16)
HGB BLD-MCNC: 9.3 G/DL — LOW (ref 12–16)
HGB BLD-MCNC: 9.9 G/DL — LOW (ref 12–16)
HOROWITZ INDEX BLDA+IHG-RTO: 100 — SIGNIFICANT CHANGE UP
HOROWITZ INDEX BLDA+IHG-RTO: 45 — SIGNIFICANT CHANGE UP
HOROWITZ INDEX BLDA+IHG-RTO: 60 — SIGNIFICANT CHANGE UP
HOROWITZ INDEX BLDA+IHG-RTO: 60 — SIGNIFICANT CHANGE UP
HOROWITZ INDEX BLDA+IHG-RTO: 80 — SIGNIFICANT CHANGE UP
HYPOCHROMIA BLD QL: SLIGHT — SIGNIFICANT CHANGE UP
IGA FLD-MCNC: 510 MG/DL — HIGH (ref 84–499)
IGG FLD-MCNC: 1162 MG/DL — SIGNIFICANT CHANGE UP (ref 610–1660)
IGM SERPL-MCNC: 73 MG/DL — SIGNIFICANT CHANGE UP (ref 35–242)
IMM GRANULOCYTES NFR BLD AUTO: 1 % — HIGH (ref 0.1–0.3)
IMM GRANULOCYTES NFR BLD AUTO: 1.1 % — HIGH (ref 0.1–0.3)
IMM GRANULOCYTES NFR BLD AUTO: 1.2 % — HIGH (ref 0.1–0.3)
IMM GRANULOCYTES NFR BLD AUTO: 1.5 % — HIGH (ref 0.1–0.3)
IMM GRANULOCYTES NFR BLD AUTO: 2 % — HIGH (ref 0.1–0.3)
IMM GRANULOCYTES NFR BLD AUTO: 2 % — HIGH (ref 0.1–0.3)
IMM GRANULOCYTES NFR BLD AUTO: 2.5 % — HIGH (ref 0.1–0.3)
INR BLD: 3.04 RATIO — HIGH (ref 0.65–1.3)
INR BLD: 4.05 RATIO — HIGH (ref 0.65–1.3)
INR BLD: 4.11 RATIO — HIGH (ref 0.65–1.3)
INR BLD: 4.17 RATIO — HIGH (ref 0.65–1.3)
INR BLD: 5.17 RATIO — CRITICAL HIGH (ref 0.65–1.3)
INR BLD: 5.63 RATIO — CRITICAL HIGH (ref 0.65–1.3)
INR BLD: 5.99 RATIO — CRITICAL HIGH (ref 0.65–1.3)
INR BLD: 7.03 RATIO — CRITICAL HIGH (ref 0.65–1.3)
IRON SATN MFR SERPL: 192 UG/DL — HIGH (ref 35–150)
IRON SATN MFR SERPL: 253 UG/DL — HIGH (ref 35–150)
IRON SATN MFR SERPL: 93 % — HIGH (ref 15–50)
KAPPA LC SER QL IFE: 6.41 MG/DL — HIGH (ref 0.33–1.94)
KAPPA/LAMBDA FREE LIGHT CHAIN RATIO, SERUM: 0.7 RATIO — SIGNIFICANT CHANGE UP (ref 0.26–1.65)
LAB BILL ONLY ITEM: SIGNIFICANT CHANGE UP
LACTATE BLDV-MCNC: 3.5 MMOL/L — HIGH (ref 0.5–1.6)
LACTATE SERPL-SCNC: 13.7 MMOL/L — CRITICAL HIGH (ref 0.7–2)
LACTATE SERPL-SCNC: 14.9 MMOL/L — CRITICAL HIGH (ref 0.7–2)
LACTATE SERPL-SCNC: 4.4 MMOL/L — CRITICAL HIGH (ref 0.7–2)
LAMBDA LC SER QL IFE: 9.14 MG/DL — HIGH (ref 0.57–2.63)
LDH SERPL L TO P-CCNC: 165 U/L — SIGNIFICANT CHANGE UP
LDH SERPL L TO P-CCNC: 875 — HIGH (ref 50–242)
LIPID PNL WITH DIRECT LDL SERPL: 94 MG/DL — SIGNIFICANT CHANGE UP
LYMPHOCYTES # BLD AUTO: 0 % — LOW (ref 20.5–51.1)
LYMPHOCYTES # BLD AUTO: 0 % — LOW (ref 20.5–51.1)
LYMPHOCYTES # BLD AUTO: 0 K/UL — LOW (ref 1.2–3.4)
LYMPHOCYTES # BLD AUTO: 0 K/UL — LOW (ref 1.2–3.4)
LYMPHOCYTES # BLD AUTO: 0.33 K/UL — LOW (ref 1.2–3.4)
LYMPHOCYTES # BLD AUTO: 0.43 K/UL — LOW (ref 1.2–3.4)
LYMPHOCYTES # BLD AUTO: 0.46 K/UL — LOW (ref 1.2–3.4)
LYMPHOCYTES # BLD AUTO: 0.48 K/UL — LOW (ref 1.2–3.4)
LYMPHOCYTES # BLD AUTO: 0.81 K/UL — LOW (ref 1.2–3.4)
LYMPHOCYTES # BLD AUTO: 0.95 K/UL — LOW (ref 1.2–3.4)
LYMPHOCYTES # BLD AUTO: 2.1 % — LOW (ref 20.5–51.1)
LYMPHOCYTES # BLD AUTO: 2.7 % — LOW (ref 20.5–51.1)
LYMPHOCYTES # BLD AUTO: 3 % — LOW (ref 20.5–51.1)
LYMPHOCYTES # BLD AUTO: 3.3 % — LOW (ref 20.5–51.1)
LYMPHOCYTES # BLD AUTO: 4.1 % — LOW (ref 20.5–51.1)
LYMPHOCYTES # BLD AUTO: 5.7 % — LOW (ref 20.5–51.1)
LYMPHOCYTES # FLD: SIGNIFICANT CHANGE UP
MACROCYTES BLD QL: SIGNIFICANT CHANGE UP
MAGNESIUM SERPL-MCNC: 1.7 MG/DL — LOW (ref 1.8–2.4)
MAGNESIUM SERPL-MCNC: 1.8 MG/DL — SIGNIFICANT CHANGE UP (ref 1.8–2.4)
MAGNESIUM SERPL-MCNC: 1.8 MG/DL — SIGNIFICANT CHANGE UP (ref 1.8–2.4)
MAGNESIUM SERPL-MCNC: 2 MG/DL — SIGNIFICANT CHANGE UP (ref 1.8–2.4)
MAGNESIUM SERPL-MCNC: 2.4 MG/DL — SIGNIFICANT CHANGE UP (ref 1.8–2.4)
MAGNESIUM SERPL-MCNC: 2.4 MG/DL — SIGNIFICANT CHANGE UP (ref 1.8–2.4)
MANUAL SMEAR VERIFICATION: SIGNIFICANT CHANGE UP
MCHC RBC-ENTMCNC: 30.4 G/DL — LOW (ref 32–37)
MCHC RBC-ENTMCNC: 30.8 G/DL — LOW (ref 32–37)
MCHC RBC-ENTMCNC: 31.4 G/DL — LOW (ref 32–37)
MCHC RBC-ENTMCNC: 31.5 PG — HIGH (ref 27–31)
MCHC RBC-ENTMCNC: 31.8 G/DL — LOW (ref 32–37)
MCHC RBC-ENTMCNC: 31.8 PG — HIGH (ref 27–31)
MCHC RBC-ENTMCNC: 32 PG — HIGH (ref 27–31)
MCHC RBC-ENTMCNC: 32 PG — HIGH (ref 27–31)
MCHC RBC-ENTMCNC: 32.1 PG — HIGH (ref 27–31)
MCHC RBC-ENTMCNC: 32.2 PG — HIGH (ref 27–31)
MCHC RBC-ENTMCNC: 32.3 PG — HIGH (ref 27–31)
MCHC RBC-ENTMCNC: 32.5 G/DL — SIGNIFICANT CHANGE UP (ref 32–37)
MCHC RBC-ENTMCNC: 32.6 G/DL — SIGNIFICANT CHANGE UP (ref 32–37)
MCHC RBC-ENTMCNC: 32.9 G/DL — SIGNIFICANT CHANGE UP (ref 32–37)
MCHC RBC-ENTMCNC: 33.1 G/DL — SIGNIFICANT CHANGE UP (ref 32–37)
MCHC RBC-ENTMCNC: 33.2 G/DL — SIGNIFICANT CHANGE UP (ref 32–37)
MCHC RBC-ENTMCNC: 33.3 G/DL — SIGNIFICANT CHANGE UP (ref 32–37)
MCHC RBC-ENTMCNC: 34.3 G/DL — SIGNIFICANT CHANGE UP (ref 32–37)
MCHC RBC-ENTMCNC: 34.4 G/DL — SIGNIFICANT CHANGE UP (ref 32–37)
MCV RBC AUTO: 100.3 FL — HIGH (ref 81–99)
MCV RBC AUTO: 102.1 FL — HIGH (ref 81–99)
MCV RBC AUTO: 106 FL — HIGH (ref 81–99)
MCV RBC AUTO: 92.8 FL — SIGNIFICANT CHANGE UP (ref 81–99)
MCV RBC AUTO: 93.5 FL — SIGNIFICANT CHANGE UP (ref 81–99)
MCV RBC AUTO: 95.8 FL — SIGNIFICANT CHANGE UP (ref 81–99)
MCV RBC AUTO: 95.9 FL — SIGNIFICANT CHANGE UP (ref 81–99)
MCV RBC AUTO: 96.5 FL — SIGNIFICANT CHANGE UP (ref 81–99)
MCV RBC AUTO: 96.6 FL — SIGNIFICANT CHANGE UP (ref 81–99)
MCV RBC AUTO: 98.7 FL — SIGNIFICANT CHANGE UP (ref 81–99)
MCV RBC AUTO: 98.9 FL — SIGNIFICANT CHANGE UP (ref 81–99)
MCV RBC AUTO: 98.9 FL — SIGNIFICANT CHANGE UP (ref 81–99)
MESOTHL CELL # FLD: SIGNIFICANT CHANGE UP %
MITOCHONDRIA AB SER-ACNC: SIGNIFICANT CHANGE UP
MONOCYTES # BLD AUTO: 0.92 K/UL — HIGH (ref 0.1–0.6)
MONOCYTES # BLD AUTO: 1.12 K/UL — HIGH (ref 0.1–0.6)
MONOCYTES # BLD AUTO: 1.36 K/UL — HIGH (ref 0.1–0.6)
MONOCYTES # BLD AUTO: 1.5 K/UL — HIGH (ref 0.1–0.6)
MONOCYTES # BLD AUTO: 1.57 K/UL — HIGH (ref 0.1–0.6)
MONOCYTES # BLD AUTO: 1.92 K/UL — HIGH (ref 0.1–0.6)
MONOCYTES # BLD AUTO: 2.24 K/UL — HIGH (ref 0.1–0.6)
MONOCYTES # BLD AUTO: 2.36 K/UL — HIGH (ref 0.1–0.6)
MONOCYTES NFR BLD AUTO: 10.3 % — HIGH (ref 1.7–9.3)
MONOCYTES NFR BLD AUTO: 10.3 % — HIGH (ref 1.7–9.3)
MONOCYTES NFR BLD AUTO: 15.9 % — HIGH (ref 1.7–9.3)
MONOCYTES NFR BLD AUTO: 4.4 % — SIGNIFICANT CHANGE UP (ref 1.7–9.3)
MONOCYTES NFR BLD AUTO: 7.9 % — SIGNIFICANT CHANGE UP (ref 1.7–9.3)
MONOCYTES NFR BLD AUTO: 8.7 % — SIGNIFICANT CHANGE UP (ref 1.7–9.3)
MONOCYTES NFR BLD AUTO: 8.8 % — SIGNIFICANT CHANGE UP (ref 1.7–9.3)
MONOCYTES NFR BLD AUTO: 9.4 % — HIGH (ref 1.7–9.3)
MONOS+MACROS # FLD: SIGNIFICANT CHANGE UP %
MRSA PCR RESULT.: NEGATIVE — SIGNIFICANT CHANGE UP
NEUTROPHILS # BLD AUTO: 10.7 K/UL — HIGH (ref 1.4–6.5)
NEUTROPHILS # BLD AUTO: 12.29 K/UL — HIGH (ref 1.4–6.5)
NEUTROPHILS # BLD AUTO: 12.29 K/UL — HIGH (ref 1.4–6.5)
NEUTROPHILS # BLD AUTO: 13.78 K/UL — HIGH (ref 1.4–6.5)
NEUTROPHILS # BLD AUTO: 15.53 K/UL — HIGH (ref 1.4–6.5)
NEUTROPHILS # BLD AUTO: 17.86 K/UL — HIGH (ref 1.4–6.5)
NEUTROPHILS # BLD AUTO: 19.17 K/UL — HIGH (ref 1.4–6.5)
NEUTROPHILS # BLD AUTO: 19.86 K/UL — HIGH (ref 1.4–6.5)
NEUTROPHILS NFR BLD AUTO: 75.8 % — HIGH (ref 42.2–75.2)
NEUTROPHILS NFR BLD AUTO: 83.3 % — HIGH (ref 42.2–75.2)
NEUTROPHILS NFR BLD AUTO: 84.5 % — HIGH (ref 42.2–75.2)
NEUTROPHILS NFR BLD AUTO: 87.2 % — HIGH (ref 42.2–75.2)
NEUTROPHILS NFR BLD AUTO: 87.3 % — HIGH (ref 42.2–75.2)
NEUTROPHILS NFR BLD AUTO: 87.9 % — HIGH (ref 42.2–75.2)
NEUTROPHILS NFR BLD AUTO: 87.9 % — HIGH (ref 42.2–75.2)
NEUTROPHILS NFR BLD AUTO: 94.7 % — HIGH (ref 42.2–75.2)
NON HDL CHOLESTEROL: 117 MG/DL — SIGNIFICANT CHANGE UP
NRBC # BLD: 0 /100 WBCS — SIGNIFICANT CHANGE UP (ref 0–0)
NRBC # BLD: 10 /100 WBCS — HIGH (ref 0–0)
NRBC # BLD: 15 /100 — HIGH (ref 0–0)
NRBC # BLD: 17 /100 WBCS — HIGH (ref 0–0)
NRBC # BLD: 18 /100 WBCS — HIGH (ref 0–0)
NRBC # BLD: 2 /100 WBCS — HIGH (ref 0–0)
NRBC # BLD: 20 /100 WBCS — HIGH (ref 0–0)
NRBC # BLD: 3 /100 WBCS — HIGH (ref 0–0)
NRBC # BLD: 32 /100 WBCS — HIGH (ref 0–0)
NRBC # BLD: 44 /100 WBCS — HIGH (ref 0–0)
NRBC # BLD: 66 /100 WBCS — HIGH (ref 0–0)
NRBC # BLD: 8 /100 WBCS — HIGH (ref 0–0)
NRBC # BLD: SIGNIFICANT CHANGE UP /100 WBCS (ref 0–0)
NT-PROBNP SERPL-SCNC: HIGH PG/ML (ref 0–300)
PCO2 BLDA: 27 MMHG — LOW (ref 38–42)
PCO2 BLDA: 28 MMHG — LOW (ref 38–42)
PCO2 BLDA: 31 MMHG — LOW (ref 38–42)
PCO2 BLDA: 32 MMHG — LOW (ref 38–42)
PCO2 BLDA: 32.2 MMHG — SIGNIFICANT CHANGE UP (ref 38–42)
PCO2 BLDA: 33 MMHG — LOW (ref 38–42)
PCO2 BLDA: 35 MMHG — LOW (ref 38–42)
PCO2 BLDA: 35 MMHG — LOW (ref 38–42)
PCO2 BLDA: 37 MMHG — LOW (ref 38–42)
PCO2 BLDA: 39 MMHG — SIGNIFICANT CHANGE UP (ref 38–42)
PCO2 BLDA: 40 MMHG — SIGNIFICANT CHANGE UP (ref 38–42)
PCO2 BLDA: 41 MMHG — SIGNIFICANT CHANGE UP (ref 38–42)
PCO2 BLDA: 42 MMHG — SIGNIFICANT CHANGE UP (ref 38–42)
PCO2 BLDA: 42 MMHG — SIGNIFICANT CHANGE UP (ref 38–42)
PCO2 BLDA: 44 MMHG — HIGH (ref 38–42)
PCO2 BLDA: 45 MMHG — HIGH (ref 38–42)
PCO2 BLDV: 31 MMHG — LOW (ref 41–51)
PF4 HEPARIN CMPLX AB SER-ACNC: NEGATIVE — SIGNIFICANT CHANGE UP
PH BLDA: 6.92 — CRITICAL LOW (ref 7.38–7.42)
PH BLDA: 7.02 — CRITICAL LOW (ref 7.38–7.42)
PH BLDA: 7.1 — CRITICAL LOW (ref 7.38–7.42)
PH BLDA: 7.22 — LOW (ref 7.38–7.42)
PH BLDA: 7.22 — LOW (ref 7.38–7.42)
PH BLDA: 7.23 — LOW (ref 7.38–7.42)
PH BLDA: 7.25 — LOW (ref 7.38–7.42)
PH BLDA: 7.25 — LOW (ref 7.38–7.42)
PH BLDA: 7.31 — LOW (ref 7.38–7.42)
PH BLDA: 7.32 — LOW (ref 7.38–7.42)
PH BLDA: 7.32 — LOW (ref 7.38–7.42)
PH BLDA: 7.33 — LOW (ref 7.38–7.42)
PH BLDA: 7.37 — LOW (ref 7.38–7.42)
PH BLDA: 7.38 — SIGNIFICANT CHANGE UP (ref 7.38–7.42)
PH BLDA: 7.39 — SIGNIFICANT CHANGE UP (ref 7.38–7.42)
PH BLDA: 7.4 — SIGNIFICANT CHANGE UP (ref 7.38–7.42)
PH BLDV: 7.41 — SIGNIFICANT CHANGE UP (ref 7.26–7.43)
PHOSPHATE SERPL-MCNC: 3.1 MG/DL — SIGNIFICANT CHANGE UP (ref 2.1–4.9)
PHOSPHATE SERPL-MCNC: 3.1 MG/DL — SIGNIFICANT CHANGE UP (ref 2.1–4.9)
PHOSPHATE SERPL-MCNC: 3.4 MG/DL — SIGNIFICANT CHANGE UP (ref 2.1–4.9)
PHOSPHATE SERPL-MCNC: 3.8 MG/DL — SIGNIFICANT CHANGE UP (ref 2.1–4.9)
PHOSPHATE SERPL-MCNC: 5.5 MG/DL — HIGH (ref 2.1–4.9)
PLAT MORPH BLD: ABNORMAL
PLATELET # BLD AUTO: 104 K/UL — LOW (ref 130–400)
PLATELET # BLD AUTO: 130 K/UL — SIGNIFICANT CHANGE UP (ref 130–400)
PLATELET # BLD AUTO: 140 K/UL — SIGNIFICANT CHANGE UP (ref 130–400)
PLATELET # BLD AUTO: 15 K/UL — CRITICAL LOW (ref 130–400)
PLATELET # BLD AUTO: 159 K/UL — SIGNIFICANT CHANGE UP (ref 130–400)
PLATELET # BLD AUTO: 16 K/UL — CRITICAL LOW (ref 130–400)
PLATELET # BLD AUTO: 20 K/UL — LOW (ref 130–400)
PLATELET # BLD AUTO: 36 K/UL — LOW (ref 130–400)
PLATELET # BLD AUTO: 60 K/UL — LOW (ref 130–400)
PLATELET # BLD AUTO: 70 K/UL — LOW (ref 130–400)
PLATELET # BLD AUTO: 72 K/UL — LOW (ref 130–400)
PLATELET # BLD AUTO: 77 K/UL — LOW (ref 130–400)
PO2 BLDA: 102 MMHG — HIGH (ref 78–95)
PO2 BLDA: 107 MMHG — HIGH (ref 78–95)
PO2 BLDA: 112 MMHG — HIGH (ref 78–95)
PO2 BLDA: 114 MMHG — HIGH (ref 78–95)
PO2 BLDA: 125 MMHG — HIGH (ref 78–95)
PO2 BLDA: 166 MMHG — HIGH (ref 78–95)
PO2 BLDA: 208 MMHG — HIGH (ref 78–95)
PO2 BLDA: 216 MMHG — HIGH (ref 78–95)
PO2 BLDA: 258 MMHG — HIGH (ref 78–95)
PO2 BLDA: 79 MMHG — SIGNIFICANT CHANGE UP (ref 78–95)
PO2 BLDA: 80 MMHG — SIGNIFICANT CHANGE UP (ref 78–95)
PO2 BLDA: 86 MMHG — SIGNIFICANT CHANGE UP (ref 78–95)
PO2 BLDA: 90 MMHG — SIGNIFICANT CHANGE UP (ref 78–95)
PO2 BLDA: 91 MMHG — SIGNIFICANT CHANGE UP (ref 78–95)
PO2 BLDA: 94 MMHG — SIGNIFICANT CHANGE UP (ref 78–95)
PO2 BLDA: 99 MMHG — HIGH (ref 78–95)
PO2 BLDV: 189 MMHG — HIGH (ref 20–40)
POIKILOCYTOSIS BLD QL AUTO: SLIGHT — SIGNIFICANT CHANGE UP
POLYCHROMASIA BLD QL SMEAR: SLIGHT — SIGNIFICANT CHANGE UP
POTASSIUM BLDV-SCNC: 5.4 MMOL/L — SIGNIFICANT CHANGE UP (ref 3.3–5.6)
POTASSIUM SERPL-MCNC: 2.9 MMOL/L — LOW (ref 3.5–5)
POTASSIUM SERPL-MCNC: 3.1 MMOL/L — LOW (ref 3.5–5)
POTASSIUM SERPL-MCNC: 3.2 MMOL/L — LOW (ref 3.5–5)
POTASSIUM SERPL-MCNC: 3.3 MMOL/L — LOW (ref 3.5–5)
POTASSIUM SERPL-MCNC: 3.6 MMOL/L — SIGNIFICANT CHANGE UP (ref 3.5–5)
POTASSIUM SERPL-MCNC: 3.9 MMOL/L — SIGNIFICANT CHANGE UP (ref 3.5–5)
POTASSIUM SERPL-MCNC: 4 MMOL/L — SIGNIFICANT CHANGE UP (ref 3.5–5)
POTASSIUM SERPL-MCNC: 4.8 MMOL/L — SIGNIFICANT CHANGE UP (ref 3.5–5)
POTASSIUM SERPL-MCNC: 4.8 MMOL/L — SIGNIFICANT CHANGE UP (ref 3.5–5)
POTASSIUM SERPL-MCNC: 5 MMOL/L — SIGNIFICANT CHANGE UP (ref 3.5–5)
POTASSIUM SERPL-MCNC: 5.6 MMOL/L — HIGH (ref 3.5–5)
POTASSIUM SERPL-SCNC: 2.9 MMOL/L — LOW (ref 3.5–5)
POTASSIUM SERPL-SCNC: 3.1 MMOL/L — LOW (ref 3.5–5)
POTASSIUM SERPL-SCNC: 3.2 MMOL/L — LOW (ref 3.5–5)
POTASSIUM SERPL-SCNC: 3.3 MMOL/L — LOW (ref 3.5–5)
POTASSIUM SERPL-SCNC: 3.6 MMOL/L — SIGNIFICANT CHANGE UP (ref 3.5–5)
POTASSIUM SERPL-SCNC: 3.9 MMOL/L — SIGNIFICANT CHANGE UP (ref 3.5–5)
POTASSIUM SERPL-SCNC: 4 MMOL/L — SIGNIFICANT CHANGE UP (ref 3.5–5)
POTASSIUM SERPL-SCNC: 4.8 MMOL/L — SIGNIFICANT CHANGE UP (ref 3.5–5)
POTASSIUM SERPL-SCNC: 4.8 MMOL/L — SIGNIFICANT CHANGE UP (ref 3.5–5)
POTASSIUM SERPL-SCNC: 5 MMOL/L — SIGNIFICANT CHANGE UP (ref 3.5–5)
POTASSIUM SERPL-SCNC: 5.6 MMOL/L — HIGH (ref 3.5–5)
PROCALCITONIN SERPL-MCNC: 2.31 NG/ML — HIGH (ref 0.02–0.1)
PROCALCITONIN SERPL-MCNC: 2.8 NG/ML — HIGH (ref 0.02–0.1)
PROT FLD-MCNC: 4.8 G/DL — SIGNIFICANT CHANGE UP
PROT SERPL-MCNC: 4.6 G/DL — LOW (ref 6–8)
PROT SERPL-MCNC: 4.9 G/DL — LOW (ref 6–8)
PROT SERPL-MCNC: 5.1 G/DL — LOW (ref 6–8)
PROT SERPL-MCNC: 5.7 G/DL — LOW (ref 6–8)
PROT SERPL-MCNC: 5.8 G/DL — LOW (ref 6–8)
PROT SERPL-MCNC: 5.9 G/DL — LOW (ref 6–8)
PROT SERPL-MCNC: 5.9 G/DL — LOW (ref 6–8)
PROT SERPL-MCNC: 6.9 G/DL — SIGNIFICANT CHANGE UP (ref 6–8)
PROT SERPL-MCNC: 7 G/DL — SIGNIFICANT CHANGE UP (ref 6–8)
PROTHROM AB SERPL-ACNC: 35 SEC — HIGH (ref 9.95–12.87)
PROTHROM AB SERPL-ACNC: >40 SEC — HIGH (ref 9.95–12.87)
PTH-INTACT FLD-MCNC: 303 PG/ML — HIGH (ref 15–65)
RAPID RVP RESULT: SIGNIFICANT CHANGE UP
RBC # BLD: 2.8 M/UL — LOW (ref 4.2–5.4)
RBC # BLD: 2.95 M/UL — LOW (ref 4.2–5.4)
RBC # BLD: 3.08 M/UL — LOW (ref 4.2–5.4)
RBC # BLD: 3.4 M/UL — LOW (ref 4.2–5.4)
RBC # BLD: 3.42 M/UL — LOW (ref 4.2–5.4)
RBC # BLD: 3.52 M/UL — LOW (ref 4.2–5.4)
RBC # BLD: 3.55 M/UL — LOW (ref 4.2–5.4)
RBC # BLD: 3.63 M/UL — LOW (ref 4.2–5.4)
RBC # BLD: 4.02 M/UL — LOW (ref 4.2–5.4)
RBC # BLD: 4.09 M/UL — LOW (ref 4.2–5.4)
RBC # BLD: 4.32 M/UL — SIGNIFICANT CHANGE UP (ref 4.2–5.4)
RBC # BLD: 4.34 M/UL — SIGNIFICANT CHANGE UP (ref 4.2–5.4)
RBC # BLD: 4.48 M/UL — SIGNIFICANT CHANGE UP (ref 4.2–5.4)
RBC # FLD: 14.9 % — HIGH (ref 11.5–14.5)
RBC # FLD: 15.1 % — HIGH (ref 11.5–14.5)
RBC # FLD: 15.2 % — HIGH (ref 11.5–14.5)
RBC # FLD: 15.5 % — HIGH (ref 11.5–14.5)
RBC # FLD: 15.5 % — HIGH (ref 11.5–14.5)
RBC # FLD: 15.6 % — HIGH (ref 11.5–14.5)
RBC # FLD: 16.4 % — HIGH (ref 11.5–14.5)
RBC # FLD: 16.4 % — HIGH (ref 11.5–14.5)
RBC # FLD: 16.5 % — HIGH (ref 11.5–14.5)
RBC # FLD: 17.1 % — HIGH (ref 11.5–14.5)
RBC # FLD: 17.5 % — HIGH (ref 11.5–14.5)
RBC # FLD: 17.8 % — HIGH (ref 11.5–14.5)
RBC BLD AUTO: ABNORMAL
RCV VOL RI: 1000 /UL — HIGH (ref 0–0)
RETICS #: 92.6 K/UL — SIGNIFICANT CHANGE UP (ref 25–125)
RETICS/RBC NFR: 2.6 % — HIGH (ref 0.5–1.5)
SAO2 % BLDA: 100 % — HIGH (ref 94–98)
SAO2 % BLDA: 90 % — LOW (ref 92–96)
SAO2 % BLDA: 95 % — SIGNIFICANT CHANGE UP (ref 94–98)
SAO2 % BLDA: 96 % — SIGNIFICANT CHANGE UP (ref 94–98)
SAO2 % BLDA: 96 % — SIGNIFICANT CHANGE UP (ref 94–98)
SAO2 % BLDA: 97 % — SIGNIFICANT CHANGE UP (ref 94–98)
SAO2 % BLDA: 98 % — HIGH (ref 92–96)
SAO2 % BLDA: 98 % — SIGNIFICANT CHANGE UP (ref 94–98)
SAO2 % BLDA: 99 % — HIGH (ref 92–96)
SAO2 % BLDA: 99 % — HIGH (ref 94–98)
SAO2 % BLDV: 98 % — SIGNIFICANT CHANGE UP
SARS-COV-2 RNA SPEC QL NAA+PROBE: SIGNIFICANT CHANGE UP
SARS-COV-2 RNA SPEC QL NAA+PROBE: SIGNIFICANT CHANGE UP
SMOOTH MUSCLE AB SER-ACNC: SIGNIFICANT CHANGE UP
SODIUM SERPL-SCNC: 131 MMOL/L — LOW (ref 135–146)
SODIUM SERPL-SCNC: 134 MMOL/L — LOW (ref 135–146)
SODIUM SERPL-SCNC: 134 MMOL/L — LOW (ref 135–146)
SODIUM SERPL-SCNC: 135 MMOL/L — SIGNIFICANT CHANGE UP (ref 135–146)
SODIUM SERPL-SCNC: 135 MMOL/L — SIGNIFICANT CHANGE UP (ref 135–146)
SODIUM SERPL-SCNC: 136 MMOL/L — SIGNIFICANT CHANGE UP (ref 135–146)
SODIUM SERPL-SCNC: 136 MMOL/L — SIGNIFICANT CHANGE UP (ref 135–146)
SODIUM SERPL-SCNC: 137 MMOL/L — SIGNIFICANT CHANGE UP (ref 135–146)
SODIUM SERPL-SCNC: 138 MMOL/L — SIGNIFICANT CHANGE UP (ref 135–146)
SODIUM SERPL-SCNC: 138 MMOL/L — SIGNIFICANT CHANGE UP (ref 135–146)
SODIUM SERPL-SCNC: 139 MMOL/L — SIGNIFICANT CHANGE UP (ref 135–146)
SOLUBLE LIVER IGG SER IA-ACNC: 2.6 — SIGNIFICANT CHANGE UP (ref 0–20)
SPECIMEN SOURCE: SIGNIFICANT CHANGE UP
SRA INTERP SER-IMP: SIGNIFICANT CHANGE UP
TIBC SERPL-MCNC: 271 UG/DL — SIGNIFICANT CHANGE UP (ref 220–430)
TIBC SERPL-MCNC: <209 UG/DL — LOW (ref 220–430)
TOTAL NUCLEATED CELL COUNT, BODY FLUID: 118 /UL — SIGNIFICANT CHANGE UP
TRANSFERRIN SERPL-MCNC: 102 MG/DL — LOW (ref 200–360)
TRIGL SERPL-MCNC: 150 MG/DL — HIGH
TRIGL SERPL-MCNC: 173 MG/DL — HIGH
TROPONIN T SERPL-MCNC: 0.23 NG/ML — CRITICAL HIGH
TROPONIN T SERPL-MCNC: 1.06 NG/ML — CRITICAL HIGH
TSH SERPL-MCNC: 5.78 UIU/ML — HIGH (ref 0.27–4.2)
TTG IGA SER-ACNC: 1.6 U/ML — SIGNIFICANT CHANGE UP
TTG IGA SER-ACNC: NEGATIVE — SIGNIFICANT CHANGE UP
TUBE TYPE: SIGNIFICANT CHANGE UP
UIBC SERPL-MCNC: 18 UG/DL — LOW (ref 110–370)
UIBC SERPL-MCNC: <17 UG/DL — LOW (ref 110–370)
VANCOMYCIN TROUGH SERPL-MCNC: 12.4 UG/ML — HIGH (ref 5–10)
VARIANT LYMPHS # BLD: 0.9 % — SIGNIFICANT CHANGE UP (ref 0–5)
VIT B12 SERPL-MCNC: 1698 PG/ML — HIGH (ref 232–1245)
WBC # BLD: 14.09 K/UL — HIGH (ref 4.8–10.8)
WBC # BLD: 14.12 K/UL — HIGH (ref 4.8–10.8)
WBC # BLD: 14.54 K/UL — HIGH (ref 4.8–10.8)
WBC # BLD: 15.67 K/UL — HIGH (ref 4.8–10.8)
WBC # BLD: 16.55 K/UL — HIGH (ref 4.8–10.8)
WBC # BLD: 17.81 K/UL — HIGH (ref 4.8–10.8)
WBC # BLD: 18.67 K/UL — HIGH (ref 4.8–10.8)
WBC # BLD: 20.34 K/UL — HIGH (ref 4.8–10.8)
WBC # BLD: 20.46 K/UL — HIGH (ref 4.8–10.8)
WBC # BLD: 20.97 K/UL — HIGH (ref 4.8–10.8)
WBC # BLD: 21.69 K/UL — HIGH (ref 4.8–10.8)
WBC # BLD: 23.01 K/UL — HIGH (ref 4.8–10.8)
WBC # FLD AUTO: 14.09 K/UL — HIGH (ref 4.8–10.8)
WBC # FLD AUTO: 14.12 K/UL — HIGH (ref 4.8–10.8)
WBC # FLD AUTO: 14.54 K/UL — HIGH (ref 4.8–10.8)
WBC # FLD AUTO: 15.67 K/UL — HIGH (ref 4.8–10.8)
WBC # FLD AUTO: 16.55 K/UL — HIGH (ref 4.8–10.8)
WBC # FLD AUTO: 17.81 K/UL — HIGH (ref 4.8–10.8)
WBC # FLD AUTO: 18.67 K/UL — HIGH (ref 4.8–10.8)
WBC # FLD AUTO: 20.34 K/UL — HIGH (ref 4.8–10.8)
WBC # FLD AUTO: 20.46 K/UL — HIGH (ref 4.8–10.8)
WBC # FLD AUTO: 20.97 K/UL — HIGH (ref 4.8–10.8)
WBC # FLD AUTO: 21.69 K/UL — HIGH (ref 4.8–10.8)
WBC # FLD AUTO: 23.01 K/UL — HIGH (ref 4.8–10.8)

## 2021-01-01 PROCEDURE — 99497 ADVNCD CARE PLAN 30 MIN: CPT | Mod: 25

## 2021-01-01 PROCEDURE — 99222 1ST HOSP IP/OBS MODERATE 55: CPT

## 2021-01-01 PROCEDURE — 93970 EXTREMITY STUDY: CPT | Mod: 26

## 2021-01-01 PROCEDURE — 71045 X-RAY EXAM CHEST 1 VIEW: CPT | Mod: 26

## 2021-01-01 PROCEDURE — 99291 CRITICAL CARE FIRST HOUR: CPT | Mod: 25,GC

## 2021-01-01 PROCEDURE — 99233 SBSQ HOSP IP/OBS HIGH 50: CPT

## 2021-01-01 PROCEDURE — 99497 ADVNCD CARE PLAN 30 MIN: CPT

## 2021-01-01 PROCEDURE — 74018 RADEX ABDOMEN 1 VIEW: CPT | Mod: 26

## 2021-01-01 PROCEDURE — 74177 CT ABD & PELVIS W/CONTRAST: CPT | Mod: 26

## 2021-01-01 PROCEDURE — 36620 INSERTION CATHETER ARTERY: CPT | Mod: GC

## 2021-01-01 PROCEDURE — 93308 TTE F-UP OR LMTD: CPT | Mod: 26,GC

## 2021-01-01 PROCEDURE — 76937 US GUIDE VASCULAR ACCESS: CPT | Mod: 26,GC

## 2021-01-01 PROCEDURE — 72125 CT NECK SPINE W/O DYE: CPT | Mod: 26

## 2021-01-01 PROCEDURE — 93306 TTE W/DOPPLER COMPLETE: CPT | Mod: 26

## 2021-01-01 PROCEDURE — 93010 ELECTROCARDIOGRAM REPORT: CPT | Mod: 76

## 2021-01-01 PROCEDURE — 71260 CT THORAX DX C+: CPT | Mod: 26

## 2021-01-01 PROCEDURE — 71045 X-RAY EXAM CHEST 1 VIEW: CPT | Mod: 26,77

## 2021-01-01 PROCEDURE — 36556 INSERT NON-TUNNEL CV CATH: CPT | Mod: GC

## 2021-01-01 PROCEDURE — 93010 ELECTROCARDIOGRAM REPORT: CPT

## 2021-01-01 PROCEDURE — 76705 ECHO EXAM OF ABDOMEN: CPT | Mod: 26

## 2021-01-01 PROCEDURE — 93010 ELECTROCARDIOGRAM REPORT: CPT | Mod: 77

## 2021-01-01 PROCEDURE — 99223 1ST HOSP IP/OBS HIGH 75: CPT

## 2021-01-01 PROCEDURE — 72170 X-RAY EXAM OF PELVIS: CPT | Mod: 26

## 2021-01-01 PROCEDURE — 99232 SBSQ HOSP IP/OBS MODERATE 35: CPT

## 2021-01-01 PROCEDURE — 70450 CT HEAD/BRAIN W/O DYE: CPT | Mod: 26

## 2021-01-01 PROCEDURE — 76604 US EXAM CHEST: CPT | Mod: 26,GC

## 2021-01-01 RX ORDER — PHENYLEPHRINE HYDROCHLORIDE 10 MG/ML
2 INJECTION INTRAVENOUS
Qty: 160 | Refills: 0 | Status: DISCONTINUED | OUTPATIENT
Start: 2021-01-01 | End: 2021-01-01

## 2021-01-01 RX ORDER — ROBINUL 0.2 MG/ML
0.2 INJECTION INTRAMUSCULAR; INTRAVENOUS EVERY 6 HOURS
Refills: 0 | Status: DISCONTINUED | OUTPATIENT
Start: 2021-01-01 | End: 2021-01-01

## 2021-01-01 RX ORDER — SODIUM CHLORIDE 9 MG/ML
1000 INJECTION, SOLUTION INTRAVENOUS ONCE
Refills: 0 | Status: COMPLETED | OUTPATIENT
Start: 2021-01-01 | End: 2021-01-01

## 2021-01-01 RX ORDER — SODIUM CHLORIDE 9 MG/ML
1000 INJECTION, SOLUTION INTRAVENOUS
Refills: 0 | Status: DISCONTINUED | OUTPATIENT
Start: 2021-01-01 | End: 2021-01-01

## 2021-01-01 RX ORDER — VANCOMYCIN HCL 1 G
750 VIAL (EA) INTRAVENOUS
Refills: 0 | Status: DISCONTINUED | OUTPATIENT
Start: 2021-01-01 | End: 2021-01-01

## 2021-01-01 RX ORDER — APIXABAN 2.5 MG/1
1 TABLET, FILM COATED ORAL
Qty: 0 | Refills: 0 | DISCHARGE

## 2021-01-01 RX ORDER — POTASSIUM CHLORIDE 20 MEQ
20 PACKET (EA) ORAL
Refills: 0 | Status: DISCONTINUED | OUTPATIENT
Start: 2021-01-01 | End: 2021-01-01

## 2021-01-01 RX ORDER — POTASSIUM CHLORIDE 20 MEQ
20 PACKET (EA) ORAL
Refills: 0 | Status: COMPLETED | OUTPATIENT
Start: 2021-01-01 | End: 2021-01-01

## 2021-01-01 RX ORDER — FAMOTIDINE 10 MG/ML
1 INJECTION INTRAVENOUS
Qty: 0 | Refills: 0 | DISCHARGE

## 2021-01-01 RX ORDER — AMIODARONE HYDROCHLORIDE 400 MG/1
0.5 TABLET ORAL
Qty: 900 | Refills: 0 | Status: DISCONTINUED | OUTPATIENT
Start: 2021-01-01 | End: 2021-01-01

## 2021-01-01 RX ORDER — CALCIUM ACETATE 667 MG
3 TABLET ORAL
Qty: 0 | Refills: 0 | DISCHARGE

## 2021-01-01 RX ORDER — FENTANYL CITRATE 50 UG/ML
0.5 INJECTION INTRAVENOUS
Qty: 2500 | Refills: 0 | Status: DISCONTINUED | OUTPATIENT
Start: 2021-01-01 | End: 2021-01-01

## 2021-01-01 RX ORDER — DILTIAZEM HCL 120 MG
10 CAPSULE, EXT RELEASE 24 HR ORAL ONCE
Refills: 0 | Status: COMPLETED | OUTPATIENT
Start: 2021-01-01 | End: 2021-01-01

## 2021-01-01 RX ORDER — DILTIAZEM HCL 120 MG
1 CAPSULE, EXT RELEASE 24 HR ORAL
Qty: 0 | Refills: 0 | DISCHARGE

## 2021-01-01 RX ORDER — ROBINUL 0.2 MG/ML
0.2 INJECTION INTRAMUSCULAR; INTRAVENOUS ONCE
Refills: 0 | Status: COMPLETED | OUTPATIENT
Start: 2021-01-01 | End: 2021-01-01

## 2021-01-01 RX ORDER — VASOPRESSIN 20 [USP'U]/ML
0.04 INJECTION INTRAVENOUS
Qty: 50 | Refills: 0 | Status: DISCONTINUED | OUTPATIENT
Start: 2021-01-01 | End: 2021-01-01

## 2021-01-01 RX ORDER — SODIUM CHLORIDE 9 MG/ML
500 INJECTION INTRAMUSCULAR; INTRAVENOUS; SUBCUTANEOUS ONCE
Refills: 0 | Status: COMPLETED | OUTPATIENT
Start: 2021-01-01 | End: 2021-01-01

## 2021-01-01 RX ORDER — VANCOMYCIN HCL 1 G
1000 VIAL (EA) INTRAVENOUS ONCE
Refills: 0 | Status: COMPLETED | OUTPATIENT
Start: 2021-01-01 | End: 2021-01-01

## 2021-01-01 RX ORDER — METOPROLOL TARTRATE 50 MG
1 TABLET ORAL
Qty: 0 | Refills: 0 | DISCHARGE

## 2021-01-01 RX ORDER — CEFEPIME 1 G/1
2000 INJECTION, POWDER, FOR SOLUTION INTRAMUSCULAR; INTRAVENOUS ONCE
Refills: 0 | Status: COMPLETED | OUTPATIENT
Start: 2021-01-01 | End: 2021-01-01

## 2021-01-01 RX ORDER — CEFEPIME 1 G/1
INJECTION, POWDER, FOR SOLUTION INTRAMUSCULAR; INTRAVENOUS
Refills: 0 | Status: DISCONTINUED | OUTPATIENT
Start: 2021-01-01 | End: 2021-01-01

## 2021-01-01 RX ORDER — PROPOFOL 10 MG/ML
10 INJECTION, EMULSION INTRAVENOUS
Qty: 1000 | Refills: 0 | Status: DISCONTINUED | OUTPATIENT
Start: 2021-01-01 | End: 2021-01-01

## 2021-01-01 RX ORDER — SUCRALFATE 1 G
1 TABLET ORAL
Refills: 0 | Status: DISCONTINUED | OUTPATIENT
Start: 2021-01-01 | End: 2021-01-01

## 2021-01-01 RX ORDER — MAGNESIUM SULFATE 500 MG/ML
2 VIAL (ML) INJECTION ONCE
Refills: 0 | Status: COMPLETED | OUTPATIENT
Start: 2021-01-01 | End: 2021-01-01

## 2021-01-01 RX ORDER — CHLORHEXIDINE GLUCONATE 213 G/1000ML
1 SOLUTION TOPICAL
Refills: 0 | Status: DISCONTINUED | OUTPATIENT
Start: 2021-01-01 | End: 2021-01-01

## 2021-01-01 RX ORDER — DEXMEDETOMIDINE HYDROCHLORIDE IN 0.9% SODIUM CHLORIDE 4 UG/ML
0.5 INJECTION INTRAVENOUS
Qty: 400 | Refills: 0 | Status: DISCONTINUED | OUTPATIENT
Start: 2021-01-01 | End: 2021-01-01

## 2021-01-01 RX ORDER — VANCOMYCIN HCL 1 G
1000 VIAL (EA) INTRAVENOUS EVERY 24 HOURS
Refills: 0 | Status: DISCONTINUED | OUTPATIENT
Start: 2021-01-01 | End: 2021-01-01

## 2021-01-01 RX ORDER — DIGOXIN 250 MCG
0.5 TABLET ORAL ONCE
Refills: 0 | Status: COMPLETED | OUTPATIENT
Start: 2021-01-01 | End: 2021-01-01

## 2021-01-01 RX ORDER — ALLOPURINOL 300 MG
1 TABLET ORAL
Qty: 0 | Refills: 0 | DISCHARGE

## 2021-01-01 RX ORDER — VANCOMYCIN HCL 1 G
1000 VIAL (EA) INTRAVENOUS
Refills: 0 | Status: DISCONTINUED | OUTPATIENT
Start: 2021-01-01 | End: 2021-01-01

## 2021-01-01 RX ORDER — SODIUM CHLORIDE 9 MG/ML
500 INJECTION, SOLUTION INTRAVENOUS ONCE
Refills: 0 | Status: COMPLETED | OUTPATIENT
Start: 2021-01-01 | End: 2021-01-01

## 2021-01-01 RX ORDER — AMIODARONE HYDROCHLORIDE 400 MG/1
1 TABLET ORAL
Qty: 900 | Refills: 0 | Status: DISCONTINUED | OUTPATIENT
Start: 2021-01-01 | End: 2021-01-01

## 2021-01-01 RX ORDER — METOPROLOL TARTRATE 50 MG
12.5 TABLET ORAL EVERY 6 HOURS
Refills: 0 | Status: DISCONTINUED | OUTPATIENT
Start: 2021-01-01 | End: 2021-01-01

## 2021-01-01 RX ORDER — OXYCODONE AND ACETAMINOPHEN 5; 325 MG/1; MG/1
1 TABLET ORAL
Qty: 0 | Refills: 0 | DISCHARGE

## 2021-01-01 RX ORDER — DEXMEDETOMIDINE HYDROCHLORIDE IN 0.9% SODIUM CHLORIDE 4 UG/ML
0.05 INJECTION INTRAVENOUS
Qty: 200 | Refills: 0 | Status: DISCONTINUED | OUTPATIENT
Start: 2021-01-01 | End: 2021-01-01

## 2021-01-01 RX ORDER — CALCIUM ACETATE 667 MG
667 TABLET ORAL
Refills: 0 | Status: DISCONTINUED | OUTPATIENT
Start: 2021-01-01 | End: 2021-01-01

## 2021-01-01 RX ORDER — VANCOMYCIN HCL 1 G
125 VIAL (EA) INTRAVENOUS EVERY 6 HOURS
Refills: 0 | Status: DISCONTINUED | OUTPATIENT
Start: 2021-01-01 | End: 2021-01-01

## 2021-01-01 RX ORDER — METOPROLOL TARTRATE 50 MG
12.5 TABLET ORAL
Refills: 0 | Status: DISCONTINUED | OUTPATIENT
Start: 2021-01-01 | End: 2021-01-01

## 2021-01-01 RX ORDER — VANCOMYCIN HCL 1 G
1250 VIAL (EA) INTRAVENOUS ONCE
Refills: 0 | Status: COMPLETED | OUTPATIENT
Start: 2021-01-01 | End: 2021-01-01

## 2021-01-01 RX ORDER — DEXTROSE 50 % IN WATER 50 %
50 SYRINGE (ML) INTRAVENOUS ONCE
Refills: 0 | Status: COMPLETED | OUTPATIENT
Start: 2021-01-01 | End: 2021-01-01

## 2021-01-01 RX ORDER — MORPHINE SULFATE 50 MG/1
4 CAPSULE, EXTENDED RELEASE ORAL
Qty: 100 | Refills: 0 | Status: DISCONTINUED | OUTPATIENT
Start: 2021-01-01 | End: 2021-01-01

## 2021-01-01 RX ORDER — MORPHINE SULFATE 50 MG/1
4 CAPSULE, EXTENDED RELEASE ORAL
Refills: 0 | Status: DISCONTINUED | OUTPATIENT
Start: 2021-01-01 | End: 2021-01-01

## 2021-01-01 RX ORDER — NOREPINEPHRINE BITARTRATE/D5W 8 MG/250ML
0.05 PLASTIC BAG, INJECTION (ML) INTRAVENOUS
Qty: 16 | Refills: 0 | Status: DISCONTINUED | OUTPATIENT
Start: 2021-01-01 | End: 2021-01-01

## 2021-01-01 RX ORDER — MORPHINE SULFATE 50 MG/1
4 CAPSULE, EXTENDED RELEASE ORAL ONCE
Refills: 0 | Status: DISCONTINUED | OUTPATIENT
Start: 2021-01-01 | End: 2021-01-01

## 2021-01-01 RX ORDER — CEFEPIME 1 G/1
2000 INJECTION, POWDER, FOR SOLUTION INTRAMUSCULAR; INTRAVENOUS EVERY 24 HOURS
Refills: 0 | Status: DISCONTINUED | OUTPATIENT
Start: 2021-01-01 | End: 2021-01-01

## 2021-01-01 RX ORDER — HYDROCORTISONE 20 MG
50 TABLET ORAL EVERY 8 HOURS
Refills: 0 | Status: DISCONTINUED | OUTPATIENT
Start: 2021-01-01 | End: 2021-01-01

## 2021-01-01 RX ORDER — AMIODARONE HYDROCHLORIDE 400 MG/1
150 TABLET ORAL ONCE
Refills: 0 | Status: COMPLETED | OUTPATIENT
Start: 2021-01-01 | End: 2021-01-01

## 2021-01-01 RX ORDER — VANCOMYCIN HCL 1 G
VIAL (EA) INTRAVENOUS
Refills: 0 | Status: DISCONTINUED | OUTPATIENT
Start: 2021-01-01 | End: 2021-01-01

## 2021-01-01 RX ORDER — DILTIAZEM HCL 120 MG
20 CAPSULE, EXT RELEASE 24 HR ORAL ONCE
Refills: 0 | Status: COMPLETED | OUTPATIENT
Start: 2021-01-01 | End: 2021-01-01

## 2021-01-01 RX ORDER — CEFEPIME 1 G/1
500 INJECTION, POWDER, FOR SOLUTION INTRAMUSCULAR; INTRAVENOUS EVERY 24 HOURS
Refills: 0 | Status: DISCONTINUED | OUTPATIENT
Start: 2021-01-01 | End: 2021-01-01

## 2021-01-01 RX ORDER — SODIUM BICARBONATE 1 MEQ/ML
50 SYRINGE (ML) INTRAVENOUS ONCE
Refills: 0 | Status: DISCONTINUED | OUTPATIENT
Start: 2021-01-01 | End: 2021-01-01

## 2021-01-01 RX ORDER — FUROSEMIDE 40 MG
40 TABLET ORAL ONCE
Refills: 0 | Status: DISCONTINUED | OUTPATIENT
Start: 2021-01-01 | End: 2021-01-01

## 2021-01-01 RX ORDER — CHLORHEXIDINE GLUCONATE 213 G/1000ML
15 SOLUTION TOPICAL
Refills: 0 | Status: DISCONTINUED | OUTPATIENT
Start: 2021-01-01 | End: 2021-01-01

## 2021-01-01 RX ORDER — SODIUM BICARBONATE 1 MEQ/ML
50 SYRINGE (ML) INTRAVENOUS ONCE
Refills: 0 | Status: COMPLETED | OUTPATIENT
Start: 2021-01-01 | End: 2021-01-01

## 2021-01-01 RX ORDER — MEROPENEM 1 G/30ML
500 INJECTION INTRAVENOUS EVERY 24 HOURS
Refills: 0 | Status: DISCONTINUED | OUTPATIENT
Start: 2021-01-01 | End: 2021-01-01

## 2021-01-01 RX ORDER — DIGOXIN 250 MCG
0.25 TABLET ORAL ONCE
Refills: 0 | Status: COMPLETED | OUTPATIENT
Start: 2021-01-01 | End: 2021-01-01

## 2021-01-01 RX ADMIN — MORPHINE SULFATE 4 MG/HR: 50 CAPSULE, EXTENDED RELEASE ORAL at 17:36

## 2021-01-01 RX ADMIN — Medication 1 DROP(S): at 08:59

## 2021-01-01 RX ADMIN — Medication 1 DROP(S): at 05:31

## 2021-01-01 RX ADMIN — Medication 3.14 MICROGRAM(S)/KG/MIN: at 00:39

## 2021-01-01 RX ADMIN — Medication 1 DROP(S): at 18:48

## 2021-01-01 RX ADMIN — SODIUM CHLORIDE 1000 MILLILITER(S): 9 INJECTION, SOLUTION INTRAVENOUS at 06:45

## 2021-01-01 RX ADMIN — Medication 1 DROP(S): at 13:29

## 2021-01-01 RX ADMIN — ROBINUL 0.2 MILLIGRAM(S): 0.2 INJECTION INTRAMUSCULAR; INTRAVENOUS at 17:14

## 2021-01-01 RX ADMIN — Medication 1 DROP(S): at 00:27

## 2021-01-01 RX ADMIN — Medication 250 MILLIGRAM(S): at 13:13

## 2021-01-01 RX ADMIN — DEXMEDETOMIDINE HYDROCHLORIDE IN 0.9% SODIUM CHLORIDE 0.9 MICROGRAM(S)/KG/HR: 4 INJECTION INTRAVENOUS at 05:37

## 2021-01-01 RX ADMIN — Medication 250 MILLIGRAM(S): at 18:59

## 2021-01-01 RX ADMIN — CHLORHEXIDINE GLUCONATE 1 APPLICATION(S): 213 SOLUTION TOPICAL at 06:03

## 2021-01-01 RX ADMIN — Medication 1 DROP(S): at 09:45

## 2021-01-01 RX ADMIN — CHLORHEXIDINE GLUCONATE 1 APPLICATION(S): 213 SOLUTION TOPICAL at 05:29

## 2021-01-01 RX ADMIN — CHLORHEXIDINE GLUCONATE 15 MILLILITER(S): 213 SOLUTION TOPICAL at 18:47

## 2021-01-01 RX ADMIN — PHENYLEPHRINE HYDROCHLORIDE 25.1 MICROGRAM(S)/KG/MIN: 10 INJECTION INTRAVENOUS at 14:23

## 2021-01-01 RX ADMIN — Medication 50 MILLIGRAM(S): at 16:55

## 2021-01-01 RX ADMIN — FENTANYL CITRATE 3.62 MICROGRAM(S)/KG/HR: 50 INJECTION INTRAVENOUS at 06:02

## 2021-01-01 RX ADMIN — Medication 50 MILLIEQUIVALENT(S): at 21:18

## 2021-01-01 RX ADMIN — MEROPENEM 100 MILLIGRAM(S): 1 INJECTION INTRAVENOUS at 13:19

## 2021-01-01 RX ADMIN — CHLORHEXIDINE GLUCONATE 15 MILLILITER(S): 213 SOLUTION TOPICAL at 05:32

## 2021-01-01 RX ADMIN — PHENYLEPHRINE HYDROCHLORIDE 25.1 MICROGRAM(S)/KG/MIN: 10 INJECTION INTRAVENOUS at 20:53

## 2021-01-01 RX ADMIN — Medication 3.14 MICROGRAM(S)/KG/MIN: at 02:32

## 2021-01-01 RX ADMIN — Medication 1000 MILLIGRAM(S): at 21:08

## 2021-01-01 RX ADMIN — PROPOFOL 4.34 MICROGRAM(S)/KG/MIN: 10 INJECTION, EMULSION INTRAVENOUS at 00:39

## 2021-01-01 RX ADMIN — CHLORHEXIDINE GLUCONATE 15 MILLILITER(S): 213 SOLUTION TOPICAL at 18:06

## 2021-01-01 RX ADMIN — Medication 1 GRAM(S): at 21:19

## 2021-01-01 RX ADMIN — PHENYLEPHRINE HYDROCHLORIDE 25.1 MICROGRAM(S)/KG/MIN: 10 INJECTION INTRAVENOUS at 21:18

## 2021-01-01 RX ADMIN — Medication 1 DROP(S): at 21:18

## 2021-01-01 RX ADMIN — CEFEPIME 100 MILLIGRAM(S): 1 INJECTION, POWDER, FOR SOLUTION INTRAMUSCULAR; INTRAVENOUS at 13:28

## 2021-01-01 RX ADMIN — Medication 25 GRAM(S): at 08:24

## 2021-01-01 RX ADMIN — SODIUM CHLORIDE 1000 MILLILITER(S): 9 INJECTION, SOLUTION INTRAVENOUS at 17:30

## 2021-01-01 RX ADMIN — Medication 50 MILLIGRAM(S): at 17:02

## 2021-01-01 RX ADMIN — Medication 1 DROP(S): at 13:08

## 2021-01-01 RX ADMIN — FENTANYL CITRATE 3.62 MICROGRAM(S)/KG/HR: 50 INJECTION INTRAVENOUS at 05:52

## 2021-01-01 RX ADMIN — DEXMEDETOMIDINE HYDROCHLORIDE IN 0.9% SODIUM CHLORIDE 0.9 MICROGRAM(S)/KG/HR: 4 INJECTION INTRAVENOUS at 23:17

## 2021-01-01 RX ADMIN — MEROPENEM 100 MILLIGRAM(S): 1 INJECTION INTRAVENOUS at 17:54

## 2021-01-01 RX ADMIN — PHENYLEPHRINE HYDROCHLORIDE 25.1 MICROGRAM(S)/KG/MIN: 10 INJECTION INTRAVENOUS at 06:02

## 2021-01-01 RX ADMIN — Medication 1 DROP(S): at 06:03

## 2021-01-01 RX ADMIN — DEXMEDETOMIDINE HYDROCHLORIDE IN 0.9% SODIUM CHLORIDE 9.04 MICROGRAM(S)/KG/HR: 4 INJECTION INTRAVENOUS at 05:52

## 2021-01-01 RX ADMIN — FENTANYL CITRATE 3.62 MICROGRAM(S)/KG/HR: 50 INJECTION INTRAVENOUS at 20:21

## 2021-01-01 RX ADMIN — DEXMEDETOMIDINE HYDROCHLORIDE IN 0.9% SODIUM CHLORIDE 0.9 MICROGRAM(S)/KG/HR: 4 INJECTION INTRAVENOUS at 11:12

## 2021-01-01 RX ADMIN — Medication 50 MILLIGRAM(S): at 21:46

## 2021-01-01 RX ADMIN — Medication 50 MILLIEQUIVALENT(S): at 02:45

## 2021-01-01 RX ADMIN — Medication 50 MILLILITER(S): at 01:40

## 2021-01-01 RX ADMIN — Medication 3.14 MICROGRAM(S)/KG/MIN: at 00:32

## 2021-01-01 RX ADMIN — AMIODARONE HYDROCHLORIDE 600 MILLIGRAM(S): 400 TABLET ORAL at 16:05

## 2021-01-01 RX ADMIN — Medication 12.5 MILLIGRAM(S): at 20:46

## 2021-01-01 RX ADMIN — Medication 50 MILLIGRAM(S): at 21:19

## 2021-01-01 RX ADMIN — VASOPRESSIN 2.4 UNIT(S)/MIN: 20 INJECTION INTRAVENOUS at 05:25

## 2021-01-01 RX ADMIN — Medication 50 MILLIEQUIVALENT(S): at 19:33

## 2021-01-01 RX ADMIN — PHENYLEPHRINE HYDROCHLORIDE 25.1 MICROGRAM(S)/KG/MIN: 10 INJECTION INTRAVENOUS at 05:29

## 2021-01-01 RX ADMIN — Medication 50 GRAM(S): at 06:36

## 2021-01-01 RX ADMIN — CHLORHEXIDINE GLUCONATE 1 APPLICATION(S): 213 SOLUTION TOPICAL at 05:34

## 2021-01-01 RX ADMIN — CEFEPIME 100 MILLIGRAM(S): 1 INJECTION, POWDER, FOR SOLUTION INTRAMUSCULAR; INTRAVENOUS at 17:33

## 2021-01-01 RX ADMIN — MEROPENEM 100 MILLIGRAM(S): 1 INJECTION INTRAVENOUS at 08:08

## 2021-01-01 RX ADMIN — CEFEPIME 100 MILLIGRAM(S): 1 INJECTION, POWDER, FOR SOLUTION INTRAMUSCULAR; INTRAVENOUS at 14:01

## 2021-01-01 RX ADMIN — Medication 1 DROP(S): at 10:32

## 2021-01-01 RX ADMIN — CHLORHEXIDINE GLUCONATE 1 APPLICATION(S): 213 SOLUTION TOPICAL at 05:23

## 2021-01-01 RX ADMIN — SODIUM CHLORIDE 75 MILLILITER(S): 9 INJECTION, SOLUTION INTRAVENOUS at 16:22

## 2021-01-01 RX ADMIN — Medication 50 MILLIGRAM(S): at 21:36

## 2021-01-01 RX ADMIN — Medication 50 MILLIGRAM(S): at 04:59

## 2021-01-01 RX ADMIN — Medication 12.5 MILLIGRAM(S): at 05:00

## 2021-01-01 RX ADMIN — SODIUM CHLORIDE 75 MILLILITER(S): 9 INJECTION, SOLUTION INTRAVENOUS at 06:02

## 2021-01-01 RX ADMIN — DEXMEDETOMIDINE HYDROCHLORIDE IN 0.9% SODIUM CHLORIDE 0.9 MICROGRAM(S)/KG/HR: 4 INJECTION INTRAVENOUS at 02:07

## 2021-01-01 RX ADMIN — Medication 50 MILLIGRAM(S): at 21:49

## 2021-01-01 RX ADMIN — CEFEPIME 2000 MILLIGRAM(S): 1 INJECTION, POWDER, FOR SOLUTION INTRAMUSCULAR; INTRAVENOUS at 21:08

## 2021-01-01 RX ADMIN — Medication 1 DROP(S): at 14:00

## 2021-01-01 RX ADMIN — Medication 1 GRAM(S): at 06:03

## 2021-01-01 RX ADMIN — Medication 1 DROP(S): at 21:49

## 2021-01-01 RX ADMIN — FENTANYL CITRATE 3.62 MICROGRAM(S)/KG/HR: 50 INJECTION INTRAVENOUS at 02:32

## 2021-01-01 RX ADMIN — MORPHINE SULFATE 4 MILLIGRAM(S): 50 CAPSULE, EXTENDED RELEASE ORAL at 17:20

## 2021-01-01 RX ADMIN — DEXMEDETOMIDINE HYDROCHLORIDE IN 0.9% SODIUM CHLORIDE 9.04 MICROGRAM(S)/KG/HR: 4 INJECTION INTRAVENOUS at 08:48

## 2021-01-01 RX ADMIN — SODIUM CHLORIDE 1000 MILLILITER(S): 9 INJECTION, SOLUTION INTRAVENOUS at 16:30

## 2021-01-01 RX ADMIN — PHENYLEPHRINE HYDROCHLORIDE 25.1 MICROGRAM(S)/KG/MIN: 10 INJECTION INTRAVENOUS at 12:10

## 2021-01-01 RX ADMIN — Medication 50 MILLIGRAM(S): at 13:12

## 2021-01-01 RX ADMIN — DEXMEDETOMIDINE HYDROCHLORIDE IN 0.9% SODIUM CHLORIDE 9.04 MICROGRAM(S)/KG/HR: 4 INJECTION INTRAVENOUS at 16:22

## 2021-01-01 RX ADMIN — Medication 3.14 MICROGRAM(S)/KG/MIN: at 05:52

## 2021-01-01 RX ADMIN — CEFEPIME 100 MILLIGRAM(S): 1 INJECTION, POWDER, FOR SOLUTION INTRAMUSCULAR; INTRAVENOUS at 17:42

## 2021-01-01 RX ADMIN — Medication 0.5 MILLIGRAM(S): at 16:05

## 2021-01-01 RX ADMIN — CHLORHEXIDINE GLUCONATE 15 MILLILITER(S): 213 SOLUTION TOPICAL at 17:40

## 2021-01-01 RX ADMIN — Medication 12.5 MILLIGRAM(S): at 10:29

## 2021-01-01 RX ADMIN — Medication 1 DROP(S): at 02:00

## 2021-01-01 RX ADMIN — DEXMEDETOMIDINE HYDROCHLORIDE IN 0.9% SODIUM CHLORIDE 9.04 MICROGRAM(S)/KG/HR: 4 INJECTION INTRAVENOUS at 06:02

## 2021-01-01 RX ADMIN — PHENYLEPHRINE HYDROCHLORIDE 25.1 MICROGRAM(S)/KG/MIN: 10 INJECTION INTRAVENOUS at 05:34

## 2021-01-01 RX ADMIN — CHLORHEXIDINE GLUCONATE 15 MILLILITER(S): 213 SOLUTION TOPICAL at 05:53

## 2021-01-01 RX ADMIN — Medication 50 MILLIGRAM(S): at 21:00

## 2021-01-01 RX ADMIN — VASOPRESSIN 2.4 UNIT(S)/MIN: 20 INJECTION INTRAVENOUS at 16:22

## 2021-01-01 RX ADMIN — CHLORHEXIDINE GLUCONATE 15 MILLILITER(S): 213 SOLUTION TOPICAL at 17:28

## 2021-01-01 RX ADMIN — CHLORHEXIDINE GLUCONATE 15 MILLILITER(S): 213 SOLUTION TOPICAL at 18:18

## 2021-01-01 RX ADMIN — DEXMEDETOMIDINE HYDROCHLORIDE IN 0.9% SODIUM CHLORIDE 9.04 MICROGRAM(S)/KG/HR: 4 INJECTION INTRAVENOUS at 19:33

## 2021-01-01 RX ADMIN — FENTANYL CITRATE 3.62 MICROGRAM(S)/KG/HR: 50 INJECTION INTRAVENOUS at 21:46

## 2021-01-01 RX ADMIN — SODIUM CHLORIDE 75 MILLILITER(S): 9 INJECTION, SOLUTION INTRAVENOUS at 23:18

## 2021-01-01 RX ADMIN — Medication 125 MILLIGRAM(S): at 05:29

## 2021-01-01 RX ADMIN — FENTANYL CITRATE 3.62 MICROGRAM(S)/KG/HR: 50 INJECTION INTRAVENOUS at 23:17

## 2021-01-01 RX ADMIN — SODIUM CHLORIDE 4000 MILLILITER(S): 9 INJECTION, SOLUTION INTRAVENOUS at 10:07

## 2021-01-01 RX ADMIN — Medication 50 MILLIEQUIVALENT(S): at 18:47

## 2021-01-01 RX ADMIN — SODIUM CHLORIDE 75 MILLILITER(S): 9 INJECTION, SOLUTION INTRAVENOUS at 01:36

## 2021-01-01 RX ADMIN — VASOPRESSIN 2.4 UNIT(S)/MIN: 20 INJECTION INTRAVENOUS at 13:15

## 2021-01-01 RX ADMIN — Medication 100 MILLIEQUIVALENT(S): at 06:36

## 2021-01-01 RX ADMIN — DEXMEDETOMIDINE HYDROCHLORIDE IN 0.9% SODIUM CHLORIDE 9.04 MICROGRAM(S)/KG/HR: 4 INJECTION INTRAVENOUS at 00:25

## 2021-01-01 RX ADMIN — Medication 100 MILLIEQUIVALENT(S): at 07:40

## 2021-01-01 RX ADMIN — Medication 50 MILLIGRAM(S): at 13:09

## 2021-01-01 RX ADMIN — Medication 3.14 MICROGRAM(S)/KG/MIN: at 08:49

## 2021-01-01 RX ADMIN — FENTANYL CITRATE 3.62 MICROGRAM(S)/KG/HR: 50 INJECTION INTRAVENOUS at 11:02

## 2021-01-01 RX ADMIN — CEFEPIME 100 MILLIGRAM(S): 1 INJECTION, POWDER, FOR SOLUTION INTRAMUSCULAR; INTRAVENOUS at 13:00

## 2021-01-01 RX ADMIN — CHLORHEXIDINE GLUCONATE 15 MILLILITER(S): 213 SOLUTION TOPICAL at 06:03

## 2021-01-01 RX ADMIN — Medication 50 MILLIGRAM(S): at 06:01

## 2021-01-01 RX ADMIN — Medication 50 MILLIGRAM(S): at 05:31

## 2021-01-01 RX ADMIN — FENTANYL CITRATE 3.62 MICROGRAM(S)/KG/HR: 50 INJECTION INTRAVENOUS at 15:00

## 2021-01-01 RX ADMIN — FENTANYL CITRATE 3.62 MICROGRAM(S)/KG/HR: 50 INJECTION INTRAVENOUS at 11:30

## 2021-01-01 RX ADMIN — MEROPENEM 100 MILLIGRAM(S): 1 INJECTION INTRAVENOUS at 10:36

## 2021-01-01 RX ADMIN — DEXMEDETOMIDINE HYDROCHLORIDE IN 0.9% SODIUM CHLORIDE 9.04 MICROGRAM(S)/KG/HR: 4 INJECTION INTRAVENOUS at 12:08

## 2021-01-01 RX ADMIN — CHLORHEXIDINE GLUCONATE 15 MILLILITER(S): 213 SOLUTION TOPICAL at 05:34

## 2021-01-01 RX ADMIN — SODIUM CHLORIDE 75 MILLILITER(S): 9 INJECTION, SOLUTION INTRAVENOUS at 05:34

## 2021-01-01 RX ADMIN — Medication 3.14 MICROGRAM(S)/KG/MIN: at 06:02

## 2021-01-01 RX ADMIN — CHLORHEXIDINE GLUCONATE 1 APPLICATION(S): 213 SOLUTION TOPICAL at 05:01

## 2021-01-01 RX ADMIN — Medication 50 MILLIGRAM(S): at 05:53

## 2021-01-01 RX ADMIN — Medication 3.14 MICROGRAM(S)/KG/MIN: at 12:08

## 2021-01-01 RX ADMIN — Medication 50 MILLIGRAM(S): at 21:30

## 2021-01-01 RX ADMIN — AMIODARONE HYDROCHLORIDE 150 MILLIGRAM(S): 400 TABLET ORAL at 21:08

## 2021-01-01 RX ADMIN — PHENYLEPHRINE HYDROCHLORIDE 25.1 MICROGRAM(S)/KG/MIN: 10 INJECTION INTRAVENOUS at 00:26

## 2021-01-01 RX ADMIN — Medication 50 MILLIGRAM(S): at 14:00

## 2021-01-01 RX ADMIN — SODIUM CHLORIDE 75 MILLILITER(S): 9 INJECTION, SOLUTION INTRAVENOUS at 02:38

## 2021-01-01 RX ADMIN — Medication 1 DROP(S): at 18:18

## 2021-01-01 RX ADMIN — Medication 250 MILLIGRAM(S): at 19:44

## 2021-01-01 RX ADMIN — CHLORHEXIDINE GLUCONATE 15 MILLILITER(S): 213 SOLUTION TOPICAL at 05:23

## 2021-01-01 RX ADMIN — Medication 50 MILLIGRAM(S): at 13:27

## 2021-01-01 RX ADMIN — Medication 50 MILLIEQUIVALENT(S): at 08:25

## 2021-01-01 RX ADMIN — Medication 50 MILLIGRAM(S): at 05:22

## 2021-01-01 RX ADMIN — CEFEPIME 100 MILLIGRAM(S): 1 INJECTION, POWDER, FOR SOLUTION INTRAMUSCULAR; INTRAVENOUS at 05:27

## 2021-01-01 RX ADMIN — PHENYLEPHRINE HYDROCHLORIDE 25.1 MICROGRAM(S)/KG/MIN: 10 INJECTION INTRAVENOUS at 08:21

## 2021-01-01 RX ADMIN — PHENYLEPHRINE HYDROCHLORIDE 25.1 MICROGRAM(S)/KG/MIN: 10 INJECTION INTRAVENOUS at 01:09

## 2021-01-01 RX ADMIN — Medication 50 MILLIGRAM(S): at 05:36

## 2021-01-01 RX ADMIN — PHENYLEPHRINE HYDROCHLORIDE 25.1 MICROGRAM(S)/KG/MIN: 10 INJECTION INTRAVENOUS at 05:52

## 2021-01-01 RX ADMIN — Medication 100 MILLIEQUIVALENT(S): at 09:44

## 2021-01-01 RX ADMIN — Medication 50 MILLIGRAM(S): at 05:25

## 2021-01-01 RX ADMIN — Medication 25 GRAM(S): at 13:16

## 2021-01-01 RX ADMIN — DEXMEDETOMIDINE HYDROCHLORIDE IN 0.9% SODIUM CHLORIDE 9.04 MICROGRAM(S)/KG/HR: 4 INJECTION INTRAVENOUS at 02:25

## 2021-01-01 RX ADMIN — Medication 3.14 MICROGRAM(S)/KG/MIN: at 00:26

## 2021-01-01 RX ADMIN — CHLORHEXIDINE GLUCONATE 1 APPLICATION(S): 213 SOLUTION TOPICAL at 05:53

## 2021-01-01 RX ADMIN — CHLORHEXIDINE GLUCONATE 1 APPLICATION(S): 213 SOLUTION TOPICAL at 05:31

## 2021-01-01 RX ADMIN — CHLORHEXIDINE GLUCONATE 15 MILLILITER(S): 213 SOLUTION TOPICAL at 05:01

## 2021-01-01 RX ADMIN — Medication 50 MILLIGRAM(S): at 13:29

## 2021-01-01 RX ADMIN — SODIUM CHLORIDE 500 MILLILITER(S): 9 INJECTION INTRAMUSCULAR; INTRAVENOUS; SUBCUTANEOUS at 11:03

## 2021-01-01 RX ADMIN — Medication 166.67 MILLIGRAM(S): at 19:00

## 2021-01-01 RX ADMIN — Medication 20 MILLIGRAM(S): at 03:43

## 2021-01-01 RX ADMIN — Medication 1 MILLIGRAM(S): at 17:14

## 2021-01-01 RX ADMIN — Medication 10 MILLIGRAM(S): at 02:24

## 2021-01-01 RX ADMIN — AMIODARONE HYDROCHLORIDE 33.3 MG/MIN: 400 TABLET ORAL at 19:44

## 2021-01-01 RX ADMIN — Medication 12.5 MILLIGRAM(S): at 00:50

## 2021-01-22 NOTE — ED PROVIDER NOTE - FAMILY HISTORY
Family history of diabetes mellitus in mother     Father  Still living? No  Family history of lung cancer, Age at diagnosis: Age Unknown     Mother  Still living? Unknown  Family history of CHF (congestive heart failure), Age at diagnosis: Age Unknown

## 2021-01-22 NOTE — ED PROCEDURE NOTE - US CPT CODES
23362 US Chest (PTX, Pleural Effussion/CHF vs COPD)
95927 Echocardiography Transthoracic with Image 2D (Echo/FAST)

## 2021-01-22 NOTE — ED PROVIDER NOTE - ATTENDING CONTRIBUTION TO CARE
Attending Note: 76 y/o F with hx obtained from daughter and pt but limited hx from pt. Pt presents after fall that happened yesterday at dialysis, pt is on Eliquis. Pt fell to her knees and reports hitting her head. No LOC or vomiting. Pt was evaluated by the medical team there and was sent home. At the time pt’s fistula was not working but they were able to fix it. Today pt came back to dialysis and received 4 hours of dialysis. At that time it was noted by team members at the dialysis clinic that pt was hypotensive and tachycardic so pt was sent to ED.   On exam: A&O to self, place, and year, follows commands. Head- pupils nl, no head trauma. ENT- mucus membranes dry. Neck- (+) C spine tenderness, FROM. Cardiac- irregularly regular, no murmurs. Lungs- (+) crackles at bases. Abdomen- (+) slightly distended, NT. Extremities- RT upper extr: (+) fistula intact, lower extr: 2+ pitting edema. Skin- no ecchymosis. Neuro- grossly intact.  Impression: Afib with RVR. Will consider cardioversion but will try fluid bolus first, given pt looks volume depleted on exam. Will obtain Pan scan of head, neck, chest, abdomen, and pelvis to evaluate for traumatic injury.

## 2021-01-22 NOTE — ED PROVIDER NOTE - CLINICAL SUMMARY MEDICAL DECISION MAKING FREE TEXT BOX
74 y/o F with hx obtained from daughter and pt but limited hx from pt. Pt presents after fall that happened yesterday at dialysis, pt is on Eliquis. Pt fell to her knees and reports hitting her head. No LOC or vomiting. Pt was evaluated by the medical team there and was sent home. At the time pt’s fistula was not working but they were able to fix it. Today pt came back to dialysis and received 4 hours of dialysis. At that time it was noted by team members at the dialysis clinic that pt was hypotensive and tachycardic so pt was sent to ED. Patient initially found to be in Afib with RVR and was seen by cardiologist Dr. Child who recommended digoxin and phenylephrine. Broad spectrum antibiotics given. Patient endorsed to me to follow up Trauma scans as patient had a fall day prior. No acute injuries found. Patient admitted to the ICU for afib w/ rvr.

## 2021-01-22 NOTE — ED ADULT NURSE NOTE - OBJECTIVE STATEMENT
Pt BIBA from dialysis for hypotension. Pt s/p fall yesterday at dialysis center. +head injury. (-)LOC. Pt on Eliquis. Pt c/o headache, b/l shoulder and knee pain. Denies any other symptoms.

## 2021-01-22 NOTE — H&P ADULT - NSHPLABSRESULTS_GEN_ALL_CORE
(01-22 @ 10:59)                      13.6  14.12 )-----------( 130                 44.1    Neutrophils = 10.70 (75.8%)  Lymphocytes = 0.81 (5.7%)  Eosinophils = 0.07 (0.5%)  Basophils = 0.09 (0.6%)  Monocytes = 2.24 (15.9%)  Bands = --%    01-22    136  |  97<L>  |  33<H>  ----------------------------<  121<H>  5.6<H>   |  17  |  3.7<H>    Ca    9.0      22 Jan 2021 10:59    TPro  6.9  /  Alb  3.9  /  TBili  0.7  /  DBili  x   /  AST  44<H>  /  ALT  10  /  AlkPhos  51  01-22      CARDIAC MARKERS ( 22 Jan 2021 10:59 )  Trop 0.23 ng/mL<HH> / CK x     / CKMB x           RVP:(01-22 @ 12:37)  [facility]      Venous Blood Gas:  01-22 @ 11:34  7.41/31/189/20/98  VBG Lactate: 3.5        Tox:     < from: CT Abdomen and Pelvis w/ IV Cont (01.22.21 @ 17:41) >    IMPRESSION:    No evidence of intra-thoracic, abdominal or pelvic visceral laceration or hematoma.  No acute fractures are identified.    Large volume ascites within the abdomen and pelvis.  Paratracheal, subcarinal, and right hilar lymphadenopathy is noted,    < end of copied text >

## 2021-01-22 NOTE — ED ADULT NURSE NOTE - NSIMPLEMENTINTERV_GEN_ALL_ED
Implemented All Fall with Harm Risk Interventions:  [place] to call system. Call bell, personal items and telephone within reach. Instruct patient to call for assistance. Room bathroom lighting operational. Non-slip footwear when patient is off stretcher. Physically safe environment: no spills, clutter or unnecessary equipment. Stretcher in lowest position, wheels locked, appropriate side rails in place. Provide visual cue, wrist band, yellow gown, etc. Monitor gait and stability. Monitor for mental status changes and reorient to person, place, and time. Review medications for side effects contributing to fall risk. Reinforce activity limits and safety measures with patient and family. Provide visual clues: red socks.

## 2021-01-22 NOTE — H&P ADULT - HISTORY OF PRESENT ILLNESS
75 years old Female with PMhx of ESRD on Tue/Thur/Sat HD, b/l PVD s/p endovascular revascularization, Permanent Afib w/ failed ablation and multiple cardioversion on Eliquis, Aortic stenosis, pulmonary HTN, COPD anemia of chronic disease, CABG, HF with reduced EF and CAD Pt presents after fall that happened yesterday at dialysis, pt is on Eliquis.   Pt fell to her knees and reports hitting her head. No LOC or vomiting. Pt was evaluated by the medical team there and was sent home. At the time pt’s fistula was not working but they were able to fix it. Today pt came back to dialysis and received 4 hours of dialysis. At that time it was noted by team members at the dialysis clinic that pt was hypotensive and tachycardic so pt was sent to ED.     In the ED, Pt was found to be hypotensive 67/38, tachycardiac 172, Temp 97.5, was saturating well on 2L NC. Pt was found to be in Atrial fibrillation w/ RVR. Initial trauma workup negative, CT A/P showed Large volume ascites within the abdomen and pelvis. Paratracheal, subcarinal, and right hilar lymphadenopathy is noted Labs were significant for elevated WBC at 14.12, lactate 4.4, Trop elevated at 0.23. Received 750 cc of IV fluid bolus in ED, started on Gavin-synephrine and vasopressin infusion. Cardiology consulted, pt was started on amiodarone and digoxin for afibb w/ RVR. Antibiotics vancomycin and cefepime also administered.    75 years old Female with PMhx of ESRD on Tue/Thur/Sat HD, b/l PVD s/p endovascular revascularization, Permanent Afib w/ failed ablation and multiple cardioversion on Eliquis, Aortic stenosis, pulmonary HTN, COPD anemia of chronic disease, CABG, HF with reduced EF and CAD Pt presents after fall that happened yesterday at dialysis, pt is on Eliquis.   Collateral history obtained from ED charts. As per the patient she fell to her knees and reports hitting her head at dialysis centre. No LOC or vomiting. Pt was evaluated by the medical team there and was sent home. At the time pt’s fistula was not working but they were able to fix it. Today pt came back to dialysis and received 4 hours of dialysis. At that time it was noted by team members at the dialysis clinic that pt was hypotensive and tachycardic so pt was sent to ED. Denies any fever, cough, chest pain, n/v/d, urinary symptoms or recent weight changes.     In the ED, Pt was found to be hypotensive 67/38, tachycardiac 172, Temp 97.5, was saturating well on 2L NC. Pt was found to be in Atrial fibrillation w/ RVR. Initial trauma workup negative, CT A/P showed Large volume ascites within the abdomen and pelvis. Paratracheal, subcarinal, and right hilar lymphadenopathy is noted Labs were significant for elevated WBC at 14.12, lactate 4.4, Trop elevated at 0.23. Received 750 cc of IV fluid bolus in ED, started on Gavin-synephrine and vasopressin infusion. Cardiology consulted, pt was started on amiodarone and digoxin for afibb w/ RVR. Antibiotics vancomycin and cefepime also administered.

## 2021-01-22 NOTE — ED PROVIDER NOTE - OBJECTIVE STATEMENT
76 y/o F with hx obtained from daughter and pt but limited hx from pt. Pt presents after fall that happened yesterday at dialysis, pt is on Eliquis. Pt fell to her knees and reports hitting her head. No LOC or vomiting. Pt was evaluated by the medical team there and was sent home. At the time pt’s fistula was not working but they were able to fix it. Today pt came back to dialysis and received 4 hours of dialysis. At that time it was noted by team members at the dialysis clinic that pt was hypotensive and tachycardic so pt was sent to ED.

## 2021-01-22 NOTE — H&P ADULT - ASSESSMENT
IMPRESSION:  septic shock / hypovolemic shock in setting of recent HD  SIRS +  Permanent Afibb complicated w/ RVR  ESRD on HD  HFrEF  HO of HTN  HO of CAD / PVD      PLAN:    CNS: Avoid Sedatives    HEENT: speech and swallow evaluation, oral care    PULMONARY: HOB at 45, Aspiration precautions,, Repeat CXR am, Repeat ABG,     CARDIOVASCULAR: Continue pressors everardo and vaso, switch to levophed once HR < 100, c/w amiodarone infuion as per cardio, Dixoin digitalization, ECHO, f/u Cardiology, repeat EKG, repeat troponin, BNP     GI: GI prophylaxis, Feeding, speech and swallow, paracentesis tomorrow,     RENAL: I=0, monitor electrolytes and replete as needed, Nephrology consult, cautious with fluid given hx of HFrEF and ESRD.     INFECTIOUS DISEASE: Antibiotics cefepime and vancomycin, Blood cultures, Urine cultures, ID consult, Check MRSA nares, Procalcitonin,     HEMATOLOGICAL:  hold Eliquis tonight for possible paracentesis tomorrow, repeat coagulation profile, Keep Hb above 7, Active type and screen, Anticoagulation, VA duplex, check peripheral smear    ENDOCRINE:  Follow up FS.  Insulin protocol if needed. f/u TSH    MUSCULOSKELETAL: Pt/rehab    Disposition: MICU  Code status: Fullcode     IMPRESSION:  septic shock / hypovolemic shock in setting of recent HD  SIRS +  Permanent Afibb complicated w/ RVR  ESRD on HD  HFrEF  HO of HTN  HO of CAD / PVD      PLAN:    CNS: Avoid Sedatives    HEENT: speech and swallow evaluation, oral care    PULMONARY: HOB at 45, Aspiration precautions,, Repeat CXR am, Repeat ABG,     CARDIOVASCULAR: IVF LR @ 75cc, Continue pressors everardo and vaso, switch to levophed once HR < 100, c/w amiodarone infusion as per cardio, Digoxin digitalization, ECHO, f/u Cardiology, repeat EKG, repeat troponin, BNP, avoid CCB and BB until hypotensive. Cautious w/ IVF     GI: GI prophylaxis, NPO, speech and swallow, paracentesis tomorrow,     RENAL: IVF LR @ 75cc, I>0, monitor electrolytes and replete as needed, Nephrology consult, cautious with fluid given hx of HFrEF and ESRD.     INFECTIOUS DISEASE: Antibiotics cefepime and vancomycin, Blood cultures, Urine cultures, ID consult, Check MRSA nares, Procalcitonin, UA    HEMATOLOGICAL: hold Eliquis tonight for possible paracentesis tomorrow, repeat coagulation profile, VA duplex    ENDOCRINE:  Follow up FS.  Insulin protocol if needed. f/u TSH    MUSCULOSKELETAL: Pt/rehab    Disposition: MICU  Code status: Fullcode

## 2021-01-22 NOTE — CONSULT NOTE ADULT - SUBJECTIVE AND OBJECTIVE BOX
Patient is a 75y old  Female who presents with a chief complaint of fatigue, lethargy low BP    HPI:  presented from the NH for the hypotension, afib-tachycardia, found to be dehydrated, severely hypotensive, dry mucus, received 750cc iv fluid, no response, then Vasopressin followed by Gavin-Synephrine was added.  Patient well known to me, hx of multiple admissions, COPD exacerbations, asthma, chronic afib, failed ablation and multiple cardioversion.  afebrile, no chills, but fatigue, lethargy,     PAST MEDICAL & SURGICAL HISTORY:  Swollen feet  ON OCCASSION    HTN (hypertension)    ESRD on hemodialysis  T,T,S    Thyroid nodule    Degenerative joint disease    Osteoarthritis    Diverticulosis    GERD (gastroesophageal reflux disease)    Heart failure with reduced ejection fraction    Pulmonary hypertension, moderate to severe    Aortic stenosis, moderate    Atrial fibrillation  status post cardioversion    MI (myocardial infarction)    CAD (coronary artery disease)    Kidney stone    HTN (hypertension)    S/P ureteral stent placement    History of kidney surgery    S/P cataract surgery    S/P CABG (coronary artery bypass graft)    AV fistula    H/O abdominal hysterectomy    H/O cardiac radiofrequency ablation        PREVIOUS DIAGNOSTIC TESTING:      ECHO  FINDINGS:    STRESS TEST  FINDINGS:    CATHETERIZATION  FINDINGS:    MEDICATIONS  (STANDING):  phenylephrine    Infusion 2 MICROgram(s)/kG/Min (25.1 mL/Hr) IV Continuous <Continuous>  vasopressin Infusion 0.04 Unit(s)/Min (2.4 mL/Hr) IV Continuous <Continuous>    MEDICATIONS  (PRN):      FAMILY HISTORY:  Family history of diabetes mellitus in mother    Family history of lung cancer (Father)    Family history of CHF (congestive heart failure) (Mother)        SOCIAL HISTORY:  CIGARETTES: ex heavy smoker  ALCOHOL: no  DRUGS: no                      REVIEW OF SYSTEMS:  CONSTITUTIONAL: No distress, not hypoxic but sick lookingh  NECK: No pain   RESPIRATORY: No cough, wheezing, shortness of breath  CARDIOVASCULAR: No chest pain, SOB, palpitations, leg swelling  GASTROINTESTINAL: No abdominal or epigastric pain. No nausea, vomiting, or hematemesis;  No melena.  NEUROLOGICAL: No dizziness, headache  SKIN: No itching, burning, rashes, or lesions   ENDOCRINE: No heat or cold intolerance  MUSCULOSKELETAL: No joint pain, No  swelling; No muscle pain  PSYCHIATRIC: long hx of depression, anxiety          Vital Signs Last 24 Hrs  T(C): 36.4 (22 Jan 2021 11:15), Max: 36.4 (22 Jan 2021 11:15)  T(F): 97.5 (22 Jan 2021 11:15), Max: 97.5 (22 Jan 2021 11:15)  HR: 120 (22 Jan 2021 16:30) (75 - 176)  BP: 135/64 (22 Jan 2021 14:51) (50/37 - 135/64)  BP(mean): 36 (22 Jan 2021 15:25) (36 - 82)  RR: 16 (22 Jan 2021 14:45) (16 - 18)  SpO2: 97% (22 Jan 2021 14:45) (92% - 100%)                      PHYSICAL EXAM:  GENERAL: No distress but sick looking  HEAD:  Atraumatic, Normocephalic  NECK: Supple, No JVD, No Bruit  NERVOUS SYSTEM:  Alert, Awake, Oriented to place, person; Normal memory and speech; Normal motor Strength 5/5 B/L upper and lower extremities  CHEST/LUNG: Normal air entry to lung base bilaterally; No wheeze, crackle, rales, rhonchi  HEART: Irregular rapid heart beat, S1, A2, P2, No S3, No gallop, systolic apical ejection murmur  ABDOMEN: Soft, Non tender, Non distended; Bowel sounds present  EXTREMITIES:  1+ Peripheral Pulses, No clubbing, No edema  SKIN: No rashes or lesions    TELEMETRY: afib tachycardia    ECG: < from: 12 Lead ECG (01.22.21 @ 11:02) >   Atrial fibrillation with rapid ventricular response  Right axis deviation  Incomplete right bundle branch block  Nonspecific ST and T wave abnormality    < end of copied text >      I&O's Detail      LABS:                        13.6   14.12 )-----------( 130      ( 22 Jan 2021 10:59 )             44.1     01-22    136  |  97<L>  |  33<H>  ----------------------------<  121<H>  5.6<H>   |  17  |  3.7<H>    Ca    9.0      22 Jan 2021 10:59    TPro  6.9  /  Alb  3.9  /  TBili  0.7  /  DBili  x   /  AST  44<H>  /  ALT  10  /  AlkPhos  51  01-22    CARDIAC MARKERS ( 22 Jan 2021 10:59 )  x     / 0.23 ng/mL / x     / x     / x              I&O's Summary      RADIOLOGY & ADDITIONAL STUDIES:< from: Xray Chest 1 View AP/PA (01.22.21 @ 11:51) >  No radiographic evidence of acute pulmonary disease.    < end of copied text >

## 2021-01-22 NOTE — ED PROVIDER NOTE - PRIOR HOSPITAL/ED VISITS SUMMARY FREE TEXT FOR MDM OBTAINED AND REVIEWED OLD RECORDS QUESTION
elective right angiogram, angioplasty of SFA stent and PT, Oct 2020 patient with elective atherectomy of AT was performed. Patient has hx of afib, was in RVR in the PACU. Patient was admitted to tele for overnight observation. Home meds were restarted and patient was rate controlled without issues.

## 2021-01-22 NOTE — ED ADULT NURSE REASSESSMENT NOTE - NS ED NURSE REASSESS COMMENT FT1
Received report from prior RN. Pt on Tele/O2 monitor. Pt O2 WDL on 4L NC. A-line in place. Fall precautions initiated. Comfort measures offered. Will cont to monitor.

## 2021-01-22 NOTE — ED PROVIDER NOTE - NS ED ROS FT
Constitutional: See HPI.  Eyes: No visual changes, eye pain or discharge. No Photophobia  ENMT: No hearing changes, pain, discharge or infections. No neck pain or stiffness. No limited ROM  Cardiac: see hpi  Respiratory: see hpi  GI: see hpi  MS: No myalgia, muscle weakness, joint pain or back pain.  Neuro: No headache or weakness. No LOC.  Skin: No skin rash.  Except as documented in the HPI, all other systems are negative.

## 2021-01-22 NOTE — ED PROVIDER NOTE - PHYSICAL EXAMINATION
VITAL SIGNS: I have reviewed nursing notes and confirm.  CONSTITUTIONAL: Ill appearing  SKIN: Warm dry, normal skin turgor  HEAD: NCAT  EYES: EOMI, PERRLA, no scleral icterus  ENT: dry mucous membranes, normal pharynx   NECK: Supple; non tender. Full ROM.   CARD: RRR, no murmurs, rubs or gallops  RESP: clear to ausculation b/l. bibasilar diminished.  No rales, rhonchi, or wheezing.  ABD: soft, + BS, non-tender, distended.  EXT: Full ROM  NEURO: normal motor. normal sensory.   PSYCH: Cooperative, appropriate.

## 2021-01-22 NOTE — ED ADULT TRIAGE NOTE - CHIEF COMPLAINT QUOTE
Sent in from dialysis for hypotension. Pt fell yesterday afternoon. Pt hit head. Denies LOC. GCS 15. Pt takes eliquis.

## 2021-01-22 NOTE — ED PROCEDURE NOTE - PROCEDURE NAME, MLM
Point of Care Ultrasound Lung
Point of Care Ultrasound Cardiac
Arterial Puncture/Cannulation
Central Line Insertion

## 2021-01-22 NOTE — H&P ADULT - NSHPPHYSICALEXAM_GEN_ALL_CORE
PHYSICAL EXAM:  GENERAL: NAD, lying in bed comfortably  HEAD:  Atraumatic, Normocephalic  EYES: EOMI, PERRLA, conjunctiva and sclera clear  ENT: Moist mucous membranes  NECK: Supple, No JVD  CHEST/LUNG: Clear to auscultation bilaterally; No rales, rhonchi, wheezing, or rubs. Unlabored respirations  HEART: Regular rate and rhythm; No murmurs, rubs, or gallops  ABDOMEN: Bowel sounds present; Soft, Nontender, Nondistended. No hepatomegally  EXTREMITIES:  2+ Peripheral Pulses, brisk capillary refill. No clubbing, cyanosis, or edema  NERVOUS SYSTEM:  Alert & Oriented X3, speech clear. No deficits   MSK: FROM all 4 extremities, full and equal strength  SKIN: No rashes or lesions PHYSICAL EXAM:  GENERAL: NAD, lying in bed comfortably  HEAD:  Atraumatic, Normocephalic  EYES: EOMI, PERRLA, conjunctiva and sclera clear  ENT: dry mucous membranes  NECK: Supple, No JVD  CHEST/LUNG: decreased bs on b/l lung bases  HEART: irregular rhythm  ABDOMEN: distended, non tender  EXTREMITIES: brisk capillary refill. No clubbing, cyanosis, or edema  NERVOUS SYSTEM:  Alert & Oriented X3, speech clear. No deficits   MSK: FROM all 4 extremities, full and equal strength  SKIN: No rashes or lesions

## 2021-01-22 NOTE — ED PROVIDER NOTE - PROGRESS NOTE DETAILS
Attending Note: 76 y/o F with hx obtained from daughter and pt but limited hx from pt. Pt presents after fall that happened yesterday at dialysis, pt is on Eliquis. Pt fell to her knees and reports hitting her head. No LOC or vomiting. Pt was evaluated by the medical team there and was sent home. At the time pt’s fistula was not working but they were able to fix it. Today pt came back to dialysis and received 4 hours of dialysis. At that time it was noted by team members at the dialysis clinic that pt was hypotensive and tachycardic so pt was sent to ED.   On exam: A&O to self, place, and year, follows commands. Head- pupils nl, no head trauma. ENT- mucus membranes dry. Neck- (+) C spine tenderness, FROM. Cardiac- irregularly regular, no murmurs. Lungs- (+) crackles at bases. Abdomen- (+) slightly distended, NT. Extremities- RT upper extr: (+) fistula intact, lower extr: 2+ pitting edema. Skin- no ecchymosis. Neuro- grossly intact.  Impression: Afib with RVR. Will consider cardioversion but will try fluid bolus first, given pt looks volume depleted on exam. Will obtain Pan scan of head, neck, chest, abdomen, and pelvis to evaluate for traumatic injury. AB: Discussed with Dr. Ayala. Pt has chronic Afib, doesn’t need rate control at this moment, recommends treating for sepsis, hold off on any further fluid administration and start vasopressin and ABX. AB: Spoke with Dr. Child, recommends phenylephrine for blood pressure management. AB: Discussed with Dr. Child. Pt has chronic Afib, doesn’t need rate control at this moment, recommends treating for sepsis, hold off on any further fluid administration and start vasopressin and ABX. dr randhawa at bedside, arterial line and central line placed. started amnio, start dig with loading dose .5 mg then .25 mg 4 hrs after and then another .25mg 4 hrs after that. can admit to ccu. SR: s/o received from dr. phillips will f/u ct and admit to ICU setting. reviewed CT findings with findings of fluid in the abdomen - no known previous hx of ascities in the previous 3 years - unknown origin - pending radiology read- called Dr. Child who does not recall hx of ascities. Will page Dr. Beck patient states known previous hx of ascites that she was told at her dialysis center- called Cyndy but Dr. Chris is covering and does not recall hx of ascites.

## 2021-01-22 NOTE — ED PROCEDURE NOTE - CPROC ED INDICATIONS1
chest pain/Dyspnea
chest pain/Dyspnea/Concern for Fluid Overload
emergency venous access
arterial puncture to obtain ABG's/blood sampling/cannulation purposes/critical patient/monitoring purposes

## 2021-01-22 NOTE — ED PROCEDURE NOTE - NS ED PROCEDURE ASSISTED BY
Supervision was available/Assistance was available
Supervision was available/Assistance was available
Supervision was available
The procedure was performed independently

## 2021-01-22 NOTE — CONSULT NOTE ADULT - ASSESSMENT
patient is in the shock, most likely septic shock presented with generalized fatigue, dehydration, tachycardia and hypotension, Afib that is chronic  Underlying Hx of ESRD on HD, PAD, severe pulmonary HTN, COPD  Afib tachycardia is secondary to her condition, otherwise her baseline HR is usually at 70-80    Admit to ccu as critical care, needs ICU team assessment  IV fluid to 1 litter, Vasopressin and Gavin-Synephrine if HR dropped to 100 then switch to Levophed  start digitalization as d/w ED  1mg in total, 0.5 mg stat then 0.25 and 0.25 mg every 4 hours, 2 doses, total 1 mg ( patient's BP was 70-80 mmHg, , unable to use other meds to slow the HR), prior 2 cardioversion in stable conditions failed  start Amiodarone IV infusion 150 mg, then 1 mg/min for 6 and then 0.5 mg/min for 18 hours  broad spectrum antibiotics, U/A, U/C, B/C  patient needs to be admitted to intensive care team for the further management

## 2021-01-23 NOTE — CONSULT NOTE ADULT - SUBJECTIVE AND OBJECTIVE BOX
GENERAL SURGERY CONSULT NOTE    Patient: NOAH QUIROS , 75y ([birth year])Female   MRN: [id number]  Location: [facility]  A  Visit: 01-22-21 Inpatient  Date: 01-23-21 @ 16:22    HPI:  75 years old Female with PMhx of ESRD on Tue/Thur/Sat HD, b/l PVD s/p endovascular revascularization, Permanent Afib w/ failed ablation and multiple cardioversion on Eliquis, Aortic stenosis, pulmonary HTN, COPD anemia of chronic disease, CABG, HF with reduced EF and CAD Pt presents after fall that happened yesterday at dialysis, pt is on Eliquis.   Collateral history obtained from ED charts. As per the patient she fell to her knees and reports hitting her head at dialysis centre. No LOC or vomiting. Pt was evaluated by the medical team there and was sent home. At the time pt’s fistula was not working but they were able to fix it. Today pt came back to dialysis and received 4 hours of dialysis. At that time it was noted by team members at the dialysis clinic that pt was hypotensive and tachycardic so pt was sent to ED. Denies any fever, cough, chest pain, n/v/d, urinary symptoms or recent weight changes.     In the ED, Pt was found to be hypotensive 67/38, tachycardiac 172, Temp 97.5, was saturating well on 2L NC. Pt was found to be in Atrial fibrillation w/ RVR. Initial trauma workup negative, CT A/P showed Large volume ascites within the abdomen and pelvis. Paratracheal, subcarinal, and right hilar lymphadenopathy is noted Labs were significant for elevated WBC at 14.12, lactate 4.4, Trop elevated at 0.23. Received 750 cc of IV fluid bolus in ED, started on Gavin-synephrine and vasopressin infusion. Cardiology consulted, pt was started on amiodarone and digoxin for afibb w/ RVR. Antibiotics vancomycin and cefepime also administered.    (22 Jan 2021 21:02)      PAST MEDICAL & SURGICAL HISTORY:  Swollen feet  ON OCCASSION    HTN (hypertension)    ESRD on hemodialysis  T,T,S    Thyroid nodule    Degenerative joint disease    Osteoarthritis    Diverticulosis    GERD (gastroesophageal reflux disease)    Heart failure with reduced ejection fraction    Pulmonary hypertension, moderate to severe    Aortic stenosis, moderate    Atrial fibrillation  status post cardioversion    MI (myocardial infarction)    CAD (coronary artery disease)    Kidney stone    HTN (hypertension)    S/P ureteral stent placement    History of kidney surgery    S/P cataract surgery    S/P CABG (coronary artery bypass graft)    AV fistula    H/O abdominal hysterectomy    H/O cardiac radiofrequency ablation    Home Medications:  allopurinol 100 mg oral tablet: 1 tab(s) orally once a day (22 Jan 2021 22:04)  calcium acetate 667 mg oral tablet: 3 tab(s) orally 3 times a day (22 Jan 2021 22:04)  dilTIAZem 30 mg oral tablet: 1 tab(s) orally 3 times a day (22 Jan 2021 22:04)  Eliquis 2.5 mg oral tablet: 1 tab(s) orally 2 times a day (22 Jan 2021 22:04)  famotidine 20 mg oral tablet: 1 tab(s) orally 2 times a day (22 Jan 2021 22:04)  Metoprolol Tartrate 25 mg oral tablet: 1 tab(s) orally 2 times a day (22 Jan 2021 22:04)  Percocet 5 mg-325 mg oral tablet: 1 tab(s) orally every 6 hours (22 Jan 2021 22:04)    VITALS:  T(F): 97.1 (01-23-21 @ 12:00), Max: 97.1 (01-23-21 @ 12:00)  HR: 110 (01-23-21 @ 15:00) (71 - 141)  RR: 25 (01-23-21 @ 15:00) (15 - 27)  SpO2: 100% (01-23-21 @ 11:00) (75% - 100%)    PHYSICAL EXAM:  General: Intubated sedated  Vascular: Rt fem faiza in place, left fem TLC in place      MEDICATIONS  (STANDING):  aMIOdarone Infusion 1 mG/Min (33.3 mL/Hr) IV Continuous <Continuous>  aMIOdarone Infusion 0.5 mG/Min (16.7 mL/Hr) IV Continuous <Continuous>  calcium acetate 667 milliGRAM(s) Oral three times a day with meals  chlorhexidine 4% Liquid 1 Application(s) Topical <User Schedule>  CRRT Treatment    <Continuous>  dexMEDEtomidine Infusion 0.05 MICROgram(s)/kG/Hr (0.9 mL/Hr) IV Continuous <Continuous>  dextrose 5% 1000 milliLiter(s) (75 mL/Hr) IV Continuous <Continuous>  fentaNYL   Infusion. 0.5 MICROgram(s)/kG/Hr (3.62 mL/Hr) IV Continuous <Continuous>  hydrocortisone sodium succinate Injectable 50 milliGRAM(s) IV Push every 8 hours  meropenem  IVPB 500 milliGRAM(s) IV Intermittent every 24 hours  norepinephrine Infusion 0.05 MICROgram(s)/kG/Min (3.14 mL/Hr) IV Continuous <Continuous>  phenylephrine    Infusion 2 MICROgram(s)/kG/Min (25.1 mL/Hr) IV Continuous <Continuous>  PureFlow Dialysate RFP-400 (K 2 / Ca 3) 5000 milliLiter(s) (2000 mL/Hr) CRRT <Continuous>  vancomycin  IVPB 750 milliGRAM(s) IV Intermittent <User Schedule>  vasopressin Infusion 0.04 Unit(s)/Min (2.4 mL/Hr) IV Continuous <Continuous>    MEDICATIONS  (PRN):      LAB/STUDIES:                        14.0   23.01 )-----------( 159      ( 23 Jan 2021 04:30 )             46.0     01-23    138  |  96<L>  |  38<H>  ----------------------------<  148<H>  5.0   |  11<L>  |  4.2<HH>    Ca    8.5      23 Jan 2021 03:00  Mg     2.4     01-23    TPro  5.8<L>  /  Alb  3.1<L>  /  TBili  0.5  /  DBili  x   /  AST  202<H>  /  ALT  105<H>  /  AlkPhos  51  01-23    PT/INR - ( 23 Jan 2021 08:13 )   PT: 35.00 sec;   INR: 3.04 ratio      PTT - ( 23 Jan 2021 08:13 )  PTT:37.5 sec  LIVER FUNCTIONS - ( 23 Jan 2021 03:00 )  Alb: 3.1 g/dL / Pro: 5.8 g/dL / ALK PHOS: 51 U/L / ALT: 105 U/L / AST: 202 U/L / GGT: x             CARDIAC MARKERS ( 23 Jan 2021 04:30 )  x     / 1.06 ng/mL / x     / x     / x      CARDIAC MARKERS ( 22 Jan 2021 10:59 )  x     / 0.23 ng/mL / x     / x     / x        ABG - ( 23 Jan 2021 13:02 )  pH, Arterial: 7.22  pH, Blood: x     /  pCO2: 28    /  pO2: 216   / HCO3: 12    / Base Excess: -14.5 /  SaO2: 99        ASSESSMENT:  75F w/ PMhx of ESRD on Tue/Thur/Sat HD, b/l PVD s/p endovascular revascularization, Permanent Afib w/ failed ablation and multiple cardioversion on Eliquis, Aortic stenosis, pulmonary HTN, COPD anemia of chronic disease, CABG, HF with reduced EF and CAD Pt presents after fall, Hypotension shock/ etiology? septic? on antibx, ESRD on HD / can not perform IHD will need CVVHD keep in even balance for acidosis -    PLAN:  - s/p emergent Right Fem [place] placement for CVVH.

## 2021-01-23 NOTE — CONSULT NOTE ADULT - SUBJECTIVE AND OBJECTIVE BOX
NEPHROLOGY CONSULTATION NOTE    THIS CONSULT IS INCOMPLETE / FULL CONSULT TO FOLLOW    Patient is a 75y Female whom presented to the hospital with     PAST MEDICAL & SURGICAL HISTORY:  Swollen feet  ON OCCASSION    HTN (hypertension)    ESRD on hemodialysis  T,T,S    Thyroid nodule    Degenerative joint disease    Osteoarthritis    Diverticulosis    GERD (gastroesophageal reflux disease)    Heart failure with reduced ejection fraction    Pulmonary hypertension, moderate to severe    Aortic stenosis, moderate    Atrial fibrillation  status post cardioversion    MI (myocardial infarction)    CAD (coronary artery disease)    Kidney stone    HTN (hypertension)    S/P ureteral stent placement    History of kidney surgery    S/P cataract surgery    S/P CABG (coronary artery bypass graft)    AV fistula    H/O abdominal hysterectomy    H/O cardiac radiofrequency ablation      Allergies:  No Known Allergies    Home Medications Reviewed  Hospital Medications:   MEDICATIONS  (STANDING):  aMIOdarone Infusion 1 mG/Min (33.3 mL/Hr) IV Continuous <Continuous>  aMIOdarone Infusion 0.5 mG/Min (16.7 mL/Hr) IV Continuous <Continuous>  calcium acetate 667 milliGRAM(s) Oral three times a day with meals  chlorhexidine 4% Liquid 1 Application(s) Topical <User Schedule>  dextrose 5% 1000 milliLiter(s) (75 mL/Hr) IV Continuous <Continuous>  hydrocortisone sodium succinate Injectable 50 milliGRAM(s) IV Push every 8 hours  lactated ringers Bolus 1000 milliLiter(s) IV Bolus once  meropenem  IVPB 500 milliGRAM(s) IV Intermittent every 24 hours  norepinephrine Infusion 0.05 MICROgram(s)/kG/Min (3.14 mL/Hr) IV Continuous <Continuous>  phenylephrine    Infusion 2 MICROgram(s)/kG/Min (25.1 mL/Hr) IV Continuous <Continuous>  vasopressin Infusion 0.04 Unit(s)/Min (2.4 mL/Hr) IV Continuous <Continuous>      SOCIAL HISTORY:  Denies ETOH,Smoking,   FAMILY HISTORY:  Family history of diabetes mellitus in mother    Family history of lung cancer (Father)    Family history of CHF (congestive heart failure) (Mother)          REVIEW OF SYSTEMS:  CONSTITUTIONAL: No weakness, fevers or chills  EYES/ENT: No visual changes;  No vertigo or throat pain   NECK: No pain or stiffness  RESPIRATORY: No cough, wheezing, hemoptysis; No shortness of breath  CARDIOVASCULAR: No chest pain or palpitations.  GASTROINTESTINAL: No abdominal or epigastric pain. No nausea, vomiting, or hematemesis; No diarrhea or constipation. No melena or hematochezia.  GENITOURINARY: No dysuria, frequency, foamy urine, urinary urgency, incontinence or hematuria  NEUROLOGICAL: No numbness or weakness  SKIN: No itching, burning, rashes, or lesions   VASCULAR: No bilateral lower extremity edema.   All other review of systems is negative unless indicated above.    VITALS:  T(F): 95.9 (01-23-21 @ 08:00), Max: 97.5 (01-22-21 @ 11:15)  HR: 106 (01-23-21 @ 09:00)  BP: 135/64 (01-22-21 @ 14:51)  RR: 17 (01-23-21 @ 09:00)  SpO2: 98% (01-23-21 @ 01:06)    01-22 @ 07:01  -  01-23 @ 07:00  --------------------------------------------------------  IN: 2073.7 mL / OUT: 0 mL / NET: 2073.7 mL    01-23 @ 07:01  -  01-23 @ 09:59  --------------------------------------------------------  IN: 591.5 mL / OUT: 0 mL / NET: 591.5 mL      Height (cm): 167.6 (01-23 @ 01:06)  Weight (kg): 72.3 (01-23 @ 01:06)  BMI (kg/m2): 25.7 (01-23 @ 01:06)  BSA (m2): 1.82 (01-23 @ 01:06)      I&O's Detail    22 Jan 2021 07:01  -  23 Jan 2021 07:00  --------------------------------------------------------  IN:    Amiodarone: 116.9 mL    dextrose 5% w/ Additives: 375 mL    IV PiggyBack: 300 mL    Lactated Ringers: 150 mL    Norepinephrine: 237 mL    Phenylephrine: 878 mL    Vasopressin: 16.8 mL  Total IN: 2073.7 mL    OUT:  Total OUT: 0 mL    Total NET: 2073.7 mL      23 Jan 2021 07:01  -  23 Jan 2021 09:59  --------------------------------------------------------  IN:    Amiodarone: 50.1 mL    dextrose 5% w/ Additives: 225 mL    Norepinephrine: 94.7 mL    Phenylephrine: 216.9 mL    Vasopressin: 4.8 mL  Total IN: 591.5 mL    OUT:  Total OUT: 0 mL    Total NET: 591.5 mL            PHYSICAL EXAM:  Constitutional: NAD  HEENT: anicteric sclera, oropharynx clear, MMM  Neck: No JVD  Respiratory: CTAB, no wheezes, rales or rhonchi  Cardiovascular: S1, S2, RRR  Gastrointestinal: BS+, soft, NT/ND  Extremities: No cyanosis or clubbing. No peripheral edema  Neurological: A/O x 3, no focal deficits  Psychiatric: Normal mood, normal affect  : No CVA tenderness. No simeon.   Skin: No rashes  Vascular Access:    LABS:  01-23    138  |  96<L>  |  38<H>  ----------------------------<  148<H>  5.0   |  11<L>  |  4.2<HH>    Ca    8.5      23 Jan 2021 03:00  Mg     2.4     01-23    TPro  5.8<L>  /  Alb  3.1<L>  /  TBili  0.5  /  DBili      /  AST  202<H>  /  ALT  105<H>  /  AlkPhos  51  01-23    Creatinine Trend: 4.2 <--, 3.7 <--                        14.0   23.01 )-----------( 159      ( 23 Jan 2021 04:30 )             46.0     Urine Studies:              RADIOLOGY & ADDITIONAL STUDIES:                 NEPHROLOGY CONSULTATION NOTE    history obtained from chart       75 years old Female with PMhx of ESRD on Tue/Thur/Sat HD, b/l PVD s/p endovascular revascularization, Permanent Afib w/ failed ablation and multiple cardioversion on Eliquis, Aortic stenosis, pulmonary HTN, COPD anemia of chronic disease, CABG, HF with reduced EF and CAD Pt presents after fall      As per the patient she fell to her knees and reports hitting her head at dialysis centre. No LOC or vomiting. Pt was evaluated by the medical team there and was sent home. At the time pt’s fistula was not working but they were able to fix it.  sp HD yesterday  At that time it was noted by team members at the dialysis clinic that pt was hypotensive and tachycardic so pt was sent to ED. Denied any fever, cough, chest pain, n/v/d, urinary symptoms or recent weight changes.     Renal called for ESRD / acidosis     Seen patient was intubated on 3 pressors / hypotensive       PAST MEDICAL & SURGICAL HISTORY:  Swollen feet ON OCCASSION  HTN (hypertension)  ESRD on hemodialysis T,T,S  Thyroid nodule  Degenerative joint disease  Osteoarthritis  Diverticulosis  GERD (gastroesophageal reflux disease)  Heart failure with reduced ejection fraction  Pulmonary hypertension, moderate to severe  Aortic stenosis, moderate  Atrial fibrillation status post cardioversion  MI (myocardial infarction)  CAD (coronary artery disease)  Kidney stone  HTN (hypertension)  S/P ureteral stent placement  History of kidney surgery  s/P cataract surgery  S/P CABG (coronary artery bypass graft)  AV fistula  H/O abdominal hysterectomy  H/O cardiac radiofrequency ablation      Allergies:  No Known Allergies    Home Medications Reviewed  Hospital Medications:   MEDICATIONS  (STANDING):  aMIOdarone Infusion 1 mG/Min (33.3 mL/Hr) IV Continuous <Continuous>  aMIOdarone Infusion 0.5 mG/Min (16.7 mL/Hr) IV Continuous <Continuous>  calcium acetate 667 milliGRAM(s) Oral three times a day with meals  dextrose 5% 1000 milliLiter(s) (75 mL/Hr) IV Continuous <Continuous>  hydrocortisone sodium succinate Injectable 50 milliGRAM(s) IV Push every 8 hours  lactated ringers Bolus 1000 milliLiter(s) IV Bolus once  meropenem  IVPB 500 milliGRAM(s) IV Intermittent every 24 hours  norepinephrine Infusion 0.05 MICROgram(s)/kG/Min (3.14 mL/Hr) IV Continuous <Continuous>  phenylephrine    Infusion 2 MICROgram(s)/kG/Min (25.1 mL/Hr) IV Continuous <Continuous>  vasopressin Infusion 0.04 Unit(s)/Min (2.4 mL/Hr) IV Continuous <Continuous>      SOCIAL HISTORY:  Denies ETOH,Smoking,   FAMILY HISTORY:  Family history of diabetes mellitus in mother    Family history of lung cancer (Father)    Family history of CHF (congestive heart failure) (Mother)          REVIEW OF SYSTEMS:  All other review of systems is negative unless indicated above.    VITALS:  T(F): 95.9 (01-23-21 @ 08:00), Max: 97.5 (01-22-21 @ 11:15)  HR: 106 (01-23-21 @ 09:00)  BP: 135/64 (01-22-21 @ 14:51)  RR: 17 (01-23-21 @ 09:00)  SpO2: 98% (01-23-21 @ 01:06)    01-22 @ 07:01  -  01-23 @ 07:00  --------------------------------------------------------  IN: 2073.7 mL / OUT: 0 mL / NET: 2073.7 mL    01-23 @ 07:01  -  01-23 @ 09:59  --------------------------------------------------------  IN: 591.5 mL / OUT: 0 mL / NET: 591.5 mL      Height (cm): 167.6 (01-23 @ 01:06)  Weight (kg): 72.3 (01-23 @ 01:06)  BMI (kg/m2): 25.7 (01-23 @ 01:06)  BSA (m2): 1.82 (01-23 @ 01:06)      I&O's Detail    22 Jan 2021 07:01  -  23 Jan 2021 07:00  --------------------------------------------------------  IN:    Amiodarone: 116.9 mL    dextrose 5% w/ Additives: 375 mL    IV PiggyBack: 300 mL    Lactated Ringers: 150 mL    Norepinephrine: 237 mL    Phenylephrine: 878 mL    Vasopressin: 16.8 mL  Total IN: 2073.7 mL    OUT:  Total OUT: 0 mL    Total NET: 2073.7 mL      23 Jan 2021 07:01  -  23 Jan 2021 09:59  --------------------------------------------------------  IN:    Amiodarone: 50.1 mL    dextrose 5% w/ Additives: 225 mL    Norepinephrine: 94.7 mL    Phenylephrine: 216.9 mL    Vasopressin: 4.8 mL  Total IN: 591.5 mL    OUT:  Total OUT: 0 mL    Total NET: 591.5 mL            PHYSICAL EXAM:  Constitutional: intubated on MV   Neck: No JVD  Respiratory: Crackles at base   Cardiovascular: S1, S2, RRR  Gastrointestinal: distended abdomen   Extremities: No cyanosis or clubbing. No peripheral edema  Neurological: A/O x 3, no focal deficits  Psychiatric: Normal mood, normal affect  : No CVA tenderness. No simeon.   Skin: No rashes  Vascular Access:    LABS:  01-23    138  |  96<L>  |  38<H>  ----------------------------<  148<H>  5.0   |  11<L>  |  4.2<HH>    Ca    8.5      23 Jan 2021 03:00  Mg     2.4     01-23    TPro  5.8<L>  /  Alb  3.1<L>  /  TBili  0.5  /  DBili      /  AST  202<H>  /  ALT  105<H>  /  AlkPhos  51  01-23    Creatinine Trend: 4.2 <--, 3.7 <--                        14.0   23.01 )-----------( 159      ( 23 Jan 2021 04:30 )             46.0     Urine Studies:              RADIOLOGY & ADDITIONAL STUDIES:  < from: CT Abdomen and Pelvis w/ IV Cont (01.22.21 @ 17:41) >    IMPRESSION:    No evidence of intra-thoracic, abdominal or pelvic visceral laceration or hematoma.  No acute fractures are identified.    Large volume ascites within the abdomen and pelvis.  Paratracheal, subcarinal, and right hilar lymphadenopathy is noted,    < end of copied text >  < from: CT Abdomen and Pelvis w/ IV Cont (01.22.21 @ 17:41) >  KIDNEYS: Atrophic bilateral kidneys with extensive vascular calcifications. Nonobstructing left renal stones. No hydronephrosis. Small left renal cysts again noted.    < end of copied text >

## 2021-01-23 NOTE — PROCEDURE NOTE - NSPROCDETAILS_GEN_ALL_CORE
guidewire recovered/lumen(s) aspirated and flushed/sterile dressing applied/sterile technique, catheter placed/ultrasound guidance with use of sterile gel and probe cove
Location identified, sterile technique used, 22 Gauge needle used to draw roughly 35ml fluid. Full para needle not used as patients INR elevated.

## 2021-01-23 NOTE — PROGRESS NOTE ADULT - SUBJECTIVE AND OBJECTIVE BOX
NOAH QUIROS 76yo W Female ad from HD center after pt was noted to be hypotensive, tachycardic and SOB.  Of note on the prior HD the pt had a fall onto her knees and reports having hit her head but did not have LOC, CP, palp, N/V. There was some issue with the HD cath which was resolved.   The pt was evaluated in HD, deemed stable and released home.  On the following HD session the pt was noted to be hypotensive BP 67/38, tachy 170s, afib with RVR,  T 97.5.  The pt was sent to ER for further evaluation.  Imaging studies inc CTH, no acute path, CT C spine multi level DDD, CT chest no PE or aortic dissection, CT abd/pelvis large ascites, The pt  had an elevated WBC, elevated LA max 13.7, BNP 70.000, The pt;s status deteriorated and the pt req intubation, vasopressors, fluid resuscitation.  The pt was cultured and started  on ABx Cefepime and Vanco.  The pt underwent dx paracentesis and ad to CCU for further critical care.  The P<MHx includes:  HTN, ASHD, CAD, old MI, afib, failed cardioversions and failed ablation, SYS and VAL CHF, AS, Pul HTN, DLD, COPD, Chr anemia, ESRDAVF, , HD, PAD, sp stents RUE, OA, DDD, DJD, OGERD, HH, diverticulosis.    INTERVAL HPI/OVERNIGHT EVENTS:    MEDICATIONS  (STANDING):  aMIOdarone Infusion 1 mG/Min (33.3 mL/Hr) IV Continuous <Continuous>  aMIOdarone Infusion 0.5 mG/Min (16.7 mL/Hr) IV Continuous <Continuous>  calcium acetate 667 milliGRAM(s) Oral three times a day with meals  chlorhexidine 4% Liquid 1 Application(s) Topical <User Schedule>  dexMEDEtomidine Infusion 0.05 MICROgram(s)/kG/Hr (0.9 mL/Hr) IV Continuous <Continuous>  dextrose 5% 1000 milliLiter(s) (75 mL/Hr) IV Continuous <Continuous>  fentaNYL   Infusion. 0.5 MICROgram(s)/kG/Hr (3.62 mL/Hr) IV Continuous <Continuous>  hydrocortisone sodium succinate Injectable 50 milliGRAM(s) IV Push every 8 hours  meropenem  IVPB 500 milliGRAM(s) IV Intermittent every 24 hours  norepinephrine Infusion 0.05 MICROgram(s)/kG/Min (3.14 mL/Hr) IV Continuous <Continuous>  phenylephrine    Infusion 2 MICROgram(s)/kG/Min (25.1 mL/Hr) IV Continuous <Continuous>  vancomycin  IVPB 750 milliGRAM(s) IV Intermittent <User Schedule>  vasopressin Infusion 0.04 Unit(s)/Min (2.4 mL/Hr) IV Continuous <Continuous>    MEDICATIONS  (PRN):      Allergies    No Known Allergies    Vital Signs Last 24 Hrs  T(C): 36.2 (23 Jan 2021 12:00), Max: 36.2 (23 Jan 2021 12:00)  T(F): 97.1 (23 Jan 2021 12:00), Max: 97.1 (23 Jan 2021 12:00)  HR: 124 (23 Jan 2021 14:00) (71 - 176)  BP: 135/64 (22 Jan 2021 14:51) (54/39 - 135/64)  BP(mean): 36 (22 Jan 2021 15:25) (36 - 82)  RR: 24 (23 Jan 2021 14:00) (15 - 27)  SpO2: 100% (23 Jan 2021 11:00) (75% - 100%)    PHYSICAL EXAM:      Constitutional: pt sedated on vent support, acutely ill with MSOF    Eyes: nonicteric, closed    ENMT: + ET tube    Neck: supple + JVD    Back: Th kyphosis    Respiratory: + harsh resp BS    Cardiovascular: S1S2 tachy and irreg    Gastrointestinal: distended, full flanks    Genitourinary:     Extremities: + arthritic changes, poor mm mass and tone    Neurological: sedated    Skin: no rash, gen pallor    Lymph Nodes: not enlarged      LABS:                        14.0   23.01 )-----------( 159      ( 23 Jan 2021 04:30 )             46.0     01-23    138  |  96<L>  |  38<H>  ----------------------------<  148<H>  5.0   |  11<L>  |  4.2<HH>    GFR 9    trop 0.23  BNP 70,000  LA 14.9, 13.7, 12.7, 10.8, 11.5  Ca    8.5      23 Jan 2021 03:00  Mg     2.4     01-23  MRSA negative  Covid negative  RVP negative    TPro  5.8<L>  /  Alb  3.1<L>  /  TBili  0.5  /  DBili  x   /  AST  202<H>  /  ALT  105<H>  /  AlkPhos  51  01-23    PT/INR - ( 23 Jan 2021 08:13 )   PT: 35.00 sec;   INR: 3.04 ratio         PTT - ( 23 Jan 2021 08:13 )  PTT:37.5 sec      RADIOLOGY & ADDITIONAL TESTS:    CXR:  L IJ, stents in RUE, sternotomy, no acute pul dis    CTH:  ch microvasc changes, no acute path    CT C spine:  severe multilevel DDD, osteophytes    CT chest:  NO PE, NO dissection, enlarged heart, no pericardial eff,  coronary calcifications, R axillary stent,  + paratrach,subcarinal and R hilar LN largest 3cm, BL calcified granulomas, reticular opacities/fibrosis of R base    CT abd/pelvis:  enlarged liver 22.5cm, atrophic kidneys, extensive vascular calcifications, non obs R renal calculi, no hydro, large volume ascites, no bowel obs     NOAH QUIROS 74yo W Female ad from HD center after pt was noted to be hypotensive, tachycardic and SOB.  Of note on the prior HD the pt had a fall onto her knees and reports having hit her head but did not have LOC, CP, palp, N/V. There was some issue with the HD cath which was resolved.   The pt was evaluated in HD, deemed stable and released home.  On the following HD session the pt was noted to be hypotensive BP 67/38, tachy 170s, afib with RVR,  T 97.5.  The pt was sent to ER for further evaluation.  Imaging studies inc CTH, no acute path, CT C spine multi level DDD, CT chest no PE or aortic dissection, CT abd/pelvis large ascites, The pt  had an elevated WBC, elevated LA max 13.7, BNP 70.000, The pt;s status deteriorated and the pt req intubation, vasopressors, fluid resuscitation.  The pt was cultured and started  on ABx Cefepime and Vanco.  The pt underwent dx paracentesis and ad to CCU for further critical care.  The P<MHx includes:  HTN, ASHD, CAD, old MI, afib, failed cardioversions and failed ablation, SYS and VAL CHF, AS, Pul HTN, DLD, COPD, Chr anemia, ESRDAVF, , HD, PAD, sp stents RUE, OA, DDD, DJD, OGERD, HH, diverticulosis.    INTERVAL HPI/OVERNIGHT EVENTS:    MEDICATIONS  (STANDING):  aMIOdarone Infusion 1 mG/Min (33.3 mL/Hr) IV Continuous <Continuous>  aMIOdarone Infusion 0.5 mG/Min (16.7 mL/Hr) IV Continuous <Continuous>  calcium acetate 667 milliGRAM(s) Oral three times a day with meals  chlorhexidine 4% Liquid 1 Application(s) Topical <User Schedule>  dexMEDEtomidine Infusion 0.05 MICROgram(s)/kG/Hr (0.9 mL/Hr) IV Continuous <Continuous>  dextrose 5% 1000 milliLiter(s) (75 mL/Hr) IV Continuous <Continuous>  fentaNYL   Infusion. 0.5 MICROgram(s)/kG/Hr (3.62 mL/Hr) IV Continuous <Continuous>  hydrocortisone sodium succinate Injectable 50 milliGRAM(s) IV Push every 8 hours  meropenem  IVPB 500 milliGRAM(s) IV Intermittent every 24 hours  norepinephrine Infusion 0.05 MICROgram(s)/kG/Min (3.14 mL/Hr) IV Continuous <Continuous>  phenylephrine    Infusion 2 MICROgram(s)/kG/Min (25.1 mL/Hr) IV Continuous <Continuous>  vancomycin  IVPB 750 milliGRAM(s) IV Intermittent <User Schedule>  vasopressin Infusion 0.04 Unit(s)/Min (2.4 mL/Hr) IV Continuous <Continuous>    MEDICATIONS  (PRN):      Allergies    No Known Allergies    Vital Signs Last 24 Hrs  T(C): 36.2 (23 Jan 2021 12:00), Max: 36.2 (23 Jan 2021 12:00)  T(F): 97.1 (23 Jan 2021 12:00), Max: 97.1 (23 Jan 2021 12:00)  HR: 124 (23 Jan 2021 14:00) (71 - 176)  BP: 135/64 (22 Jan 2021 14:51) (54/39 - 135/64)  BP(mean): 36 (22 Jan 2021 15:25) (36 - 82)  RR: 24 (23 Jan 2021 14:00) (15 - 27)  SpO2: 100% (23 Jan 2021 11:00) (75% - 100%)    PHYSICAL EXAM:      Constitutional: pt sedated on vent support, acutely ill with MSOF    Eyes: nonicteric, closed    ENMT: + ET tube    Neck: supple + JVD    Back: Th kyphosis    Respiratory: + harsh resp BS    Cardiovascular: S1S2 tachy and irreg    Gastrointestinal: distended, full flanks    Genitourinary:     Extremities: + arthritic changes, poor mm mass and tone    Neurological: sedated    Skin: no rash, gen pallor    Lymph Nodes: not enlarged      LABS:                        14.0   23.01 )-----------( 159      WBC:  5, 14, 23             46.0     01-23    138  |  96<L>  |  38<H>  ----------------------------<  148<H>  5.0   |  11<L>  |  4.2<HH>    GFR 9    trop 0.23  BNP 70,000  LA 14.9, 13.7, 12.7, 10.8, 11.5  Ca    8.5      23 Jan 2021 03:00  Mg     2.4     01-23  MRSA negative  Covid negative  RVP negative    TPro  5.8<L>  /  Alb  3.1<L>  /  TBili  0.5  /  DBili  x   /  AST  202<H>  /  ALT  105<H>  /  AlkPhos  51  01-23    PT/INR - ( 23 Jan 2021 08:13 )   PT: 35.00 sec;   INR: 3.04 ratio         PTT - ( 23 Jan 2021 08:13 )  PTT:37.5 sec      RADIOLOGY & ADDITIONAL TESTS:    CXR:  L IJ, stents in RUE, sternotomy, no acute pul dis    CTH:  ch microvasc changes, no acute path    CT C spine:  severe multilevel DDD, osteophytes    CT chest:  NO PE, NO dissection, enlarged heart, no pericardial eff,  coronary calcifications, R axillary stent,  + paratrach,subcarinal and R hilar LN largest 3cm, BL calcified granulomas, reticular opacities/fibrosis of R base    CT abd/pelvis:  enlarged liver 22.5cm, atrophic kidneys, extensive vascular calcifications, non obs R renal calculi, no hydro, large volume ascites, no bowel obs

## 2021-01-23 NOTE — CONSULT NOTE ADULT - SUBJECTIVE AND OBJECTIVE BOX
HPI:  75 years old Female with PMhx of ESRD on Tue/Thur/Sat HD, b/l PVD s/p endovascular revascularization, Permanent Afib w/ failed ablation and multiple cardioversion on Eliquis, Aortic stenosis, pulmonary HTN, COPD anemia of chronic disease, CABG, HF with reduced EF and CAD Pt presents after fall that happened yesterday at dialysis, pt is on Eliquis.   Collateral history obtained from ED charts.     As per the patient she fell to her knees and reports hitting her head at dialysis centre. No LOC or vomiting. Pt was evaluated by the medical team there and was sent home. At the time pt’s fistula was not working but they were able to fix it. Today pt came back to dialysis and received 4 hours of dialysis. At that time it was noted by team members at the dialysis clinic that pt was hypotensive and tachycardic so pt was sent to ED. Denies any fever, cough, chest pain, n/v/d, urinary symptoms or recent weight changes.     In the ED, Pt was found to be hypotensive 67/38, tachycardiac 172, Temp 97.5, was saturating well on 2L NC. Pt was found to be in Atrial fibrillation w/ RVR. Initial trauma workup negative, CT A/P showed Large volume ascites within the abdomen and pelvis. Paratracheal, subcarinal, and right hilar lymphadenopathy is noted Labs were significant for elevated WBC at 14.12, lactate 4.4, Trop elevated at 0.23. Received 750 cc of IV fluid bolus in ED, started on Gavin-synephrine and vasopressin infusion. Cardiology consulted, pt was started on amiodarone and digoxin for afibb w/ RVR. Antibiotics vancomycin and cefepime also administered.    (22 Jan 2021 21:02)      CC/ HPI Patient is a 75y old  Female who presents with a chief complaint of fall and hypotension (23 Jan 2021 07:22)      ATRIAL FIBRILLATION WITH RVR,SEPTIC SHOCK    ^LOW BP    Family history of diabetes mellitus in mother    FH: diabetes mellitus    Family history of lung cancer (Father)    Family history of CHF (congestive heart failure) (Mother)    No pertinent family history in first degree relatives      Swollen feet    HTN (hypertension)    ESRD on hemodialysis    Thyroid nodule    Degenerative joint disease    Osteoarthritis    Diverticulosis    GERD (gastroesophageal reflux disease)    Heart failure with reduced ejection fraction    Pulmonary hypertension, moderate to severe    Aortic stenosis, moderate    Atrial fibrillation    CKD (chronic kidney disease)    MI (myocardial infarction)    CAD (coronary artery disease)    Kidney stone    HTN (hypertension)    Atrial fibrillation with RVR    S/P ureteral stent placement    History of kidney surgery    S/P cataract surgery    History of cardioversion    S/P CABG (coronary artery bypass graft)    AV fistula    H/O abdominal hysterectomy    H/O cardiac radiofrequency ablation    History of open heart surgery    LOW BP      Septic shock  k        FAMILY HISTORY:  Family history of diabetes mellitus in mother    FH: diabetes mellitus    Family history of lung cancer (Father)    Family history of CHF (congestive heart failure) (Mother)    No pertinent family history in first degree relatives        No Known Allergies        Marital Status:  (   )    (   ) Single    (   )    (  )   Occupation:   Lives with: (  ) alone  (  ) children   (  ) spouse   (  ) parents  (  ) other  Recent Travel:     Substance Use (street drugs): (  x) never used  (  ) other:  Tobacco Usage:  (   ) never smoked   ( x  ) former smoker   (   ) current smoker  (     ) pack year  Alcohol Usage:        Home prescriptions  allopurinol 100 mg oral tablet: 1 tab(s) orally once a day  calcium acetate 667 mg oral tablet: 3 tab(s) orally 3 times a day  dilTIAZem 30 mg oral tablet: 1 tab(s) orally 3 times a day  Eliquis 2.5 mg oral tablet: 1 tab(s) orally 2 times a day  famotidine 20 mg oral tablet: 1 tab(s) orally 2 times a day  Metoprolol Tartrate 25 mg oral tablet: 1 tab(s) orally 2 times a day  Percocet 5 mg-325 mg oral tablet: 1 tab(s) orally every 6 hours      MEDICATIONS  (STANDING):  aMIOdarone Infusion 1 mG/Min (33.3 mL/Hr) IV Continuous <Continuous>  aMIOdarone Infusion 0.5 mG/Min (16.7 mL/Hr) IV Continuous <Continuous>  calcium acetate 667 milliGRAM(s) Oral three times a day with meals  cefepime   IVPB 500 milliGRAM(s) IV Intermittent every 24 hours  chlorhexidine 4% Liquid 1 Application(s) Topical <User Schedule>  dextrose 5% 1000 milliLiter(s) (75 mL/Hr) IV Continuous <Continuous>  hydrocortisone sodium succinate Injectable 50 milliGRAM(s) IV Push every 8 hours  norepinephrine Infusion 0.05 MICROgram(s)/kG/Min (3.14 mL/Hr) IV Continuous <Continuous>  phenylephrine    Infusion 2 MICROgram(s)/kG/Min (25.1 mL/Hr) IV Continuous <Continuous>  vancomycin    Solution 125 milliGRAM(s) Oral every 6 hours  vasopressin Infusion 0.04 Unit(s)/Min (2.4 mL/Hr) IV Continuous <Continuous>    MEDICATIONS  (PRN):      lactated ringers Bolus:   1000 milliLiter(s), IV Bolus, once, infuse over 60 Minute(s), Stop After 1 Doses  lactated ringers.: Solution, 1000 milliLiter(s) infuse at 75 mL/Hr  sodium chloride 0.9% Bolus:   500 milliLiter(s), IV Bolus, once, infuse over 60 Minute(s), Stop After 1 Doses  Provider's Contact #: [phone]      T(C): 35.5 (01-23-21 @ 08:00), Max: 36.4 (01-22-21 @ 11:15)  HR: 106 (01-23-21 @ 09:00) (75 - 176)  BP: 135/64 (01-22-21 @ 14:51) (50/37 - 135/64)  RR: 17 (01-23-21 @ 09:00) (15 - 20)  SpO2: 98% (01-23-21 @ 01:06) (92% - 100%)  ICU Vital Signs Last 24 Hrs  T(C): 35.5 (23 Jan 2021 08:00), Max: 36.4 (22 Jan 2021 11:15)  T(F): 95.9 (23 Jan 2021 08:00), Max: 97.5 (22 Jan 2021 11:15)  HR: 106 (23 Jan 2021 09:00) (75 - 176)  BP: 135/64 (22 Jan 2021 14:51) (50/37 - 135/64)  BP(mean): 36 (22 Jan 2021 15:25) (36 - 82)  ABP: 106/52 (23 Jan 2021 09:00) (79/43 - 121/67)  ABP(mean): 74 (23 Jan 2021 09:00) (56 - 86)  RR: 17 (23 Jan 2021 09:00) (15 - 20)  SpO2: 98% (23 Jan 2021 01:06) (92% - 100%)      I&O's Summary    22 Jan 2021 07:01  -  23 Jan 2021 07:00  --------------------------------------------------------  IN: 2073.7 mL / OUT: 0 mL / NET: 2073.7 mL    23 Jan 2021 07:01  -  23 Jan 2021 09:12  --------------------------------------------------------  IN: 591.5 mL / OUT: 0 mL / NET: 591.5 mL        I&O's Detail    22 Jan 2021 07:01  -  23 Jan 2021 07:00  --------------------------------------------------------  IN:    Amiodarone: 116.9 mL    dextrose 5% w/ Additives: 375 mL    IV PiggyBack: 300 mL    Lactated Ringers: 150 mL    Norepinephrine: 237 mL    Phenylephrine: 878 mL    Vasopressin: 16.8 mL  Total IN: 2073.7 mL    OUT:  Total OUT: 0 mL    Total NET: 2073.7 mL      23 Jan 2021 07:01  -  23 Jan 2021 09:12  --------------------------------------------------------  IN:    Amiodarone: 50.1 mL    dextrose 5% w/ Additives: 225 mL    Norepinephrine: 94.7 mL    Phenylephrine: 216.9 mL    Vasopressin: 4.8 mL  Total IN: 591.5 mL    OUT:  Total OUT: 0 mL    Total NET: 591.5 mL          Drug Dosing Weight  Height (cm): 167.6 (23 Jan 2021 01:06)  Weight (kg): 72.3 (23 Jan 2021 01:06)  BMI (kg/m2): 25.7 (23 Jan 2021 01:06)  BSA (m2): 1.82 (23 Jan 2021 01:06)    LABS:                          14.0   23.01 )-----------( 159      ( 23 Jan 2021 04:30 )             46.0       01-23    138  |  96<L>  |  38<H>  ----------------------------<  148<H>  5.0   |  11<L>  |  4.2<HH>    Ca    8.5      23 Jan 2021 03:00  Mg     2.4     01-23    TPro  5.8<L>  /  Alb  3.1<L>  /  TBili  0.5  /  DBili  x   /  AST  202<H>  /  ALT  105<H>  /  AlkPhos  51  01-23      LIVER FUNCTIONS - ( 23 Jan 2021 03:00 )  Alb: 3.1 g/dL / Pro: 5.8 g/dL / ALK PHOS: 51 U/L / ALT: 105 U/L / AST: 202 U/L / GGT: x             CARDIAC MARKERS ( 23 Jan 2021 04:30 )  x     / 1.06 ng/mL / x     / x     / x      CARDIAC MARKERS ( 22 Jan 2021 10:59 )  x     / 0.23 ng/mL / x     / x     / x                Serum Pro-Brain Natriuretic Peptide: >74848 pg/mL (01-23-21 @ 04:30)      Lactate, Blood: 13.7 mmol/L (01-23-21 @ 03:00)  Lactate, Blood: 14.9 mmol/L (01-22-21 @ 21:10)  Lactate, Blood: 4.4 mmol/L (01-22-21 @ 10:59)              cefepime   IVPB 500 milliGRAM(s) IV Intermittent every 24 hours  vancomycin    Solution 125 milliGRAM(s) Oral every 6 hours      ABG - ( 23 Jan 2021 08:45 )  pH, Arterial: 7.02  pH, Blood: x     /  pCO2: 45    /  pO2: 80    / HCO3: 12    / Base Excess: -19.2 /  SaO2: 90              RADIOLOGY:  I have personally reviewed all chest and other pertinent radiology films.      REVIEW OF SYSTEMS:     · CONSTITUTIONAL:   no fever   no chills.      · EYES:   no discharge,   no irritation,    no visual changes.    · ENMT:   Ears: no ear pain and no hearing problems.  Nose: no nasal congestion and no nasal drainage.      · CARDIOVASCULAR:   no chest pain,   no swelling  no palpitations      · RESPIRATORY:  +SOB,  no wheezing ,  no respiratory difficulty  no sputum production    · GASTROINTESTINAL:   no abdominal pain,    no nausea   no vomiting.    · GENITOURINARY:  no dysuria,   no frequency,       · MUSCULOSKELETAL:   no back pain,   no neck pain,   no weakness.    · SKIN:   no pruritis,   no rashes.    · NEURO:   no loss of consciousness,   no headache,   no weakness.    · PSYCHIATRIC:   no known mental health issues  no anxiety  no depression        ALLERGIC/IMMUNOLOGIC:   No active allergic or immunologic issues    all other systems are negative        PHYSICAL EXAM:     · CONSTITUTIONAL:   not septic appearing,   well nourished,   NAD    · ENMT:   Airway patent,   Nasal mucosa clear.  Mouth with normal mucosa.   No thrush    · EYES:   Clear bilaterally,   pupils equal,   round and reactive to light.    · CARDIAC:     irregular rhythm.    Heart sounds S1, S2.   no thrills or bruits on palpitation  normal  cardiac impulse  No murmurs, no rubs or gallops on auscultation  no edema        CAROTID:   normal systolic impulse  no bruits    · RESPIRATORY:   rales  normal chest expansion  not tachypneic,  no retractions or use of accessory muscles  palpation of chest is normal with no fremitus  percussion of chest demonstrates no hyperresonance or dullness    · GASTROINTESTINAL:  Abdomen soft, distended  non-tender,   no guarding,   + BS  liver spleen not palpable      · MUSCULOSKELETAL:   range of motion is not limited,  no clubbing, cyanosis  no petechiae    · NEUROLOGICAL:   Alert and oriented   no obvious focal deficits in cranial nerve areas  no motor or sensory deficits.      · SKIN:   Skin normal color for race,   warm,   dry and intact.       · PSYCHIATRIC:   Alert and oriented to person,   place, time/situation.   normal mood and affect.   no apparent risk to self or others.    · HEME LYMPH:   no splenomegaly.  No cervical  lymphadenopathy.  no inguinal lymphadenopathy

## 2021-01-23 NOTE — PROGRESS NOTE ADULT - SUBJECTIVE AND OBJECTIVE BOX
Patient is a 75y old  Female who presents with a chief complaint of fall and hypotension (22 Jan 2021 21:02)      INTERVAL HPI/OVERNIGHT EVENTS:   No overnight events   Afebrile, hemodynamically stable     ICU Vital Signs Last 24 Hrs  T(C): 35.9 (23 Jan 2021 01:06), Max: 36.4 (22 Jan 2021 11:15)  T(F): 96.6 (23 Jan 2021 01:06), Max: 97.5 (22 Jan 2021 11:15)  HR: 106 (23 Jan 2021 06:00) (75 - 176)  BP: 135/64 (22 Jan 2021 14:51) (50/37 - 135/64)  BP(mean): 36 (22 Jan 2021 15:25) (36 - 82)  ABP: 98/48 (23 Jan 2021 06:00) (79/43 - 121/67)  ABP(mean): 66 (23 Jan 2021 06:00) (56 - 86)  RR: 16 (23 Jan 2021 06:00) (15 - 20)  SpO2: 98% (23 Jan 2021 01:06) (92% - 100%)    I&O's Summary    22 Jan 2021 07:01  -  23 Jan 2021 07:00  --------------------------------------------------------  IN: 1803.6 mL / OUT: 0 mL / NET: 1803.6 mL          LABS:                        14.0   23.01 )-----------( 159      ( 23 Jan 2021 04:30 )             46.0     01-23    138  |  96<L>  |  38<H>  ----------------------------<  148<H>  5.0   |  11<L>  |  4.2<HH>    Ca    8.5      23 Jan 2021 03:00  Mg     2.4     01-23    TPro  5.8<L>  /  Alb  3.1<L>  /  TBili  0.5  /  DBili  x   /  AST  202<H>  /  ALT  105<H>  /  AlkPhos  51  01-23        CAPILLARY BLOOD GLUCOSE      POCT Blood Glucose.: 111 mg/dL (23 Jan 2021 01:57)  POCT Blood Glucose.: 46 mg/dL (23 Jan 2021 01:39)  POCT Blood Glucose.: 89 mg/dL (22 Jan 2021 10:43)    ABG - ( 23 Jan 2021 04:30 )  pH, Arterial: 7.10  pH, Blood: x     /  pCO2: 35    /  pO2: 102   / HCO3: -19   / Base Excess: x     /  SaO2: 95                  RADIOLOGY & ADDITIONAL TESTS:    Consultant(s) Notes Reviewed:  [x ] YES  [ ] NO    MEDICATIONS  (STANDING):  aMIOdarone Infusion 1 mG/Min (33.3 mL/Hr) IV Continuous <Continuous>  aMIOdarone Infusion 0.5 mG/Min (16.7 mL/Hr) IV Continuous <Continuous>  calcium acetate 667 milliGRAM(s) Oral three times a day with meals  cefepime   IVPB 500 milliGRAM(s) IV Intermittent every 24 hours  chlorhexidine 4% Liquid 1 Application(s) Topical <User Schedule>  dextrose 5% 1000 milliLiter(s) (75 mL/Hr) IV Continuous <Continuous>  dextrose 50% Injectable 50 milliLiter(s) IV Push once  hydrocortisone sodium succinate Injectable 50 milliGRAM(s) IV Push every 8 hours  lactated ringers Bolus 1000 milliLiter(s) IV Bolus once  norepinephrine Infusion 0.05 MICROgram(s)/kG/Min (3.14 mL/Hr) IV Continuous <Continuous>  phenylephrine    Infusion 2 MICROgram(s)/kG/Min (25.1 mL/Hr) IV Continuous <Continuous>  vancomycin    Solution 125 milliGRAM(s) Oral every 6 hours  vancomycin  IVPB 1000 milliGRAM(s) IV Intermittent every 48 hours  vasopressin Infusion 0.04 Unit(s)/Min (2.4 mL/Hr) IV Continuous <Continuous>    MEDICATIONS  (PRN):      PHYSICAL EXAM:  GENERAL:   HEAD:  Atraumatic, Normocephalic  EYES: EOMI, PERRLA, conjunctiva and sclera clear  NECK: Supple, No JVD, Normal thyroid, no enlarged nodes  NERVOUS SYSTEM:  Alert & Awake.   CHEST/LUNG: B/L good air entry; No rales, rhonchi, or wheezing  HEART: S1S2 normal, no S3, Regular rate and rhythm; No murmurs  ABDOMEN: Soft, Nontender, Nondistended; Bowel sounds present  EXTREMITIES:  2+ Peripheral Pulses, No clubbing, cyanosis, or edema  LYMPH: No lymphadenopathy noted  SKIN: No rashes or lesions    Care Discussed with Consultants/Other Providers [ x] YES  [ ] NO

## 2021-01-23 NOTE — CONSULT NOTE ADULT - ASSESSMENT
IMPRESSION:  Sepsis  septic shock  hypotension with hypoperfusion  mediastinal adenopathy  R heart failure  atrial fibrillation with RVR  ascites      Suggest:  titrate pressors and wean as tolerated  continue empiric antibiotics  full cultures  diagnostic paracentesis  f/u culture results  GI evaluation  frequent clinical evaluation of signs of perfusion  maintain BP >110  accurate measures of I/O  the patient remains unstable in critical condition despite maximal therapies   IMPRESSION:  Sepsis POA  septic shock  hypotension with hypoperfusion  mediastinal adenopathy  R heart failure  atrial fibrillation with RVR  ascites  ? ischemic bowel      Suggest:  titrate pressors and wean as tolerated  needs intubation  continue empiric antibiotics  full cultures  diagnostic paracentesis  f/u culture results  GI evaluation  frequent clinical evaluation of signs of perfusion  maintain BP >110  accurate measures of I/O  repeat ABG  the patient remains unstable in critical condition despite maximal therapies    spoke to renal    The patient is critically ill with a high probability of imminent or life threatening deterioration.

## 2021-01-23 NOTE — PROGRESS NOTE ADULT - ASSESSMENT
Elderly WF with multiple complicated medical issues inc ESRD on HD ad with hypotension, tachycardia and RF,lactic acidosis,  intubated, on vasopressors, IV resuscitation and IV Abx.  The pt underwent dx paracentesis for large ascites.  The pt is in CCU and ff by pul-critical care, cardio,  Renal and ID.    Septic Shock, SIRS, MSOF, Lactic Acidosis  Acute RF, sp intubation  Afib with RVR  Large Volume Ascites, R/O SBP  Sp recent fall  ESRD, HD  Hx of HTN, ASHD, CAD, old MI, SCABG, afib (faile cardioversions and ablation), AS, SYS and VAL CHF, PUL HTN  Hx of COPD, ex tobacco use  Hx of DLD  Hx of PVD sp aa stents  Hx of GERD, HH, Diverticulosis  Hx of OA, DDD, DJD    pt critically ill with ARF, MSOF, Lactic acidosis ad to [facility]  pt intubated, on vasopressors, cultured and placed on Abx, underwent dx paracentesis (35cc straw like fluid removed0  Imaging studies reviewed and show no acute pathology or source of def infection, except for large volume ascites    Pul-critical care  ID:  Vanco and meropenem renal doses  Cardio: started amiodarone, digoxin  Renal  monitor CBC, electrolytes, LA, check procal, check fungitell  pt is negative for MRSA, Covid  guarded state

## 2021-01-23 NOTE — CONSULT NOTE ADULT - SUBJECTIVE AND OBJECTIVE BOX
NOAH QUIROS  75y, Female  Allergy: No Known Allergies      All historical available data reviewed.    HPI:  75 years old Female with PMhx of ESRD on Tue/Thur/Sat HD, b/l PVD s/p endovascular revascularization, Permanent Afib w/ failed ablation and multiple cardioversion on Eliquis, Aortic stenosis, pulmonary HTN, COPD anemia of chronic disease, CABG, HF with reduced EF and CAD Pt presents after fall that happened yesterday at dialysis, pt is on Eliquis.   Collateral history obtained from ED charts. As per the patient she fell to her knees and reports hitting her head at dialysis centre. No LOC or vomiting. Pt was evaluated by the medical team there and was sent home. At the time pt’s fistula was not working but they were able to fix it. Today pt came back to dialysis and received 4 hours of dialysis. At that time it was noted by team members at the dialysis clinic that pt was hypotensive and tachycardic so pt was sent to ED. Denies any fever, cough, chest pain, n/v/d, urinary symptoms or recent weight changes.     In the ED, Pt was found to be hypotensive 67/38, tachycardiac 172, Temp 97.5, was saturating well on 2L NC. Pt was found to be in Atrial fibrillation w/ RVR. Initial trauma workup negative, CT A/P showed Large volume ascites within the abdomen and pelvis. Paratracheal, subcarinal, and right hilar lymphadenopathy is noted Labs were significant for elevated WBC at 14.12, lactate 4.4, Trop elevated at 0.23. Received 750 cc of IV fluid bolus in ED, started on Gavin-synephrine and vasopressin infusion. Cardiology consulted, pt was started on amiodarone and digoxin for afibb w/ RVR. Antibiotics vancomycin and cefepime also administered.    (22 Jan 2021 21:02)    FAMILY HISTORY:  Family history of diabetes mellitus in mother    Family history of lung cancer (Father)    Family history of CHF (congestive heart failure) (Mother)      PAST MEDICAL & SURGICAL HISTORY:  Swollen feet  ON OCCASSION    HTN (hypertension)    ESRD on hemodialysis  T,T,S    Thyroid nodule    Degenerative joint disease    Osteoarthritis    Diverticulosis    GERD (gastroesophageal reflux disease)    Heart failure with reduced ejection fraction    Pulmonary hypertension, moderate to severe    Aortic stenosis, moderate    Atrial fibrillation  status post cardioversion    MI (myocardial infarction)    CAD (coronary artery disease)    Kidney stone    HTN (hypertension)    S/P ureteral stent placement    History of kidney surgery    S/P cataract surgery    S/P CABG (coronary artery bypass graft)    AV fistula    H/O abdominal hysterectomy    H/O cardiac radiofrequency ablation          VITALS:  T(F): 95.9, Max: 97.5 (01-22-21 @ 11:15)  HR: 106  BP: 135/64  RR: 17Vital Signs Last 24 Hrs  T(C): 35.5 (23 Jan 2021 08:00), Max: 36.4 (22 Jan 2021 11:15)  T(F): 95.9 (23 Jan 2021 08:00), Max: 97.5 (22 Jan 2021 11:15)  HR: 106 (23 Jan 2021 09:00) (75 - 176)  BP: 135/64 (22 Jan 2021 14:51) (50/37 - 135/64)  BP(mean): 36 (22 Jan 2021 15:25) (36 - 82)  RR: 17 (23 Jan 2021 09:00) (15 - 20)  SpO2: 98% (23 Jan 2021 01:06) (92% - 100%)    TESTS & MEASUREMENTS:                        14.0   23.01 )-----------( 159      ( 23 Jan 2021 04:30 )             46.0     01-23    138  |  96<L>  |  38<H>  ----------------------------<  148<H>  5.0   |  11<L>  |  4.2<HH>    Ca    8.5      23 Jan 2021 03:00  Mg     2.4     01-23    TPro  5.8<L>  /  Alb  3.1<L>  /  TBili  0.5  /  DBili  x   /  AST  202<H>  /  ALT  105<H>  /  AlkPhos  51  01-23    LIVER FUNCTIONS - ( 23 Jan 2021 03:00 )  Alb: 3.1 g/dL / Pro: 5.8 g/dL / ALK PHOS: 51 U/L / ALT: 105 U/L / AST: 202 U/L / GGT: x                   RADIOLOGY & ADDITIONAL TESTS:  Personal review of radiological diagnostics performed  Echo and EKG results noted when applicable.     MEDICATIONS:  aMIOdarone Infusion 1 mG/Min IV Continuous <Continuous>  aMIOdarone Infusion 0.5 mG/Min IV Continuous <Continuous>  calcium acetate 667 milliGRAM(s) Oral three times a day with meals  cefepime   IVPB 500 milliGRAM(s) IV Intermittent every 24 hours  chlorhexidine 4% Liquid 1 Application(s) Topical <User Schedule>  dextrose 5% 1000 milliLiter(s) IV Continuous <Continuous>  hydrocortisone sodium succinate Injectable 50 milliGRAM(s) IV Push every 8 hours  norepinephrine Infusion 0.05 MICROgram(s)/kG/Min IV Continuous <Continuous>  phenylephrine    Infusion 2 MICROgram(s)/kG/Min IV Continuous <Continuous>  vancomycin    Solution 125 milliGRAM(s) Oral every 6 hours  vasopressin Infusion 0.04 Unit(s)/Min IV Continuous <Continuous>      ANTIBIOTICS:  cefepime   IVPB 500 milliGRAM(s) IV Intermittent every 24 hours  vancomycin    Solution 125 milliGRAM(s) Oral every 6 hours

## 2021-01-23 NOTE — CONSULT NOTE ADULT - ASSESSMENT
75 years old Female with PMhx of ESRD on Tue/Thur/Sat HD, b/l PVD s/p endovascular revascularization, Permanent Afib w/ failed ablation and multiple cardioversion on Eliquis, Aortic stenosis, pulmonary HTN, COPD anemia of chronic disease, CABG, HF with reduced EF and CAD Pt presents after fall that happened yesterday at dialysis, pt is on Eliquis.   Collateral history obtained from ED charts. As per the patient she fell to her knees and reports hitting her head at dialysis centre. No LOC or vomiting. Pt was evaluated by the medical team there and was sent home. At the time pt’s fistula was not working but they were able to fix it. Today pt came back to dialysis and received 4 hours of dialysis. At that time it was noted by team members at the dialysis clinic that pt was hypotensive and tachycardic so pt was sent to ED. Denies any fever, cough, chest pain, n/v/d, urinary symptoms or recent weight changes.   In the ED, Pt was found to be hypotensive 67/38, tachycardiac 172, Temp 97.5, was saturating well on 2L NC. Pt was found to be in Atrial fibrillation w/ RVR. Initial trauma workup negative, CT A/P showed Large volume ascites within the abdomen and pelvis. Paratracheal, subcarinal, and right hilar lymphadenopathy is noted Labs were significant for elevated WBC at 14.12, lactate 4.4,    IMPRESSION;  Shock with MSOF with severe metabolic acidosis/lactic acidosis ( 13.7 ) on pressors  No obvious focus of infection  No PNA  No line infection  Would suggest ischemic gut but based on Hx and PE no evidence of mesenteric ischemia/infarct  No evidence of CD colitis ( clinically or based on CT )  Clinically no SBP ( but would proceed to a diagnostic paracentesis )  Transaminitis : secondary to ischemia . Clinically no cholangitis  No PNA : CT no consolidation  No CNS infection  COVID-19 NG    RECOMMENDATIONS;  BCx   diagnostic paracentesis  Abdominal sono  Vancomycin 750 mg iv post HD  Meropenem 500 mg iv q24h  d/c po vancomycin  d/c left femoral catheter

## 2021-01-23 NOTE — PROGRESS NOTE ADULT - ASSESSMENT
IMPRESSION:  septic shock / hypovolemic shock in setting of recent HD  SIRS +  Permanent Afibb complicated w/ RVR  ESRD on HD  HFrEF  HO of HTN  HO of CAD / PVD      PLAN:    CNS: Avoid Sedatives    HEENT: speech and swallow evaluation, oral care    PULMONARY: HOB at 45, Aspiration precautions,, Repeat CXR am, Repeat ABG,     CARDIOVASCULAR: IVF LR @ 75cc, Continue pressors everardo and vaso, switch to levophed once HR < 100, c/w amiodarone infusion as per cardio, Digoxin digitalization, ECHO, f/u Cardiology, repeat EKG, repeat troponin, BNP, avoid CCB and BB until hypotensive. Cautious w/ IVF     GI: GI prophylaxis, NPO, speech and swallow, paracentesis tomorrow,     RENAL: IVF LR @ 75cc, I>0, monitor electrolytes and replete as needed, Nephrology consult, cautious with fluid given hx of HFrEF and ESRD.     INFECTIOUS DISEASE: Antibiotics cefepime and vancomycin, Blood cultures, Urine cultures, ID consult, Check MRSA nares, Procalcitonin, UA    HEMATOLOGICAL: hold Eliquis tonight for possible paracentesis tomorrow, repeat coagulation profile, VA duplex    ENDOCRINE:  Follow up FS.  Insulin protocol if needed. f/u TSH    MUSCULOSKELETAL: Pt/rehab    Disposition: MICU  Code status: Fullcode

## 2021-01-23 NOTE — PROVIDER CONTACT NOTE (OTHER) - SITUATION
patient needs blood cultures drawn. PCT tried, I tried twice . Sr , Dr. Her made aware and stated hat he'd come to  draw the blood.

## 2021-01-23 NOTE — CONSULT NOTE ADULT - ASSESSMENT
75 years old Female with PMhx of ESRD on Tue/Thur/Sat HD, b/l PVD s/p endovascular revascularization, Permanent Afib w/ failed ablation and multiple cardioversion on Eliquis, Aortic stenosis, pulmonary HTN, COPD anemia of chronic disease, CABG, HF with reduced EF and CAD   Admitted with hypotension after HD found to be in septic shock cx by respiratory failure and afib with rvr. Currently pt intubated on mechanical ventillation sedated and on pressors, IV abx with no clear source of infection   GI was consulted for Ascites.   Pt was noted to have ascites even 1 year prior on [facility] abdomen   no PMH of liver disease on CT abd done this admission no cirrhosis+ hepatomegaly  and no splenomegaly but large ascites   AST and ALT elevated , bili and ALP normal . INR elevated, thrombocytopenia noted   S/p diag paracentesis - no evidence of peritonitis , PMN <250     #) Acute transaminitis - possible sec to congested hepatopathy vs others   #) Large Ascites - unknown etiology   Possible sec to cardiac give severe pulm htn, reduced ef with as vs sec to ESRD vs r/o portal hypertension   Rec:  check ascitic fluid albumin,. total protein, LDH, GS and culture   US doppler of hepatic vessels   check viral hepatitis panel   Check CK level   monitor daily LFT and INR   hemodynamic optimization as per primary team    75 years old Female with PMhx of ESRD on Tue/Thur/Sat HD, b/l PVD s/p endovascular revascularization, Permanent Afib w/ failed ablation and multiple cardioversion on Eliquis, Aortic stenosis, pulmonary HTN, COPD anemia of chronic disease, CABG, HF with reduced EF and CAD   Admitted with hypotension after HD found to be in septic shock cx by respiratory failure and afib with rvr. Currently pt intubated on mechanical ventillation sedated and on pressors, IV abx with no clear source of infection   GI was consulted for Ascites.   Pt was noted to have ascites even 1 year prior on [facility] abdomen   no PMH of liver disease on CT abd done this admission no cirrhosis+ hepatomegaly  and no splenomegaly but large ascites   AST and ALT elevated , bili and ALP normal . INR elevated, thrombocytopenia noted   S/p diag paracentesis - no evidence of peritonitis , PMN <250     #) Acute transaminitis - possible sec to Ischemic / congested hepatopathy vs others   #) Large Ascites - unknown etiology   Possible sec to cardiac given severe pulm htn, reduced ef with as vs sec to ESRD vs r/o portal hypertension   Rec:  check ascitic fluid albumin,. total protein, LDH, GS and culture   US doppler of hepatic vessels   check viral hepatitis panel   Check CK level   monitor daily LFT and INR   hemodynamic optimization as per primary team

## 2021-01-23 NOTE — CONSULT NOTE ADULT - SUBJECTIVE AND OBJECTIVE BOX
GI HPI:  Patient is a 75y old  Female who presents with a chief complaint of fall and hypotension, septic shock, ARF,intubation (23 Jan 2021 14:12)  . Patient complaining of       Hospital course:  75 years old Female with PMhx of ESRD on Tue/Thur/Sat HD, b/l PVD s/p endovascular revascularization, Permanent Afib w/ failed ablation and multiple cardioversion on Eliquis, Aortic stenosis, pulmonary HTN, COPD anemia of chronic disease, CABG, HF with reduced EF and CAD Pt presents after fall that happened yesterday at dialysis, pt is on Eliquis.   Collateral history obtained from ED charts. As per the patient she fell to her knees and reports hitting her head at dialysis centre. No LOC or vomiting. Pt was evaluated by the medical team there and was sent home. At the time pt’s fistula was not working but they were able to fix it. Today pt came back to dialysis and received 4 hours of dialysis. At that time it was noted by team members at the dialysis clinic that pt was hypotensive and tachycardic so pt was sent to ED. Denies any fever, cough, chest pain, n/v/d, urinary symptoms or recent weight changes.     In the ED, Pt was found to be hypotensive 67/38, tachycardiac 172, Temp 97.5, was saturating well on 2L NC. Pt was found to be in Atrial fibrillation w/ RVR. Initial trauma workup negative, CT A/P showed Large volume ascites within the abdomen and pelvis. Paratracheal, subcarinal, and right hilar lymphadenopathy is noted Labs were significant for elevated WBC at 14.12, lactate 4.4, Trop elevated at 0.23. Received 750 cc of IV fluid bolus in ED, started on Gavin-synephrine and vasopressin infusion. Cardiology consulted, pt was started on amiodarone and digoxin for afibb w/ RVR. Antibiotics vancomycin and cefepime also administered.    (22 Jan 2021 21:02)      PAST MEDICAL & SURGICAL HISTORY  Swollen feet  ON OCCASSION    HTN (hypertension)    ESRD on hemodialysis  T,T,S    Thyroid nodule    Degenerative joint disease    Osteoarthritis    Diverticulosis    GERD (gastroesophageal reflux disease)    Heart failure with reduced ejection fraction    Pulmonary hypertension, moderate to severe    Aortic stenosis, moderate    Atrial fibrillation  status post cardioversion    MI (myocardial infarction)    CAD (coronary artery disease)    Kidney stone    HTN (hypertension)    S/P ureteral stent placement    History of kidney surgery    S/P cataract surgery    S/P CABG (coronary artery bypass graft)    AV fistula    H/O abdominal hysterectomy    H/O cardiac radiofrequency ablation        FAMILY HISTORY:  FAMILY HISTORY:  Family history of diabetes mellitus in mother    Family history of lung cancer (Father)    Family history of CHF (congestive heart failure) (Mother)        SOCIAL HISTORY:  smoker:   Alcohol:  Drug:    ALLERGIES:  No Known Allergies      MEDICATIONS:  MEDICATIONS  (STANDING):  aMIOdarone Infusion 1 mG/Min (33.3 mL/Hr) IV Continuous <Continuous>  aMIOdarone Infusion 0.5 mG/Min (16.7 mL/Hr) IV Continuous <Continuous>  calcium acetate 667 milliGRAM(s) Oral three times a day with meals  chlorhexidine 4% Liquid 1 Application(s) Topical <User Schedule>  CRRT Treatment    <Continuous>  dexMEDEtomidine Infusion 0.05 MICROgram(s)/kG/Hr (0.9 mL/Hr) IV Continuous <Continuous>  dextrose 5% 1000 milliLiter(s) (75 mL/Hr) IV Continuous <Continuous>  fentaNYL   Infusion. 0.5 MICROgram(s)/kG/Hr (3.62 mL/Hr) IV Continuous <Continuous>  hydrocortisone sodium succinate Injectable 50 milliGRAM(s) IV Push every 8 hours  meropenem  IVPB 500 milliGRAM(s) IV Intermittent every 24 hours  norepinephrine Infusion 0.05 MICROgram(s)/kG/Min (3.14 mL/Hr) IV Continuous <Continuous>  phenylephrine    Infusion 2 MICROgram(s)/kG/Min (25.1 mL/Hr) IV Continuous <Continuous>  PureFlow Dialysate RFP-400 (K 2 / Ca 3) 5000 milliLiter(s) (2000 mL/Hr) CRRT <Continuous>  vancomycin  IVPB 750 milliGRAM(s) IV Intermittent <User Schedule>  vasopressin Infusion 0.04 Unit(s)/Min (2.4 mL/Hr) IV Continuous <Continuous>    MEDICATIONS  (PRN):      HOME MEDICATIONS:  Home Medications:  allopurinol 100 mg oral tablet: 1 tab(s) orally once a day (22 Jan 2021 22:04)  calcium acetate 667 mg oral tablet: 3 tab(s) orally 3 times a day (22 Jan 2021 22:04)  dilTIAZem 30 mg oral tablet: 1 tab(s) orally 3 times a day (22 Jan 2021 22:04)  Eliquis 2.5 mg oral tablet: 1 tab(s) orally 2 times a day (22 Jan 2021 22:04)  famotidine 20 mg oral tablet: 1 tab(s) orally 2 times a day (22 Jan 2021 22:04)  Metoprolol Tartrate 25 mg oral tablet: 1 tab(s) orally 2 times a day (22 Jan 2021 22:04)  Percocet 5 mg-325 mg oral tablet: 1 tab(s) orally every 6 hours (22 Jan 2021 22:04)      ROS:     REVIEW OF SYSTEMS  General:  No fevers  Eyes:  No reported pain   ENT:  No sore throat   NECK: No stiffness   CV:  No chest pain   Resp:  No shortness of breath  GI:  See HPI  :  No dysuria  Muscle:  No weakness  Neuro:  No tingling  Endocrine:  No polyuria  Heme:  No ecchymosis          VITALS:   T(F): 97.1 (01-23 @ 12:00), Max: 97.5 (01-22 @ 11:15)  HR: 110 (01-23 @ 15:00) (71 - 176)  BP: 135/64 (01-22 @ 14:51) (50/37 - 135/64)  BP(mean): 36 (01-22 @ 15:25) (36 - 82)  RR: 25 (01-23 @ 15:00) (15 - 27)  SpO2: 100% (01-23 @ 11:00) (75% - 100%)    I&O's Summary    22 Jan 2021 07:01  -  23 Jan 2021 07:00  --------------------------------------------------------  IN: 2073.7 mL / OUT: 0 mL / NET: 2073.7 mL    23 Jan 2021 07:01  -  23 Jan 2021 15:32  --------------------------------------------------------  IN: 2062.9 mL / OUT: 12 mL / NET: 2050.9 mL        PHYSICAL EXAM:  EYES: No scleral icterus   LUNG: Clear to auscultation bilaterally; No rales, rhonchi, wheezing, or rubs  HEART: RRR; No murmurs  ABDOMEN: Soft, +BS, Abdominal Tenderness, No guarding, No Geronimo Sign   Rectal Exam:     LABS:                        14.0   23.01 )-----------( 159      ( 23 Jan 2021 04:30 )             46.0     PT/INR - ( 23 Jan 2021 08:13 )  INR: 3.04          PTT - ( 23 Jan 2021 08:13 )  PTT:37.5   LIVER FUNCTIONS - ( 23 Jan 2021 03:00 )  Alb: 3.1 g/dL / Pro: 5.8 g/dL / ALK PHOS: 51 U/L / ALT: 105 U/L / AST: 202 U/L / GGT: x           01-23    138  |  96<L>  |  38<H>  ----------------------------<  148<H>  5.0   |  11<L>  |  4.2<HH>    Ca    8.5      23 Jan 2021 03:00  Mg     2.4     01-23      CARDIAC MARKERS ( 23 Jan 2021 04:30 )  x     / 1.06 ng/mL / x     / x     / x      CARDIAC MARKERS ( 22 Jan 2021 10:59 )  x     / 0.23 ng/mL / x     / x     / x          01-23 Chol 140 LDL -- HDL 23<L> Trig 150<H>    Previous EGD:    Previous colonoscopy:       RADIOLOGY:        GI HPI:  Patient is a 75y old  Female who presents with a chief complaint of fall and hypotension, septic shock, ARF,intubation (23 Jan 2021 14:12)  75 years old Female with PMhx of ESRD on Tue/Thur/Sat HD, b/l PVD s/p endovascular revascularization, Permanent Afib w/ failed ablation and multiple cardioversion on Eliquis, Aortic stenosis, pulmonary HTN, COPD anemia of chronic disease, CABG, HF with reduced EF and CAD   Admitted with hypotension after HD found to be in septic shock cx by respiratory failure and afib with rvr. Currently pt intubated on mechanical ventillation sedated and on pressors, IV abx with no clear source of infection   GI was consulted for Ascites.   Pt was noted to have ascites even 1 year prior on [facility] abdomen   no PMH of liver disease on CT abd done this admission no cirrhosis+ hepatomegaly  and no splenomegaly but large ascites   AST and ALT elevated , bili and ALP normal . INR elevated, thrombocytopenia noted   S/p diag paracentesis - no evidence of peritonitis , PMN < 250         PAST MEDICAL & SURGICAL HISTORY  Swollen feet  ON OCCASSION    HTN (hypertension)    ESRD on hemodialysis  T,T,S    Thyroid nodule    Degenerative joint disease    Osteoarthritis    Diverticulosis    GERD (gastroesophageal reflux disease)    Heart failure with reduced ejection fraction    Pulmonary hypertension, moderate to severe    Aortic stenosis, moderate    Atrial fibrillation  status post cardioversion    MI (myocardial infarction)    CAD (coronary artery disease)    Kidney stone    HTN (hypertension)    S/P ureteral stent placement    History of kidney surgery    S/P cataract surgery    S/P CABG (coronary artery bypass graft)    AV fistula    H/O abdominal hysterectomy    H/O cardiac radiofrequency ablation        FAMILY HISTORY:  FAMILY HISTORY:  Family history of diabetes mellitus in mother    Family history of lung cancer (Father)    Family history of CHF (congestive heart failure) (Mother)        SOCIAL HISTORY:  smoker: not smoker  Alcohol: not alcoholic   Drug: no drug abuse     ALLERGIES:  No Known Allergies      MEDICATIONS:  MEDICATIONS  (STANDING):  aMIOdarone Infusion 1 mG/Min (33.3 mL/Hr) IV Continuous <Continuous>  aMIOdarone Infusion 0.5 mG/Min (16.7 mL/Hr) IV Continuous <Continuous>  calcium acetate 667 milliGRAM(s) Oral three times a day with meals  chlorhexidine 4% Liquid 1 Application(s) Topical <User Schedule>  CRRT Treatment    <Continuous>  dexMEDEtomidine Infusion 0.05 MICROgram(s)/kG/Hr (0.9 mL/Hr) IV Continuous <Continuous>  dextrose 5% 1000 milliLiter(s) (75 mL/Hr) IV Continuous <Continuous>  fentaNYL   Infusion. 0.5 MICROgram(s)/kG/Hr (3.62 mL/Hr) IV Continuous <Continuous>  hydrocortisone sodium succinate Injectable 50 milliGRAM(s) IV Push every 8 hours  meropenem  IVPB 500 milliGRAM(s) IV Intermittent every 24 hours  norepinephrine Infusion 0.05 MICROgram(s)/kG/Min (3.14 mL/Hr) IV Continuous <Continuous>  phenylephrine    Infusion 2 MICROgram(s)/kG/Min (25.1 mL/Hr) IV Continuous <Continuous>  PureFlow Dialysate RFP-400 (K 2 / Ca 3) 5000 milliLiter(s) (2000 mL/Hr) CRRT <Continuous>  vancomycin  IVPB 750 milliGRAM(s) IV Intermittent <User Schedule>  vasopressin Infusion 0.04 Unit(s)/Min (2.4 mL/Hr) IV Continuous <Continuous>    MEDICATIONS  (PRN):      HOME MEDICATIONS:  Home Medications:  allopurinol 100 mg oral tablet: 1 tab(s) orally once a day (22 Jan 2021 22:04)  calcium acetate 667 mg oral tablet: 3 tab(s) orally 3 times a day (22 Jan 2021 22:04)  dilTIAZem 30 mg oral tablet: 1 tab(s) orally 3 times a day (22 Jan 2021 22:04)  Eliquis 2.5 mg oral tablet: 1 tab(s) orally 2 times a day (22 Jan 2021 22:04)  famotidine 20 mg oral tablet: 1 tab(s) orally 2 times a day (22 Jan 2021 22:04)  Metoprolol Tartrate 25 mg oral tablet: 1 tab(s) orally 2 times a day (22 Jan 2021 22:04)  Percocet 5 mg-325 mg oral tablet: 1 tab(s) orally every 6 hours (22 Jan 2021 22:04)      ROS:     REVIEW OF SYSTEMS  unobtainable sec to pt condition          VITALS:   T(F): 97.1 (01-23 @ 12:00), Max: 97.5 (01-22 @ 11:15)  HR: 110 (01-23 @ 15:00) (71 - 176)  BP: 135/64 (01-22 @ 14:51) (50/37 - 135/64)  BP(mean): 36 (01-22 @ 15:25) (36 - 82)  RR: 25 (01-23 @ 15:00) (15 - 27)  SpO2: 100% (01-23 @ 11:00) (75% - 100%)    I&O's Summary    22 Jan 2021 07:01  -  23 Jan 2021 07:00  --------------------------------------------------------  IN: 2073.7 mL / OUT: 0 mL / NET: 2073.7 mL    23 Jan 2021 07:01  -  23 Jan 2021 15:32  --------------------------------------------------------  IN: 2062.9 mL / OUT: 12 mL / NET: 2050.9 mL        PHYSICAL EXAM:  Gen: intubated and sedated   EYES: No scleral icterus   LUNG: Clear to auscultation bilaterally;   HEART: s1 and s2 heard   ABDOMEN: Soft, +BS, + abd distension, no Abdominal Tenderness, No guarding, No Geronimo Sign   Neuro: sedated   Ext: trace edema     LABS:                        14.0   23.01 )-----------( 159      ( 23 Jan 2021 04:30 )             46.0     PT/INR - ( 23 Jan 2021 08:13 )  INR: 3.04          PTT - ( 23 Jan 2021 08:13 )  PTT:37.5   LIVER FUNCTIONS - ( 23 Jan 2021 03:00 )  Alb: 3.1 g/dL / Pro: 5.8 g/dL / ALK PHOS: 51 U/L / ALT: 105 U/L / AST: 202 U/L / GGT: x           01-23    138  |  96<L>  |  38<H>  ----------------------------<  148<H>  5.0   |  11<L>  |  4.2<HH>    Ca    8.5      23 Jan 2021 03:00  Mg     2.4     01-23      CARDIAC MARKERS ( 23 Jan 2021 04:30 )  x     / 1.06 ng/mL / x     / x     / x      CARDIAC MARKERS ( 22 Jan 2021 10:59 )  x     / 0.23 ng/mL / x     / x     / x          01-23 Chol 140 LDL -- HDL 23<L> Trig 150<H>      RADIOLOGY:  < from: CT Abdomen and Pelvis w/ IV Cont (01.22.21 @ 17:41) >  FINDINGS ABDOMEN AND PELVIS:    HEPATIC: The liver is slightly enlarged measuring 22.5 cm in length. No evidence of mass or bile duct dilatation.    BILIARY: Dependent density within the gallbladder consistent with stones and/or sludge.    SPLEEN: Unremarkable.    PANCREAS: The pancreas is mildly atrophic. No evidence of mass or pancreatitis.    ADRENAL GLANDS: Unremarkable.    KIDNEYS: Atrophic bilateral kidneys with extensive vascular calcifications. Nonobstructing left renal stones. No hydronephrosis. Small left renal cysts again noted.    ABDOMINOPELVIC NODES: Unremarkable.    PELVIC ORGANS: No evidence of pelvic mass, lymphadenopathy, or fluid collection.    PERITONEUM/MESENTERY/BOWEL: Large volume ascites within the abdomen and pelvis. No evidence of bowel obstruction. No pneumoperitoneum.    BONES/SOFT TISSUES: Degenerative changes of the spine are noted. No acute fractures.    OTHER: Aortoiliac calcifications are noted with no evidence of abdominal aortic aneurysm. Dense vascular calcifications are noted.      IMPRESSION:    No evidence of intra-thoracic, abdominal or pelvic visceral laceration or hematoma.  No acute fractures are identified.    Large volume ascites within the abdomen and pelvis.  Paratracheal, subcarinal, and right hilar lymphadenopathy is noted,      < end of copied text >

## 2021-01-24 NOTE — PROGRESS NOTE ADULT - ASSESSMENT
IMPRESSION:  Sepsis POA  septic shock  hypotension with hypoperfusion  mediastinal adenopathy  R heart failure  atrial fibrillation with RVR  ascites  ? ischemic bowel  chronic renal failure      Suggest:  titrate pressors and wean as tolerated  keep sedated  continue empiric antibiotics  full cultures  diagnostic paracentesis  f/u culture results  GI evaluation  frequent clinical evaluation of signs of perfusion  maintain BP >110  accurate measures of I/O  RRT per renal  the patient remains unstable in critical condition despite maximal therapies        The patient is critically ill with a high probability of imminent or life threatening deterioration.

## 2021-01-24 NOTE — PROGRESS NOTE ADULT - SUBJECTIVE AND OBJECTIVE BOX
SUBJECTIVE:    Patient is a 75y old Female who presents with a chief complaint of fall and hypotension (24 Jan 2021 08:18)    Currently admitted to CCU with the primary diagnosis of septic shock     Today is hospital day 3d. Patient seen and examined at bedside today.    PAST MEDICAL & SURGICAL HISTORY  Swollen feet  ON OCCASSION    HTN (hypertension)    ESRD on hemodialysis  T,T,S    Thyroid nodule    Degenerative joint disease    Osteoarthritis    Diverticulosis    GERD (gastroesophageal reflux disease)    Heart failure with reduced ejection fraction    Pulmonary hypertension, moderate to severe    Aortic stenosis, moderate    Atrial fibrillation  status post cardioversion    MI (myocardial infarction)    CAD (coronary artery disease)    Kidney stone    HTN (hypertension)    S/P ureteral stent placement    History of kidney surgery    S/P cataract surgery    S/P CABG (coronary artery bypass graft)    AV fistula    H/O abdominal hysterectomy    H/O cardiac radiofrequency ablation      SOCIAL HISTORY:  Negative for smoking/alcohol/drug use.     ALLERGIES:  No Known Allergies    MEDICATIONS:  STANDING MEDICATIONS  aMIOdarone Infusion 1 mG/Min IV Continuous <Continuous>  aMIOdarone Infusion 0.501 mG/Min IV Continuous <Continuous>  calcium acetate 667 milliGRAM(s) Oral three times a day with meals  chlorhexidine 0.12% Liquid 15 milliLiter(s) Oral Mucosa two times a day  chlorhexidine 4% Liquid 1 Application(s) Topical <User Schedule>  CRRT Treatment    <Continuous>  dexMEDEtomidine Infusion 0.05 MICROgram(s)/kG/Hr IV Continuous <Continuous>  dextrose 5% 1000 milliLiter(s) IV Continuous <Continuous>  fentaNYL   Infusion. 0.501 MICROgram(s)/kG/Hr IV Continuous <Continuous>  hydrocortisone sodium succinate Injectable 50 milliGRAM(s) IV Push every 8 hours  meropenem  IVPB 500 milliGRAM(s) IV Intermittent every 24 hours  norepinephrine Infusion 0.05 MICROgram(s)/kG/Min IV Continuous <Continuous>  phenylephrine    Infusion 2 MICROgram(s)/kG/Min IV Continuous <Continuous>  PureFlow Dialysate RFP-400 (K 2 / Ca 3) 5000 milliLiter(s) CRRT <Continuous>  vancomycin  IVPB 1000 milliGRAM(s) IV Intermittent every 24 hours  vancomycin  IVPB      vasopressin Infusion 0.04 Unit(s)/Min IV Continuous <Continuous>    PRN MEDICATIONS    VITALS:   T(F): 97.2  HR: 118  BP: 127/53  RR: 24  SpO2: 100%    LABS:                        14.1   17.81 )-----------( 104      ( 24 Jan 2021 04:30 )             44.3     01-24    131<L>  |  94<L>  |  33<H>  ----------------------------<  159<H>  4.8   |  13<L>  |  2.8<H>    Ca    9.4      24 Jan 2021 04:30  Mg     2.0     01-24    TPro  7.0  /  Alb  3.4<L>  /  TBili  1.2  /  DBili  x   /  AST  1510<H>  /  ALT  1036<H>  /  AlkPhos  81  01-24    PT/INR - ( 24 Jan 2021 04:30 )   PT: >40.00 sec;   INR: 4.05 ratio         PTT - ( 24 Jan 2021 04:30 )  PTT:37.2 sec    ABG - ( 24 Jan 2021 03:19 )  pH, Arterial: 7.25  pH, Blood: x     /  pCO2: 33    /  pO2: 166   / HCO3: 14    / Base Excess: -11.4 /  SaO2: 99                    Culture - Body Fluid with Gram Stain (collected 23 Jan 2021 14:46)  Source: Peritoneal Peritoneal Fluid  Gram Stain (23 Jan 2021 20:06):    polymorphonuclear leukocytes seen    No organisms seen    by cytocentrifuge    Culture - Blood (collected 22 Jan 2021 21:10)  Source: .Blood Blood-Peripheral  Preliminary Report (24 Jan 2021 03:10):    No growth to date.    Culture - Blood (collected 22 Jan 2021 21:10)  Source: .Blood Blood-Peripheral  Preliminary Report (24 Jan 2021 03:10):    No growth to date.      CARDIAC MARKERS ( 23 Jan 2021 04:30 )  x     / 1.06 ng/mL / x     / x     / x          RADIOLOGY:    PHYSICAL EXAM:  GEN: No acute distress  LUNGS: Clear to auscultation bilaterally   HEART: S1/S2 present. RRR.   ABD: Soft, non-tender, non-distended. Bowel sounds present  EXT: NC/NC/NE/2+PP/BARBA/Skin Intact.   NEURO: AAOX3    Intravenous access:   NG tube:   Lubin Catheter:   Indwelling Urethral Catheter:     Connect To:  Straight Drainage/Gravity    Indication:  Urine Output Monitoring in Critically Ill (01-24-21 @ 05:46) (not performed) [Active]  Indwelling Urethral Catheter:     Connect To:  Straight Drainage/Gravity    Indication:  Urine Output Monitoring in Critically Ill (01-23-21 @ 12:33) (not performed) [Active]      6 click score:       SUBJECTIVE:    Patient is a 75y old Female who presents with a chief complaint of fall and hypotension (24 Jan 2021 08:18)    Currently admitted to CCU with the primary diagnosis of septic shock     Today is hospital day 3d. Patient seen and examined at bedside today. On mechanical ventilation. On multiple pressors.    PAST MEDICAL & SURGICAL HISTORY  Swollen feet  ON OCCASSION    HTN (hypertension)    ESRD on hemodialysis  T,T,S    Thyroid nodule    Degenerative joint disease    Osteoarthritis    Diverticulosis    GERD (gastroesophageal reflux disease)    Heart failure with reduced ejection fraction    Pulmonary hypertension, moderate to severe    Aortic stenosis, moderate    Atrial fibrillation  status post cardioversion    MI (myocardial infarction)    CAD (coronary artery disease)    Kidney stone    HTN (hypertension)    S/P ureteral stent placement    History of kidney surgery    S/P cataract surgery    S/P CABG (coronary artery bypass graft)    AV fistula    H/O abdominal hysterectomy    H/O cardiac radiofrequency ablation      SOCIAL HISTORY:  Negative for smoking/alcohol/drug use.     ALLERGIES:  No Known Allergies    MEDICATIONS:  STANDING MEDICATIONS  aMIOdarone Infusion 1 mG/Min IV Continuous <Continuous>  aMIOdarone Infusion 0.501 mG/Min IV Continuous <Continuous>  calcium acetate 667 milliGRAM(s) Oral three times a day with meals  chlorhexidine 0.12% Liquid 15 milliLiter(s) Oral Mucosa two times a day  chlorhexidine 4% Liquid 1 Application(s) Topical <User Schedule>  CRRT Treatment    <Continuous>  dexMEDEtomidine Infusion 0.05 MICROgram(s)/kG/Hr IV Continuous <Continuous>  dextrose 5% 1000 milliLiter(s) IV Continuous <Continuous>  fentaNYL   Infusion. 0.501 MICROgram(s)/kG/Hr IV Continuous <Continuous>  hydrocortisone sodium succinate Injectable 50 milliGRAM(s) IV Push every 8 hours  meropenem  IVPB 500 milliGRAM(s) IV Intermittent every 24 hours  norepinephrine Infusion 0.05 MICROgram(s)/kG/Min IV Continuous <Continuous>  phenylephrine    Infusion 2 MICROgram(s)/kG/Min IV Continuous <Continuous>  PureFlow Dialysate RFP-400 (K 2 / Ca 3) 5000 milliLiter(s) CRRT <Continuous>  vancomycin  IVPB 1000 milliGRAM(s) IV Intermittent every 24 hours  vancomycin  IVPB      vasopressin Infusion 0.04 Unit(s)/Min IV Continuous <Continuous>    PRN MEDICATIONS    VITALS:   T(F): 97.2  HR: 118  BP: 127/53  RR: 24  SpO2: 100%    LABS:                        14.1   17.81 )-----------( 104      ( 24 Jan 2021 04:30 )             44.3     01-24    131<L>  |  94<L>  |  33<H>  ----------------------------<  159<H>  4.8   |  13<L>  |  2.8<H>    Ca    9.4      24 Jan 2021 04:30  Mg     2.0     01-24    TPro  7.0  /  Alb  3.4<L>  /  TBili  1.2  /  DBili  x   /  AST  1510<H>  /  ALT  1036<H>  /  AlkPhos  81  01-24    PT/INR - ( 24 Jan 2021 04:30 )   PT: >40.00 sec;   INR: 4.05 ratio         PTT - ( 24 Jan 2021 04:30 )  PTT:37.2 sec    ABG - ( 24 Jan 2021 03:19 )  pH, Arterial: 7.25  pH, Blood: x     /  pCO2: 33    /  pO2: 166   / HCO3: 14    / Base Excess: -11.4 /  SaO2: 99                    Culture - Body Fluid with Gram Stain (collected 23 Jan 2021 14:46)  Source: Peritoneal Peritoneal Fluid  Gram Stain (23 Jan 2021 20:06):    polymorphonuclear leukocytes seen    No organisms seen    by cytocentrifuge    Culture - Blood (collected 22 Jan 2021 21:10)  Source: .Blood Blood-Peripheral  Preliminary Report (24 Jan 2021 03:10):    No growth to date.    Culture - Blood (collected 22 Jan 2021 21:10)  Source: .Blood Blood-Peripheral  Preliminary Report (24 Jan 2021 03:10):    No growth to date.      CARDIAC MARKERS ( 23 Jan 2021 04:30 )  x     / 1.06 ng/mL / x     / x     / x          PHYSICAL EXAM:  GEN: No acute distress  LUNGS: on mechanical ventilation  HEART: S1/S2 present, tachycardic  ABD: Soft, non-tender, non-distended  EXT: trace LE edema   NEURO: sedated    Intravenous access:   NG tube:   Lubin Catheter:   Indwelling Urethral Catheter:     Connect To:  Straight Drainage/Gravity    Indication:  Urine Output Monitoring in Critically Ill (01-24-21 @ 05:46) (not performed) [Active]  Indwelling Urethral Catheter:     Connect To:  Straight Drainage/Gravity    Indication:  Urine Output Monitoring in Critically Ill (01-23-21 @ 12:33) (not performed) [Active]      6 click score:

## 2021-01-24 NOTE — PROGRESS NOTE ADULT - ASSESSMENT
75 years old Female with PMhx of ESRD on Tue/Thur/Sat HD, b/l PVD s/p endovascular revascularization, Permanent Afib w/ failed ablation and multiple cardioversion on Eliquis, Aortic stenosis, pulmonary HTN, COPD anemia of chronic disease, CABG, HF with reduced EF and CAD presented after fall. Sepsis present on admission. Found to be in A-fib with RVR on admission. Large volume ascites present on admission s/p paracentesis. Admitted to CCU with septic shock 75 years old Female with PMhx of ESRD on Tue/Thur/Sat HD, b/l PVD s/p endovascular revascularization, Permanent Afib w/ failed ablation and multiple cardioversion on Eliquis, Aortic stenosis, pulmonary HTN, COPD anemia of chronic disease, CABG, HF with reduced EF and CAD presented after fall. Sepsis present on admission. Found to be in A-fib with RVR on admission. Large volume ascites present on admission s/p paracentesis. Admitted to CCU with shock with MSOF with severe metabolic acidosis/lactic acidosis on pressors. On CVVHD.    #Shock with MSOF with severe metabolic acidosis/lactic acidosis on pressors  - S/p emergent right fem [place] placement for CVVHD  - No obvious focus of infection  - C/w vancomycin and meropenem for now, as per ID  - Blood cultures show no growth to date    #Acute transaminitis  #Ascites  - Possibly secondary to ischemic/congestive hepatopathy  - S/p paracentesis  - Ascitic fluid results noted  - F/u ascitic fluid cultures until final  - Check ascitic fluid albumin, total protein, LDH, GS and culture   - US doppler of hepatic vessels   - check viral hepatitis panel   - Check CK level   - monitor daily LFT and INR     #Mediastinal adenopathy    #Right heart failure  - F/u TTE  - Avoid volume overload    #Atrial fibrillation with RVR  -            75 years old Female with PMhx of ESRD on Tue/Thur/Sat HD, b/l PVD s/p endovascular revascularization, Permanent Afib w/ failed ablation and multiple cardioversion on Eliquis, Aortic stenosis, pulmonary HTN, COPD anemia of chronic disease, CABG, HF with reduced EF and CAD presented after fall. Sepsis present on admission. Found to be in A-fib with RVR on admission. Large volume ascites present on admission s/p paracentesis. Admitted to CCU with shock with MSOF with severe metabolic acidosis/lactic acidosis on pressors. On CVVHD.    #Shock with MSOF with severe metabolic acidosis/lactic acidosis on pressors  - Intubated and sedated  - On multiple pressors  - S/p emergent right fem [place] placement for CVVHD  - No obvious focus of infection  - C/w vancomycin and meropenem for now, as per ID  - C/w D5W with NaHCO3  - Blood cultures show no growth to date    #Acute transaminitis  #Ascites  - Possibly secondary to ischemic/congestive hepatopathy  - S/p paracentesis  - Ascitic fluid results noted  - F/u ascitic fluid cultures until final  - Check ascitic fluid albumin, total protein, LDH, GS and culture   - US doppler of hepatic vessels   - check viral hepatitis panel   - Check CK level   - monitor daily LFT and INR     #Mediastinal adenopathy  - Further evaluation if condition becomes more stable    #Right heart failure  - F/u TTE  - Avoid volume overload    #Atrial fibrillation with RVR  - On amiodarone drip    #Misc  Diet: NPO  DVT PPx: on hold due to elevated INR  Dispo: remain in CCU           75 years old Female with PMhx of ESRD on Tue/Thur/Sat HD, b/l PVD s/p endovascular revascularization, Permanent Afib w/ failed ablation and multiple cardioversion on Eliquis, Aortic stenosis, pulmonary HTN, COPD anemia of chronic disease, CABG, HF with reduced EF and CAD presented after fall. Sepsis present on admission. Found to be in A-fib with RVR on admission. Large volume ascites present on admission s/p paracentesis. Admitted to CCU with shock with MSOF with severe metabolic acidosis/lactic acidosis on pressors. On CVVHD.    #Shock with MSOF with severe metabolic acidosis/lactic acidosis on pressors  - Intubated and sedated  - On multiple pressors  - S/p emergent right fem [place] placement for CVVHD  - No obvious focus of infection  - C/w vancomycin and meropenem for now, as per ID  - C/w D5W with NaHCO3  - Blood cultures show no growth to date    #Acute transaminitis  #Ascites  - Possibly secondary to ischemic/congestive hepatopathy  - S/p paracentesis  - Ascitic fluid results noted  - F/u ascitic fluid cultures until final   - US doppler of hepatic vessels   - check viral hepatitis panel   - Check CK level   - monitor daily LFT and INR     #Mediastinal adenopathy  - Further evaluation if condition becomes more stable    #Right heart failure  - F/u TTE  - Avoid volume overload    #Atrial fibrillation with RVR  - On amiodarone drip    #Misc  Diet: NPO  DVT PPx: on hold due to elevated INR  Dispo: remain in CCU

## 2021-01-24 NOTE — PROGRESS NOTE ADULT - ASSESSMENT
Elderly WF with multiple complicated medical issues inc ESRD on HD ad with hypotension, tachycardia and RF,lactic acidosis,  intubated, on vasopressors, IV resuscitation and IV Abx.  The pt underwent dx paracentesis for large ascites.  The pt is in CCU and ff by pul-critical care, cardio,  Renal and ID.    Septic Shock, SIRS, MSOF, Lactic Acidosis,Metabolic Acidosis  Acute RF, sp intubation  Afib with RVR  Acute Transaminitis due to shock liver  Large Volume Ascites, R/O SBP  Sp recent fall  ESRD, HD  Hx of HTN, ASHD, CAD, old MI, SCABG, afib (faile cardioversions and ablation), AS, SYS and VAL CHF, PUL HTN  Hx of COPD, ex tobacco use  Hx of DLD  Hx of PVD sp aa stents  Hx of GERD, HH, Diverticulosis  Hx of OA, DDD, DJD    pt in CCU, afebrile today, AWBC 23 to 17, drop in Plts to 104, now with acutely elevated AST/ALT due to shocked liver   pt critically ill with ARF, MSOF, lactic acidosis ans metabolic acidosis  pt intubated, remains on vasopressors, cultured and placed on Abx, underwent dx paracentesis (35cc straw like fluid removed0  Imaging studies reviewed and show no acute pathology or source of def infection, except for large volume ascites    Pul-critical care  ID:  Vanco and meropenem renal doses  Cardio: started amiodarone, digoxin  Renal, R groin [place] placed today,   monitor CBC, electrolytes, LA, check procal, check fungitell  pt is negative for MRSA, negative Covid  guarded state Elderly WF with multiple complicated medical issues inc ESRD on HD ad with hypotension, tachycardia and RF,lactic acidosis,  intubated, on vasopressors, IV resuscitation and IV Abx.  The pt underwent dx paracentesis for large ascites.  The pt is in CCU and ff by pul-critical care, cardio,  Renal and ID.    Septic Shock, SIRS, MSOF, Lactic Acidosis,Metabolic Acidosis  Acute RF, sp intubation  Afib with RVR  Acute Transaminitis due to shock liver  Large Volume Ascites, R/O SBP  Sp recent fall  ESRD, HD  Hx of HTN, ASHD, CAD, old MI, SCABG, afib (faile cardioversions and ablation), AS, SYS and VAL CHF, PUL HTN  Hx of COPD, ex tobacco use  Hx of DLD  Hx of PVD sp aa stents  Hx of GERD, HH, Diverticulosis  Hx of OA, DDD, DJD    pt in CCU, afebrile today, AWBC 23 to 17, drop in Plts to 104, now with acutely elevated AST/ALT due to shocked liver, elevated INR 4.05  pt critically ill with ARF, MSOF, lactic acidosis ans metabolic acidosis  pt intubated, remains on vasopressors, cultured and placed on Abx, underwent dx paracentesis (35cc straw like fluid removed0  Imaging studies reviewed and show no acute pathology or source of def infection, except for large volume ascites    Pul-critical care  ID:  Vanco and meropenem renal doses  Cardio: started amiodarone, digoxin  Renal, R groin [place] placed today,   monitor CBC, electrolytes, LA, check procal, check fungitell  pt is negative for MRSA, negative Covid  guarded state

## 2021-01-24 NOTE — PROGRESS NOTE ADULT - SUBJECTIVE AND OBJECTIVE BOX
Patient is a 75y old  Female who presents with a chief complaint of fall and hypotension (23 Jan 2021 16:22)        HPI:  75 years old Female with PMhx of ESRD on Tue/Thur/Sat HD, b/l PVD s/p endovascular revascularization, Permanent Afib w/ failed ablation and multiple cardioversion on Eliquis, Aortic stenosis, pulmonary HTN, COPD anemia of chronic disease, CABG, HF with reduced EF and CAD Pt presents after fall that happened yesterday at dialysis, pt is on Eliquis.   Collateral history obtained from ED charts. As per the patient she fell to her knees and reports hitting her head at dialysis centre. No LOC or vomiting. Pt was evaluated by the medical team there and was sent home. At the time pt’s fistula was not working but they were able to fix it. Today pt came back to dialysis and received 4 hours of dialysis. At that time it was noted by team members at the dialysis clinic that pt was hypotensive and tachycardic so pt was sent to ED. Denies any fever, cough, chest pain, n/v/d, urinary symptoms or recent weight changes.     In the ED, Pt was found to be hypotensive 67/38, tachycardiac 172, Temp 97.5, was saturating well on 2L NC. Pt was found to be in Atrial fibrillation w/ RVR. Initial trauma workup negative, CT A/P showed Large volume ascites within the abdomen and pelvis. Paratracheal, subcarinal, and right hilar lymphadenopathy is noted Labs were significant for elevated WBC at 14.12, lactate 4.4, Trop elevated at 0.23. Received 750 cc of IV fluid bolus in ED, started on Gavin-synephrine and vasopressin infusion. Cardiology consulted, pt was started on amiodarone and digoxin for afibb w/ RVR. Antibiotics vancomycin and cefepime also administered.    (22 Jan 2021 21:02)    Pt evaluated on AM rounds.  Interval Events: No overnight events.    REVIEW OF SYSTEMS:   see HPI      OBJECTIVE:  ICU Vital Signs Last 24 Hrs  T(C): 36.2 (24 Jan 2021 08:00), Max: 36.6 (23 Jan 2021 20:00)  T(F): 97.2 (24 Jan 2021 08:00), Max: 97.9 (23 Jan 2021 20:00)  HR: 114 (24 Jan 2021 08:00) (71 - 140)  BP: 127/53 (23 Jan 2021 22:00) (127/53 - 127/53)  BP(mean): 73 (23 Jan 2021 22:00) (73 - 73)  ABP: 130/60 (24 Jan 2021 08:00) (86/42 - 136/62)  ABP(mean): 84 (24 Jan 2021 08:00) (58 - 104)  RR: 22 (24 Jan 2021 08:00) (11 - 29)  SpO2: 99% (24 Jan 2021 05:00) (75% - 100%)    Mode: AC/ CMV (Assist Control/ Continuous Mandatory Ventilation), RR (machine): 14, TV (machine): 450, FiO2: 80, PEEP: 5, ITime: 1, MAP: 14, PIP: 31    01-23 @ 07:01 - 01-24 @ 07:00  --------------------------------------------------------  IN: 5785.4 mL / OUT: 2532 mL / NET: 3253.4 mL    01-24 @ 07:01 - 01-24 @ 08:18  --------------------------------------------------------  IN: 297.9 mL / OUT: 223 mL / NET: 74.9 mL      CAPILLARY BLOOD GLUCOSE      POCT Blood Glucose.: 141 mg/dL (23 Jan 2021 08:25)        PHYSICAL EXAM:     · CONSTITUTIONAL:   septic appearing,   well nourished,   NAD    · ENMT:   Airway patent,   Nasal mucosa clear.  Mouth with normal mucosa.   No thrush    · EYES:   Clear bilaterally,   pupils equal,   round and reactive to light.    · CARDIAC:   Normal rate,   regular rhythm.    Heart sounds S1, S2.   No murmurs, no rubs or gallops on auscultation  no edema        CAROTID:   normal systolic impulse  no bruits    · RESPIRATORY:   rales  normal chest expansion    no retractions or use of accessory muscles  palpation of chest is normal with no fremitus  percussion of chest demonstrates no hyperresonance or dullness    · GASTROINTESTINAL:  Abdomen soft,   non-tender,   + BS  liver/spleen not palpable    · MUSCULOSKELETAL:   no clubbing, cyanosis      · NEUROLOGICAL:   Unavailable as the patient is sedated.        · SKIN:   Skin normal color for race,   warm, dry   No evidence of rash.    · PSYCHIATRIC:   Unable to obtain due to patient's condition.        · HEME LYMPH:   no splenomegaly.  No cervical  lymphadenopathy.  no inguinal lymphadenopathy    HOSPITAL MEDICATIONS:  MEDICATIONS  (STANDING):  aMIOdarone Infusion 1 mG/Min (33.3 mL/Hr) IV Continuous <Continuous>  aMIOdarone Infusion 0.501 mG/Min (16.7 mL/Hr) IV Continuous <Continuous>  calcium acetate 667 milliGRAM(s) Oral three times a day with meals  chlorhexidine 0.12% Liquid 15 milliLiter(s) Oral Mucosa two times a day  chlorhexidine 4% Liquid 1 Application(s) Topical <User Schedule>  CRRT Treatment    <Continuous>  dexMEDEtomidine Infusion 0.05 MICROgram(s)/kG/Hr (0.9 mL/Hr) IV Continuous <Continuous>  dextrose 5% 1000 milliLiter(s) (75 mL/Hr) IV Continuous <Continuous>  fentaNYL   Infusion. 0.501 MICROgram(s)/kG/Hr (3.62 mL/Hr) IV Continuous <Continuous>  hydrocortisone sodium succinate Injectable 50 milliGRAM(s) IV Push every 8 hours  meropenem  IVPB 500 milliGRAM(s) IV Intermittent every 24 hours  norepinephrine Infusion 0.05 MICROgram(s)/kG/Min (3.14 mL/Hr) IV Continuous <Continuous>  phenylephrine    Infusion 2 MICROgram(s)/kG/Min (25.1 mL/Hr) IV Continuous <Continuous>  PureFlow Dialysate RFP-400 (K 2 / Ca 3) 5000 milliLiter(s) (2000 mL/Hr) CRRT <Continuous>  vancomycin  IVPB 1000 milliGRAM(s) IV Intermittent every 24 hours  vancomycin  IVPB      vasopressin Infusion 0.04 Unit(s)/Min (2.4 mL/Hr) IV Continuous <Continuous>    MEDICATIONS  (PRN):    lactated ringers Bolus:   1000 milliLiter(s), IV Bolus, once, infuse over 15 Minute(s), Stop After 1 Doses  lactated ringers Bolus:   1000 milliLiter(s), IV Bolus, once, infuse over 60 Minute(s), Stop After 1 Doses  lactated ringers.: Solution, 1000 milliLiter(s) infuse at 75 mL/Hr  sodium chloride 0.9% Bolus:   500 milliLiter(s), IV Bolus, once, infuse over 60 Minute(s), Stop After 1 Doses  Provider's Contact #: [phone]      LABS:                        14.1   17.81 )-----------( 104      ( 24 Jan 2021 04:30 )             44.3     01-24    131<L>  |  94<L>  |  33<H>  ----------------------------<  159<H>  4.8   |  13<L>  |  2.8<H>    Ca    9.4      24 Jan 2021 04:30  Mg     2.0     01-24    TPro  7.0  /  Alb  3.4<L>  /  TBili  1.2  /  DBili  x   /  AST  1510<H>  /  ALT  1036<H>  /  AlkPhos  81  01-24    PT/INR - ( 23 Jan 2021 08:13 )   PT: 35.00 sec;   INR: 3.04 ratio         PTT - ( 23 Jan 2021 08:13 )  PTT:37.5 sec    Arterial Blood Gas:  01-24 @ 03:19  7.25/33/166/14/99/-11.4  ABG lactate: --  Arterial Blood Gas:  01-23 @ 13:02  7.22/28/216/12/99/-14.5  ABG lactate: --  Arterial Blood Gas:  01-23 @ 08:45  7.02/45/80/12/90/-19.2  ABG lactate: --  Arterial Blood Gas:  01-23 @ 04:30  7.10/35/102/-19/95/--  ABG lactate: --  Arterial Blood Gas:  01-23 @ 03:08  6.92/39/258/8/99/-24.0  ABG lactate: --    Venous Blood Gas:  01-22 @ 11:34  7.41/31/189/20/98  VBG Lactate: 3.5    CARDIAC MARKERS ( 23 Jan 2021 04:30 )  x     / 1.06 ng/mL / x     / x     / x      CARDIAC MARKERS ( 22 Jan 2021 10:59 )  x     / 0.23 ng/mL / x     / x     / x            Mode: AC/ CMV (Assist Control/ Continuous Mandatory Ventilation)  RR (machine): 14  TV (machine): 450  FiO2: 80  PEEP: 5  ITime: 1  MAP: 14  PIP: 31      ABG - ( 24 Jan 2021 03:19 )  pH, Arterial: 7.25  pH, Blood: x     /  pCO2: 33    /  pO2: 166   / HCO3: 14    / Base Excess: -11.4 /  SaO2: 99                  RADIOLOGY: I personally reviewed latest CXR and other pertinent films.

## 2021-01-24 NOTE — PROGRESS NOTE ADULT - ASSESSMENT
75 years old Female with PMhx of ESRD on Tue/Thur/Sat HD, b/l PVD s/p endovascular revascularization, Permanent Afib w/ failed ablation and multiple cardioversion on Eliquis, Aortic stenosis, pulmonary HTN, COPD anemia of chronic disease, CABG, HF with reduced EF and CAD Pt presents after fall     ·	ESRD on HD / on multiple pressors / severe acidemia / lactic acidosis / on CVVHD, even balance  ·	check daily phosphorus level  ·	dose vanc by level daily  ·	hyponatremic, avoid hypotonic infusions  ·	Hypotension shock/ etiology? septic? on vancomycin/meropenem / follow cx / keep MAP > 65   ·	ascites sp paracentesis f/u cx  ·	respiratory failure on MV   ·	prognosis poor

## 2021-01-24 NOTE — PROGRESS NOTE ADULT - SUBJECTIVE AND OBJECTIVE BOX
DIAGNOSIS:   HOSPITAL DAY #:    STATUS POST:    POST OPERATIVE DAY #:     Vital Signs Last 24 Hrs  T(C): 37.3 (24 Jan 2021 20:00), Max: 37.3 (24 Jan 2021 20:00)  T(F): 99.1 (24 Jan 2021 20:00), Max: 99.1 (24 Jan 2021 20:00)  HR: 132 (24 Jan 2021 22:00) (104 - 134)  BP: --  BP(mean): --  RR: 26 (24 Jan 2021 22:00) (11 - 29)  SpO2: 100% (24 Jan 2021 21:07) (99% - 100%)    SUBJECTIVE: Pt seen    Pain: YES  [ ]   NO [ ]   Nausea: [ ] YES [ ] NO           Vomiting: [ ] YES [ ] NO  Flatus: [ ] YES [ ] NO             Bowel Movement: [ ] YES [ ] NO     Void: [ ]YES [ ]No      OWEN DRAINAGE: SIGNIFICANT [ ]   NOT SIGNIFICANT [ ]   NOT APPLICABLE [ ]  YES [ ] NO    General Appearance: Appears well, NAD  Neck: Supple  Chest: Equal expansion bilaterally, equal breath sounds  CV: Pulse regular presently  Abdomen: Soft [ ] YES [ ]NO  DISTENDED [ ] YES [ ] NO TENDERNESS [ ]YES [ ]NO  INCISIONS: HEALING WELL [ ] YES  [ ] NO ERYTHEMA [ ] YES [ ] NO DRAINAGE [ ] YES  [ ] NO  Extremities: Grossly symmetric, CALF TENDERNESS [ ] YES  [ ] NO  udall site ok     LABS:                        14.1   17.81 )-----------( 104      ( 24 Jan 2021 04:30 )             44.3     01-24    131<L>  |  94<L>  |  33<H>  ----------------------------<  159<H>  4.8   |  13<L>  |  2.8<H>    Ca    9.4      24 Jan 2021 04:30  Phos  5.5     01-24  Mg     2.0     01-24    TPro  7.0  /  Alb  3.4<L>  /  TBili  1.2  /  DBili  x   /  AST  1510<H>  /  ALT  1036<H>  /  AlkPhos  81  01-24    PT/INR - ( 24 Jan 2021 04:30 )   PT: >40.00 sec;   INR: 4.05 ratio         PTT - ( 24 Jan 2021 04:30 )  PTT:37.2 sec        ASSESSMENT:renal follow up noted     GOOD POST OP COURSE [ ]  YES  [ ] NO  CONDITION IMPROVING  []  YES  [ ]  NO          PLAN:    CONTINUE PRESENT MANAGEMENT  [ ] YES  [ ] NO

## 2021-01-24 NOTE — PROGRESS NOTE ADULT - SUBJECTIVE AND OBJECTIVE BOX
Nephrology Progress Note    NOAH QUIROS  MRN-[id number]  75y  Female    S:  Patient is seen and examined, events over the last 24h noted.    O:  Allergies:  No Known Allergies    Hospital Medications:   MEDICATIONS  (STANDING):  aMIOdarone Infusion 1 mG/Min (33.3 mL/Hr) IV Continuous <Continuous>  aMIOdarone Infusion 0.501 mG/Min (16.7 mL/Hr) IV Continuous <Continuous>  calcium acetate 667 milliGRAM(s) Oral three times a day with meals  chlorhexidine 0.12% Liquid 15 milliLiter(s) Oral Mucosa two times a day  chlorhexidine 4% Liquid 1 Application(s) Topical <User Schedule>  CRRT Treatment    <Continuous>  dexMEDEtomidine Infusion 0.05 MICROgram(s)/kG/Hr (0.9 mL/Hr) IV Continuous <Continuous>  dextrose 5% 1000 milliLiter(s) (75 mL/Hr) IV Continuous <Continuous>  fentaNYL   Infusion. 0.501 MICROgram(s)/kG/Hr (3.62 mL/Hr) IV Continuous <Continuous>  hydrocortisone sodium succinate Injectable 50 milliGRAM(s) IV Push every 8 hours  meropenem  IVPB 500 milliGRAM(s) IV Intermittent every 24 hours  norepinephrine Infusion 0.05 MICROgram(s)/kG/Min (3.14 mL/Hr) IV Continuous <Continuous>  phenylephrine    Infusion 2 MICROgram(s)/kG/Min (25.1 mL/Hr) IV Continuous <Continuous>  PureFlow Dialysate RFP-400 (K 2 / Ca 3) 5000 milliLiter(s) (2000 mL/Hr) CRRT <Continuous>  vancomycin  IVPB 1000 milliGRAM(s) IV Intermittent every 24 hours  vancomycin  IVPB      vasopressin Infusion 0.04 Unit(s)/Min (2.4 mL/Hr) IV Continuous <Continuous>    MEDICATIONS  (PRN):    Home Medications:  Home Medications:  allopurinol 100 mg oral tablet: 1 tab(s) orally once a day (2021 22:04)  calcium acetate 667 mg oral tablet: 3 tab(s) orally 3 times a day (2021 22:04)  dilTIAZem 30 mg oral tablet: 1 tab(s) orally 3 times a day (2021 22:04)  Eliquis 2.5 mg oral tablet: 1 tab(s) orally 2 times a day (2021 22:04)  famotidine 20 mg oral tablet: 1 tab(s) orally 2 times a day (2021 22:04)  Metoprolol Tartrate 25 mg oral tablet: 1 tab(s) orally 2 times a day (2021 22:04)  Percocet 5 mg-325 mg oral tablet: 1 tab(s) orally every 6 hours (2021 22:04)      VITALS:  Daily     Daily Weight in k.2 (2021 06:00)  T(F): 97.2 (21 @ 08:00), Max: 97.9 (21 @ 20:00)  HR: 120 (21 @ 12:00)  BP: 127/53 (21 @ 22:00)  RR: 22 (21 @ 12:00)  SpO2: 100% (21 @ 07:50)  Wt(kg): --  I&O's Detail    2021 07:  -  2021 07:00  --------------------------------------------------------  IN:    Amiodarone: 200.4 mL    Amiodarone: 200.4 mL    Dexmedetomidine: 358.9 mL    dextrose 5% w/ Additives: 1800 mL    FentaNYL: 287.5 mL    IV PiggyBack: 300 mL    Norepinephrine: 1215.5 mL    Other (mL): 200 mL    Phenylephrine: 1165.1 mL    Vasopressin: 57.6 mL  Total IN: 5785.4 mL    OUT:    Indwelling Catheter - Urethral (mL): 37 mL    Other (mL): 2495 mL  Total OUT: 2532 mL    Total NET: 3253.4 mL      2021 07:  -  2021 16:59  --------------------------------------------------------  IN:    Amiodarone: 100.2 mL    Dexmedetomidine: 162.6 mL    dextrose 5% w/ Additives: 450 mL    FentaNYL: 130.2 mL    IV PiggyBack: 50 mL    Norepinephrine: 300 mL    Phenylephrine: 330 mL    Vasopressin: 14.4 mL  Total IN: 1537.4 mL    OUT:    Indwelling Catheter - Urethral (mL): 10 mL    Other (mL): 679 mL  Total OUT: 689 mL    Total NET: 848.4 mL        I&O's Summary    2021 07:  -  2021 07:00  --------------------------------------------------------  IN: 5785.4 mL / OUT: 2532 mL / NET: 3253.4 mL    2021 07:  -  2021 16:59  --------------------------------------------------------  IN: 1537.4 mL / OUT: 689 mL / NET: 848.4 mL          PHYSICAL EXAM:  Gen: intubated/ventilated  Card: tachycardic  Abd: distended  Vascular access: inocencia Lubin    LABS:  ABG - ( 2021 13:33 )  pH, Arterial: 7.23  pH, Blood: x     /  pCO2: 27    /  pO2: 114   / HCO3: 11    / Base Excess: -14.5 /  SaO2: 97            131<L>  |  94<L>  |  33<H>  ----------------------------<  159<H>  4.8   |  13<L>  |  2.8<H>    Ca    9.4      2021 04:30  Phos  5.5       Mg     2.0         TPro  7.0  /  Alb  3.4<L>  /  TBili  1.2  /  DBili      /  AST  1510<H>  /  ALT  1036<H>  /  AlkPhos  81      eGFR if Non African American: 16 mL/min/1.73M2 (21 @ 04:30)  eGFR if African American: 19 mL/min/1.73M2 (21 @ 04:30)    Phosphorus Level, Serum: 5.5 mg/dL (21 @ 14:27)  Phosphorus Level, Serum: 4.1 mg/dL (10-26-20 @ 22:23)                          14.1   17.81 )-----------( 104      ( 2021 04:30 )             44.3     Mean Cell Volume: 98.9 fL (21 @ 04:30)  Mean Cell Volume: 106.0 fL (21 @ 04:30)  Mean Cell Volume: 102.1 fL (21 @ 10:59)    Ferritin, Serum: 17449 ng/mL (21 @ 04:30)  % Saturation, Iron: 93 % (21 @ 04:30)    Creatinine trend:  Creatinine, Serum: 2.8 mg/dL (21 @ 04:30)  Creatinine, Serum: 4.2 mg/dL (21 @ 03:00)  Creatinine, Serum: 4.6 mg/dL (21 @ 03:00)  Creatinine, Serum: 3.7 mg/dL (21 @ 10:59)  Creatinine, Serum: 3.3 mg/dL (10-26-20 @ 22:23)  Creatinine, Serum: 3.9 mg/dL (10-02-20 @ 09:10)  Creatinine, Serum: 4.8 mg/dL (20 @ 12:25)

## 2021-01-24 NOTE — PROGRESS NOTE ADULT - SUBJECTIVE AND OBJECTIVE BOX
NOAH QUIROS 76yo W Female ad from HD center after pt was noted to be hypotensive, tachycardic and SOB.  Of note on the prior HD the pt had a fall onto her knees and reports having hit her head but did not have LOC, CP, palp, N/V. There was some issue with the HD cath which was resolved.   The pt was evaluated in HD, deemed stable and released home.  On the following HD session the pt was noted to be hypotensive BP 67/38, tachy 170s, afib with RVR,  T 97.5.  The pt was sent to ER for further evaluation.  Imaging studies inc CTH, no acute path, CT C spine multi level DDD, CT chest no PE or aortic dissection, CT abd/pelvis large ascites, The pt  had an elevated WBC, elevated LA max 13.7, BNP 70.000, The pt;s status deteriorated and the pt req intubation, vasopressors, fluid resuscitation.  The pt was cultured and started  on ABx Cefepime and Vanco.  The pt underwent dx paracentesis and ad to CCU for further critical care.  The P<MHx includes:  HTN, ASHD, CAD, old MI, afib, failed cardioversions and failed ablation, SYS and VAL CHF, AS, Pul HTN, DLD, COPD, Chr anemia, ESRDAVF, , HD, PAD, sp stents RUE, OA, DDD, DJD, OGERD, HH, diverticulosis.    INTERVAL HPI/OVERNIGHT EVENTS: pt remains critical, in CCU, intubated on vasopressors, empiric Abx without def source of infection, today afebrile, WBC 23 to 17, drop in Plts 104, + acute transaminitis due to shock liver, cont afib c RVR, R groin [place] placed,     MEDICATIONS  (STANDING):  aMIOdarone Infusion 1 mG/Min (33.3 mL/Hr) IV Continuous <Continuous>  aMIOdarone Infusion 0.5 mG/Min (16.7 mL/Hr) IV Continuous <Continuous>  calcium acetate 667 milliGRAM(s) Oral three times a day with meals  chlorhexidine 4% Liquid 1 Application(s) Topical <User Schedule>  dexMEDEtomidine Infusion 0.05 MICROgram(s)/kG/Hr (0.9 mL/Hr) IV Continuous <Continuous>  dextrose 5% 1000 milliLiter(s) (75 mL/Hr) IV Continuous <Continuous>  fentaNYL   Infusion. 0.5 MICROgram(s)/kG/Hr (3.62 mL/Hr) IV Continuous <Continuous>  hydrocortisone sodium succinate Injectable 50 milliGRAM(s) IV Push every 8 hours  meropenem  IVPB 500 milliGRAM(s) IV Intermittent every 24 hours  norepinephrine Infusion 0.05 MICROgram(s)/kG/Min (3.14 mL/Hr) IV Continuous <Continuous>  phenylephrine    Infusion 2 MICROgram(s)/kG/Min (25.1 mL/Hr) IV Continuous <Continuous>  vancomycin  IVPB 750 milliGRAM(s) IV Intermittent <User Schedule>  vasopressin Infusion 0.04 Unit(s)/Min (2.4 mL/Hr) IV Continuous <Continuous>    MEDICATIONS  (PRN):      Allergies    No Known Allergies    Vital Signs Last 24 Hrs    T(F): 97.2  HR: 120  BP: 128/60    RR: 22  SpO2: 100% FIO2 80    PHYSICAL EXAM:      Constitutional: pt sedated on vent support, critically ill with MSOF    Eyes: nonicteric, closed    ENMT: + ET tube    Neck: supple + JVD    Back: Th kyphosis    Respiratory: + harsh resp BS    Cardiovascular: S1S2 tachy and irreg, monitor afib c RVR 120s, + PVCs    Gastrointestinal: distended, full flanks    Genitourinary:     Extremities: + arthritic changes, poor mm mass and tone    Neurological: sedated    Skin: no rash, gen pallor    Lymph Nodes: not enlarged      LABS:                            14 17 (23) )-----------( 104     WBC:  5, 14, 23                 44      131  |  94  |  33  ----------------------------<  159  4.8   |  13  |  2.8    GFR 9    trop 0.23  BNP 70,000  LA 14.9, 13.7, 12.7, 10.8, 11.5, 10  Ca    8.5      23 Jan 2021 03:00  Mg     2.4     01-23  MRSA negative  Covid negative  RVP negative    TPro  5.8<L>  /  Alb  3.1<L>  /  TBili  0.5  /  DBili  x   /  AST  202,  1510  /  ALT  105,  1036  /  AlkPhos  51,  81    PT/INR - ( 23 Jan 2021 08:13 )   PT: 35.00 sec;   INR: 3.04 ratio         PTT - ( 23 Jan 2021 08:13 )  PTT:37.5 sec      RADIOLOGY & ADDITIONAL TESTS:    CXR:  L IJ, stents in RUE, sternotomy, no acute pul dis    CTH:  ch microvasc changes, no acute path    CT C spine:  severe multilevel DDD, osteophytes    CT chest:  NO PE, NO dissection, enlarged heart, no pericardial eff,  coronary calcifications, R axillary stent,  + paratrach,subcarinal and R hilar LN largest 3cm, BL calcified granulomas, reticular opacities/fibrosis of R base    CT abd/pelvis:  enlarged liver 22.5cm, atrophic kidneys, extensive vascular calcifications, non obs R renal calculi, no hydro, large volume ascites, no bowel obs

## 2021-01-25 NOTE — DIETITIAN INITIAL EVALUATION ADULT. - OTHER INFO
Pertinent Medical Information: Admitted with fall and hypotension. Shock with MSOF with severe metabolic acidosis/lactic acidosis on pressors. Intubated & sedated. Ascites noted possibly secondary to ischemic/congestive hepatopathy. s/p paracentesis. Systolic CHF noted. ESRD - noted to be on CVVHD at this time. Tmax 24 hours 37.3 C. Ve 10.    Ht: 167.6 cm. Wt: 72.3 kg (1/23 dosing wt). Latest wt 86.2 kg (1/25) vs 81.2 kg (1/24) vs 72.3 kg (1/23). Significant wt fluctuations observed. Will estimate nutrient needs using dosing wt. BMI: 25.7 (using dosing wt 72.3 kg). IBW: 59.1 kg.    4+ edema to abdomen noted. Ascites noted. Abd noted distended. Date of last BM unknown at this time - no BM this admit. Intubated. OG tube in. Skin: ecchymosis noted.    Currently NPO. No plan to initiate EN at this time.    Unable to obtain nutrition hx at this time as pt is intubated. NKFA per chart.

## 2021-01-25 NOTE — DIETITIAN INITIAL EVALUATION ADULT. - ADD RECOMMEND
Recommendation: Multiple contraindications for EN at this time - pt on multiple pressors, reportedly requires hemodynamic optimization. Abd distention noted at this time. Would consult Nutrition Support Team to evaluate patient.

## 2021-01-25 NOTE — PROGRESS NOTE ADULT - SUBJECTIVE AND OBJECTIVE BOX
GI HPI Today:  Patient is a 75y old  Female who presents with a chief complaint of fall and hypotension (25 Jan 2021 09:38)  Gi following the patient for ascites and elevated liver enzymes   Interval events:   S/p Paracentesis   pt continues to be intubated, sedated and on pressors and CVVH     PAST MEDICAL & SURGICAL HISTORY  Swollen feet  ON OCCASSION    HTN (hypertension)    ESRD on hemodialysis  T,T,S    Thyroid nodule    Degenerative joint disease    Osteoarthritis    Diverticulosis    GERD (gastroesophageal reflux disease)    Heart failure with reduced ejection fraction    Pulmonary hypertension, moderate to severe    Aortic stenosis, moderate    Atrial fibrillation  status post cardioversion    MI (myocardial infarction)    CAD (coronary artery disease)    Kidney stone    HTN (hypertension)    S/P ureteral stent placement    History of kidney surgery    S/P cataract surgery    S/P CABG (coronary artery bypass graft)    AV fistula    H/O abdominal hysterectomy    H/O cardiac radiofrequency ablation        ALLERGIES:  No Known Allergies      MEDICATIONS:  MEDICATIONS  (STANDING):  aMIOdarone Infusion 1 mG/Min (33.3 mL/Hr) IV Continuous <Continuous>  aMIOdarone Infusion 0.501 mG/Min (16.7 mL/Hr) IV Continuous <Continuous>  calcium acetate 667 milliGRAM(s) Oral three times a day with meals  chlorhexidine 0.12% Liquid 15 milliLiter(s) Oral Mucosa two times a day  chlorhexidine 4% Liquid 1 Application(s) Topical <User Schedule>  CRRT Treatment    <Continuous>  dexMEDEtomidine Infusion 0.5 MICROgram(s)/kG/Hr (9.04 mL/Hr) IV Continuous <Continuous>  dextrose 5% 1000 milliLiter(s) (75 mL/Hr) IV Continuous <Continuous>  fentaNYL   Infusion. 0.501 MICROgram(s)/kG/Hr (3.62 mL/Hr) IV Continuous <Continuous>  hydrocortisone sodium succinate Injectable 50 milliGRAM(s) IV Push every 8 hours  meropenem  IVPB 500 milliGRAM(s) IV Intermittent every 24 hours  norepinephrine Infusion 0.05 MICROgram(s)/kG/Min (3.14 mL/Hr) IV Continuous <Continuous>  phenylephrine    Infusion 2 MICROgram(s)/kG/Min (25.1 mL/Hr) IV Continuous <Continuous>  propofol Infusion 10 MICROgram(s)/kG/Min (4.34 mL/Hr) IV Continuous <Continuous>  PureFlow Dialysate RFP-400 (K 2 / Ca 3) 5000 milliLiter(s) (2000 mL/Hr) CRRT <Continuous>  vancomycin  IVPB 1000 milliGRAM(s) IV Intermittent every 24 hours  vancomycin  IVPB      vasopressin Infusion 0.04 Unit(s)/Min (2.4 mL/Hr) IV Continuous <Continuous>    MEDICATIONS  (PRN):      REVIEW OF SYSTEMS  Unobtainable sec to pt condition        VITALS:   T(F): 96.5 (01-25 @ 08:00), Max: 99.1 (01-24 @ 20:00)  HR: 128 (01-25 @ 10:52) (71 - 176)  BP: 127/53 (01-23 @ 22:00) (50/37 - 135/64)  BP(mean): 73 (01-23 @ 22:00) (36 - 82)  RR: 17 (01-25 @ 10:00) (0 - 29)  SpO2: 98% (01-25 @ 10:52) (75% - 100%)        PHYSICAL EXAM:  Gen: pt intubated and sedated   EYES: No scleral icterus   LUNG: b/l AE present   HEART: S1 and S2 heard   ABDOMEN: Soft, +BS, + mild abd distension, no Abdominal Tenderness, No guarding, No Geronimo Sign   Neuro: sedated   Ext: +1 edema b/l      Blood Work :                        13.1   14.54 )-----------( 72       ( 25 Jan 2021 05:00 )             39.5     PT/INR - ( 25 Jan 2021 05:00 )  INR: 5.63          PTT - ( 24 Jan 2021 04:30 )  PTT:37.2   01-25    134<L>  |  98  |  22<H>  ----------------------------<  127<H>  3.9   |  14<L>  |  1.9<H>    Ca    9.6      25 Jan 2021 05:00  Phos  5.5     01-24  Mg     1.8     01-25      CBC -  ( 25 Jan 2021 05:00 )  Hemoglobin : 13.1    CBC -  ( 24 Jan 2021 04:30 )  Hemoglobin : 14.1    CBC -  ( 23 Jan 2021 04:30 )  Hemoglobin : 14.0    CBC -  ( 22 Jan 2021 10:59 )  Hemoglobin : 13.6      LIVER FUNCTIONS - ( 25 Jan 2021 05:00 )  Alb: 3.3 [3.5 - 5.2] / Pro: 5.9 [6.0 - 8.0] / ALK PHOS: 102 [30 - 115] / ALT: 844 [0 - 41] / AST: 847 [0 - 41] / GGT: x     LIVER FUNCTIONS - ( 24 Jan 2021 04:30 )  Alb: 3.4 [3.5 - 5.2] / Pro: 7.0 [6.0 - 8.0] / ALK PHOS: 81 [30 - 115] / ALT: 1036 [0 - 41] / AST: 1510 [0 - 41] / GGT: x     LIVER FUNCTIONS - ( 23 Jan 2021 03:00 )  Alb: 3.1 [3.5 - 5.2] / Pro: 5.8 [6.0 - 8.0] / ALK PHOS: 51 [30 - 115] / ALT: 105 [0 - 41] / AST: 202 [0 - 41] / GGT: x     LIVER FUNCTIONS - ( 22 Jan 2021 10:59 )  Alb: 3.9 [3.5 - 5.2] / Pro: 6.9 [6.0 - 8.0] / ALK PHOS: 51 [30 - 115] / ALT: 10 [0 - 41] / AST: 44 [0 - 41] / GGT: x         RADIOLOGY:    < from: CT Abdomen and Pelvis w/ IV Cont (01.22.21 @ 17:41) >  FINDINGS ABDOMEN AND PELVIS:    HEPATIC: The liver is slightly enlarged measuring 22.5 cm in length. No evidence of mass or bile duct dilatation.    BILIARY: Dependent density within the gallbladder consistent with stones and/or sludge.    SPLEEN: Unremarkable.    PANCREAS: The pancreas is mildly atrophic. No evidence of mass or pancreatitis.    ADRENAL GLANDS: Unremarkable.    KIDNEYS: Atrophic bilateral kidneys with extensive vascular calcifications. Nonobstructing left renal stones. No hydronephrosis. Small left renal cysts again noted.    ABDOMINOPELVIC NODES: Unremarkable.    PELVIC ORGANS: No evidence of pelvic mass, lymphadenopathy, or fluid collection.    PERITONEUM/MESENTERY/BOWEL: Large volume ascites within the abdomen and pelvis. No evidence of bowel obstruction. No pneumoperitoneum.    BONES/SOFT TISSUES: Degenerative changes of the spine are noted. No acute fractures.    OTHER: Aortoiliac calcifications are noted with no evidence of abdominal aortic aneurysm. Dense vascular calcifications are noted.      IMPRESSION:    No evidence of intra-thoracic, abdominal or pelvic visceral laceration or hematoma.  No acute fractures are identified.    Large volume ascites within the abdomen and pelvis.  Paratracheal, subcarinal, and right hilar lymphadenopathy is noted,      < end of copied text >

## 2021-01-25 NOTE — PROGRESS NOTE ADULT - ASSESSMENT
75 years old Female with PMhx of ESRD on Tue/Thur/Sat HD, b/l PVD s/p endovascular revascularization, Permanent Afib w/ failed ablation and multiple cardioversion on Eliquis, Aortic stenosis, pulmonary HTN, COPD anemia of chronic disease, CABG, HF with reduced EF and CAD presented after fall. Sepsis present on admission. Found to be in A-fib with RVR on admission. Large volume ascites present on admission s/p paracentesis. Admitted to CCU with shock with MSOF with severe metabolic acidosis/lactic acidosis on pressors. On CVVHD.    #Shock with MSOF with severe metabolic acidosis/lactic acidosis on pressors  - Intubated and sedated  - On multiple pressors  - S/p emergent right fem [place] placement for CVVHD  - No obvious focus of infection  - C/w vancomycin and meropenem for now, as per ID  - Dose vanc by level daily  - C/w D5W with NaHCO3  - Blood cultures show no growth to date    #Acute transaminitis  #Ascites  - Possibly secondary to ischemic/congestive hepatopathy  - S/p paracentesis  - Pt noted to have ascites even 1 year prior on previous imaging  - Evaluated by GI -> ascites likely secondary to cardiac etiology  - Ascitic fluid results noted -> no evidence of peritonitis, results not consistent with cirrhosis/portal HTN  - F/u ascitic fluid cultures until final    - F/u viral hepatitis panel   - monitor daily LFT and INR     #Mediastinal adenopathy  - Further evaluation if condition becomes more stable    #Systolic CHF  #Pulmonary HTN  - TTE noted: EF 35-40%, severely enlarged RA, moderately reduced RV systolic function, severely enlarged RV, severe TR, severe pulmonary HTN, severe AS  - Hemodynamic optimization in setting of shock and severe acidemia    #Atrial fibrillation with RVR  - On amiodarone drip    #Cyanosis  - Likely vasopressor-related cyanosis/blue digits  - Vascular surgery eval appreciated  - Pt still requiring pressor support at this time    #Misc  Diet: NPO  DVT PPx: on hold due to elevated INR  Dispo: remain in CCU

## 2021-01-25 NOTE — DIETITIAN INITIAL EVALUATION ADULT. - PERTINENT MEDS FT
dextrose 5% 1 L solution (to provide 170 kcal), propofol at 4.34 mL (115 kcal/day if infused over 24 hour duration), amiodarone, levophed at 3.14 mL/hr), amiodarone, calcium acetate, phenylephrine at 25.1 mL/hr, vasopressin at 2.4 mL/hr

## 2021-01-25 NOTE — CONSULT NOTE ADULT - SUBJECTIVE AND OBJECTIVE BOX
VASCULAR SURGERY CONSULT NOTE      HPI:  75 years old Female with PMhx of ESRD on Tue/Thur/Sat HD, b/l PVD s/p endovascular revascularization, Permanent Afib w/ failed ablation and multiple cardioversion on Eliquis, Aortic stenosis, pulmonary HTN, COPD anemia of chronic disease, CABG, HF with reduced EF and CAD Pt presents after fall that happened yesterday at dialysis, pt is on Eliquis.   Collateral history obtained from ED charts. As per the patient she fell to her knees and reports hitting her head at dialysis centre. No LOC or vomiting. Pt was evaluated by the medical team there and was sent home. At the time pt’s fistula was not working but they were able to fix it. Today pt came back to dialysis and received 4 hours of dialysis. At that time it was noted by team members at the dialysis clinic that pt was hypotensive and tachycardic so pt was sent to ED. Denies any fever, cough, chest pain, n/v/d, urinary symptoms or recent weight changes.     In the ED, Pt was found to be hypotensive 67/38, tachycardiac 172, Temp 97.5, was saturating well on 2L NC. Pt was found to be in Atrial fibrillation w/ RVR. Initial trauma workup negative, CT A/P showed Large volume ascites within the abdomen and pelvis. Paratracheal, subcarinal, and right hilar lymphadenopathy is noted Labs were significant for elevated WBC at 14.12, lactate 4.4, Trop elevated at 0.23. Received 750 cc of IV fluid bolus in ED, started on Gavin-synephrine and vasopressin infusion. Cardiology consulted, pt was started on amiodarone and digoxin for afibb w/ RVR. Antibiotics vancomycin and cefepime also administered.    (22 Jan 2021 21:02)        PAST MEDICAL & SURGICAL HISTORY:  Swollen feet  ON OCCASSION    HTN (hypertension)    ESRD on hemodialysis  T,T,S    Thyroid nodule    Degenerative joint disease    Osteoarthritis    Diverticulosis    GERD (gastroesophageal reflux disease)    Heart failure with reduced ejection fraction    Pulmonary hypertension, moderate to severe    Aortic stenosis, moderate    Atrial fibrillation  status post cardioversion    MI (myocardial infarction)    CAD (coronary artery disease)    Kidney stone    HTN (hypertension)    S/P ureteral stent placement    History of kidney surgery    S/P cataract surgery    S/P CABG (coronary artery bypass graft)    AV fistula    H/O abdominal hysterectomy    H/O cardiac radiofrequency ablation      No Known Allergies    Home Medications:  allopurinol 100 mg oral tablet: 1 tab(s) orally once a day (22 Jan 2021 22:04)  calcium acetate 667 mg oral tablet: 3 tab(s) orally 3 times a day (22 Jan 2021 22:04)  dilTIAZem 30 mg oral tablet: 1 tab(s) orally 3 times a day (22 Jan 2021 22:04)  Eliquis 2.5 mg oral tablet: 1 tab(s) orally 2 times a day (22 Jan 2021 22:04)  famotidine 20 mg oral tablet: 1 tab(s) orally 2 times a day (22 Jan 2021 22:04)  Metoprolol Tartrate 25 mg oral tablet: 1 tab(s) orally 2 times a day (22 Jan 2021 22:04)  Percocet 5 mg-325 mg oral tablet: 1 tab(s) orally every 6 hours (22 Jan 2021 22:04)    No permtinent family history of PVD    REVIEW OF SYSTEMS:  GENERAL:                                         negative  SKIN:                                                see HPI  OPTHALMOLOGIC:                          negative  ENMT:                                               negative  RESPIRATORY AND THORAX:        negative  CARDIOVASCULAR:                         see HPI  GASTROINTESTINAL:                       negative  NEPHROLOGY:                                  see HPI  MUSCULOSKELETAL:                       negative  NEUROLOGIC:                                   negative  PSYCHIATRIC:                                    negative  HEMATOLOGY/LYMPHATICS:         negative  ENDOCRINE:                                     negative  ALLERGIC/IMMUNOLOGIC:            negative    12 point ROS otherwise normal except as stated in HPI    PHYSICAL EXAM  Vital Signs Last 24 Hrs  T(C): 35.8 (25 Jan 2021 12:00), Max: 37.3 (24 Jan 2021 20:00)  T(F): 96.5 (25 Jan 2021 12:00), Max: 99.1 (24 Jan 2021 20:00)  HR: 128 (25 Jan 2021 13:00) (120 - 134)  BP: --  BP(mean): --  RR: 22 (25 Jan 2021 13:00) (0 - 28)  SpO2: 98% (25 Jan 2021 10:52) (98% - 100%)    Appearance: Normal	  HEENT:   Normal oral mucosa, PERRL, EOMI	  Neck: Supple, - JVD;    Cardiovascular: Normal S1 S2, No JVD, No murmurs,   Respiratory: Lungs clear to auscultation, No Rales, Rhonchi, Wheezing	  Gastrointestinal:  Soft, Non-tender, positive BS	  Skin: No rashes, No ecchymoses, No cyanosis  Extremities: Normal range of motion, No edema, warm legs until ankles, warm arms until hands. Hands and feet: cyanotic/blue digits  b/l feet cyanosis, b/l hand cyanosis  b/l radial/ulnar: non dopplerable, + b/l brachial dopplerable  Neurologic: Non-focal  Psychiatry: A & O x 3, Mood & affect appropriate      PULSES:  Femoral:  Popliteal: dopplerable b/l  aterior tibial above ankle + b/l dopplerable pulses  Dorsal Pedal: non dopplerable b/l  Posterior Tibial: non dopplerable b/l  Capillary:    MEDICATIONS:   MEDICATIONS  (STANDING):  aMIOdarone Infusion 1 mG/Min (33.3 mL/Hr) IV Continuous <Continuous>  aMIOdarone Infusion 0.501 mG/Min (16.7 mL/Hr) IV Continuous <Continuous>  calcium acetate 667 milliGRAM(s) Oral three times a day with meals  chlorhexidine 0.12% Liquid 15 milliLiter(s) Oral Mucosa two times a day  chlorhexidine 4% Liquid 1 Application(s) Topical <User Schedule>  CRRT Treatment    <Continuous>  dexMEDEtomidine Infusion 0.5 MICROgram(s)/kG/Hr (9.04 mL/Hr) IV Continuous <Continuous>  dextrose 5% 1000 milliLiter(s) (75 mL/Hr) IV Continuous <Continuous>  fentaNYL   Infusion. 0.501 MICROgram(s)/kG/Hr (3.62 mL/Hr) IV Continuous <Continuous>  hydrocortisone sodium succinate Injectable 50 milliGRAM(s) IV Push every 8 hours  meropenem  IVPB 500 milliGRAM(s) IV Intermittent every 24 hours  norepinephrine Infusion 0.05 MICROgram(s)/kG/Min (3.14 mL/Hr) IV Continuous <Continuous>  phenylephrine    Infusion 2 MICROgram(s)/kG/Min (25.1 mL/Hr) IV Continuous <Continuous>  propofol Infusion 10 MICROgram(s)/kG/Min (4.34 mL/Hr) IV Continuous <Continuous>  PureFlow Dialysate RFP-400 (K 2 / Ca 3) 5000 milliLiter(s) (2000 mL/Hr) CRRT <Continuous>  vancomycin  IVPB 1000 milliGRAM(s) IV Intermittent every 24 hours  vancomycin  IVPB      vasopressin Infusion 0.04 Unit(s)/Min (2.4 mL/Hr) IV Continuous <Continuous>    MEDICATIONS  (PRN):      LAB/STUDIES:                        13.1   14.54 )-----------( 72       ( 25 Jan 2021 05:00 )             39.5     01-25    134<L>  |  98  |  22<H>  ----------------------------<  127<H>  3.9   |  14<L>  |  1.9<H>    Ca    9.6      25 Jan 2021 05:00  Phos  5.5     01-24  Mg     1.8     01-25    TPro  5.9<L>  /  Alb  3.3<L>  /  TBili  1.5<H>  /  DBili  x   /  AST  847<H>  /  ALT  844<H>  /  AlkPhos  102  01-25    PT/INR - ( 25 Jan 2021 05:00 )   PT: >40.00 sec;   INR: 5.63 ratio         PTT - ( 24 Jan 2021 04:30 )  PTT:37.2 sec  LIVER FUNCTIONS - ( 25 Jan 2021 05:00 )  Alb: 3.3 g/dL / Pro: 5.9 g/dL / ALK PHOS: 102 U/L / ALT: 844 U/L / AST: 847 U/L / GGT: x               CARDIAC MARKERS ( 24 Jan 2021 17:03 )  x     / x     / 696 U/L / x     / x              ABG - ( 25 Jan 2021 03:09 )  pH, Arterial: 7.31  pH, Blood: x     /  pCO2: 32.2  /  pO2: 112   / HCO3: ?16.2 / Base Excess: -9.0  /  SaO2: 98                  Culture - Fungal, Body Fluid (collected 23 Jan 2021 14:46)  Source: Peritoneal Peritoneal Fluid  Preliminary Report (25 Jan 2021 09:11):    Testing in progress    Culture - Body Fluid with Gram Stain (collected 23 Jan 2021 14:46)  Source: Peritoneal Peritoneal Fluid  Gram Stain (23 Jan 2021 20:06):    polymorphonuclear leukocytes seen    No organisms seen    by cytocentrifuge  Preliminary Report (24 Jan 2021 16:27):    No growth    Culture - Blood (collected 23 Jan 2021 06:56)  Source: .Blood Blood-Peripheral  Preliminary Report (24 Jan 2021 18:01):    No growth to date.    Culture - Blood (collected 22 Jan 2021 21:10)  Source: .Blood Blood-Peripheral  Preliminary Report (24 Jan 2021 03:10):    No growth to date.    Culture - Blood (collected 22 Jan 2021 21:10)  Source: .Blood Blood-Peripheral  Preliminary Report (24 Jan 2021 03:10):    No growth to date.        IMAGING:

## 2021-01-25 NOTE — PROGRESS NOTE ADULT - SUBJECTIVE AND OBJECTIVE BOX
SUBJECTIVE:    Patient is a 75y old Female who presents with a chief complaint of fall and hypotension (25 Jan 2021 14:15)    Currently admitted to medicine with the primary diagnosis of acute respiratory failure, septic shock, severe acidemia     Today is hospital day 4d. Patient seen and examined at bedside this AM. On mechanical ventilation. On pressors.    PAST MEDICAL & SURGICAL HISTORY  Swollen feet  ON OCCASSION    HTN (hypertension)    ESRD on hemodialysis  T,T,S    Thyroid nodule    Degenerative joint disease    Osteoarthritis    Diverticulosis    GERD (gastroesophageal reflux disease)    Heart failure with reduced ejection fraction    Pulmonary hypertension, moderate to severe    Aortic stenosis, moderate    Atrial fibrillation  status post cardioversion    MI (myocardial infarction)    CAD (coronary artery disease)    Kidney stone    HTN (hypertension)    S/P ureteral stent placement    History of kidney surgery    S/P cataract surgery    S/P CABG (coronary artery bypass graft)    AV fistula    H/O abdominal hysterectomy    H/O cardiac radiofrequency ablation      SOCIAL HISTORY:  Negative for smoking/alcohol/drug use.     ALLERGIES:  No Known Allergies    MEDICATIONS:  STANDING MEDICATIONS  aMIOdarone Infusion 1 mG/Min IV Continuous <Continuous>  aMIOdarone Infusion 0.501 mG/Min IV Continuous <Continuous>  calcium acetate 667 milliGRAM(s) Oral three times a day with meals  chlorhexidine 0.12% Liquid 15 milliLiter(s) Oral Mucosa two times a day  chlorhexidine 4% Liquid 1 Application(s) Topical <User Schedule>  CRRT Treatment    <Continuous>  dexMEDEtomidine Infusion 0.5 MICROgram(s)/kG/Hr IV Continuous <Continuous>  dextrose 5% 1000 milliLiter(s) IV Continuous <Continuous>  fentaNYL   Infusion. 0.501 MICROgram(s)/kG/Hr IV Continuous <Continuous>  hydrocortisone sodium succinate Injectable 50 milliGRAM(s) IV Push every 8 hours  meropenem  IVPB 500 milliGRAM(s) IV Intermittent every 24 hours  norepinephrine Infusion 0.05 MICROgram(s)/kG/Min IV Continuous <Continuous>  phenylephrine    Infusion 2 MICROgram(s)/kG/Min IV Continuous <Continuous>  propofol Infusion 10 MICROgram(s)/kG/Min IV Continuous <Continuous>  PureFlow Dialysate RFP-400 (K 2 / Ca 3) 5000 milliLiter(s) CRRT <Continuous>  vancomycin  IVPB 1000 milliGRAM(s) IV Intermittent every 24 hours  vancomycin  IVPB      vasopressin Infusion 0.04 Unit(s)/Min IV Continuous <Continuous>    PRN MEDICATIONS    VITALS:   T(F): 96.5  HR: 128  BP: --  RR: 22  SpO2: 98%    LABS:                        13.1   14.54 )-----------( 72       ( 25 Jan 2021 05:00 )             39.5     01-25    134<L>  |  98  |  22<H>  ----------------------------<  127<H>  3.9   |  14<L>  |  1.9<H>    Ca    9.6      25 Jan 2021 05:00  Phos  5.5     01-24  Mg     1.8     01-25    TPro  5.9<L>  /  Alb  3.3<L>  /  TBili  1.5<H>  /  DBili  x   /  AST  847<H>  /  ALT  844<H>  /  AlkPhos  102  01-25    PT/INR - ( 25 Jan 2021 05:00 )   PT: >40.00 sec;   INR: 5.63 ratio         PTT - ( 24 Jan 2021 04:30 )  PTT:37.2 sec    ABG - ( 25 Jan 2021 03:09 )  pH, Arterial: 7.31  pH, Blood: x     /  pCO2: 32.2  /  pO2: 112   / HCO3: ?16.2 / Base Excess: -9.0  /  SaO2: 98                Creatine Kinase, Serum: 696 U/L <H> (01-24-21 @ 17:03)      Culture - Fungal, Body Fluid (collected 23 Jan 2021 14:46)  Source: Peritoneal Peritoneal Fluid  Preliminary Report (25 Jan 2021 09:11):    Testing in progress    Culture - Body Fluid with Gram Stain (collected 23 Jan 2021 14:46)  Source: Peritoneal Peritoneal Fluid  Gram Stain (23 Jan 2021 20:06):    polymorphonuclear leukocytes seen    No organisms seen    by cytocentrifuge  Preliminary Report (24 Jan 2021 16:27):    No growth    Culture - Blood (collected 23 Jan 2021 06:56)  Source: .Blood Blood-Peripheral  Preliminary Report (24 Jan 2021 18:01):    No growth to date.    Culture - Blood (collected 22 Jan 2021 21:10)  Source: .Blood Blood-Peripheral  Preliminary Report (24 Jan 2021 03:10):    No growth to date.    Culture - Blood (collected 22 Jan 2021 21:10)  Source: .Blood Blood-Peripheral  Preliminary Report (24 Jan 2021 03:10):    No growth to date.      CARDIAC MARKERS ( 24 Jan 2021 17:03 )  x     / x     / 696 U/L / x     / x          PHYSICAL EXAM:  GEN: No acute distress  LUNGS: on mechanical ventilation   HEART: S1/S2 present, tachycardic  ABD: Soft, non-tender, non-distended  EXT: cold, cyanotic   NEURO: sedated    Intravenous access:   NG tube:   Lubin Catheter:   Indwelling Urethral Catheter:     Connect To:  Straight Drainage/Gravity    Indication:  Urine Output Monitoring in Critically Ill (01-24-21 @ 05:46) (not performed) [Active]  Indwelling Urethral Catheter:     Connect To:  Straight Drainage/Gravity    Indication:  Urine Output Monitoring in Critically Ill (01-23-21 @ 12:33) (not performed) [Active]

## 2021-01-25 NOTE — CONSULT NOTE ADULT - ASSESSMENT
75 years old Female with PMhx of ESRD on Tue/Thur/Sat HD, b/l PVD s/p endovascular revascularization, Permanent Afib w/ failed ablation and multiple cardioversion on Eliquis, Aortic stenosis, pulmonary HTN, COPD anemia of chronic disease, CABG, HF with reduced EF and CAD Pt presents after fall that happened yesterday at dialysis, pt is on Eliquis. Admitted, septic shock, intubated, on pressors    Vascular Surgery called to evaluate cyanotic extremities with absent distal pules while on pressors   Vasopressor related cyanosis/blue digits     No vascular interventions   Please, wean off pressors, extubate, then obtain art dplx    SPECTRA [id number]

## 2021-01-25 NOTE — PROGRESS NOTE ADULT - ASSESSMENT
75 years old Female with PMhx of ESRD on Tue/Thur/Sat HD, b/l PVD s/p endovascular revascularization, Permanent Afib w/ failed ablation and multiple cardioversion was on Eliquis, Aortic stenosis, pulmonary HTN, COPD anemia of chronic disease, CABG, HF with reduced EF and CAD   Admitted with hypotension after HD found to be in septic shock cx by respiratory failure and afib with rvr.   Currently pt intubated on mechanical ventillation sedated and on pressors, IV abx with no clear source of infection, CVVH     #) GI initially consulted for Ascites.   Pt was noted to have ascites even 1 year prior on USG abdomen   no PMH of liver disease,  on CT abd done this admission no cirrhosis+ hepatomegaly  and no splenomegaly but large ascites   S/p S/p diag paracentesis - no evidence of peritonitis , PMN <250   Ascitic fluid Total Protein is > 2.5 and SAAG is <1.1 - this is not consistent with liver cirrhosis /portal hypertension   Ascites likely sec to cardiac etiology - in the setting of CHF with reduced EF   Rec further volume optimization as per cardio      #) Acute transaminitis - possible sec to Ischemic / congested hepatopathy vs others   Given AST and ALt in 1000 and bili 1.5 in setting of shock and on pressors   currently Liver enzymes were improving   Low platelets and high INR in setting of shock   Rec:  evaluate for possible DIC   check viral hepatitis panel YVONNE, ASMA, AMA, anti LKM-1, soluble liver antigen, hepatitis panel (HBV, HCV, HAV IgM), EBV PCR, CMV PCR, HSV PCR, immunoglobulin, ceruloplasmin, alpha-1 antitrypsin, iron panel (iron, TIBC, ferritin), celiac panel  daily lft and INR   hemodynamic optimization as per primary team

## 2021-01-25 NOTE — PROGRESS NOTE ADULT - SUBJECTIVE AND OBJECTIVE BOX
DIAGNOSIS:   HOSPITAL DAY #:    STATUS POST:    POST OPERATIVE DAY #:     Vital Signs Last 24 Hrs  T(C): 35.6 (25 Jan 2021 20:00), Max: 37.1 (25 Jan 2021 00:00)  T(F): 96 (25 Jan 2021 20:00), Max: 98.8 (25 Jan 2021 00:00)  HR: 120 (25 Jan 2021 22:00) (118 - 134)  BP: --  BP(mean): --  RR: 8 (25 Jan 2021 22:00) (0 - 26)  SpO2: 98% (25 Jan 2021 10:52) (98% - 100%)    SUBJECTIVE: Pt seen    Pain: YES  [ ]   NO [ ]   Nausea: [ ] YES [ ] NO           Vomiting: [ ] YES [ ] NO  Flatus: [ ] YES [ ] NO             Bowel Movement: [ ] YES [ ] NO     Void: [ ]YES [ ]No      OWEN DRAINAGE: SIGNIFICANT [ ]   NOT SIGNIFICANT [ ]   NOT APPLICABLE [ ]  YES [ ] NO    General Appearance: Appears well, NAD  Neck: Supple  Chest: Equal expansion bilaterally, equal breath sounds  CV: Pulse regular presently  Abdomen: Soft [ ] YES [ ]NO  DISTENDED [ ] YES [ ] NO TENDERNESS [ ]YES [ ]NO  INCISIONS: HEALING WELL [ ] YES  [ ] NO ERYTHEMA [ ] YES [ ] NO DRAINAGE [ ] YES  [ ] NO  Extremities: Grossly symmetric, CALF TENDERNESS [ ] YES  [ ] NO  udall site ok    LABS:                        13.1   14.54 )-----------( 72       ( 25 Jan 2021 05:00 )             39.5     01-25    135  |  98  |  19  ----------------------------<  147<H>  3.6   |  17  |  1.6<H>    Ca    9.9      25 Jan 2021 16:45  Phos  5.5     01-24  Mg     1.8     01-25    TPro  5.9<L>  /  Alb  3.3<L>  /  TBili  1.5<H>  /  DBili  x   /  AST  847<H>  /  ALT  844<H>  /  AlkPhos  102  01-25    PT/INR - ( 25 Jan 2021 05:00 )   PT: >40.00 sec;   INR: 5.63 ratio         PTT - ( 24 Jan 2021 04:30 )  PTT:37.2 sec        ASSESSMENT: renal and vascular follow up noted      GOOD POST OP COURSE [ ]  YES  [ ] NO  CONDITION IMPROVING  []  YES  [ ]  NO          PLAN:    CONTINUE PRESENT MANAGEMENT  [ ] YES  [ ] NO

## 2021-01-25 NOTE — DIETITIAN INITIAL EVALUATION ADULT. - RD TO REMAIN AVAILABLE
Nutrition Intervention: Coordination of Care. Monitor: Energy intake, diet order, glucose profile, renal profile, nutrition focused physical findings, body composition./yes

## 2021-01-25 NOTE — PROGRESS NOTE ADULT - SUBJECTIVE AND OBJECTIVE BOX
Nephrology progress note    THIS IS AN INCOMPLETE NOTE . FULL NOTE TO FOLLOW SHORTLY    Patient is seen and examined, events over the last 24 h noted .    Allergies:  No Known Allergies    Hospital Medications:   MEDICATIONS  (STANDING):  aMIOdarone Infusion 1 mG/Min (33.3 mL/Hr) IV Continuous <Continuous>  aMIOdarone Infusion 0.501 mG/Min (16.7 mL/Hr) IV Continuous <Continuous>  calcium acetate 667 milliGRAM(s) Oral three times a day with meals  chlorhexidine 0.12% Liquid 15 milliLiter(s) Oral Mucosa two times a day  chlorhexidine 4% Liquid 1 Application(s) Topical <User Schedule>  dexMEDEtomidine Infusion 0.5 MICROgram(s)/kG/Hr (9.04 mL/Hr) IV Continuous <Continuous>  dextrose 5% 1000 milliLiter(s) (75 mL/Hr) IV Continuous <Continuous>  fentaNYL   Infusion. 0.501 MICROgram(s)/kG/Hr (3.62 mL/Hr) IV Continuous <Continuous>  hydrocortisone sodium succinate Injectable 50 milliGRAM(s) IV Push every 8 hours  meropenem  IVPB 500 milliGRAM(s) IV Intermittent every 24 hours  norepinephrine Infusion 0.05 MICROgram(s)/kG/Min (3.14 mL/Hr) IV Continuous <Continuous>  phenylephrine    Infusion 2 MICROgram(s)/kG/Min (25.1 mL/Hr) IV Continuous <Continuous>  propofol Infusion 10 MICROgram(s)/kG/Min (4.34 mL/Hr) IV Continuous <Continuous>  vancomycin  IVPB 1000 milliGRAM(s) IV Intermittent every 24 hours  vancomycin  IVPB      vasopressin Infusion 0.04 Unit(s)/Min (2.4 mL/Hr) IV Continuous <Continuous>        VITALS:  T(F): 96.5 (01-25-21 @ 08:00), Max: 99.1 (01-24-21 @ 20:00)  HR: 128 (01-25-21 @ 08:00)  BP: --  RR: 0 (01-25-21 @ 08:00)  SpO2: 98% (01-25-21 @ 06:00)  Wt(kg): --    01-23 @ 07:01  -  01-24 @ 07:00  --------------------------------------------------------  IN: 5785.4 mL / OUT: 2532 mL / NET: 3253.4 mL    01-24 @ 07:01 - 01-25 @ 07:00  --------------------------------------------------------  IN: 8171.2 mL / OUT: 4155 mL / NET: 4016.2 mL    01-25 @ 07:01 - 01-25 @ 09:39  --------------------------------------------------------  IN: 684.8 mL / OUT: 660 mL / NET: 24.8 mL          PHYSICAL EXAM:  Constitutional: NAD  HEENT: anicteric sclera, oropharynx clear, MMM  Neck: No JVD  Respiratory: CTAB, no wheezes, rales or rhonchi  Cardiovascular: S1, S2, RRR  Gastrointestinal: BS+, soft, NT/ND  Extremities: No cyanosis or clubbing. No peripheral edema  :  No simeon.   Skin: No rashes    LABS:  01-25    134<L>  |  98  |  22<H>  ----------------------------<  127<H>  3.9   |  14<L>  |  1.9<H>    Ca    9.6      25 Jan 2021 05:00  Phos  5.5     01-24  Mg     1.8     01-25    TPro  5.9<L>  /  Alb  3.3<L>  /  TBili  1.5<H>  /  DBili      /  AST  847<H>  /  ALT  844<H>  /  AlkPhos  102  01-25                          13.1   14.54 )-----------( 72       ( 25 Jan 2021 05:00 )             39.5       Urine Studies:      RADIOLOGY & ADDITIONAL STUDIES:   Nephrology progress note  Patient is seen and examined, events over the last 24 h noted .  Intubated on MV  on pressors     Allergies:  No Known Allergies    Hospital Medications:   MEDICATIONS  (STANDING):    aMIOdarone Infusion 1 mG/Min (33.3 mL/Hr) IV Continuous <Continuous>  aMIOdarone Infusion 0.501 mG/Min (16.7 mL/Hr) IV Continuous <Continuous>  calcium acetate 667 milliGRAM(s) Oral three times a day with meals  dexMEDEtomidine Infusion 0.5 MICROgram(s)/kG/Hr (9.04 mL/Hr) IV Continuous <Continuous>  dextrose 5% 1000 milliLiter(s) (75 mL/Hr) IV Continuous <Continuous>  fentaNYL   Infusion. 0.501 MICROgram(s)/kG/Hr (3.62 mL/Hr) IV Continuous <Continuous>  hydrocortisone sodium succinate Injectable 50 milliGRAM(s) IV Push every 8 hours  meropenem  IVPB 500 milliGRAM(s) IV Intermittent every 24 hours  norepinephrine Infusion 0.05 MICROgram(s)/kG/Min (3.14 mL/Hr) IV Continuous <Continuous>  phenylephrine    Infusion 2 MICROgram(s)/kG/Min (25.1 mL/Hr) IV Continuous <Continuous>  propofol Infusion 10 MICROgram(s)/kG/Min (4.34 mL/Hr) IV Continuous <Continuous>  vancomycin  IVPB 1000 milliGRAM(s) IV Intermittent every 24 hours  vasopressin Infusion 0.04 Unit(s)/Min (2.4 mL/Hr) IV Continuous <Continuous>        VITALS:  T(F): 96.5 (01-25-21 @ 08:00), Max: 99.1 (01-24-21 @ 20:00)  HR: 128 (01-25-21 @ 08:00)  BP: 99/54  RR: 0 (01-25-21 @ 08:00)  SpO2: 98% (01-25-21 @ 06:00)  Wt(kg): --    01-23 @ 07:01 - 01-24 @ 07:00  --------------------------------------------------------  IN: 5785.4 mL / OUT: 2532 mL / NET: 3253.4 mL    01-24 @ 07:01 - 01-25 @ 07:00  --------------------------------------------------------  IN: 8171.2 mL / OUT: 4155 mL / NET: 4016.2 mL    01-25 @ 07:01 - 01-25 @ 09:39  --------------------------------------------------------  IN: 684.8 mL / OUT: 660 mL / NET: 24.8 mL          PHYSICAL EXAM:  Constitutional: intubated on MV   Neck: No JVD  Respiratory: CTAB  Cardiovascular: S1, S2, RRR  Gastrointestinal: BS+, soft, NT/ND  Extremities: mottled ext / plus one edema   Skin: No rashes    LABS:  01-25    134<L>  |  98  |  22<H>  ----------------------------<  127<H>  3.9   |  14<L>  |  1.9<H>    Ca    9.6      25 Jan 2021 05:00  Phos  5.5     01-24  Mg     1.8     01-25    TPro  5.9<L>  /  Alb  3.3<L>  /  TBili  1.5<H>  /  DBili      /  AST  847<H>  /  ALT  844<H>  /  AlkPhos  102  01-25                          13.1   14.54 )-----------( 72       ( 25 Jan 2021 05:00 )             39.5       Urine Studies:      RADIOLOGY & ADDITIONAL STUDIES:

## 2021-01-25 NOTE — PROGRESS NOTE ADULT - ASSESSMENT
75 years old Female with PMhx of ESRD on Tue/Thur/Sat HD, b/l PVD s/p endovascular revascularization, Permanent Afib w/ failed ablation and multiple cardioversion on Eliquis, Aortic stenosis, pulmonary HTN, COPD anemia of chronic disease, CABG, HF with reduced EF and CAD Pt presents after fall     ·	ESRD on HD / on multiple pressors / severe acidemia / lactic acidosis / on CVVHD continue even balance  ·	check daily phosphorus level/ level ok   ·	dose vanc by level daily  ·	hyponatremic, avoid hypotonic infusions/ better with CVVHD   ·	Hypotension shock/ etiology? septic? on vancomycin/meropenem / follow cx / keep MAP > 65   ·	ascites sp paracentesis f/u cx  ·	respiratory failure on MV   ·	prognosis poor

## 2021-01-26 NOTE — PROGRESS NOTE ADULT - SUBJECTIVE AND OBJECTIVE BOX
Subjective:  I was asked by medicine-ICU team to see the patient and f/u, it was written the cause of admission was fall, the cause of fall was extreme fatigue, lethargy hypotension that in the ED considering HR, BP, general being, severe volume depletion interpreted as sepsis.   Patient is in critical condition, sick, general condition has not been improved much, event has progressed, o 100% FIO2, intubated on pressors support, cold and constricted extremities, on CVVHD, tachycardic (less than before), hypotensive.  Elevated LFT due to shock liver.  chest x-ray LLL infiltrate- opacities, likely PNA    MEDICATIONS  (STANDING):  aMIOdarone Infusion 1 mG/Min (33.3 mL/Hr) IV Continuous <Continuous>  calcium acetate 667 milliGRAM(s) Oral three times a day with meals  cefepime   IVPB      chlorhexidine 0.12% Liquid 15 milliLiter(s) Oral Mucosa two times a day  chlorhexidine 4% Liquid 1 Application(s) Topical <User Schedule>  CRRT Treatment    <Continuous>  dexMEDEtomidine Infusion 0.5 MICROgram(s)/kG/Hr (9.04 mL/Hr) IV Continuous <Continuous>  dextrose 5% 1000 milliLiter(s) (75 mL/Hr) IV Continuous <Continuous>  fentaNYL   Infusion. 0.501 MICROgram(s)/kG/Hr (3.62 mL/Hr) IV Continuous <Continuous>  hydrocortisone sodium succinate Injectable 50 milliGRAM(s) IV Push every 8 hours  metoprolol succinate ER 12.5 milliGRAM(s) Oral <User Schedule>  norepinephrine Infusion 0.05 MICROgram(s)/kG/Min (3.14 mL/Hr) IV Continuous <Continuous>  phenylephrine    Infusion 2 MICROgram(s)/kG/Min (25.1 mL/Hr) IV Continuous <Continuous>  propofol Infusion 10 MICROgram(s)/kG/Min (4.34 mL/Hr) IV Continuous <Continuous>  PureFlow Dialysate RFP-400 (K 2 / Ca 3) 5000 milliLiter(s) (2000 mL/Hr) CRRT <Continuous>  vasopressin Infusion 0.04 Unit(s)/Min (2.4 mL/Hr) IV Continuous <Continuous>    MEDICATIONS  (PRN):            Vital Signs Last 24 Hrs  T(C): 37.1 (26 Jan 2021 18:00), Max: 37.1 (26 Jan 2021 18:00)  T(F): 98.8 (26 Jan 2021 18:00), Max: 98.8 (26 Jan 2021 18:00)  HR: 114 (26 Jan 2021 18:00) (110 - 126)  BP: --  BP(mean): --  RR: 20 (26 Jan 2021 18:00) (12 - 27)  SpO2: 96% (26 Jan 2021 16:00) (96% - 100%)             REVIEW OF SYSTEMS:             PHYSICAL EXAM:  · CONSTITUTIONAL: intubated, sedated, on 3 pressor support, tachycardic, FIO2 100%, PEEP 5  . NECK: Supple, no JVD   · RESPIRATORY: vent dependent, air entry to both base, no wheeze  · CARDIOVASCULAR: irregular HR, S1, blunted A2, louder P2, no murmur, irregular rate  · EXTREMITIES: cold, cyanosis, no edema  · VASCULAR: Pulses are irregular on upper ext, in lower not palpable  	  TELEMETRY: A.fib, HR at 110-120    ECG: < from: 12 Lead ECG (01.24.21 @ 05:37) >   Atrial fibrillation with rapid ventricular response with premature ventricular  or aberrantly conducted complexes  Rightward axis  Low voltage QRS  Nonspecific T wave abnormality    < end of copied text >      TTE: < from: TTE Echo Complete w/o Contrast w/ Doppler (01.24.21 @ 09:42) >   1. Left ventricular ejection fraction, by visual estimation, is 35 to 40%.   2. Moderately decreased segmental left ventricular systolic function.   3. Severely enlarged right atrium.   4. Moderately reduced RV systolic function.   5. Severely enlarged right ventricle.   6. Normal left atrial size.   7. Moderate pleural effusion inboth left and right lateral regions.   8. Mild mitral valve regurgitation.   9. Thickening and calcification of the anterior and posterior mitral valve leaflets.  10. Severe tricuspid regurgitation.  11. Aortic valve thickening with decreased leafletopening.  12. Mild aortic regurgitation.  13. Mild pulmonic valve regurgitation.  14. Estimated pulmonary artery systolic pressure is 61.4 mmHg assuming a right atrial pressure of 15 mmHg, which is consistent with severe pulmonary hypertension.  15. Peak transaortic gradient equals 42.2 mmHg, mean transaortic gradient equals 24.8 mmHg, the calculated aortic valve area equals 0.39 cm² by the continuity equation consistent with severe aortic stenosis.    I DISAGREE WITH ABOVE MAURICIO CALCULATION, MEAN GRADIENT IS 24 mmHg, VALVE OPENS TO SOME DEGREE, VISUAL VALVE AREA IS MUCH HIGHER THAN CALCULATION BY ECHO TECH     < end of copied text >      LABS:                        12.9   14.09 )-----------( 36       ( 26 Jan 2021 04:30 )             37.6     01-26    135  |  100  |  17  ----------------------------<  164<H>  3.3<L>   |  17  |  1.3    Ca    10.2<H>      26 Jan 2021 04:30  Phos  3.4     01-26  Mg     1.7     01-26    TPro  5.7<L>  /  Alb  3.4<L>  /  TBili  2.1<H>  /  DBili  x   /  AST  1208<H>  /  ALT  1161<H>  /  AlkPhos  116<H>  01-26        PT/INR - ( 26 Jan 2021 04:30 )   PT: >40.00 sec;   INR: 7.03 ratio             I&O's Summary    25 Jan 2021 07:01  -  26 Jan 2021 07:00  --------------------------------------------------------  IN: 8647 mL / OUT: 8067 mL / NET: 580 mL    26 Jan 2021 07:01  -  26 Jan 2021 18:52  --------------------------------------------------------  IN: 4068.4 mL / OUT: 1965 mL / NET: 2103.4 mL      BNP  RADIOLOGY & ADDITIONAL STUDIES: < from: Xray Chest 1 View- PORTABLE-Routine (Xray Chest 1 View- PORTABLE-Routine in AM.) (01.26.21 @ 07:34) >  Decreased left basilar opacity.  Stable support devices.    < end of copied text >      IMPRESSION AND PLAN:

## 2021-01-26 NOTE — PROGRESS NOTE ADULT - ATTENDING COMMENTS
Attending Statement: I have personally performed a face to face diagnostic evaluation on this patient and have arrived at the suggestions for care. I have written all aspects of the above note.
Ascites not related to Liver diseases. LFT elevation -ischemic hepatitis.
as above

## 2021-01-26 NOTE — PROGRESS NOTE ADULT - SUBJECTIVE AND OBJECTIVE BOX
seen and examined  intubated/ventilated   on pressor   on cvvhd       PAST HISTORY  --------------------------------------------------------------------------------  No significant changes to PMH, PSH, FHx, SHx, unless otherwise noted    ALLERGIES & MEDICATIONS  --------------------------------------------------------------------------------  Allergies    No Known Allergies    Intolerances      Standing Inpatient Medications  aMIOdarone Infusion 1 mG/Min IV Continuous <Continuous>  aMIOdarone Infusion 0.501 mG/Min IV Continuous <Continuous>  calcium acetate 667 milliGRAM(s) Oral three times a day with meals  chlorhexidine 0.12% Liquid 15 milliLiter(s) Oral Mucosa two times a day  chlorhexidine 4% Liquid 1 Application(s) Topical <User Schedule>  CRRT Treatment    <Continuous>  dexMEDEtomidine Infusion 0.5 MICROgram(s)/kG/Hr IV Continuous <Continuous>  dextrose 5% 1000 milliLiter(s) IV Continuous <Continuous>  fentaNYL   Infusion. 0.501 MICROgram(s)/kG/Hr IV Continuous <Continuous>  hydrocortisone sodium succinate Injectable 50 milliGRAM(s) IV Push every 8 hours  meropenem  IVPB 500 milliGRAM(s) IV Intermittent every 24 hours  norepinephrine Infusion 0.05 MICROgram(s)/kG/Min IV Continuous <Continuous>  phenylephrine    Infusion 2 MICROgram(s)/kG/Min IV Continuous <Continuous>  propofol Infusion 10 MICROgram(s)/kG/Min IV Continuous <Continuous>  PureFlow Dialysate RFP-400 (K 2 / Ca 3) 5000 milliLiter(s) CRRT <Continuous>  vasopressin Infusion 0.04 Unit(s)/Min IV Continuous <Continuous>    PRN Inpatient Medications          VITALS/PHYSICAL EXAM  --------------------------------------------------------------------------------  T(C): 36.1 (01-26-21 @ 04:00), Max: 36.1 (01-26-21 @ 00:00)  HR: 120 (01-26-21 @ 07:00) (116 - 132)  BP: --  RR: 21 (01-26-21 @ 07:00) (0 - 27)  SpO2: 98% (01-25-21 @ 10:52) (98% - 98%)  Wt(kg): --        01-25-21 @ 07:01  -  01-26-21 @ 07:00  --------------------------------------------------------  IN: 8647 mL / OUT: 8067 mL / NET: 580 mL      Physical Exam:  	Gen: intubated/ventilated   	Pulm:B/L montserrat   	CV:  S1S2; no rub  	Abd: +distended      LABS/STUDIES  --------------------------------------------------------------------------------              12.9   14.09 >-----------<  36       [01-26-21 @ 04:30]              37.6     135  |  100  |  17  ----------------------------<  164      [01-26-21 @ 04:30]  3.3   |  17  |  1.3        Ca     10.2     [01-26-21 @ 04:30]      Mg     1.7     [01-26-21 @ 04:30]      Phos  3.4     [01-26-21 @ 04:30]    TPro  5.7  /  Alb  3.4  /  TBili  2.1  /  DBili  x   /  AST  1208  /  ALT  1161  /  AlkPhos  116  [01-26-21 @ 04:30]    PT/INR: PT >40.00, INR 7.03       [01-26-21 @ 04:30]          [01-24-21 @ 17:03]    Creatinine Trend:  SCr 1.3 [01-26 @ 04:30]  SCr 1.6 [01-25 @ 16:45]  SCr 1.9 [01-25 @ 05:00]  SCr 2.8 [01-24 @ 04:30]  SCr 4.2 [01-23 @ 03:00]        Iron 192, TIBC <209, %sat --      [01-25-21 @ 20:00]  Ferritin 86567      [01-23-21 @ 04:30]  TSH 5.78      [01-23-21 @ 03:00]  Lipid: chol 140, , HDL 23, LDL --      [01-23-21 @ 03:00]    HBsAg Nonreact      [01-24-21 @ 17:03]  HCV 0.13, Nonreact      [01-24-21 @ 17:03]

## 2021-01-26 NOTE — PROGRESS NOTE ADULT - ASSESSMENT
75 years old Female with PMhx of ESRD on Tue/Thur/Sat HD, b/l PVD s/p endovascular revascularization, Permanent Afib w/ failed ablation and multiple cardioversion on Eliquis, Aortic stenosis, pulmonary HTN, COPD anemia of chronic disease, CABG, HF with reduced EF and CAD Pt presents after fall     ·	ESRD on HD / on multiple pressors / severe acidemia / lactic acidosis / on CVVHD continue even balance  ·	check daily phosphorus level/ level ok   ·	adjust meropenem patient on cvvhd   ·	remains on pressors   ·	hyponatremic, avoid hypotonic infusions/ better with CVVHD   ·	hypercalcemia / check PTH / vit D   ·	high LFT , seen by GI , ferritin very high   ·	ascites sp paracentesis f/u cx  ·	respiratory failure on MV   ·	prognosis poor

## 2021-01-26 NOTE — PROGRESS NOTE ADULT - ASSESSMENT
septic shock in critical condition, on 3 pressor support, 100% FIO2, on ventilator  Afib tachycardia at 110-120/min secondary to above  shock liver   underlying Cardiomyopathy at 35-40% EF, moderate AS at 24 mmHg gradient), pulmonary HTN at 61 mmHg, severe TR due to Pulmonary HTN, ESRD on HD, acidosis, thrombocytopenia ( possibly early sign for DIC?)    suggest to stop Amiodarone, considering high LFT  try Metoprolol 12.5 qid, via NGT, if not effective ( expectable), try a low dose of Esmolol 50 mcg/min infusion without bolus  repeat COVID PCR test again  this is not a picture of cardiogenic shock due to VHD or Afib or low EF, i have seen her from early presentation to ED, presentation was more in favor of septic shock  very poor prognosis.

## 2021-01-26 NOTE — PROGRESS NOTE ADULT - SUBJECTIVE AND OBJECTIVE BOX
SUBJECTIVE:    Patient is a 75y old Female who presents with a chief complaint of fall and hypotension (26 Jan 2021 12:16)    Currently admitted to medicine with the primary diagnosis of shock with MSOF     Today is hospital day 5d. Patient seen and examined at bedside this AM. On mechanical ventilation. On pressors.    PAST MEDICAL & SURGICAL HISTORY  Swollen feet  ON OCCASSION    HTN (hypertension)    ESRD on hemodialysis  T,T,S    Thyroid nodule    Degenerative joint disease    Osteoarthritis    Diverticulosis    GERD (gastroesophageal reflux disease)    Heart failure with reduced ejection fraction    Pulmonary hypertension, moderate to severe    Aortic stenosis, moderate    Atrial fibrillation  status post cardioversion    MI (myocardial infarction)    CAD (coronary artery disease)    Kidney stone    HTN (hypertension)    S/P ureteral stent placement    History of kidney surgery    S/P cataract surgery    S/P CABG (coronary artery bypass graft)    AV fistula    H/O abdominal hysterectomy    H/O cardiac radiofrequency ablation      SOCIAL HISTORY:  Negative for smoking/alcohol/drug use.     ALLERGIES:  No Known Allergies    MEDICATIONS:  STANDING MEDICATIONS  aMIOdarone Infusion 1 mG/Min IV Continuous <Continuous>  aMIOdarone Infusion 0.501 mG/Min IV Continuous <Continuous>  calcium acetate 667 milliGRAM(s) Oral three times a day with meals  cefepime   IVPB 2000 milliGRAM(s) IV Intermittent once  cefepime   IVPB      chlorhexidine 0.12% Liquid 15 milliLiter(s) Oral Mucosa two times a day  chlorhexidine 4% Liquid 1 Application(s) Topical <User Schedule>  CRRT Treatment    <Continuous>  dexMEDEtomidine Infusion 0.5 MICROgram(s)/kG/Hr IV Continuous <Continuous>  dextrose 5% 1000 milliLiter(s) IV Continuous <Continuous>  fentaNYL   Infusion. 0.501 MICROgram(s)/kG/Hr IV Continuous <Continuous>  hydrocortisone sodium succinate Injectable 50 milliGRAM(s) IV Push every 8 hours  norepinephrine Infusion 0.05 MICROgram(s)/kG/Min IV Continuous <Continuous>  phenylephrine    Infusion 2 MICROgram(s)/kG/Min IV Continuous <Continuous>  propofol Infusion 10 MICROgram(s)/kG/Min IV Continuous <Continuous>  PureFlow Dialysate RFP-400 (K 2 / Ca 3) 5000 milliLiter(s) CRRT <Continuous>  vasopressin Infusion 0.04 Unit(s)/Min IV Continuous <Continuous>    PRN MEDICATIONS    VITALS:   T(F): 96  HR: 116  BP: --  RR: 20  SpO2: 96%    LABS:                        12.9   14.09 )-----------( 36       ( 26 Jan 2021 04:30 )             37.6     01-26    135  |  100  |  17  ----------------------------<  164<H>  3.3<L>   |  17  |  1.3    Ca    10.2<H>      26 Jan 2021 04:30  Phos  3.4     01-26  Mg     1.7     01-26    TPro  5.7<L>  /  Alb  3.4<L>  /  TBili  2.1<H>  /  DBili  x   /  AST  1208<H>  /  ALT  1161<H>  /  AlkPhos  116<H>  01-26    PT/INR - ( 26 Jan 2021 04:30 )   PT: >40.00 sec;   INR: 7.03 ratio             ABG - ( 26 Jan 2021 03:58 )  pH, Arterial: 7.40  pH, Blood: x     /  pCO2: 31    /  pO2: 99    / HCO3: 19    / Base Excess: -4.6  /  SaO2: 97                    CARDIAC MARKERS ( 24 Jan 2021 17:03 )  x     / x     / 696 U/L / x     / x        PHYSICAL EXAM:  GEN: No acute distress  LUNGS: on mechanical ventilation   HEART: S1/S2 present, tachycardic  ABD: Soft  EXT: cyanosis noted  NEURO: sedated    Intravenous access:   NG tube:   [place]ubin Catheter:   Indwelling Urethral Catheter:     Connect To:  Straight Drainage/Gravity    Indication:  Urine Output Monitoring in Critically Ill (01-24-21 @ 05:46) (not performed) [Active]  Indwelling Urethral Catheter:     Connect To:  Straight Drainage/Gravity    Indication:  Urine Output Monitoring in Critically Ill (01-23-21 @ 12:33) (not performed) [Active]

## 2021-01-26 NOTE — PROGRESS NOTE ADULT - ASSESSMENT
75 year old Female with PMhx of ESRD on Tue/Thur/Sat HD, b/l PVD s/p endovascular revascularization, Permanent Afib w/ failed ablation and multiple cardioversion on Eliquis, Aortic stenosis, pulmonary HTN, COPD anemia of chronic disease, CABG, HF with reduced EF and CAD presented after fall. Sepsis present on admission. Found to be in A-fib with RVR on admission. Large volume ascites present on admission s/p paracentesis. Admitted to CCU with shock with MSOF with severe metabolic acidosis/lactic acidosis on pressors. On CVVHD.    #Shock with MSOF with severe metabolic acidosis/lactic acidosis on pressors  - Intubated and sedated  - On multiple pressors  - S/p emergent right fem [place] placement for CVVHD  - No obvious focus of infection  - Cefepime 2g IV Q24H, as per ID  - Check UA/urine culture  - C/w D5W with NaHCO3 150mEq  - Check PTH and vitamin D  - Blood cultures show no growth to date    #Acute transaminitis  #Ascites  - Possibly secondary to ischemic/congestive hepatopathy  - S/p paracentesis  - Pt noted to have ascites even 1 year prior on previous imaging  - Evaluated by GI -> ascites likely secondary to cardiac etiology  - F/u GI labs  - Ascitic fluid results noted -> no evidence of peritonitis, results not consistent with cirrhosis/portal HTN  - F/u ascitic fluid cultures until final    - F/u viral hepatitis panel -> negative  - Monitor daily LFT and INR    #Coagulopathy  #Thrombocytopenia  - Likely secondary to shock and severe acidemia  - Daily PT and INR  - Fibrinogen assay WNL     #Mediastinal adenopathy  - Further evaluation if condition becomes more stable    #Systolic CHF  #Pulmonary HTN  - TTE noted: EF 35-40%, severely enlarged RA, moderately reduced RV systolic function, severely enlarged RV, severe TR, severe pulmonary HTN, severe AS  - Hemodynamic optimization in setting of shock and severe acidemia    #Atrial fibrillation with RVR  - On amiodarone drip    #Cyanosis  - Likely vasopressor-related cyanosis/blue digits  - Vascular surgery eval appreciated  - Pt still requiring pressor support at this time    #Misc  Diet: NPO  DVT PPx: on hold due to elevated INR  Dispo: remain in CCU       75 year old Female with PMhx of ESRD on Tue/Thur/Sat HD, b/l PVD s/p endovascular revascularization, Permanent Afib w/ failed ablation and multiple cardioversion on Eliquis, Aortic stenosis, pulmonary HTN, COPD anemia of chronic disease, CABG, HF with reduced EF and CAD presented after fall. Sepsis present on admission. Found to be in A-fib with RVR on admission. Large volume ascites present on admission s/p paracentesis. Admitted to CCU with shock with MSOF with severe metabolic acidosis/lactic acidosis on pressors. On CVVHD.    #Shock with MSOF with severe metabolic acidosis/lactic acidosis on pressors  - Intubated and sedated  - On multiple pressors  - S/p emergent right fem [place] placement for CVVHD  - No obvious focus of infection  - Cefepime 2g IV Q24H, as per ID  - Check UA/urine culture  - C/w D5W with NaHCO3 150mEq  - Check PTH and vitamin D  - Blood cultures show no growth to date    #Acute transaminitis  #Ascites  - Possibly secondary to ischemic/congestive hepatopathy  - S/p paracentesis  - Pt noted to have ascites even 1 year prior on previous imaging  - Evaluated by GI -> ascites likely secondary to cardiac etiology  - F/u GI labs  - Ascitic fluid results noted -> no evidence of peritonitis, results not consistent with cirrhosis/portal HTN  - F/u ascitic fluid cultures until final    - F/u viral hepatitis panel -> negative  - Monitor daily LFT and INR    #Coagulopathy  #Thrombocytopenia  - Likely secondary to shock and severe acidemia  - Daily PT and INR  - Hematology eval  - Repeat fibrinogen assay     #Mediastinal adenopathy  - Further evaluation if condition becomes more stable    #Systolic CHF  #Pulmonary HTN  - TTE noted: EF 35-40%, severely enlarged RA, moderately reduced RV systolic function, severely enlarged RV, severe TR, severe pulmonary HTN, severe AS  - Hemodynamic optimization in setting of shock and severe acidemia    #Atrial fibrillation with RVR  - On amiodarone drip    #Cyanosis  - Likely vasopressor-related cyanosis/blue digits  - Vascular surgery eval appreciated  - Pt still requiring pressor support at this time    #Misc  Diet: NPO  DVT PPx: on hold due to elevated INR  Dispo: remain in CCU

## 2021-01-26 NOTE — PROGRESS NOTE ADULT - ASSESSMENT
· Assessment	  75 years old Female with PMhx of ESRD on Tue/Thur/Sat HD, b/l PVD s/p endovascular revascularization, Permanent Afib w/ failed ablation and multiple cardioversion on Eliquis, Aortic stenosis, pulmonary HTN, COPD anemia of chronic disease, CABG, HF with reduced EF and CAD Pt presents after fall that happened yesterday at dialysis, pt is on Eliquis.   Collateral history obtained from ED charts. As per the patient she fell to her knees and reports hitting her head at dialysis centre. No LOC or vomiting. Pt was evaluated by the medical team there and was sent home. At the time pt’s fistula was not working but they were able to fix it. Today pt came back to dialysis and received 4 hours of dialysis. At that time it was noted by team members at the dialysis clinic that pt was hypotensive and tachycardic so pt was sent to ED. Denies any fever, cough, chest pain, n/v/d, urinary symptoms or recent weight changes.   In the ED, Pt was found to be hypotensive 67/38, tachycardiac 172, Temp 97.5, was saturating well on 2L NC. Pt was found to be in Atrial fibrillation w/ RVR. Initial trauma workup negative, CT A/P showed Large volume ascites within the abdomen and pelvis. Paratracheal, subcarinal, and right hilar lymphadenopathy is noted Labs were significant for elevated WBC at 14.12, lactate 4.4,    IMPRESSION;  Shock with MSOF with severe metabolic acidosis/lactic acidosis ( 13.7 ) on 3 pressors  No PNA. CXR no focal consolidation. Nares no ORSA  No line infection  Would suggest ischemic gut but based on Hx and PE no evidence of mesenteric ischemia/infarct  No evidence of CD colitis ( clinically or based on CT )  Clinically no SBP . Paracentesis no SBP. Peritoneal fluid cultures NG and numbers not consistent with an infection  Transaminitis : secondary to ischemia . Increasing   infection?  No CNS infection  COVID-19 NG  BCx 1/22,23 NGTD      RECOMMENDATIONS;  UA/UCx  Abdominal sono  cefepime 2 gm iv q24h  d/c vancomycin /meropenem

## 2021-01-26 NOTE — PROGRESS NOTE ADULT - SUBJECTIVE AND OBJECTIVE BOX
NOAH QUIROS  75y, Female    All available historical data reviewed    OVERNIGHT EVENTS:  no fevers  fio2 60%  withdraws to pain  on 3 pressors  ongoing CVVH      ROS:  unable to obtain history secondary to patient's mental status and/or sedation      VITALS:  T(F): 96.6, Max: 97 (01-26-21 @ 00:00)  HR: 118  BP: --  RR: 20Vital Signs Last 24 Hrs  T(C): 35.9 (26 Jan 2021 08:00), Max: 36.1 (26 Jan 2021 00:00)  T(F): 96.6 (26 Jan 2021 08:00), Max: 97 (26 Jan 2021 00:00)  HR: 118 (26 Jan 2021 12:00) (110 - 132)  BP: --  BP(mean): --  RR: 20 (26 Jan 2021 11:00) (12 - 27)  SpO2: 98% (26 Jan 2021 12:00) (97% - 100%)    TESTS & MEASUREMENTS:                        12.9   14.09 )-----------( 36       ( 26 Jan 2021 04:30 )             37.6     01-26    135  |  100  |  17  ----------------------------<  164<H>  3.3<L>   |  17  |  1.3    Ca    10.2<H>      26 Jan 2021 04:30  Phos  3.4     01-26  Mg     1.7     01-26    TPro  5.7<L>  /  Alb  3.4<L>  /  TBili  2.1<H>  /  DBili  x   /  AST  1208<H>  /  ALT  1161<H>  /  AlkPhos  116<H>  01-26    LIVER FUNCTIONS - ( 26 Jan 2021 04:30 )  Alb: 3.4 g/dL / Pro: 5.7 g/dL / ALK PHOS: 116 U/L / ALT: 1161 U/L / AST: 1208 U/L / GGT: x             Culture - Fungal, Body Fluid (collected 01-23-21 @ 14:46)  Source: Peritoneal Peritoneal Fluid  Preliminary Report (01-25-21 @ 09:11):    Testing in progress    Culture - Body Fluid with Gram Stain (collected 01-23-21 @ 14:46)  Source: Peritoneal Peritoneal Fluid  Gram Stain (01-23-21 @ 20:06):    polymorphonuclear leukocytes seen    No organisms seen    by cytocentrifuge  Preliminary Report (01-24-21 @ 16:27):    No growth    Culture - Blood (collected 01-23-21 @ 06:56)  Source: .Blood Blood-Peripheral  Preliminary Report (01-24-21 @ 18:01):    No growth to date.    Culture - Blood (collected 01-22-21 @ 21:10)  Source: .Blood Blood-Peripheral  Preliminary Report (01-24-21 @ 03:10):    No growth to date.    Culture - Blood (collected 01-22-21 @ 21:10)  Source: .Blood Blood-Peripheral  Preliminary Report (01-24-21 @ 03:10):    No growth to date.            RADIOLOGY & ADDITIONAL TESTS:  Personal review of radiological diagnostics performed  Echo and EKG results noted when applicable.     MEDICATIONS:  aMIOdarone Infusion 1 mG/Min IV Continuous <Continuous>  aMIOdarone Infusion 0.501 mG/Min IV Continuous <Continuous>  calcium acetate 667 milliGRAM(s) Oral three times a day with meals  chlorhexidine 0.12% Liquid 15 milliLiter(s) Oral Mucosa two times a day  chlorhexidine 4% Liquid 1 Application(s) Topical <User Schedule>  CRRT Treatment    <Continuous>  CRRT Treatment    <Continuous>  dexMEDEtomidine Infusion 0.5 MICROgram(s)/kG/Hr IV Continuous <Continuous>  dextrose 5% 1000 milliLiter(s) IV Continuous <Continuous>  fentaNYL   Infusion. 0.501 MICROgram(s)/kG/Hr IV Continuous <Continuous>  hydrocortisone sodium succinate Injectable 50 milliGRAM(s) IV Push every 8 hours  magnesium sulfate  IVPB 2 Gram(s) IV Intermittent once  meropenem  IVPB 500 milliGRAM(s) IV Intermittent every 24 hours  norepinephrine Infusion 0.05 MICROgram(s)/kG/Min IV Continuous <Continuous>  phenylephrine    Infusion 2 MICROgram(s)/kG/Min IV Continuous <Continuous>  propofol Infusion 10 MICROgram(s)/kG/Min IV Continuous <Continuous>  PureFlow Dialysate RFP-400 (K 2 / Ca 3) 5000 milliLiter(s) CRRT <Continuous>  PureFlow Dialysate RFP-400 (K 2 / Ca 3) 5000 milliLiter(s) CRRT <Continuous>  vasopressin Infusion 0.04 Unit(s)/Min IV Continuous <Continuous>      ANTIBIOTICS:  meropenem  IVPB 500 milliGRAM(s) IV Intermittent every 24 hours

## 2021-01-27 NOTE — PROGRESS NOTE ADULT - SUBJECTIVE AND OBJECTIVE BOX
SUBJECTIVE:    Patient is a 75y old Female who presents with a chief complaint of fall and hypotension (27 Jan 2021 12:33)    Currently admitted to CCU with the primary diagnosis of shock with multi-system organ failure     Today is hospital day 6d. Patient seen and examined at bedside this AM. No acute overnight events.    PAST MEDICAL & SURGICAL HISTORY  Swollen feet  ON OCCASSION    HTN (hypertension)    ESRD on hemodialysis  T,T,S    Thyroid nodule    Degenerative joint disease    Osteoarthritis    Diverticulosis    GERD (gastroesophageal reflux disease)    Heart failure with reduced ejection fraction    Pulmonary hypertension, moderate to severe    Aortic stenosis, moderate    Atrial fibrillation  status post cardioversion    MI (myocardial infarction)    CAD (coronary artery disease)    Kidney stone    HTN (hypertension)    S/P ureteral stent placement    History of kidney surgery    S/P cataract surgery    S/P CABG (coronary artery bypass graft)    AV fistula    H/O abdominal hysterectomy    H/O cardiac radiofrequency ablation      SOCIAL HISTORY:  Negative for smoking/alcohol/drug use.     ALLERGIES:  No Known Allergies    MEDICATIONS:  STANDING MEDICATIONS  artificial  tears Solution 1 Drop(s) Both EYES every 4 hours  calcium acetate 667 milliGRAM(s) Oral three times a day with meals  cefepime   IVPB 2000 milliGRAM(s) IV Intermittent every 24 hours  cefepime   IVPB      chlorhexidine 0.12% Liquid 15 milliLiter(s) Oral Mucosa two times a day  chlorhexidine 4% Liquid 1 Application(s) Topical <User Schedule>  dexMEDEtomidine Infusion 0.5 MICROgram(s)/kG/Hr IV Continuous <Continuous>  dextrose 5% 1000 milliLiter(s) IV Continuous <Continuous>  fentaNYL   Infusion. 0.501 MICROgram(s)/kG/Hr IV Continuous <Continuous>  hydrocortisone sodium succinate Injectable 50 milliGRAM(s) IV Push every 8 hours  norepinephrine Infusion 0.05 MICROgram(s)/kG/Min IV Continuous <Continuous>  phenylephrine    Infusion 2 MICROgram(s)/kG/Min IV Continuous <Continuous>  vasopressin Infusion 0.04 Unit(s)/Min IV Continuous <Continuous>    PRN MEDICATIONS    VITALS:   T(F): 95.8  HR: 116  BP: --  RR: 14  SpO2: 96%    LABS:                        11.6   15.67 )-----------( 20       ( 27 Jan 2021 04:30 )             33.7     01-27    138  |  102  |  14  ----------------------------<  161<H>  3.1<L>   |  19  |  1.0    Ca    9.8      27 Jan 2021 04:30  Phos  3.1     01-27  Mg     1.7     01-27    TPro  4.9<L>  /  Alb  2.7<L>  /  TBili  2.5<H>  /  DBili  x   /  AST  1737<H>  /  ALT  1528<H>  /  AlkPhos  115  01-27    PT/INR - ( 27 Jan 2021 04:30 )   PT: >40.00 sec;   INR: 5.99 ratio             ABG - ( 27 Jan 2021 03:04 )  pH, Arterial: 7.39  pH, Blood: x     /  pCO2: 37    /  pO2: 208   / HCO3: 22    / Base Excess: -2.2  /  SaO2: 100       PHYSICAL EXAM:  GEN: No acute distress  LUNGS: on mechanical ventilation   HEART: S1/S2 present, tachycardic  ABD: Soft, non-distended  EXT: cyanotic extremities  NEURO: sedated    Intravenous access:   NG tube:   Lubin Catheter:   Indwelling Urethral Catheter:     Connect To:  Straight Drainage/Gravity    Indication:  Urine Output Monitoring in Critically Ill (01-24-21 @ 05:46) (not performed) [Active]  Indwelling Urethral Catheter:     Connect To:  Straight Drainage/Gravity    Indication:  Urine Output Monitoring in Critically Ill (01-23-21 @ 12:33) (not performed) [Active]

## 2021-01-27 NOTE — PROGRESS NOTE ADULT - SUBJECTIVE AND OBJECTIVE BOX
NOAH QUIROS  75y, Female    All available historical data reviewed    OVERNIGHT EVENTS:  no fevers  non responsive  fio2 70%  on CVVH  no diarrhea  on pressors    ROS:  General: Denies rigors, nightsweats  HEENT: Denies headache, rhinorrhea, sore throat, eye pain  CV: Denies CP, palpitations  PULM: Denies wheezing, hemoptysis  GI: Denies hematemesis, hematochezia, melena  : Denies discharge, hematuria  MSK: Denies arthralgias, myalgias  SKIN: Denies rash, lesions  NEURO: Denies paresthesias, weakness  PSYCH: Denies depression, anxiety    VITALS:  T(F): 97.7, Max: 98.8 (01-26-21 @ 18:00)  HR: 116  BP: --  RR: 14Vital Signs Last 24 Hrs  T(C): 36.5 (27 Jan 2021 08:00), Max: 37.1 (26 Jan 2021 18:00)  T(F): 97.7 (27 Jan 2021 08:00), Max: 98.8 (26 Jan 2021 18:00)  HR: 116 (27 Jan 2021 09:00) (110 - 122)  BP: --  BP(mean): --  RR: 14 (27 Jan 2021 09:00) (14 - 22)  SpO2: 96% (26 Jan 2021 16:00) (96% - 96%)    TESTS & MEASUREMENTS:                        11.6   15.67 )-----------( 20       ( 27 Jan 2021 04:30 )             33.7     01-27    138  |  102  |  14  ----------------------------<  161<H>  3.1<L>   |  19  |  1.0    Ca    9.8      27 Jan 2021 04:30  Phos  3.1     01-27  Mg     1.7     01-27    TPro  4.9<L>  /  Alb  2.7<L>  /  TBili  2.5<H>  /  DBili  x   /  AST  1737<H>  /  ALT  1528<H>  /  AlkPhos  115  01-27    LIVER FUNCTIONS - ( 27 Jan 2021 04:30 )  Alb: 2.7 g/dL / Pro: 4.9 g/dL / ALK PHOS: 115 U/L / ALT: 1528 U/L / AST: 1737 U/L / GGT: x             Culture - Fungal, Body Fluid (collected 01-23-21 @ 14:46)  Source: Peritoneal Peritoneal Fluid  Preliminary Report (01-25-21 @ 09:11):    Testing in progress    Culture - Body Fluid with Gram Stain (collected 01-23-21 @ 14:46)  Source: Peritoneal Peritoneal Fluid  Gram Stain (01-23-21 @ 20:06):    polymorphonuclear leukocytes seen    No organisms seen    by cytocentrifuge  Preliminary Report (01-24-21 @ 16:27):    No growth    Culture - Blood (collected 01-23-21 @ 06:56)  Source: .Blood Blood-Peripheral  Preliminary Report (01-24-21 @ 18:01):    No growth to date.    Culture - Blood (collected 01-22-21 @ 21:10)  Source: .Blood Blood-Peripheral  Preliminary Report (01-24-21 @ 03:10):    No growth to date.    Culture - Blood (collected 01-22-21 @ 21:10)  Source: .Blood Blood-Peripheral  Preliminary Report (01-24-21 @ 03:10):    No growth to date.            RADIOLOGY & ADDITIONAL TESTS:  Personal review of radiological diagnostics performed  Echo and EKG results noted when applicable.     MEDICATIONS:  artificial  tears Solution 1 Drop(s) Both EYES every 4 hours  calcium acetate 667 milliGRAM(s) Oral three times a day with meals  cefepime   IVPB 2000 milliGRAM(s) IV Intermittent every 24 hours  cefepime   IVPB      chlorhexidine 0.12% Liquid 15 milliLiter(s) Oral Mucosa two times a day  chlorhexidine 4% Liquid 1 Application(s) Topical <User Schedule>  CRRT Treatment    <Continuous>  dexMEDEtomidine Infusion 0.5 MICROgram(s)/kG/Hr IV Continuous <Continuous>  dextrose 5% 1000 milliLiter(s) IV Continuous <Continuous>  fentaNYL   Infusion. 0.501 MICROgram(s)/kG/Hr IV Continuous <Continuous>  hydrocortisone sodium succinate Injectable 50 milliGRAM(s) IV Push every 8 hours  norepinephrine Infusion 0.05 MICROgram(s)/kG/Min IV Continuous <Continuous>  phenylephrine    Infusion 2 MICROgram(s)/kG/Min IV Continuous <Continuous>  potassium chloride  20 mEq/100 mL IVPB 20 milliEquivalent(s) IV Intermittent every 2 hours  PureFlow Dialysate RFP-400 (K 2 / Ca 3) 5000 milliLiter(s) CRRT <Continuous>  vasopressin Infusion 0.04 Unit(s)/Min IV Continuous <Continuous>      ANTIBIOTICS:  cefepime   IVPB 2000 milliGRAM(s) IV Intermittent every 24 hours  cefepime   IVPB

## 2021-01-27 NOTE — PROGRESS NOTE ADULT - ASSESSMENT
75 years old Female with PMhx of ESRD on Tue/Thur/Sat HD, b/l PVD s/p endovascular revascularization, Permanent Afib w/ failed ablation and multiple cardioversion on Eliquis, Aortic stenosis, pulmonary HTN, COPD anemia of chronic disease, CABG, HF with reduced EF and CAD Pt presents after fall     ·	ESRD on HD / on multiple pressors / severe acidemia / lactic acidosis / on CVVHD continue even balance  ·	check daily phosphorus level/ level ok from today   ·	adjust cefepime  patient on cvvhd   ·	remains on pressors   ·	hypercalcemia / check PTH / vit D   ·	high LFT , seen by GI , ferritin very high   ·	ascites sp paracentesis f/u cx  ·	respiratory failure on MV   ·	prognosis very poor

## 2021-01-27 NOTE — CONSULT NOTE ADULT - SUBJECTIVE AND OBJECTIVE BOX
REQUESTED OF:     Chart reviewed, Hospital Day 5    NOAH QUIROS 75yFemale  HPI:  75 years old Female with PMhx of ESRD on Tue/Thur/Sat HD, b/l PVD s/p endovascular revascularization, Permanent Afib w/ failed ablation and multiple cardioversion on Eliquis, Aortic stenosis, pulmonary HTN, COPD anemia of chronic disease, CABG, HF with reduced EF and CAD Pt presents after fall that happened yesterday at dialysis, pt is on Eliquis.   Collateral history obtained from ED charts. As per the patient she fell to her knees and reports hitting her head at dialysis centre. No LOC or vomiting. Pt was evaluated by the medical team there and was sent home. At the time pt’s fistula was not working but they were able to fix it. Today pt came back to dialysis and received 4 hours of dialysis. At that time it was noted by team members at the dialysis clinic that pt was hypotensive and tachycardic so pt was sent to ED. Denies any fever, cough, chest pain, n/v/d, urinary symptoms or recent weight changes.     In the ED, Pt was found to be hypotensive 67/38, tachycardiac 172, Temp 97.5, was saturating well on 2L NC. Pt was found to be in Atrial fibrillation w/ RVR. Initial trauma workup negative, CT A/P showed Large volume ascites within the abdomen and pelvis. Paratracheal, subcarinal, and right hilar lymphadenopathy is noted Labs were significant for elevated WBC at 14.12, lactate 4.4, Trop elevated at 0.23. Received 750 cc of IV fluid bolus in ED, started on Gavin-synephrine and vasopressin infusion. Cardiology consulted, pt was started on amiodarone and digoxin for afibb w/ RVR. Antibiotics vancomycin and cefepime also administered.    (22 Jan 2021 21:02)        PAST MEDICAL & SURGICAL HISTORY:  Swollen feet  ON OCCASSION    HTN (hypertension)    ESRD on hemodialysis  T,T,S    Thyroid nodule    Degenerative joint disease    Osteoarthritis    Diverticulosis    GERD (gastroesophageal reflux disease)    Heart failure with reduced ejection fraction    Pulmonary hypertension, moderate to severe    Aortic stenosis, moderate    Atrial fibrillation  status post cardioversion    MI (myocardial infarction)    CAD (coronary artery disease)    Kidney stone    HTN (hypertension)    S/P ureteral stent placement    History of kidney surgery    S/P cataract surgery    S/P CABG (coronary artery bypass graft)    AV fistula    H/O abdominal hysterectomy    H/O cardiac radiofrequency ablation        Subjective and Objective:  Today,  Discussed with     Focused Palliative Care Evaluation:                   Symptoms:                                      Pain                                     Dyspnea                                     N/V                                     Appetite                                     Anxiety                                     Other _____________________                     Support Devices:              PHYSICAL EXAM:      Constitutional:    Eyes:    ENMT:    Neck:    Breasts:    Back:    Respiratory:    Cardiovascular:    Gastrointestinal:    Genitourinary:    Rectal:    Extremities:    Vascular:    Neurological:    Skin:    Lymph Nodes:    Musculoskeletal:    Psychiatric:        T(C): 35.4, Max: 37.1 (18:00)  HR: 118 (114 - 122)  BP: --  RR: 14 (14 - 20)  SpO2: 96% (96% - 96%)      LABS/STUDIES:  01-27    138  |  102  |  14  ----------------------------<  161<H>  3.1<L>   |  19  |  1.0    Ca    9.8      27 Jan 2021 04:30  Phos  3.1     01-27  Mg     1.7     01-27    TPro  4.9<L>  /  Alb  2.7<L>  /  TBili  2.5<H>  /  DBili  x   /  AST  1737<H>  /  ALT  1528<H>  /  AlkPhos  115  01-27                            11.6   15.67 )-----------( 20       ( 27 Jan 2021 04:30 )             33.7       MEDICATIONS  (STANDING):  artificial  tears Solution 1 Drop(s) Both EYES every 4 hours  calcium acetate 667 milliGRAM(s) Oral three times a day with meals  cefepime   IVPB 2000 milliGRAM(s) IV Intermittent every 24 hours  cefepime   IVPB      chlorhexidine 0.12% Liquid 15 milliLiter(s) Oral Mucosa two times a day  chlorhexidine 4% Liquid 1 Application(s) Topical <User Schedule>  dexMEDEtomidine Infusion 0.5 MICROgram(s)/kG/Hr (9.04 mL/Hr) IV Continuous <Continuous>  dextrose 5% 1000 milliLiter(s) (75 mL/Hr) IV Continuous <Continuous>  fentaNYL   Infusion. 0.501 MICROgram(s)/kG/Hr (3.62 mL/Hr) IV Continuous <Continuous>  hydrocortisone sodium succinate Injectable 50 milliGRAM(s) IV Push every 8 hours  norepinephrine Infusion 0.05 MICROgram(s)/kG/Min (3.14 mL/Hr) IV Continuous <Continuous>  phenylephrine    Infusion 2 MICROgram(s)/kG/Min (25.1 mL/Hr) IV Continuous <Continuous>  vasopressin Infusion 0.04 Unit(s)/Min (2.4 mL/Hr) IV Continuous <Continuous>    MEDICATIONS  (PRN):          iStop:         PPS  Level    __________       Note PPS = Palliative Performance Scale; (c)2001, [employer]       Range from 100% meaning Full ambulation/self-care/intake/Level of Consciousness                                                                              to        10% meaning Bedbound/Unable to do any activity/extensive disease /Total Care/ No PO intake/ LOC=Full/drowsy/+/-confusion        (0% = death)                     Prior to acute illness, patient's functionality reportedly                                                                                                                                                                                    REQUESTED OF:     Chart reviewed, Hospital Day 5    NOAH QUIROS 75yFemale  HPI:  75 years old Female with PMhx of ESRD on Tue/Thur/Sat HD, b/l PVD s/p endovascular revascularization, Permanent Afib w/ failed ablation and multiple cardioversion on Eliquis, Aortic stenosis, pulmonary HTN, COPD anemia of chronic disease, CABG, HF with reduced EF and CAD Pt presents after fall that happened yesterday at dialysis, pt is on Eliquis.   Collateral history obtained from ED charts. As per the patient she fell to her knees and reports hitting her head at dialysis centre. No LOC or vomiting. Pt was evaluated by the medical team there and was sent home. At the time pt’s fistula was not working but they were able to fix it. Today pt came back to dialysis and received 4 hours of dialysis. At that time it was noted by team members at the dialysis clinic that pt was hypotensive and tachycardic so pt was sent to ED. Denies any fever, cough, chest pain, n/v/d, urinary symptoms or recent weight changes.     In the ED, Pt was found to be hypotensive 67/38, tachycardiac 172, Temp 97.5, was saturating well on 2L NC. Pt was found to be in Atrial fibrillation w/ RVR. Initial trauma workup negative, CT A/P showed Large volume ascites within the abdomen and pelvis. Paratracheal, subcarinal, and right hilar lymphadenopathy is noted Labs were significant for elevated WBC at 14.12, lactate 4.4, Trop elevated at 0.23. Received 750 cc of IV fluid bolus in ED, started on Gavin-synephrine and vasopressin infusion. Cardiology consulted, pt was started on amiodarone and digoxin for afibb w/ RVR. Antibiotics vancomycin and cefepime also administered.    (22 Jan 2021 21:02)        PAST MEDICAL & SURGICAL HISTORY:  Swollen feet  ON OCCASSION    HTN (hypertension)    ESRD on hemodialysis  T,T,S    Thyroid nodule    Degenerative joint disease    Osteoarthritis    Diverticulosis    GERD (gastroesophageal reflux disease)    Heart failure with reduced ejection fraction    Pulmonary hypertension, moderate to severe    Aortic stenosis, moderate    Atrial fibrillation  status post cardioversion    MI (myocardial infarction)    CAD (coronary artery disease)    Kidney stone    HTN (hypertension)    S/P ureteral stent placement    History of kidney surgery    S/P cataract surgery    S/P CABG (coronary artery bypass graft)    AV fistula    H/O abdominal hysterectomy    H/O cardiac radiofrequency ablation    Subjective and Objective: Patient seen and   Today,  Discussed with     Focused Palliative Care Evaluation:                   Symptoms:                                      Pain                                     Dyspnea                                     N/V                                     Appetite                                     Anxiety                                     Other _____________________                     Support Devices:              PHYSICAL EXAM:      Constitutional:    Eyes:    ENMT:    Neck:    Breasts:    Back:    Respiratory:    Cardiovascular:    Gastrointestinal:    Genitourinary:    Rectal:    Extremities:    Vascular:    Neurological:    Skin:    Lymph Nodes:    Musculoskeletal:    Psychiatric:        T(C): 35.4, Max: 37.1 (18:00)  HR: 118 (114 - 122)  BP: --  RR: 14 (14 - 20)  SpO2: 96% (96% - 96%)      LABS/STUDIES:  01-27    138  |  102  |  14  ----------------------------<  161<H>  3.1<L>   |  19  |  1.0    Ca    9.8      27 Jan 2021 04:30  Phos  3.1     01-27  Mg     1.7     01-27    TPro  4.9<L>  /  Alb  2.7<L>  /  TBili  2.5<H>  /  DBili  x   /  AST  1737<H>  /  ALT  1528<H>  /  AlkPhos  115  01-27                            11.6   15.67 )-----------( 20       ( 27 Jan 2021 04:30 )             33.7       MEDICATIONS  (STANDING):  artificial  tears Solution 1 Drop(s) Both EYES every 4 hours  calcium acetate 667 milliGRAM(s) Oral three times a day with meals  cefepime   IVPB 2000 milliGRAM(s) IV Intermittent every 24 hours  cefepime   IVPB      chlorhexidine 0.12% Liquid 15 milliLiter(s) Oral Mucosa two times a day  chlorhexidine 4% Liquid 1 Application(s) Topical <User Schedule>  dexMEDEtomidine Infusion 0.5 MICROgram(s)/kG/Hr (9.04 mL/Hr) IV Continuous <Continuous>  dextrose 5% 1000 milliLiter(s) (75 mL/Hr) IV Continuous <Continuous>  fentaNYL   Infusion. 0.501 MICROgram(s)/kG/Hr (3.62 mL/Hr) IV Continuous <Continuous>  hydrocortisone sodium succinate Injectable 50 milliGRAM(s) IV Push every 8 hours  norepinephrine Infusion 0.05 MICROgram(s)/kG/Min (3.14 mL/Hr) IV Continuous <Continuous>  phenylephrine    Infusion 2 MICROgram(s)/kG/Min (25.1 mL/Hr) IV Continuous <Continuous>  vasopressin Infusion 0.04 Unit(s)/Min (2.4 mL/Hr) IV Continuous <Continuous>    MEDICATIONS  (PRN):          iStop:         PPS  Level    __________       Note PPS = Palliative Performance Scale; (c)2001, [employer]       Range from 100% meaning Full ambulation/self-care/intake/Level of Consciousness                                                                              to        10% meaning Bedbound/Unable to do any activity/extensive disease /Total Care/ No PO intake/ LOC=Full/drowsy/+/-confusion        (0% = death)                     Prior to acute illness, patient's functionality reportedly                                                                                                                                                                                    REQUESTED OF:     Chart reviewed, Hospital Day 5    NOAH QUIROS 75yFemale  HPI:  75 years old Female with PMhx of ESRD on Tue/Thur/Sat HD, b/l PVD s/p endovascular revascularization, Permanent Afib w/ failed ablation and multiple cardioversion on Eliquis, Aortic stenosis, pulmonary HTN, COPD anemia of chronic disease, CABG, HF with reduced EF and CAD Pt presents after fall that happened yesterday at dialysis, pt is on Eliquis.   Collateral history obtained from ED charts. As per the patient she fell to her knees and reports hitting her head at dialysis centre. No LOC or vomiting. Pt was evaluated by the medical team there and was sent home. At the time pt’s fistula was not working but they were able to fix it. Today pt came back to dialysis and received 4 hours of dialysis. At that time it was noted by team members at the dialysis clinic that pt was hypotensive and tachycardic so pt was sent to ED. Denies any fever, cough, chest pain, n/v/d, urinary symptoms or recent weight changes.     In the ED, Pt was found to be hypotensive 67/38, tachycardiac 172, Temp 97.5, was saturating well on 2L NC. Pt was found to be in Atrial fibrillation w/ RVR. Initial trauma workup negative, CT A/P showed Large volume ascites within the abdomen and pelvis. Paratracheal, subcarinal, and right hilar lymphadenopathy is noted Labs were significant for elevated WBC at 14.12, lactate 4.4, Trop elevated at 0.23. Received 750 cc of IV fluid bolus in ED, started on Gavin-synephrine and vasopressin infusion. Cardiology consulted, pt was started on amiodarone and digoxin for afibb w/ RVR. Antibiotics vancomycin and cefepime also administered.    (22 Jan 2021 21:02)    PAST MEDICAL & SURGICAL HISTORY:  Swollen feet  ON OCCASSION    HTN (hypertension)    ESRD on hemodialysis  T,T,S    Thyroid nodule    Degenerative joint disease    Osteoarthritis    Diverticulosis    GERD (gastroesophageal reflux disease)    Heart failure with reduced ejection fraction    Pulmonary hypertension, moderate to severe    Aortic stenosis, moderate    Atrial fibrillation  status post cardioversion    MI (myocardial infarction)    CAD (coronary artery disease)    Kidney stone    HTN (hypertension)    S/P ureteral stent placement    History of kidney surgery    S/P cataract surgery    S/P CABG (coronary artery bypass graft)    AV fistula    H/O abdominal hysterectomy    H/O cardiac radiofrequency ablation    Subjective and Objective: Patient seen and   Today,  Discussed with     Focused Palliative Care Evaluation:                   Symptoms:                                      Pain                                     Dyspnea                                     N/V                                     Appetite                                     Anxiety                                     Other _____________________                     Support Devices:              PHYSICAL EXAM:      Constitutional:    Eyes:    ENMT:    Neck:    Breasts:    Back:    Respiratory:    Cardiovascular:    Gastrointestinal:    Genitourinary:    Rectal:    Extremities:    Vascular:    Neurological:    Skin:    Lymph Nodes:    Musculoskeletal:    Psychiatric:        T(C): 35.4, Max: 37.1 (18:00)  HR: 118 (114 - 122)  BP: --  RR: 14 (14 - 20)  SpO2: 96% (96% - 96%)      LABS/STUDIES:  01-27    138  |  102  |  14  ----------------------------<  161<H>  3.1<L>   |  19  |  1.0    Ca    9.8      27 Jan 2021 04:30  Phos  3.1     01-27  Mg     1.7     01-27    TPro  4.9<L>  /  Alb  2.7<L>  /  TBili  2.5<H>  /  DBili  x   /  AST  1737<H>  /  ALT  1528<H>  /  AlkPhos  115  01-27                            11.6   15.67 )-----------( 20       ( 27 Jan 2021 04:30 )             33.7       MEDICATIONS  (STANDING):  artificial  tears Solution 1 Drop(s) Both EYES every 4 hours  calcium acetate 667 milliGRAM(s) Oral three times a day with meals  cefepime   IVPB 2000 milliGRAM(s) IV Intermittent every 24 hours  cefepime   IVPB      chlorhexidine 0.12% Liquid 15 milliLiter(s) Oral Mucosa two times a day  chlorhexidine 4% Liquid 1 Application(s) Topical <User Schedule>  dexMEDEtomidine Infusion 0.5 MICROgram(s)/kG/Hr (9.04 mL/Hr) IV Continuous <Continuous>  dextrose 5% 1000 milliLiter(s) (75 mL/Hr) IV Continuous <Continuous>  fentaNYL   Infusion. 0.501 MICROgram(s)/kG/Hr (3.62 mL/Hr) IV Continuous <Continuous>  hydrocortisone sodium succinate Injectable 50 milliGRAM(s) IV Push every 8 hours  norepinephrine Infusion 0.05 MICROgram(s)/kG/Min (3.14 mL/Hr) IV Continuous <Continuous>  phenylephrine    Infusion 2 MICROgram(s)/kG/Min (25.1 mL/Hr) IV Continuous <Continuous>  vasopressin Infusion 0.04 Unit(s)/Min (2.4 mL/Hr) IV Continuous <Continuous>    MEDICATIONS  (PRN):            PPS  Level  10%       Note PPS = Palliative Performance Scale; (c)2001, [employer]       Range from 100% meaning Full ambulation/self-care/intake/Level of Consciousness                                                                              to        10% meaning Bedbound/Unable to do any activity/extensive disease /Total Care/ No PO intake/ LOC=Full/drowsy/+/-confusion        (0% = death)                     Prior to acute illness, patient's functionality reportedly                                                                                                                                                                                    REQUESTED OF:     Chart reviewed, Hospital Day 5    NOAH QUIROS 75yFemale  HPI:  75 years old Female with PMhx of ESRD on Tue/Thur/Sat HD, b/l PVD s/p endovascular revascularization, Permanent Afib w/ failed ablation and multiple cardioversion on Eliquis, Aortic stenosis, pulmonary HTN, COPD anemia of chronic disease, CABG, HF with reduced EF and CAD Pt presents after fall that happened yesterday at dialysis, pt is on Eliquis.   Collateral history obtained from ED charts. As per the patient she fell to her knees and reports hitting her head at dialysis centre. No LOC or vomiting. Pt was evaluated by the medical team there and was sent home. At the time pt’s fistula was not working but they were able to fix it. Today pt came back to dialysis and received 4 hours of dialysis. At that time it was noted by team members at the dialysis clinic that pt was hypotensive and tachycardic so pt was sent to ED. Denies any fever, cough, chest pain, n/v/d, urinary symptoms or recent weight changes.     In the ED, Pt was found to be hypotensive 67/38, tachycardiac 172, Temp 97.5, was saturating well on 2L NC. Pt was found to be in Atrial fibrillation w/ RVR. Initial trauma workup negative, CT A/P showed Large volume ascites within the abdomen and pelvis. Paratracheal, subcarinal, and right hilar lymphadenopathy is noted Labs were significant for elevated WBC at 14.12, lactate 4.4, Trop elevated at 0.23. Received 750 cc of IV fluid bolus in ED, started on Gavin-synephrine and vasopressin infusion. Cardiology consulted, pt was started on amiodarone and digoxin for afibb w/ RVR. Antibiotics vancomycin and cefepime also administered.    (22 Jan 2021 21:02)    PAST MEDICAL & SURGICAL HISTORY:  Swollen feet  ON OCCASSION    HTN (hypertension)    ESRD on hemodialysis  T,T,S    Thyroid nodule    Degenerative joint disease    Osteoarthritis    Diverticulosis    GERD (gastroesophageal reflux disease)    Heart failure with reduced ejection fraction    Pulmonary hypertension, moderate to severe    Aortic stenosis, moderate    Atrial fibrillation  status post cardioversion    MI (myocardial infarction)    CAD (coronary artery disease)    Kidney stone    HTN (hypertension)    S/P ureteral stent placement    History of kidney surgery    S/P cataract surgery    S/P CABG (coronary artery bypass graft)    AV fistula    H/O abdominal hysterectomy    H/O cardiac radiofrequency ablation    Subjective and Objective: Patient seen and examined at bedside. Patient intubated. No distress noted.   Today,  Discussed with resident Dr Rodgers and daughter Rabia    Focused Palliative Care Evaluation:  Not able to assess                  Symptoms:                                      Pain                                     Dyspnea                                     N/V                                     Appetite                                     Anxiety                                     Other _____________________                     Support Devices:  Vented           PHYSICAL EXAM:      Constitutional: Intubated, appears critically ill     Eyes: closed    ENMT: Atraumatic, normocephalic     Neck: trachea midline    Respiratory: vented BS    Cardiovascular: s1, S2, no M/R/G     Gastrointestinal: soft, not tender     Genitourinary: simeon       T(C): 35.4, Max: 37.1 (18:00)  HR: 118 (114 - 122)  BP: --  RR: 14 (14 - 20)  SpO2: 96% (96% - 96%)      LABS/STUDIES:  01-27    138  |  102  |  14  ----------------------------<  161<H>  3.1<L>   |  19  |  1.0    Ca    9.8      27 Jan 2021 04:30  Phos  3.1     01-27  Mg     1.7     01-27    TPro  4.9<L>  /  Alb  2.7<L>  /  TBili  2.5<H>  /  DBili  x   /  AST  1737<H>  /  ALT  1528<H>  /  AlkPhos  115  01-27                            11.6   15.67 )-----------( 20       ( 27 Jan 2021 04:30 )             33.7       MEDICATIONS  (STANDING):  artificial  tears Solution 1 Drop(s) Both EYES every 4 hours  calcium acetate 667 milliGRAM(s) Oral three times a day with meals  cefepime   IVPB 2000 milliGRAM(s) IV Intermittent every 24 hours  cefepime   IVPB      chlorhexidine 0.12% Liquid 15 milliLiter(s) Oral Mucosa two times a day  chlorhexidine 4% Liquid 1 Application(s) Topical <User Schedule>  dexMEDEtomidine Infusion 0.5 MICROgram(s)/kG/Hr (9.04 mL/Hr) IV Continuous <Continuous>  dextrose 5% 1000 milliLiter(s) (75 mL/Hr) IV Continuous <Continuous>  fentaNYL   Infusion. 0.501 MICROgram(s)/kG/Hr (3.62 mL/Hr) IV Continuous <Continuous>  hydrocortisone sodium succinate Injectable 50 milliGRAM(s) IV Push every 8 hours  norepinephrine Infusion 0.05 MICROgram(s)/kG/Min (3.14 mL/Hr) IV Continuous <Continuous>  phenylephrine    Infusion 2 MICROgram(s)/kG/Min (25.1 mL/Hr) IV Continuous <Continuous>  vasopressin Infusion 0.04 Unit(s)/Min (2.4 mL/Hr) IV Continuous <Continuous>    MEDICATIONS  (PRN):        PPS  Level  10%       Note PPS = Palliative Performance Scale; (c)2001, [employer]       Range from 100% meaning Full ambulation/self-care/intake/Level of Consciousness                                                                              to        10% meaning Bedbound/Unable to do any activity/extensive disease /Total Care/ No PO intake/ LOC=Full/drowsy/+/-confusion        (0% = death)                     Prior to acute illness, patient's functionality reportedly low

## 2021-01-27 NOTE — PROGRESS NOTE ADULT - SUBJECTIVE AND OBJECTIVE BOX
DIAGNOSIS:   HOSPITAL DAY #:    STATUS POST:    POST OPERATIVE DAY #:     Vital Signs Last 24 Hrs  T(C): 36.5 (27 Jan 2021 20:00), Max: 36.6 (27 Jan 2021 16:00)  T(F): 97.7 (27 Jan 2021 20:00), Max: 97.8 (27 Jan 2021 16:00)  HR: 118 (27 Jan 2021 21:00) (112 - 122)  BP: --  BP(mean): --  RR: 13 (27 Jan 2021 21:00) (13 - 20)  SpO2: --    SUBJECTIVE: Pt seen    Pain: YES  [ ]   NO [ ]   Nausea: [ ] YES [ ] NO           Vomiting: [ ] YES [ ] NO  Flatus: [ ] YES [ ] NO             Bowel Movement: [ ] YES [ ] NO     Void: [ ]YES [ ]No      OWEN DRAINAGE: SIGNIFICANT [ ]   NOT SIGNIFICANT [ ]   NOT APPLICABLE [ ]  YES [ ] NO    General Appearance: Appears well, NAD  Neck: Supple  Chest: Equal expansion bilaterally, equal breath sounds  CV: Pulse regular presently  Abdomen: Soft [ ] YES [ ]NO  DISTENDED [ ] YES [ ] NO TENDERNESS [ ]YES [ ]NO  INCISIONS: HEALING WELL [ ] YES  [ ] NO ERYTHEMA [ ] YES [ ] NO DRAINAGE [ ] YES  [ ] NO  Extremities: Grossly symmetric, CALF TENDERNESS [ ] YES  [ ] NO      LABS:                        11.2   18.67 )-----------( 15       ( 27 Jan 2021 17:17 )             34.0     01-27    138  |  102  |  14  ----------------------------<  161<H>  3.1<L>   |  19  |  1.0    Ca    9.8      27 Jan 2021 04:30  Phos  3.1     01-27  Mg     1.7     01-27    TPro  4.9<L>  /  Alb  2.7<L>  /  TBili  2.5<H>  /  DBili  x   /  AST  1737<H>  /  ALT  1528<H>  /  AlkPhos  115  01-27    PT/INR - ( 27 Jan 2021 17:17 )   PT: >40.00 sec;   INR: 5.17 ratio         PTT - ( 27 Jan 2021 17:17 )  PTT:40.4 sec        ASSESSMENT: renal follow up noted    GOOD POST OP COURSE [ ]  YES  [ ] NO  CONDITION IMPROVING  []  YES  [ ]  NO          PLAN:    CONTINUE PRESENT MANAGEMENT  [ ] YES  [ ] NO

## 2021-01-27 NOTE — CONSULT NOTE ADULT - PROVIDER SPECIALTY LIST ADULT
Nephrology
Palliative Care
Pulmonology
Heme/Onc
Surgery
Gastroenterology
Cardiology
Vascular Surgery
Infectious Disease

## 2021-01-27 NOTE — PROGRESS NOTE ADULT - ASSESSMENT
75 year old Female with PMhx of ESRD on Tue/Thur/Sat HD, b/l PVD s/p endovascular revascularization, Permanent Afib w/ failed ablation and multiple cardioversion on Eliquis, Aortic stenosis, pulmonary HTN, COPD anemia of chronic disease, CABG, HF with reduced EF and CAD presented after fall. Sepsis present on admission. Found to be in A-fib with RVR on admission. Large volume ascites present on admission s/p paracentesis. Admitted to CCU with shock with MSOF with severe metabolic acidosis/lactic acidosis on pressors. On CVVHD.    #Shock with MSOF with severe metabolic acidosis/lactic acidosis on pressors  - Intubated and sedated  - On multiple pressors  - S/p emergent right fem [place] placement for CVVHD  - No obvious focus of infection  - Cefepime 2g IV Q24H, as per ID  - Check UA/urine culture  - C/w D5W with NaHCO3 150mEq  - Check PTH and vitamin D  - Blood cultures show no growth to date    #Acute transaminitis  #Ascites  - Possibly secondary to ischemic/congestive hepatopathy  - S/p paracentesis  - Pt noted to have ascites even 1 year prior on previous imaging  - Evaluated by GI -> ascites likely secondary to cardiac etiology  - F/u GI labs  - Ascitic fluid results noted -> no evidence of peritonitis, results not consistent with cirrhosis/portal HTN  - F/u ascitic fluid cultures until final    - F/u viral hepatitis panel -> negative  - Monitor daily LFT and INR    #Coagulopathy  #Thrombocytopenia  - Likely secondary to shock and severe acidemia  - Daily PT and INR  - Hematology eval  - Repeat fibrinogen assay     #Mediastinal adenopathy  - Further evaluation if condition becomes more stable    #Systolic CHF  #Pulmonary HTN  - TTE noted: EF 35-40%, severely enlarged RA, moderately reduced RV systolic function, severely enlarged RV, severe TR, severe pulmonary HTN, severe AS  - Hemodynamic optimization in setting of shock and severe acidemia    #Atrial fibrillation with RVR  - On amiodarone drip    #Cyanosis  - Likely vasopressor-related cyanosis/blue digits  - Vascular surgery eval appreciated  - Pt still requiring pressor support at this time    #Misc  Diet: NPO  DVT PPx: on hold due to elevated INR  Dispo: remain in CCU     75 year old Female with PMhx of ESRD on Tue/Thur/Sat HD, b/l PVD s/p endovascular revascularization, Permanent Afib w/ failed ablation and multiple cardioversion on Eliquis, Aortic stenosis, pulmonary HTN, COPD anemia of chronic disease, CABG, HF with reduced EF and CAD presented after fall. Sepsis present on admission. Found to be in A-fib with RVR on admission. Large volume ascites present on admission s/p paracentesis. Admitted to CCU with shock with MSOF with severe metabolic acidosis/lactic acidosis on pressors. On CVVHD.    #Shock with MSOF with severe metabolic acidosis/lactic acidosis on pressors  - Intubated and sedated  - On multiple pressors  - S/p emergent right fem [place] placement for CVVHD  - No obvious focus of infection  - Cefepime 2g IV Q24H, as per ID  - Check UA/urine culture  - C/w D5W with NaHCO3 150mEq  - PTH elevated; vitamin D levels decreased  - Blood cultures show no growth to date  - F/u repeat cultures  - F/u COVID PCR  - No CNS infection, as per ID  - Risks of performing lumbar puncture outweigh benefits at this time    #Acute transaminitis  #Ascites  - Possibly secondary to ischemic/congestive hepatopathy, amiodarone toxicity  - Cardio eval appreciated -> amiodarone stopped  - S/p paracentesis  - Pt noted to have ascites even 1 year prior on previous imaging  - Evaluated by GI -> ascites likely secondary to cardiac etiology  - F/u GI labs  - Ascitic fluid results noted -> no evidence of peritonitis, results not consistent with cirrhosis/portal HTN  - F/u ascitic fluid cultures until final    - F/u viral hepatitis panel -> negative  - Monitor daily LFT and INR    #Coagulopathy  #Thrombocytopenia  - Likely secondary to shock and severe acidemia  - Daily PT and INR  - Daily fibrinogen and D-dimer  - DIC panel  - Send HIT  - Transfuse PLT if PLT < 20 or if active bleed  - F/u hematology eval    #Mediastinal adenopathy  - Further evaluation if condition becomes more stable    #Systolic CHF  #Pulmonary HTN  - TTE noted: EF 35-40%, severely enlarged RA, moderately reduced RV systolic function, severely enlarged RV, severe TR, severe pulmonary HTN, severe AS  - Hemodynamic optimization in setting of shock and severe acidemia    #Atrial fibrillation with RVR  - Amiodarone stopped due to high LFT's  - Try metoprolol 12.5mg QID    #Cyanosis  - Vasopressor-related cyanosis/blue digits  - Vascular surgery eval appreciated  - Pt still requiring pressor support at this time    #Misc  Diet: NPO  DVT PPx: on hold due to elevated INR  Dispo: remain in CCU     75 year old Female with PMhx of ESRD on Tue/Thur/Sat HD, b/l PVD s/p endovascular revascularization, Permanent Afib w/ failed ablation and multiple cardioversion on Eliquis, Aortic stenosis, pulmonary HTN, COPD anemia of chronic disease, CABG, HF with reduced EF and CAD presented after fall. Sepsis present on admission. Found to be in A-fib with RVR on admission. Large volume ascites present on admission s/p paracentesis. Admitted to CCU with shock with MSOF with severe metabolic acidosis/lactic acidosis on pressors. On CVVHD.    #Shock with MSOF with severe metabolic acidosis/lactic acidosis on pressors  - Intubated and sedated  - On multiple pressors  - S/p emergent right fem [place] placement for CVVHD  - No obvious focus of infection  - Cefepime 2g IV Q24H, as per ID  - Check UA/urine culture  - C/w D5W with NaHCO3 150mEq  - PTH elevated; vitamin D levels decreased  - Blood cultures show no growth to date  - F/u repeat cultures  - F/u COVID PCR  - No CNS infection, as per ID  - Risks of performing lumbar puncture outweigh benefits at this time    #Acute transaminitis  #Ascites  - Possibly secondary to ischemic/congestive hepatopathy, amiodarone toxicity  - Cardio eval appreciated -> amiodarone stopped  - S/p paracentesis  - Pt noted to have ascites even 1 year prior on previous imaging  - Evaluated by GI -> ascites likely secondary to cardiac etiology  - F/u GI labs  - Ascitic fluid results noted -> no evidence of peritonitis, results not consistent with cirrhosis/portal HTN  - F/u ascitic fluid cultures until final    - F/u viral hepatitis panel -> negative  - Monitor daily LFT and INR    #Coagulopathy  #Thrombocytopenia  - Likely secondary to shock and severe acidemia  - Daily PT and INR  - Daily fibrinogen and D-dimer  - DIC panel  - Send HIT  - Transfuse PLT if PLT < 20 or if active bleed  - F/u hematology eval    #Mediastinal adenopathy  - Further evaluation if condition becomes more stable    #Systolic CHF  #Pulmonary HTN  - TTE noted: EF 35-40%, severely enlarged RA, moderately reduced RV systolic function, severely enlarged RV, severe TR, severe pulmonary HTN, severe AS  - Hemodynamic optimization in setting of shock and severe acidemia    #Atrial fibrillation with RVR  - Amiodarone stopped due to high LFT's  - BP not able to tolerate metoprolol    #Cyanosis  - Vasopressor-related cyanosis/blue digits  - Vascular surgery eval appreciated  - Pt still requiring pressor support at this time    #Misc  Diet: NPO  DVT PPx: on hold due to elevated INR  Dispo: remain in CCU

## 2021-01-27 NOTE — CONSULT NOTE ADULT - CONSULT REQUESTED DATE/TIME
23-Jan-2021 16:22
27-Jan-2021 15:43
23-Jan-2021 15:32
25-Jan-2021 14:16
23-Jan-2021 09:58
27-Jan-2021 12:33
23-Jan-2021 09:12
22-Jan-2021 00:00
23-Jan-2021 09:16

## 2021-01-27 NOTE — PROGRESS NOTE ADULT - SUBJECTIVE AND OBJECTIVE BOX
Nephrology progress note    THIS IS AN INCOMPLETE NOTE . FULL NOTE TO FOLLOW SHORTLY    Patient is seen and examined, events over the last 24 h noted .    Allergies:  No Known Allergies    Hospital Medications:   MEDICATIONS  (STANDING):  artificial  tears Solution 1 Drop(s) Both EYES every 4 hours  calcium acetate 667 milliGRAM(s) Oral three times a day with meals  cefepime   IVPB 2000 milliGRAM(s) IV Intermittent every 24 hours  cefepime   IVPB      chlorhexidine 0.12% Liquid 15 milliLiter(s) Oral Mucosa two times a day  chlorhexidine 4% Liquid 1 Application(s) Topical <User Schedule>  CRRT Treatment    <Continuous>  dexMEDEtomidine Infusion 0.5 MICROgram(s)/kG/Hr (9.04 mL/Hr) IV Continuous <Continuous>  dextrose 5% 1000 milliLiter(s) (75 mL/Hr) IV Continuous <Continuous>  fentaNYL   Infusion. 0.501 MICROgram(s)/kG/Hr (3.62 mL/Hr) IV Continuous <Continuous>  hydrocortisone sodium succinate Injectable 50 milliGRAM(s) IV Push every 8 hours  norepinephrine Infusion 0.05 MICROgram(s)/kG/Min (3.14 mL/Hr) IV Continuous <Continuous>  phenylephrine    Infusion 2 MICROgram(s)/kG/Min (25.1 mL/Hr) IV Continuous <Continuous>  potassium chloride  20 mEq/100 mL IVPB 20 milliEquivalent(s) IV Intermittent every 2 hours  PureFlow Dialysate RFP-400 (K 2 / Ca 3) 5000 milliLiter(s) (2000 mL/Hr) CRRT <Continuous>  vasopressin Infusion 0.04 Unit(s)/Min (2.4 mL/Hr) IV Continuous <Continuous>        VITALS:  T(F): 97.7 (01-27-21 @ 08:00), Max: 98.8 (01-26-21 @ 18:00)  HR: 118 (01-27-21 @ 08:00)  BP: --  RR: 15 (01-27-21 @ 08:00)  SpO2: 96% (01-26-21 @ 16:00)  Wt(kg): --    01-25 @ 07:01  -  01-26 @ 07:00  --------------------------------------------------------  IN: 8647 mL / OUT: 8067 mL / NET: 580 mL    01-26 @ 07:01 - 01-27 @ 07:00  --------------------------------------------------------  IN: 8687.5 mL / OUT: 5122 mL / NET: 3565.5 mL    01-27 @ 07:01 - 01-27 @ 09:13  --------------------------------------------------------  IN: 401.7 mL / OUT: 258 mL / NET: 143.7 mL          PHYSICAL EXAM:  Constitutional: NAD  HEENT: anicteric sclera, oropharynx clear, MMM  Neck: No JVD  Respiratory: CTAB, no wheezes, rales or rhonchi  Cardiovascular: S1, S2, RRR  Gastrointestinal: BS+, soft, NT/ND  Extremities: No cyanosis or clubbing. No peripheral edema  :  No simeon.   Skin: No rashes    LABS:  01-27    138  |  102  |  14  ----------------------------<  161<H>  3.1<L>   |  19  |  1.0    Ca    9.8      27 Jan 2021 04:30  Phos  3.1     01-27  Mg     1.7     01-27    TPro  4.9<L>  /  Alb  2.7<L>  /  TBili  2.5<H>  /  DBili      /  AST  1737<H>  /  ALT  1528<H>  /  AlkPhos  115  01-27                          11.6   15.67 )-----------( 20       ( 27 Jan 2021 04:30 )             33.7       Urine Studies:      RADIOLOGY & ADDITIONAL STUDIES:   Nephrology progress note  Patient is seen and examined, events over the last 24 h noted .  still on CVVHD     Allergies:  No Known Allergies    Hospital Medications:   MEDICATIONS  (STANDING):  artificial  tears Solution 1 Drop(s) Both EYES every 4 hours  calcium acetate 667 milliGRAM(s) Oral three times a day with meals  cefepime   IVPB 2000 milliGRAM(s) IV Intermittent every 24 hours  CRRT Treatment    <Continuous>  dexMEDEtomidine Infusion 0.5 MICROgram(s)/kG/Hr (9.04 mL/Hr) IV Continuous <Continuous>  dextrose 5% 1000 milliLiter(s) (75 mL/Hr) IV Continuous <Continuous>  fentaNYL   Infusion. 0.501 MICROgram(s)/kG/Hr (3.62 mL/Hr) IV Continuous <Continuous>  hydrocortisone sodium succinate Injectable 50 milliGRAM(s) IV Push every 8 hours  norepinephrine Infusion 0.05 MICROgram(s)/kG/Min (3.14 mL/Hr) IV Continuous <Continuous>  phenylephrine    Infusion 2 MICROgram(s)/kG/Min (25.1 mL/Hr) IV Continuous <Continuous>  potassium chloride  20 mEq/100 mL IVPB 20 milliEquivalent(s) IV Intermittent every 2 hours  PureFlow Dialysate RFP-400 (K 2 / Ca 3) 5000 milliLiter(s) (2000 mL/Hr) CRRT <Continuous>  vasopressin Infusion 0.04 Unit(s)/Min (2.4 mL/Hr) IV Continuous <Continuous>        VITALS:  T(F): 97.7 (01-27-21 @ 08:00), Max: 98.8 (01-26-21 @ 18:00)  HR: 118 (01-27-21 @ 08:00)  RR: 15 (01-27-21 @ 08:00)  SpO2: 96% (01-26-21 @ 16:00)  Wt(kg): --    01-25 @ 07:01  -  01-26 @ 07:00  --------------------------------------------------------  IN: 8647 mL / OUT: 8067 mL / NET: 580 mL    01-26 @ 07:01  -  01-27 @ 07:00  --------------------------------------------------------  IN: 8687.5 mL / OUT: 5122 mL / NET: 3565.5 mL    01-27 @ 07:01 - 01-27 @ 09:13  --------------------------------------------------------  IN: 401.7 mL / OUT: 258 mL / NET: 143.7 mL          PHYSICAL EXAM:  Constitutional: intubated on MV   Neck: No JVD  Respiratory: Crackles atbase   Cardiovascular: S1, S2, RRR  Gastrointestinal: BS+, soft, NT/ND  Extremities: cyanosis and bluish discoloration   :  No simeon.   Skin: No rashes    LABS:  01-27    138  |  102  |  14  ----------------------------<  161<H>  3.1<L>   |  19  |  1.0    Ca    9.8      27 Jan 2021 04:30  Phos  3.1     01-27  Mg     1.7     01-27    TPro  4.9<L>  /  Alb  2.7<L>  /  TBili  2.5<H>  /  DBili      /  AST  1737<H>  /  ALT  1528<H>  /  AlkPhos  115  01-27                          11.6   15.67 )-----------( 20       ( 27 Jan 2021 04:30 )             33.7       Urine Studies:      RADIOLOGY & ADDITIONAL STUDIES:

## 2021-01-27 NOTE — CONSULT NOTE ADULT - ASSESSMENT
75 year old Female with PMhx of ESRD on Tue/Thur/Sat HD, b/l PVD s/p endovascular revascularization, Permanent Afib w/ failed ablation and multiple cardioversion on Eliquis, Aortic stenosis, pulmonary HTN, COPD anemia of chronic disease, CABG, HF with reduced EF and CAD presented after recent fall (medically evaluated) and then subsequent hypotension during next scheduled dialysis. Sepsis present on admission. Found to be in A-fib with RVR on admission. Large volume ascites present on admission s/p paracentesis. Admitted to CCU with shock with MSOF with severe metabolic acidosis/lactic acidosis on pressors. On CVVHD. Consulted for support with GOC discussions.     GOC discussion - see above, In sum, family needs time to privately talk, they have wally and will be praying    Recommendations:    Continue current ICU plan  Full code  Will follow  [id number]     75 year old Female with PMhx of ESRD on Tue/Thur/Sat HD, b/l PVD s/p endovascular revascularization, Permanent Afib w/ failed ablation and multiple cardioversion on Eliquis, Aortic stenosis, pulmonary HTN, COPD anemia of chronic disease, CABG, HF with reduced EF and CAD presented after recent fall (medically evaluated) and then subsequent hypotension during next scheduled dialysis. Sepsis present on admission. Found to be in A-fib with RVR on admission. Large volume ascites present on admission s/p paracentesis. Admitted to CCU with shock with MSOF with severe metabolic acidosis/lactic acidosis on pressors. On CVVHD. Consulted for support with GOC discussions.     GOC discussion - see above, In sum, family needs time to privately talk, they have wally and will be praying    Recommendations:    Continue current ICU plan  Full code  Will follow    [id number]

## 2021-01-27 NOTE — CONSULT NOTE ADULT - ASSESSMENT
IMPRESSION;  Shock with MSOF with severe metabolic acidosis/lactic acidosis ( 13.7 ) on 2 pressors  ESRD on HD  Transaminitis : secondary to ischemia . Increasing  No CNS infection  COVID-19 NG  BCx 1/22,23 NGTD      RECOMMENDATIONS:  DIC panel  Send HIT  Transfuse PLT if PLT < 20 or if active bleed    Further rec pending attending rounds  IMPRESSION;  Shock with MSOF with severe metabolic acidosis/lactic acidosis ( 13.7 ) on 2 pressors  ESRD on HD  Transaminitis : secondary to ischemia . Increasing  No CNS infection  COVID-19 NG  BCx 1/22,23 NGTD  Thrombocytopenia likey due sepsi     RECOMMENDATIONS:  DIC panel  Send HIT  Supportive Care IMPRESSION;  Shock with MSOF with severe metabolic acidosis/lactic acidosis ( 13.7 ) on 2 pressors  ESRD on HD  Transaminitis : secondary to ischemia . Increasing  No CNS infection  COVID-19 NG  BCx 1/22,23 NGTD  Thrombocytopenia likey due sepsi     RECOMMENDATIONS:  DIC panel  Send HIT  Transfuse PLT if <20 or bleeding   Supportive Care 75 years old Female with PMhx of ESRD on Tue/Thur/Sat HD, b/l PVD s/p endovascular revascularization, Permanent Afib w/ failed ablation and multiple cardioversion on Eliquis, Aortic stenosis, pulmonary HTN, COPD anemia of chronic disease, CABG, HF with reduced EF and CAD presents after fall and found to be in multi-organ system failure, shock, respiratory failure on CVVHD now.     IMPRESSION;  Shock with MSOF with severe metabolic acidosis/lactic acidosis ( 13.7 ) on 2 pressors  ESRD on HD  Transaminitis, likely shock liver  No CNS infection  COVID-19 NG  BCx 1/22,23 NGTD  Thrombocytopenia likey due sepsis, r/o TTP (5 schistocytes per HFP seen, multiple nucleated RBCs, polychromasia)  Paratracheal, subcarinal and hilar adenopathy    RECOMMENDATIONS:  DIC panel (repeat fibrinogen, dimer, coags, CBC)  Hemolysis labs (retic, hapto, LDH)   Send HIT abs  [id number]  Transfuse PLT if <20 or bleeding   F/u cytology  Supportive Care 75 years old Female with PMhx of ESRD on Tue/Thur/Sat HD, b/l PVD s/p endovascular revascularization, Permanent Afib w/ failed ablation and multiple cardioversion on Eliquis, Aortic stenosis, pulmonary HTN, COPD anemia of chronic disease, CABG, HF with reduced EF and CAD presents after fall and found to be in multi-organ system failure, shock, respiratory failure on CVVHD now.     IMPRESSION;  Shock with MSOF with severe metabolic acidosis/lactic acidosis ( 13.7 ) on 2 pressors  ESRD on HD  Transaminitis, likely shock liver  No CNS infection  COVID-19 NG  BCx 1/22,23 NGTD  Thrombocytopenia likey due sepsis,R/O HIT,  r/o TTP ( peripheral smear showed 3- 5 schistocytes per HFP seen, multiple nucleated RBCs, polychromasia)  Paratracheal, subcarinal and hilar adenopathy likely malignant process    RECOMMENDATIONS:  DIC panel (repeat fibrinogen, dimer, coags, CBC)  Hemolysis labs (retic, hapto, LDH)   Send HIT abs  [id number]  Transfuse PLT if <20 or bleeding   F/u cytology  Supportive Care

## 2021-01-27 NOTE — CONSULT NOTE ADULT - CONSULT REASON
Ascites
[place] for [facility]
cold extremities
goals of care; patient on multiple pressors; shock with multisystem organ
ESRD on HD
afib, tachycardia, hypotension, shock
dyspnea
Thrombocytopenia
sepsis

## 2021-01-27 NOTE — CONSULT NOTE ADULT - REASON FOR ADMISSION
fall and hypotension

## 2021-01-27 NOTE — PROGRESS NOTE ADULT - ASSESSMENT
· Assessment	  75 years old Female with PMhx of ESRD on Tue/Thur/Sat HD, b/l PVD s/p endovascular revascularization, Permanent Afib w/ failed ablation and multiple cardioversion on Eliquis, Aortic stenosis, pulmonary HTN, COPD anemia of chronic disease, CABG, HF with reduced EF and CAD Pt presents after fall that happened yesterday at dialysis, pt is on Eliquis.   Collateral history obtained from ED charts. As per the patient she fell to her knees and reports hitting her head at dialysis centre. No LOC or vomiting. Pt was evaluated by the medical team there and was sent home. At the time pt’s fistula was not working but they were able to fix it. Today pt came back to dialysis and received 4 hours of dialysis. At that time it was noted by team members at the dialysis clinic that pt was hypotensive and tachycardic so pt was sent to ED. Denies any fever, cough, chest pain, n/v/d, urinary symptoms or recent weight changes.   In the ED, Pt was found to be hypotensive 67/38, tachycardiac 172, Temp 97.5, was saturating well on 2L NC. Pt was found to be in Atrial fibrillation w/ RVR. Initial trauma workup negative, CT A/P showed Large volume ascites within the abdomen and pelvis. Paratracheal, subcarinal, and right hilar lymphadenopathy is noted Labs were significant for elevated WBC at 14.12, lactate 4.4,    IMPRESSION;  Shock with MSOF with severe metabolic acidosis/lactic acidosis ( 13.7 ) on 2 pressors  No PNA. CXR no focal consolidation. Nares no ORSA  No line infection  Would suggest ischemic gut but based on Hx and PE no evidence of mesenteric ischemia/infarct  No evidence of CD colitis ( clinically or based on CT )  Clinically no SBP . Paracentesis no SBP. Peritoneal fluid cultures NG and numbers not consistent with an infection  Transaminitis : secondary to ischemia . Increasing  No CNS infection  COVID-19 NG  BCx 1/22,23 NGTD      RECOMMENDATIONS;  Abdominal sono  cefepime 2 gm iv q24h

## 2021-01-27 NOTE — CONSULT NOTE ADULT - CONVERSATION DETAILS
Dgt was able to teach back information that primary team provided. Not ready to make decision about DNR/withdrawal of care. All questions answered. Dgt was able to teach back information that primary team provided. Discussed patient's condition and prognosis. Also Addressed code status. Not ready to make decision about DNR/withdrawal of care. All questions answered. SHe would like to speak to her family.

## 2021-01-27 NOTE — CONSULT NOTE ADULT - ATTENDING COMMENTS
above noted labs noted medical follow up noted discussed case with surgical resident regarding placement of udall catheter
I was in the ER for more than 70 minutes at the bedside, and aware and discussed about all above managements.
Attending Statement: I have personally performed a face to face diagnostic evaluation on this patient and have arrived at the suggestions for care. I have written all aspects of the above note.
Patient seen and examined on 1/23/21
75 year old Female with PMhx of ESRD on Tue/Thur/Sat HD, b/l PVD s/p endovascular revascularization, Permanent Afib w/ failed ablation and multiple cardioversion on Eliquis, Aortic stenosis, pulmonary HTN, COPD anemia of chronic disease, CABG, HF with reduced EF and CAD presented after fall. Sepsis present on admission. Found to be in A-fib with RVR on admission. Large volume ascites present on admission s/p paracentesis. Admitted to CCU with shock with MSOF with severe metabolic acidosis/lactic acidosis on pressors. On CVVHD.   Patient intubated and on multiple pressors.   Palliative consulted for GOC.  Called daughter Rabia, she was able to teach back the medical update. we addressed code status, daughter very overwhelmed and would like to discuss with the rest of her family. Please  see GOC above for more details.

## 2021-01-27 NOTE — CONSULT NOTE ADULT - SUBJECTIVE AND OBJECTIVE BOX
Patient is a 75y old  Female who presents with a chief complaint of fall and hypotension (2021 10:04)      HPI:  75 years old Female with PMhx of ESRD on Tue/Thur/Sat HD, b/l PVD s/p endovascular revascularization, Permanent Afib w/ failed ablation and multiple cardioversion on Eliquis, Aortic stenosis, pulmonary HTN, COPD anemia of chronic disease, CABG, HF with reduced EF and CAD Pt presents after fall that happened yesterday at dialysis, pt is on Eliquis.   Collateral history obtained from ED charts. As per the patient she fell to her knees and reports hitting her head at dialysis centre. No LOC or vomiting. Pt was evaluated by the medical team there and was sent home. At the time pt’s fistula was not working but they were able to fix it. Today pt came back to dialysis and received 4 hours of dialysis. At that time it was noted by team members at the dialysis clinic that pt was hypotensive and tachycardic so pt was sent to ED. Denies any fever, cough, chest pain, n/v/d, urinary symptoms or recent weight changes.     In the ED, Pt was found to be hypotensive 67/38, tachycardiac 172, Temp 97.5, was saturating well on 2L NC. Pt was found to be in Atrial fibrillation w/ RVR. Initial trauma workup negative, CT A/P showed Large volume ascites within the abdomen and pelvis. Paratracheal, subcarinal, and right hilar lymphadenopathy is noted Labs were significant for elevated WBC at 14.12, lactate 4.4, Trop elevated at 0.23. Received 750 cc of IV fluid bolus in ED, started on Gavin-synephrine and vasopressin infusion. Cardiology consulted, pt was started on amiodarone and digoxin for afibb w/ RVR. Antibiotics vancomycin and cefepime also administered.    (2021 21:02)       ROS:  Negative except for:    PAST MEDICAL & SURGICAL HISTORY:  Swollen feet  ON OCCASSION    HTN (hypertension)    ESRD on hemodialysis  T,T,S    Thyroid nodule    Degenerative joint disease    Osteoarthritis    Diverticulosis    GERD (gastroesophageal reflux disease)    Heart failure with reduced ejection fraction    Pulmonary hypertension, moderate to severe    Aortic stenosis, moderate    Atrial fibrillation  status post cardioversion    MI (myocardial infarction)    CAD (coronary artery disease)    Kidney stone    HTN (hypertension)    S/P ureteral stent placement    History of kidney surgery    S/P cataract surgery    S/P CABG (coronary artery bypass graft)    AV fistula    H/O abdominal hysterectomy    H/O cardiac radiofrequency ablation        SOCIAL HISTORY:    FAMILY HISTORY:  Family history of diabetes mellitus in mother    Family history of lung cancer (Father)    Family history of CHF (congestive heart failure) (Mother)        MEDICATIONS  (STANDING):  artificial  tears Solution 1 Drop(s) Both EYES every 4 hours  calcium acetate 667 milliGRAM(s) Oral three times a day with meals  cefepime   IVPB 2000 milliGRAM(s) IV Intermittent every 24 hours  cefepime   IVPB      chlorhexidine 0.12% Liquid 15 milliLiter(s) Oral Mucosa two times a day  chlorhexidine 4% Liquid 1 Application(s) Topical <User Schedule>  dexMEDEtomidine Infusion 0.5 MICROgram(s)/kG/Hr (9.04 mL/Hr) IV Continuous <Continuous>  dextrose 5% 1000 milliLiter(s) (75 mL/Hr) IV Continuous <Continuous>  fentaNYL   Infusion. 0.501 MICROgram(s)/kG/Hr (3.62 mL/Hr) IV Continuous <Continuous>  hydrocortisone sodium succinate Injectable 50 milliGRAM(s) IV Push every 8 hours  norepinephrine Infusion 0.05 MICROgram(s)/kG/Min (3.14 mL/Hr) IV Continuous <Continuous>  phenylephrine    Infusion 2 MICROgram(s)/kG/Min (25.1 mL/Hr) IV Continuous <Continuous>  vasopressin Infusion 0.04 Unit(s)/Min (2.4 mL/Hr) IV Continuous <Continuous>    Allergies    No Known Allergies    Vital Signs Last 24 Hrs  T(C): 36.5 (2021 08:00), Max: 37.1 (2021 18:00)  T(F): 97.7 (2021 08:00), Max: 98.8 (2021 18:00)  HR: 118 (2021 11:00) (114 - 122)  RR: 15 (2021 11:00) (14 - 20)  SpO2: 96% (2021 16:00) (96% - 96%)    PHYSICAL EXAM  Constitutional: intubated on MV   Neck: No JVD  Respiratory: Crackles atbase   Cardiovascular: S1, S2, RRR  Gastrointestinal: BS+, soft, NT/ND  Extremities: cyanosis and bluish discoloration   :  No simeon.   Skin: No rashes      LABS:                          11.6   15.67 )-----------( 20       ( 2021 04:30 )             33.7         Mean Cell Volume : 92.8 fL  Mean Cell Hemoglobin : 32.0 pg  Mean Cell Hemoglobin Concentration : 34.4 g/dL  Auto Neutrophil # : 13.78 K/uL  Auto Lymphocyte # : 0.33 K/uL  Auto Monocyte # : 1.36 K/uL  Auto Eosinophil # : 0.01 K/uL  Auto Basophil # : 0.03 K/uL  Auto Neutrophil % : 87.9 %  Auto Lymphocyte % : 2.1 %  Auto Monocyte % : 8.7 %  Auto Eosinophil % : 0.1 %  Auto Basophil % : 0.2 %      Serial CBC's   @ 04:30  Hct-33.7 / Hgb-11.6 / Plat-20 / RBC-3.63 / WBC-15.67  Serial CBC's   @ 04:30  Hct-37.6 / Hgb-12.9 / Plat-36 / RBC-4.02 / WBC-14.09  Serial CBC's   @ 05:00  Hct-39.5 / Hgb-13.1 / Plat-72 / RBC-4.09 / WBC-14.54  Serial CBC's   @ 04:30  Hct-44.3 / Hgb-14.1 / Plat-104 / RBC-4.48 / WBC-17.81          138  |  102  |  14  ----------------------------<  161<H>  3.1<L>   |  19  |  1.0    Ca    9.8      2021 04:30  Phos  3.1       Mg     1.7         TPro  4.9<L>  /  Alb  2.7<L>  /  TBili  2.5<H>  /  DBili  x   /  AST  1737<H>  /  ALT  1528<H>  /  AlkPhos  115        PT/INR - ( 2021 04:30 )   PT: >40.00 sec;   INR: 5.99 ratio             Iron - Total Binding Capacity.: <209 ug/dL ( @ 20:00)  Ferritin, Serum: 11182 ng/mL ( @ 20:00)  Ferritin, Serum: 26433 ng/mL (:30)  Iron - Total Binding Capacity.: 271 ug/dL (:30)  Folate, Serum: >20.0 ng/mL (:)  Vitamin B12, Serum: 1698 pg/mL (:)      Quantitative Ig mg/dL (:)  Quantitative IgA: 510 mg/dL (:)  Quantitative IgM: 73 mg/dL (:)  KATELYNN Lambda: 9.14 mg/dL (:)  KATELYNN Kappa: 6.41 mg/dL (:)               Patient is a 75y old  Female who presents with a chief complaint of fall and hypotension (2021 10:04)      HPI:  75 years old Female with PMhx of ESRD on Tue/Thur/Sat HD, b/l PVD s/p endovascular revascularization, Permanent Afib w/ failed ablation and multiple cardioversion on Eliquis, Aortic stenosis, pulmonary HTN, COPD anemia of chronic disease, CABG, HF with reduced EF and CAD Pt presents after fall that happened yesterday at dialysis, pt is on Eliquis.   Collateral history obtained from ED charts. As per the patient she fell to her knees and reports hitting her head at dialysis centre. No LOC or vomiting. Pt was evaluated by the medical team there and was sent home. At the time pt’s fistula was not working but they were able to fix it. Today pt came back to dialysis and received 4 hours of dialysis. At that time it was noted by team members at the dialysis clinic that pt was hypotensive and tachycardic so pt was sent to ED. Denies any fever, cough, chest pain, n/v/d, urinary symptoms or recent weight changes.     In the ED, Pt was found to be hypotensive 67/38, tachycardiac 172, Temp 97.5, was saturating well on 2L NC. Pt was found to be in Atrial fibrillation w/ RVR. Initial trauma workup negative, CT A/P showed Large volume ascites within the abdomen and pelvis. Paratracheal, subcarinal, and right hilar lymphadenopathy is noted Labs were significant for elevated WBC at 14.12, lactate 4.4, Trop elevated at 0.23. Received 750 cc of IV fluid bolus in ED, started on Gavin-synephrine and vasopressin infusion. Cardiology consulted, pt was started on amiodarone and digoxin for afibb w/ RVR. Antibiotics vancomycin and cefepime also administered.    (2021 21:02)     Patient is now intubated, on pressors requiring CVVH. She has worsening thrombocytopenia. 5 schistocytes per HPF, some nucleated RBCs and polychromasia seen on smear.    ROS:  Negative except for:    PAST MEDICAL & SURGICAL HISTORY:  Swollen feet  ON OCCASSION    HTN (hypertension)    ESRD on hemodialysis  T,T,S    Thyroid nodule    Degenerative joint disease    Osteoarthritis    Diverticulosis    GERD (gastroesophageal reflux disease)    Heart failure with reduced ejection fraction    Pulmonary hypertension, moderate to severe    Aortic stenosis, moderate    Atrial fibrillation  status post cardioversion    MI (myocardial infarction)    CAD (coronary artery disease)    Kidney stone    HTN (hypertension)    S/P ureteral stent placement    History of kidney surgery    S/P cataract surgery    S/P CABG (coronary artery bypass graft)    AV fistula    H/O abdominal hysterectomy    H/O cardiac radiofrequency ablation        SOCIAL HISTORY:    FAMILY HISTORY:  Family history of diabetes mellitus in mother    Family history of lung cancer (Father)    Family history of CHF (congestive heart failure) (Mother)        MEDICATIONS  (STANDING):  artificial  tears Solution 1 Drop(s) Both EYES every 4 hours  calcium acetate 667 milliGRAM(s) Oral three times a day with meals  cefepime   IVPB 2000 milliGRAM(s) IV Intermittent every 24 hours  cefepime   IVPB      chlorhexidine 0.12% Liquid 15 milliLiter(s) Oral Mucosa two times a day  chlorhexidine 4% Liquid 1 Application(s) Topical <User Schedule>  dexMEDEtomidine Infusion 0.5 MICROgram(s)/kG/Hr (9.04 mL/Hr) IV Continuous <Continuous>  dextrose 5% 1000 milliLiter(s) (75 mL/Hr) IV Continuous <Continuous>  fentaNYL   Infusion. 0.501 MICROgram(s)/kG/Hr (3.62 mL/Hr) IV Continuous <Continuous>  hydrocortisone sodium succinate Injectable 50 milliGRAM(s) IV Push every 8 hours  norepinephrine Infusion 0.05 MICROgram(s)/kG/Min (3.14 mL/Hr) IV Continuous <Continuous>  phenylephrine    Infusion 2 MICROgram(s)/kG/Min (25.1 mL/Hr) IV Continuous <Continuous>  vasopressin Infusion 0.04 Unit(s)/Min (2.4 mL/Hr) IV Continuous <Continuous>    Allergies    No Known Allergies    Vital Signs Last 24 Hrs  T(C): 36.5 (2021 08:00), Max: 37.1 (2021 18:00)  T(F): 97.7 (2021 08:00), Max: 98.8 (2021 18:00)  HR: 118 (2021 11:00) (114 - 122)  RR: 15 (2021 11:00) (14 - 20)  SpO2: 96% (2021 16:00) (96% - 96%)    PHYSICAL EXAM  Constitutional: intubated on MV   Neck: No JVD  Respiratory: Crackles atbase   Cardiovascular: S1, S2, RRR  Gastrointestinal: BS+, soft, NT/ND  Extremities: cyanosis and bluish discoloration   :  No simeon.   Skin: No rashes      LABS:                          11.6   15.67 )-----------( 20       ( 2021 04:30 )             33.7         Mean Cell Volume : 92.8 fL  Mean Cell Hemoglobin : 32.0 pg  Mean Cell Hemoglobin Concentration : 34.4 g/dL  Auto Neutrophil # : 13.78 K/uL  Auto Lymphocyte # : 0.33 K/uL  Auto Monocyte # : 1.36 K/uL  Auto Eosinophil # : 0.01 K/uL  Auto Basophil # : 0.03 K/uL  Auto Neutrophil % : 87.9 %  Auto Lymphocyte % : 2.1 %  Auto Monocyte % : 8.7 %  Auto Eosinophil % : 0.1 %  Auto Basophil % : 0.2 %      Serial CBC's   @ 04:30  Hct-33.7 / Hgb-11.6 / Plat-20 / RBC-3.63 / WBC-15.67  Serial CBC's   @ 04:30  Hct-37.6 / Hgb-12.9 / Plat-36 / RBC-4.02 / WBC-14.09  Serial CBC's   @ 05:00  Hct-39.5 / Hgb-13.1 / Plat-72 / RBC-4.09 / WBC-14.54  Serial CBC's   @ 04:30  Hct-44.3 / Hgb-14.1 / Plat-104 / RBC-4.48 / WBC-17.81          138  |  102  |  14  ----------------------------<  161<H>  3.1<L>   |  19  |  1.0    Ca    9.8      2021 04:30  Phos  3.1       Mg     1.7         TPro  4.9<L>  /  Alb  2.7<L>  /  TBili  2.5<H>  /  DBili  x   /  AST  1737<H>  /  ALT  1528<H>  /  AlkPhos  115        PT/INR - ( 2021 04:30 )   PT: >40.00 sec;   INR: 5.99 ratio             Iron - Total Binding Capacity.: <209 ug/dL ( 20:00)  Ferritin, Serum: 35893 ng/mL (:00)  Ferritin, Serum: 21296 ng/mL (:30)  Iron - Total Binding Capacity.: 271 ug/dL ( 04:30)  Folate, Serum: >20.0 ng/mL (:00)  Vitamin B12, Serum: 1698 pg/mL (:00)      Quantitative Ig mg/dL (:00)  Quantitative IgA: 510 mg/dL (:00)  Quantitative IgM: 73 mg/dL (:00)  KATELYNN Lambda: 9.14 mg/dL (:00)  KATELYNN Kappa: 6.41 mg/dL ( 20:00)    < from: CT Chest w/ IV Cont (21 @ 17:40) >    IMPRESSION:    No evidence of intra-thoracic, abdominal or pelvic visceral laceration or hematoma.  No acute fractures are identified.    Large volume ascites within the abdomen and pelvis.  Paratracheal, subcarinal, and right hilar lymphadenopathy is noted,    < end of copied text >

## 2021-01-28 NOTE — PROGRESS NOTE ADULT - SUBJECTIVE AND OBJECTIVE BOX
24H events:  Haptoglobin < 20  Checked smear again, few schistocytes per HPF  There was a family meeting today and it was decided to make patient DNR and comfort measures only, plan to withdraw care tomorrow including stopping pressors and palliative extubation    PAST MEDICAL & SURGICAL HISTORY  Swollen feet  ON OCCASSION    HTN (hypertension)    ESRD on hemodialysis  T,T,S    Thyroid nodule    Degenerative joint disease    Osteoarthritis    Diverticulosis    GERD (gastroesophageal reflux disease)    Heart failure with reduced ejection fraction    Pulmonary hypertension, moderate to severe    Aortic stenosis, moderate    Atrial fibrillation  status post cardioversion    MI (myocardial infarction)    CAD (coronary artery disease)    Kidney stone    HTN (hypertension)    S/P ureteral stent placement    History of kidney surgery    S/P cataract surgery    S/P CABG (coronary artery bypass graft)    AV fistula    H/O abdominal hysterectomy    H/O cardiac radiofrequency ablation      SOCIAL HISTORY:  Negative for smoking/alcohol/drug use.     ALLERGIES:  No Known Allergies    MEDICATIONS:  STANDING MEDICATIONS  artificial  tears Solution 1 Drop(s) Both EYES every 4 hours  calcium acetate 667 milliGRAM(s) Oral three times a day with meals  cefepime   IVPB 2000 milliGRAM(s) IV Intermittent every 24 hours  cefepime   IVPB      chlorhexidine 0.12% Liquid 15 milliLiter(s) Oral Mucosa two times a day  chlorhexidine 4% Liquid 1 Application(s) Topical <User Schedule>  CRRT Treatment    <Continuous>  dexMEDEtomidine Infusion 0.5 MICROgram(s)/kG/Hr IV Continuous <Continuous>  dextrose 5% 1000 milliLiter(s) IV Continuous <Continuous>  fentaNYL   Infusion. 0.501 MICROgram(s)/kG/Hr IV Continuous <Continuous>  hydrocortisone sodium succinate Injectable 50 milliGRAM(s) IV Push every 8 hours  norepinephrine Infusion 0.05 MICROgram(s)/kG/Min IV Continuous <Continuous>  phenylephrine    Infusion 2 MICROgram(s)/kG/Min IV Continuous <Continuous>  potassium chloride  20 mEq/100 mL IVPB 20 milliEquivalent(s) IV Intermittent every 2 hours  PureFlow Dialysate RFP-400 (K 2 / Ca 3) 5000 milliLiter(s) CRRT <Continuous>  sucralfate 1 Gram(s) Oral two times a day  vasopressin Infusion 0.04 Unit(s)/Min IV Continuous <Continuous>    PRN MEDICATIONS    VITALS:   T(F): 95  HR: 110  BP: --  RR: 14  SpO2: 97%    LABS:                        9.0    16.55 )-----------( 16       ( 28 Jan 2021 16:52 )             27.7     01-28    137  |  106  |  12  ----------------------------<  180<H>  3.2<L>   |  20  |  0.7    Ca    8.8      28 Jan 2021 16:52  Phos  3.1     01-28  Mg     1.7     01-28    TPro  5.1<L>  /  Alb  3.0<L>  /  TBili  3.6<H>  /  DBili  x   /  AST  1169<H>  /  ALT  1413<H>  /  AlkPhos  122<H>  01-28    PT/INR - ( 28 Jan 2021 04:30 )   PT: >40.00 sec;   INR: 4.17 ratio         PTT - ( 27 Jan 2021 17:17 )  PTT:40.4 sec    ABG - ( 28 Jan 2021 14:04 )  pH, Arterial: 7.32  pH, Blood: x     /  pCO2: 42    /  pO2: 79    / HCO3: 22    / Base Excess: -4.4  /  SaO2: 96                    Culture - Blood (collected 26 Jan 2021 17:20)  Source: .Blood None  Preliminary Report (27 Jan 2021 23:01):    No growth to date.    Culture - Blood (collected 26 Jan 2021 17:20)  Source: .Blood None  Preliminary Report (27 Jan 2021 23:01):    No growth to date.    PHYSICAL EXAM:  GEN: Intubated, sedated  HENT: NCAT  LUNGS: bilateral air movement  HEART: Tachycardic  ABD: Soft  EXT: cyanotic feet  NEURO: Sedated

## 2021-01-28 NOTE — PROGRESS NOTE ADULT - ASSESSMENT
75 years old Female with PMhx of ESRD on Tue/Thur/Sat HD, b/l PVD s/p endovascular revascularization, Permanent Afib w/ failed ablation and multiple cardioversion on Eliquis, Aortic stenosis, pulmonary HTN, COPD anemia of chronic disease, CABG, HF with reduced EF and CAD presents after fall and found to be in multi-organ system failure, shock, respiratory failure on CVVHD now.     IMPRESSION;  Shock with MSOF with severe metabolic acidosis/lactic acidosis ( 13.7 ) on 2 pressors  ESRD on HD  Transaminitis, likely shock liver  No CNS infection  COVID-19 NG  BCx 1/22,23 NGTD  Thrombocytopenia likey due sepsis,R/O HIT,  r/o TTP ( peripheral smear showed 3- 5 schistocytes per HFP seen, multiple nucleated RBCs, polychromasia)  Paratracheal, subcarinal and hilar adenopathy likely malignant process    RECOMMENDATIONS:  Haptoglobin was less than 20 with elevated LDH and bilirubin as well as schistocytes seen on peripheral smear. Additionally, she has renal failure and AMS. However, she had shock liver which can also explain the bili and LDH, and she had ESRD prior to this admission. Her retic was low however she can still be hemolyzing and having poor bone marrow response due to her critical illness. Overall her condition is suspicious for TTP, however after a discussion with her family and medical resident it was decided to make her DNR with plan to withdraw care and palliative extubation tomorrow. Additionally, she would be unlikely to be able to tolerate PLEX, and if this is TTP it is likely related to malignancy with her adenopathy, which would not respond well to treatment anyway.  - DNR, palliative extubation tomorrow   75 years old Female with PMhx of ESRD on Tue/Thur/Sat HD, b/l PVD s/p endovascular revascularization, Permanent Afib w/ failed ablation and multiple cardioversion on Eliquis, Aortic stenosis, pulmonary HTN, COPD anemia of chronic disease, CABG, HF with reduced EF and CAD presents after fall and found to be in multi-organ system failure, shock, respiratory failure on CVVHD now.     IMPRESSION;  Shock with MSOF with severe metabolic acidosis/lactic acidosis ( 13.7 ) on 2 pressors  ESRD on HD  Transaminitis, likely shock liver  No CNS infection  COVID-19 NG  BCx 1/22,23 NGTD  Thrombocytopenia likey due sepsis,R/O HIT,  r/o TTP ( peripheral smear showed 3- 5 schistocytes per HFP seen, multiple nucleated RBCs, polychromasia)  Paratracheal, subcarinal and hilar adenopathy likely malignant process    RECOMMENDATIONS:  Haptoglobin was less than 20 with elevated LDH and bilirubin as well as schistocytes seen on peripheral smear. Additionally, she has renal failure and AMS. However, she had shock liver which can also explain the bili and LDH, and she had ESRD prior to this admission. Her retic was low however she can still be hemolyzing and having poor bone marrow response due to her critical illness. Overall her condition is very complex with multiple causes for low plts including a remote possibility  for TTP, however after a discussion with her family and medical resident it was decided to make her DNR with plan to withdraw care and palliative extubation tomorrow. Additionally, she would be unlikely to be able to tolerate PLEX, and in this case if TTP were the possible cause it would have been likely related to a possible malignancy given her adenopathy, which is unlikely to respond to PLEX treatment.  - DNR, palliative extubation tomorrow

## 2021-01-28 NOTE — PROGRESS NOTE ADULT - ASSESSMENT
75 years old Female with PMhx of ESRD on Tue/Thur/Sat HD, b/l PVD s/p endovascular revascularization, Permanent Afib w/ failed ablation and multiple cardioversion on Eliquis, Aortic stenosis, pulmonary HTN, COPD anemia of chronic disease, CABG, HF with reduced EF and CAD Pt presents after fall     ·	ESRD on HD / on multiple pressors / severe acidemia / lactic acidosis / on CVVHD continue even balance  ·	check daily phosphorus level/ level ok from today   ·	replete k to 4   ·	remains on pressors   ·	hypercalcemia / check PTH / vit D   ·	high LFT , seen by GI , ferritin very high   ·	respiratory failure on MV   ·	prognosis very poor

## 2021-01-28 NOTE — PROGRESS NOTE ADULT - ASSESSMENT
75 years old Female with PMhx of ESRD on Tue/Thur/Sat HD, b/l PVD s/p endovascular revascularization, Permanent Afib w/ failed ablation and multiple cardioversion on Eliquis, Aortic stenosis, pulmonary HTN, COPD anemia of chronic disease, CABG, HF with reduced EF and CAD Pt presents after fall that happened yesterday at dialysis, pt is on Eliquis.   Collateral history obtained from ED charts. As per the patient she fell to her knees and reports hitting her head at dialysis centre. No LOC or vomiting. Pt was evaluated by the medical team there and was sent home. At the time pt’s fistula was not working but they were able to fix it. Today pt came back to dialysis and received 4 hours of dialysis. At that time it was noted by team members at the dialysis clinic that pt was hypotensive and tachycardic so pt was sent to ED. Denies any fever, cough, chest pain, n/v/d, urinary symptoms or recent weight changes.   In the ED, Pt was found to be hypotensive 67/38, tachycardiac 172, Temp 97.5, was saturating well on 2L NC. Pt was found to be in Atrial fibrillation w/ RVR. Initial trauma workup negative, CT A/P showed Large volume ascites within the abdomen and pelvis. Paratracheal, subcarinal, and right hilar lymphadenopathy is noted Labs were significant for elevated WBC at 14.12, lactate 4.4,    IMPRESSION;  Shock with MSOF with severe metabolic acidosis/lactic acidosis ( 13.7 ) on 2 pressors  Extensive gangrenous changes of all digits with significant ischemic mottling of limbs  No PNA. CXR no focal consolidation. Nares no ORSA  No line infection  Would suggest ischemic gut but based on Hx and PE no evidence of mesenteric ischemia/infarct  No evidence of CD colitis ( clinically or based on CT )  Clinically no SBP . Paracentesis no SBP. Peritoneal fluid cultures NG and numbers not consistent with an infection  Transaminitis : secondary to ischemia . Increasing  No CNS infection  COVID-19 NG  BCx 1/22,23 NGTD      RECOMMENDATIONS;  cefepime 2 gm iv q24h    recall prn please

## 2021-01-28 NOTE — CHART NOTE - NSCHARTNOTEFT_GEN_A_CORE
Registered Dietitian Follow-Up     Patient Profile Reviewed                           Yes [x]   No []     Nutrition History Previously Obtained        Yes []  No [x]       Pertinent Subjective Information: Pt. remains intubated/sedated, on 3 pressors. Pt. remains NPO since admit on 1/22. Ve: 8.36, Tmax 36.6, MAP 70.     Pertinent Medical Interventions: Patient is critically ill, ventilated. Large volume ascites present on admission s/p paracentesis. Admitted to CCU with shock with MSOF with severe metabolic acidosis/lactic acidosis on pressors. On CVVHD. Nephro, ID following. Full code per palliative note.      Diet order: NPO     Anthropometrics:  - Ht. 167.6cm  - Wt. 90.5kg on 1/28 vs. 72.3kg on 1/23 pt. with edema, fluid shifts vs. bed scale error, will continue to monitor   - %wt change  - BMI 25.7  - IBW 59.1kg     Pertinent Lab Data: (1/28) WBC 20.97, RBC 3.40, Hg 10.8, Hct 32.6, K 2.9, glu 157     Pertinent Meds: Levophed, Phenylephrine, Versed, Fentanyl, Vasopressin, D5 @75ml/h ((~306kcal)      Physical Findings:  - Appearance: intubated/sedated, 3+ generalized edema  - GI function: ascites, abd noted distended, firm per flow sheets  - Tubes: OGT  - Oral/Mouth cavity: NPO  - Skin: ecchymosis      Nutrition Requirements (from RD note on 1/25)   Weight Used: 72.3kg      Estimated Energy Needs    Continue []  Adjust [x] ~1354kcal (PSE 2003b)   Adjusted Energy Recommendations:   kcal/day        Estimated Protein Needs    Continue [x]  Adjust [] 130-145 g/day (1.8-2 g/kg ABW)  Adjusted Protein Recommendations:   gm/day        Estimated Fluid Needs        Continue [x]  Adjust [] per CCU team   Adjusted Fluid Recommendations:   mL/day     Nutrient Intake: NPO        [] Previous Nutrition Diagnosis:  Inadequate protein-energy intake.             [x] Ongoing          [] Resolved     Nutrition Intervention: coordination of care    Rec: Pt. on multiple pressors at this time, abd noted distended, firm. EN contraindicated at this time. Pt. now NPO day 6. Please consult Nutrition Support Team for evaluation.      Goal/Expected Outcome: In 4 days nutrition support to be initiated.      Indicator/Monitoring:  Energy intake, diet order, glucose profile, renal profile, nutrition focused physical findings, body composition.

## 2021-01-28 NOTE — GOALS OF CARE CONVERSATION - ADVANCED CARE PLANNING - CONVERSATION DETAILS
Palliative Care  MD and LMSW spoke with pt.'s daughter, Sherley via telephone.  Role of palliative care again reviewed, and additionally reviewed pt.'s clinical status.  All questions answered.  Daughter states at this time she wishes to speak with medical team and different specialists.  Additionally stated that she spoke with her brothers and family has decided on full aggressive care.  Above relayed to CCU team.

## 2021-01-28 NOTE — PROGRESS NOTE ADULT - SUBJECTIVE AND OBJECTIVE BOX
SUBJECTIVE:    Patient is a 75y old Female who presents with a chief complaint of fall and hypotension (28 Jan 2021 12:29)    Currently admitted to medicine with the primary diagnosis of shock with multisystem organ failure     Today is hospital day 7d. Patient seen and examined at bedside this AM. No acute overnight events.    PAST MEDICAL & SURGICAL HISTORY  Swollen feet  ON OCCASSION    HTN (hypertension)    ESRD on hemodialysis  T,T,S    Thyroid nodule    Degenerative joint disease    Osteoarthritis    Diverticulosis    GERD (gastroesophageal reflux disease)    Heart failure with reduced ejection fraction    Pulmonary hypertension, moderate to severe    Aortic stenosis, moderate    Atrial fibrillation  status post cardioversion    MI (myocardial infarction)    CAD (coronary artery disease)    Kidney stone    HTN (hypertension)    S/P ureteral stent placement    History of kidney surgery    S/P cataract surgery    S/P CABG (coronary artery bypass graft)    AV fistula    H/O abdominal hysterectomy    H/O cardiac radiofrequency ablation      SOCIAL HISTORY:  Negative for smoking/alcohol/drug use.     ALLERGIES:  No Known Allergies    MEDICATIONS:  STANDING MEDICATIONS  artificial  tears Solution 1 Drop(s) Both EYES every 4 hours  calcium acetate 667 milliGRAM(s) Oral three times a day with meals  cefepime   IVPB 2000 milliGRAM(s) IV Intermittent every 24 hours  cefepime   IVPB      chlorhexidine 0.12% Liquid 15 milliLiter(s) Oral Mucosa two times a day  chlorhexidine 4% Liquid 1 Application(s) Topical <User Schedule>  CRRT Treatment    <Continuous>  dexMEDEtomidine Infusion 0.5 MICROgram(s)/kG/Hr IV Continuous <Continuous>  dextrose 5% 1000 milliLiter(s) IV Continuous <Continuous>  fentaNYL   Infusion. 0.501 MICROgram(s)/kG/Hr IV Continuous <Continuous>  hydrocortisone sodium succinate Injectable 50 milliGRAM(s) IV Push every 8 hours  norepinephrine Infusion 0.05 MICROgram(s)/kG/Min IV Continuous <Continuous>  phenylephrine    Infusion 2 MICROgram(s)/kG/Min IV Continuous <Continuous>  PureFlow Dialysate RFP-400 (K 2 / Ca 3) 5000 milliLiter(s) CRRT <Continuous>  sucralfate 1 Gram(s) Oral two times a day  vasopressin Infusion 0.04 Unit(s)/Min IV Continuous <Continuous>    PRN MEDICATIONS    VITALS:   T(F): 97.5  HR: 114  BP: --  RR: 14  SpO2: 97%    LABS:                        10.8   20.97 )-----------( 53       ( 28 Jan 2021 04:30 )             32.6     01-28    136  |  103  |  14  ----------------------------<  157<H>  2.9<L>   |  20  |  0.9    Ca    9.5      28 Jan 2021 04:30  Phos  3.1     01-28  Mg     1.7     01-28    TPro  5.1<L>  /  Alb  3.0<L>  /  TBili  3.6<H>  /  DBili  x   /  AST  1169<H>  /  ALT  1413<H>  /  AlkPhos  122<H>  01-28    PT/INR - ( 28 Jan 2021 04:30 )   PT: >40.00 sec;   INR: 4.17 ratio         PTT - ( 27 Jan 2021 17:17 )  PTT:40.4 sec    ABG - ( 28 Jan 2021 03:23 )  pH, Arterial: 7.33  pH, Blood: x     /  pCO2: 40    /  pO2: 86    / HCO3: 21    / Base Excess: -4.4  /  SaO2: 97                    Culture - Blood (collected 26 Jan 2021 17:20)  Source: .Blood None  Preliminary Report (27 Jan 2021 23:01):    No growth to date.    Culture - Blood (collected 26 Jan 2021 17:20)  Source: .Blood None  Preliminary Report (27 Jan 2021 23:01):    No growth to date.          RADIOLOGY:    PHYSICAL EXAM:  GEN: No acute distress  LUNGS: Clear to auscultation bilaterally   HEART: Regular  ABD: Soft, non-tender, non-distended.  EXT: NC/NC/NE/2+PP/BARBA/Skin Intact.   NEURO: AAOX3    Intravenous access:   NG tube:   Lubin Catheter:   Indwelling Urethral Catheter:     Connect To:  Straight Drainage/Gravity    Indication:  Urine Output Monitoring in Critically Ill (01-24-21 @ 05:46) (not performed) [Active]  Indwelling Urethral Catheter:     Connect To:  Straight Drainage/Gravity    Indication:  Urine Output Monitoring in Critically Ill (01-23-21 @ 12:33) (not performed) [Active]       SUBJECTIVE:    Patient is a 75y old Female who presents with a chief complaint of fall and hypotension (28 Jan 2021 12:29)    Currently admitted to medicine with the primary diagnosis of shock with multisystem organ failure     Today is hospital day 7d. Patient seen and examined at bedside this AM. No acute overnight events. On MV. Sedated. On multiple pressors.    PAST MEDICAL & SURGICAL HISTORY  Swollen feet  ON OCCASSION    HTN (hypertension)    ESRD on hemodialysis  T,T,S    Thyroid nodule    Degenerative joint disease    Osteoarthritis    Diverticulosis    GERD (gastroesophageal reflux disease)    Heart failure with reduced ejection fraction    Pulmonary hypertension, moderate to severe    Aortic stenosis, moderate    Atrial fibrillation  status post cardioversion    MI (myocardial infarction)    CAD (coronary artery disease)    Kidney stone    HTN (hypertension)    S/P ureteral stent placement    History of kidney surgery    S/P cataract surgery    S/P CABG (coronary artery bypass graft)    AV fistula    H/O abdominal hysterectomy    H/O cardiac radiofrequency ablation      SOCIAL HISTORY:  Negative for smoking/alcohol/drug use.     ALLERGIES:  No Known Allergies    MEDICATIONS:  STANDING MEDICATIONS  artificial  tears Solution 1 Drop(s) Both EYES every 4 hours  calcium acetate 667 milliGRAM(s) Oral three times a day with meals  cefepime   IVPB 2000 milliGRAM(s) IV Intermittent every 24 hours  cefepime   IVPB      chlorhexidine 0.12% Liquid 15 milliLiter(s) Oral Mucosa two times a day  chlorhexidine 4% Liquid 1 Application(s) Topical <User Schedule>  CRRT Treatment    <Continuous>  dexMEDEtomidine Infusion 0.5 MICROgram(s)/kG/Hr IV Continuous <Continuous>  dextrose 5% 1000 milliLiter(s) IV Continuous <Continuous>  fentaNYL   Infusion. 0.501 MICROgram(s)/kG/Hr IV Continuous <Continuous>  hydrocortisone sodium succinate Injectable 50 milliGRAM(s) IV Push every 8 hours  norepinephrine Infusion 0.05 MICROgram(s)/kG/Min IV Continuous <Continuous>  phenylephrine    Infusion 2 MICROgram(s)/kG/Min IV Continuous <Continuous>  PureFlow Dialysate RFP-400 (K 2 / Ca 3) 5000 milliLiter(s) CRRT <Continuous>  sucralfate 1 Gram(s) Oral two times a day  vasopressin Infusion 0.04 Unit(s)/Min IV Continuous <Continuous>    PRN MEDICATIONS    VITALS:   T(F): 97.5  HR: 114  BP: --  RR: 14  SpO2: 97%    LABS:                        10.8   20.97 )-----------( 53       ( 28 Jan 2021 04:30 )             32.6     01-28    136  |  103  |  14  ----------------------------<  157<H>  2.9<L>   |  20  |  0.9    Ca    9.5      28 Jan 2021 04:30  Phos  3.1     01-28  Mg     1.7     01-28    TPro  5.1<L>  /  Alb  3.0<L>  /  TBili  3.6<H>  /  DBili  x   /  AST  1169<H>  /  ALT  1413<H>  /  AlkPhos  122<H>  01-28    PT/INR - ( 28 Jan 2021 04:30 )   PT: >40.00 sec;   INR: 4.17 ratio         PTT - ( 27 Jan 2021 17:17 )  PTT:40.4 sec    ABG - ( 28 Jan 2021 03:23 )  pH, Arterial: 7.33  pH, Blood: x     /  pCO2: 40    /  pO2: 86    / HCO3: 21    / Base Excess: -4.4  /  SaO2: 97                    Culture - Blood (collected 26 Jan 2021 17:20)  Source: .Blood None  Preliminary Report (27 Jan 2021 23:01):    No growth to date.    Culture - Blood (collected 26 Jan 2021 17:20)  Source: .Blood None  Preliminary Report (27 Jan 2021 23:01):    No growth to date.    PHYSICAL EXAM:  GEN: No acute distress  LUNGS: on mechanical ventilation  HEART: S1/S2 present, tachycardic  ABD: Soft, non-tender, non-distended  EXT: upper and lower extremity cyanosis   NEURO: sedated, withdraws to pain    Intravenous access:   NG tube:   Lubin Catheter:   Indwelling Urethral Catheter:     Connect To:  Straight Drainage/Gravity    Indication:  Urine Output Monitoring in Critically Ill (01-24-21 @ 05:46) (not performed) [Active]  Indwelling Urethral Catheter:     Connect To:  Straight Drainage/Gravity    Indication:  Urine Output Monitoring in Critically Ill (01-23-21 @ 12:33) (not performed) [Active]       SUBJECTIVE:    Patient is a 75y old Female who presents with a chief complaint of fall and hypotension (28 Jan 2021 12:29)    Currently admitted to medicine with the primary diagnosis of shock with multisystem organ failure     Today is hospital day 7d. Patient seen and examined at bedside this AM. No acute overnight events. On MV. Sedated. On multiple pressors.    PAST MEDICAL & SURGICAL HISTORY  Swollen feet  ON OCCASSION    HTN (hypertension)    ESRD on hemodialysis  T,T,S    Thyroid nodule    Degenerative joint disease    Osteoarthritis    Diverticulosis    GERD (gastroesophageal reflux disease)    Heart failure with reduced ejection fraction    Pulmonary hypertension, moderate to severe    Aortic stenosis, moderate    Atrial fibrillation  status post cardioversion    MI (myocardial infarction)    CAD (coronary artery disease)    Kidney stone    HTN (hypertension)    S/P ureteral stent placement    History of kidney surgery    S/P cataract surgery    S/P CABG (coronary artery bypass graft)    AV fistula    H/O abdominal hysterectomy    H/O cardiac radiofrequency ablation      SOCIAL HISTORY:  Negative for smoking/alcohol/drug use.     ALLERGIES:  No Known Allergies    MEDICATIONS:  STANDING MEDICATIONS  artificial  tears Solution 1 Drop(s) Both EYES every 4 hours  calcium acetate 667 milliGRAM(s) Oral three times a day with meals  cefepime   IVPB 2000 milliGRAM(s) IV Intermittent every 24 hours  cefepime   IVPB      chlorhexidine 0.12% Liquid 15 milliLiter(s) Oral Mucosa two times a day  chlorhexidine 4% Liquid 1 Application(s) Topical <User Schedule>  CRRT Treatment    <Continuous>  dexMEDEtomidine Infusion 0.5 MICROgram(s)/kG/Hr IV Continuous <Continuous>  dextrose 5% 1000 milliLiter(s) IV Continuous <Continuous>  fentaNYL   Infusion. 0.501 MICROgram(s)/kG/Hr IV Continuous <Continuous>  hydrocortisone sodium succinate Injectable 50 milliGRAM(s) IV Push every 8 hours  norepinephrine Infusion 0.05 MICROgram(s)/kG/Min IV Continuous <Continuous>  phenylephrine    Infusion 2 MICROgram(s)/kG/Min IV Continuous <Continuous>  PureFlow Dialysate RFP-400 (K 2 / Ca 3) 5000 milliLiter(s) CRRT <Continuous>  sucralfate 1 Gram(s) Oral two times a day  vasopressin Infusion 0.04 Unit(s)/Min IV Continuous <Continuous>    PRN MEDICATIONS    VITALS:   T(F): 97.5  HR: 114  BP: --  RR: 14  SpO2: 97%    LABS:                        10.8   20.97 )-----------( 53       ( 28 Jan 2021 04:30 )             32.6     01-28    136  |  103  |  14  ----------------------------<  157<H>  2.9<L>   |  20  |  0.9    Ca    9.5      28 Jan 2021 04:30  Phos  3.1     01-28  Mg     1.7     01-28    TPro  5.1<L>  /  Alb  3.0<L>  /  TBili  3.6<H>  /  DBili  x   /  AST  1169<H>  /  ALT  1413<H>  /  AlkPhos  122<H>  01-28    PT/INR - ( 28 Jan 2021 04:30 )   PT: >40.00 sec;   INR: 4.17 ratio         PTT - ( 27 Jan 2021 17:17 )  PTT:40.4 sec    ABG - ( 28 Jan 2021 03:23 )  pH, Arterial: 7.33  pH, Blood: x     /  pCO2: 40    /  pO2: 86    / HCO3: 21    / Base Excess: -4.4  /  SaO2: 97                    Culture - Blood (collected 26 Jan 2021 17:20)  Source: .Blood None  Preliminary Report (27 Jan 2021 23:01):    No growth to date.    Culture - Blood (collected 26 Jan 2021 17:20)  Source: .Blood None  Preliminary Report (27 Jan 2021 23:01):    No growth to date.    PHYSICAL EXAM:  GEN: No acute distress  LUNGS: on mechanical ventilation  HEART: S1/S2 present, tachycardic  ABD: Soft, non-tender, distended  EXT: upper and lower extremity cyanosis   NEURO: sedated, withdraws to pain    Intravenous access:   NG tube:   Lubin Catheter:   Indwelling Urethral Catheter:     Connect To:  Straight Drainage/Gravity    Indication:  Urine Output Monitoring in Critically Ill (01-24-21 @ 05:46) (not performed) [Active]  Indwelling Urethral Catheter:     Connect To:  Straight Drainage/Gravity    Indication:  Urine Output Monitoring in Critically Ill (01-23-21 @ 12:33) (not performed) [Active]

## 2021-01-28 NOTE — PROGRESS NOTE ADULT - ASSESSMENT
75 year old Female with PMhx of ESRD on Tue/Thur/Sat HD, b/l PVD s/p endovascular revascularization, Permanent Afib w/ failed ablation and multiple cardioversion on Eliquis, Aortic stenosis, pulmonary HTN, COPD anemia of chronic disease, CABG, HF with reduced EF and CAD presented after fall. Sepsis present on admission. Found to be in A-fib with RVR on admission. Large volume ascites present on admission s/p paracentesis. Admitted to CCU with shock with MSOF with severe metabolic acidosis/lactic acidosis on pressors. On CVVHD.   Patient intubated and on multiple pressors.   Spoke to daughter today-would like full code, please see note above.      Recommendations:    Continue current ICU plan  Full code  Will follow    [phone]

## 2021-01-28 NOTE — CHART NOTE - NSCHARTNOTEFT_GEN_A_CORE
Following lengthy discussion with patient's family regarding overall prognosis, family decided to make patient DNR and they plan to go through with palliative extubation tomorrow. MOLST form was filled out and placed in the patient's chart. Will follow up with palliative care. Following lengthy discussion with patient's family regarding overall prognosis, family decided to make patient DNR and they plan to withdraw medical treatment tomorrow. MOLST form was filled out and placed in the patient's chart. Will follow up with palliative care.

## 2021-01-28 NOTE — PROGRESS NOTE ADULT - SUBJECTIVE AND OBJECTIVE BOX
75yFemale with diagnosis: ATRIAL FIBRILLATION WITH RVR,SEPTIC SHOCK    Patient seen and examined at bedside. Patient is critically ill, ventilated.   Spoke with daughter today, please see [facility] note for details     PHYSICAL EXAM    Constitutional: Intubated, appears critically ill     Eyes: closed    ENMT: Atraumatic, normocephalic     Neck: trachea midline    Respiratory: vented BS    Cardiovascular: s1, S2, no M/R/G     Gastrointestinal: soft, not tender     Genitourinary: simeon     T(C): , Max: 36.6 (16:00)  T(F): 97.5  HR: 114 (109 - 122)  BP: --  RR: 14 (9 - 14)  SpO2: 97% (97% - 97%)      LABS:                          10.8   20.97 )-----------( 53       ( 28 Jan 2021 04:30 )             32.6                                                                                      01-28    136  |  103  |  14  ----------------------------<  157<H>  2.9<L>   |  20  |  0.9    Ca    9.5      28 Jan 2021 04:30  Phos  3.1     01-28  Mg     1.7     01-28    TPro  5.1<L>  /  Alb  3.0<L>  /  TBili  3.6<H>  /  DBili  x   /  AST  1169<H>  /  ALT  1413<H>  /  AlkPhos  122<H>  01-28                                               MEDICATIONS  (STANDING):  artificial  tears Solution 1 Drop(s) Both EYES every 4 hours  calcium acetate 667 milliGRAM(s) Oral three times a day with meals  cefepime   IVPB 2000 milliGRAM(s) IV Intermittent every 24 hours  cefepime   IVPB      chlorhexidine 0.12% Liquid 15 milliLiter(s) Oral Mucosa two times a day  chlorhexidine 4% Liquid 1 Application(s) Topical <User Schedule>  CRRT Treatment    <Continuous>  dexMEDEtomidine Infusion 0.5 MICROgram(s)/kG/Hr (9.04 mL/Hr) IV Continuous <Continuous>  dextrose 5% 1000 milliLiter(s) (75 mL/Hr) IV Continuous <Continuous>  fentaNYL   Infusion. 0.501 MICROgram(s)/kG/Hr (3.62 mL/Hr) IV Continuous <Continuous>  hydrocortisone sodium succinate Injectable 50 milliGRAM(s) IV Push every 8 hours  norepinephrine Infusion 0.05 MICROgram(s)/kG/Min (3.14 mL/Hr) IV Continuous <Continuous>  phenylephrine    Infusion 2 MICROgram(s)/kG/Min (25.1 mL/Hr) IV Continuous <Continuous>  PureFlow Dialysate RFP-400 (K 2 / Ca 3) 5000 milliLiter(s) (2000 mL/Hr) CRRT <Continuous>  sucralfate 1 Gram(s) Oral two times a day  vasopressin Infusion 0.04 Unit(s)/Min (2.4 mL/Hr) IV Continuous <Continuous>    MEDICATIONS  (PRN):

## 2021-01-28 NOTE — PROGRESS NOTE ADULT - SUBJECTIVE AND OBJECTIVE BOX
NOAH QUIROS  75y, Female    All available historical data reviewed    OVERNIGHT EVENTS:  no fevers  on multiple pressors  fio2 70%  sedation, non responsive  no gag/corneals    ROS:  unable to obtain history secondary to patient's mental status and/or sedation      VITALS:  T(F): 97.5, Max: 97.8 (01-27-21 @ 16:00)  HR: 109  BP: --  RR: 14Vital Signs Last 24 Hrs  T(C): 36.4 (28 Jan 2021 08:00), Max: 36.6 (27 Jan 2021 16:00)  T(F): 97.5 (28 Jan 2021 08:00), Max: 97.8 (27 Jan 2021 16:00)  HR: 109 (28 Jan 2021 10:12) (109 - 122)  BP: --  BP(mean): --  RR: 14 (28 Jan 2021 09:00) (13 - 15)  SpO2: 97% (28 Jan 2021 06:00) (97% - 97%)    TESTS & MEASUREMENTS:                        10.8   20.97 )-----------( 53       ( 28 Jan 2021 04:30 )             32.6     01-28    136  |  103  |  14  ----------------------------<  157<H>  2.9<L>   |  20  |  0.9    Ca    9.5      28 Jan 2021 04:30  Phos  3.1     01-28  Mg     1.7     01-28    TPro  5.1<L>  /  Alb  3.0<L>  /  TBili  3.6<H>  /  DBili  x   /  AST  1169<H>  /  ALT  1413<H>  /  AlkPhos  122<H>  01-28    LIVER FUNCTIONS - ( 28 Jan 2021 04:30 )  Alb: 3.0 g/dL / Pro: 5.1 g/dL / ALK PHOS: 122 U/L / ALT: 1413 U/L / AST: 1169 U/L / GGT: x             Culture - Blood (collected 01-26-21 @ 17:20)  Source: .Blood None  Preliminary Report (01-27-21 @ 23:01):    No growth to date.    Culture - Blood (collected 01-26-21 @ 17:20)  Source: .Blood None  Preliminary Report (01-27-21 @ 23:01):    No growth to date.    Culture - Fungal, Body Fluid (collected 01-23-21 @ 14:46)  Source: Peritoneal Peritoneal Fluid  Preliminary Report (01-25-21 @ 09:11):    Testing in progress    Culture - Body Fluid with Gram Stain (collected 01-23-21 @ 14:46)  Source: Peritoneal Peritoneal Fluid  Gram Stain (01-23-21 @ 20:06):    polymorphonuclear leukocytes seen    No organisms seen    by cytocentrifuge  Preliminary Report (01-24-21 @ 16:27):    No growth    Culture - Blood (collected 01-23-21 @ 06:56)  Source: .Blood Blood-Peripheral  Preliminary Report (01-24-21 @ 18:01):    No growth to date.    Culture - Blood (collected 01-22-21 @ 21:10)  Source: .Blood Blood-Peripheral  Final Report (01-28-21 @ 02:22):    No Growth Final    Culture - Blood (collected 01-22-21 @ 21:10)  Source: .Blood Blood-Peripheral  Final Report (01-28-21 @ 02:22):    No Growth Final            RADIOLOGY & ADDITIONAL TESTS:  Personal review of radiological diagnostics performed  Echo and EKG results noted when applicable.     MEDICATIONS:  artificial  tears Solution 1 Drop(s) Both EYES every 4 hours  calcium acetate 667 milliGRAM(s) Oral three times a day with meals  cefepime   IVPB 2000 milliGRAM(s) IV Intermittent every 24 hours  cefepime   IVPB      chlorhexidine 0.12% Liquid 15 milliLiter(s) Oral Mucosa two times a day  chlorhexidine 4% Liquid 1 Application(s) Topical <User Schedule>  CRRT Treatment    <Continuous>  dexMEDEtomidine Infusion 0.5 MICROgram(s)/kG/Hr IV Continuous <Continuous>  dextrose 5% 1000 milliLiter(s) IV Continuous <Continuous>  fentaNYL   Infusion. 0.501 MICROgram(s)/kG/Hr IV Continuous <Continuous>  hydrocortisone sodium succinate Injectable 50 milliGRAM(s) IV Push every 8 hours  norepinephrine Infusion 0.05 MICROgram(s)/kG/Min IV Continuous <Continuous>  phenylephrine    Infusion 2 MICROgram(s)/kG/Min IV Continuous <Continuous>  PureFlow Dialysate RFP-400 (K 2 / Ca 3) 5000 milliLiter(s) CRRT <Continuous>  sucralfate 1 Gram(s) Oral two times a day  vasopressin Infusion 0.04 Unit(s)/Min IV Continuous <Continuous>      ANTIBIOTICS:  cefepime   IVPB 2000 milliGRAM(s) IV Intermittent every 24 hours  cefepime   IVPB

## 2021-01-28 NOTE — PROGRESS NOTE ADULT - SUBJECTIVE AND OBJECTIVE BOX
seen and examined  intubated/ventilated   on 3 pressors  on cvvhd         PAST HISTORY  --------------------------------------------------------------------------------  No significant changes to PMH, PSH, FHx, SHx, unless otherwise noted    ALLERGIES & MEDICATIONS  --------------------------------------------------------------------------------  Allergies    No Known Allergies    Intolerances      Standing Inpatient Medications  artificial  tears Solution 1 Drop(s) Both EYES every 4 hours  calcium acetate 667 milliGRAM(s) Oral three times a day with meals  cefepime   IVPB 2000 milliGRAM(s) IV Intermittent every 24 hours  cefepime   IVPB      chlorhexidine 0.12% Liquid 15 milliLiter(s) Oral Mucosa two times a day  chlorhexidine 4% Liquid 1 Application(s) Topical <User Schedule>  dexMEDEtomidine Infusion 0.5 MICROgram(s)/kG/Hr IV Continuous <Continuous>  dextrose 5% 1000 milliLiter(s) IV Continuous <Continuous>  fentaNYL   Infusion. 0.501 MICROgram(s)/kG/Hr IV Continuous <Continuous>  hydrocortisone sodium succinate Injectable 50 milliGRAM(s) IV Push every 8 hours  norepinephrine Infusion 0.05 MICROgram(s)/kG/Min IV Continuous <Continuous>  phenylephrine    Infusion 2 MICROgram(s)/kG/Min IV Continuous <Continuous>  potassium chloride  20 mEq/100 mL IVPB 20 milliEquivalent(s) IV Intermittent every 2 hours  vasopressin Infusion 0.04 Unit(s)/Min IV Continuous <Continuous>        VITALS/PHYSICAL EXAM  --------------------------------------------------------------------------------  T(C): 36.2 (01-28-21 @ 04:00), Max: 36.6 (01-27-21 @ 16:00)  HR: 118 (01-28-21 @ 07:00) (112 - 122)  BP: --  RR: 14 (01-28-21 @ 07:00) (13 - 15)  SpO2: 97% (01-28-21 @ 06:00) (97% - 97%)  Wt(kg): --        01-27-21 @ 07:01  -  01-28-21 @ 07:00  --------------------------------------------------------  IN: 20043.8 mL / OUT: 6403 mL / NET: 5209.8 mL    01-28-21 @ 07:01  -  01-28-21 @ 07:47  --------------------------------------------------------  IN: 470.7 mL / OUT: 0 mL / NET: 470.7 mL      Physical Exam:  	Gen: intubated/ventilated   	Pulm: decrease BS B/L  	CV:  S1S2; no rub  	Abd: +distended  	Vascular access:udall     LABS/STUDIES  --------------------------------------------------------------------------------              10.8   20.97 >-----------<  53       [01-28-21 @ 04:30]              32.6     136  |  103  |  14  ----------------------------<  157      [01-28-21 @ 04:30]  2.9   |  20  |  0.9        Ca     9.5     [01-28-21 @ 04:30]      Mg     1.7     [01-28-21 @ 04:30]      Phos  3.1     [01-28-21 @ 04:30]    TPro  5.1  /  Alb  3.0  /  TBili  3.6  /  DBili  x   /  AST  1169  /  ALT  1413  /  AlkPhos  122  [01-28-21 @ 04:30]    PT/INR: PT >40.00, INR 4.17       [01-28-21 @ 04:30]  PTT: 40.4       [01-27-21 @ 17:17]          [01-27-21 @ 17:17]    Creatinine Trend:  SCr 0.9 [01-28 @ 04:30]  SCr 1.0 [01-27 @ 04:30]  SCr 1.3 [01-26 @ 04:30]  SCr 1.6 [01-25 @ 16:45]  SCr 1.9 [01-25 @ 05:00]        Iron 192, TIBC <209, %sat --      [01-25-21 @ 20:00]  Ferritin 78020      [01-25-21 @ 20:00]  PTH -- (Ca 9.9)      [01-26-21 @ 18:10]   303  Vitamin D (25OH) 19      [01-26-21 @ 18:10]  TSH 5.78      [01-23-21 @ 03:00]  Lipid: chol --, , HDL --, LDL --      [01-27-21 @ 17:17]    HBsAg Nonreact      [01-24-21 @ 17:03]  HCV 0.13, Nonreact      [01-24-21 @ 17:03]    Free Light Chains: kappa 6.41, lambda 9.14, ratio = 0.70      [01-25 @ 20:00]

## 2021-01-28 NOTE — PROGRESS NOTE ADULT - ASSESSMENT
75 year old Female with PMhx of ESRD on Tue/Thur/Sat HD, b/l PVD s/p endovascular revascularization, Permanent Afib w/ failed ablation and multiple cardioversion on Eliquis, Aortic stenosis, pulmonary HTN, COPD anemia of chronic disease, CABG, HF with reduced EF and CAD presented after fall. Sepsis present on admission. Found to be in A-fib with RVR on admission. Large volume ascites present on admission s/p paracentesis. Admitted to CCU with shock with MSOF with severe metabolic acidosis/lactic acidosis on pressors. On CVVHD.    #Shock with MSOF with severe metabolic acidosis/lactic acidosis on pressors  - Intubated and sedated  - On multiple pressors  - S/p emergent right fem [place] placement for CVVHD  - No obvious focus of infection  - Cefepime 2g IV Q24H, as per ID  - Check UA/urine culture  - C/w D5W with NaHCO3 150mEq  - PTH elevated; vitamin D levels decreased  - Blood cultures show no growth to date  - F/u repeat cultures  - F/u Fungitell  - COVID PCR negative on 1/26  - Risks of performing lumbar puncture outweigh benefits at this time and lumbar puncture will not  or prognosis    #Acute transaminitis  #Ascites  - Possibly secondary to ischemic/congestive hepatopathy, amiodarone toxicity  - Cardio eval appreciated -> amiodarone stopped  - S/p paracentesis  - Pt noted to have ascites even 1 year prior on previous imaging  - Evaluated by GI -> ascites likely secondary to cardiac etiology  - Ascitic fluid results noted -> no evidence of peritonitis, results not consistent with cirrhosis/portal HTN  - F/u ascitic fluid cultures until final    - F/u viral hepatitis panel -> negative  - Monitor daily LFT and INR    #Coagulopathy  #Thrombocytopenia  - Likely secondary to shock and severe acidemia  - S/p 1U PLT transfusion  - Daily PT and INR  - Daily fibrinogen and D-dimer  - F/u HIT labs  - Transfuse PLT if PLT < 20 or if active bleed  - Hematology recs appreciated    #Mediastinal adenopathy  - Further evaluation if condition becomes more stable    #Systolic CHF  #Pulmonary HTN  - TTE noted: EF 35-40%, severely enlarged RA, moderately reduced RV systolic function, severely enlarged RV, severe TR, severe pulmonary HTN, severe AS  - Hemodynamic optimization in setting of shock and severe acidemia    #Atrial fibrillation with RVR  - Amiodarone stopped due to high LFT's  - BP not able to tolerate metoprolol    #Cyanosis  - Vasopressor-related cyanosis/blue digits  - Vascular surgery eval appreciated  - Pt still requiring pressor support at this time    #Misc  Diet: NPO  DVT PPx: on hold due to elevated INR  Dispo: remain in CCU

## 2021-01-29 NOTE — PROGRESS NOTE ADULT - ASSESSMENT
75 year old Female with PMhx of ESRD on Tue/Thur/Sat HD, b/l PVD s/p endovascular revascularization, Permanent Afib w/ failed ablation and multiple cardioversion on Eliquis, Aortic stenosis, pulmonary HTN, COPD anemia of chronic disease, CABG, HF with reduced EF and CAD presented after fall. Sepsis present on admission. Found to be in A-fib with RVR on admission. Large volume ascites present on admission s/p paracentesis. Admitted to CCU with shock with MSOF with severe metabolic acidosis/lactic acidosis on pressors. Family planning to withdraw care today.    #Shock with MSOF with severe metabolic acidosis/lactic acidosis on pressors  - Intubated and sedated  - On multiple pressors  - S/p emergent right fem [place] placement for CVVHD  - No obvious focus of infection  - Cefepime 2g IV Q24H, as per ID  - Check UA/urine culture  - C/w D5W with NaHCO3 150mEq  - PTH elevated; vitamin D levels decreased  - Blood cultures show no growth to date  - F/u repeat cultures  - F/u Fungitell  - COVID PCR negative on 1/26  - Risks of performing lumbar puncture outweigh benefits at this time and lumbar puncture will not  or prognosis    #Acute transaminitis  #Ascites  - Possibly secondary to ischemic/congestive hepatopathy, amiodarone toxicity  - Cardio eval appreciated -> amiodarone stopped  - S/p paracentesis  - Pt noted to have ascites even 1 year prior on previous imaging  - Evaluated by GI -> ascites likely secondary to cardiac etiology  - Ascitic fluid results noted -> no evidence of peritonitis, results not consistent with cirrhosis/portal HTN  - F/u ascitic fluid cultures until final    - F/u viral hepatitis panel -> negative  - Monitor daily LFT and INR    #Coagulopathy  #Thrombocytopenia  - Likely secondary to shock and severe acidemia  - S/p 2U PLT  - Daily PT and INR  - Daily fibrinogen and D-dimer  - Heparin-induced platelet antibody negative  - Transfuse PLT if PLT < 20 or if active bleed  - Hematology recs appreciated    #Mediastinal adenopathy  - Further evaluation if condition becomes more stable    #Systolic CHF  #Pulmonary HTN  - TTE noted: EF 35-40%, severely enlarged RA, moderately reduced RV systolic function, severely enlarged RV, severe TR, severe pulmonary HTN, severe AS  - Hemodynamic optimization in setting of shock and severe acidemia    #Atrial fibrillation with RVR  - Amiodarone stopped due to high LFT's  - BP not able to tolerate metoprolol    #Cyanosis  - Vasopressor-related cyanosis/blue digits  - Vascular surgery eval appreciated  - Pt still requiring pressor support at this time    #Misc  Diet: NPO  DVT PPx: on hold due to elevated INR  Dispo: remain in CCU     75 year old Female with PMhx of ESRD on Tue/Thur/Sat HD, b/l PVD s/p endovascular revascularization, Permanent Afib w/ failed ablation and multiple cardioversion on Eliquis, Aortic stenosis, pulmonary HTN, COPD anemia of chronic disease, CABG, HF with reduced EF and CAD presented after fall. Sepsis present on admission. Found to be in A-fib with RVR on admission. Large volume ascites present on admission s/p paracentesis. Admitted to CCU with shock with MSOF with severe metabolic acidosis/lactic acidosis on pressors. Family planning to withdraw care today.    #Shock with MSOF with severe metabolic acidosis/lactic acidosis on pressors  - Intubated and sedated  - On multiple pressors  - S/p emergent right fem [place] placement for CVVHD  - No obvious focus of infection  - Cefepime 2g IV Q24H, as per ID  - Check UA/urine culture  - C/w D5W with NaHCO3 150mEq  - PTH elevated; vitamin D levels decreased  - Blood cultures show no growth to date  - F/u repeat cultures  - F/u Fungitell  - COVID PCR negative on 1/26  - Risks of performing lumbar puncture outweigh benefits at this time and lumbar puncture will not  or prognosis    #Acute transaminitis  #Ascites  - Possibly secondary to ischemic/congestive hepatopathy, amiodarone toxicity  - Cardio eval appreciated -> amiodarone stopped  - S/p paracentesis  - Pt noted to have ascites even 1 year prior on previous imaging  - Evaluated by GI -> ascites likely secondary to cardiac etiology  - Ascitic fluid results noted -> no evidence of peritonitis, results not consistent with cirrhosis/portal HTN  - F/u ascitic fluid cultures until final    - F/u viral hepatitis panel -> negative  - Monitor daily LFT and INR    #Coagulopathy  #Thrombocytopenia  - Likely secondary to shock and severe acidemia  - S/p 2U PLT  - Daily PT and INR  - Daily fibrinogen and D-dimer  - Heparin-induced platelet antibody negative  - Transfuse PLT if PLT < 20 or if active bleed  - Hematology recs appreciated    #Mediastinal adenopathy  - Further evaluation if condition becomes more stable    #Systolic CHF  #Pulmonary HTN  - TTE noted: EF 35-40%, severely enlarged RA, moderately reduced RV systolic function, severely enlarged RV, severe TR, severe pulmonary HTN, severe AS  - Hemodynamic optimization in setting of shock and severe acidemia    #Atrial fibrillation with RVR  - Amiodarone stopped due to high LFT's  - BP not able to tolerate metoprolol    #Cyanosis  - Vasopressor-related cyanosis/blue digits  - Vascular surgery eval appreciated  - Pt still requiring pressor support at this time    #Misc  Diet: NPO  DVT PPx: on hold due to elevated INR  Dispo: remain in CCU  DNR

## 2021-01-29 NOTE — PROGRESS NOTE ADULT - ASSESSMENT
INCOMPLETE     75 year old Female with PMhx of ESRD on Tue/Thur/Sat HD, b/l PVD s/p endovascular revascularization, Permanent Afib w/ failed ablation and multiple cardioversion on Eliquis, Aortic stenosis, pulmonary HTN, COPD anemia of chronic disease, CABG, HF with reduced EF and CAD presented after fall. Sepsis present on admission. Found to be in A-fib with RVR on admission. Large volume ascites present on admission s/p paracentesis. Admitted to CCU with shock with MSOF with severe metabolic acidosis/lactic acidosis on pressors.   Patient's family visited yesterday and want to transition to CMO today,  Spoke to daughter Sherley and sister Carolyn as above. Family to visit at 5 pm and patient to be extubated and started on CMO after.  TIme spent discussing ACP 30 mins.     Recommendations:    Place DNR/DNI order  Place CMO order prior to starting care  Primary team to fill out new MOLST with CMO  Start Morphine drip at 4 mg/hr  prior to extubation  Morphine 4 mg IV stat dose prior to extubation   Morphine 4 mg IV Q15 mins PRN for 1 hour- for pain and dyspnea  Morphine 4 mg IV Q1h PRn for pain and dyspnea  Ativan 1 mg Stat dose IV prior to extubation  Ativan 1 mg Q15 mins PRN for 1 hour  Ativan 1 mg IV Q4h PRN for agitation and anxiety   Glycopyrrolate 0.2 mg IV stat prior to extubation  Glycopyrrolate 0.2 mg IV Q6h PRN for secretions  DC IVF, blood draws and vital signs  DC any meds not contributing to comfort     [phone]     75 year old Female with PMhx of ESRD on Tue/Thur/Sat HD, b/l PVD s/p endovascular revascularization, Permanent Afib w/ failed ablation and multiple cardioversion on Eliquis, Aortic stenosis, pulmonary HTN, COPD anemia of chronic disease, CABG, HF with reduced EF and CAD presented after fall. Sepsis present on admission. Found to be in A-fib with RVR on admission. Large volume ascites present on admission s/p paracentesis. Admitted to CCU with shock with MSOF with severe metabolic acidosis/lactic acidosis on pressors.   Patient's family visited yesterday and want to transition to CMO today,    Spoke to daughter Sherley and sister Carolyn as above. Family to visit at 5 pm and patient to be extubated and started on CMO after.  Time spent discussing ACP 30 mins.     Recommendations:    Place DNR/DNI order  Place CMO order prior to starting care  Primary team to fill out new MOLST with CMO  Start Morphine drip at 4 mg/hr  prior to extubation  Morphine 4 mg IV stat dose prior to extubation   Morphine 4 mg IV Q15 mins PRN for 1 hour- for pain and dyspnea  Morphine 4 mg IV Q1h PRn for pain and dyspnea  Ativan 1 mg Stat dose IV prior to extubation  Ativan 1 mg Q15 mins PRN for 1 hour  Ativan 1 mg IV Q4h PRN for agitation and anxiety   Glycopyrrolate 0.2 mg IV stat prior to extubation  Glycopyrrolate 0.2 mg IV Q6h PRN for secretions  DC IVF, blood draws and vital signs  DC any meds not contributing to comfort     [phone]

## 2021-01-29 NOTE — PROGRESS NOTE ADULT - SUBJECTIVE AND OBJECTIVE BOX
SUBJECTIVE:    Patient is a 75y old Female who presents with a chief complaint of fall and hypotension (28 Jan 2021 18:27)    Currently admitted to medicine with the primary diagnosis of shock with multisystem organ failure     Today is hospital day 8d. Patient seen and examined at bedside this AM. No acute overnight events.    PAST MEDICAL & SURGICAL HISTORY  Swollen feet  ON OCCASSION    HTN (hypertension)    ESRD on hemodialysis  T,T,S    Thyroid nodule    Degenerative joint disease    Osteoarthritis    Diverticulosis    GERD (gastroesophageal reflux disease)    Heart failure with reduced ejection fraction    Pulmonary hypertension, moderate to severe    Aortic stenosis, moderate    Atrial fibrillation  status post cardioversion    MI (myocardial infarction)    CAD (coronary artery disease)    Kidney stone    HTN (hypertension)    S/P ureteral stent placement    History of kidney surgery    S/P cataract surgery    S/P CABG (coronary artery bypass graft)    AV fistula    H/O abdominal hysterectomy    H/O cardiac radiofrequency ablation      SOCIAL HISTORY:  Negative for smoking/alcohol/drug use.     ALLERGIES:  No Known Allergies    MEDICATIONS:  STANDING MEDICATIONS  artificial  tears Solution 1 Drop(s) Both EYES every 4 hours  calcium acetate 667 milliGRAM(s) Oral three times a day with meals  cefepime   IVPB 2000 milliGRAM(s) IV Intermittent every 24 hours  cefepime   IVPB      chlorhexidine 0.12% Liquid 15 milliLiter(s) Oral Mucosa two times a day  chlorhexidine 4% Liquid 1 Application(s) Topical <User Schedule>  CRRT Treatment    <Continuous>  dexMEDEtomidine Infusion 0.5 MICROgram(s)/kG/Hr IV Continuous <Continuous>  dextrose 5% 1000 milliLiter(s) IV Continuous <Continuous>  fentaNYL   Infusion. 0.501 MICROgram(s)/kG/Hr IV Continuous <Continuous>  hydrocortisone sodium succinate Injectable 50 milliGRAM(s) IV Push every 8 hours  norepinephrine Infusion 0.05 MICROgram(s)/kG/Min IV Continuous <Continuous>  phenylephrine    Infusion 2 MICROgram(s)/kG/Min IV Continuous <Continuous>  PureFlow Dialysate RFP-400 (K 2 / Ca 3) 5000 milliLiter(s) CRRT <Continuous>  sucralfate 1 Gram(s) Oral two times a day  vasopressin Infusion 0.04 Unit(s)/Min IV Continuous <Continuous>    PRN MEDICATIONS    VITALS:   T(F): 97.7  HR: 112  BP: --  RR: 15  SpO2: --    LABS:                        9.3    20.34 )-----------( 60       ( 29 Jan 2021 04:30 )             29.6     01-29    134<L>  |  102  |  18  ----------------------------<  137<H>  4.0   |  16<L>  |  1.0    Ca    10.0      29 Jan 2021 04:30  Phos  3.8     01-29  Mg     1.8     01-29    TPro  4.6<L>  /  Alb  2.4<L>  /  TBili  4.4<H>  /  DBili  x   /  AST  509<H>  /  ALT  843<H>  /  AlkPhos  108  01-29    PT/INR - ( 29 Jan 2021 04:30 )   PT: >40.00 sec;   INR: 4.11 ratio         PTT - ( 27 Jan 2021 17:17 )  PTT:40.4 sec    ABG - ( 29 Jan 2021 03:53 )  pH, Arterial: 7.22  pH, Blood: x     /  pCO2: 44    /  pO2: 107   / HCO3: 18    / Base Excess: -9.3  /  SaO2: 98                    Culture - Blood (collected 26 Jan 2021 17:20)  Source: .Blood None  Preliminary Report (27 Jan 2021 23:01):    No growth to date.    Culture - Blood (collected 26 Jan 2021 17:20)  Source: .Blood None  Preliminary Report (27 Jan 2021 23:01):    No growth to date.      PHYSICAL EXAM:  GEN: No acute distress  LUNGS: on mechanical ventilation   HEART: S1/S2 present, tachycardic  ABD: Soft, non-tender, distended  EXT: UE and LE cyanosis   NEURO: sedated    Intravenous access:   NG tube:   Lubin Catheter:   Indwelling Urethral Catheter:     Connect To:  Straight Drainage/Gravity    Indication:  Urine Output Monitoring in Critically Ill (01-24-21 @ 05:46) (not performed) [Active]  Indwelling Urethral Catheter:     Connect To:  Straight Drainage/Gravity    Indication:  Urine Output Monitoring in Critically Ill (01-23-21 @ 12:33) (not performed) [Active]

## 2021-01-29 NOTE — PROGRESS NOTE ADULT - TREATMENT GUIDELINE COMMENT
Full code  Continue current medical management
Palliative extubation and CMO to be started after daughter visits.

## 2021-01-29 NOTE — PROGRESS NOTE ADULT - SUBJECTIVE AND OBJECTIVE BOX
Nephrology progress note    THIS IS AN INCOMPLETE NOTE . FULL NOTE TO FOLLOW SHORTLY    Patient is seen and examined, events over the last 24 h noted .    Allergies:  No Known Allergies    Hospital Medications:   MEDICATIONS  (STANDING):  artificial  tears Solution 1 Drop(s) Both EYES every 4 hours  calcium acetate 667 milliGRAM(s) Oral three times a day with meals  cefepime   IVPB 2000 milliGRAM(s) IV Intermittent every 24 hours  cefepime   IVPB      chlorhexidine 0.12% Liquid 15 milliLiter(s) Oral Mucosa two times a day  chlorhexidine 4% Liquid 1 Application(s) Topical <User Schedule>  CRRT Treatment    <Continuous>  dexMEDEtomidine Infusion 0.5 MICROgram(s)/kG/Hr (9.04 mL/Hr) IV Continuous <Continuous>  dextrose 5% 1000 milliLiter(s) (75 mL/Hr) IV Continuous <Continuous>  fentaNYL   Infusion. 0.501 MICROgram(s)/kG/Hr (3.62 mL/Hr) IV Continuous <Continuous>  hydrocortisone sodium succinate Injectable 50 milliGRAM(s) IV Push every 8 hours  norepinephrine Infusion 0.05 MICROgram(s)/kG/Min (3.14 mL/Hr) IV Continuous <Continuous>  phenylephrine    Infusion 2 MICROgram(s)/kG/Min (25.1 mL/Hr) IV Continuous <Continuous>  PureFlow Dialysate RFP-400 (K 2 / Ca 3) 5000 milliLiter(s) (2000 mL/Hr) CRRT <Continuous>  sucralfate 1 Gram(s) Oral two times a day  vasopressin Infusion 0.04 Unit(s)/Min (2.4 mL/Hr) IV Continuous <Continuous>        VITALS:  T(F): 97.7 (01-29-21 @ 04:00), Max: 203.4 (01-28-21 @ 20:00)  HR: 112 (01-29-21 @ 06:00)  BP: --  RR: 15 (01-29-21 @ 06:00)  SpO2: --  Wt(kg): --    01-27 @ 07:01  -  01-28 @ 07:00  --------------------------------------------------------  IN: 32514.8 mL / OUT: 6403 mL / NET: 5209.8 mL    01-28 @ 07:01  -  01-29 @ 07:00  --------------------------------------------------------  IN: 37232.4 mL / OUT: 4575 mL / NET: 8279.4 mL          PHYSICAL EXAM:  Constitutional: NAD  HEENT: anicteric sclera, oropharynx clear, MMM  Neck: No JVD  Respiratory: CTAB, no wheezes, rales or rhonchi  Cardiovascular: S1, S2, RRR  Gastrointestinal: BS+, soft, NT/ND  Extremities: No cyanosis or clubbing. No peripheral edema  :  No simeon.   Skin: No rashes    LABS:  01-29    134<L>  |  102  |  18  ----------------------------<  137<H>  4.0   |  16<L>  |  1.0    Ca    10.0      29 Jan 2021 04:30  Phos  3.8     01-29  Mg     1.8     01-29    TPro  4.6<L>  /  Alb  2.4<L>  /  TBili  4.4<H>  /  DBili      /  AST  509<H>  /  ALT  843<H>  /  AlkPhos  108  01-29                          9.3    20.34 )-----------( 60       ( 29 Jan 2021 04:30 )             29.6       Urine Studies:      RADIOLOGY & ADDITIONAL STUDIES:   Nephrology progress note  Patient is seen and examined, events over the last 24 h noted .  Off CVVHD  Patient will be on comfort care   please recall if we can be of any help

## 2021-01-29 NOTE — PROGRESS NOTE ADULT - SUBJECTIVE AND OBJECTIVE BOX
75yFemale with diagnosis: ATRIAL FIBRILLATION WITH RVR,SEPTIC SHOCK    ...    PHYSICAL EXAM    Constitutional: Intubated, appears critically ill     Eyes: closed    ENMT: Atraumatic, normocephalic     Neck: trachea midline    Respiratory: vented BS    Cardiovascular: s1, S2, no M/R/G     Gastrointestinal: soft, not tender     Genitourinary: simeon     T(C): 36.9 (01-29-21 @ 08:00)  HR: 142 (01-29-21 @ 11:00)  BP: --  RR: 15 (01-29-21 @ 11:00)  SpO2: --      IN: 40502.4 mL / OUT: 4575 mL / NET: 8279.4 mL    IN: 2524.1 mL / OUT: 0 mL / NET: 2524.1 mL                              9.3    20.34 )-----------( 60       ( 29 Jan 2021 04:30 )             29.6       01-29    134<L>  |  102  |  18  ----------------------------<  137<H>  4.0   |  16<L>  |  1.0    Ca    10.0      29 Jan 2021 04:30  Phos  3.8     01-29  Mg     1.8     01-29    TPro  4.6<L>  /  Alb  2.4<L>  /  TBili  4.4<H>  /  DBili  x   /  AST  509<H>  /  ALT  843<H>  /  AlkPhos  108  01-29      PT/INR - ( 29 Jan 2021 04:30 )   PT: >40.00 sec;   INR: 4.11 ratio         PTT - ( 27 Jan 2021 17:17 )  PTT:40.4 sec          Culture - Blood (collected 26 Jan 2021 17:20)  Source: .Blood None  Preliminary Report (27 Jan 2021 23:01):    No growth to date.    Culture - Blood (collected 26 Jan 2021 17:20)  Source: .Blood None  Preliminary Report (27 Jan 2021 23:01):    No growth to date.        Mode: AC/ CMV (Assist Control/ Continuous Mandatory Ventilation), RR (machine): 14, TV (machine): 450, FiO2: 100, PEEP: 5, ITime: 1, MAP: 14, PIP: 37    MEDICATIONS  (STANDING):  artificial  tears Solution 1 Drop(s) Both EYES every 4 hours  calcium acetate 667 milliGRAM(s) Oral three times a day with meals  cefepime   IVPB 2000 milliGRAM(s) IV Intermittent every 24 hours  cefepime   IVPB      chlorhexidine 0.12% Liquid 15 milliLiter(s) Oral Mucosa two times a day  chlorhexidine 4% Liquid 1 Application(s) Topical <User Schedule>  CRRT Treatment    <Continuous>  dexMEDEtomidine Infusion 0.5 MICROgram(s)/kG/Hr (9.04 mL/Hr) IV Continuous <Continuous>  dextrose 5% 1000 milliLiter(s) (75 mL/Hr) IV Continuous <Continuous>  fentaNYL   Infusion. 0.501 MICROgram(s)/kG/Hr (3.62 mL/Hr) IV Continuous <Continuous>  hydrocortisone sodium succinate Injectable 50 milliGRAM(s) IV Push every 8 hours  metoprolol tartrate 12.5 milliGRAM(s) Oral two times a day  norepinephrine Infusion 0.05 MICROgram(s)/kG/Min (3.14 mL/Hr) IV Continuous <Continuous>  phenylephrine    Infusion 2 MICROgram(s)/kG/Min (25.1 mL/Hr) IV Continuous <Continuous>  PureFlow Dialysate RFP-400 (K 2 / Ca 3) 5000 milliLiter(s) (2000 mL/Hr) CRRT <Continuous>  sucralfate 1 Gram(s) Oral two times a day  vasopressin Infusion 0.04 Unit(s)/Min (2.4 mL/Hr) IV Continuous <Continuous>    MEDICATIONS  (PRN):       75yFemale with diagnosis: ATRIAL FIBRILLATION WITH RVR,SEPTIC SHOCK    Patient seen and examined at bedside. Intubated, critically ill. Family visited yesterday.  Palliative extubation to be done today. Please see GOC below.     PHYSICAL EXAM    Constitutional: Intubated, appears critically ill     Eyes: closed    ENMT: Atraumatic, normocephalic     Neck: trachea midline    Respiratory: vented BS    Cardiovascular: s1, S2, no M/R/G     Gastrointestinal: soft, not tender     Genitourinary: simeon     T(C): 36.9 (01-29-21 @ 08:00)  HR: 142 (01-29-21 @ 11:00)  BP: --  RR: 15 (01-29-21 @ 11:00)  SpO2: --      IN: 76361.4 mL / OUT: 4575 mL / NET: 8279.4 mL    IN: 2524.1 mL / OUT: 0 mL / NET: 2524.1 mL                              9.3    20.34 )-----------( 60       ( 29 Jan 2021 04:30 )             29.6       01-29    134<L>  |  102  |  18  ----------------------------<  137<H>  4.0   |  16<L>  |  1.0    Ca    10.0      29 Jan 2021 04:30  Phos  3.8     01-29  Mg     1.8     01-29    TPro  4.6<L>  /  Alb  2.4<L>  /  TBili  4.4<H>  /  DBili  x   /  AST  509<H>  /  ALT  843<H>  /  AlkPhos  108  01-29      PT/INR - ( 29 Jan 2021 04:30 )   PT: >40.00 sec;   INR: 4.11 ratio         PTT - ( 27 Jan 2021 17:17 )  PTT:40.4 sec          Culture - Blood (collected 26 Jan 2021 17:20)  Source: .Blood None  Preliminary Report (27 Jan 2021 23:01):    No growth to date.    Culture - Blood (collected 26 Jan 2021 17:20)  Source: .Blood None  Preliminary Report (27 Jan 2021 23:01):    No growth to date.        Mode: AC/ CMV (Assist Control/ Continuous Mandatory Ventilation), RR (machine): 14, TV (machine): 450, FiO2: 100, PEEP: 5, ITime: 1, MAP: 14, PIP: 37    MEDICATIONS  (STANDING):  artificial  tears Solution 1 Drop(s) Both EYES every 4 hours  calcium acetate 667 milliGRAM(s) Oral three times a day with meals  cefepime   IVPB 2000 milliGRAM(s) IV Intermittent every 24 hours  cefepime   IVPB      chlorhexidine 0.12% Liquid 15 milliLiter(s) Oral Mucosa two times a day  chlorhexidine 4% Liquid 1 Application(s) Topical <User Schedule>  CRRT Treatment    <Continuous>  dexMEDEtomidine Infusion 0.5 MICROgram(s)/kG/Hr (9.04 mL/Hr) IV Continuous <Continuous>  dextrose 5% 1000 milliLiter(s) (75 mL/Hr) IV Continuous <Continuous>  fentaNYL   Infusion. 0.501 MICROgram(s)/kG/Hr (3.62 mL/Hr) IV Continuous <Continuous>  hydrocortisone sodium succinate Injectable 50 milliGRAM(s) IV Push every 8 hours  metoprolol tartrate 12.5 milliGRAM(s) Oral two times a day  norepinephrine Infusion 0.05 MICROgram(s)/kG/Min (3.14 mL/Hr) IV Continuous <Continuous>  phenylephrine    Infusion 2 MICROgram(s)/kG/Min (25.1 mL/Hr) IV Continuous <Continuous>  PureFlow Dialysate RFP-400 (K 2 / Ca 3) 5000 milliLiter(s) (2000 mL/Hr) CRRT <Continuous>  sucralfate 1 Gram(s) Oral two times a day  vasopressin Infusion 0.04 Unit(s)/Min (2.4 mL/Hr) IV Continuous <Continuous>    MEDICATIONS  (PRN):

## 2021-01-29 NOTE — PROGRESS NOTE ADULT - REASON FOR ADMISSION
fall and hypotension
fall and hypotension, septic shock, ARF,intubation
fall and hypotension
Hx of fall, hypotension, septic shock, ARF, MSOF
fall and hypotension

## 2021-01-29 NOTE — DISCHARGE NOTE FOR THE EXPIRED PATIENT - HOSPITAL COURSE
History of Present Illness:   75 years old Female with PMhx of ESRD on Tue/Thur/Sat HD, b/l PVD s/p endovascular revascularization, Permanent Afib w/ failed ablation and multiple cardioversion on Eliquis, Aortic stenosis, pulmonary HTN, COPD anemia of chronic disease, CABG, HF with reduced EF and CAD Pt presents after fall that happened yesterday at dialysis, pt is on Eliquis.   Collateral history obtained from ED charts. As per the patient she fell to her knees and reports hitting her head at dialysis centre. No LOC or vomiting. Pt was evaluated by the medical team there and was sent home. At the time pt’s fistula was not working but they were able to fix it. Today pt came back to dialysis and received 4 hours of dialysis. At that time it was noted by team members at the dialysis clinic that pt was hypotensive and tachycardic so pt was sent to ED. Denies any fever, cough, chest pain, n/v/d, urinary symptoms or recent weight changes.     In the ED, Pt was found to be hypotensive 67/38, tachycardiac 172, Temp 97.5, was saturating well on 2L NC. Pt was found to be in Atrial fibrillation w/ RVR. Initial trauma workup negative, CT A/P showed Large volume ascites within the abdomen and pelvis. Paratracheal, subcarinal, and right hilar lymphadenopathy is noted Labs were significant for elevated WBC at 14.12, lactate 4.4, Trop elevated at 0.23. Received 750 cc of IV fluid bolus in ED, started on Gavin-synephrine and vasopressin infusion. Cardiology consulted, pt was started on amiodarone and digoxin for afibb w/ RVR. Antibiotics vancomycin and cefepime also administered.     Hospital course:  75 year old Female with PMhx of ESRD on Tue/Thur/Sat HD, b/l PVD s/p endovascular revascularization, Permanent Afib w/ failed ablation and multiple cardioversion on Eliquis, Aortic stenosis, pulmonary HTN, COPD anemia of chronic disease, CABG, HF with reduced EF and CAD presented after fall. Sepsis present on admission. Found to be in A-fib with RVR on admission. Large volume ascites present on admission s/p paracentesis. Admitted to CCU with shock with MSOF with severe metabolic acidosis/lactic acidosis on pressors. Pt was intubated for acute hypoxic respiratory failure, remained hemodynamically unstable necessitating multiple pressors, and developed severe multiorgan dysfunction. On 21, family made decision to withdraw care and make pt comfort care only. At 17:35, pt was liberated from the ventilator, pressors and other interventions were stopped, and pt  at 17:57 on 2021 with family at bedside.

## 2021-01-30 LAB
ADAMTS13 ACTIVITY: 42.6 % — LOW
ADAMTS13-COMMENT: SIGNIFICANT CHANGE UP
FUNGITELL: 48 PG/ML — SIGNIFICANT CHANGE UP

## 2021-01-31 LAB
CULTURE RESULTS: SIGNIFICANT CHANGE UP
CULTURE RESULTS: SIGNIFICANT CHANGE UP
SPECIMEN SOURCE: SIGNIFICANT CHANGE UP
SPECIMEN SOURCE: SIGNIFICANT CHANGE UP

## 2021-02-09 DIAGNOSIS — Z95.828 PRESENCE OF OTHER VASCULAR IMPLANTS AND GRAFTS: ICD-10-CM

## 2021-02-09 DIAGNOSIS — Z87.891 PERSONAL HISTORY OF NICOTINE DEPENDENCE: ICD-10-CM

## 2021-02-09 DIAGNOSIS — I25.10 ATHEROSCLEROTIC HEART DISEASE OF NATIVE CORONARY ARTERY WITHOUT ANGINA PECTORIS: ICD-10-CM

## 2021-02-09 DIAGNOSIS — N18.6 END STAGE RENAL DISEASE: ICD-10-CM

## 2021-02-09 DIAGNOSIS — I13.2 HYPERTENSIVE HEART AND CHRONIC KIDNEY DISEASE WITH HEART FAILURE AND WITH STAGE 5 CHRONIC KIDNEY DISEASE, OR END STAGE RENAL DISEASE: ICD-10-CM

## 2021-02-09 DIAGNOSIS — A41.9 SEPSIS, UNSPECIFIED ORGANISM: ICD-10-CM

## 2021-02-09 DIAGNOSIS — Z51.5 ENCOUNTER FOR PALLIATIVE CARE: ICD-10-CM

## 2021-02-09 DIAGNOSIS — I08.2 RHEUMATIC DISORDERS OF BOTH AORTIC AND TRICUSPID VALVES: ICD-10-CM

## 2021-02-09 DIAGNOSIS — I48.21 PERMANENT ATRIAL FIBRILLATION: ICD-10-CM

## 2021-02-09 DIAGNOSIS — J44.9 CHRONIC OBSTRUCTIVE PULMONARY DISEASE, UNSPECIFIED: ICD-10-CM

## 2021-02-09 DIAGNOSIS — I50.22 CHRONIC SYSTOLIC (CONGESTIVE) HEART FAILURE: ICD-10-CM

## 2021-02-09 DIAGNOSIS — Z90.710 ACQUIRED ABSENCE OF BOTH CERVIX AND UTERUS: ICD-10-CM

## 2021-02-09 DIAGNOSIS — I73.89 OTHER SPECIFIED PERIPHERAL VASCULAR DISEASES: ICD-10-CM

## 2021-02-09 DIAGNOSIS — D69.59 OTHER SECONDARY THROMBOCYTOPENIA: ICD-10-CM

## 2021-02-09 DIAGNOSIS — Z79.01 LONG TERM (CURRENT) USE OF ANTICOAGULANTS: ICD-10-CM

## 2021-02-09 DIAGNOSIS — E87.1 HYPO-OSMOLALITY AND HYPONATREMIA: ICD-10-CM

## 2021-02-09 DIAGNOSIS — D68.9 COAGULATION DEFECT, UNSPECIFIED: ICD-10-CM

## 2021-02-09 DIAGNOSIS — I42.9 CARDIOMYOPATHY, UNSPECIFIED: ICD-10-CM

## 2021-02-09 DIAGNOSIS — E87.2 ACIDOSIS: ICD-10-CM

## 2021-02-09 DIAGNOSIS — R65.21 SEVERE SEPSIS WITH SEPTIC SHOCK: ICD-10-CM

## 2021-02-09 DIAGNOSIS — R18.8 OTHER ASCITES: ICD-10-CM

## 2021-02-09 DIAGNOSIS — Z95.1 PRESENCE OF AORTOCORONARY BYPASS GRAFT: ICD-10-CM

## 2021-02-09 DIAGNOSIS — I25.2 OLD MYOCARDIAL INFARCTION: ICD-10-CM

## 2021-02-09 DIAGNOSIS — E86.0 DEHYDRATION: ICD-10-CM

## 2021-02-09 DIAGNOSIS — K72.00 ACUTE AND SUBACUTE HEPATIC FAILURE WITHOUT COMA: ICD-10-CM

## 2021-02-09 DIAGNOSIS — E83.52 HYPERCALCEMIA: ICD-10-CM

## 2021-02-09 DIAGNOSIS — J96.01 ACUTE RESPIRATORY FAILURE WITH HYPOXIA: ICD-10-CM

## 2021-02-09 DIAGNOSIS — Z66 DO NOT RESUSCITATE: ICD-10-CM

## 2021-02-09 DIAGNOSIS — I27.20 PULMONARY HYPERTENSION, UNSPECIFIED: ICD-10-CM

## 2021-02-09 DIAGNOSIS — Z99.2 DEPENDENCE ON RENAL DIALYSIS: ICD-10-CM

## 2021-02-17 ENCOUNTER — APPOINTMENT (OUTPATIENT)
Dept: VASCULAR SURGERY | Facility: CLINIC | Age: 76
End: 2021-02-17

## 2021-02-24 LAB
CULTURE RESULTS: SIGNIFICANT CHANGE UP
SPECIMEN SOURCE: SIGNIFICANT CHANGE UP

## 2021-04-16 NOTE — AIRWAY PLACEMENT NOTE ADULT - AIRWAY TYPE
endotracheal tube with subglottic suction Prednisone Counseling:  I discussed with the patient the risks of prolonged use of prednisone including but not limited to weight gain, insomnia, osteoporosis, mood changes, diabetes, susceptibility to infection, glaucoma and high blood pressure.  In cases where prednisone use is prolonged, patients should be monitored with blood pressure checks, serum glucose levels and an eye exam.  Additionally, the patient may need to be placed on GI prophylaxis, PCP prophylaxis, and calcium and vitamin D supplementation and/or a bisphosphonate.  The patient verbalized understanding of the proper use and the possible adverse effects of prednisone.  All of the patient's questions and concerns were addressed.

## 2021-06-02 DIAGNOSIS — Z11.59 ENCOUNTER FOR SCREENING FOR OTHER VIRAL DISEASES: ICD-10-CM

## 2021-11-09 NOTE — ASU PREOP CHECKLIST - VIA
Patricia is a 89 year old who is being evaluated via a billable telephone visit.      What phone number would you like to be contacted at? [phone]  How would you like to obtain your AVS? Mail a copy     [facility] ENDOCRINOLOGY    Endocrine Note 2021    Patricia Bose, 1924, [id number]          Reason for visit      1. Osteopenia, unspecified location    2. Type 2 diabetes mellitus without complication, without long-term current use of insulin (H)        History     Patricia Bose is a very pleasant 89 year old old female who presents for follow up.  SUMMARY:    Patricia is contacted today in f/u for Osteopenia and Type 2 DM.  She has been getting Prolia injections without difficulties. Her last Dexa Scan showed both increase in BMD and stability. These are both considered to be a positive response to treatment. Her last Vit D and Calcium levels were normal. She has had no falls in the last year.    Her current A1c is 6.6 and quite good. She is currently managing her BG with diet only.     Blood Glucose Log: *States she is waiting for a new glucose meter as her current one has .      128      133      132      117      100      Past Medical History     Patient Active Problem List   Diagnosis     Osteopenia     Mixed hyperlipidemia     Hypertension     Insomnia     CAD (coronary artery disease)     Type 2 diabetes mellitus (H)     Chronic kidney disease, stage 3 (H)     Other chronic pancreatitis (H)     IPF (idiopathic pulmonary fibrosis) (H)     Hypercalcemia     Osteopenia     Protein in urine       Family History       family history includes Breast Cancer (age of onset: 52.00) in her mother; Breast Cancer (age of onset: 72.00) in her sister; Cancer in her father; Coronary Artery Disease in her brother; Coronary Artery Disease (age of onset: 67.00) in her father; Coronary Artery Disease (age of onset: 78.00) in her brother; Diabetes in her brother;  Parkinsonism in her mother.    Social History      reports that she has never smoked. She has never used smokeless tobacco. She reports current alcohol use of about 4.0 standard drinks of alcohol per week. She reports that she does not use drugs.      Review of Systems     Patient has no polyuria or polydipsia, no chest pain, dyspnea or TIA's, no numbness, tingling or pain in extremities  Remainder negative except as noted in HPI.      Vital Signs     There were no vitals taken for this visit.  Wt Readings from Last 3 Encounters:   09/13/21 50.1 kg (110 lb 8 oz)   06/23/21 50.5 kg (111 lb 6.4 oz)   03/09/21 49.9 kg (110 lb)       Physical Exam           Assessment     1. Osteopenia, unspecified location    2. Type 2 diabetes mellitus without complication, without long-term current use of insulin (H)        Plan     Patricia will continue on Prolia injections and f/u with me in 1 year.    She will continue to maintain her BG with her diet.            Verónica Lopez NP  [facility]  11/17/2021  8:46 AM            Lab Results   Lab Results: personally reviewed.   Lab on 11/12/2021   Component Date Value     Hemoglobin A1C 11/12/2021 6.6*     Sodium 11/12/2021 138      Potassium 11/12/2021 4.6      Chloride 11/12/2021 100      Carbon Dioxide (CO2) 11/12/2021 26      Anion Gap 11/12/2021 12      Urea Nitrogen 11/12/2021 30*     Creatinine 11/12/2021 0.97      Calcium 11/12/2021 10.3      Glucose 11/12/2021 138*     GFR Estimate 11/12/2021 50*     Microalbumin Urine mg/dL 11/12/2021 7.31*     Creatinine Urine mg/dL 11/12/2021 27      Microalbumin Urine mg/g * 11/12/2021 270.7*   Office Visit - [facility] on 06/23/2021   Component Date Value     Cholesterol 06/23/2021 158      Triglycerides 06/23/2021 87      Direct Measure HDL 06/23/2021 55      LDL Cholesterol Calculat* 06/23/2021 86      Patient Fasting > 8hrs? 06/23/2021 Unknown      Hemoglobin A1C 06/23/2021 7.0*     Sodium 06/23/2021 138      Potassium 06/23/2021  4.7      Chloride 06/23/2021 102      Carbon Dioxide (CO2) 06/23/2021 20*     Anion Gap 06/23/2021 16      Glucose 06/23/2021 143*     Urea Nitrogen 06/23/2021 28      Creatinine 06/23/2021 0.97      GFR Estimate If Black 06/23/2021 >60      GFR Estimate 06/23/2021 53*     Bilirubin Total 06/23/2021 0.4      Calcium 06/23/2021 9.7      Protein Total 06/23/2021 7.0      Albumin 06/23/2021 4.2      Alkaline Phosphatase 06/23/2021 48      AST 06/23/2021 30      ALT 06/23/2021 20      WBC 06/23/2021 7.1      RBC Count 06/23/2021 4.04      Hemoglobin 06/23/2021 12.2      Hematocrit 06/23/2021 38.4      MCV 06/23/2021 95      MCH 06/23/2021 30.2      MCHC 06/23/2021 31.8*     RDW 06/23/2021 13.8      Platelet Count 06/23/2021 180      Mean Platelet Volume 06/23/2021 11.3*     % Neutrophils 06/23/2021 70      % Lymphocytes 06/23/2021 19*     % Monocytes 06/23/2021 7      % Eosinophils 06/23/2021 3      % Basophils 06/23/2021 1      % Immature Granulocytes 06/23/2021 0      Absolute Neutrophils 06/23/2021 4.9      Absolute Lymphocytes 06/23/2021 1.4      Absolute Monocytes 06/23/2021 0.5      Eosinophils Absolute 06/23/2021 0.2      Absolute Basophils 06/23/2021 0.1      Absolute Immature Granul* 06/23/2021 0.0    Ambulatory - [facility] on 05/21/2021   Component Date Value     Erythrocyte Sedimentatio* 05/21/2021 31*     CRP 05/21/2021 0.3      Platelet Count 05/21/2021 218        Fingerstick Blood Glucose: @BBIBDBM78OGB(POCGLUFGR:10)@      Recent Labs   Lab Test 06/12/19  1039   TSH 1.95     Invalid input(s): CORTPRE, WNVG52YAD, [id number]  @LABRCNTIP(NA,K,CL,co2,bun,creatinine,calcium,labalbu,prot,bilitot,alkphos,alt,ast,glucose)@        No results found for: TESTOSTERONE  Untested Testosterone, Free  No components found for: LABTEST    Current Medications     Outpatient Medications Prior to Visit   Medication Sig Dispense Refill     amoxicillin (AMOXIL) 500 MG capsule [AMOXICILLIN (AMOXIL) 500 MG CAPSULE] Take 500 mg by  mouth. Take 4 pills prior to dental work       aspirin 81 MG EC tablet [ASPIRIN 81 MG EC TABLET] Take 81 mg by mouth daily.       atorvastatin (LIPITOR) 40 MG tablet [ATORVASTATIN (LIPITOR) 40 MG TABLET] Take 1 tablet (40 mg total) by mouth daily. 90 tablet 1     blood glucose monitoring (NO BRAND SPECIFIED) meter device kit by [employer] route daily 1 kit 0     blood glucose test (ACCU-CHEK AMIRA PLUS TEST STRP) strips [BLOOD GLUCOSE TEST (ACCU-CHEK AMIRA PLUS TEST STRP) STRIPS] USE 1 STRIP DAILY TO TEST BLOOD SUGAR 100 strip 5     cholecalciferol, vitamin D3, (VITAMIN D3) 1,000 unit capsule [CHOLECALCIFEROL, VITAMIN D3, (VITAMIN D3) 1,000 UNIT CAPSULE] Take 1,000 Units by mouth daily.       DENTA 5000 PLUS 1.1 % Crea [DENTA 5000 PLUS 1.1 % CREA]        DENTAGEL 1.1 % GEL topical gel        generic lancets (ACCU-CHEK FASTCLIX LANCING DEV) [GENERIC LANCETS (ACCU-CHEK FASTCLIX LANCING DEV)] Use 1 each As Directed daily. 200 each 2     glucosamine-chondroitin 500-400 mg cap [GLUCOSAMINE-CHONDROITIN 500-400 MG CAP] Take 2 capsules by mouth daily.       guaiFENesin-codeine (ROBITUSSIN AC) 100-10 MG/5ML solution Take 5-10 mLs by mouth 4 times daily as needed for cough 240 mL 0     hydrochlorothiazide (HYDRODIURIL) 25 MG tablet Take 1 tablet (25 mg) by mouth daily 90 tablet 1     LORazepam (ATIVAN) 0.5 MG tablet [LORAZEPAM (ATIVAN) 0.5 MG TABLET] Take 1 hour prior to imaging and may take a second dose right before procedure if needed. Fill at preferred pharmacy and bring to MRI appointment. You will need a  to bring you home after your appointment. 2 tablet 0     losartan (COZAAR) 25 MG tablet [LOSARTAN (COZAAR) 25 MG TABLET] Take 1 tablet (25 mg total) by mouth daily. 90 tablet 1     magnesium 250 mg Tab [MAGNESIUM 250 MG TAB] Take 1 tablet by mouth daily.       MELATONIN ORAL [MELATONIN ORAL] Take 10 mg by mouth daily.        multivitamin therapeutic (THERAGRAN) tablet [MULTIVITAMIN THERAPEUTIC (THERAGRAN) TABLET]  Take 1 tablet by mouth daily.       vit C/E/Zn/coppr/lutein/zeaxan (PRESERVISION AREDS-2 ORAL) [VIT C/E/ZN/COPPR/LUTEIN/ZEAXAN (PRESERVISION AREDS-2 ORAL)] Take 1 tablet by mouth 2 (two) times a day.       vitamin A-vitamin C-vit E-min (OCUVITE) Tab tablet [VITAMIN A-VITAMIN C-VIT E-MIN (OCUVITE) TAB TABLET] Take 1 tablet by mouth 2 (two) times a day.        denosumab (PROLIA) 60 MG/ML SOSY injection Inject 1 mL (60 mg) Subcutaneous once for 1 dose 1 mL 1     denosumab (PROLIA) injection 60 mg        No facility-administered medications prior to visit.     Study Result    Narrative & Impression   9/30/2020        RE: Patricia Bose  YOB: 1924           Dear Verónica Lpoez,     Patient Profile:  89 y.o. female, postmenopausal, is here for the follow up bone density test.   History of fractures - None. Family history of osteoporosis - Yes;  mother and sibling.  Family history of hip fracture: Yes;  Sister. Smoking history - No. Osteoporosis treatment past -  Yes;  HRT and Bisphosphonates. Osteoporosis treatment current -   Yes;  Prolia.  Chronic medical problems - History of kidney disease, Hysterectomy, Oophorectomy and Premature menopause. High risk medications -  None.        Assessment:     1. The spine bone density L1-L2 with T-score -1.4.  2. Femoral bone densities show left femoral neck T- score -1.5 and right femoral neck T-score -1.6.  3. Trabecular bone score indicates good trabecular bone architecture.        89 y.o. female with LOW BONE DENSITY (OSTEOPENIA) and HIGH fracture risk.      Since the previous bone density dated  September 21, 2018, there has been a   +4.5% change in the bone density of the spine.  Additionally there has been no statistically significant % change in the hips bilaterally.   An increase in/or stability is   considered a positive response to treatment.          Recommendations:  A continuation of the current treatment is recommended with follow up bone density scan  in 2 years.        Bone densitometry was performed on your patient using our [employer] iDXA densitometer. The results are summarized and a copy of the actual scans are included for your review. In conformity with the International Society of Clinical Densitometry's most   recent position statement for DXA interpretation (2015), the diagnosis will be made on the lowest measured T-score of the lumbar spine, femoral neck, total proximal femur or 33% radius. Note the change in terminology for diagnostic classification from   OSTEOPENIA to LOW BONE MASS. All trending for sequential exams will be done using multiple vertebrae or the total proximal femur. Fracture risk is based on the WHO Fracture Risk Assessment Tool (FRAX). If additional information is needed or if you would   like to discuss the results, please do not hesitate to call me.         Thank you for referring this patient to [facility]. We are happy to be of service in support of you and your practice. If you have any questions or suggestions to improve our service, please call me at [phone].      Sincerely,      Tamika Ramirez M.D. C.C.ESSIE.  Osteoporosis Services, [facility]         Phone call duration: 20 minutes    Date of last OV: 11/17/20  Reason for Visit: Osteoporosis & DM    Dexa Scan: 9/30/20  Last Prolia Injection: 5/18/21       stretcher

## 2022-03-07 NOTE — H&P PST ADULT - DOES THIS PATIENT HAVE A HISTORY OF OR HAS BEEN DX WITH HEART FAILURE?
General Surgery Consult      Consulting surgical team: B  Consulting attending: Dr. Bravo      HPI:  Patient is a 69 y/o M, current smoker (1ppd x 50 years), current every day ETOH abuse (1 pint vodka and several beers daily x 50 years), with PMHx of HLD, HTN (not on any medications),  CABG (LIMA to LAD, SVG to PDA in 2017), AS (s/p AVR in 2017), who presented to [facility] ED on 2/24/22 with complaints of 9/10 chest pain with associated diaphoresis that occurred when walking to the bathroom last night around 1 am. Patient states that during this time, he fell and "blacked out" and hit the back of his head. Patient then went back to sleep. Patient states that he had a similiar chest pain 3 days ago that resolved on his own. Patient's wife states that patient has not seen a physician for over 2 years due to insurance issues.  Patient states last drink was 2 shots of vodka last night. Patient denies current chest pain, palpitations, dizziness, diaphoresis, headache, fever, chills, abdominal pain, back pain, n/v/d, recent travel or sick contacts.     In the ED, VS: 97.2, HR: 85 bpm, BP: 121/75 mmHg, RR: 18 breaths/min, spO2: 100% on RA   Labs significant for: Hgb/Hct: 9.5/31.4, AST: 51, Troponin: 0.02. EKG: NSR @ 85 bpm, ST depressions in II, V4, V5, V6. CXR: No cardiopulmonary edema   Patient admitted to cardiology/telemetry for further management of NSTEMI.    (24 Feb 2022 11:57)        PAST MEDICAL HISTORY:  HTN (hypertension)    ETOH abuse    Smoker    Hyperlipidemia    Aortic valve stenosis, unspecified etiology          PAST SURGICAL HISTORY:  No significant past surgical history          MEDICATIONS:        ALLERGIES:  No Known Allergies        VITALS & I/Os:  Vital Signs Last 24 Hrs  T(C): 36.7 (07 Mar 2022 10:06), Max: 36.7 (06 Mar 2022 16:54)  T(F): 98 (07 Mar 2022 10:06), Max: 98 (06 Mar 2022 16:54)  HR: 87 (07 Mar 2022 10:06) (74 - 87)  BP: 103/70 (07 Mar 2022 10:06) (97/64 - 120/78)  BP(mean): --  RR: 18 (07 Mar 2022 10:06) (17 - 18)  SpO2: 97% (07 Mar 2022 10:06) (97% - 98%)    I&O's Summary    06 Mar 2022 07:01  -  07 Mar 2022 07:00  --------------------------------------------------------  IN: 0 mL / OUT: 775 mL / NET: -775 mL          PHYSICAL EXAM:  General: No acute distress  Respiratory: Nonlabored  Cardiovascular: normotensive, regular rate  Abdominal: Soft, nondistended, nontender. No rebound or guarding. No organomegaly, no palpable mass.  Extremities: Warm      LABS:                        8.3    6.84  )-----------( 147      ( 07 Mar 2022 07:47 )             27.5     03-07    139  |  107  |  13  ----------------------------<  103<H>  3.9   |  19<L>  |  0.82    Ca    9.2      07 Mar 2022 07:47  Phos  3.5     03-07  Mg     2.0     03-07    TPro  7.7  /  Alb  2.9<L>  /  TBili  0.7  /  DBili  x   /  AST  74<H>  /  ALT  43  /  AlkPhos  105  03-07    Lactate:    PT/INR - ( 07 Mar 2022 07:48 )   PT: 15.2 sec;   INR: 1.27          PTT - ( 07 Mar 2022 07:48 )  PTT:62.1 sec          Urinalysis Basic - ( 07 Mar 2022 09:51 )    Color: Yellow / Appearance: Cloudy / SG: <=1.005 / pH: x  Gluc: x / Ketone: NEGATIVE  / Bili: Negative / Urobili: 4.0 E.U./dL   Blood: x / Protein: 100 mg/dL / Nitrite: POSITIVE   Leuk Esterase: Trace / RBC: < 5 /HPF / WBC < 5 /HPF   Sq Epi: x / Non Sq Epi: 0-5 /HPF / Bacteria: Many /HPF          IMAGING:                                                                                               General Surgery Consult      Consulting surgical team: 1c  Consulting attending: Dr. Hebert      HPI:  Patient is a 67 y/o M, current smoker (1ppd x 50 years), current every day ETOH abuse (1 pint vodka and several beers daily x 50 years), with PMHx of HLD, HTN (not on any medications),  CABG (LIMA to LAD, SVG to PDA in 2017), AS (s/p AVR in 2017), who presented to [facility] ED on 2/24/22 with complaints of 9/10 chest pain with associated diaphoresis that occurred when walking to the bathroom last night around 1 am. Patient states that during this time, he fell and "blacked out" and hit the back of his head. Patient then went back to sleep. Patient states that he had a similiar chest pain 3 days ago that resolved on his own. Patient's wife states that patient has not seen a physician for over 2 years due to insurance issues.  Patient states last drink was 2 shots of vodka last night. Patient denies current chest pain, palpitations, dizziness, diaphoresis, headache, fever, chills, abdominal pain, back pain, n/v/d, recent travel or sick contacts.     In the ED, VS: 97.2, HR: 85 bpm, BP: 121/75 mmHg, RR: 18 breaths/min, spO2: 100% on RA   Labs significant for: Hgb/Hct: 9.5/31.4, AST: 51, Troponin: 0.02. EKG: NSR @ 85 bpm, ST depressions in II, V4, V5, V6. CXR: No cardiopulmonary edema   Patient admitted to cardiology/telemetry for further management of NSTEMI.    (24 Feb 2022 11:57)    Surgery addendum:   Consult for new HCC diagnosis      PAST MEDICAL HISTORY:  HTN (hypertension)    ETOH abuse    Smoker    Hyperlipidemia    Aortic valve stenosis, unspecified etiology          PAST SURGICAL HISTORY:  No significant past surgical history          MEDICATIONS:        ALLERGIES:  No Known Allergies        VITALS & I/Os:  Vital Signs Last 24 Hrs  T(C): 36.7 (07 Mar 2022 10:06), Max: 36.7 (06 Mar 2022 16:54)  T(F): 98 (07 Mar 2022 10:06), Max: 98 (06 Mar 2022 16:54)  HR: 87 (07 Mar 2022 10:06) (74 - 87)  BP: 103/70 (07 Mar 2022 10:06) (97/64 - 120/78)  BP(mean): --  RR: 18 (07 Mar 2022 10:06) (17 - 18)  SpO2: 97% (07 Mar 2022 10:06) (97% - 98%)    I&O's Summary    06 Mar 2022 07:01  -  07 Mar 2022 07:00  --------------------------------------------------------  IN: 0 mL / OUT: 775 mL / NET: -775 mL          PHYSICAL EXAM:  General: No acute distress  Respiratory: Nonlabored  Cardiovascular: normotensive, regular rate  Abdominal: Soft, nondistended, nontender. No rebound or guarding. No organomegaly, no palpable mass.  Extremities: Warm      LABS:                        8.3    6.84  )-----------( 147      ( 07 Mar 2022 07:47 )             27.5     03-07    139  |  107  |  13  ----------------------------<  103<H>  3.9   |  19<L>  |  0.82    Ca    9.2      07 Mar 2022 07:47  Phos  3.5     03-07  Mg     2.0     03-07    TPro  7.7  /  Alb  2.9<L>  /  TBili  0.7  /  DBili  x   /  AST  74<H>  /  ALT  43  /  AlkPhos  105  03-07    Lactate:    PT/INR - ( 07 Mar 2022 07:48 )   PT: 15.2 sec;   INR: 1.27          PTT - ( 07 Mar 2022 07:48 )  PTT:62.1 sec          Urinalysis Basic - ( 07 Mar 2022 09:51 )    Color: Yellow / Appearance: Cloudy / SG: <=1.005 / pH: x  Gluc: x / Ketone: NEGATIVE  / Bili: Negative / Urobili: 4.0 E.U./dL   Blood: x / Protein: 100 mg/dL / Nitrite: POSITIVE   Leuk Esterase: Trace / RBC: < 5 /HPF / WBC < 5 /HPF   Sq Epi: x / Non Sq Epi: 0-5 /HPF / Bacteria: Many /HPF          IMAGING:                                                                                               General Surgery Consult      Consulting surgical team: 1c  Consulting attending: Dr. Hebert      HPI:  Patient is a 67 y/o M, current smoker (1ppd x 50 years), current every day ETOH abuse (1 pint vodka and several beers daily x 50 years), with PMHx of HLD, HTN (not on any medications),  CABG (LIMA to LAD, SVG to PDA in 2017), AS (s/p AVR in 2017), who presented to [facility] ED on 2/24/22 with complaints of 9/10 chest pain with associated diaphoresis that occurred when walking to the bathroom last night around 1 am. Patient states that during this time, he fell and "blacked out" and hit the back of his head. Patient then went back to sleep. Patient states that he had a similiar chest pain 3 days ago that resolved on his own. Patient's wife states that patient has not seen a physician for over 2 years due to insurance issues.  Patient states last drink was 2 shots of vodka last night. Patient denies current chest pain, palpitations, dizziness, diaphoresis, headache, fever, chills, abdominal pain, back pain, n/v/d, recent travel or sick contacts.     In the ED, VS: 97.2, HR: 85 bpm, BP: 121/75 mmHg, RR: 18 breaths/min, spO2: 100% on RA   Labs significant for: Hgb/Hct: 9.5/31.4, AST: 51, Troponin: 0.02. EKG: NSR @ 85 bpm, ST depressions in II, V4, V5, V6. CXR: No cardiopulmonary edema   Patient admitted to cardiology/telemetry for further management of NSTEMI.    (24 Feb 2022 11:57)    Surgery addendum:   Patient accompanied by sister. Patient lethargic, opened his eyes to pain. Consulted by primary team for new HCC diagnosis. Patient admitted on Feb 24th.     PAST MEDICAL HISTORY:  HTN (hypertension)  ETOH abuse  Smoker  Hyperlipidemia  Aortic valve stenosis, unspecified etiology      PAST SURGICAL HISTORY:  No significant past surgical history        MEDICATIONS:  MEDICATIONS  (STANDING):  artificial tears (preservative free) Ophthalmic Solution 1 Drop(s) Both EYES two times a day  aspirin enteric coated 81 milliGRAM(s) Oral daily  atorvastatin 80 milliGRAM(s) Oral at bedtime  ferrous    sulfate 325 milliGRAM(s) Oral <User Schedule>  folic acid 1 milliGRAM(s) Oral daily  heparin   Injectable 5000 Unit(s) SubCutaneous every 8 hours  lactulose Syrup 20 Gram(s) Oral three times a day  multivitamin 1 Tablet(s) Oral daily  nicotine - 21 mG/24Hr(s) Patch 1 patch Transdermal daily  pantoprazole    Tablet 40 milliGRAM(s) Oral daily  rifAXIMin 550 milliGRAM(s) Oral two times a day    MEDICATIONS  (PRN):  sodium chloride 0.65% Nasal 1 Spray(s) Both Nostrils four times a day PRN Nasal Congestion        ALLERGIES:  No Known Allergies        VITALS & I/Os:  Vital Signs Last 24 Hrs  T(C): 36.7 (07 Mar 2022 10:06), Max: 36.7 (06 Mar 2022 16:54)  T(F): 98 (07 Mar 2022 10:06), Max: 98 (06 Mar 2022 16:54)  HR: 87 (07 Mar 2022 10:06) (74 - 87)  BP: 103/70 (07 Mar 2022 10:06) (97/64 - 120/78)  BP(mean): --  RR: 18 (07 Mar 2022 10:06) (17 - 18)  SpO2: 97% (07 Mar 2022 10:06) (97% - 98%)    I&O's Summary    06 Mar 2022 07:01  -  07 Mar 2022 07:00  --------------------------------------------------------  IN: 0 mL / OUT: 775 mL / NET: -775 mL          PHYSICAL EXAM:  General: No acute distress  Respiratory: Nonlabored  Cardiovascular: normotensive, regular rate  Abdominal: Soft, nondistended, nontender. No rebound or guarding. No organomegaly, no palpable mass.  Extremities: Warm      LABS:                        8.3    6.84  )-----------( 147      ( 07 Mar 2022 07:47 )             27.5     03-07    139  |  107  |  13  ----------------------------<  103<H>  3.9   |  19<L>  |  0.82    Ca    9.2      07 Mar 2022 07:47  Phos  3.5     03-07  Mg     2.0     03-07    TPro  7.7  /  Alb  2.9<L>  /  TBili  0.7  /  DBili  x   /  AST  74<H>  /  ALT  43  /  AlkPhos  105  03-07    Lactate:    PT/INR - ( 07 Mar 2022 07:48 )   PT: 15.2 sec;   INR: 1.27          PTT - ( 07 Mar 2022 07:48 )  PTT:62.1 sec          Urinalysis Basic - ( 07 Mar 2022 09:51 )    Color: Yellow / Appearance: Cloudy / SG: <=1.005 / pH: x  Gluc: x / Ketone: NEGATIVE  / Bili: Negative / Urobili: 4.0 E.U./dL   Blood: x / Protein: 100 mg/dL / Nitrite: POSITIVE   Leuk Esterase: Trace / RBC: < 5 /HPF / WBC < 5 /HPF   Sq Epi: x / Non Sq Epi: 0-5 /HPF / Bacteria: Many /HPF          IMAGING:                                                                                               General Surgery Consult      Consulting surgical team: 1c  Consulting attending: Dr. Hebert      HPI:  Patient is a 67 y/o M, current smoker (1ppd x 50 years), current every day ETOH abuse (1 pint vodka and several beers daily x 50 years), with PMHx of HLD, HTN (not on any medications),  CABG (LIMA to LAD, SVG to PDA in 2017), AS (s/p AVR in 2017), who presented to [facility] ED on 2/24/22 with complaints of 9/10 chest pain with associated diaphoresis that occurred when walking to the bathroom last night around 1 am. Patient states that during this time, he fell and "blacked out" and hit the back of his head. Patient then went back to sleep. Patient states that he had a similiar chest pain 3 days ago that resolved on his own. Patient's wife states that patient has not seen a physician for over 2 years due to insurance issues.  Patient states last drink was 2 shots of vodka last night. Patient denies current chest pain, palpitations, dizziness, diaphoresis, headache, fever, chills, abdominal pain, back pain, n/v/d, recent travel or sick contacts.     In the ED, VS: 97.2, HR: 85 bpm, BP: 121/75 mmHg, RR: 18 breaths/min, spO2: 100% on RA   Labs significant for: Hgb/Hct: 9.5/31.4, AST: 51, Troponin: 0.02. EKG: NSR @ 85 bpm, ST depressions in II, V4, V5, V6. CXR: No cardiopulmonary edema   Patient admitted to cardiology/telemetry for further management of NSTEMI.    (24 Feb 2022 11:57)    Surgery addendum:   Patient accompanied by sister. Patient lethargic, opened his eyes to pain. Consulted by primary team for new HCC diagnosis. Patient admitted on Feb 24th for chest pain, diaphoresis and syncopal episode. Patient diagnosed with NSTEMI with [facility] with nonobstructive disease, then found to have restenosis of bioprosthetic valve for which aortic root surgery was planned. However patient was found not fit for surgery due to poor functional status, noncompliance to medication, alcoholism, cirrhosis, hepatic encephalopathy and liver lesion found in CT scan. Patient transferred to the medical service. Patient diagnosed with mild encephalopathy, GI consulted recommended starting lactulose and rifaximin also holding librium because it is a possible cause of altered mental status. GI also recommended 3 phase CT abdomen for characterization of liver mass. CT shows 2.5 cm left hepatic lesion consistent with LI-RADS v 2018 Category: LR-5 Definitely HCC. OPTN Category (if LR-5): 5B. No locally invasive or metastatic disease. Left hepatic artery variant. No additional suspicious hepatic lesions. Border line splenomegaly. Abdominal US The portal veins, hepatic veins and hepatic arteries are patent with normal directionality of flow. Cirrhosis. Since 2/28/2022, again a 2.9 cm heterogeneous lesion is present within the left lobe of the liver suspicious for hepatoma.      PAST MEDICAL HISTORY:  HTN (hypertension)  ETOH abuse  Smoker  Hyperlipidemia  Aortic valve stenosis, unspecified etiology      PAST SURGICAL HISTORY:  No significant past surgical history      MEDICATIONS:  MEDICATIONS  (STANDING):  artificial tears (preservative free) Ophthalmic Solution 1 Drop(s) Both EYES two times a day  aspirin enteric coated 81 milliGRAM(s) Oral daily  atorvastatin 80 milliGRAM(s) Oral at bedtime  ferrous    sulfate 325 milliGRAM(s) Oral <User Schedule>  folic acid 1 milliGRAM(s) Oral daily  heparin   Injectable 5000 Unit(s) SubCutaneous every 8 hours  lactulose Syrup 20 Gram(s) Oral three times a day  multivitamin 1 Tablet(s) Oral daily  nicotine - 21 mG/24Hr(s) Patch 1 patch Transdermal daily  pantoprazole    Tablet 40 milliGRAM(s) Oral daily  rifAXIMin 550 milliGRAM(s) Oral two times a day    MEDICATIONS  (PRN):  sodium chloride 0.65% Nasal 1 Spray(s) Both Nostrils four times a day PRN Nasal Congestion      ALLERGIES:  No Known Allergies        VITALS & I/Os:  Vital Signs Last 24 Hrs  T(C): 36.7 (07 Mar 2022 10:06), Max: 36.7 (06 Mar 2022 16:54)  T(F): 98 (07 Mar 2022 10:06), Max: 98 (06 Mar 2022 16:54)  HR: 87 (07 Mar 2022 10:06) (74 - 87)  BP: 103/70 (07 Mar 2022 10:06) (97/64 - 120/78)  BP(mean): --  RR: 18 (07 Mar 2022 10:06) (17 - 18)  SpO2: 97% (07 Mar 2022 10:06) (97% - 98%)    I&O's Summary    06 Mar 2022 07:01  -  07 Mar 2022 07:00  --------------------------------------------------------  IN: 0 mL / OUT: 775 mL / NET: -775 mL          PHYSICAL EXAM:  General: No acute distress.   Respiratory: Nonlabored  Cardiovascular: normotensive, regular rate  Abdominal: Soft, nondistended, nontender. No rebound or guarding. No organomegaly, no palpable mass.   Extremities: Warm  Neurology: Lethargic, open eyes to pain. Doesn't follows commands Oriented in person and place, not to time.       LABS:                        8.3    6.84  )-----------( 147      ( 07 Mar 2022 07:47 )             27.5     03-07    139  |  107  |  13  ----------------------------<  103<H>  3.9   |  19<L>  |  0.82    Ca    9.2      07 Mar 2022 07:47  Phos  3.5     03-07  Mg     2.0     03-07    TPro  7.7  /  Alb  2.9<L>  /  TBili  0.7  /  DBili  x   /  AST  74<H>  /  ALT  43  /  AlkPhos  105  03-07    Lactate:    PT/INR - ( 07 Mar 2022 07:48 )   PT: 15.2 sec;   INR: 1.27          PTT - ( 07 Mar 2022 07:48 )  PTT:62.1 sec      Urinalysis Basic - ( 07 Mar 2022 09:51 )    Color: Yellow / Appearance: Cloudy / SG: <=1.005 / pH: x  Gluc: x / Ketone: NEGATIVE  / Bili: Negative / Urobili: 4.0 E.U./dL   Blood: x / Protein: 100 mg/dL / Nitrite: POSITIVE   Leuk Esterase: Trace / RBC: < 5 /HPF / WBC < 5 /HPF   Sq Epi: x / Non Sq Epi: 0-5 /HPF / Bacteria: Many /HPF      IMAGING:  CT Abdomen and Pelvis w/ IV Cont:   ACC: [id number] EXAM:  CT ABDOMEN AND PELVIS IC                          PROCEDURE DATE:  03/06/2022          INTERPRETATION:  CLINICAL INFORMATION: Hepatic lesion    COMPARISON: Multiple priors dating back to 2017    PROCEDURE:  CT of the Abdomen and Pelvis was performed with intravenous contrast.  Arterial, Portal Venous and Delayed phases were acquired.  Intravenous contrast: 90 ml Omnipaque 350. 10 ml discarded.  Oral contrast: None.  Sagittal and coronal reformats were performed.    FINDINGS:  LOWER CHEST: Within normal limits.    LIVER: Cirrhosis.  Lesion #: 1  Location: Segment 2/3  Size: 2.5 x 2.5 cm. 5:43  AP Hyperenhancement: YES  PV/Equilibrium Washout: YES  Capsule Appearance: YES  Threshold Growth (>50% size increase in <= 6 months): N/A.  Ancillary features: Fat, nodule within nodule.  LI-RADS v 2018 Category: LR-5 Definitely HCC.  OPTN Category (if LR-5): 5B    BILE DUCTS: Normal caliber.  GALLBLADDER: Within normal limits.  SPLEEN: Borderline splenomegaly.  PANCREAS: Within normal limits.  ADRENALS: Within normal limits.  KIDNEYS/URETERS: Heterogeneous left nephrogram. Significant bilateral   renal artery calcifications.    BLADDER: Within normal limits.  REPRODUCTIVE ORGANS: No pelvic masses. Thickened and hyperenhancing right   levator muscle.    BOWEL: No bowel obstruction.  PERITONEUM: No ascites. Nonspecific right pericolic fat stranding.  VESSELS: Left hepatic artery arising from the left gastric artery.   Accessory left hepatic artery arising from the distalcommon hepatic   artery. Moderate stenosis SMA origin. Extensive arterial atherosclerotic   calcifications. Upper abdominal and mesenteric varices.  RETROPERITONEUM/LYMPH NODES: No lymphadenopathy.  ABDOMINAL WALL: Within normal limits.  BONES: Within normal limits.    IMPRESSION:    2.5 cm left hepatic lesion consistent with LI-RADS v 2018 Category: LR-5   Definitely HCC. OPTN Category (if LR-5): 5B.    No locally invasive or metastatic disease. Left hepatic artery variant.   No additional suspicious hepatic lesions. Other incidental comments as   above.          --- End of Report ---            JETT DE LA CRUZ MD; Attending Radiologist  This document has been electronically signed. Mar  6 2022  4:37PM (03-06-22 @ 15:33)     yes

## 2022-04-07 NOTE — PROGRESS NOTE ADULT - CONVERSATION DETAILS
Per Dr. Jair frederick to refill 88 mcg levothyroxine.    
Called daughter to follow up with conversation from yesterday regarding code status. Daughter appeared very over whelmed. Discussed code status, stated she had spoken to her brothers and they want patient to be a full code.
Family visited yesterday. Called daughter and sister to discuss CMO and palliative extubation. Family agreeable.   Discussed CMO and palliative extubation in detail including meds. Daughter would like to visit at 5 pm and then have comfort measures started thereafter.

## 2022-04-25 NOTE — PATIENT PROFILE ADULT - NSPROMUTINFOINDIVIDFT_GEN_A_NUR
Patient had heart transplant done with [facility], is going to follow with them. Appt cancelled in CHF clinic today. Instructed patient to call if any HF needs arise. n/a

## 2022-05-10 NOTE — H&P ADULT - NSHPPOAPULMEMBOLUS_GEN_A_CORE
[facility]) Dermatology  Stiven Tate M.D.  [phone]       Alee Lopez  2004    15 y.o. female     Date of Visit: 5/10/2022    Chief Complaint:   Chief Complaint   Patient presents with    Skin Lesion        I was asked to see this patient by Dr. Wright ref. provider found. History of Present Illness:  1. Patient presents today with her father for evaluation of a changing pigmented lesion on her left dorsal foot. She has noticed a new dark brown papule within a light tan/pink papule that has been present for years. New papule developed in the last several weeks. Patient is very careful to wear hats and sunscreen-family history positive for grandmother  of malignant melanoma. Father with basal cell carcinoma. Mother with dysplastic nevi    Patient does have a history of flat warts dorsal hands-treated previously with liquid nitrogen. Has noted a tan papule on her left cheek for years but in the last couple of weeks was in the sun and noticed multiple new tan to pink papules on her chin. Review of Systems:  Constitutional: Reports general sense of well-being       Past Medical History, Surgical History, Family History, Medications and Allergies reviewed.     Social History:   Social History     Socioeconomic History    Marital status: Single     Spouse name: Not on file    Number of children: Not on file    Years of education: Not on file    Highest education level: Not on file   Occupational History    Not on file   Tobacco Use    Smoking status: Never Smoker    Smokeless tobacco: Never Used   Vaping Use    Vaping Use: Never used   Substance and Sexual Activity    Alcohol use: No    Drug use: No    Sexual activity: Not on file   Other Topics Concern    Not on file   Social History Narrative    Not on file     Social Determinants of Health     Financial Resource Strain:     Difficulty of Paying Living Expenses: Not on file   Food Insecurity:     Worried About Running Out of Food in the Last Year: Not on file    Ran Out of Food in the Last Year: Not on file   Transportation Needs:     Lack of Transportation (Medical): Not on file    Lack of Transportation (Non-Medical): Not on file   Physical Activity:     Days of Exercise per Week: Not on file    Minutes of Exercise per Session: Not on file   Stress:     Feeling of Stress : Not on file   Social Connections:     Frequency of Communication with Friends and Family: Not on file    Frequency of Social Gatherings with Friends and Family: Not on file    Attends Sikh Services: Not on file    Active Member of [address] [place] [address] [email or web address] or Organizations: Not on file    Attends Club or Organization Meetings: Not on file    Marital Status: Not on file   Intimate Partner Violence:     Fear of Current or Ex-Partner: Not on file    Emotionally Abused: Not on file    Physically Abused: Not on file    Sexually Abused: Not on file   Housing Stability:     Unable to Pay for Housing in the Last Year: Not on file    Number of [place] in the Last Year: Not on file    Unstable Housing in the Last Year: Not on file       Physical Examination       -General: Well-appearing, NAD  1. Well-developed well-nourished  Multiple flattopped pink to tan papules chin and cheeks, dorsal hand  Left dorsal foot 5 mm pink papule with 1 mm dark brown papule at [facility] out atypia  Picture did not save    Assessment and Plan     1. Neoplasm of uncertain behavior of skin-left dorsal foot--Discussed possible diagnosis. Patient agreeable to biopsy. Verbal consent obtained after risks (infection, bleeding, scar), benefits and alternatives explained. -Area(s) to be biopsied were marked with a surgical pen. Site(s) were cleansed with alcohol. Local anesthesia achieved with 1% lidocaine with epinephrine/sodium bicarbonate. Shave biopsy performed with a razor blade. Hemostasis was achieved with aluminum chloride.  The wound(s) were dressed with petrolatum and covered with a bandage. Specimen(s) sent to pathology. Pt educated re: risk of bleeding, infection, scar and wound care instructions. Mupirocin daily under occlusion. Discussed occlusive bandage and wearing shoes-patient is a rower-avoid contact with water, wear shoes and socks. 2. Flat wart-imiquimod cream 3 times per week, slowly increasing as tolerated to 4 or 5 times per week, balancing against irritation. Reviewed side effects and contraindications.   Recheck 1 to 2 months sooner if needed no

## 2022-05-30 NOTE — DISCHARGE NOTE PROVIDER - NSDCHOSPICE_GEN_A_CORE
Provisional Diagnosis:    Psychosis, unspecified  Depression, unspecified    Psychosocial and Contextual Factors:    Patient was released from 6 month care home stay, however he is unable to state why he was there. When asked about what legal charges caused this he states \"seeing stars\". He is not employed or in school  He reports finishing highschool  He reports a history of several psychiatric admissions, stating he has been admitted up to 50 times    -[facility] Summary:     Patient: -[facility] Suicide Screening  1) Within the past month, have you wished you were dead or wished you could go to sleep and not wake up? : Yes  2) Have you actually had any thoughts of killing yourself? : Yes  3) Have you been thinking about how you might kill yourself? : Yes  4) Have you had these thoughts and had some intention of acting on them? : No  5) Have you started to work out or worked out the details of how to kill yourself? Do you intend to carry out this plan? : No  6) Have you ever done anything, started to do anything, or prepared to do anything to end your life?: No  Risk of Suicide: Moderate Risk    Family: Patient does not report having family. Agency: Patient states he was linked with [facility] prior to going [facility] care home    [facility]  Suicidal and Self-Injurious Behavior : Actual suicide attempt - Lifetime (Patient states \"maybe\" when asked if he has had a suicide attempt in the life)  Suicidal Ideation (Most Severe in Past Month): Wish to be dead,Suicidal thoughts  Activating Events (Recent): Pending incarceration or homelessness,Other (comment) (Intrusive sexual thoughts; Auditory and visual hallucinations)  Treatment History: Previous psychiatric diagnosis and treatments  Clinical Status (Recent):  Hopelessness,Agitation or severe anxiety,Mixed affective episode (e.g. bipolar)    Abuse Assessment  Physical Abuse: Denies  Verbal Abuse: Denies  Emotional abuse: Denies  Financial Abuse: Denies  Sexual abuse: Denies  Possible abuse reported to: None needed    Clinical Summary:    Brook Crook was pink slipped at [facility] for suicidal ideation and subsequently brought by EMS to [facility] [place] [facility] [place] for further psychiatric evaluation. Upon interview, Brook Crook is sitting on the edge of his bed getting I.V. fluids. He appears hypervigilant and internally preoccupied with eyes darting around the room. He is disorganized and a poor historian, and at times does not fully answer this RN's questions. When asked why he came to the ED today he reports that he can not live anymore because of what is going on. He reports that he has \"figured it out\" and expressed persecutory and paranoid delusions. He states that he feels there is organized people \"probably the police\" who are making him hear and see things and feels these people are after him. He states he does not know why they are doing this. He reports that for a long time he thought it may have been aliens. He is skeptical of long term medication use and expressed belief that whenever he takes medications for an extended period of time he has \"these episodes\". He does report adherence with medications while in care home and believes he was taking Depakote, Risperdal and Vistaril. He frequently interrupts this RN and asks to speak to Dr. Ana Lilia De La O to [facility] him feel right again\" during the interview. He reports intrusive thoughts that are sexual in nature, but does not elaborate on this. He endorses auditory hallucinations that are constant and say demeaning words constantly such as \"bitch\" and \"chomo\". He also endorses visual hallucinations of \"protons\". He reports a death wish and suicidal thoughts with no specific plan. He reports he will not act on suicidal thoughts, but he is not sure what will happen if he is discharged \"like this\". When asked if he has previously attempted suicide he states \"maybe\". He denies a violence history or having homicidal thoughts at this time.  He denies recent drug use, but does report a history of illicit drug use including crack cocaine and Marijuana. He denies alcohol use. He daily tobacco use. He abruptly ends the interview by asking to speak to someone different.      Level of Care Disposition:       Disposition pending medical clearance         Arlis Opitz, RN  05/30/22 0151 No

## 2022-06-22 NOTE — CONSULT NOTE ADULT - CONSULT REQUESTED DATE/TIME
17-Mar-2019 13:55
18-Mar-2019
18-Mar-2019 14:12
no chest pain, no cough, and no shortness of breath.

## 2022-10-30 NOTE — CONSULT NOTE ADULT - PROVIDER SPECIALTY LIST ADULT
Cardiology possibly 2/2 poor po intake with ongoing abdominal pain. Pt complains of generalized weakness and trouble ambulating, requests new YOSEPH. No focal neurological deficits. PT consulted    Plan:  - encourage PO intake + replete electrolytes   - Fall precautions  - F/u SW as pt would like to be sent to a new YOSEPH on D/C

## 2022-11-30 NOTE — PATIENT PROFILE ADULT - NSPROMUTANXFEARADDRESSFT_GEN_A_NUR
Detail Level: Detailed Add 37237 Cpt? (Important Note: In 2017 The Use Of 31527 Is Being Tracked By Cms To Determine Future Global Period Reimbursement For Global Periods): yes NA

## 2023-01-04 NOTE — DISCHARGE NOTE ADULT - NS AS DC FOLLOWUP STROKE INST
URGENT CARE VISIT NOTE    Chief Complaint   Patient presents with   • URI     Stuffy nose, headache, sweats, and body aches.  Got covid booster a week ago.  Last had covid over a year ago.  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  Is requesting covid testing.        HISTORY     Terell Prieto is a 30 year old adult who presents to the urgent care clinic with stuffy nose, headache, sweats, body aches starting this morning. He is needing covid testing for his .     Medications, Past medical history and Allergies were reviewed in medical record    REVIEW OF SYSTEMS    See history of present illness     PHYSICAL EXAM    Vital Signs:    Vitals:    01/04/23 1041   BP: 129/71   Pulse: 82   Resp: 16   Temp: 97.9 °F (36.6 °C)   TempSrc: Temporal   SpO2: 98%   Weight: 107.3 kg (236 lb 10.6 oz)   Height: 5' 9\" (1.753 m)     General:  Well developed, well-nourished adult in no acute distress.    Chest: Clear to auscultation all fields. No accessory muscle use.  Cardiovascular:  Regular rate & rhythm. Less than 2 second capillary refill.  Musculoskeletal: Without deformity, clubbing, or cyanosis of upper extremities.    Neurologic:  Alert, appropriate.   Skin: Warm and dry. No rash, wounds, or lesions noted on visible skin    Labs  pending    Imaging:  n/a    ASSESSMENT & PLAN      1. Viral illness  - SARS-COV-2/INFLUENZA BY PCR  - OTC meds per package directions prn symptom control   - rest  - push fluids  - Reinforced education in After Visit Summary  - patient verbalized understanding regarding plan of care and all questions answered.  - Return if symptoms worsen or fail to improve.            My collaborating physician is Dr. John Dean DO.   
Stroke (includes: TIA/SAH/ICH/Ischemic Stroke)

## 2023-03-14 NOTE — ED ADULT NURSE NOTE - NS ED NURSE RECORD ANOTHER HT AND WT
Yes Opzelura Counseling:  I discussed with the patient the risks of Opzelura including but not limited to nasopharngitis, bronchitis, ear infection, eosinophila, hives, diarrhea, folliculitis, tonsillitis, and rhinorrhea.  Taken orally, this medication has been linked to serious infections; higher rate of mortality; malignancy and lymphoproliferative disorders; major adverse cardiovascular events; thrombosis; thrombocytopenia, anemia, and neutropenia; and lipid elevations.

## 2023-04-04 NOTE — CONSULT NOTE ADULT - RESPIRATORY
· Will need follow-up imaging and pulmonary function test, would be reasonable to repeat in 1 year unless new or worsening symptoms  · Has mild subpleural disease with some traction bronchiectasis on imaging  Pulmonary function relatively preserved other than a moderate reduction in diffusion  · We will continue with symptom surveillance at this time  Breath Sounds equal & clear to percussion & auscultation, no accessory muscle use

## 2023-05-18 NOTE — PROGRESS NOTE ADULT - ATTENDING COMMENTS
As I have written above Wartpeel Counseling:  I discussed with the patient the risks of Wartpeel including but not limited to erythema, scaling, itching, weeping, crusting, and pain.

## 2023-05-24 NOTE — H&P PST ADULT - ENMT
Chad Easton from [facility] is requesting verbal confirmation that AS will follow home health and sign plan of care. No oral lesions; no gross abnormalities

## 2023-06-09 NOTE — DISCHARGE NOTE ADULT - INFLUENZA IMMUNIZATION DATE (APPROXIMATE):
Conversation had between Dr. Toledo, Jasmina Magaña and her brother, and Rukhsana Krishnamurthy from palliative. Patient's poor prognosis was discussed and poor quality of life. Dr. Toledo explained that patient is not likely to recover and appears to be suffering from the medical perspective. She is on very high doses of levophed, midodrine, and florinef, and hydrocortisone as artificial support for her hypotension and she is no longer tolerating intermittent dialysis well. Decision was made to make the patient DNR/DNI, no further HD sessions, pressors capped, and to transition to full comfort care with plan for likely inpatient hospice. However there is good likelihood patient will pass during this admission.     This discussion was reiterated to the second brother of HCP Jasmina Magaña later in the day when he arrived bedside as well. 15-Oct-2018

## 2023-06-09 NOTE — PATIENT PROFILE ADULT. - FALL HARM RISK CONCLUSION
Hospital Medicine Discharge Summary    Patient ID: Melinda Liang      Patient's PCP: No primary care provider on file. Admit Date: 6/5/2023     Discharge Date:   6/9/2023    Admitting Provider: Juana Doyle MD     Discharge Provider: Juana Doyle MD     Discharge Diagnoses: Active Hospital Problems    Diagnosis     Intractable hiccups [R06.6]     HTN (hypertension) [I10]     Nausea & vomiting [R11.2]        The patient was seen and examined on day of discharge and this discharge summary is in conjunction with any daily progress note from day of discharge. Hospital Course: The patient is a 61-year-old gentleman with history of hypertension, intermittent hiccups, esophageal gastric outflow obstruction was admitted for intractable hiccups. He was recently diagnosed with esophageal gastric outflow obstruction. Patient has been taking baclofen and Thorazine for hiccups but the have been persistent 2 to 3 days prior to presentation. He denies any difficulty swallowing. He reports temporary relief with carbonated soda. He reports that his aunt he had a similar issue in the past and had some surgical management. MRI brain was unrevealing of acute pathology. Esophagram done noted for small hiatus hernia with moderate luminal narrowing proximal to the hiatal sac but temporarily impedes passage of barium tablet. Patient underwent EGD which was noted for 2 cm hiatus hernia with Z-line being regular but no obvious stricture in the distal esophagus. This was empirically balloon dilated but there was no mucosal breakage. There was noted pooling of secretions in the esophagus consistent with esophageal dysmotility. Recommended to continue Protonix with Thorazine and baclofen and GI will refer the patient to tertiary physician for phrenic nerve block to treat his intractable hiccups.   We will discharge home today      Physical Exam Performed:     BP (!) 150/109   Pulse 89   Temp
Fall Risk

## 2023-10-04 NOTE — CONSULT NOTE ADULT - CONSULT REQUESTED DATE/TIME
[facility] Hospitalist Internal Medicine   Progress Note    Primary Care Physician  Dwain Hurd MD    Subjective / Objective:    Patient seen and examined by me. No acute events overnight. Pending courtney sscan, likely will happen tmo.   Avoids tylenol, gives her headaches.  There were no other concerns.     14 point Review of systems is negative except for as noted above.     Physical Exam:  General: Alert and oriented, no acute distress  Eyes: no scleral icterus, no conjunctival erythema   Cardio: S1, S2, RRR, no murmur, rub, gallop or thrills noted.   Pulm: Lungs clear to auscultation bilaterally, no wheeze or rhonchi noted. + sternal chest wall tenderness  GI: Soft, epigastric-tender, nondistended.  : No suprapubic tenderness.  Ext: No upper or lower extremity edema noted. Warm  Musculoskeletal: Grossly normal strength both upper and lower extremities.  Skin: No abnormal bruising or discoloration noted. No jaundice.   Psych: Appropriate mood and affect. Good Insight and Judgment  Neuro: Grossly normal motor and sensory function. Pt appropriately follows commands.    Assessment/Plan:    70 years old female with past medical history CAD status post stents, hyperlipidemia, GERD who presented to ED with mid sternal chest pain at rest the last 2 to 3 days associated with some shortness of breath    #CAD status post stents  #Chest pain likely musculoskeletal given positive chest wall tenderness  Troponins negative  EKG sinus bradycardia with ST-T wave abnormality  - Cardiology on consult  -- Monitor on telemetry  -- Resume aspirin, Plavix, statin  -- courtney scan     Sinus bradycardia:  - Hold metoprolol  -Monitor on telemetry  -Cardiology following     Hypertension uncontrolled  - Restarted on home medications amlodipine 10 mg daily  - Continue to monitor blood pressure parameters and adjust medications as needed     Hypokalemia  - replete prn     Hyperlipidemia:  - Resume home medications     History of GERD:  - 
Continue home Protonix Carafate     Chronic normocytic anemia  - Continue to monitor    #Nicotine Dependence  3-5 cigs / d  - smoking cessation counseling provided x4 minutes  - Discussed with patient on the effects of smoking on health; negative aspects of smoking  - nicotine patch/gum available should patient request          Communication: With patient, nurse, consultants    Code Status: Full Resuscitation  DVT Prophylaxis:    Current Active Medications for DVT Prophylaxis (From admission, onward)         Stop     heparin (porcine) injection 5,000 Units  5,000 Units,   Subcutaneous,   3 times per day         --               Nutrition: Cardiac Diet     I/O's  No intake or output data in the 24 hours ending 10/04/23 0848      ALLERGIES:  Patient has no known allergies.     No data recorded  MAP (mmHg)  Av  Min: 97  Max: 121          HOSPITAL MEDS  Current Facility-Administered Medications   Medication Dose Route Frequency Provider Last Rate Last Admin   • amLODIPine (NORVASC) tablet 10 mg  10 mg Oral Daily Ebony Rdz MD       • atorvastatin (LIPITOR) tablet 40 mg  40 mg Oral Daily Ebony Rdz MD       • aspirin chewable 81 mg  81 mg Oral Daily Ebony Rdz MD       • clopidogrel (PLAVIX) tablet 75 mg  75 mg Oral Daily Ebony Rdz MD       • ferrous sulfate (65 mg Fe per 325 mg) tablet 325 mg  325 mg Oral Daily Ebony Rdz MD       • pantoprazole (PROTONIX) EC tablet 40 mg  40 mg Oral QAM AC Ebony Rdz MD   40 mg at 10/04/23 0533   • heparin (porcine) injection 5,000 Units  5,000 Units Subcutaneous 3 times per day Ebony Rdz MD   5,000 Units at 10/04/23 0700   • colchicine (COLCRYS) tablet 0.6 mg  0.6 mg Oral Daily Ebony Rdz MD       • sucralfate (CARAFATE) tablet 1 g  1 g Oral 4x Daily AC & HS Ebony Rdz MD           Current Facility-Administered Medications   Medication Dose Route Frequency Provider Last Rate Last Admin       Current Facility-Administered 
08-May-2018 10:08
08-May-2018 11:33
Medications   Medication Dose Route Frequency Provider Last Rate Last Admin   • ketorolac (TORADOL) injection 15 mg  15 mg Intravenous Q6H PRN Elio Santacruz APNP       • guaiFENesin 100 MG/5ML solution 200 mg  200 mg Oral Q4H PRN Ebony Rdz MD       • bisacodyl (DULCOLAX) EC tablet 5 mg  5 mg Oral Daily PRN Ebony Rdz MD       • simethicone (MYLICON) tablet 125 mg  125 mg Oral 4x Daily PRN Ebony Rdz MD       • hydrALAZINE (APRESOLINE) tablet 25 mg  25 mg Oral TID PRN Ebony Rdz MD   25 mg at 10/04/23 0442   • melatonin tablet 3 mg  3 mg Oral Nightly PRN Ebony Rdz MD   3 mg at 10/04/23 0105   • acetaminophen (TYLENOL) tablet 650 mg  650 mg Oral Q4H PRN Ebony Rdz MD       • ondansetron (ZOFRAN) injection 4 mg  4 mg Intravenous Q4H PRN Ebony Rdz MD       • sodium chloride (NORMAL SALINE) 0.9 % bolus 500 mL  500 mL Intravenous PRN Ebony Rdz MD              Last Recorded Vitals    SpO2 Readings from Last 3 Encounters:   10/04/23 97%   08/04/23 100%   07/13/23 100%        VITAL SIGNS:     Vital Last Value 24 Hour Range   Temperature 98.6 °F (37 °C) (10/04/23 0755) Temp  Min: 97.9 °F (36.6 °C)  Max: 98.6 °F (37 °C)   Pulse (!) 57 (10/04/23 0755) Pulse  Min: 54  Max: 60   Respiratory 16 (10/04/23 0755) Resp  Min: 14  Max: 18   Non-Invasive  Blood Pressure (!) 166/87 (10/04/23 0755) BP  Min: 147/73  Max: 179/92   Pulse Oximetry 97 % (10/04/23 0755) SpO2  Min: 96 %  Max: 100 %   Arterial   Blood Pressure   No data recorded      Vital Today Admitted   Weight       Height N/A Height: 5' 6\" (167.6 cm) (10/04/23 0028)   BMI N/A              Labs   Recent Labs     10/03/23  1908 10/04/23  0725   WBC 2.7* 3.1*   RBC 3.41* 3.12*   HGB 9.1* 8.3*   HCT 31.0* 28.5*    204   MCV 90.9 91.3   MCH 26.7 26.6   MCHC 29.4* 29.1*   NRBCRE 0 0         Recent Labs     10/03/23  1908 10/04/23  0725   SODIUM 142 142   POTASSIUM 3.2* 4.0   MG  --  2.2   CO2 25 26   ANIONGAP 10 8 
  GLUCOSE 91 97   BUN 14 13   CREATININE 0.79 0.66   CALCIUM 9.0 9.0   BILIRUBIN 0.2 0.2   AST 16 11   GPT 15 13   ALKPT 67 61   GLOB 3.7 3.2   AGR 1.0 1.0        Recent Labs   Lab 10/04/23  0725 10/03/23  1908   SODIUM 142 142   POTASSIUM 4.0 3.2*   CHLORIDE 112* 110   CO2 26 25   BUN 13 14   CREATININE 0.66 0.79   GLUCOSE 97 91   ALBUMIN 3.2* 3.7   AST 11 16   BILIRUBIN 0.2 0.2       No results for input(s): \"PCT\" in the last 72 hours.     Recent Labs   Lab 10/03/23  1908   NTPROB 223*        No results for input(s): \"INR\", \"PT\", \"PTT\" in the last 72 hours.    No results available in last 24 hours    Imaging  XR CHEST AP OR PA   Final Result   Impression:    No acute cardiopulmonary abnormality.      Electronically Signed by: OBINNA BANGURA MD    Signed on: 10/3/2023 8:39 PM    Workstation ID: [id number]          Cultures  Microbiology Results     None          All imaging, labs, vitals and related tests have been reviewed and interpreted by me.       MORE than 50 MINS WERE SPENT ON THIS PATIENTS CARE TODAY. This includes the following: Reviewed all vitals, medications, new orders, I/O, labs, micro, radiology, nurses notes, pertinent consultant notes which are reflected in assessment and plan.This does not include time spent on other items of care such as smoking cessation counseling, prolonged care time, and or advanced care planning if applicable.   (Level 1 PN: 25 min, Level 2 PN: 35 min, Level 3 PN: 50 min)     Grant Brush MD  [facility] Hospitalist  Available via perfect serve  10/4/2023 8:48 AM        
10-May-2018 18:34

## 2023-10-07 NOTE — PATIENT PROFILE ADULT - INDICATE TYPE
Ear Cerumen Removal    Date/Time: 10/7/2023 11:38 AM    Performed by: Kai Barajas RN  Authorized by: Dane Holden MD    Consent:     Consent given by:  Patient    Risks discussed:  Pain and incomplete removal  Universal protocol:     Procedure explained and questions answered to patient or proxy's satisfaction: yes    Procedure details:     Location:  L ear    Procedure type: irrigation      Procedure outcomes: cerumen removed      Equipment/Method of Removal:  Otoscope    Time:  Moderate amount of time  Post-procedure details:     Inspection:  Ear canal clear    Hearing quality:  Improved    Procedure completion:  Tolerated       Health Care Proxy (HCP)

## 2024-01-03 NOTE — H&P PST ADULT - LYMPH NODES
[FreeTextEntry1] : 1. Family h/o colon cancer / change in bowel habits:  [id number] with random biopsies planned  2. GERD / early satiety:  EGD planned  Pertinent available records reviewed Risks of the procedures including but not limited to bleeding / perforation / infection / anesthesia complication / missed  lesions explained to the  patient . The patient expressed understanding and a desire to proceed with the procedures.  Risk of not doing procedures includes but is not limited to missed or delayed diagnosis of gastric pathology, colon cancer or other gastrointestinal pathology  A consultation note was provided to the referring provider No lymphadedenopathy

## 2024-05-23 NOTE — ED ADULT NURSE NOTE - PAIN: PRESENCE, MLM
[facility] MRN [id number] chart reads follow up with pcp- speciality came over as other- possible trauma? Left v/m, CT 5/22. left vm 5/23 lw none denies pain/discomfort

## 2024-11-06 NOTE — PATIENT PROFILE ADULT - STATED REASON FOR ADMISSION
75 yo woman PMH T2DM on insulin, HTN, HLD, hypothyroidism, RA on Prednisone, Fibromyalgia, cerebral aneurysm s/p repair, chronic hypercapnic respiratory failure s/p trach (vent dependent) and chronic PEG 2022, recurrent C. diff infection , persistent GJ tube leaks now s/p GC fistula repair 8/12  admitted 10/7/24 with resp distress and found to have RML opacity     Antibiotics  Ceftriaxone 10/7  Azithro 10/7  Cefepime 10/7--> 10/12  meropenem 10/15 --> 10/23  IV Vanco 10/17 --> 10/21  Vanco via NGT 10/24; 11/1-->  Metronidazole 11/3 -->  Cefepime 11/5 -->      10/10 melena, anemia, blood tx  10/14 EGD for GJ exchange and piror PEG site closure  One benign-appearing, intrinsic moderate stenosis was found in the upper third of the        esophagus. The stenosis was traversed.       The cardia and gastric fundus were normal.       A gastric tube with tip in the jejunum was found in the gastric body. The PEG required        removal because it was leaking. The J tube extension (12F) was removed first. An externally        removable 24 Fr EndoVive Safety gastrostomy tube was lubricated. The guide wire was passed        through the existing G-tube port and snared endoscopically. The endoscope and snare were then        removed, pulling the wire out through the mouth. The g-tube was tied to the guidewire, pulled        through the mouth into the stomach and then pulled out from the stomach through the skin. The        bumper was attached to the gastrostomy tube. The feeding tube was then cut to an appropriate        length. The final position of the gastrostomy tube was confirmed by relook endoscopy, and        skin marking noted to be 3 cm at the external bumper. The final tension and compression of        the abdominal wall by the PEG tube and external bumper were checked and revealed that the        bumper was moderately tight and mildly deforming the skin. The J tube extension (12 F        EndoVive Jejunal feeding tube) was advanced into the stomach. The tip of the J tube had a        loop thread that was captured with a Resolution clip. The thread and the J tube extension        were advanced to the proximal jejunum, and the endoclip along with the tip of the J tube was        attached to the wall securely. The J tube extension was capped, and the tube site was cleaned        and dressed.       A small fistula was found on the anterior wall of the gastric body related to prior G tube        site. Coagulation of tissue near the fistula using argon plasma at 1.2 liters/minute and 35        peraza was successful. To closure the gastrocutaneous fistula, four hemostatic clips were        successfully placed (MR conditional, two 16 mm DuraClip and 2 Mantis clips.       The examined duodenum was normal.    10/14, 10/15 Fever, transient hypoxia post procedure  10/15 ProCalcitonin = 8.48 suggestive of bacterial process; BCx x2 NGTD; Trach: Pseudomonas   - though benign mg stain  10;16 Leukocytosis WBC = 14.26  10/17 fever, hypotension, abd distension, no diarrhea noted this am WBC = 15.02; Rising Procalcitonin = 38.49; Obstructed J tube   10/18  lost IV access re-gained  - CT scan late this afternoon  WBC = 8.68; Rising Procalcitonin >100  10/18 imaging suggests multifocal pneumonia  10/21 improved chest exam, Procalcitonin level considerably decreased, decreased FiO2  - afebrile since 10/18  10/21 UGI endoscopy done  Findings:       The esophagus was normal. A fistula was found on the anterior wall of the gastric body.       There was evidence of a gastrostomy present in the gastric body. The patient was placed in        the supine position. The endoscope was advanced to the jejunum and the prior placed J tube        with loop attached to the wall and the loop thread was cut by endoclip. The J tube and the G        tube were removed. The gastrostomy fistula was utilized and an Avanos 22 F        Gastrostomy/Jejunal tube was advanced. The jejunal tip was advanced through the pylorus and        into the duodenum. The endoscope was then advanced to the duodenum and the loop thread at the        tip of the J tube was captured and advanced to the proximal jejunum. The loop thread was        clipped to wall by a [employer] Resolution clip. The J tube flushed easily and the G        tube decompress the abdomen easily. The internal balloon was insufflated with 10 cc of water        to hold the GJ tube in place. The outside marking was at 3.  10/23  no distress, liquid stools noted  - Meropenem discontinued  GI PCR NEG  10/24  persistent diarrhea  Cdiff NEG; enteral vanco stopped and Immodium provided  10/28 low grade temps, mild leukocytosis  10/30 blood transfusion  11/1  fever  +Cdiff  11/3 continued fever  - +BC Stph epi most likely procurement contaminant,  modest diarrhea reported  - daughter described increased diarrhea in evenings - Pseudomonas airway colonization is long term, no suggestion of pneumonia at present, sacral decub remains superifical will granulated without signs of infection  11/4  - fever, abnormal chest exam though FIO2 still 30%  rectal tube inserted for increased diarrhea  11/5 lethargy, fever, leukocytosis, increased lactate, hyponatremia  11/6 sputum gram stain suggests persistent Pseudomonas colonization  - continued fever, leukocytosis      Issues  recurrent Cdiff  cerebral aneurysm s/p repair  chronic hypercapnic respiratory failure s/p trach (vent dependent) and chronic PEG 2022  anemia  - had iron deficiency  Pseudomonas aeruginosa upper airway colonization  (most recent isolate Resist to Cipro/Levo)  T2DM on insulin  HTN  HLD  hypothyroidism  RA on Prednisone  Fibromyalgia  debility  sacral ulcer largely healed  malnourished/chronic catabolic state      Suggest  Continue vanco 125 q 6 hours via J tube 11/1 -->  Flagyl 11/3--> for Cdiff  Cefepime 11/5 -->  Repeat Procalcitonin    discussed with [facility] NP     shortness of breath

## 2025-01-13 NOTE — PRE-ANESTHESIA EVALUATION ADULT - NSANTHTOBACCOSD_GEN_ALL_CORE
Pharmacy / CDM Team made patient outreach attempt.    Reason: need to schedule - missed appt  Outcome:   -Communicated with patient and scheduled with Dr. Dan Reyes, PharmD - [facility] on 02/18/25        Pharmacy / Chronic Disease Management (CDM)  [facility]  Phone: [phone]    No

## 2025-03-27 NOTE — CONSULT NOTE ADULT - I WAS PHYSICALLY PRESENT FOR THE KEY PORTIONS OF THE EVALUATION AND MANAGEMENT (E/M) SERVICE PROVIDED.  I AGREE WITH THE ABOVE HISTORY, PHYSICAL, AND PLAN WHICH I HAVE REVIEWED AND EDITED WHERE APPROPRIATE
Statement Selected
pt w/ epistaxis, initially trialed afrin w/o relief, eventually packed w/ nasal packing and observed for hemostasis.
